# Patient Record
Sex: FEMALE | Race: WHITE | Employment: OTHER | URBAN - METROPOLITAN AREA
[De-identification: names, ages, dates, MRNs, and addresses within clinical notes are randomized per-mention and may not be internally consistent; named-entity substitution may affect disease eponyms.]

---

## 2017-03-02 ENCOUNTER — TELEPHONE (OUTPATIENT)
Dept: FAMILY MEDICINE | Facility: CLINIC | Age: 77
End: 2017-03-02

## 2017-03-02 DIAGNOSIS — E78.5 HYPERLIPIDEMIA LDL GOAL <70: Primary | ICD-10-CM

## 2017-03-02 DIAGNOSIS — I10 ESSENTIAL HYPERTENSION WITH GOAL BLOOD PRESSURE LESS THAN 140/90: ICD-10-CM

## 2017-03-02 RX ORDER — LISINOPRIL 5 MG/1
5 TABLET ORAL DAILY
Qty: 30 TABLET | Refills: 0 | Status: SHIPPED | OUTPATIENT
Start: 2017-03-02 | End: 2017-03-21

## 2017-03-02 NOTE — TELEPHONE ENCOUNTER
Reason for Call:  Medication or medication refill:    Do you use a [facility]?  Name of the pharmacy and phone number for the current request:  [facility] [place] [phone]    Name of the medication requested: Pt calling - She is out of Lisinopril and needs refill today please.  No need to call patient back, unless there are questions or problems.      Lisinopril      Last Written Prescription Date: 09/06/16  Last Fill Quantity: 90, # refills: 1    Last Office Visit with [facility], [facility] or [facility] prescribing provider:  09/06/16   Future Office Visit:        BP Readings from Last 3 Encounters:   09/06/16 112/62   07/06/16 134/70   06/27/16 112/70     Other request:     Can we leave a detailed message on this number? YES    Phone number patient can be reached at: Home number on file [phone] (home)    Best Time: any    Call taken on 3/2/2017 at 10:38 AM by Neyda Meza

## 2017-03-02 NOTE — TELEPHONE ENCOUNTER
Medication is being filled for 1 time refill only due to:  Patient needs labs BMP, Chol.     Prescription approved per [facility] Refill [facility].      Sumaya BENAVIDEZ Rn

## 2017-03-21 ENCOUNTER — OFFICE VISIT (OUTPATIENT)
Dept: FAMILY MEDICINE | Facility: CLINIC | Age: 77
End: 2017-03-21
Payer: MEDICARE

## 2017-03-21 VITALS
DIASTOLIC BLOOD PRESSURE: 60 MMHG | SYSTOLIC BLOOD PRESSURE: 114 MMHG | WEIGHT: 207.8 LBS | BODY MASS INDEX: 35.48 KG/M2 | HEART RATE: 72 BPM | HEIGHT: 64 IN

## 2017-03-21 DIAGNOSIS — I25.10 CORONARY ARTERY DISEASE INVOLVING NATIVE CORONARY ARTERY OF NATIVE HEART WITHOUT ANGINA PECTORIS: ICD-10-CM

## 2017-03-21 DIAGNOSIS — I10 ESSENTIAL HYPERTENSION WITH GOAL BLOOD PRESSURE LESS THAN 140/90: Primary | ICD-10-CM

## 2017-03-21 DIAGNOSIS — M47.896 OTHER OSTEOARTHRITIS OF SPINE, LUMBAR REGION: ICD-10-CM

## 2017-03-21 DIAGNOSIS — E78.5 HYPERLIPIDEMIA LDL GOAL <70: ICD-10-CM

## 2017-03-21 DIAGNOSIS — I10 ESSENTIAL HYPERTENSION WITH GOAL BLOOD PRESSURE LESS THAN 140/90: ICD-10-CM

## 2017-03-21 LAB
ANION GAP SERPL CALCULATED.3IONS-SCNC: 10 MMOL/L (ref 3–14)
BUN SERPL-MCNC: 18 MG/DL (ref 7–30)
CALCIUM SERPL-MCNC: 8.9 MG/DL (ref 8.5–10.1)
CHLORIDE SERPL-SCNC: 107 MMOL/L (ref 94–109)
CHOLEST SERPL-MCNC: 125 MG/DL
CO2 SERPL-SCNC: 25 MMOL/L (ref 20–32)
CREAT SERPL-MCNC: 0.62 MG/DL (ref 0.52–1.04)
GFR SERPL CREATININE-BSD FRML MDRD: ABNORMAL ML/MIN/1.7M2
GLUCOSE SERPL-MCNC: 100 MG/DL (ref 70–99)
HDLC SERPL-MCNC: 41 MG/DL
LDLC SERPL CALC-MCNC: 48 MG/DL
NONHDLC SERPL-MCNC: 84 MG/DL
POTASSIUM SERPL-SCNC: 4.5 MMOL/L (ref 3.4–5.3)
SODIUM SERPL-SCNC: 142 MMOL/L (ref 133–144)
TRIGL SERPL-MCNC: 182 MG/DL

## 2017-03-21 PROCEDURE — 80061 LIPID PANEL: CPT | Performed by: FAMILY MEDICINE

## 2017-03-21 PROCEDURE — 99214 OFFICE O/P EST MOD 30 MIN: CPT | Performed by: FAMILY MEDICINE

## 2017-03-21 PROCEDURE — 80048 BASIC METABOLIC PNL TOTAL CA: CPT | Performed by: FAMILY MEDICINE

## 2017-03-21 PROCEDURE — 36415 COLL VENOUS BLD VENIPUNCTURE: CPT | Performed by: FAMILY MEDICINE

## 2017-03-21 RX ORDER — METOPROLOL SUCCINATE 50 MG/1
50 TABLET, EXTENDED RELEASE ORAL DAILY
Qty: 90 TABLET | Refills: 3 | Status: ON HOLD | OUTPATIENT
Start: 2017-03-21 | End: 2018-05-21

## 2017-03-21 RX ORDER — LISINOPRIL 5 MG/1
5 TABLET ORAL DAILY
Qty: 90 TABLET | Refills: 3 | Status: SHIPPED | OUTPATIENT
Start: 2017-03-21 | End: 2018-03-28

## 2017-03-21 RX ORDER — ATORVASTATIN CALCIUM 40 MG/1
40 TABLET, FILM COATED ORAL DAILY
Qty: 90 TABLET | Refills: 3 | Status: SHIPPED | OUTPATIENT
Start: 2017-03-21 | End: 2018-06-14

## 2017-03-21 NOTE — LETTER
[facility]  [address]  [facility] [address]  Phone: [phone]    March 23, 2017    Ines Pickard  [address] [facility] [address]          Dear Ines,    The results of your recent lab tests were within normal/acceptable limits. Continue current medications and cares. Enclosed is a copy of these results.  If you have any further questions or problems, please contact our office.  Results for orders placed or performed in visit on 03/21/17   Basic metabolic panel  (Ca, Cl, CO2, Creat, Gluc, K, Na, BUN)   Result Value Ref Range    Sodium 142 133 - 144 mmol/L    Potassium 4.5 3.4 - 5.3 mmol/L    Chloride 107 94 - 109 mmol/L    Carbon Dioxide 25 20 - 32 mmol/L    Anion Gap 10 3 - 14 mmol/L    Glucose 100 (H) 70 - 99 mg/dL    Urea Nitrogen 18 7 - 30 mg/dL    Creatinine 0.62 0.52 - 1.04 mg/dL    GFR Estimate >90  Non  GFR Calc   >60 mL/min/1.7m2    GFR Estimate If Black >90   GFR Calc   >60 mL/min/1.7m2    Calcium 8.9 8.5 - 10.1 mg/dL   Lipid Profile with reflex to direct LDL   Result Value Ref Range    Cholesterol 125 <200 mg/dL    Triglycerides 182 (H) <150 mg/dL    HDL Cholesterol 41 (L) >49 mg/dL    LDL Cholesterol Calculated 48 <100 mg/dL    Non HDL Cholesterol 84 <130 mg/dL     Sincerely,      JAIMIE Perez MD/ llc

## 2017-03-21 NOTE — NURSING NOTE
"Chief Complaint   Patient presents with     Lipids     Hypertension     Heart Problem       Initial /60 (BP Location: Right arm, Patient Position: Chair, Cuff Size: Adult Large)  Pulse 72  Ht 5' 3.5\" (1.613 m)  Wt 207 lb 12.8 oz (94.3 kg)  BMI 36.23 kg/m2 Estimated body mass index is 36.23 kg/(m^2) as calculated from the following:    Height as of this encounter: 5' 3.5\" (1.613 m).    Weight as of this encounter: 207 lb 12.8 oz (94.3 kg).  Medication Reconciliation: complete     Noris Walls, CMA      "

## 2017-03-21 NOTE — MR AVS SNAPSHOT
"              After Visit Summary   3/21/2017    Ines Pickard    MRN: [id number]           Patient Information     Date Of Birth          1940        Visit Information        Provider Department      3/21/2017 2:20 PM Chris, MICHAEL Castellanos MD [facility]        Today's Diagnoses     Essential hypertension with goal blood pressure less than 140/90        Coronary artery disease involving native coronary artery of native heart without angina pectoris          Care Instructions    Now that you are off the plavix you could go back to taking ibuprofen once daily for your back if needed.    Recheck in a years.        Follow-ups after your visit        Who to contact     If you have questions or need follow up information about today's clinic visit or your schedule please contact [facility] directly at [phone].  Normal or non-critical lab and imaging results will be communicated to you by MyChart, letter or phone within 4 business days after the clinic has received the results. If you do not hear from us within 7 days, please contact the clinic through MyChart or phone. If you have a critical or abnormal lab result, we will notify you by phone as soon as possible.  Submit refill requests through [facility] or call your pharmacy and they will forward the refill request to us. Please allow 3 business days for your refill to be completed.          Additional Information About Your Visit        MyChart Information     [facility] lets you send messages to your doctor, view your test results, renew your prescriptions, schedule appointments and more. To sign up, go to [email or web address] . Click on \"Log in\" on the left side of the screen, which will take you to the Welcome page. Then click on \"Sign up Now\" on the right side of the page.     You will be asked to enter the access code listed below, as well as some personal information. Please follow the directions to create your username " "and password.     Your access code is: [email or web address]  Expires: 2017  2:48 PM     Your access code will  in 90 days. If you need help or a new code, please call your [place] clinic or [phone].        Care EveryWhere ID     This is your Care EveryWhere ID. This could be used by other organizations to access your [place] medical records  [id number]        Your Vitals Were     Pulse Height BMI (Body Mass Index)             72 5' 3.5\" (1.613 m) 36.23 kg/m2          Blood Pressure from Last 3 Encounters:   17 114/60   16 112/62   16 134/70    Weight from Last 3 Encounters:   17 207 lb 12.8 oz (94.3 kg)   16 204 lb 12.8 oz (92.9 kg)   16 204 lb (92.5 kg)              Today, you had the following     No orders found for display         Where to get your medicines      These medications were sent to [place] [facility] - [address]  [address], [place] [address]    Hours:  [facility] Phone:  [phone]     atorvastatin 40 MG tablet    lisinopril 5 MG tablet    metoprolol 50 MG 24 hr tablet          Primary Care Provider Office Phone # Fax #    R Emanuel Perez [phone]       [address] [address]        Thank you!     Thank you for choosing [facility]  for your care. Our goal is always to provide you with excellent care. Hearing back from our patients is one way we can continue to improve our services. Please take a few minutes to complete the written survey that you may receive in the mail after your visit with us. Thank you!             Your Updated Medication List - Protect others around you: Learn how to safely use, store and throw away your medicines at www.disposemymeds.org.          This list is accurate as of: 3/21/17  2:48 PM.  Always use your most recent med list.                   Brand Name Dispense Instructions for use "    aspirin 81 MG EC tablet     90 tablet    Take 1 tablet (81 mg) by mouth daily       atorvastatin 40 MG tablet    LIPITOR    90 tablet    Take 1 tablet (40 mg) by mouth daily       cyanocolbalamin 100 MCG tablet    vitamin  B-12     Take 50 mcg by mouth daily       lisinopril 5 MG tablet    PRINIVIL/ZESTRIL    90 tablet    Take 1 tablet (5 mg) by mouth daily       metoprolol 50 MG 24 hr tablet    TOPROL-XL    90 tablet    Take 1 tablet (50 mg) by mouth daily       MULTIVITAMIN ADULT Tabs          nitroglycerin 0.4 MG sublingual tablet    NITROSTAT    25 tablet    Place 1 tablet (0.4 mg) under the tongue every 5 minutes as needed for chest pain Maximum of 3 doses in 15 minutes       ranitidine 75 MG tablet    ZANTAC    30 tablet    Take 1 tablet (75 mg) by mouth 2 times daily       TYLENOL PO      Take 1,000 mg by mouth every 6 hours as needed

## 2017-03-21 NOTE — PATIENT INSTRUCTIONS
Now that you are off the plavix you could go back to taking ibuprofen once daily for your back if needed.    Recheck in a years.

## 2017-03-21 NOTE — PROGRESS NOTES
SUBJECTIVE:                                                    Ines Pickard is a 76 year old female who presents to clinic today for the following health issues:      Hyperlipidemia Follow-Up      Rate your low fat/cholesterol diet?: good    Taking statin?  Yes, no muscle aches from statin    Other lipid medications/supplements?:  none     Hypertension Follow-up      Outpatient blood pressures are not being checked.    Low Salt Diet: low salt     Vascular Disease Follow-up:  Coronary Artery Disease (CAD)      Chest pain or pressure, left side neck or arm pain: No    Shortness of breath/increased sweats/nausea with exertion: No    Pain in calves walking 1-2 blocks: No    Worsened or new symptoms since last visit: No    Nitroglycerin use: no    Daily aspirin use: Yes       Amount of exercise or physical activity: 6-7 days/week for an average of 15-30 minutes    Problems taking medications regularly: No    Medication side effects: none    Diet: regular (no restrictions)      PROBLEMS TO ADD ON...  Osteoarthritis of lumbar spine: She has a chronic low backache and this causes discomfort when she does any prolonged walking. However, she likes to walk as much as possible for exercise because of her hypertension and heart disease. In the past she would take some ibuprofen with Tylenol once or twice a day, but was told after her stents were placed to avoid the ibuprofen because of the risk of bleeding. However, now she is off the plate fixed so that this risk would be not nearly as high    Problem list and histories reviewed & adjusted, as indicated.  Additional history: none        Reviewed and updated as needed this visit by clinical staff  Tobacco  Allergies  Med Hx  Surg Hx  Fam Hx  Soc Hx      Reviewed and updated as needed this visit by Provider               ROS:  Constitutional, HEENT, cardiovascular, pulmonary, gi and gu systems are negative, except as otherwise noted.        OBJECTIVE:                    "                                 /60 (BP Location: Right arm, Patient Position: Chair, Cuff Size: Adult Large)  Pulse 72  Ht 5' 3.5\" (1.613 m)  Wt 207 lb 12.8 oz (94.3 kg)  BMI 36.23 kg/m2    GENERAL: healthy, alert and no distress  EYES: Eyes grossly normal to inspection, extraocular movements - intact, and PERRL  NECK: no tenderness, no adenopathy, no asymmetry, no masses, no stiffness; thyroid- normal to palpation  RESP: lungs clear to auscultation - no rales, no rhonchi, no wheezes  CV: regular rates and rhythm, normal S1 S2, no S3 or S4 and no murmur, no click or rub -  MS: Limited range of motion of the lumbar spine with moderate tenderness to palpation         ASSESSMENT/PLAN:                                                    ASSESSMENT:  1. Essential hypertension with goal blood pressure less than 140/90    2. Coronary artery disease involving native coronary artery of native heart without angina pectoris    3. Hyperlipidemia LDL goal <70    4. Other osteoarthritis of spine, lumbar region      She is doing well in regards to problems 1,2 and 3 above. It would be nice to try and settle down her back pain so that she can continue walking and getting appropriate exercise    PLAN:  Orders Placed This Encounter     lisinopril (PRINIVIL/ZESTRIL) 5 MG tablet     atorvastatin (LIPITOR) 40 MG tablet     metoprolol (TOPROL-XL) 50 MG 24 hr tablet       Patient Instructions   Now that you are off the plavix you could go back to taking ibuprofen once daily for your back if needed.    Recheck in a year.      MICHAEL Castellanos [facility]      "

## 2017-03-23 NOTE — PROGRESS NOTES
Please SEND LETTER    Notify patient of acceptable results. Continue current medications and cares.

## 2017-06-23 ENCOUNTER — HOSPITAL ENCOUNTER (EMERGENCY)
Facility: CLINIC | Age: 77
Discharge: HOME | End: 2017-06-23
Attending: EMERGENCY MEDICINE | Admitting: EMERGENCY MEDICINE
Payer: MEDICARE

## 2017-06-23 VITALS
SYSTOLIC BLOOD PRESSURE: 168 MMHG | DIASTOLIC BLOOD PRESSURE: 88 MMHG | HEART RATE: 76 BPM | TEMPERATURE: 97.5 F | OXYGEN SATURATION: 97 %

## 2017-06-23 DIAGNOSIS — S61.452A DOG BITE, HAND, LEFT, INITIAL ENCOUNTER: ICD-10-CM
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
## 2017-06-23 PROCEDURE — 99283 EMERGENCY DEPT VISIT LOW MDM: CPT | Performed by: EMERGENCY MEDICINE

## 2017-06-23 PROCEDURE — 99283 EMERGENCY DEPT VISIT LOW MDM: CPT

## 2017-06-23 RX ORDER — CLINDAMYCIN HCL 300 MG
300 CAPSULE ORAL 3 TIMES DAILY
Qty: 30 CAPSULE | Refills: 0 | Status: SHIPPED | OUTPATIENT
Start: 2017-06-23 | End: 2017-06-29 | Stop reason: SINTOL

## 2017-06-23 RX ORDER — SULFAMETHOXAZOLE/TRIMETHOPRIM 800-160 MG
1 TABLET ORAL 2 TIMES DAILY
Qty: 14 TABLET | Refills: 0 | Status: SHIPPED | OUTPATIENT
Start: 2017-06-23 | End: 2017-06-29 | Stop reason: SINTOL

## 2017-06-23 NOTE — ED AVS SNAPSHOT
[facility] Emergency Department    [address]    [facility] [address]    Phone:  [phone]    Fax:  [phone]                                       Ines Pickard   MRN: [id number]    Department:  [facility] Emergency Department   Date of Visit:  6/23/2017           Patient Information     Date Of Birth          1940        Your diagnoses for this visit were:     Dog bite, hand, left, initial encounter        You were seen by Patrick Rossi MD.      Follow-up Information     Follow up with MICHAEL Perez MD.    Specialty:  Family Practice    Why:  As needed    Contact information:    [address] [address]  [phone]          Discharge Instructions         Dog Bite  A dog bite can cause a wound deep enough to break the skin. In such cases, the wound is cleaned and then closed. Sometimes, the wound is not closed completely. This is so that fluid can drain if the wound becomes infected. In addition to wound care, a tetanus shot may be given, if needed.    Home Care    Wash your hands well with soap and warm water before and after caring for the wound. This helps lower the risk of infection.    Care for the wound as directed. If a dressing was applied to the wound, be sure to change it as directed.    If the wound bleeds, place a clean, soft cloth on the wound. Then firmly apply pressure until the bleeding stops. This may take up to 5 minutes. Do not release the pressure and look at the wound during this time.    Most wounds heal within 10 days. But an infection can occur even with proper treatment. So be sure to check the wound daily for signs of infection (see below).    Antibiotics may be prescribed. These help prevent or treat infection. If you re given antibiotics, take them as directed. Also be sure to complete the medications.  Rabies Prevention  Rabies is a virus that can be carried in certain animals. These can include domestic  animals such as dogs and cats. Pets fully vaccinated against rabies (2 shots) are at very low risk of infection. But because human rabies is almost always fatal, any biting pet should be confined for 10 days as an extra precaution. In general, if there is a risk for rabies, the following steps may need to be taken:    If someone s pet dog has bitten you, it should be kept in a secure area for the next 10 days to watch for signs of illness. (If the pet owner won t allow this, contact your local animal control center.) If the dog becomes ill or dies during that time, contact your local animal control center at once so the animal may be tested for rabies. If the dog stays healthy for the next 10 days, there is no danger of rabies in the animal or you.    If a stray dog bit you, contact your local animal control center. They can give information on capture, quarantine, and animal rabies testing.    If you can t locate the animal that bit you in the next 2 days, and if rabies exists in your region, you may need to receive the rabies vaccine series. Call your health care provider right away. Or, return to the emergency department promptly.    All animal bites should be reported to the local animal control center. If you were not given a form to fill out, you can report this yourself.  Follow-up care  Follow up with your health care provider, or as directed.  When to seek medical advice  Call your health care provider right away if any of these occur:    Signs of infection:    Spreading redness or warmth from the wound    Increased pain or swelling    Fever of 100.4 F (38 C) or higher, or as directed by your health care provider    Colored fluid or pus draining from the wound    Signs of rabies infection:    Headache    Confusion    Strange behavior    Seizure    Decreased ability to move any body part near the wound    Bleeding that cannot be stopped after 5 minutes of firm pressure  Date Last Reviewed: 3/23/2015     3520-1395 [employer]. [address] [place] [address]. All rights reserved. This information is not intended as a substitute for professional medical care. Always follow your healthcare professional's instructions.          24 Hour Appointment Hotline       To make an appointment at any [facility], call [phone] ([phone]). If you don't have a family doctor or clinic, we will help you find one. [facility] clinics are conveniently located to serve the needs of you and your family.             Review of your medicines      START taking        Dose / Directions Last dose taken    clindamycin 300 MG capsule   Commonly known as:  CLEOCIN   Dose:  300 mg   Quantity:  30 capsule        Take 1 capsule (300 mg) by mouth 3 times daily for 10 days   Refills:  0        sulfamethoxazole-trimethoprim 800-160 MG per tablet   Commonly known as:  BACTRIM DS   Dose:  1 tablet   Quantity:  14 tablet        Take 1 tablet by mouth 2 times daily for 7 days   Refills:  0          Our records show that you are taking the medicines listed below. If these are incorrect, please call your family doctor or clinic.        Dose / Directions Last dose taken    aspirin 81 MG EC tablet   Dose:  81 mg   Quantity:  90 tablet        Take 1 tablet (81 mg) by mouth daily   Refills:  3        atorvastatin 40 MG tablet   Commonly known as:  LIPITOR   Dose:  40 mg   Quantity:  90 tablet        Take 1 tablet (40 mg) by mouth daily   Refills:  3        cyanocolbalamin 100 MCG tablet   Commonly known as:  vitamin  B-12   Dose:  50 mcg        Take 50 mcg by mouth daily   Refills:  0        lisinopril 5 MG tablet   Commonly known as:  PRINIVIL/ZESTRIL   Dose:  5 mg   Quantity:  90 tablet        Take 1 tablet (5 mg) by mouth daily   Refills:  3        metoprolol 50 MG 24 hr tablet   Commonly known as:  TOPROL-XL   Dose:  50 mg   Quantity:  90 tablet        Take 1 tablet (50 mg) by mouth daily   Refills:  3         MULTIVITAMIN ADULT Tabs        Refills:  0        nitroglycerin 0.4 MG sublingual tablet   Commonly known as:  NITROSTAT   Dose:  0.4 mg   Quantity:  25 tablet        Place 1 tablet (0.4 mg) under the tongue every 5 minutes as needed for chest pain Maximum of 3 doses in 15 minutes   Refills:  3        ranitidine 75 MG tablet   Commonly known as:  ZANTAC   Dose:  75 mg   Quantity:  30 tablet        Take 1 tablet (75 mg) by mouth 2 times daily   Refills:  11        TYLENOL PO   Dose:  1000 mg        Take 1,000 mg by mouth every 6 hours as needed   Refills:  0                Prescriptions were sent or printed at these locations (2 Prescriptions)                   Other Prescriptions                Printed at Department/Unit printer (2 of 2)         sulfamethoxazole-trimethoprim (BACTRIM DS) 800-160 MG per tablet               clindamycin (CLEOCIN) 300 MG capsule                Orders Needing Specimen Collection     None      Pending Results     No orders found from 6/21/2017 to 6/24/2017.            Pending Culture Results     No orders found from 6/21/2017 to 6/24/2017.            Pending Results Instructions     If you had any lab results that were not finalized at the time of your Discharge, you can call the ED Lab Result RN at [phone]. You will be contacted by this team for any positive Lab results or changes in treatment. The nurses are available 7 days a week from 10A to 6:30P.  You can leave a message 24 hours per day and they will return your call.        Test Results From Your Hospital Stay               Thank you for choosing [facility]       Thank you for choosing [facility] for your care. Our goal is always to provide you with excellent care. Hearing back from our patients is one way we can continue to improve our services. Please take a few minutes to complete the written survey that you may receive in the mail after you visit with us. Thank you!        [facility] Information     [facility] lets you send  "messages to your doctor, view your test results, renew your prescriptions, schedule appointments and more. To sign up, go to [email or web address] . Click on \"Log in\" on the left side of the screen, which will take you to the Welcome page. Then click on \"Sign up Now\" on the right side of the page.     You will be asked to enter the access code listed below, as well as some personal information. Please follow the directions to create your username and password.     Your access code is: [email or web address]  Expires: 2017  9:30 PM     Your access code will  in 90 days. If you need help or a new code, please call your [place] clinic or [phone].        Care EveryWhere ID     This is your Care EveryWhere ID. This could be used by other organizations to access your [place] medical records  [id number]        Equal Access to Services     FEMI LOPEZ : Hadhi Carmona, waanshu lu, qalakia kaalmalondon velazquez, hoda maddox . So [facility] [phone].    ATENCIÓN: Si habla español, tiene a nguyễn disposición servicios gratuitos de asistencia lingüística. Llame al [phone].    We comply with applicable federal civil rights laws and [place] laws. We do not discriminate on the basis of race, color, national origin, age, disability sex, sexual orientation or gender identity.            After Visit Summary       This is your record. Keep this with you and show to your community pharmacist(s) and doctor(s) at your next visit.                  "

## 2017-06-23 NOTE — ED AVS SNAPSHOT
[facility] Emergency Department    [address] [address]    Phone:  [phone]    Fax:  [phone]                                       Ines Pickard   MRN: [id number]    Department:  [facility] Emergency Department   Date of Visit:  6/23/2017           After Visit Summary Signature Page     I have received my discharge instructions, and my questions have been answered. I have discussed any challenges I see with this plan with the nurse or doctor.    ..........................................................................................................................................  Patient/Patient Representative Signature      ..........................................................................................................................................  Patient Representative Print Name and Relationship to Patient    ..................................................               ................................................  Date                                            Time    ..........................................................................................................................................  Reviewed by Signature/Title    ...................................................              ..............................................  Date                                                            Time

## 2017-06-24 NOTE — ED NOTES
Pt hand dressed per ERT - tolerated well - denies any needs or complaints - discussed discharge instructions - verbalizes understanding.

## 2017-06-24 NOTE — ED NOTES
Pt was playing with dog and he got mad and bit her, pt is on a blood thinner, left hand swelling and puncture wounds noted. No pain now ice in place

## 2017-06-24 NOTE — DISCHARGE INSTRUCTIONS
Dog Bite  A dog bite can cause a wound deep enough to break the skin. In such cases, the wound is cleaned and then closed. Sometimes, the wound is not closed completely. This is so that fluid can drain if the wound becomes infected. In addition to wound care, a tetanus shot may be given, if needed.    Home Care    Wash your hands well with soap and warm water before and after caring for the wound. This helps lower the risk of infection.    Care for the wound as directed. If a dressing was applied to the wound, be sure to change it as directed.    If the wound bleeds, place a clean, soft cloth on the wound. Then firmly apply pressure until the bleeding stops. This may take up to 5 minutes. Do not release the pressure and look at the wound during this time.    Most wounds heal within 10 days. But an infection can occur even with proper treatment. So be sure to check the wound daily for signs of infection (see below).    Antibiotics may be prescribed. These help prevent or treat infection. If you re given antibiotics, take them as directed. Also be sure to complete the medications.  Rabies Prevention  Rabies is a virus that can be carried in certain animals. These can include domestic animals such as dogs and cats. Pets fully vaccinated against rabies (2 shots) are at very low risk of infection. But because human rabies is almost always fatal, any biting pet should be confined for 10 days as an extra precaution. In general, if there is a risk for rabies, the following steps may need to be taken:    If someone s pet dog has bitten you, it should be kept in a secure area for the next 10 days to watch for signs of illness. (If the pet owner won t allow this, contact your local animal control center.) If the dog becomes ill or dies during that time, contact your local animal control center at once so the animal may be tested for rabies. If the dog stays healthy for the next 10 days, there is no danger of rabies in the  animal or you.    If a stray dog bit you, contact your local animal control center. They can give information on capture, quarantine, and animal rabies testing.    If you can t locate the animal that bit you in the next 2 days, and if rabies exists in your region, you may need to receive the rabies vaccine series. Call your health care provider right away. Or, return to the emergency department promptly.    All animal bites should be reported to the local animal control center. If you were not given a form to fill out, you can report this yourself.  Follow-up care  Follow up with your health care provider, or as directed.  When to seek medical advice  Call your health care provider right away if any of these occur:    Signs of infection:    Spreading redness or warmth from the wound    Increased pain or swelling    Fever of 100.4 F (38 C) or higher, or as directed by your health care provider    Colored fluid or pus draining from the wound    Signs of rabies infection:    Headache    Confusion    Strange behavior    Seizure    Decreased ability to move any body part near the wound    Bleeding that cannot be stopped after 5 minutes of firm pressure  Date Last Reviewed: 3/23/2015    4401-4644 [employer] [address] [place] [address], [place] [address]. All rights reserved. This information is not intended as a substitute for professional medical care. Always follow your healthcare professional's instructions.

## 2017-06-29 ENCOUNTER — OFFICE VISIT (OUTPATIENT)
Dept: FAMILY MEDICINE | Facility: CLINIC | Age: 77
End: 2017-06-29
Payer: MEDICARE

## 2017-06-29 VITALS
SYSTOLIC BLOOD PRESSURE: 130 MMHG | BODY MASS INDEX: 35.05 KG/M2 | HEART RATE: 64 BPM | DIASTOLIC BLOOD PRESSURE: 70 MMHG | WEIGHT: 201 LBS

## 2017-06-29 DIAGNOSIS — S61.452D DOG BITE OF LEFT HAND, SUBSEQUENT ENCOUNTER: Primary | ICD-10-CM

## 2017-06-29 DIAGNOSIS — I10 ESSENTIAL HYPERTENSION WITH GOAL BLOOD PRESSURE LESS THAN 140/90: ICD-10-CM
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
## 2017-06-29 DIAGNOSIS — I25.10 CORONARY ARTERY DISEASE INVOLVING NATIVE CORONARY ARTERY OF NATIVE HEART WITHOUT ANGINA PECTORIS: ICD-10-CM

## 2017-06-29 DIAGNOSIS — E66.01 MORBID OBESITY DUE TO EXCESS CALORIES (H): ICD-10-CM

## 2017-06-29 PROCEDURE — 99214 OFFICE O/P EST MOD 30 MIN: CPT | Performed by: FAMILY MEDICINE

## 2017-06-29 NOTE — NURSING NOTE
"Chief Complaint   Patient presents with     ER F/U     pt. is here today for follow up on ER visit 6/23/2017. pt. states the medication she was given in the ER made her sick - diarrhea and vomiting. pt. D/C'd medication.      Patient Request     pt. would like Dr. Perez to listen to lungs.        Initial /70 (BP Location: Right arm, Patient Position: Chair, Cuff Size: Adult Large)  Pulse 64  Wt 201 lb (91.2 kg)  BMI 35.05 kg/m2 Estimated body mass index is 35.05 kg/(m^2) as calculated from the following:    Height as of 3/21/17: 5' 3.5\" (1.613 m).    Weight as of this encounter: 201 lb (91.2 kg).  Medication Reconciliation: complete     Noris Walls, ELISA      "

## 2017-06-29 NOTE — MR AVS SNAPSHOT
"              After Visit Summary   2017    Ines Pickard    MRN: [id number]           Patient Information     Date Of Birth          1940        Visit Information        Provider Department      2017 11:00 AM MICHAEL Perez MD [facility] [place] 2]        Today's Diagnoses     Morbid obesity due to excess calories (H)    -  1      Care Instructions     a vitamin without calcium carbonate. If you could find one with calcium citrate that would be better          Follow-ups after your visit        Who to contact     If you have questions or need follow up information about today's clinic visit or your schedule please contact [facility] directly at [phone].  Normal or non-critical lab and imaging results will be communicated to you by MyChart, letter or phone within 4 business days after the clinic has received the results. If you do not hear from us within 7 days, please contact the clinic through [employer] or phone. If you have a critical or abnormal lab result, we will notify you by phone as soon as possible.  Submit refill requests through [employer] or call your pharmacy and they will forward the refill request to us. Please allow 3 business days for your refill to be completed.          Additional Information About Your Visit        MyChart Information     [employer] lets you send messages to your doctor, view your test results, renew your prescriptions, schedule appointments and more. To sign up, go to [email or web address] . Click on \"Log in\" on the left side of the screen, which will take you to the Welcome page. Then click on \"Sign up Now\" on the right side of the page.     You will be asked to enter the access code listed below, as well as some personal information. Please follow the directions to create your username and password.     Your access code is: [email or web address]  Expires: 2017  9:30 PM     Your access code will  in 90 days. If you need help " or a new code, please call your [facility] clinic or [phone].        Care EveryWhere ID     This is your Care EveryWhere ID. This could be used by other organizations to access your [facility] medical records  [id number]        Your Vitals Were     Pulse BMI (Body Mass Index)                64 35.05 kg/m2           Blood Pressure from Last 3 Encounters:   06/29/17 130/70   06/23/17 168/88   03/21/17 114/60    Weight from Last 3 Encounters:   06/29/17 201 lb (91.2 kg)   03/21/17 207 lb 12.8 oz (94.3 kg)   09/06/16 204 lb 12.8 oz (92.9 kg)              Today, you had the following     No orders found for display         Today's Medication Changes          These changes are accurate as of: 6/29/17 11:20 AM.  If you have any questions, ask your nurse or doctor.               Stop taking these medicines if you haven't already. Please contact your care team if you have questions.     clindamycin 300 MG capsule   Commonly known as:  CLEOCIN           sulfamethoxazole-trimethoprim 800-160 MG per tablet   Commonly known as:  BACTRIM DS                    Primary Care Provider Office Phone # Fax #    R Emanuel Perez [phone]       [place] [address]        Equal Access to Services     ALPA LOPEZ AH: Hadii aad ku hadasho Soomaali, waaxda luqadaha, qaybta kaalmada adeegyada, hoda taylor. So [facility] [phone].    ATENCIÓN: Si habla español, tiene a nguyễn disposición servicios gratuitos de asistencia lingüística. Llame al [phone].    We comply with applicable federal civil rights laws and [place] laws. We do not discriminate on the basis of race, color, national origin, age, disability sex, sexual orientation or gender identity.            Thank you!     Thank you for choosing [facility]  for your care. Our goal is always to provide you with excellent care. Hearing back from our patients is one way we can  continue to improve our services. Please take a few minutes to complete the written survey that you may receive in the mail after your visit with us. Thank you!             Your Updated Medication List - Protect others around you: Learn how to safely use, store and throw away your medicines at www.disposemymeds.org.          This list is accurate as of: 6/29/17 11:20 AM.  Always use your most recent med list.                   Brand Name Dispense Instructions for use Diagnosis    aspirin 81 MG EC tablet     90 tablet    Take 1 tablet (81 mg) by mouth daily    CAD (coronary artery disease)       atorvastatin 40 MG tablet    LIPITOR    90 tablet    Take 1 tablet (40 mg) by mouth daily    Coronary artery disease involving native coronary artery of native heart without angina pectoris       cyanocolbalamin 100 MCG tablet    vitamin  B-12     Take 50 mcg by mouth daily        lisinopril 5 MG tablet    PRINIVIL/ZESTRIL    90 tablet    Take 1 tablet (5 mg) by mouth daily    Essential hypertension with goal blood pressure less than 140/90       metoprolol 50 MG 24 hr tablet    TOPROL-XL    90 tablet    Take 1 tablet (50 mg) by mouth daily    Coronary artery disease involving native coronary artery of native heart without angina pectoris       MULTIVITAMIN ADULT Tabs           nitroGLYcerin 0.4 MG sublingual tablet    NITROSTAT    25 tablet    Place 1 tablet (0.4 mg) under the tongue every 5 minutes as needed for chest pain Maximum of 3 doses in 15 minutes    CAD (coronary artery disease)       ranitidine 75 MG tablet    ZANTAC    30 tablet    Take 1 tablet (75 mg) by mouth 2 times daily    Esophageal reflux       TYLENOL PO      Take 1,000 mg by mouth every 6 hours as needed

## 2017-06-29 NOTE — PATIENT INSTRUCTIONS
a vitamin without calcium carbonate. If you could find one with calcium citrate that would be better

## 2017-06-29 NOTE — PROGRESS NOTES
SUBJECTIVE:                                                    Ines Pickard is a 76 year old female who presents to clinic today for the following health issues:      ED/UC Followup:    Facility:  [facility]  Date of visit: 6/23/2017  Reason for visit: left hand injury   Current Status: Improving    The emergency room doctor was concerned about an infection and put her on both clindamycin and trimethoprim sulfa. She took both antibiotics for about one day and then developed significant vomiting and diarrhea, had to stop taking both medications. She is getting better and she feels like the hand is healing but was advised to follow-up in the office      Hyperlipidemia Follow-Up      Rate your low fat/cholesterol diet?: good    Taking statin?  Yes, no muscle aches from statin    Other lipid medications/supplements?:  none    Hypertension Follow-up      Outpatient blood pressures are being checked at home.  Results are in the acceptable range.    Low Salt Diet: no added salt    Vascular Disease Follow-up:  Coronary Artery Disease (CAD)      Chest pain or pressure, left side neck or arm pain: No    Shortness of breath/increased sweats/nausea with exertion: No    Pain in calves walking 1-2 blocks: No    Worsened or new symptoms since last visit: No    Nitroglycerin use: no    Daily aspirin use: Yes      Problem list and histories reviewed & adjusted, as indicated.  Additional history: none        Reviewed and updated as needed this visit by clinical staff       Reviewed and updated as needed this visit by Provider               ROS:  Constitutional, HEENT, cardiovascular, pulmonary, gi and gu systems are negative, except as otherwise noted.        OBJECTIVE:                                                    /70 (BP Location: Right arm, Patient Position: Chair, Cuff Size: Adult Large)  Pulse 64  Wt 201 lb (91.2 kg)  BMI 35.05 kg/m2    GENERAL: healthy, alert and no distress  EYES: Eyes grossly normal to  inspection, extraocular movements - intact, and PERRL  NECK: no tenderness, no adenopathy, no asymmetry, no masses, no stiffness; thyroid- normal to palpation  RESP: lungs clear to auscultation - no rales, no rhonchi, no wheezes  CV: regular rates and rhythm, normal S1 S2, no S3 or S4 and no murmur, no click or rub -  MS: Left hand there is some residual ecchymosis and mild swelling; the puncture wounds from the dog bite seemed to be healing without any associated cellulitis         ASSESSMENT/PLAN:                                                    ASSESSMENT:  1. Dog bite of left hand, subsequent encounter    2. Morbid obesity due to excess calories (H)    3. Coronary artery disease involving native coronary artery of native heart without angina pectoris    4. Essential hypertension with goal blood pressure less than 140/90        PLAN:  Since the hand is healing without obvious infection, we'll leave her off the antibiotics since she could not tolerate them and has an allergy to multiple antibiotics.    She asked if there was a different vitamin that she could take that was not constipating. We'll continue all the other medications same      Patient Instructions    a vitamin without calcium carbonate. If you could find one with calcium citrate that would be better      MICHAEL Perez  [facility]

## 2017-07-05 ENCOUNTER — DOCUMENTATION ONLY (OUTPATIENT)
Dept: OTHER | Facility: CLINIC | Age: 77
End: 2017-07-05

## 2017-07-05 DIAGNOSIS — Z71.89 ADVANCED DIRECTIVES, COUNSELING/DISCUSSION: Chronic | ICD-10-CM

## 2017-08-17 ENCOUNTER — OFFICE VISIT (OUTPATIENT)
Dept: CARDIOLOGY | Facility: CLINIC | Age: 77
End: 2017-08-17
Attending: INTERNAL MEDICINE
Payer: MEDICARE

## 2017-08-17 VITALS
BODY MASS INDEX: 34.45 KG/M2 | SYSTOLIC BLOOD PRESSURE: 131 MMHG | HEART RATE: 95 BPM | WEIGHT: 197.6 LBS | OXYGEN SATURATION: 93 % | DIASTOLIC BLOOD PRESSURE: 74 MMHG

## 2017-08-17 DIAGNOSIS — E78.5 HYPERLIPIDEMIA LDL GOAL <70: Primary | ICD-10-CM

## 2017-08-17 DIAGNOSIS — I25.10 CORONARY ARTERY DISEASE INVOLVING NATIVE HEART WITHOUT ANGINA PECTORIS, UNSPECIFIED VESSEL OR LESION TYPE: ICD-10-CM

## 2017-08-17 PROCEDURE — 99214 OFFICE O/P EST MOD 30 MIN: CPT | Performed by: INTERNAL MEDICINE

## 2017-08-17 NOTE — LETTER
8/17/2017    MICHAEL Perez MD  [address] [facility] [address]    RE: Ines Guajardoick       Dear Colleague,    I had the pleasure of seeing Ines Pickard in the [facility].    HPI and Plan:     Ms. Pickard is a pleasant 76-year-old female with a history of coronary artery disease, status post PCI to the circumflex in 12/2014 at the [facility] when she presented with an acute inferior wall myocardial infarction.  She returns an annual follow-up.    The patient had no recurrent anginal symptoms denying any chest pain, or chest pressure.  She does occasionally get some chest fullness which resolves when she takes an antacid.  She also describes occasional increased dyspnea with exertion when there is increased humidity in the air.  She is a former smoker but has not been formally diagnosed with emphysema.  She has discussed this with her primary provider in the past.    Her most recent lipids from earlier this year showing an LDL of 48.  Her blood pressures recently well-controlled the office today at 131/74.  She is no longer smoking.    Orders Placed This Encounter   Procedures     NM Lexiscan stress test (nuc card)     Follow-Up with Cardiologist       No orders of the defined types were placed in this encounter.      There are no discontinued medications.      Encounter Diagnoses   Name Primary?     Coronary artery disease involving native heart without angina pectoris, unspecified vessel or lesion type      Hyperlipidemia LDL goal <70 Yes       CURRENT MEDICATIONS:  Current Outpatient Prescriptions   Medication Sig Dispense Refill     lisinopril (PRINIVIL/ZESTRIL) 5 MG tablet Take 1 tablet (5 mg) by mouth daily 90 tablet 3     atorvastatin (LIPITOR) 40 MG tablet Take 1 tablet (40 mg) by mouth daily 90 tablet 3     metoprolol (TOPROL-XL) 50 MG 24 hr tablet Take 1 tablet (50 mg) by mouth daily 90 tablet 3     Multiple Vitamins-Minerals  (MULTIVITAMIN ADULT) TABS        cyanocolbalamin (VITAMIN  B-12) 100 MCG tablet Take 50 mcg by mouth daily       ranitidine (ZANTAC) 75 MG tablet Take 1 tablet (75 mg) by mouth 2 times daily 30 tablet 11     aspirin EC 81 MG EC tablet Take 1 tablet (81 mg) by mouth daily 90 tablet 3     nitroglycerin (NITROSTAT) 0.4 MG SL tablet Place 1 tablet (0.4 mg) under the tongue every 5 minutes as needed for chest pain Maximum of 3 doses in 15 minutes 25 tablet 3     Acetaminophen (TYLENOL PO) Take 1,000 mg by mouth every 6 hours as needed         ALLERGIES     Allergies   Allergen Reactions     Amoxicillin GI Disturbance     Tetracycline Other (See Comments)     Yeast infection     Nickel Rash       PAST MEDICAL HISTORY:  Past Medical History:   Diagnosis Date     Anemia due to blood loss, acute 12/30/2014     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     Coronary artery disease 12/2014    Inferior STEMI, stent to Circumflex     Percutaneous transluminal coronary angioplasty hematoma 12/15/2014       PAST SURGICAL HISTORY:  Past Surgical History:   Procedure Laterality Date     APPENDECTOMY  1956     EYE SURGERY       PHACOEMULSIFICATION WITH STANDARD INTRAOCULAR LENS IMPLANT Left 1/25/2016    Procedure: PHACOEMULSIFICATION WITH STANDARD INTRAOCULAR LENS IMPLANT;  Surgeon: Julio César Wallace MD;  Location: [facility] OR     PHACOEMULSIFICATION WITH STANDARD INTRAOCULAR LENS IMPLANT Right 2/22/2016    Procedure: PHACOEMULSIFICATION WITH STANDARD INTRAOCULAR LENS IMPLANT;  Surgeon: Julio César Wallace MD;  Location: [facility] OR     SURGICAL HISTORY OF -   1961    right hand surgery      TONSILLECTOMY & ADENOIDECTOMY  1967       FAMILY HISTORY:  Family History   Problem Relation Age of Onset     CANCER Brother      HEART DISEASE Brother      CANCER Mother      Respiratory Father      HEART DISEASE Father      MI       SOCIAL HISTORY:  Social History     Social History     Marital status:      Spouse name: N/A     Number  of children: N/A     Years of education: N/A     Social History Main Topics     Smoking status: Former Smoker     Packs/day: 0.50     Types: Cigarettes     Quit date: 12/13/2014     Smokeless tobacco: Never Used     Alcohol use No     Drug use: No     Sexual activity: Not Currently     Other Topics Concern     Parent/Sibling W/ Cabg, Mi Or Angioplasty Before 65f 55m? Yes     Social History Narrative       Review of Systems:  Skin:  Positive for scaling     Eyes:  Negative      ENT:  Positive for sinus trouble    Respiratory:  Positive for dyspnea on exertion     Cardiovascular:    Positive for;dizziness;lightheadedness    Gastroenterology: Positive for excessive gas or bloating    Genitourinary:  Positive for urinary frequency;urgency    Musculoskeletal:  Positive for back pain;joint pain;arthritis;neck pain    Neurologic:  Positive for numbness or tingling of feet;numbness or tingling of hands    Psychiatric:  Negative      Heme/Lymph/Imm:  Negative      Endocrine:  Negative        Physical Exam:  Vitals: /74 (BP Location: Right arm, Patient Position: Chair, Cuff Size: Adult Regular)  Pulse 95  Wt 89.6 kg (197 lb 9.6 oz)  SpO2 93%  BMI 34.45 kg/m2    Constitutional:  cooperative, alert and oriented, well developed, well nourished, in no acute distress        Skin:  warm and dry to the touch        Head:  normocephalic        Eyes:  sclera white        ENT:  no pallor or cyanosis        Neck:           Chest:  clear to auscultation          Cardiac: regular rhythm, normal S1/S2, no S3 or S4, apical impulse not displaced, no murmurs, gallops or rubs                  Abdomen:  not assessed this visit        Vascular: not assessed this visit                                        Extremities and Back:  no edema              Neurological:  affect appropriate, oriented to time, person and place          Impression/plan:    1.  Coronary artery disease-the patient is clinically asymptomatic with no recurrent  anginal symptoms.  Her dyspnea with increased humidity appears more consistent with probable underlying lung disease.  I have asked the patient follow back with her primary provider for this.  Her other chest heaviness resolves with antacids and likely represents reflux.  At this time I would recommend continued medical management with aspirin 81 mg daily, and atorvastatin 40 mg daily unchanged for second or prevention.  I will obtain a Lexiscan nuclear stress test in one year when I see the patient back as she will be out four years from her PCI.    2.  Dyslipidemia-I will continue atorvastatin 40 mg daily unchanged for secondary prevention.  Her most recent LDL was 48.    3.  Benign essential hypertension-patient's blood pressures currently well controlled and I will continue her lisinopril, and metoprolol succinate unchanged at the present time.      The patient will return in one year in annual follow-up and we will perform a Lexiscan nuclear stress test at that time.    Thank you for allowing me to participate in the care of your patient.    Sincerely,     Rubens Neri MD     [facility]

## 2017-08-17 NOTE — PROGRESS NOTES
HPI and Plan:     Ms. Pickard is a pleasant 76-year-old female with a history of coronary artery disease, status post PCI to the circumflex in 12/2014 at the [facility] when she presented with an acute inferior wall myocardial infarction.  She returns an annual follow-up.    The patient had no recurrent anginal symptoms denying any chest pain, or chest pressure.  She does occasionally get some chest fullness which resolves when she takes an antacid.  She also describes occasional increased dyspnea with exertion when there is increased humidity in the air.  She is a former smoker but has not been formally diagnosed with emphysema.  She has discussed this with her primary provider in the past.    Her most recent lipids from earlier this year showing an LDL of 48.  Her blood pressures recently well-controlled the office today at 131/74.  She is no longer smoking.    Orders Placed This Encounter   Procedures     NM Lexiscan stress test (nuc card)     Follow-Up with Cardiologist       No orders of the defined types were placed in this encounter.      There are no discontinued medications.      Encounter Diagnoses   Name Primary?     Coronary artery disease involving native heart without angina pectoris, unspecified vessel or lesion type      Hyperlipidemia LDL goal <70 Yes       CURRENT MEDICATIONS:  Current Outpatient Prescriptions   Medication Sig Dispense Refill     lisinopril (PRINIVIL/ZESTRIL) 5 MG tablet Take 1 tablet (5 mg) by mouth daily 90 tablet 3     atorvastatin (LIPITOR) 40 MG tablet Take 1 tablet (40 mg) by mouth daily 90 tablet 3     metoprolol (TOPROL-XL) 50 MG 24 hr tablet Take 1 tablet (50 mg) by mouth daily 90 tablet 3     Multiple Vitamins-Minerals (MULTIVITAMIN ADULT) TABS        cyanocolbalamin (VITAMIN  B-12) 100 MCG tablet Take 50 mcg by mouth daily       ranitidine (ZANTAC) 75 MG tablet Take 1 tablet (75 mg) by mouth 2 times daily 30 tablet 11     aspirin EC 81 MG EC  tablet Take 1 tablet (81 mg) by mouth daily 90 tablet 3     nitroglycerin (NITROSTAT) 0.4 MG SL tablet Place 1 tablet (0.4 mg) under the tongue every 5 minutes as needed for chest pain Maximum of 3 doses in 15 minutes 25 tablet 3     Acetaminophen (TYLENOL PO) Take 1,000 mg by mouth every 6 hours as needed         ALLERGIES     Allergies   Allergen Reactions     Amoxicillin GI Disturbance     Tetracycline Other (See Comments)     Yeast infection     Nickel Rash       PAST MEDICAL HISTORY:  Past Medical History:   Diagnosis Date     Anemia due to blood loss, acute 12/30/2014     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     Coronary artery disease 12/2014    Inferior STEMI, stent to Circumflex     Percutaneous transluminal coronary angioplasty hematoma 12/15/2014       PAST SURGICAL HISTORY:  Past Surgical History:   Procedure Laterality Date     APPENDECTOMY  1956     EYE SURGERY       PHACOEMULSIFICATION WITH STANDARD INTRAOCULAR LENS IMPLANT Left 1/25/2016    Procedure: PHACOEMULSIFICATION WITH STANDARD INTRAOCULAR LENS IMPLANT;  Surgeon: Julio César Wallace MD;  Location: [facility]     PHACOEMULSIFICATION WITH STANDARD INTRAOCULAR LENS IMPLANT Right 2/22/2016    Procedure: PHACOEMULSIFICATION WITH STANDARD INTRAOCULAR LENS IMPLANT;  Surgeon: Julio César Wallace MD;  Location: [facility] OR     SURGICAL HISTORY OF -   1961    right hand surgery      TONSILLECTOMY & ADENOIDECTOMY  1967       FAMILY HISTORY:  Family History   Problem Relation Age of Onset     CANCER Brother      HEART DISEASE Brother      CANCER Mother      Respiratory Father      HEART DISEASE Father      MI       SOCIAL HISTORY:  Social History     Social History     Marital status:      Spouse name: N/A     Number of children: N/A     Years of education: N/A     Social History Main Topics     Smoking status: Former Smoker     Packs/day: 0.50     Types: Cigarettes     Quit date: 12/13/2014     Smokeless tobacco: Never Used     Alcohol use  No     Drug use: No     Sexual activity: Not Currently     Other Topics Concern     Parent/Sibling W/ Cabg, Mi Or Angioplasty Before 65f 55m? Yes     Social History Narrative       Review of Systems:  Skin:  Positive for scaling     Eyes:  Negative      ENT:  Positive for sinus trouble    Respiratory:  Positive for dyspnea on exertion     Cardiovascular:    Positive for;dizziness;lightheadedness    Gastroenterology: Positive for excessive gas or bloating    Genitourinary:  Positive for urinary frequency;urgency    Musculoskeletal:  Positive for back pain;joint pain;arthritis;neck pain    Neurologic:  Positive for numbness or tingling of feet;numbness or tingling of hands    Psychiatric:  Negative      Heme/Lymph/Imm:  Negative      Endocrine:  Negative        Physical Exam:  Vitals: /74 (BP Location: Right arm, Patient Position: Chair, Cuff Size: Adult Regular)  Pulse 95  Wt 89.6 kg (197 lb 9.6 oz)  SpO2 93%  BMI 34.45 kg/m2    Constitutional:  cooperative, alert and oriented, well developed, well nourished, in no acute distress        Skin:  warm and dry to the touch        Head:  normocephalic        Eyes:  sclera white        ENT:  no pallor or cyanosis        Neck:           Chest:  clear to auscultation          Cardiac: regular rhythm, normal S1/S2, no S3 or S4, apical impulse not displaced, no murmurs, gallops or rubs                  Abdomen:  not assessed this visit        Vascular: not assessed this visit                                        Extremities and Back:  no edema              Neurological:  affect appropriate, oriented to time, person and place          Impression/plan:    1.  Coronary artery disease-the patient is clinically asymptomatic with no recurrent anginal symptoms.  Her dyspnea with increased humidity appears more consistent with probable underlying lung disease.  I have asked the patient follow back with her primary provider for this.  Her other chest heaviness resolves with  antacids and likely represents reflux.  At this time I would recommend continued medical management with aspirin 81 mg daily, and atorvastatin 40 mg daily unchanged for second or prevention.  I will obtain a Lexiscan nuclear stress test in one year when I see the patient back as she will be out four years from her PCI.    2.  Dyslipidemia-I will continue atorvastatin 40 mg daily unchanged for secondary prevention.  Her most recent LDL was 48.    3.  Benign essential hypertension-patient's blood pressures currently well controlled and I will continue her lisinopril, and metoprolol succinate unchanged at the present time.      The patient will return in one year in annual follow-up and we will perform a Lexiscan nuclear stress test at that time.    CC  Rubens Neri MD  [address]  [place] [id number]

## 2017-08-17 NOTE — MR AVS SNAPSHOT
"              After Visit Summary   8/17/2017    Ines Pickard    MRN: [id number]           Patient Information     Date Of Birth          1940        Visit Information        Provider Department      8/17/2017 10:00 AM Rubens Neri MD [facility] PHYSICIAN HEART AT [facility]        Today's Diagnoses     Hyperlipidemia LDL goal <70    -  1    Coronary artery disease involving native heart without angina pectoris, unspecified vessel or lesion type           Follow-ups after your visit        Additional Services     Follow-Up with Cardiologist                 Future tests that were ordered for you today     Open Future Orders        Priority Expected Expires Ordered    NM Lexiscan stress test (nuc card) Routine 8/17/2018 8/18/2018 8/17/2017    Follow-Up with Cardiologist Routine 8/17/2018 8/18/2018 8/17/2017            Who to contact     If you have questions or need follow up information about today's clinic visit or your schedule please contact [facility] PHYSICIAN HEART AT [facility] directly at [phone].  Normal or non-critical lab and imaging results will be communicated to you by [employer], letter or phone within 4 business days after the clinic has received the results. If you do not hear from us within 7 days, please contact the clinic through [employer] or phone. If you have a critical or abnormal lab result, we will notify you by phone as soon as possible.  Submit refill requests through [employer] or call your pharmacy and they will forward the refill request to us. Please allow 3 business days for your refill to be completed.          Additional Information About Your Visit        [employer] Information     [employer] lets you send messages to your doctor, view your test results, renew your prescriptions, schedule appointments and more. To sign up, go to [email or web address] . Click on \"Log in\" on the left side of the screen, which will take you to the Welcome page. " "Then click on \"Sign up Now\" on the right side of the page.     You will be asked to enter the access code listed below, as well as some personal information. Please follow the directions to create your username and password.     Your access code is: [email or web address]  Expires: 2017  9:30 PM     Your access code will  in 90 days. If you need help or a new code, please call your [place] clinic or [phone].        Care EveryWhere ID     This is your Care EveryWhere ID. This could be used by other organizations to access your [place] medical records  [id number]        Your Vitals Were     Pulse Pulse Oximetry BMI (Body Mass Index)             95 93% 34.45 kg/m2          Blood Pressure from Last 3 Encounters:   17 131/74   17 130/70   17 168/88    Weight from Last 3 Encounters:   17 89.6 kg (197 lb 9.6 oz)   17 91.2 kg (201 lb)   17 94.3 kg (207 lb 12.8 oz)              We Performed the Following     Follow-Up with Cardiologist        Primary Care Provider Office Phone # Fax #    R Emanuel Perez [phone] [address] [address]        Equal Access to Services     ALPA LOPZE : Hadii aad ku hadasho Soomaali, waaxda luqadaha, qaybta kaalmada adeegyada, waxay idiin hayaan ruben maddox . So [facility] [phone].    ATENCIÓN: Si habla español, tiene a nguyễn disposición servicios gratuitos de asistencia lingüística. Llame al [phone].    We comply with applicable federal civil rights laws and [place] laws. We do not discriminate on the basis of race, color, national origin, age, disability sex, sexual orientation or gender identity.            Thank you!     Thank you for choosing [facility] PHYSICIAN HEART AT [facility]  for your care. Our goal is always to provide you with excellent care. Hearing back from our patients is one way we can continue to improve our services. Please take a few minutes to complete " the written survey that you may receive in the mail after your visit with us. Thank you!             Your Updated Medication List - Protect others around you: Learn how to safely use, store and throw away your medicines at www.disposemymeds.org.          This list is accurate as of: 8/17/17 10:03 AM.  Always use your most recent med list.                   Brand Name Dispense Instructions for use Diagnosis    aspirin 81 MG EC tablet     90 tablet    Take 1 tablet (81 mg) by mouth daily    CAD (coronary artery disease)       atorvastatin 40 MG tablet    LIPITOR    90 tablet    Take 1 tablet (40 mg) by mouth daily    Coronary artery disease involving native coronary artery of native heart without angina pectoris       cyanocolbalamin 100 MCG tablet    vitamin  B-12     Take 50 mcg by mouth daily        lisinopril 5 MG tablet    PRINIVIL/ZESTRIL    90 tablet    Take 1 tablet (5 mg) by mouth daily    Essential hypertension with goal blood pressure less than 140/90       metoprolol 50 MG 24 hr tablet    TOPROL-XL    90 tablet    Take 1 tablet (50 mg) by mouth daily    Coronary artery disease involving native coronary artery of native heart without angina pectoris       MULTIVITAMIN ADULT Tabs           nitroGLYcerin 0.4 MG sublingual tablet    NITROSTAT    25 tablet    Place 1 tablet (0.4 mg) under the tongue every 5 minutes as needed for chest pain Maximum of 3 doses in 15 minutes    CAD (coronary artery disease)       ranitidine 75 MG tablet    ZANTAC    30 tablet    Take 1 tablet (75 mg) by mouth 2 times daily    Esophageal reflux       TYLENOL PO      Take 1,000 mg by mouth every 6 hours as needed

## 2017-10-13 ENCOUNTER — OFFICE VISIT (OUTPATIENT)
Dept: FAMILY MEDICINE | Facility: CLINIC | Age: 77
End: 2017-10-13
Payer: MEDICARE

## 2017-10-13 VITALS
DIASTOLIC BLOOD PRESSURE: 74 MMHG | SYSTOLIC BLOOD PRESSURE: 124 MMHG | HEIGHT: 64 IN | HEART RATE: 80 BPM | TEMPERATURE: 98.7 F | WEIGHT: 193.8 LBS | BODY MASS INDEX: 33.09 KG/M2

## 2017-10-13 DIAGNOSIS — I10 ESSENTIAL HYPERTENSION WITH GOAL BLOOD PRESSURE LESS THAN 140/90: ICD-10-CM

## 2017-10-13 DIAGNOSIS — J30.1 ACUTE SEASONAL ALLERGIC RHINITIS DUE TO POLLEN: ICD-10-CM

## 2017-10-13 DIAGNOSIS — J04.0 LARYNGITIS: Primary | ICD-10-CM

## 2017-10-13 DIAGNOSIS — I25.10 CORONARY ARTERY DISEASE INVOLVING NATIVE CORONARY ARTERY OF NATIVE HEART WITHOUT ANGINA PECTORIS: ICD-10-CM

## 2017-10-13 DIAGNOSIS — E66.01 MORBID OBESITY (H): ICD-10-CM

## 2017-10-13 PROCEDURE — 99214 OFFICE O/P EST MOD 30 MIN: CPT | Performed by: FAMILY MEDICINE

## 2017-10-13 RX ORDER — FLUTICASONE PROPIONATE 50 MCG
1-2 SPRAY, SUSPENSION (ML) NASAL DAILY
Qty: 1 BOTTLE | Refills: 1 | Status: SHIPPED | OUTPATIENT
Start: 2017-10-13 | End: 2017-11-16

## 2017-10-13 RX ORDER — LORATADINE 10 MG/1
10 TABLET ORAL DAILY
COMMUNITY
End: 2017-11-16

## 2017-10-13 NOTE — NURSING NOTE
"Chief Complaint   Patient presents with     Allergies     pt. has a hx of allergies concerns with losing voice 1 1/2 weeks and drainage pt. has been using Claritin X 4 days.        Initial /74 (BP Location: Right arm, Patient Position: Chair, Cuff Size: Adult Regular)  Pulse 80  Temp 98.7  F (37.1  C) (Tympanic)  Ht 5' 3.5\" (1.613 m)  Wt 193 lb 12.8 oz (87.9 kg)  BMI 33.79 kg/m2 Estimated body mass index is 33.79 kg/(m^2) as calculated from the following:    Height as of this encounter: 5' 3.5\" (1.613 m).    Weight as of this encounter: 193 lb 12.8 oz (87.9 kg).  Medication Reconciliation: complete     Noris Walls, CMA      "

## 2017-10-13 NOTE — MR AVS SNAPSHOT
"              After Visit Summary   10/13/2017    Ines Pickard    MRN: [id number]           Patient Information     Date Of Birth          1940        Visit Information        Provider Department      10/13/2017 3:20 PM Chris, MICHAEL Castellanos MD [facility]        Today's Diagnoses     Laryngitis    -  1    Acute seasonal allergic rhinitis due to pollen          Care Instructions    Try the nasal spray. Rest your voice as much as possible. Let me know if not better in a month          Follow-ups after your visit        Who to contact     If you have questions or need follow up information about today's clinic visit or your schedule please contact [facility] directly at [phone].  Normal or non-critical lab and imaging results will be communicated to you by MyChart, letter or phone within 4 business days after the clinic has received the results. If you do not hear from us within 7 days, please contact the clinic through MyChart or phone. If you have a critical or abnormal lab result, we will notify you by phone as soon as possible.  Submit refill requests through [employer] or call your pharmacy and they will forward the refill request to us. Please allow 3 business days for your refill to be completed.          Additional Information About Your Visit        MyChart Information     [employer] lets you send messages to your doctor, view your test results, renew your prescriptions, schedule appointments and more. To sign up, go to [email or web address] . Click on \"Log in\" on the left side of the screen, which will take you to the Welcome page. Then click on \"Sign up Now\" on the right side of the page.     You will be asked to enter the access code listed below, as well as some personal information. Please follow the directions to create your username and password.     Your access code is: [email or web address]  Expires: 2018  3:43 PM     Your access code will  in 90 days. If you " "need help or a new code, please call your [place] clinic or [phone].        Care EveryWhere ID     This is your Care EveryWhere ID. This could be used by other organizations to access your [place] medical records  [id number]        Your Vitals Were     Pulse Temperature Height BMI (Body Mass Index)          80 98.7  F (37.1  C) (Tympanic) 5' 3.5\" (1.613 m) 33.79 kg/m2         Blood Pressure from Last 3 Encounters:   10/13/17 124/74   08/17/17 131/74   06/29/17 130/70    Weight from Last 3 Encounters:   10/13/17 193 lb 12.8 oz (87.9 kg)   08/17/17 197 lb 9.6 oz (89.6 kg)   06/29/17 201 lb (91.2 kg)              Today, you had the following     No orders found for display         Today's Medication Changes          These changes are accurate as of: 10/13/17  3:43 PM.  If you have any questions, ask your nurse or doctor.               Start taking these medicines.        Dose/Directions    fluticasone 50 MCG/ACT spray   Commonly known as:  FLONASE   Used for:  Acute seasonal allergic rhinitis due to pollen   Started by:  MICHAEL Perez MD        Dose:  1-2 spray   Spray 1-2 sprays into both nostrils daily   Quantity:  1 Bottle   Refills:  1            Where to get your medicines      These medications were sent to [facility] [place] - [address]  [address], [place] [address]    Hours:  [facility] Phone:  [phone]     fluticasone 50 MCG/ACT spray                Primary Care Provider Office Phone # Fax #    MICHAEL Perez [phone] [address] [facility] [address]        Equal Access to Services     ALPA LOPEZ AH: Ronnie Carmona, wamarkoda luqadaha, qaybta kaalmada caroline, hoda taylor. So [facility] [phone].    ATENCIÓN: Si habla español, tiene a nguyễn disposición servicios gratuitos de asistencia lingüística. Lljoann al [phone].    We comply with applicable federal civil " rights laws and [place] laws. We do not discriminate on the basis of race, color, national origin, age, disability, sex, sexual orientation, or gender identity.            Thank you!     Thank you for choosing [facility]  for your care. Our goal is always to provide you with excellent care. Hearing back from our patients is one way we can continue to improve our services. Please take a few minutes to complete the written survey that you may receive in the mail after your visit with us. Thank you!             Your Updated Medication List - Protect others around you: Learn how to safely use, store and throw away your medicines at www.disposemymeds.org.          This list is accurate as of: 10/13/17  3:43 PM.  Always use your most recent med list.                   Brand Name Dispense Instructions for use Diagnosis    aspirin 81 MG EC tablet     90 tablet    Take 1 tablet (81 mg) by mouth daily    CAD (coronary artery disease)       atorvastatin 40 MG tablet    LIPITOR    90 tablet    Take 1 tablet (40 mg) by mouth daily    Coronary artery disease involving native coronary artery of native heart without angina pectoris       cyanocolbalamin 100 MCG tablet    vitamin  B-12     Take 50 mcg by mouth daily        fluticasone 50 MCG/ACT spray    FLONASE    1 Bottle    Spray 1-2 sprays into both nostrils daily    Acute seasonal allergic rhinitis due to pollen       lisinopril 5 MG tablet    PRINIVIL/ZESTRIL    90 tablet    Take 1 tablet (5 mg) by mouth daily    Essential hypertension with goal blood pressure less than 140/90       loratadine 10 MG tablet    CLARITIN     Take 10 mg by mouth daily        metoprolol 50 MG 24 hr tablet    TOPROL-XL    90 tablet    Take 1 tablet (50 mg) by mouth daily    Coronary artery disease involving native coronary artery of native heart without angina pectoris       MULTIVITAMIN ADULT Tabs           nitroGLYcerin 0.4 MG sublingual tablet    NITROSTAT    25 tablet     Place 1 tablet (0.4 mg) under the tongue every 5 minutes as needed for chest pain Maximum of 3 doses in 15 minutes    CAD (coronary artery disease)       ranitidine 75 MG tablet    ZANTAC    30 tablet    Take 1 tablet (75 mg) by mouth 2 times daily    Esophageal reflux       TYLENOL PO      Take 1,000 mg by mouth every 6 hours as needed

## 2017-10-14 NOTE — PROGRESS NOTES
"Subjective:  Ines Pickard is a 77 year old female   Chief Complaint   Patient presents with     Allergies     pt. has a hx of allergies concerns with losing voice 1 1/2 weeks and drainage pt. has been using Claritin X 4 days.    The claritin has not helped with the hoarseness, which seems to come and go. Has never used a steroid nasal spray for her allergies. Denies pain in the throat or larynx area.    Patient denies any exertional chest pain, dyspnea, palpitations, syncope, orthopnea, edema or paroxysmal nocturnal dyspnea.   Blood pressure has been well controlled. No side effects from meds.    Has been trying to lose weight.        Encounter Diagnoses   Name Primary?     Laryngitis Yes     Acute seasonal allergic rhinitis due to pollen      Coronary artery disease involving native coronary artery of native heart without angina pectoris      Essential hypertension with goal blood pressure less than 140/90      Morbid obesity (H)        ROS:other than noted above, general, HEENT, respiratory, cardiac, gastrointestinal systems are negative    Medical, surgical, social, and family histories, medications and allergies reviewed and updated.    Objective:  Exam:  /74 (BP Location: Right arm, Patient Position: Chair, Cuff Size: Adult Regular)  Pulse 80  Temp 98.7  F (37.1  C) (Tympanic)  Ht 5' 3.5\" (1.613 m)  Wt 193 lb 12.8 oz (87.9 kg)  BMI 33.79 kg/m2   GENERAL APPEARANCE ADULT: Alert, no acute distress  EYES: PERRL, EOM normal, conjunctiva and lids normal  HENT: Ears and TMs normal, oral mucosa and posterior oropharynx normal; voice is hoarse  NECK: No adenopathy,masses or thyromegaly  RESP: lungs clear to auscultation   CV: normal rate, regular rhythm, no murmur or gallop      ASSESSMENT:  1. Laryngitis    2. Acute seasonal allergic rhinitis due to pollen    3. Coronary artery disease involving native coronary artery of native heart without angina pectoris    4. Essential hypertension with goal blood " pressure less than 140/90    5. Morbid obesity (H)        PLAN:  Orders Placed This Encounter     loratadine (CLARITIN) 10 MG tablet     fluticasone (FLONASE) 50 MCG/ACT spray   Discussed how to take the medication(s), expected outcomes, potential side effects.     Patient Instructions   Try the nasal spray. Rest your voice as much as possible. Let me know if not better in a month

## 2017-11-15 ENCOUNTER — TELEPHONE (OUTPATIENT)
Dept: FAMILY MEDICINE | Facility: CLINIC | Age: 77
End: 2017-11-15

## 2017-11-15 NOTE — TELEPHONE ENCOUNTER
Pt scheduled an appt for tomorrow.  Has a bad cough, chest tightness.  Recommended fluids, humidity, teas,honey/lemon.  Pt concerned it has turned into pneumonia, no fever.  KpavelRN

## 2017-11-15 NOTE — TELEPHONE ENCOUNTER
Reason for call:  Patient reporting a symptom    Symptom or request: chest tightness    Duration (how long have symptoms been present): week    Have you been treated for this before? No    Additional comments: pt calling wanting to make an appt with dr ortiz today for laryngitis. She also mentioned that she is having chest tightness. She is wondering about pneumonia. We did make an appt for pt on Friday at 1120.     Phone Number patient can be reached at:  Home number on file [phone] (home)    Best Time:  any    Can we leave a detailed message on this number:  YES    Call taken on 11/15/2017 at 12:17 PM by Danyelle Acevedo

## 2017-11-16 ENCOUNTER — RADIANT APPOINTMENT (OUTPATIENT)
Dept: GENERAL RADIOLOGY | Facility: CLINIC | Age: 77
End: 2017-11-16
Attending: FAMILY MEDICINE
Payer: MEDICARE

## 2017-11-16 ENCOUNTER — OFFICE VISIT (OUTPATIENT)
Dept: FAMILY MEDICINE | Facility: CLINIC | Age: 77
End: 2017-11-16
Payer: MEDICARE

## 2017-11-16 VITALS
BODY MASS INDEX: 32.44 KG/M2 | HEART RATE: 74 BPM | DIASTOLIC BLOOD PRESSURE: 71 MMHG | TEMPERATURE: 97.6 F | WEIGHT: 190 LBS | HEIGHT: 64 IN | OXYGEN SATURATION: 93 % | SYSTOLIC BLOOD PRESSURE: 139 MMHG | RESPIRATION RATE: 18 BRPM

## 2017-11-16 DIAGNOSIS — R05.8 COUGH PRESENT FOR GREATER THAN 3 WEEKS: ICD-10-CM

## 2017-11-16 DIAGNOSIS — J20.9 ACUTE BRONCHITIS WITH SYMPTOMS > 10 DAYS: Primary | ICD-10-CM

## 2017-11-16 DIAGNOSIS — R09.89 CHEST CONGESTION: ICD-10-CM

## 2017-11-16 PROCEDURE — 71020 XR CHEST 2 VW: CPT

## 2017-11-16 PROCEDURE — 99214 OFFICE O/P EST MOD 30 MIN: CPT | Performed by: FAMILY MEDICINE

## 2017-11-16 RX ORDER — AZITHROMYCIN 250 MG/1
TABLET, FILM COATED ORAL
Qty: 6 TABLET | Refills: 0 | Status: SHIPPED | OUTPATIENT
Start: 2017-11-16 | End: 2018-01-09

## 2017-11-16 RX ORDER — ALBUTEROL SULFATE 90 UG/1
2 AEROSOL, METERED RESPIRATORY (INHALATION) 4 TIMES DAILY
Qty: 1 INHALER | Refills: 2 | Status: SHIPPED | OUTPATIENT
Start: 2017-11-16 | End: 2018-03-28

## 2017-11-16 NOTE — PROGRESS NOTES
"  SUBJECTIVE:   Ines Pickard is a 77 year old female who presents to clinic today for the following health issues:      Acute Illness   Acute illness concerns: chest congestion  Onset: 1 month    Fever: YES    Chills/Sweats: YES    Headache (location?): no     Sinus Pressure:YES- tender, post-nasal drainage, facial pain, teeth pain and left side    Conjunctivitis:  YES: both    Ear Pain: YES: left    Rhinorrhea: YES- with bloody discharge    Congestion: YES    Sore Throat: no      Cough: YES-productive of clear sputum    Wheeze: YES    Decreased Appetite: YES    Nausea: YES    Vomiting: YES    Diarrhea:  no     Dysuria/Freq.: no     Fatigue/Achiness: YES- fatigue    Sick/Strep Exposure: no      Therapies Tried and outcome:flonase and Claritin not of help       /71  Pulse 74  Temp 97.6  F (36.4  C) (Tympanic)  Resp 18  Ht 5' 3.5\" (1.613 m)  Wt 190 lb (86.2 kg)  SpO2 93%  BMI 33.13 kg/m2  EXAM: GENERAL APPEARANCE: Alert, no acute distress  HENT: Ears and TMs normal, oral mucosa and posterior oropharynx normal  RESP: lungs clear to auscultation   CV: normal rate, regular rhythm,  II/VI systolic murmur at RUSB  ABDOMEN: soft, no organomegaly, masses or tenderness      ASSESSMENT/PLAN:      ICD-10-CM    1. Acute bronchitis with symptoms > 10 days J20.9 azithromycin (ZITHROMAX) 250 MG tablet     albuterol (PROAIR HFA/PROVENTIL HFA/VENTOLIN HFA) 108 (90 BASE) MCG/ACT Inhaler   2. Chest congestion R09.89 XR Chest 2 Views   3. Cough present for greater than 3 weeks R05 XR Chest 2 Views     Will treat given the month of symptoms and failure to clear.      Patient Instructions     Azithromycin 250 mg tablets- take 2 tablets today and then one daily for four days    Albuterol inhaler with spacer - use 2 puffs four times a day until you're feeling like the cough has really improved, then you may start to cut back on this.    Rohini Suárez M.D.      Thank you for choosing [facility].  You may be " receiving a survey in the mail from [employer] regarding your visit today.  Please take a few minutes to complete and return the survey to let us know how we are doing.      Our Clinic hours are:  Mondays    7:20 am - 7 pm  Tues -  Fri  7:20 am - 5 pm    Clinic Phone: [phone]    The clinic lab opens at 7:30 am Mon - Fri and appointments are required.    [facility]  Ph. [phone]  Monday-Thursday 8 am - 7pm  Tues/Wed/Fri 8 am - 5:30 pm

## 2017-11-16 NOTE — PATIENT INSTRUCTIONS
Azithromycin 250 mg tablets- take 2 tablets today and then one daily for four days    Albuterol inhaler with spacer - use 2 puffs four times a day until you're feeling like the cough has really improved, then you may start to cut back on this.    Rohini Suárez M.D.      Thank you for choosing [facility].  You may be receiving a survey in the mail from [facility] regarding your visit today.  Please take a few minutes to complete and return the survey to let us know how we are doing.      Our Clinic hours are:  Mondays    7:20 am - 7 pm  Tues -  Fri  7:20 am - 5 pm    Clinic Phone: [phone]    The clinic lab opens at 7:30 am Mon - Fri and appointments are required.    [facility]. [phone]  Monday-Thursday 8 am - 7pm  Tues/Wed/Fri 8 am - 5:30 pm

## 2017-11-16 NOTE — MR AVS SNAPSHOT
After Visit Summary   11/16/2017    Ines Pickard    MRN: [id number]           Patient Information     Date Of Birth          1940        Visit Information        Provider Department      11/16/2017 11:00 AM Rohini Suárez MD [facility]        Today's Diagnoses     Acute bronchitis with symptoms > 10 days    -  1    Chest congestion        Cough present for greater than 3 weeks          Care Instructions      Azithromycin 250 mg tablets- take 2 tablets today and then one daily for four days    Albuterol inhaler with spacer - use 2 puffs four times a day until you're feeling like the cough has really improved, then you may start to cut back on this.    Rohini Suárez M.D.      Thank you for choosing [facility].  You may be receiving a survey in the mail from [employer] regarding your visit today.  Please take a few minutes to complete and return the survey to let us know how we are doing.      Our Clinic hours are:  Mondays    7:20 am - 7 pm  Tues -  Fri  7:20 am - 5 pm    Clinic Phone: [phone]    The clinic lab opens at 7:30 am Mon - Fri and appointments are required.    [facility]. [phone]  Monday-Thursday 8 am - 7pm  Tues/Wed/Fri 8 am - 5:30 pm                 Follow-ups after your visit        Who to contact     If you have questions or need follow up information about today's clinic visit or your schedule please contact [facility] directly at [phone].  Normal or non-critical lab and imaging results will be communicated to you by MyChart, letter or phone within 4 business days after the clinic has received the results. If you do not hear from us within 7 days, please contact the clinic through MyChart or phone. If you have a critical or abnormal lab result, we will notify you by phone as soon as possible.  Submit refill requests through [employer] or call your pharmacy and they will forward the refill  "request to us. Please allow 3 business days for your refill to be completed.          Additional Information About Your Visit        [employer] Information     [employer] lets you send messages to your doctor, view your test results, renew your prescriptions, schedule appointments and more. To sign up, go to [email or web address] . Click on \"Log in\" on the left side of the screen, which will take you to the Welcome page. Then click on \"Sign up Now\" on the right side of the page.     You will be asked to enter the access code listed below, as well as some personal information. Please follow the directions to create your username and password.     Your access code is: [email or web address]  Expires: 2018  2:43 PM     Your access code will  in 90 days. If you need help or a new code, please call your [place] clinic or [phone].        Care EveryWhere ID     This is your Care EveryWhere ID. This could be used by other organizations to access your [place] medical records  [id number]        Your Vitals Were     Pulse Temperature Respirations Height Pulse Oximetry BMI (Body Mass Index)    74 97.6  F (36.4  C) (Tympanic) 18 5' 3.5\" (1.613 m) 93% 33.13 kg/m2       Blood Pressure from Last 3 Encounters:   17 139/71   10/13/17 124/74   17 131/74    Weight from Last 3 Encounters:   17 190 lb (86.2 kg)   10/13/17 193 lb 12.8 oz (87.9 kg)   17 197 lb 9.6 oz (89.6 kg)                 Today's Medication Changes          These changes are accurate as of: 17 11:22 AM.  If you have any questions, ask your nurse or doctor.               Start taking these medicines.        Dose/Directions    albuterol 108 (90 BASE) MCG/ACT Inhaler   Commonly known as:  PROAIR HFA/PROVENTIL HFA/VENTOLIN HFA   Used for:  Acute bronchitis with symptoms > 10 days   Started by:  Rohini Suárez MD        Dose:  2 puff   Inhale 2 puffs into the lungs 4 times daily   Quantity:  1 Inhaler   Refills:  2       azithromycin " 250 MG tablet   Commonly known as:  ZITHROMAX   Used for:  Acute bronchitis with symptoms > 10 days   Started by:  Rohini Suárez MD        Two tablets first day, then one tablet daily for four days.   Quantity:  6 tablet   Refills:  0            Where to get your medicines      These medications were sent to [facility] - [place], [place] - [address]  [address] [place] [address]    Hours:  [facility] Phone:  [phone]     albuterol 108 (90 BASE) MCG/ACT Inhaler    azithromycin 250 MG tablet                Primary Care Provider Office Phone # Fax #    R Emanuel Perez [phone] [address] [facility] [place] [address]        Equal Access to Services     FEMI LOPEZ : Hadii aad ku hadasho Soomaali, waaxda luqadaha, qaybta kaalmada adeegyada, hoda maddox . So [facility] [phone].    ATENCIÓN: Si habla español, tiene a nguyễn disposición servicios gratuitos de asistencia lingüística. [facility] [phone].    We comply with applicable federal civil rights laws and [place] laws. We do not discriminate on the basis of race, color, national origin, age, disability, sex, sexual orientation, or gender identity.            Thank you!     Thank you for choosing [facility]  for your care. Our goal is always to provide you with excellent care. Hearing back from our patients is one way we can continue to improve our services. Please take a few minutes to complete the written survey that you may receive in the mail after your visit with us. Thank you!             Your Updated Medication List - Protect others around you: Learn how to safely use, store and throw away your medicines at www.disposemymeds.org.          This list is accurate as of: 11/16/17 11:22 AM.  Always use your most recent med list.                   Brand Name Dispense Instructions for use Diagnosis    albuterol 108 (90 BASE) MCG/ACT  Inhaler    PROAIR HFA/PROVENTIL HFA/VENTOLIN HFA    1 Inhaler    Inhale 2 puffs into the lungs 4 times daily    Acute bronchitis with symptoms > 10 days       aspirin 81 MG EC tablet     90 tablet    Take 1 tablet (81 mg) by mouth daily    CAD (coronary artery disease)       atorvastatin 40 MG tablet    LIPITOR    90 tablet    Take 1 tablet (40 mg) by mouth daily    Coronary artery disease involving native coronary artery of native heart without angina pectoris       azithromycin 250 MG tablet    ZITHROMAX    6 tablet    Two tablets first day, then one tablet daily for four days.    Acute bronchitis with symptoms > 10 days       cyanocolbalamin 100 MCG tablet    vitamin  B-12     Take 50 mcg by mouth daily        lisinopril 5 MG tablet    PRINIVIL/ZESTRIL    90 tablet    Take 1 tablet (5 mg) by mouth daily    Essential hypertension with goal blood pressure less than 140/90       metoprolol 50 MG 24 hr tablet    TOPROL-XL    90 tablet    Take 1 tablet (50 mg) by mouth daily    Coronary artery disease involving native coronary artery of native heart without angina pectoris       MULTIVITAMIN ADULT Tabs           nitroGLYcerin 0.4 MG sublingual tablet    NITROSTAT    25 tablet    Place 1 tablet (0.4 mg) under the tongue every 5 minutes as needed for chest pain Maximum of 3 doses in 15 minutes    CAD (coronary artery disease)       ranitidine 75 MG tablet    ZANTAC    30 tablet    Take 1 tablet (75 mg) by mouth 2 times daily    Esophageal reflux       TYLENOL PO      Take 1,000 mg by mouth every 6 hours as needed

## 2017-11-16 NOTE — NURSING NOTE
"Chief Complaint   Patient presents with     URI       Initial /71  Pulse 74  Temp 97.6  F (36.4  C) (Tympanic)  Resp 18  Ht 5' 3.5\" (1.613 m)  Wt 190 lb (86.2 kg)  SpO2 93%  BMI 33.13 kg/m2 Estimated body mass index is 33.13 kg/(m^2) as calculated from the following:    Height as of this encounter: 5' 3.5\" (1.613 m).    Weight as of this encounter: 190 lb (86.2 kg).  Medication Reconciliation: complete    "

## 2018-01-01 ENCOUNTER — APPOINTMENT (OUTPATIENT)
Dept: RADIATION THERAPY | Facility: OUTPATIENT CENTER | Age: 78
End: 2018-01-01
Payer: MEDICARE

## 2018-01-01 ENCOUNTER — HOSPITAL ENCOUNTER (OUTPATIENT)
Facility: CLINIC | Age: 78
Discharge: HOME | End: 2018-07-24
Attending: INTERNAL MEDICINE | Admitting: INTERNAL MEDICINE
Payer: MEDICARE

## 2018-01-01 ENCOUNTER — INFUSION THERAPY VISIT (OUTPATIENT)
Dept: INFUSION THERAPY | Facility: CLINIC | Age: 78
End: 2018-01-01
Attending: INTERNAL MEDICINE
Payer: MEDICARE

## 2018-01-01 ENCOUNTER — ONCOLOGY VISIT (OUTPATIENT)
Dept: RADIATION THERAPY | Facility: OUTPATIENT CENTER | Age: 78
End: 2018-01-01

## 2018-01-01 ENCOUNTER — ONCOLOGY VISIT (OUTPATIENT)
Dept: ONCOLOGY | Facility: CLINIC | Age: 78
End: 2018-01-01
Attending: INTERNAL MEDICINE
Payer: MEDICARE

## 2018-01-01 ENCOUNTER — OFFICE VISIT (OUTPATIENT)
Dept: FAMILY MEDICINE | Facility: CLINIC | Age: 78
End: 2018-01-01
Payer: MEDICARE

## 2018-01-01 ENCOUNTER — TELEPHONE (OUTPATIENT)
Dept: FAMILY MEDICINE | Facility: CLINIC | Age: 78
End: 2018-01-01

## 2018-01-01 ENCOUNTER — ALLIED HEALTH/NURSE VISIT (OUTPATIENT)
Dept: FAMILY MEDICINE | Facility: CLINIC | Age: 78
End: 2018-01-01
Payer: MEDICARE

## 2018-01-01 ENCOUNTER — OFFICE VISIT (OUTPATIENT)
Dept: RADIATION THERAPY | Facility: OUTPATIENT CENTER | Age: 78
End: 2018-01-01
Payer: MEDICARE

## 2018-01-01 ENCOUNTER — TELEPHONE (OUTPATIENT)
Dept: CARDIOLOGY | Facility: CLINIC | Age: 78
End: 2018-01-01

## 2018-01-01 ENCOUNTER — HOSPITAL ENCOUNTER (OUTPATIENT)
Dept: PET IMAGING | Facility: CLINIC | Age: 78
Discharge: HOME | End: 2018-09-19
Attending: INTERNAL MEDICINE | Admitting: INTERNAL MEDICINE
Payer: MEDICARE

## 2018-01-01 ENCOUNTER — OFFICE VISIT (OUTPATIENT)
Dept: CARDIOLOGY | Facility: CLINIC | Age: 78
End: 2018-01-01
Payer: MEDICARE

## 2018-01-01 ENCOUNTER — HOSPITAL ENCOUNTER (OUTPATIENT)
Dept: CT IMAGING | Facility: CLINIC | Age: 78
Discharge: HOME | End: 2018-12-12
Attending: INTERNAL MEDICINE | Admitting: INTERNAL MEDICINE
Payer: MEDICARE

## 2018-01-01 ENCOUNTER — RADIANT APPOINTMENT (OUTPATIENT)
Dept: GENERAL RADIOLOGY | Facility: CLINIC | Age: 78
End: 2018-01-01
Attending: FAMILY MEDICINE
Payer: MEDICARE

## 2018-01-01 ENCOUNTER — TELEPHONE (OUTPATIENT)
Dept: ONCOLOGY | Facility: CLINIC | Age: 78
End: 2018-01-01

## 2018-01-01 ENCOUNTER — TELEPHONE (OUTPATIENT)
Dept: GENERAL RADIOLOGY | Facility: CLINIC | Age: 78
End: 2018-01-01

## 2018-01-01 ENCOUNTER — DOCUMENTATION ONLY (OUTPATIENT)
Dept: CARE COORDINATION | Facility: CLINIC | Age: 78
End: 2018-01-01

## 2018-01-01 ENCOUNTER — TELEPHONE (OUTPATIENT)
Dept: RADIOLOGY | Facility: CLINIC | Age: 78
End: 2018-01-01

## 2018-01-01 VITALS
RESPIRATION RATE: 16 BRPM | HEART RATE: 82 BPM | OXYGEN SATURATION: 95 % | WEIGHT: 158.9 LBS | BODY MASS INDEX: 28.16 KG/M2 | HEIGHT: 63 IN | DIASTOLIC BLOOD PRESSURE: 62 MMHG | SYSTOLIC BLOOD PRESSURE: 118 MMHG | TEMPERATURE: 98.6 F

## 2018-01-01 VITALS
WEIGHT: 168.4 LBS | HEART RATE: 80 BPM | DIASTOLIC BLOOD PRESSURE: 65 MMHG | TEMPERATURE: 98.6 F | OXYGEN SATURATION: 96 % | RESPIRATION RATE: 18 BRPM | BODY MASS INDEX: 29.84 KG/M2 | SYSTOLIC BLOOD PRESSURE: 123 MMHG

## 2018-01-01 VITALS
HEART RATE: 72 BPM | SYSTOLIC BLOOD PRESSURE: 112 MMHG | OXYGEN SATURATION: 98 % | RESPIRATION RATE: 16 BRPM | BODY MASS INDEX: 31.65 KG/M2 | DIASTOLIC BLOOD PRESSURE: 64 MMHG | TEMPERATURE: 97.4 F | HEIGHT: 62 IN | WEIGHT: 172 LBS

## 2018-01-01 VITALS
SYSTOLIC BLOOD PRESSURE: 109 MMHG | BODY MASS INDEX: 28.1 KG/M2 | HEART RATE: 73 BPM | WEIGHT: 158.6 LBS | DIASTOLIC BLOOD PRESSURE: 61 MMHG

## 2018-01-01 VITALS
SYSTOLIC BLOOD PRESSURE: 139 MMHG | TEMPERATURE: 98.4 F | RESPIRATION RATE: 16 BRPM | HEIGHT: 62 IN | WEIGHT: 174.4 LBS | BODY MASS INDEX: 32.09 KG/M2 | DIASTOLIC BLOOD PRESSURE: 67 MMHG | OXYGEN SATURATION: 92 % | HEART RATE: 76 BPM

## 2018-01-01 VITALS
OXYGEN SATURATION: 98 % | WEIGHT: 157.8 LBS | HEART RATE: 99 BPM | SYSTOLIC BLOOD PRESSURE: 105 MMHG | RESPIRATION RATE: 18 BRPM | DIASTOLIC BLOOD PRESSURE: 71 MMHG | BODY MASS INDEX: 27.96 KG/M2

## 2018-01-01 VITALS
SYSTOLIC BLOOD PRESSURE: 121 MMHG | HEART RATE: 83 BPM | OXYGEN SATURATION: 95 % | WEIGHT: 171.6 LBS | BODY MASS INDEX: 31.39 KG/M2 | DIASTOLIC BLOOD PRESSURE: 77 MMHG

## 2018-01-01 VITALS
SYSTOLIC BLOOD PRESSURE: 114 MMHG | HEART RATE: 76 BPM | RESPIRATION RATE: 18 BRPM | OXYGEN SATURATION: 96 % | WEIGHT: 158 LBS | BODY MASS INDEX: 28 KG/M2 | DIASTOLIC BLOOD PRESSURE: 70 MMHG

## 2018-01-01 VITALS
HEART RATE: 88 BPM | OXYGEN SATURATION: 95 % | DIASTOLIC BLOOD PRESSURE: 75 MMHG | SYSTOLIC BLOOD PRESSURE: 130 MMHG | RESPIRATION RATE: 18 BRPM

## 2018-01-01 DIAGNOSIS — C34.92 SMALL CELL CARCINOMA OF LEFT LUNG (H): Primary | ICD-10-CM

## 2018-01-01 DIAGNOSIS — F41.9 ANXIETY: ICD-10-CM

## 2018-01-01 DIAGNOSIS — R59.9 ENLARGED GLANDS: ICD-10-CM

## 2018-01-01 DIAGNOSIS — I25.10 CORONARY ARTERY DISEASE INVOLVING NATIVE CORONARY ARTERY OF NATIVE HEART WITHOUT ANGINA PECTORIS: Primary | ICD-10-CM

## 2018-01-01 DIAGNOSIS — R07.81 RIB TENDERNESS: ICD-10-CM

## 2018-01-01 DIAGNOSIS — C34.92 SMALL CELL LUNG CANCER, LEFT (H): ICD-10-CM

## 2018-01-01 DIAGNOSIS — D64.9 ANEMIA, UNSPECIFIED TYPE: ICD-10-CM

## 2018-01-01 DIAGNOSIS — E78.5 HYPERLIPIDEMIA LDL GOAL <70: ICD-10-CM

## 2018-01-01 DIAGNOSIS — Z23 NEED FOR PROPHYLACTIC VACCINATION AND INOCULATION AGAINST INFLUENZA: Primary | ICD-10-CM

## 2018-01-01 DIAGNOSIS — I10 BENIGN ESSENTIAL HYPERTENSION: ICD-10-CM

## 2018-01-01 DIAGNOSIS — C34.12 MALIGNANT NEOPLASM OF UPPER LOBE OF LEFT LUNG (H): Primary | ICD-10-CM

## 2018-01-01 DIAGNOSIS — I25.10 CORONARY ARTERY DISEASE INVOLVING NATIVE CORONARY ARTERY OF NATIVE HEART WITHOUT ANGINA PECTORIS: ICD-10-CM

## 2018-01-01 DIAGNOSIS — J20.9 ACUTE BRONCHITIS WITH SYMPTOMS > 10 DAYS: ICD-10-CM

## 2018-01-01 DIAGNOSIS — M54.6 ACUTE MIDLINE THORACIC BACK PAIN: Primary | ICD-10-CM

## 2018-01-01 DIAGNOSIS — C34.92 SMALL CELL CARCINOMA OF LEFT LUNG (H): ICD-10-CM

## 2018-01-01 DIAGNOSIS — Z53.9 DIAGNOSIS NOT YET DEFINED: Primary | ICD-10-CM

## 2018-01-01 DIAGNOSIS — R93.5 ABNORMAL CT OF THE ABDOMEN: ICD-10-CM

## 2018-01-01 DIAGNOSIS — I10 ESSENTIAL HYPERTENSION WITH GOAL BLOOD PRESSURE LESS THAN 140/90: ICD-10-CM

## 2018-01-01 DIAGNOSIS — M54.6 ACUTE MIDLINE THORACIC BACK PAIN: ICD-10-CM

## 2018-01-01 DIAGNOSIS — Z92.21 STATUS POST CHEMOTHERAPY: ICD-10-CM

## 2018-01-01 DIAGNOSIS — Z95.828 PORT-A-CATH IN PLACE: Primary | ICD-10-CM

## 2018-01-01 LAB
ALBUMIN SERPL-MCNC: 3.4 G/DL (ref 3.4–5)
ALBUMIN SERPL-MCNC: 3.5 G/DL (ref 3.4–5)
ALP SERPL-CCNC: 70 U/L (ref 40–150)
ALP SERPL-CCNC: 72 U/L (ref 40–150)
ALT SERPL W P-5'-P-CCNC: 17 U/L (ref 0–50)
ALT SERPL W P-5'-P-CCNC: 21 U/L (ref 0–50)
ANION GAP SERPL CALCULATED.3IONS-SCNC: 7 MMOL/L (ref 3–14)
ANION GAP SERPL CALCULATED.3IONS-SCNC: 7 MMOL/L (ref 3–14)
AST SERPL W P-5'-P-CCNC: 16 U/L (ref 0–45)
AST SERPL W P-5'-P-CCNC: 18 U/L (ref 0–45)
BASOPHILS # BLD AUTO: 0.1 10E9/L (ref 0–0.2)
BASOPHILS # BLD AUTO: 0.1 10E9/L (ref 0–0.2)
BASOPHILS NFR BLD AUTO: 0.5 %
BASOPHILS NFR BLD AUTO: 0.7 %
BILIRUB SERPL-MCNC: 0.8 MG/DL (ref 0.2–1.3)
BILIRUB SERPL-MCNC: 0.9 MG/DL (ref 0.2–1.3)
BUN SERPL-MCNC: 16 MG/DL (ref 7–30)
BUN SERPL-MCNC: 21 MG/DL (ref 7–30)
CALCIUM SERPL-MCNC: 8.5 MG/DL (ref 8.5–10.1)
CALCIUM SERPL-MCNC: 8.6 MG/DL (ref 8.5–10.1)
CEA SERPL-MCNC: 2 UG/L (ref 0–2.5)
CEA SERPL-MCNC: 7 UG/L (ref 0–2.5)
CHLORIDE SERPL-SCNC: 108 MMOL/L (ref 94–109)
CHLORIDE SERPL-SCNC: 109 MMOL/L (ref 94–109)
CO2 SERPL-SCNC: 25 MMOL/L (ref 20–32)
CO2 SERPL-SCNC: 26 MMOL/L (ref 20–32)
CREAT SERPL-MCNC: 0.49 MG/DL (ref 0.52–1.04)
CREAT SERPL-MCNC: 0.59 MG/DL (ref 0.52–1.04)
DIFFERENTIAL METHOD BLD: ABNORMAL
DIFFERENTIAL METHOD BLD: ABNORMAL
EOSINOPHIL # BLD AUTO: 0.1 10E9/L (ref 0–0.7)
EOSINOPHIL # BLD AUTO: 0.2 10E9/L (ref 0–0.7)
EOSINOPHIL NFR BLD AUTO: 1.3 %
EOSINOPHIL NFR BLD AUTO: 2.2 %
ERYTHROCYTE [DISTWIDTH] IN BLOOD BY AUTOMATED COUNT: 14.2 % (ref 10–15)
ERYTHROCYTE [DISTWIDTH] IN BLOOD BY AUTOMATED COUNT: 14.9 % (ref 10–15)
GFR SERPL CREATININE-BSD FRML MDRD: >90 ML/MIN/1.7M2
GFR SERPL CREATININE-BSD FRML MDRD: >90 ML/MIN/1.7M2
GLUCOSE SERPL-MCNC: 107 MG/DL (ref 70–99)
GLUCOSE SERPL-MCNC: 88 MG/DL (ref 70–99)
HCT VFR BLD AUTO: 34.4 % (ref 35–47)
HCT VFR BLD AUTO: 35.5 % (ref 35–47)
HGB BLD-MCNC: 11 G/DL (ref 11.7–15.7)
HGB BLD-MCNC: 11.6 G/DL (ref 11.7–15.7)
IMM GRANULOCYTES # BLD: 0 10E9/L (ref 0–0.4)
IMM GRANULOCYTES # BLD: 0 10E9/L (ref 0–0.4)
IMM GRANULOCYTES NFR BLD: 0.3 %
IMM GRANULOCYTES NFR BLD: 0.3 %
LYMPHOCYTES # BLD AUTO: 2.2 10E9/L (ref 0.8–5.3)
LYMPHOCYTES # BLD AUTO: 2.2 10E9/L (ref 0.8–5.3)
LYMPHOCYTES NFR BLD AUTO: 22.6 %
LYMPHOCYTES NFR BLD AUTO: 24.9 %
MCH RBC QN AUTO: 27.2 PG (ref 26.5–33)
MCH RBC QN AUTO: 28.6 PG (ref 26.5–33)
MCHC RBC AUTO-ENTMCNC: 32 G/DL (ref 31.5–36.5)
MCHC RBC AUTO-ENTMCNC: 32.7 G/DL (ref 31.5–36.5)
MCV RBC AUTO: 83 FL (ref 78–100)
MCV RBC AUTO: 90 FL (ref 78–100)
MONOCYTES # BLD AUTO: 0.8 10E9/L (ref 0–1.3)
MONOCYTES # BLD AUTO: 0.9 10E9/L (ref 0–1.3)
MONOCYTES NFR BLD AUTO: 9.4 %
MONOCYTES NFR BLD AUTO: 9.4 %
NEUTROPHILS # BLD AUTO: 5.5 10E9/L (ref 1.6–8.3)
NEUTROPHILS # BLD AUTO: 6.3 10E9/L (ref 1.6–8.3)
NEUTROPHILS NFR BLD AUTO: 63.4 %
NEUTROPHILS NFR BLD AUTO: 65 %
NRBC # BLD AUTO: 0 10*3/UL
NRBC # BLD AUTO: 0 10*3/UL
NRBC BLD AUTO-RTO: 0 /100
NRBC BLD AUTO-RTO: 0 /100
PLATELET # BLD AUTO: 234 10E9/L (ref 150–450)
PLATELET # BLD AUTO: 236 10E9/L (ref 150–450)
POTASSIUM SERPL-SCNC: 4 MMOL/L (ref 3.4–5.3)
POTASSIUM SERPL-SCNC: 4 MMOL/L (ref 3.4–5.3)
PROT SERPL-MCNC: 6.5 G/DL (ref 6.8–8.8)
PROT SERPL-MCNC: 6.6 G/DL (ref 6.8–8.8)
RBC # BLD AUTO: 3.84 10E12/L (ref 3.8–5.2)
RBC # BLD AUTO: 4.26 10E12/L (ref 3.8–5.2)
SODIUM SERPL-SCNC: 140 MMOL/L (ref 133–144)
SODIUM SERPL-SCNC: 142 MMOL/L (ref 133–144)
WBC # BLD AUTO: 8.7 10E9/L (ref 4–11)
WBC # BLD AUTO: 9.7 10E9/L (ref 4–11)

## 2018-01-01 PROCEDURE — 25000128 H RX IP 250 OP 636: Performed by: INTERNAL MEDICINE

## 2018-01-01 PROCEDURE — 99214 OFFICE O/P EST MOD 30 MIN: CPT | Performed by: INTERNAL MEDICINE

## 2018-01-01 PROCEDURE — 34300033 ZZH RX 343: Performed by: INTERNAL MEDICINE

## 2018-01-01 PROCEDURE — 82378 CARCINOEMBRYONIC ANTIGEN: CPT | Performed by: INTERNAL MEDICINE

## 2018-01-01 PROCEDURE — G0008 ADMIN INFLUENZA VIRUS VAC: HCPCS

## 2018-01-01 PROCEDURE — A9552 F18 FDG: HCPCS | Performed by: INTERNAL MEDICINE

## 2018-01-01 PROCEDURE — 71101 X-RAY EXAM UNILAT RIBS/CHEST: CPT | Mod: RT

## 2018-01-01 PROCEDURE — 96523 IRRIG DRUG DELIVERY DEVICE: CPT

## 2018-01-01 PROCEDURE — 71260 CT THORAX DX C+: CPT

## 2018-01-01 PROCEDURE — 72072 X-RAY EXAM THORAC SPINE 3VWS: CPT | Mod: FY

## 2018-01-01 PROCEDURE — 25000125 ZZHC RX 250: Performed by: INTERNAL MEDICINE

## 2018-01-01 PROCEDURE — 36591 DRAW BLOOD OFF VENOUS DEVICE: CPT

## 2018-01-01 PROCEDURE — 80053 COMPREHEN METABOLIC PANEL: CPT | Performed by: INTERNAL MEDICINE

## 2018-01-01 PROCEDURE — 99214 OFFICE O/P EST MOD 30 MIN: CPT | Performed by: FAMILY MEDICINE

## 2018-01-01 PROCEDURE — 90662 IIV NO PRSV INCREASED AG IM: CPT

## 2018-01-01 PROCEDURE — 78815 PET IMAGE W/CT SKULL-THIGH: CPT | Mod: PS

## 2018-01-01 PROCEDURE — 85025 COMPLETE CBC W/AUTO DIFF WBC: CPT | Performed by: INTERNAL MEDICINE

## 2018-01-01 PROCEDURE — 99207 ZZC NO CHARGE NURSE ONLY: CPT

## 2018-01-01 PROCEDURE — G0179 MD RECERTIFICATION HHA PT: HCPCS | Performed by: FAMILY MEDICINE

## 2018-01-01 PROCEDURE — G0463 HOSPITAL OUTPT CLINIC VISIT: HCPCS

## 2018-01-01 PROCEDURE — 99214 OFFICE O/P EST MOD 30 MIN: CPT | Performed by: NURSE PRACTITIONER

## 2018-01-01 RX ORDER — IOPAMIDOL 755 MG/ML
83 INJECTION, SOLUTION INTRAVASCULAR ONCE
Status: COMPLETED | OUTPATIENT
Start: 2018-01-01 | End: 2018-01-01

## 2018-01-01 RX ORDER — METOPROLOL SUCCINATE 50 MG/1
50 TABLET, EXTENDED RELEASE ORAL DAILY
Qty: 90 TABLET | Refills: 1 | Status: SHIPPED | OUTPATIENT
Start: 2018-01-01 | End: 2019-01-01

## 2018-01-01 RX ORDER — ALBUTEROL SULFATE 90 UG/1
2 AEROSOL, METERED RESPIRATORY (INHALATION) 4 TIMES DAILY
Qty: 1 INHALER | Refills: 2 | Status: SHIPPED | OUTPATIENT
Start: 2018-01-01 | End: 2019-01-01

## 2018-01-01 RX ORDER — LISINOPRIL 5 MG/1
5 TABLET ORAL DAILY
Qty: 90 TABLET | Refills: 2 | Status: SHIPPED | OUTPATIENT
Start: 2018-01-01 | End: 2019-01-01

## 2018-01-01 RX ORDER — MEMANTINE HYDROCHLORIDE 5 MG/1
5 TABLET ORAL DAILY
Qty: 30 TABLET | Refills: 1 | Status: SHIPPED | OUTPATIENT
Start: 2018-01-01 | End: 2018-01-01

## 2018-01-01 RX ORDER — HEPARIN SODIUM (PORCINE) LOCK FLUSH IV SOLN 100 UNIT/ML 100 UNIT/ML
5 SOLUTION INTRAVENOUS
Status: DISCONTINUED | OUTPATIENT
Start: 2018-01-01 | End: 2018-01-01 | Stop reason: HOSPADM

## 2018-01-01 RX ORDER — MEMANTINE HYDROCHLORIDE 5 MG/1
5 TABLET ORAL 2 TIMES DAILY
Qty: 30 TABLET | Refills: 1 | Status: SHIPPED | OUTPATIENT
Start: 2018-01-01 | End: 2018-01-01

## 2018-01-01 RX ORDER — LORAZEPAM 0.5 MG/1
0.5 TABLET ORAL 2 TIMES DAILY PRN
Qty: 30 TABLET | Refills: 5 | Status: SHIPPED | OUTPATIENT
Start: 2018-01-01 | End: 2019-01-01

## 2018-01-01 RX ORDER — LORAZEPAM 0.5 MG/1
0.5 TABLET ORAL 2 TIMES DAILY PRN
Qty: 30 TABLET | Refills: 5 | Status: SHIPPED | OUTPATIENT
Start: 2018-01-01 | End: 2018-01-01

## 2018-01-01 RX ORDER — METOPROLOL SUCCINATE 100 MG/1
100 TABLET, EXTENDED RELEASE ORAL DAILY
Refills: 3 | COMMUNITY
Start: 2018-06-13 | End: 2018-01-01

## 2018-01-01 RX ORDER — HYDROCODONE BITARTRATE AND ACETAMINOPHEN 5; 325 MG/1; MG/1
TABLET ORAL
COMMUNITY
Start: 2018-02-12 | End: 2018-01-01

## 2018-01-01 RX ADMIN — SODIUM CHLORIDE 61 ML: 9 INJECTION, SOLUTION INTRAVENOUS at 11:23

## 2018-01-01 RX ADMIN — SODIUM CHLORIDE, PRESERVATIVE FREE 5 ML: 5 INJECTION INTRAVENOUS at 14:37

## 2018-01-01 RX ADMIN — SODIUM CHLORIDE, PRESERVATIVE FREE 5 ML: 5 INJECTION INTRAVENOUS at 13:32

## 2018-01-01 RX ADMIN — SODIUM CHLORIDE, PRESERVATIVE FREE 5 ML: 5 INJECTION INTRAVENOUS at 13:38

## 2018-01-01 RX ADMIN — SODIUM CHLORIDE, PRESERVATIVE FREE 500 UNITS: 5 INJECTION INTRAVENOUS at 10:27

## 2018-01-01 RX ADMIN — IOPAMIDOL 83 ML: 755 INJECTION, SOLUTION INTRAVENOUS at 11:23

## 2018-01-01 RX ADMIN — FLUDEOXYGLUCOSE F-18 14.08 MCI.: 500 INJECTION, SOLUTION INTRAVENOUS at 10:27

## 2018-01-01 RX ADMIN — SODIUM CHLORIDE, PRESERVATIVE FREE 500 UNITS: 5 INJECTION INTRAVENOUS at 11:28

## 2018-01-01 ASSESSMENT — PAIN SCALES - GENERAL
PAINLEVEL: NO PAIN (0)
PAINLEVEL: MILD PAIN (3)
PAINLEVEL: MILD PAIN (3)
PAINLEVEL: NO PAIN (0)
PAINLEVEL: NO PAIN (0)

## 2018-01-01 ASSESSMENT — MIFFLIN-ST. JEOR: SCORE: 1219.32

## 2018-01-08 ENCOUNTER — ALLIED HEALTH/NURSE VISIT (OUTPATIENT)
Dept: FAMILY MEDICINE | Facility: CLINIC | Age: 78
End: 2018-01-08
Payer: MEDICARE

## 2018-01-08 DIAGNOSIS — Z23 NEED FOR PROPHYLACTIC VACCINATION AND INOCULATION AGAINST INFLUENZA: Primary | ICD-10-CM

## 2018-01-08 PROCEDURE — 99207 ZZC NO CHARGE NURSE ONLY: CPT

## 2018-01-08 PROCEDURE — 90662 IIV NO PRSV INCREASED AG IM: CPT

## 2018-01-08 PROCEDURE — G0008 ADMIN INFLUENZA VIRUS VAC: HCPCS

## 2018-01-08 NOTE — PROGRESS NOTES

## 2018-01-08 NOTE — MR AVS SNAPSHOT
"              After Visit Summary   1/8/2018    Ines Pickard    MRN: [id number]           Patient Information     Date Of Birth          1940        Visit Information        Provider Department      1/8/2018 11:30 AM Cma/Lpn, Fl Cl [facility]        Today's Diagnoses     Need for prophylactic vaccination and inoculation against influenza    -  1       Follow-ups after your visit        Your next 10 appointments already scheduled     Jan 08, 2018 11:30 AM CST   Nurse Only with Fl Cl Cma/Lpn   [facility] ([facility]    [address]  [facility] [address]   [phone]            Jan 09, 2018  2:00 PM CST   New Visit with Grover Stuart MD   [facility]    [address] [address] [phone]              Who to contact     If you have questions or need follow up information about today's clinic visit or your schedule please contact [facility] directly at [phone].  Normal or non-critical lab and imaging results will be communicated to you by [employer], letter or phone within 4 business days after the clinic has received the results. If you do not hear from us within 7 days, please contact the clinic through [employer] or phone. If you have a critical or abnormal lab result, we will notify you by phone as soon as possible.  Submit refill requests through [employer] or call your pharmacy and they will forward the refill request to us. Please allow 3 business days for your refill to be completed.          Additional Information About Your Visit        [employer] Information     [employer] lets you send messages to your doctor, view your test results, renew your prescriptions, schedule appointments and more. To sign up, go to [email or web address] . Click on \"Log in\" on the left side of the screen, which will take you to the Welcome page. Then click on \"Sign up Now\" on " the right side of the page.     You will be asked to enter the access code listed below, as well as some personal information. Please follow the directions to create your username and password.     Your access code is: [email or web address]  Expires: 2018  2:43 PM     Your access code will  in 90 days. If you need help or a new code, please call your [place] clinic or [phone].        Care EveryWhere ID     This is your Care EveryWhere ID. This could be used by other organizations to access your [place] medical records  [id number]         Blood Pressure from Last 3 Encounters:   17 139/71   10/13/17 124/74   17 131/74    Weight from Last 3 Encounters:   17 190 lb (86.2 kg)   10/13/17 193 lb 12.8 oz (87.9 kg)   17 197 lb 9.6 oz (89.6 kg)              We Performed the Following     ADMIN INFLUENZA (For MEDICARE Patients ONLY) []     FLU VACCINE, INCREASED ANTIGEN, PRESV FREE, AGE 65+ [53613]        Primary Care Provider Office Phone # Fax #    R Emanuel Perez [phone] [address] [address]        Equal Access to Services     ALPA LOPEZ : Hadii doroteo ku hadasho Soomaali, waaxda luqadaha, qaybta kaalmada adeegyada, hoda taylor. So [facility] [phone].    ATENCIÓN: Si habla español, tiene a nguyễn disposición servicios gratuitos de asistencia lingüística. Llame al [phone].    We comply with applicable federal civil rights laws and [place] laws. We do not discriminate on the basis of race, color, national origin, age, disability, sex, sexual orientation, or gender identity.            Thank you!     Thank you for choosing [facility]  for your care. Our goal is always to provide you with excellent care. Hearing back from our patients is one way we can continue to improve our services. Please take a few minutes to complete the written survey that you may receive in the mail after your visit  with us. Thank you!             Your Updated Medication List - Protect others around you: Learn how to safely use, store and throw away your medicines at www.disposemymeds.org.          This list is accurate as of: 1/8/18 11:14 AM.  Always use your most recent med list.                   Brand Name Dispense Instructions for use Diagnosis    albuterol 108 (90 BASE) MCG/ACT Inhaler    PROAIR HFA/PROVENTIL HFA/VENTOLIN HFA    1 Inhaler    Inhale 2 puffs into the lungs 4 times daily    Acute bronchitis with symptoms > 10 days       aspirin 81 MG EC tablet     90 tablet    Take 1 tablet (81 mg) by mouth daily    CAD (coronary artery disease)       atorvastatin 40 MG tablet    LIPITOR    90 tablet    Take 1 tablet (40 mg) by mouth daily    Coronary artery disease involving native coronary artery of native heart without angina pectoris       azithromycin 250 MG tablet    ZITHROMAX    6 tablet    Two tablets first day, then one tablet daily for four days.    Acute bronchitis with symptoms > 10 days       cyanocolbalamin 100 MCG tablet    vitamin  B-12     Take 50 mcg by mouth daily        lisinopril 5 MG tablet    PRINIVIL/ZESTRIL    90 tablet    Take 1 tablet (5 mg) by mouth daily    Essential hypertension with goal blood pressure less than 140/90       metoprolol 50 MG 24 hr tablet    TOPROL-XL    90 tablet    Take 1 tablet (50 mg) by mouth daily    Coronary artery disease involving native coronary artery of native heart without angina pectoris       MULTIVITAMIN ADULT Tabs           nitroGLYcerin 0.4 MG sublingual tablet    NITROSTAT    25 tablet    Place 1 tablet (0.4 mg) under the tongue every 5 minutes as needed for chest pain Maximum of 3 doses in 15 minutes    CAD (coronary artery disease)       ranitidine 75 MG tablet    ZANTAC    30 tablet    Take 1 tablet (75 mg) by mouth 2 times daily    Esophageal reflux       TYLENOL PO      Take 1,000 mg by mouth every 6 hours as needed

## 2018-01-09 ENCOUNTER — OFFICE VISIT (OUTPATIENT)
Dept: OTOLARYNGOLOGY | Facility: CLINIC | Age: 78
End: 2018-01-09
Payer: MEDICARE

## 2018-01-09 VITALS
SYSTOLIC BLOOD PRESSURE: 139 MMHG | WEIGHT: 180 LBS | HEART RATE: 85 BPM | DIASTOLIC BLOOD PRESSURE: 74 MMHG | TEMPERATURE: 97.7 F | BODY MASS INDEX: 30.73 KG/M2 | HEIGHT: 64 IN

## 2018-01-09 DIAGNOSIS — R91.8 MASS OF LEFT LUNG: ICD-10-CM

## 2018-01-09 DIAGNOSIS — J38.01 VOCAL FOLD PARALYSIS, LEFT: Primary | ICD-10-CM

## 2018-01-09 PROCEDURE — 31575 DIAGNOSTIC LARYNGOSCOPY: CPT | Performed by: OTOLARYNGOLOGY

## 2018-01-09 PROCEDURE — 99204 OFFICE O/P NEW MOD 45 MIN: CPT | Mod: 25 | Performed by: OTOLARYNGOLOGY

## 2018-01-09 ASSESSMENT — PAIN SCALES - GENERAL: PAINLEVEL: NO PAIN (0)

## 2018-01-09 NOTE — MR AVS SNAPSHOT
"              After Visit Summary   1/9/2018    Ines Pickard    MRN: [id number]           Patient Information     Date Of Birth          1940        Visit Information        Provider Department      1/9/2018 2:00 PM Grover Stuart MD [facility]        Today's Diagnoses     Vocal fold paralysis, left    -  1      Care Instructions    Per Physician's instructions            Follow-ups after your visit        Future tests that were ordered for you today     Open Future Orders        Priority Expected Expires Ordered    CT Chest w Contrast Routine  1/9/2019 1/9/2018    CT Soft Tissue Neck w Contrast Routine  1/9/2019 1/9/2018            Who to contact     If you have questions or need follow up information about today's clinic visit or your schedule please contact [facility] directly at [phone].  Normal or non-critical lab and imaging results will be communicated to you by MyChart, letter or phone within 4 business days after the clinic has received the results. If you do not hear from us within 7 days, please contact the clinic through MyChart or phone. If you have a critical or abnormal lab result, we will notify you by phone as soon as possible.  Submit refill requests through [employer] or call your pharmacy and they will forward the refill request to us. Please allow 3 business days for your refill to be completed.          Additional Information About Your Visit        MyChart Information     [employer] lets you send messages to your doctor, view your test results, renew your prescriptions, schedule appointments and more. To sign up, go to [email or web address] . Click on \"Log in\" on the left side of the screen, which will take you to the Welcome page. Then click on \"Sign up Now\" on the right side of the page.     You will be asked to enter the access code listed below, as well as some personal information. Please follow the directions to create your username and password.   " "  Your access code is: [email or web address]  Expires: 2018  2:43 PM     Your access code will  in 90 days. If you need help or a new code, please call your [place] clinic or [phone].        Care EveryWhere ID     This is your Care EveryWhere ID. This could be used by other organizations to access your [place] medical records  [id number]        Your Vitals Were     Pulse Temperature Height BMI (Body Mass Index)          85 97.7  F (36.5  C) (Oral) 1.613 m (5' 3.5\") 31.39 kg/m2         Blood Pressure from Last 3 Encounters:   18 139/74   17 139/71   10/13/17 124/74    Weight from Last 3 Encounters:   18 81.6 kg (180 lb)   17 86.2 kg (190 lb)   10/13/17 87.9 kg (193 lb 12.8 oz)              We Performed the Following     LARYNGOSCOPY FLEX FIBEROPTIC, DIAGNOSTIC          Today's Medication Changes          These changes are accurate as of: 18  2:33 PM.  If you have any questions, ask your nurse or doctor.               Stop taking these medicines if you haven't already. Please contact your care team if you have questions.     azithromycin 250 MG tablet   Commonly known as:  ZITHROMAX   Stopped by:  Grover Stuart MD                    Primary Care Provider Office Phone # Fax #    R Emanuel Perez [phone] [address] [address]        Equal Access to Services     [facility] Center[facility] AH: Hadii doroteo ku hadasho Soomaali, waaxda luqadaha, qaybta kaalmada adeegyada, hoda taylor. So [facility] [phone].    ATENCIÓN: Si habla español, tiene a nguyễn disposición servicios gratuitos de asistencia lingüística. Llame al [phone].    We comply with applicable federal civil rights laws and [place] laws. We do not discriminate on the basis of race, color, national origin, age, disability, sex, sexual orientation, or gender identity.            Thank you!     Thank you for choosing [facility]  for your care. Our goal is " always to provide you with excellent care. Hearing back from our patients is one way we can continue to improve our services. Please take a few minutes to complete the written survey that you may receive in the mail after your visit with us. Thank you!             Your Updated Medication List - Protect others around you: Learn how to safely use, store and throw away your medicines at www.disposemymeds.org.          This list is accurate as of: 1/9/18  2:33 PM.  Always use your most recent med list.                   Brand Name Dispense Instructions for use Diagnosis    albuterol 108 (90 BASE) MCG/ACT Inhaler    PROAIR HFA/PROVENTIL HFA/VENTOLIN HFA    1 Inhaler    Inhale 2 puffs into the lungs 4 times daily    Acute bronchitis with symptoms > 10 days       aspirin 81 MG EC tablet     90 tablet    Take 1 tablet (81 mg) by mouth daily    CAD (coronary artery disease)       atorvastatin 40 MG tablet    LIPITOR    90 tablet    Take 1 tablet (40 mg) by mouth daily    Coronary artery disease involving native coronary artery of native heart without angina pectoris       cyanocolbalamin 100 MCG tablet    vitamin  B-12     Take 50 mcg by mouth daily        lisinopril 5 MG tablet    PRINIVIL/ZESTRIL    90 tablet    Take 1 tablet (5 mg) by mouth daily    Essential hypertension with goal blood pressure less than 140/90       metoprolol 50 MG 24 hr tablet    TOPROL-XL    90 tablet    Take 1 tablet (50 mg) by mouth daily    Coronary artery disease involving native coronary artery of native heart without angina pectoris       MULTIVITAMIN ADULT Tabs           nitroGLYcerin 0.4 MG sublingual tablet    NITROSTAT    25 tablet    Place 1 tablet (0.4 mg) under the tongue every 5 minutes as needed for chest pain Maximum of 3 doses in 15 minutes    CAD (coronary artery disease)       ranitidine 75 MG tablet    ZANTAC    30 tablet    Take 1 tablet (75 mg) by mouth 2 times daily    Esophageal reflux       TYLENOL PO      Take 1,000 mg by  mouth every 6 hours as needed

## 2018-01-09 NOTE — LETTER
1/9/2018         RE: Ines Pickard  [address] [facility] [address]        Dear Colleague,    Thank you for referring your patient, Ines Pickard, to the [facility]. Please see a copy of my visit note below.        History of Present Illness - Ines Pickard is a 77 year old female with throat laryngitis and sinus drainage. Patient woke up one morning and noticed a change in her voice. These symptoms are intermittent. If she is not talking much, when she does, she has no change. With prolonged talking or talking in groups, she will have changes in her voice. Was seen by her primary care physician and was given a steroid nasal spray. She notes this did not help her, causing her to close up her throat and reported shortness of breath. She was seen by a different provider and had an x-ray done. She is currently on 75 mg of ranitidine twice daily. No problems with swallowing food or liquids. She feels she has sticky phlegm at the back of the throat.  She denies any coughing on liquids or foods getting stuck in her throat.    Also notes that her left ear feels like it is plugged with pain. She has had it off and on for a while now.     History of myocardiac infaction. Stent was placed. Denies any open heart surgery. Denies stroke. Denies neck surgery. 2 years ago, she fell and hit her head, cracking frontal bone.       Past Medical History -   Patient Active Problem List   Diagnosis     Allergic rhinitis     Hyperlipidemia LDL goal <70     Advance Care Planning     Esophageal reflux     Coronary artery disease involving native coronary artery without angina pectoris     Senile nuclear cataract     Essential hypertension with goal blood pressure less than 140/90     Morbid obesity (H)       Current Medications -   Current Outpatient Prescriptions:      albuterol (PROAIR HFA/PROVENTIL HFA/VENTOLIN HFA) 108 (90 BASE) MCG/ACT Inhaler, Inhale 2 puffs into the lungs 4 times daily, Disp: 1  Inhaler, Rfl: 2     lisinopril (PRINIVIL/ZESTRIL) 5 MG tablet, Take 1 tablet (5 mg) by mouth daily, Disp: 90 tablet, Rfl: 3     atorvastatin (LIPITOR) 40 MG tablet, Take 1 tablet (40 mg) by mouth daily, Disp: 90 tablet, Rfl: 3     metoprolol (TOPROL-XL) 50 MG 24 hr tablet, Take 1 tablet (50 mg) by mouth daily, Disp: 90 tablet, Rfl: 3     Multiple Vitamins-Minerals (MULTIVITAMIN ADULT) TABS, , Disp: , Rfl:      cyanocolbalamin (VITAMIN  B-12) 100 MCG tablet, Take 50 mcg by mouth daily, Disp: , Rfl:      ranitidine (ZANTAC) 75 MG tablet, Take 1 tablet (75 mg) by mouth 2 times daily, Disp: 30 tablet, Rfl: 11     aspirin EC 81 MG EC tablet, Take 1 tablet (81 mg) by mouth daily, Disp: 90 tablet, Rfl: 3     nitroglycerin (NITROSTAT) 0.4 MG SL tablet, Place 1 tablet (0.4 mg) under the tongue every 5 minutes as needed for chest pain Maximum of 3 doses in 15 minutes, Disp: 25 tablet, Rfl: 3     Acetaminophen (TYLENOL PO), Take 1,000 mg by mouth every 6 hours as needed, Disp: , Rfl:     Allergies -   Allergies   Allergen Reactions     Amoxicillin GI Disturbance     Tetracycline Other (See Comments)     Yeast infection     Nickel Rash       Social History -   Social History     Social History     Marital status:      Spouse name: N/A     Number of children: N/A     Years of education: N/A     Social History Main Topics     Smoking status: Former Smoker     Packs/day: 0.50     Types: Cigarettes     Quit date: 12/13/2014     Smokeless tobacco: Never Used     Alcohol use No     Drug use: No     Sexual activity: Not Currently     Other Topics Concern     Parent/Sibling W/ Cabg, Mi Or Angioplasty Before 65f 55m? Yes     Social History Narrative       Family History -   Family History   Problem Relation Age of Onset     CANCER Brother      HEART DISEASE Brother      CANCER Mother      Respiratory Father      HEART DISEASE Father      MI       Review of Systems - As per HPI and PMHx, otherwise 7 system review of the head and  "neck negative. 10+ system review negative.    This document serves as a record of the services and decisions personally performed and made by Grover Stuart MD. It was created on his behalf by Diana Martinez, a trained medical scribe. The creation of this document is based the provider's statements to the medical scribe.    Scribe Diana Martinez 2:21 PM 1/9/2018      Physical Exam  /74 (BP Location: Right arm, Patient Position: Sitting, Cuff Size: Adult Regular)  Pulse 85  Temp 97.7  F (36.5  C) (Oral)  Ht 1.613 m (5' 3.5\")  Wt 81.6 kg (180 lb)  BMI 31.39 kg/m2  General - The patient is well nourished and well developed, and appears to have good nutritional status.  Alert and oriented to person and place, answers questions and cooperates with examination appropriately.   Head and Face - Normocephalic and atraumatic, with no gross asymmetry noted of the contour of the facial features.  The facial nerve is intact, with strong symmetric movements.  Voice and Breathing - The patient was breathing comfortably without the use of accessory muscles. There was no wheezing, stridor, or stertor.  The patients voice was breathy.  Ears - Bilateral pinna and EACs with normal appearing overlying skin. Tympanic membrane intact with good mobility on pneumatic otoscopy bilaterally. Bony landmarks of the ossicular chain are normal. The tympanic membranes are normal in appearance. No retraction, perforation, or masses.  No fluid or purulence was seen in the external canal or the middle ear.   Eyes - Extraocular movements intact.  Sclera were not icteric or injected, conjunctiva were pink and moist.  Mouth - Examination of the oral cavity showed pink, healthy oral mucosa. No lesions or ulcerations noted.  The tongue was mobile and midline, and the dentition were in good condition.    Throat - The walls of the oropharynx were pink, moist. Thornwald cyst present. The tonsillar pillars and soft palate were symmetric.  The uvula was " midline on elevation.  Neck - Normal midline excursion of the laryngotracheal complex during swallowing.  Full range of motion on passive movement.  Palpation of the occipital, submental, submandibular, internal jugular chain, and supraclavicular nodes did not demonstrate any abnormal lymph nodes or masses.  The carotid pulse was palpable bilaterally.  Palpation of the thyroid was soft and smooth, with no nodules or goiter appreciated.  The trachea was mobile and midline.  Nose - External contour is symmetric, no gross deflection or scars.  Nasal mucosa is pink and moist with no abnormal mucus.  The septum was midline and non-obstructive, turbinates of normal size and position.  No polyps, masses, or purulence noted on examination.  Heart:  Regular rate and rhythm, no murmurs.  Lungs:  Chest clear to auscultation bilaterally      CXR 11/16/18: normal    Procedure: Flexible Endoscopy  Indication: Hoarseness    Attempts at mirror laryngoscopy were not possible due to gag reflex.  Therefore I proceeded with a fiberoptic examination.  First I sprayed both sides of the nose with a mixture of lidocaine and neosynephrine.  I then passed the scope through the nasal cavity.  The nasal cavity was unremarkable.  The nasopharynx was mucosally covered and symmetric.  The Eustachian tube openings were unobstructed.  Going further down I had a clear view of the base of tongue which had normal appearing lingual tonsillar tissue.  The base of tongue was free of lesions, and the vallecula was open.  The epiglottis was smooth and mucosally covered.  The supraglottic larynx was then clearly visualized.  Visualization of vocal folds reveal healthy mucosa without lesions. Left vocal fold is immobile.  The pyriform sinuses were open, and the limited view of the postcricoid region did not show any lesions.      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  * Unable to determine cause at this time.   * Will need further diagnostic studies done to rule out dangerous etiology.   * CT scan of both the neck and chest ordered today to look for possible tumor  * Will discuss with patient results as well as treatment options.      The information in this document, created by a scribe for me, accurately reflects the services I personally performed and the decisions made by me. I have reviewed and approved this document for accuracy.      Dr. Grover Stuart MD  Otolaryngology  [facility]        This laryngoscopy scope was  used on this patient.    Flexible    Scope Number: Yovani Storz Flexible  #[id number]   Adult   Jane Segal CMA        Again, thank you for allowing me to participate in the care of your patient.        Sincerely,        Grover Stuart MD

## 2018-01-09 NOTE — NURSING NOTE
"Initial /74 (BP Location: Right arm, Patient Position: Sitting, Cuff Size: Adult Regular)  Pulse 85  Temp 97.7  F (36.5  C) (Oral)  Ht 1.613 m (5' 3.5\")  Wt 81.6 kg (180 lb)  BMI 31.39 kg/m2 Estimated body mass index is 31.39 kg/(m^2) as calculated from the following:    Height as of this encounter: 1.613 m (5' 3.5\").    Weight as of this encounter: 81.6 kg (180 lb). .    Jane Segal CMA    "

## 2018-01-09 NOTE — PROGRESS NOTES
This laryngoscopy scope was  used on this patient.    Flexible    Scope Number: Yovani Storz Flexible  #[id number]   Adult   Jane Segal CMA

## 2018-01-09 NOTE — PROGRESS NOTES
History of Present Illness - Ines Pickard is a 77 year old female with throat laryngitis and sinus drainage. Patient woke up one morning and noticed a change in her voice. These symptoms are intermittent. If she is not talking much, when she does, she has no change. With prolonged talking or talking in groups, she will have changes in her voice. Was seen by her primary care physician and was given a steroid nasal spray. She notes this did not help her, causing her to close up her throat and reported shortness of breath. She was seen by a different provider and had an x-ray done. She is currently on 75 mg of ranitidine twice daily. No problems with swallowing food or liquids. She feels she has sticky phlegm at the back of the throat.  She denies any coughing on liquids or foods getting stuck in her throat.    Also notes that her left ear feels like it is plugged with pain. She has had it off and on for a while now.     History of myocardiac infaction. Stent was placed. Denies any open heart surgery. Denies stroke. Denies neck surgery. 2 years ago, she fell and hit her head, cracking frontal bone.       Past Medical History -   Patient Active Problem List   Diagnosis     Allergic rhinitis     Hyperlipidemia LDL goal <70     Advance Care Planning     Esophageal reflux     Coronary artery disease involving native coronary artery without angina pectoris     Senile nuclear cataract     Essential hypertension with goal blood pressure less than 140/90     Morbid obesity (H)       Current Medications -   Current Outpatient Prescriptions:      albuterol (PROAIR HFA/PROVENTIL HFA/VENTOLIN HFA) 108 (90 BASE) MCG/ACT Inhaler, Inhale 2 puffs into the lungs 4 times daily, Disp: 1 Inhaler, Rfl: 2     lisinopril (PRINIVIL/ZESTRIL) 5 MG tablet, Take 1 tablet (5 mg) by mouth daily, Disp: 90 tablet, Rfl: 3     atorvastatin (LIPITOR) 40 MG tablet, Take 1 tablet (40 mg) by mouth daily, Disp: 90 tablet, Rfl: 3     metoprolol  (TOPROL-XL) 50 MG 24 hr tablet, Take 1 tablet (50 mg) by mouth daily, Disp: 90 tablet, Rfl: 3     Multiple Vitamins-Minerals (MULTIVITAMIN ADULT) TABS, , Disp: , Rfl:      cyanocolbalamin (VITAMIN  B-12) 100 MCG tablet, Take 50 mcg by mouth daily, Disp: , Rfl:      ranitidine (ZANTAC) 75 MG tablet, Take 1 tablet (75 mg) by mouth 2 times daily, Disp: 30 tablet, Rfl: 11     aspirin EC 81 MG EC tablet, Take 1 tablet (81 mg) by mouth daily, Disp: 90 tablet, Rfl: 3     nitroglycerin (NITROSTAT) 0.4 MG SL tablet, Place 1 tablet (0.4 mg) under the tongue every 5 minutes as needed for chest pain Maximum of 3 doses in 15 minutes, Disp: 25 tablet, Rfl: 3     Acetaminophen (TYLENOL PO), Take 1,000 mg by mouth every 6 hours as needed, Disp: , Rfl:     Allergies -   Allergies   Allergen Reactions     Amoxicillin GI Disturbance     Tetracycline Other (See Comments)     Yeast infection     Nickel Rash       Social History -   Social History     Social History     Marital status:      Spouse name: N/A     Number of children: N/A     Years of education: N/A     Social History Main Topics     Smoking status: Former Smoker     Packs/day: 0.50     Types: Cigarettes     Quit date: 12/13/2014     Smokeless tobacco: Never Used     Alcohol use No     Drug use: No     Sexual activity: Not Currently     Other Topics Concern     Parent/Sibling W/ Cabg, Mi Or Angioplasty Before 65f 55m? Yes     Social History Narrative       Family History -   Family History   Problem Relation Age of Onset     CANCER Brother      HEART DISEASE Brother      CANCER Mother      Respiratory Father      HEART DISEASE Father      MI       Review of Systems - As per HPI and PMHx, otherwise 7 system review of the head and neck negative. 10+ system review negative.    This document serves as a record of the services and decisions personally performed and made by Grover Stuart MD. It was created on his behalf by Diana Martinez, a trained medical scribe. The  "creation of this document is based the provider's statements to the medical scribe.    Cheng Martinez 2:21 PM 1/9/2018      Physical Exam  /74 (BP Location: Right arm, Patient Position: Sitting, Cuff Size: Adult Regular)  Pulse 85  Temp 97.7  F (36.5  C) (Oral)  Ht 1.613 m (5' 3.5\")  Wt 81.6 kg (180 lb)  BMI 31.39 kg/m2  General - The patient is well nourished and well developed, and appears to have good nutritional status.  Alert and oriented to person and place, answers questions and cooperates with examination appropriately.   Head and Face - Normocephalic and atraumatic, with no gross asymmetry noted of the contour of the facial features.  The facial nerve is intact, with strong symmetric movements.  Voice and Breathing - The patient was breathing comfortably without the use of accessory muscles. There was no wheezing, stridor, or stertor.  The patients voice was breathy.  Ears - Bilateral pinna and EACs with normal appearing overlying skin. Tympanic membrane intact with good mobility on pneumatic otoscopy bilaterally. Bony landmarks of the ossicular chain are normal. The tympanic membranes are normal in appearance. No retraction, perforation, or masses.  No fluid or purulence was seen in the external canal or the middle ear.   Eyes - Extraocular movements intact.  Sclera were not icteric or injected, conjunctiva were pink and moist.  Mouth - Examination of the oral cavity showed pink, healthy oral mucosa. No lesions or ulcerations noted.  The tongue was mobile and midline, and the dentition were in good condition.    Throat - The walls of the oropharynx were pink, moist. Thornwald cyst present. The tonsillar pillars and soft palate were symmetric.  The uvula was midline on elevation.  Neck - Normal midline excursion of the laryngotracheal complex during swallowing.  Full range of motion on passive movement.  Palpation of the occipital, submental, submandibular, internal jugular chain, and " supraclavicular nodes did not demonstrate any abnormal lymph nodes or masses.  The carotid pulse was palpable bilaterally.  Palpation of the thyroid was soft and smooth, with no nodules or goiter appreciated.  The trachea was mobile and midline.  Nose - External contour is symmetric, no gross deflection or scars.  Nasal mucosa is pink and moist with no abnormal mucus.  The septum was midline and non-obstructive, turbinates of normal size and position.  No polyps, masses, or purulence noted on examination.  Heart:  Regular rate and rhythm, no murmurs.  Lungs:  Chest clear to auscultation bilaterally      CXR 11/16/18: normal    Procedure: Flexible Endoscopy  Indication: Hoarseness    Attempts at mirror laryngoscopy were not possible due to gag reflex.  Therefore I proceeded with a fiberoptic examination.  First I sprayed both sides of the nose with a mixture of lidocaine and neosynephrine.  I then passed the scope through the nasal cavity.  The nasal cavity was unremarkable.  The nasopharynx was mucosally covered and symmetric.  The Eustachian tube openings were unobstructed.  Going further down I had a clear view of the base of tongue which had normal appearing lingual tonsillar tissue.  The base of tongue was free of lesions, and the vallecula was open.  The epiglottis was smooth and mucosally covered.  The supraglottic larynx was then clearly visualized.  Visualization of vocal folds reveal healthy mucosa without lesions. Left vocal fold is immobile.  The pyriform sinuses were open, and the limited view of the postcricoid region did not show any lesions.      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  * Unable to determine cause at this time.   * Will need further diagnostic studies done to rule out dangerous etiology.   * CT scan of both the neck and chest ordered today to look for  possible tumor  * Will discuss with patient results as well as treatment options.      The information in this document, created by a scribe for me, accurately reflects the services I personally performed and the decisions made by me. I have reviewed and approved this document for accuracy.      Dr. Grover Stuatr MD  Otolaryngology  [facility]

## 2018-01-10 ENCOUNTER — HOSPITAL ENCOUNTER (OUTPATIENT)
Dept: CT IMAGING | Facility: CLINIC | Age: 78
End: 2018-01-10
Attending: OTOLARYNGOLOGY
Payer: MEDICARE

## 2018-01-10 ENCOUNTER — HOSPITAL ENCOUNTER (OUTPATIENT)
Dept: CT IMAGING | Facility: CLINIC | Age: 78
Discharge: HOME | End: 2018-01-10
Attending: OTOLARYNGOLOGY | Admitting: OTOLARYNGOLOGY
Payer: MEDICARE

## 2018-01-10 DIAGNOSIS — J38.01 VOCAL FOLD PARALYSIS, LEFT: ICD-10-CM

## 2018-01-10 LAB
CREAT BLD-MCNC: 0.5 MG/DL (ref 0.52–1.04)
GFR SERPL CREATININE-BSD FRML MDRD: >90 ML/MIN/1.7M2
RADIOLOGIST FLAGS: ABNORMAL

## 2018-01-10 PROCEDURE — 70491 CT SOFT TISSUE NECK W/DYE: CPT

## 2018-01-10 PROCEDURE — 82565 ASSAY OF CREATININE: CPT

## 2018-01-10 PROCEDURE — 25000125 ZZHC RX 250: Performed by: RADIOLOGY

## 2018-01-10 PROCEDURE — 25000128 H RX IP 250 OP 636: Performed by: RADIOLOGY

## 2018-01-10 PROCEDURE — 71260 CT THORAX DX C+: CPT

## 2018-01-10 RX ORDER — IOPAMIDOL 755 MG/ML
80 INJECTION, SOLUTION INTRAVASCULAR ONCE
Status: DISCONTINUED | OUTPATIENT
Start: 2018-01-10 | End: 2018-01-10

## 2018-01-10 RX ORDER — IOPAMIDOL 755 MG/ML
125 INJECTION, SOLUTION INTRAVASCULAR ONCE
Status: COMPLETED | OUTPATIENT
Start: 2018-01-10 | End: 2018-01-10

## 2018-01-10 RX ADMIN — IOPAMIDOL 125 ML: 755 INJECTION, SOLUTION INTRAVENOUS at 14:41

## 2018-01-10 RX ADMIN — SODIUM CHLORIDE 80 ML: 9 INJECTION, SOLUTION INTRAVENOUS at 14:41

## 2018-01-12 ENCOUNTER — TELEPHONE (OUTPATIENT)
Dept: FAMILY MEDICINE | Facility: CLINIC | Age: 78
End: 2018-01-12

## 2018-01-12 DIAGNOSIS — F41.9 ANXIETY: Primary | ICD-10-CM

## 2018-01-12 RX ORDER — LORAZEPAM 0.5 MG/1
0.5 TABLET ORAL EVERY 8 HOURS PRN
Qty: 20 TABLET | Refills: 0 | Status: SHIPPED | OUTPATIENT
Start: 2018-01-12 | End: 2018-01-19

## 2018-01-12 NOTE — TELEPHONE ENCOUNTER
Reason for Call:  Medication or medication refill:    Do you use a [facility]?  Name of the pharmacy and phone number for the current request:  [facility] [phone]    Name of the medication requested: Patient calling requesting an anxiety medication to help her until her appointment on 1-18-18. Patient states she is very nervous about what is going to happen at this appointment. She is worked up. Wants something to calm her down.    Other request: none    Can we leave a detailed message on this number? YES    Phone number patient can be reached at: Home number on file [phone] (home)    Best Time: any    Call taken on 1/12/2018 at 10:17 AM by Vianey Louis

## 2018-01-12 NOTE — TELEPHONE ENCOUNTER
Ativan ordered (lorazepam) 0.5 mg up to three times a day.  #20 ordered and there will be no refills without an office visit.  Take sparingly and only as needed for extreme anxiety or sleep (temporarily).      Do not drive after taking this medication.    Rohini Suárez M.D.

## 2018-01-12 NOTE — TELEPHONE ENCOUNTER
Patient has an appointment on 1-18-18 with Dr. Reis at the [facility]. Patient states she understands will need to gather more information to understand what the lung mass is, so will go from there. Reminded the patient ativan was sent in and patient is on the way to pick it up now.    JONATHAN Maher

## 2018-01-12 NOTE — TELEPHONE ENCOUNTER
We cannot know what the lung mass is exactly until we have more information, such as a biopsy. Please inform patient that finding out what the lung mass is is the reason for the Thoracic Surgery consultation. Please ensure that she has arranged a visit in the near future.    Thanks,    Dr. Stuart

## 2018-01-12 NOTE — TELEPHONE ENCOUNTER
Dr. Suárez,  Patient called and is wondering if she can get an anti-anxiety medication to help her as she is dealing with anxiety over appointment on 1-18-18 and the results of the testing to be done. Reviewed the history of anxiety meds and it looks like the patient has tried Ativan before. Patient wants what Dr. Perez gave her before, but only see Ativan in medication history. Patient is having difficulties with falling back asleep. Please advise in Dr. Perez's absence, thank you.    JONATHAN Maher

## 2018-01-12 NOTE — TELEPHONE ENCOUNTER
Patient is notified with instruction below per Dr. Suárez. Patient states she has questions about what the lung mass and what treatment options are. Told the patient will route this to Dr. Stuart to review.     JONATHAN Maher

## 2018-01-16 NOTE — TELEPHONE ENCOUNTER
Documentation indicates pt was advised by another RN.  Carolyne Aguayo RN  [facility]

## 2018-01-18 ENCOUNTER — TELEPHONE (OUTPATIENT)
Dept: FAMILY MEDICINE | Facility: CLINIC | Age: 78
End: 2018-01-18

## 2018-01-18 ENCOUNTER — OFFICE VISIT (OUTPATIENT)
Dept: SURGERY | Facility: CLINIC | Age: 78
End: 2018-01-18
Attending: STUDENT IN AN ORGANIZED HEALTH CARE EDUCATION/TRAINING PROGRAM
Payer: MEDICARE

## 2018-01-18 VITALS
OXYGEN SATURATION: 94 % | DIASTOLIC BLOOD PRESSURE: 82 MMHG | HEART RATE: 89 BPM | RESPIRATION RATE: 18 BRPM | HEIGHT: 64 IN | BODY MASS INDEX: 30.97 KG/M2 | WEIGHT: 181.4 LBS | SYSTOLIC BLOOD PRESSURE: 149 MMHG | TEMPERATURE: 98.8 F

## 2018-01-18 DIAGNOSIS — R91.8 LUNG MASS: Primary | ICD-10-CM

## 2018-01-18 PROCEDURE — 40000114 ZZH STATISTIC NO CHARGE CLINIC VISIT

## 2018-01-18 PROCEDURE — G0463 HOSPITAL OUTPT CLINIC VISIT: HCPCS | Mod: ZF

## 2018-01-18 ASSESSMENT — PAIN SCALES - GENERAL: PAINLEVEL: NO PAIN (0)

## 2018-01-18 NOTE — LETTER
1/18/2018       RE: Ines Pickard  [address] [facility] [address]     Dear Colleague,    Thank you for referring your patient, Ines Pickard, to the [facility]. Please see a copy of my visit note below.    THORACIC SURGERY - NEW PATIENT OFFICE VISIT      Dear Dr. Stuart,    I saw Ms. Pickard at your request in consultation for the evaluation and treatment of a left pulmonary mass.     HPI  Ms. nIes Pickard is a 77 year old year-old female former smoker, evaluated by ENT in [place], [place] for new onset of hoarseness, which began ~4 months ago. Direct laryngoscopy revealed left vocal cord paralysis and CT scan of the chest confirmed a left hilar mass with mediastinal and hilar adenopathy. She denies any new dyspnea, chest pain, or dysphagia. She endorses a loss of appetite and a 10 pound weight loss over 3 months.    Previsit Tests   1/10/18 CT chest: 2.3 cm TEX nodule extending into left hilum highly suspicious for malignancy. Left hilar and mediastinal adenopathy invading inferior aspect of the arch with likely invasion of RLN at this level. Ascending aortic aneurysm (4.6 cm) and 1. 5cm left adrenal nodule.       PMH  GERD  Obesity  HLD  CAD   Inferior STEMI s/p stent  HTN  HLD  Cataracts    PSH  Past Surgical History:   Procedure Laterality Date     APPENDECTOMY  1956     EYE SURGERY       PHACOEMULSIFICATION WITH STANDARD INTRAOCULAR LENS IMPLANT Left 1/25/2016    Procedure: PHACOEMULSIFICATION WITH STANDARD INTRAOCULAR LENS IMPLANT;  Surgeon: Julio César Wallace MD;  Location: [facility] OR     PHACOEMULSIFICATION WITH STANDARD INTRAOCULAR LENS IMPLANT Right 2/22/2016    Procedure: PHACOEMULSIFICATION WITH STANDARD INTRAOCULAR LENS IMPLANT;  Surgeon: Julio César Wallace MD;  Location: [facility] OR     SURGICAL HISTORY OF -   1961    right hand surgery      TONSILLECTOMY & ADENOIDECTOMY  1967        ETOH - no  TOB - former, quit 2014    Physical examination  BMI  31.4  Non-contributory.    From a personal perspective, she is  and lives alone in a mobile home in [place], [place]. She is accompanied by her one of her three sons, Trell, whom lives in [place], [place]. She enjoys playing cards in her card group.     IMPRESSION (R91.8) Lung mass  (primary encounter diagnosis)        77 year old year-old female with left upper lobe mass and mediastinal and hilar adenopathy presents for diagnosis and staging.      PLAN  I spent a total of 30 minutes with Ms. Pickard and her son, Trell, more than 50% of which were spent in counseling, coordination of care, and face-to-face time. I reviewed the plan as follows:  Necessary Preop Tests & Appointments:   - Pulmonary nodule conference tomorrow to determine optimal diagnostic biopsy modality. EBUS seems to be the most favorable option given the location of her lymphadenopathy.  - PET-CT, MRI brain after tissue diagnosis obtained.   -  Based on preliminary imaging findings thus far, specifically invasion into mediastinal structures including recurrent laryngeal nerve, mediastinal lymph nodes, and adrenal nodule, she has advanced disease and would likely not be a surgical candidate at this time.   Smoking Cessation: n/a    All questions were answered and Ines Pickard and present family were in agreement with the plan.  I appreciate the opportunity to participate in the care of your patient and will keep you updated.  Sincerely,    Again, thank you for allowing me to participate in the care of your patient.      Sincerely,    Radha Reis MD

## 2018-01-18 NOTE — TELEPHONE ENCOUNTER
Dr. Stuart,    MAHAMED:  Not sure if you were you able to get a hold of patient to notify her of her CT of neck and chest results from 1-10-18 prior to appt with thoracic surgery today.  RN was unable to find documentation.    Thank you!  Ines BENAVIDEZ RN

## 2018-01-18 NOTE — TELEPHONE ENCOUNTER
FYI:  See note prior to this one.  Patient did have appt today with thoracic surgery regarding finding on CT chest completed 1-10-18.  RN spoke with specialty RN - not certain if patient was made aware of findings prior to her appt today with thoracic surgery.    Notes Recorded by Grover Stuart MD on 1/10/2018 at 5:12 PM  Reviewed CT Neck and Chest. 2.3cm mass in the left upper lobe of the lung, concerning for malignancy with potential metastasis to the hilum. 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 I attempted to reach patient today but was only able to leave a message. I have placed a referral to [facility]. I will try calling back tomorrow.    Routing to provider.  Ines BENAVIDEZ RN

## 2018-01-18 NOTE — TELEPHONE ENCOUNTER
Please see note below and call patient.  Received this note 2 days ago.              Can you please call the patient and explain why she was referred to thoracic surgery for a consultation? She is calling our New Patient  asking what this is all about.     Thanks   Charo Maher RN

## 2018-01-18 NOTE — TELEPHONE ENCOUNTER
Yes, I talked to her the following day and let her know about the suspicious mass and the referral.    Dr. Stuart

## 2018-01-18 NOTE — PROGRESS NOTES
THORACIC SURGERY - NEW PATIENT OFFICE VISIT      Dear Dr. Stuart,    I saw Ms. Pickard at your request in consultation for the evaluation and treatment of a left pulmonary mass.     HPI  Ms. Ines Pickard is a 77 year old year-old female former smoker, evaluated by ENT in [place], [place] for new onset of hoarseness, which began ~4 months ago. Direct laryngoscopy revealed left vocal cord paralysis and CT scan of the chest confirmed a left hilar mass with mediastinal and hilar adenopathy. She denies any new dyspnea, chest pain, or dysphagia. She endorses a loss of appetite and a 10 pound weight loss over 3 months.    Previsit Tests   1/10/18 CT chest: 2.3 cm TEX nodule extending into left hilum highly suspicious for malignancy. Left hilar and mediastinal adenopathy invading inferior aspect of the arch with likely invasion of RLN at this level. Ascending aortic aneurysm (4.6 cm) and 1. 5cm left adrenal nodule.       PMH  GERD  Obesity  HLD  CAD   Inferior STEMI s/p stent  HTN  HLD  Cataracts    PSH  Past Surgical History:   Procedure Laterality Date     APPENDECTOMY  1956     EYE SURGERY       PHACOEMULSIFICATION WITH STANDARD INTRAOCULAR LENS IMPLANT Left 1/25/2016    Procedure: PHACOEMULSIFICATION WITH STANDARD INTRAOCULAR LENS IMPLANT;  Surgeon: Julio César Wallace MD;  Location: [facility] OR     PHACOEMULSIFICATION WITH STANDARD INTRAOCULAR LENS IMPLANT Right 2/22/2016    Procedure: PHACOEMULSIFICATION WITH STANDARD INTRAOCULAR LENS IMPLANT;  Surgeon: Julio César Wallace MD;  Location: [facility] OR     SURGICAL HISTORY OF -   1961    right hand surgery      TONSILLECTOMY & ADENOIDECTOMY  1967        ETOH - no  TOB - former, quit 2014    Physical examination  BMI 31.4  Non-contributory.    From a personal perspective, she is  and lives alone in a mobile home in [place], [place]. She is accompanied by her one of her three sons, Trell, whom lives in [place], [place]. She enjoys playing cards in her card group.     IMPRESSION (R91.8)  Lung mass  (primary encounter diagnosis)        77 year old year-old female with left upper lobe mass and mediastinal and hilar adenopathy presents for diagnosis and staging.      PLAN  I spent a total of 30 minutes with Ms. Pickard and her son, Trell, more than 50% of which were spent in counseling, coordination of care, and face-to-face time. I reviewed the plan as follows:  Necessary Preop Tests & Appointments:   - Pulmonary nodule conference tomorrow to determine optimal diagnostic biopsy modality. EBUS seems to be the most favorable option given the location of her lymphadenopathy.  - PET-CT, MRI brain after tissue diagnosis obtained.   -  Based on preliminary imaging findings thus far, specifically invasion into mediastinal structures including recurrent laryngeal nerve, mediastinal lymph nodes, and adrenal nodule, she has advanced disease and would likely not be a surgical candidate at this time.   Smoking Cessation: n/a    All questions were answered and Ines Pickard and present family were in agreement with the plan.  I appreciate the opportunity to participate in the care of your patient and will keep you updated.  Sincerely,

## 2018-01-18 NOTE — MR AVS SNAPSHOT
"              After Visit Summary   2018    Ines Pickard    MRN: [id number]           Patient Information     Date Of Birth          1940        Visit Information        Provider Department      2018 12:00 PM Radha Reis MD [facility]        Today's Diagnoses     Lung mass    -  1       Follow-ups after your visit        Who to contact     If you have questions or need follow up information about today's clinic visit or your schedule please contact [facility] directly at [phone].  Normal or non-critical lab and imaging results will be communicated to you by Heartbeathart, letter or phone within 4 business days after the clinic has received the results. If you do not hear from us within 7 days, please contact the clinic through Heartbeathart or phone. If you have a critical or abnormal lab result, we will notify you by phone as soon as possible.  Submit refill requests through Suja Juice or call your pharmacy and they will forward the refill request to us. Please allow 3 business days for your refill to be completed.          Additional Information About Your Visit        MyChart Information     Suja Juice lets you send messages to your doctor, view your test results, renew your prescriptions, schedule appointments and more. To sign up, go to [email or web address] . Click on \"Log in\" on the left side of the screen, which will take you to the Welcome page. Then click on \"Sign up Now\" on the right side of the page.     You will be asked to enter the access code listed below, as well as some personal information. Please follow the directions to create your username and password.     Your access code is: [email or web address]  Expires: 2018  1:30 PM     Your access code will  in 90 days. If you need help or a new code, please call your [facility] or [phone].        Care EveryWhere ID     This is your Care EveryWhere ID. This could be used by other " "organizations to access your [place] medical records  [id number]        Your Vitals Were     Pulse Temperature Respirations Height Pulse Oximetry BMI (Body Mass Index)    89 98.8  F (37.1  C) (Oral) 18 1.613 m (5' 3.5\") 94% 31.63 kg/m2       Blood Pressure from Last 3 Encounters:   01/18/18 149/82   01/09/18 139/74   11/16/17 139/71    Weight from Last 3 Encounters:   01/18/18 82.3 kg (181 lb 6.4 oz)   01/09/18 81.6 kg (180 lb)   11/16/17 86.2 kg (190 lb)              Today, you had the following     No orders found for display       Primary Care Provider Office Phone # Fax #    R Emanuel Perez [phone] [address] [facility] [address]        Equal Access to Services     [facility]: Hadii aad ku hadasho Sodesi, waaxda luqadaha, qaybta kaalmada adedlyada, hoda maddox . So [facility] [phone].    ATENCIÓN: Si habla español, tiene a nguyễn disposición servicios gratuitos de asistencia lingüística. Lornajoann al [phone].    We comply with applicable federal civil rights laws and [place] laws. We do not discriminate on the basis of race, color, national origin, age, disability, sex, sexual orientation, or gender identity.            Thank you!     Thank you for choosing [facility]  for your care. Our goal is always to provide you with excellent care. Hearing back from our patients is one way we can continue to improve our services. Please take a few minutes to complete the written survey that you may receive in the mail after your visit with us. Thank you!             Your Updated Medication List - Protect others around you: Learn how to safely use, store and throw away your medicines at www.disposemymeds.org.          This list is accurate as of: 1/18/18  1:30 PM.  Always use your most recent med list.                   Brand Name Dispense Instructions for use Diagnosis    albuterol 108 (90 BASE) MCG/ACT Inhaler    PROAIR " HFA/PROVENTIL HFA/VENTOLIN HFA    1 Inhaler    Inhale 2 puffs into the lungs 4 times daily    Acute bronchitis with symptoms > 10 days       aspirin 81 MG EC tablet     90 tablet    Take 1 tablet (81 mg) by mouth daily    CAD (coronary artery disease)       atorvastatin 40 MG tablet    LIPITOR    90 tablet    Take 1 tablet (40 mg) by mouth daily    Coronary artery disease involving native coronary artery of native heart without angina pectoris       cyanocolbalamin 100 MCG tablet    vitamin  B-12     Take 50 mcg by mouth daily        lisinopril 5 MG tablet    PRINIVIL/ZESTRIL    90 tablet    Take 1 tablet (5 mg) by mouth daily    Essential hypertension with goal blood pressure less than 140/90       LORazepam 0.5 MG tablet    ATIVAN    20 tablet    Take 1 tablet (0.5 mg) by mouth every 8 hours as needed for anxiety    Anxiety       metoprolol succinate 50 MG 24 hr tablet    TOPROL-XL    90 tablet    Take 1 tablet (50 mg) by mouth daily    Coronary artery disease involving native coronary artery of native heart without angina pectoris       MULTIVITAMIN ADULT Tabs      Take by mouth daily        nitroGLYcerin 0.4 MG sublingual tablet    NITROSTAT    25 tablet    Place 1 tablet (0.4 mg) under the tongue every 5 minutes as needed for chest pain Maximum of 3 doses in 15 minutes    CAD (coronary artery disease)       ranitidine 75 MG tablet    ZANTAC    30 tablet    Take 1 tablet (75 mg) by mouth 2 times daily    Esophageal reflux       TYLENOL PO      Take 1,000 mg by mouth every 6 hours as needed

## 2018-01-19 ENCOUNTER — OFFICE VISIT (OUTPATIENT)
Dept: FAMILY MEDICINE | Facility: CLINIC | Age: 78
End: 2018-01-19
Payer: MEDICARE

## 2018-01-19 ENCOUNTER — CARE COORDINATION (OUTPATIENT)
Dept: GASTROENTEROLOGY | Facility: CLINIC | Age: 78
End: 2018-01-19

## 2018-01-19 ENCOUNTER — TELEPHONE (OUTPATIENT)
Dept: FAMILY MEDICINE | Facility: CLINIC | Age: 78
End: 2018-01-19

## 2018-01-19 VITALS
TEMPERATURE: 98.2 F | SYSTOLIC BLOOD PRESSURE: 126 MMHG | WEIGHT: 180.4 LBS | BODY MASS INDEX: 31.96 KG/M2 | HEIGHT: 63 IN | HEART RATE: 102 BPM | DIASTOLIC BLOOD PRESSURE: 77 MMHG

## 2018-01-19 DIAGNOSIS — R91.8 HILAR MASS: Primary | ICD-10-CM

## 2018-01-19 DIAGNOSIS — F41.9 ANXIETY: ICD-10-CM

## 2018-01-19 PROCEDURE — 99213 OFFICE O/P EST LOW 20 MIN: CPT | Performed by: FAMILY MEDICINE

## 2018-01-19 RX ORDER — LORAZEPAM 0.5 MG/1
0.5 TABLET ORAL 2 TIMES DAILY PRN
Qty: 30 TABLET | Refills: 0 | Status: SHIPPED | OUTPATIENT
Start: 2018-01-19 | End: 2018-02-01

## 2018-01-19 RX ORDER — LORAZEPAM 0.5 MG/1
0.5 TABLET ORAL EVERY 8 HOURS PRN
Qty: 20 TABLET | Refills: 0 | Status: CANCELLED | OUTPATIENT
Start: 2018-01-19

## 2018-01-19 ASSESSMENT — ANXIETY QUESTIONNAIRES
1. FEELING NERVOUS, ANXIOUS, OR ON EDGE: NEARLY EVERY DAY
7. FEELING AFRAID AS IF SOMETHING AWFUL MIGHT HAPPEN: NOT AT ALL
2. NOT BEING ABLE TO STOP OR CONTROL WORRYING: MORE THAN HALF THE DAYS
6. BECOMING EASILY ANNOYED OR IRRITABLE: NOT AT ALL
5. BEING SO RESTLESS THAT IT IS HARD TO SIT STILL: MORE THAN HALF THE DAYS

## 2018-01-19 ASSESSMENT — PATIENT HEALTH QUESTIONNAIRE - PHQ9: 5. POOR APPETITE OR OVEREATING: MORE THAN HALF THE DAYS

## 2018-01-19 NOTE — TELEPHONE ENCOUNTER
Patient was notified of the notes from telephone encounter: 1/12/18  Ativan ordered (lorazepam) 0.5 mg up to three times a day.  #20 ordered and there will be no refills without an office visit.  Take sparingly and only as needed for extreme anxiety or sleep (temporarily).       Do not drive after taking this medication.     Patient was scheduled with a provider for today.    Gina POP RN
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
## 2018-01-19 NOTE — MR AVS SNAPSHOT
"              After Visit Summary   1/19/2018    Ines Pickard    MRN: [id number]           Patient Information     Date Of Birth          1940        Visit Information        Provider Department      1/19/2018 2:00 PM Noel Rodriguez MD [facility]        Today's Diagnoses     Hilar mass    -  1    Anxiety           Follow-ups after your visit        Your next 10 appointments already scheduled     Jan 30, 2018   Procedure with Carlos Fletcher MD   [facility], [place], Endoscopy ([facility], [facility])    [address]  [place] [id number] [phone]           The [facility] is located on the corner of [facility] and [facility] on the [facility]. It is easily accessible from virtually any point in the [facility] area, via T-ZONE and NDoubleDutch.              Who to contact     If you have questions or need follow up information about today's clinic visit or your schedule please contact [facility] directly at [phone].  Normal or non-critical lab and imaging results will be communicated to you by [facility], letter or phone within 4 business days after the clinic has received the results. If you do not hear from us within 7 days, please contact the clinic through Taskmitt or phone. If you have a critical or abnormal lab result, we will notify you by phone as soon as possible.  Submit refill requests through Cinnamon or call your pharmacy and they will forward the refill request to us. Please allow 3 business days for your refill to be completed.          Additional Information About Your Visit        [facility]resents Information     Cinnamon lets you send messages to your doctor, view your test results, renew your prescriptions, schedule appointments and more. To sign up, go to [email or web address] . Click on \"Log in\" on the left side of the screen, which will take you to the Welcome page. Then " "click on \"Sign up Now\" on the right side of the page.     You will be asked to enter the access code listed below, as well as some personal information. Please follow the directions to create your username and password.     Your access code is: [email or web address]  Expires: 2018  1:30 PM     Your access code will  in 90 days. If you need help or a new code, please call your [place] clinic or [phone].        Care EveryWhere ID     This is your Care EveryWhere ID. This could be used by other organizations to access your [place] medical records  [id number]        Your Vitals Were     Pulse Temperature Height BMI (Body Mass Index)          102 98.2  F (36.8  C) (Tympanic) 5' 3\" (1.6 m) 31.96 kg/m2         Blood Pressure from Last 3 Encounters:   18 126/77   18 149/82   18 139/74    Weight from Last 3 Encounters:   18 180 lb 6.4 oz (81.8 kg)   18 181 lb 6.4 oz (82.3 kg)   18 180 lb (81.6 kg)              Today, you had the following     No orders found for display         Today's Medication Changes          These changes are accurate as of: 18  2:50 PM.  If you have any questions, ask your nurse or doctor.               These medicines have changed or have updated prescriptions.        Dose/Directions    LORazepam 0.5 MG tablet   Commonly known as:  ATIVAN   This may have changed:  when to take this   Used for:  Anxiety   Changed by:  Noel Rodriguez MD        Dose:  0.5 mg   Take 1 tablet (0.5 mg) by mouth 2 times daily as needed for anxiety   Quantity:  30 tablet   Refills:  0            Where to get your medicines      Some of these will need a paper prescription and others can be bought over the counter.  Ask your nurse if you have questions.     Bring a paper prescription for each of these medications     LORazepam 0.5 MG tablet                Primary Care Provider Office Phone # Fax #    R Emanuel Perez [phone] [address] [facility] " [facility] [address]        Equal Access to Services     [facility] : Hadii aad ku hadrivermarycarmen Carmona, wamarkoda minnierodolfoha, qakeyta etiennecrowhoda smith. So [facility] [phone].    ATENCIÓN: Si habla español, tiene a nguyễn disposición servicios gratuitos de asistencia lingüística. Yefri al [phone].    We comply with applicable federal civil rights laws and [place] laws. We do not discriminate on the basis of race, color, national origin, age, disability, sex, sexual orientation, or gender identity.            Thank you!     Thank you for choosing [facility]  for your care. Our goal is always to provide you with excellent care. Hearing back from our patients is one way we can continue to improve our services. Please take a few minutes to complete the written survey that you may receive in the mail after your visit with us. Thank you!             Your Updated Medication List - Protect others around you: Learn how to safely use, store and throw away your medicines at www.disposemymeds.org.          This list is accurate as of: 1/19/18  2:50 PM.  Always use your most recent med list.                   Brand Name Dispense Instructions for use Diagnosis    albuterol 108 (90 BASE) MCG/ACT Inhaler    PROAIR HFA/PROVENTIL HFA/VENTOLIN HFA    1 Inhaler    Inhale 2 puffs into the lungs 4 times daily    Acute bronchitis with symptoms > 10 days       aspirin 81 MG EC tablet     90 tablet    Take 1 tablet (81 mg) by mouth daily    CAD (coronary artery disease)       atorvastatin 40 MG tablet    LIPITOR    90 tablet    Take 1 tablet (40 mg) by mouth daily    Coronary artery disease involving native coronary artery of native heart without angina pectoris       cyanocolbalamin 100 MCG tablet    vitamin  B-12     Take 50 mcg by mouth daily        lisinopril 5 MG tablet    PRINIVIL/ZESTRIL    90 tablet    Take 1 tablet (5 mg) by mouth daily    Essential hypertension  with goal blood pressure less than 140/90       LORazepam 0.5 MG tablet    ATIVAN    30 tablet    Take 1 tablet (0.5 mg) by mouth 2 times daily as needed for anxiety    Anxiety       metoprolol succinate 50 MG 24 hr tablet    TOPROL-XL    90 tablet    Take 1 tablet (50 mg) by mouth daily    Coronary artery disease involving native coronary artery of native heart without angina pectoris       MULTIVITAMIN ADULT Tabs      Take by mouth daily        nitroGLYcerin 0.4 MG sublingual tablet    NITROSTAT    25 tablet    Place 1 tablet (0.4 mg) under the tongue every 5 minutes as needed for chest pain Maximum of 3 doses in 15 minutes    CAD (coronary artery disease)       ranitidine 75 MG tablet    ZANTAC    30 tablet    Take 1 tablet (75 mg) by mouth 2 times daily    Esophageal reflux       TYLENOL PO      Take 1,000 mg by mouth every 6 hours as needed

## 2018-01-19 NOTE — PROGRESS NOTES
SUBJECTIVE:   Ines Pickard is a 77 year old female who presents to clinic today for the following health issues:    During the workup for hoarseness she had a CT of the chest:  IMPRESSION:   1. A 2.3 cm left upper lobe nodule extends into the right lung hilum,  and is highly suspicious for malignancy, most likely bronchogenic  carcinoma. Consider PET/CT for further characterization.  2. Left hilar and mediastinal adenopathy, with masslike soft tissue  thickening extending along the inferior aspect of the aortic arch,  likely involves the recurrent laryngeal nerve in this location.  3. Aneurysmal dilatation of the ascending thoracic aorta measures 4.6  cm in diameter.  4. An 1.5 cm left adrenal nodule is indeterminate, and metastatic  disease cannot be excluded.  5. Moderate age-indeterminate anterior compression of the L1 vertebral  body.      [Access Center: Left upper lobe nodule extends into the left lung  hilum, and is highly suspicious for malignancy.]  ENDOSCOPIC ULTRASOUND, ESOPHAGOSCOPY / UPPER GASTROINTESTINAL TRACT (GI) N/A Monitor Anesthesia Care    EUS       On January 30 they will be doing the endoscopic ultrasound apparently to biopsy the 2.3 cm nodule in the right lung hilum.    Of course she is very nervous about waiting this long to find out what is going on.  She has been taking lorazepam 0.5 mg twice daily for 7 days and she only has 6 tabs left.  I gave her another 30 tabs.        Anxiety Follow-Up    Status since last visit: No change    Other associated symptoms:None    Complicating factors:   Significant life event: Yes-  Put dog down and some health problems   Current substance abuse: None  Depression symptoms: No  SHAISTA-7 SCORE 2/15/2016   Total Score 0       GAD7        Amount of exercise or physical activity: 2-3 days/week for an average of 30-45 minutes    Problems taking medications regularly: No    Medication side effects: none    Diet: regular (no restrictions)            Problem  "list and histories reviewed & adjusted, as indicated.  Additional history: as documented    Patient Active Problem List   Diagnosis     Allergic rhinitis     Hyperlipidemia LDL goal <70     Advance Care Planning     Esophageal reflux     Coronary artery disease involving native coronary artery without angina pectoris     Senile nuclear cataract     Essential hypertension with goal blood pressure less than 140/90     Morbid obesity (H)     Past Surgical History:   Procedure Laterality Date     APPENDECTOMY  1956     EYE SURGERY       PHACOEMULSIFICATION WITH STANDARD INTRAOCULAR LENS IMPLANT Left 1/25/2016    Procedure: PHACOEMULSIFICATION WITH STANDARD INTRAOCULAR LENS IMPLANT;  Surgeon: Julio César Wallace MD;  Location: [facility] OR     PHACOEMULSIFICATION WITH STANDARD INTRAOCULAR LENS IMPLANT Right 2/22/2016    Procedure: PHACOEMULSIFICATION WITH STANDARD INTRAOCULAR LENS IMPLANT;  Surgeon: Julio César Wallace MD;  Location: [facility] OR     SURGICAL HISTORY OF -   1961    right hand surgery      TONSILLECTOMY & ADENOIDECTOMY  1967       Social History   Substance Use Topics     Smoking status: Former Smoker     Packs/day: 0.50     Types: Cigarettes     Quit date: 12/13/2014     Smokeless tobacco: Never Used     Alcohol use No     Family History   Problem Relation Age of Onset     CANCER Brother      HEART DISEASE Brother      CANCER Mother      Respiratory Father      HEART DISEASE Father      MI             Reviewed and updated as needed this visit by clinical staffTobacco  Allergies  Meds  Med Hx  Surg Hx  Fam Hx  Soc Hx      Reviewed and updated as needed this visit by Provider         ROS:  CONSTITUTIONAL:NEGATIVE for fever, chills, change in weight  PSYCHIATRIC: anxiety    OBJECTIVE:     /77  Pulse 102  Temp 98.2  F (36.8  C) (Tympanic)  Ht 5' 3\" (1.6 m)  Wt 180 lb 6.4 oz (81.8 kg)  BMI 31.96 kg/m2  Body mass index is 31.96 kg/(m^2).  GENERAL: healthy, alert and no distress  PSYCH: mentation appears " normal and anxious        ASSESSMENT/PLAN:               ICD-10-CM    1. Hilar mass R91.8    2. Anxiety F41.9 LORazepam (ATIVAN) 0.5 MG tablet     Endoscopy to biopsy right hilar mass on January 30.  Recheck in clinic as needed.        Noel Rodriguez MD  [facility]

## 2018-01-19 NOTE — NURSING NOTE
"Chief Complaint   Patient presents with     Anxiety       Initial There were no vitals taken for this visit. Estimated body mass index is 31.63 kg/(m^2) as calculated from the following:    Height as of 1/18/18: 5' 3.5\" (1.613 m).    Weight as of 1/18/18: 181 lb 6.4 oz (82.3 kg).  Medication Reconciliation: complete   Annie Zaldivar CMA      "

## 2018-01-19 NOTE — PROGRESS NOTES
Care Coordination New Patient Referral  Surgical Oncology and GI    NP referral date: 01/18/18  Referred to MD: Justine  Referring MD: Venu  Referring : Carmen Matthews sent the following note to Dr. Fletcher:    Patient recently seen in thoracic surgery for hoarseness (has left cord paralysis) and left hilar mass, also left adrenal mass.     In conference today, it was suggested you should do bx adrenal and also could likely get bx of mass via EUS as well.       Note from Dr. Fletcher:   Yes - we can always get to left adrenal.   If FNA is negative prelim on that, I can also easily sample the left hilar mass which extends into left lower paratracheal space.     Diagnosis: left hilar mass    01/18/18 per Dr. Fletcher will arrange for EUS in endoscopy    01/18/18 Per Carmen, she has spoken with the patient today and OK to contact.  I will forward to endoscopy scheduling to arrange.    Morena GUERINN, HNBC  RN Care Coordinator  Surgical Oncology  Ph: [phone]  Email: [email or web address]

## 2018-01-22 ENCOUNTER — TELEPHONE (OUTPATIENT)
Dept: GASTROENTEROLOGY | Facility: CLINIC | Age: 78
End: 2018-01-22

## 2018-01-22 PROBLEM — F41.9 ANXIETY: Status: ACTIVE | Noted: 2018-01-22

## 2018-01-22 NOTE — TELEPHONE ENCOUNTER
Patient scheduled for EUS    Indication for procedure. Hilar mass    Referring Provider. Dr. Reis  ? no    Arrival time verified? 9:30 a.m    Facility location verified? [address] [facility], [address]     Instructions given regarding prep and procedure    Prep Type NPO for 6 hours    Are you taking any anticoagulants or blood thinners? no    Instructions given? Yes, verbally    Electronic implanted devices? no    Pre procedure teaching completed? Yes    Transportation from procedure? Yes, son    H&P / Pre op physical completed? 1/19/2018  Kassie Allen RN

## 2018-01-29 ENCOUNTER — ANESTHESIA EVENT (OUTPATIENT)
Dept: GASTROENTEROLOGY | Facility: CLINIC | Age: 78
End: 2018-01-29
Payer: MEDICARE

## 2018-01-30 ENCOUNTER — TELEPHONE (OUTPATIENT)
Dept: OTHER | Facility: CLINIC | Age: 78
End: 2018-01-30

## 2018-01-30 ENCOUNTER — HOSPITAL ENCOUNTER (OUTPATIENT)
Facility: CLINIC | Age: 78
Discharge: HOME | End: 2018-01-30
Attending: INTERNAL MEDICINE | Admitting: INTERNAL MEDICINE
Payer: MEDICARE

## 2018-01-30 ENCOUNTER — SURGERY (OUTPATIENT)
Age: 78
End: 2018-01-30

## 2018-01-30 ENCOUNTER — ANESTHESIA (OUTPATIENT)
Dept: GASTROENTEROLOGY | Facility: CLINIC | Age: 78
End: 2018-01-30
Payer: MEDICARE

## 2018-01-30 VITALS
BODY MASS INDEX: 31.48 KG/M2 | HEART RATE: 93 BPM | WEIGHT: 177.7 LBS | DIASTOLIC BLOOD PRESSURE: 102 MMHG | TEMPERATURE: 97.5 F | SYSTOLIC BLOOD PRESSURE: 138 MMHG | RESPIRATION RATE: 16 BRPM | OXYGEN SATURATION: 95 %

## 2018-01-30 LAB — UPPER EUS: NORMAL

## 2018-01-30 PROCEDURE — 00000155 ZZHCL STATISTIC H-CELL BLOCK W/STAIN: Performed by: INTERNAL MEDICINE

## 2018-01-30 PROCEDURE — 25000128 H RX IP 250 OP 636: Performed by: NURSE ANESTHETIST, CERTIFIED REGISTERED

## 2018-01-30 PROCEDURE — 88172 CYTP DX EVAL FNA 1ST EA SITE: CPT | Performed by: INTERNAL MEDICINE

## 2018-01-30 PROCEDURE — 43242 EGD US FINE NEEDLE BX/ASPIR: CPT | Performed by: INTERNAL MEDICINE

## 2018-01-30 PROCEDURE — 88342 IMHCHEM/IMCYTCHM 1ST ANTB: CPT | Performed by: INTERNAL MEDICINE

## 2018-01-30 PROCEDURE — 88305 TISSUE EXAM BY PATHOLOGIST: CPT | Performed by: INTERNAL MEDICINE

## 2018-01-30 PROCEDURE — 88173 CYTOPATH EVAL FNA REPORT: CPT | Performed by: INTERNAL MEDICINE

## 2018-01-30 PROCEDURE — 25000125 ZZHC RX 250: Performed by: NURSE ANESTHETIST, CERTIFIED REGISTERED

## 2018-01-30 PROCEDURE — 37000009 ZZH ANESTHESIA TECHNICAL FEE, EACH ADDTL 15 MIN: Performed by: INTERNAL MEDICINE

## 2018-01-30 PROCEDURE — 37000008 ZZH ANESTHESIA TECHNICAL FEE, 1ST 30 MIN: Performed by: INTERNAL MEDICINE

## 2018-01-30 PROCEDURE — 88341 IMHCHEM/IMCYTCHM EA ADD ANTB: CPT | Performed by: INTERNAL MEDICINE

## 2018-01-30 RX ORDER — GLYCOPYRROLATE 0.2 MG/ML
INJECTION, SOLUTION INTRAMUSCULAR; INTRAVENOUS PRN
Status: DISCONTINUED | OUTPATIENT
Start: 2018-01-30 | End: 2018-01-30

## 2018-01-30 RX ORDER — LIDOCAINE HYDROCHLORIDE 20 MG/ML
INJECTION, SOLUTION INFILTRATION; PERINEURAL PRN
Status: DISCONTINUED | OUTPATIENT
Start: 2018-01-30 | End: 2018-01-30

## 2018-01-30 RX ORDER — PROPOFOL 10 MG/ML
INJECTION, EMULSION INTRAVENOUS PRN
Status: DISCONTINUED | OUTPATIENT
Start: 2018-01-30 | End: 2018-01-30

## 2018-01-30 RX ORDER — SODIUM CHLORIDE 9 MG/ML
INJECTION, SOLUTION INTRAVENOUS CONTINUOUS PRN
Status: DISCONTINUED | OUTPATIENT
Start: 2018-01-30 | End: 2018-01-30

## 2018-01-30 RX ORDER — PROPOFOL 10 MG/ML
INJECTION, EMULSION INTRAVENOUS CONTINUOUS PRN
Status: DISCONTINUED | OUTPATIENT
Start: 2018-01-30 | End: 2018-01-30

## 2018-01-30 RX ADMIN — BENZOCAINE 1 EACH: 220 SPRAY, METERED PERIODONTAL at 11:09

## 2018-01-30 RX ADMIN — MIDAZOLAM 1 MG: 1 INJECTION INTRAMUSCULAR; INTRAVENOUS at 11:09

## 2018-01-30 RX ADMIN — GLYCOPYRROLATE 0.1 MG: 0.2 INJECTION, SOLUTION INTRAMUSCULAR; INTRAVENOUS at 11:13

## 2018-01-30 RX ADMIN — LIDOCAINE HYDROCHLORIDE 30 MG: 20 INJECTION, SOLUTION INFILTRATION; PERINEURAL at 11:13

## 2018-01-30 RX ADMIN — PROPOFOL 10 MG: 10 INJECTION, EMULSION INTRAVENOUS at 11:40

## 2018-01-30 RX ADMIN — PROPOFOL 75 MCG/KG/MIN: 10 INJECTION, EMULSION INTRAVENOUS at 11:13

## 2018-01-30 RX ADMIN — SODIUM CHLORIDE: 9 INJECTION, SOLUTION INTRAVENOUS at 11:00

## 2018-01-30 ASSESSMENT — LIFESTYLE VARIABLES: TOBACCO_USE: 1

## 2018-01-30 NOTE — ANESTHESIA PREPROCEDURE EVALUATION
Anesthesia Evaluation     . Pt has had prior anesthetic.     No history of anesthetic complications          ROS/MED HX    ENT/Pulmonary: Comment: L VC paralysis    NP scope 1/2018:  Procedure: Flexible Endoscopy  Indication: Hoarseness     Attempts at mirror laryngoscopy were not possible due to gag reflex.  Therefore I proceeded with a fiberoptic examination.  First I sprayed both sides of the nose with a mixture of lidocaine and neosynephrine.  I then passed the scope through the nasal cavity.  The nasal cavity was unremarkable.  The nasopharynx was mucosally covered and symmetric.  The Eustachian tube openings were unobstructed.  Going further down I had a clear view of the base of tongue which had normal appearing lingual tonsillar tissue.  The base of tongue was free of lesions, and the vallecula was open.  The epiglottis was smooth and mucosally covered.  The supraglottic larynx was then clearly visualized.  Visualization of vocal folds reveal healthy mucosa without lesions. Left vocal fold is immobile.  The pyriform sinuses were open, and the limited view of the postcricoid region did not show any lesions.        (+)tobacco use, Past use Intermittent asthma Treatment: Inhaler prn,  , . .   (-) sleep apnea and recent URI   Neurologic:  - neg neurologic ROS     Cardiovascular: Comment: CT chest 1/10/2018:  IMPRESSION:   1. A 2.3 cm left upper lobe nodule extends into the right lung hilum,  and is highly suspicious for malignancy, most likely bronchogenic  carcinoma. Consider PET/CT for further characterization.  2. Left hilar and mediastinal adenopathy, with masslike soft tissue  thickening extending along the inferior aspect of the aortic arch,  likely involves the recurrent laryngeal nerve in this location.  ###3. Aneurysmal dilatation of the ascending thoracic aorta measures 4.6###  cm in diameter.  4. An 1.5 cm left adrenal nodule is indeterminate, and metastatic  disease cannot be excluded.  5. Moderate  age-indeterminate anterior compression of the L1 vertebral  body.     (+) Dyslipidemia, hypertension--CAD, --stent (stent to Cx 2014),1 . : . . . :. . Previous cardiac testing Echodate:results:TTE 7/2015:  The visual ejection fraction is estimated at 55-60%.  There is moderate mid and distal inferior wall hypokinesis.  The transmitral spectral Doppler flow pattern is suggestive of impaired LV  relaxation.  The study was technically difficult.date: results: date: results: date: results:          METS/Exercise Tolerance:  3 - Able to walk 1-2 blocks without stopping   Hematologic:         Musculoskeletal:         GI/Hepatic:     (+) GERD      (-) liver disease   Renal/Genitourinary:  - ROS Renal section negative    (-) renal disease   Endo:      (-) Type I DM, Type II DM and thyroid disease   Psychiatric:         Infectious Disease:  - neg infectious disease ROS       Malignancy:   (+)   Lung mass        Other:                     Physical Exam  Normal systems: pulmonary    Airway   Mallampati: III  TM distance: >3 FB  Neck ROM: full    Dental   (+) missing  Comment: Multiple missing teeth (front upper CIs, and 3 lowers). None chipped or loose    Cardiovascular   Rhythm and rate: regular and normal  (+) murmur (3/6 systolic, all fields)   (-) no weak pulses    Pulmonary    breath sounds clear to auscultation(-) no rhonchi                    Anesthesia Plan      History & Physical Review      ASA Status:  3 .        Plan for MAC with Propofol induction. Maintenance will be TIVA.      MAC. PIVx1. Standard ASA monitors. IV opioids. ENDO PACU postop. Discussed GA and airway adjuncts if any respiratory concerns arise.     All Q&A answered from patient/family        Postoperative Care      Consents  Anesthetic plan, risks, benefits and alternatives discussed with:  Patient..

## 2018-01-30 NOTE — IP AVS SNAPSHOT
[facility], [place], Endoscopy    [address] [facility] [address]    Phone:  [phone]                                       After Visit Summary   1/30/2018    Ines Pickard    MRN: [id number]           After Visit Summary Signature Page     I have received my discharge instructions, and my questions have been answered. I have discussed any challenges I see with this plan with the nurse or doctor.    ..........................................................................................................................................  Patient/Patient Representative Signature      ..........................................................................................................................................  Patient Representative Print Name and Relationship to Patient    ..................................................               ................................................  Date                                            Time    ..........................................................................................................................................  Reviewed by Signature/Title    ...................................................              ..............................................  Date                                                            Time

## 2018-01-30 NOTE — ANESTHESIA POSTPROCEDURE EVALUATION
Patient: Ines Pickard    Procedure(s):   EUS - Wound Class: II-Clean Contaminated    Diagnosis:left hilar mass, adrenal mass  Diagnosis Additional Information: No value filed.    Anesthesia Type:  MAC    Note:  Anesthesia Post Evaluation    Patient location during evaluation: PACU  Patient participation: Able to fully participate in evaluation  Level of consciousness: awake and alert  Pain management: adequate  Airway patency: patent  Cardiovascular status: acceptable  Respiratory status: acceptable  Hydration status: acceptable  PONV: none     Anesthetic complications: None          Last vitals:  Vitals:    01/30/18 0921 01/30/18 0923 01/30/18 1208   BP: (!) 162/97  141/72   Pulse: 90  93   Resp: 17  16   Temp:  36.4  C (97.5  F)    SpO2: 97%  99%         Electronically Signed By: Jospeh Henry MD  January 30, 2018  12:37 PM

## 2018-01-30 NOTE — IP AVS SNAPSHOT
MRN:[id number]                      After Visit Summary   1/30/2018    Ines Pickard    MRN: [id number]           Thank you!     Thank you for choosing [facility] for your care. Our goal is always to provide you with excellent care. Hearing back from our patients is one way we can continue to improve our services. Please take a few minutes to complete the written survey that you may receive in the mail after you visit with us. Thank you!        Patient Information     Date Of Birth          1940        About your hospital stay     You were admitted on:  January 30, 2018 You last received care in the:  [facility], Endoscopy    You were discharged on:  January 30, 2018       Who to Call     For medical emergencies, please call 911.  For non-urgent questions about your medical care, please call your primary care provider or clinic, [phone]  For questions related to your surgery, please call your surgery clinic        Attending Provider     Provider Carlos Boone MD Gastroenterology       Primary Care Provider Office Phone # Fax #    R Emanuel Perez [phone]      Further instructions from your care team       [facility] Endoscopic Ultrasound with Monitored Anesthesia Care  For 24 hours after your procedure  Sedation:  1. Get plenty of rest. A responsible adult must stay with you for at least 24 hours after you leave the hospital.   2. Do not drive or use heavy equipment. If you have weakness or tingling, don't drive or use heavy equipment until this feeling goes away.  3. Do not drink alcohol.  4. Avoid strenuous or risky activities. Ask for help when climbing stairs.   5. You may feel lightheaded. IF so, sit for a few minutes before standing. Have someone help you get up.   6. If you have nausea (feel sick to your stomach): Drink only clear liquids such as apple juice, ginger ale, broth or 7-Up. Rest may also help. Be sure to drink enough fluids. Move to  a regular diet as you feel able.  7. You may have a slight fever. Call the doctor if your fever is over 100 F (37.7 C) (taken under the tongue) or lasts longer than 24 hours.  8. You may have a dry mouth, a sore throat, muscle aches or trouble sleeping. These should go away after 24 hours.  9. Do not make important or legal decisions.   Procedural:  1. Wait one hour before eating or drinking. Start with sips of water. When your gag reflex has returned, you may return to your normal diet, medicines, and light exercise.  2. Some bloating is normal. You may have large burps or pass air.  3. You may have a sore throat for 2 to 3 days. If so, it may help to:    Avoid hot liquids for 24 hours.    Use sore throat lozenges.    Gargle as need with salt water up to 4 times a day. Mix 1 cup of warm water with 1 teaspoon of salt. Do not swallow.  4. You may take Tylenol (acetaminophen) for pain unless your doctor has told you not to.   Do not take aspirin or ibuprofen (Advil, Motrin, or other anti-inflammatory  drugs) for __3___ days.  Call right away if you have:  1. Unusual pain in belly or chest pain not relieved with passing air.  2. Severe throat pain or trouble swallowing.  3. Black stools (tar-like looking bowel movement).  4.   5. It has been over 8 to 10 hours since surgery and you are still not able to urinate (pass water).  6. Headache for over 24 hours.  Follow-up:  __x__ We took small tissue or fluid samples. Your doctor will call you with the results within two weeks.  If you have severe pain, bleeding, vomit blood, or shortness of breath, go to an emergency room.  If you have questions, call:  Endoscopy: Monday to Friday, 7 a.m. to 4:30 p.m. [phone] (We may have to call you back)  After hours: Hospital  [phone] (Ask for the GI fellow on call)        Pending Results     Date and Time Order Name Status Description    1/30/2018 1157 Fine needle aspiration In process             Admission  "Information     Date & Time Provider Department Dept. Phone    2018 Carlos Fletcher MD [facility], [place], Endoscopy [phone]      Your Vitals Were     Blood Pressure Pulse Temperature Respirations Weight Pulse Oximetry    141/72 93 97.5  F (36.4  C) (Oral) 16 80.6 kg (177 lb 11.2 oz) 99%    BMI (Body Mass Index)                   31.48 kg/m2           MyChart Information     [facility] lets you send messages to your doctor, view your test results, renew your prescriptions, schedule appointments and more. To sign up, go to [email or web address] . Click on \"Log in\" on the left side of the screen, which will take you to the Welcome page. Then click on \"Sign up Now\" on the right side of the page.     You will be asked to enter the access code listed below, as well as some personal information. Please follow the directions to create your username and password.     Your access code is: [email or web address]  Expires: 2018  1:30 PM     Your access code will  in 90 days. If you need help or a new code, please call your [facility] clinic or [phone].        Care EveryWhere ID     This is your Care EveryWhere ID. This could be used by other organizations to access your [facility] medical records  [id number]        Equal Access to Services     ALPA LOPEZ AH: Hadii doroteo mtzo Sodesi, waaxda luqadaha, qaybta kaalmada adedlyada, hoda taylor. So [facility] [phone].    ATENCIÓN: Si habla español, tiene a nguyễn disposición servicios gratuitos de asistencia lingüística. Yefri al [phone].    We comply with applicable federal civil rights laws and [place] laws. We do not discriminate on the basis of race, color, national origin, age, disability, sex, sexual orientation, or gender identity.               Review of your medicines      UNREVIEWED medicines. Ask your doctor about these medicines        Dose / Directions    albuterol 108 (90 BASE) MCG/ACT Inhaler   Commonly known as:  " PROAIR HFA/PROVENTIL HFA/VENTOLIN HFA   Used for:  Acute bronchitis with symptoms > 10 days        Dose:  2 puff   Inhale 2 puffs into the lungs 4 times daily   Quantity:  1 Inhaler   Refills:  2       aspirin 81 MG EC tablet   Used for:  CAD (coronary artery disease)        Dose:  81 mg   Take 1 tablet (81 mg) by mouth daily   Quantity:  90 tablet   Refills:  3       atorvastatin 40 MG tablet   Commonly known as:  LIPITOR   Used for:  Coronary artery disease involving native coronary artery of native heart without angina pectoris        Dose:  40 mg   Take 1 tablet (40 mg) by mouth daily   Quantity:  90 tablet   Refills:  3       cyanocolbalamin 100 MCG tablet   Commonly known as:  vitamin  B-12        Dose:  50 mcg   Take 50 mcg by mouth daily   Refills:  0       lisinopril 5 MG tablet   Commonly known as:  PRINIVIL/ZESTRIL   Used for:  Essential hypertension with goal blood pressure less than 140/90        Dose:  5 mg   Take 1 tablet (5 mg) by mouth daily   Quantity:  90 tablet   Refills:  3       LORazepam 0.5 MG tablet   Commonly known as:  ATIVAN   Used for:  Anxiety        Dose:  0.5 mg   Take 1 tablet (0.5 mg) by mouth 2 times daily as needed for anxiety   Quantity:  30 tablet   Refills:  0       metoprolol succinate 50 MG 24 hr tablet   Commonly known as:  TOPROL-XL   Used for:  Coronary artery disease involving native coronary artery of native heart without angina pectoris        Dose:  50 mg   Take 1 tablet (50 mg) by mouth daily   Quantity:  90 tablet   Refills:  3       MULTIVITAMIN ADULT Tabs        Take by mouth daily   Refills:  0       nitroGLYcerin 0.4 MG sublingual tablet   Commonly known as:  NITROSTAT   Used for:  CAD (coronary artery disease)        Dose:  0.4 mg   Place 1 tablet (0.4 mg) under the tongue every 5 minutes as needed for chest pain Maximum of 3 doses in 15 minutes   Quantity:  25 tablet   Refills:  3       ranitidine 75 MG tablet   Commonly known as:  ZANTAC   Used for:   Esophageal reflux        Dose:  75 mg   Take 1 tablet (75 mg) by mouth 2 times daily   Quantity:  30 tablet   Refills:  11       TYLENOL PO        Dose:  1000 mg   Take 1,000 mg by mouth every 6 hours as needed   Refills:  0                Protect others around you: Learn how to safely use, store and throw away your medicines at www.disposemymeds.org.             Medication List: This is a list of all your medications and when to take them. Check marks below indicate your daily home schedule. Keep this list as a reference.      Medications           Morning Afternoon Evening Bedtime As Needed    albuterol 108 (90 BASE) MCG/ACT Inhaler   Commonly known as:  PROAIR HFA/PROVENTIL HFA/VENTOLIN HFA   Inhale 2 puffs into the lungs 4 times daily                                aspirin 81 MG EC tablet   Take 1 tablet (81 mg) by mouth daily                                atorvastatin 40 MG tablet   Commonly known as:  LIPITOR   Take 1 tablet (40 mg) by mouth daily                                cyanocolbalamin 100 MCG tablet   Commonly known as:  vitamin  B-12   Take 50 mcg by mouth daily                                lisinopril 5 MG tablet   Commonly known as:  PRINIVIL/ZESTRIL   Take 1 tablet (5 mg) by mouth daily                                LORazepam 0.5 MG tablet   Commonly known as:  ATIVAN   Take 1 tablet (0.5 mg) by mouth 2 times daily as needed for anxiety                                metoprolol succinate 50 MG 24 hr tablet   Commonly known as:  TOPROL-XL   Take 1 tablet (50 mg) by mouth daily                                MULTIVITAMIN ADULT Tabs   Take by mouth daily                                nitroGLYcerin 0.4 MG sublingual tablet   Commonly known as:  NITROSTAT   Place 1 tablet (0.4 mg) under the tongue every 5 minutes as needed for chest pain Maximum of 3 doses in 15 minutes                                ranitidine 75 MG tablet   Commonly known as:  ZANTAC   Take 1 tablet (75 mg) by mouth 2 times daily                                 TYLENOL PO   Take 1,000 mg by mouth every 6 hours as needed

## 2018-01-30 NOTE — TELEPHONE ENCOUNTER
Morena:    See EUS report from today. Sampling appears preliminarily consistent with malignancy.    Pt requested oncology follow-up in [place] if needed and is frustrated by delay in evaluation (which I do not be believe has been overly delayed).    Please see if can tentatively arrange for consultation next week with Dr. Miner in oncology.    BOBBI Fletcher MD  Associate Professor of Medicine  Division of Gastroenterology, Hepatology and Nutrition  [facility]      Sent as CC to Dr.s Reis and Carmen Matthews.

## 2018-02-01 ENCOUNTER — HOSPITAL ENCOUNTER (OUTPATIENT)
Dept: LAB | Facility: CLINIC | Age: 78
Discharge: HOME | End: 2018-02-01
Attending: INTERNAL MEDICINE | Admitting: INTERNAL MEDICINE
Payer: MEDICARE

## 2018-02-01 ENCOUNTER — ONCOLOGY VISIT (OUTPATIENT)
Dept: ONCOLOGY | Facility: CLINIC | Age: 78
End: 2018-02-01
Attending: INTERNAL MEDICINE
Payer: MEDICARE

## 2018-02-01 VITALS
SYSTOLIC BLOOD PRESSURE: 142 MMHG | HEART RATE: 88 BPM | WEIGHT: 178.3 LBS | TEMPERATURE: 98.8 F | RESPIRATION RATE: 20 BRPM | DIASTOLIC BLOOD PRESSURE: 77 MMHG | HEIGHT: 63 IN | OXYGEN SATURATION: 92 % | BODY MASS INDEX: 31.59 KG/M2

## 2018-02-01 DIAGNOSIS — C34.12 MALIGNANT NEOPLASM OF UPPER LOBE OF LEFT LUNG (H): Primary | ICD-10-CM

## 2018-02-01 DIAGNOSIS — F41.9 ANXIETY: ICD-10-CM

## 2018-02-01 LAB
ALBUMIN SERPL-MCNC: 3.4 G/DL (ref 3.4–5)
ALP SERPL-CCNC: 51 U/L (ref 40–150)
ALT SERPL W P-5'-P-CCNC: 19 U/L (ref 0–50)
AST SERPL W P-5'-P-CCNC: 14 U/L (ref 0–45)
BILIRUB DIRECT SERPL-MCNC: 0.2 MG/DL (ref 0–0.2)
BILIRUB SERPL-MCNC: 1.1 MG/DL (ref 0.2–1.3)
CEA SERPL-MCNC: 6.6 UG/L (ref 0–2.5)
COPATH REPORT: NORMAL
PROT SERPL-MCNC: 6.7 G/DL (ref 6.8–8.8)

## 2018-02-01 PROCEDURE — 80076 HEPATIC FUNCTION PANEL: CPT | Performed by: INTERNAL MEDICINE

## 2018-02-01 PROCEDURE — G0463 HOSPITAL OUTPT CLINIC VISIT: HCPCS

## 2018-02-01 PROCEDURE — 82378 CARCINOEMBRYONIC ANTIGEN: CPT | Performed by: INTERNAL MEDICINE

## 2018-02-01 PROCEDURE — 36415 COLL VENOUS BLD VENIPUNCTURE: CPT | Performed by: INTERNAL MEDICINE

## 2018-02-01 PROCEDURE — 99205 OFFICE O/P NEW HI 60 MIN: CPT | Performed by: INTERNAL MEDICINE

## 2018-02-01 RX ORDER — LORAZEPAM 0.5 MG/1
0.5 TABLET ORAL 2 TIMES DAILY PRN
Qty: 30 TABLET | Refills: 0 | Status: SHIPPED | OUTPATIENT
Start: 2018-02-01 | End: 2018-03-26

## 2018-02-01 ASSESSMENT — PAIN SCALES - GENERAL: PAINLEVEL: NO PAIN (0)

## 2018-02-01 NOTE — PATIENT INSTRUCTIONS
Dr. Miner would like you to have labs today a brain MRI and a PET scan. We would like to see you back in for a follow up appointment with results. When you are in need of a refill, please call your pharmacy and they will send us a request.  Copy of appointments, and after visit summary (AVS) given to patient.  If you have any questions please call Bella Arvizu RN, BSN Oncology Hematology  [facility] [phone]. For questions after business hours, or on holidays/weekends, please call our after hours Nurse Triage line [phone]. Thank you.             Labs today, brain MRI and PET to be done.

## 2018-02-01 NOTE — NURSING NOTE
"Oncology Rooming Note    February 1, 2018 11:50 AM   Ines Pickard is a 77 year old female who presents for:    Chief Complaint   Patient presents with     Oncology Clinic Visit     New Patient - Lung CA     Initial Vitals: /77 (BP Location: Right arm, Patient Position: Sitting, Cuff Size: Adult Regular)  Pulse 88  Temp 98.8  F (37.1  C) (Tympanic)  Resp 20  Ht 1.6 m (5' 3\")  Wt 80.9 kg (178 lb 4.8 oz)  SpO2 92%  Breastfeeding? No  BMI 31.58 kg/m2 Estimated body mass index is 31.58 kg/(m^2) as calculated from the following:    Height as of this encounter: 1.6 m (5' 3\").    Weight as of this encounter: 80.9 kg (178 lb 4.8 oz). Body surface area is 1.9 meters squared.  No Pain (0) Comment: Data Unavailable   No LMP recorded. Patient is postmenopausal.  Allergies reviewed: Yes  Medications reviewed: Yes    Medications: MEDICATION REFILLS NEEDED TODAY. Provider was notified.  Pharmacy name entered into [facility]: [facility] - [place], [place] - [address]    Clinical concerns:  New Patient - Lung CA.     Patient is here today with her good friend Liyah.   7  minutes for nursing intake (face to face time)     Casi Sandoval CMA              "

## 2018-02-01 NOTE — LETTER
"    2/1/2018         RE: Ines Pickard  [address] [facility] [address]        Dear Colleague,    Thank you for referring your patient, Ines Pickard, to the [facility]. Please see a copy of my visit note below.    Visit Date:   02/01/2018      ONCOLOGY CONSULTATION      REFERRING PHYSICIAN:  Carlos Fletcher MD       CHIEF COMPLAINT/REASON FOR VISIT:  Left adrenal mass, FNA 01/30/2018 preliminary is consistent with malignancy; left upper lobe mass and left hilar mass.        HISTORY OF PRESENT ILLNESS:  A 77-year-old female presented with 5 months duration of hoarseness.  She was evaluated by ENT.  Direct laryngoscopy revealed left vocal cord paralysis.  CT chest with contrast 01/10/2018 identified a 2.3 cm left upper lobe nodule extending to the right hilum, suspicious for malignancy, left hilar mediastinal adenopathy with mass-like soft tissue thickening extending along the inferior aspect of the aortic arch likely involving the recurrent laryngeal nerve in this location, left adrenal nodule 1.5 cm undetermined, compression on L1 vertebral body.  EUS 01/30/2017 FNA was done on 2 hypoechoic left adrenal mass.  Preliminary pathology is positive for malignancy.      The patient feels she has \"stuff around her throat,\" more prominent discomfort sensation with hoarseness in the morning.  She also has about 15-pound weight loss in the last 5-6 months.  Denies any shortness of breath or hemoptysis.  Denies any other bone pain.      PAST MEDICAL HISTORY:  CAD, stent around 2014, reflux, hypertension, cataracts, hyperlipidemia.      MEDICATIONS:  Reviewed in Epic system.      SOCIAL HISTORY:  She lives in her own house.  She has 3 boys.  She is here with her neighbor.  She quit smoking years back.  Denies alcohol abuse.      FAMILY HISTORY:  Positive for mom who had Hodgkin lymphoma.  Brother had unknown type of cancer.      REVIEW OF SYSTEMS:   GENERAL:  She has weight loss, about 15 months " over 6 months' duration.  No night sweats, baseline energy level, ECOG 0:1, no pain.    HEENT:  Hoarseness for 5-6 months prior to presentation.    NEURO:   No headache, double vision, or focal weakness.  No neuropathy.   SKIN:  No chronic skin rash or skin infection.   CARDIOVASCULAR:  CAD stents, hypertension, hyperlipidemia.  No palpitations, no chest pressure, no dyspnea on exertion.   PULMONARY:  Left upper lobe mass, hilar mass.  No shortness of breath, no pleurisy, no cough, no hemoptysis.   GI:  Reflux.  No abdominal pain, nausea, vomiting, constipation.  No diarrhea.  No bright red blood per rectum.   :  No urgency, frequency.  No recurrent urinary tract infection.  No kidney problems.   RHEUMATOLOGY/MUSCULOSKELETAL SYSTEM:  No arthritis.  No joint pain.  No muscle ache.   ENDOCRINE:  No history of diabetes or thyroid problem.  No complaints of hot flashes.   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  No history of bleeding or thrombosis episode.   IMMUNOLOGY:  No recurrent fever or chills episode.  No recurrent infectious episode.   BREASTS/GYN:  No history of vaginal bleeding/discharge/dryness.  No breast pain or nipple discharge.       PHYSICAL EXAMINATION:   VITAL SIGNS:  Stable.   GENERAL APPEARANCE:  Elderly lady, looks like her stated age, not in acute distress.   HEENT: The patient is normocephalic, atraumatic. Pupils are equally reactive to light.  Sclerae are anicteric.  Moist oral mucosa.  Negative pharynx.  No oral thrush.   NECK:  Supple.  No jugular venous distention.  Thyroid is not palpable.   LYMPH NODES:  Superficial lymphadenopathy is not appreciable in the bilateral cervical, supraclavicular, axillary or inguinal areas.   CARDIOVASCULAR:  S1, S2 regular with no murmurs or gallops.  No carotid or abdominal bruits.   PULMONARY:  Lungs are clear to auscultation and percussion bilaterally.  There is no wheezing or rhonchi.    GASTROINTESTINAL:  Abdomen is soft, nontender.  No hepatosplenomegaly.  No signs of ascites.  No mass appreciable.   MUSCULOSKELETAL/EXTREMITIES:  No edema.  No cyanotic changes.  No signs of joint deformity.  No lymphedema.  No spinal or paraspinal tenderness.  No CVA tenderness.   NEUROLOGIC:  Cranial nerves II-XII are grossly intact.  Sensation intact.  Muscle strength and muscle tone symmetrical, 5/5 throughout.   SKIN:  No petechiae.  No rash.  No signs of cellulitis.      LABORATORY DATA REVIEWED:  From 01/30/2018 FNA on left adrenal mass biopsy result is pending.  Preliminary is for malignancy.        CURRENT IMAGE DATA REVIEWED:  CT chest 01/2018 as above.      OLD DATA REVIEW IN SUMMARY:  Chest x-ray 11/2017, no abnormalities identified.  In 2014, CBC indicating white count 17, hemoglobin 11 and platelets normal.  Differential indicating neutrophilia when she had a heart attack.        ASSESSMENT AND PLAN:  Newly identified left upper lobe mass extending to the left yessy associated with mediastinal adenopathy. This mass extends along the inferior aspect of the aortic arch, likely involving the recurrent laryngeal nerve and left adrenal nodules.  FNA of left adrenal nodule by EUS highly suggestive of malignancy.      The patient has hoarseness for 5 months associated with about 15-pound weight loss.  No hemoptysis, no shortness of breath, no chest pain.      I have indicated to the patient and her friends likely she has lung cancer with left adrenal gland involvement.      Recommend further workup with lab work, MRI of the brain and PET scan.      We talked about lung cancer treatment in general for Stage IV disease including chemotherapy, targeted therapy and immunotherapy.      She has a very open mind.  At this point she wants to try all effective therapy out that potentially could help her hoarseness.      Final oncologic recommendation will be based on the above further workup.         SUDHA VELASCO MD              D: 2018   T: 2018   MT: ANNABELLA      Name:     ELIZABETH LANDA   MRN:      [id number]        Account:      [id number]   :      1940           Visit Date:   2018      Document: [id number]       Again, thank you for allowing me to participate in the care of your patient.        Sincerely,        Tracy Miner MD, MD

## 2018-02-01 NOTE — MR AVS SNAPSHOT
After Visit Summary   2/1/2018    Ines Pickard    MRN: [id number]           Patient Information     Date Of Birth          1940        Visit Information        Provider Department      2/1/2018 11:30 AM Tracy Miner MD [facility]        Today's Diagnoses     Malignant neoplasm of upper lobe of left lung (H)    -  1    Anxiety          Care Instructions    Dr. Miner would like you to have labs today a brain MRI and a PET scan. We would like to see you back in for a follow up appointment with results. When you are in need of a refill, please call your pharmacy and they will send us a request.  Copy of appointments, and after visit summary (AVS) given to patient.  If you have any questions please call Bella Arvizu RN, BSN Oncology Hematology  [facility] [phone]. For questions after business hours, or on holidays/weekends, please call our after hours Nurse Triage line [phone]. Thank you.             Labs today, brain MRI and PET to be done.           Follow-ups after your visit        Your next 10 appointments already scheduled     Feb 07, 2018  8:30 AM CST   (Arrive by 8:15 AM)   PE NPET ONCOLOGY (EYES TO THIGHS) with [id number]   [facility] Pet CT ([facility])    [address] [facility] [address] [phone]           Tell your doctor:   If there is any chance you may be pregnant or if you are breastfeeding.   If you have problems lying in small spaces (claustrophobia). If you do, your doctor may give you medicine to help you relax. If you have diabetes:   Have your exam early in the morning. Your blood glucose will go up as the day goes by.   Your glucose level must be 180 or less at the start of the exam. Please take any medicines you need to ensure this blood glucose level. 24 hours before your scan: Don t do any heavy exercise. (No jogging, aerobics or other workouts.) Exercise will make your pictures  less accurate. 6 hours before your scan:   Stop all food and liquids (except water).   Do not chew gum or suck on mints.   If you need to take medicine with food, you may take it with a few crackers.  Please call your Imaging Department at your exam site with any questions.            Feb 07, 2018 12:30 PM CST   (Arrive by 12:15 PM)   MR BRAIN W/O & W CONTRAST with [id number] [place] [address] MRI ([facility])    [address] [facility] [address]   [phone]           Take your medicines as usual, unless your doctor tells you not to. Bring a list of your current medicines to your exam (including vitamins, minerals and over-the-counter drugs).  You will be given intravenous contrast for this exam. To prepare:   The day before your exam, drink extra fluids at least six 8-ounce glasses (unless your doctor tells you to restrict your fluids).   Have a blood test (creatinine test) within 30 days of your exam. Go to your clinic or Diagnostic Imaging Department for this test.  The MRI machine uses a strong magnet. Please wear clothes without metal (snaps, zippers). A sweatsuit works well, or we may give you a hospital gown.  Please remove any body piercings and hair extensions before you arrive. You will also remove watches, jewelry, hairpins, wallets, dentures, partial dental plates and hearing aids. You may wear contact lenses, and you may be able to wear your rings. We have a safe place to keep your personal items, but it is safer to leave them at home.   **IMPORTANT** THE INSTRUCTIONS BELOW ARE ONLY FOR THOSE PATIENTS WHO HAVE BEEN TOLD THEY WILL RECEIVE SEDATION OR GENERAL ANESTHESIA DURING THEIR MRI PROCEDURE:  IF YOU WILL RECEIVE SEDATION (take medicine to help you relax during your exam):   You must get the medicine from your doctor before you arrive. Bring the medicine to the exam. Do not take it at home.   Arrive one hour early. Bring someone who can take you home after the test. Your  medicine will make you sleepy. After the exam, you may not drive, take a bus or take a taxi by yourself.   No eating 8 hours before your exam. You may have clear liquids up until 4 hours before your exam. (Clear liquids include water, clear tea, black coffee and fruit juice without pulp.)  IF YOU WILL RECEIVE ANESTHESIA (be asleep for your exam):   Arrive 1 1/2 hours early. Bring someone who can take you home after the test. You may not drive, take a bus or take a taxi by yourself.   No eating 8 hours before your exam. You may have clear liquids up until 4 hours before your exam. (Clear liquids include water, clear tea, black coffee and fruit juice without pulp.)  Please call the Imaging Department at your exam site with any questions.            Feb 08, 2018  1:45 PM CST   Return Visit with Tracy Miner MD   [facility] ([facility])    [facility]  [address]  [facility] [address] [phone]              Future tests that were ordered for you today     Open Future Orders        Priority Expected Expires Ordered    PET Oncology (Eyes to Thighs) Routine 2/7/2018 2/1/2019 2/1/2018    MR Brain w/o & w Contrast Routine  2/1/2019 2/1/2018            Who to contact     If you have questions or need follow up information about today's clinic visit or your schedule please contact [facility] directly at [phone].  Normal or non-critical lab and imaging results will be communicated to you by MyChart, letter or phone within 4 business days after the clinic has received the results. If you do not hear from us within 7 days, please contact the clinic through [employer] or phone. If you have a critical or abnormal lab result, we will notify you by phone as soon as possible.  Submit refill requests through [employer] or call your pharmacy and they will forward the refill request to us. Please allow 3 business days for your refill to be completed.           "Additional Information About Your Visit        MyChart Information     [employer] lets you send messages to your doctor, view your test results, renew your prescriptions, schedule appointments and more. To sign up, go to [email or web address] . Click on \"Log in\" on the left side of the screen, which will take you to the Welcome page. Then click on \"Sign up Now\" on the right side of the page.     You will be asked to enter the access code listed below, as well as some personal information. Please follow the directions to create your username and password.     Your access code is: [email or web address]  Expires: 2018  1:30 PM     Your access code will  in 90 days. If you need help or a new code, please call your [facility] clinic or [phone].        Care EveryWhere ID     This is your Care EveryWhere ID. This could be used by other organizations to access your [facility] medical records  [id number]        Your Vitals Were     Pulse Temperature Respirations Height Pulse Oximetry Breastfeeding?    88 98.8  F (37.1  C) (Tympanic) 20 1.6 m (5' 3\") 92% No    BMI (Body Mass Index)                   31.58 kg/m2            Blood Pressure from Last 3 Encounters:   18 142/77   18 (!) 138/102   18 126/77    Weight from Last 3 Encounters:   18 80.9 kg (178 lb 4.8 oz)   18 80.6 kg (177 lb 11.2 oz)   18 81.8 kg (180 lb 6.4 oz)              We Performed the Following     CEA     Hepatic panel          Where to get your medicines      Some of these will need a paper prescription and others can be bought over the counter.  Ask your nurse if you have questions.     Bring a paper prescription for each of these medications     LORazepam 0.5 MG tablet          Primary Care Provider Office Phone # Fax #    R Emanuel Perez [phone] [address] [facility] [address]        Equal Access to Services     ALPA LOPEZ AH: renita Hernandez, cathie " hoda monzondl maddox ah. [facility] [phone].    ATENCIÓN: Si elvira wright, tiene a nguyễn disposición servicios gratuitos de asistencia lingüística. Yefri al [phone].    We comply with applicable federal civil rights laws and [place] laws. We do not discriminate on the basis of race, color, national origin, age, disability, sex, sexual orientation, or gender identity.            Thank you!     Thank you for choosing [facility]  for your care. Our goal is always to provide you with excellent care. Hearing back from our patients is one way we can continue to improve our services. Please take a few minutes to complete the written survey that you may receive in the mail after your visit with us. Thank you!             Your Updated Medication List - Protect others around you: Learn how to safely use, store and throw away your medicines at www.disposemymeds.org.          This list is accurate as of 2/1/18 12:41 PM.  Always use your most recent med list.                   Brand Name Dispense Instructions for use Diagnosis    albuterol 108 (90 BASE) MCG/ACT Inhaler    PROAIR HFA/PROVENTIL HFA/VENTOLIN HFA    1 Inhaler    Inhale 2 puffs into the lungs 4 times daily    Acute bronchitis with symptoms > 10 days       aspirin 81 MG EC tablet     90 tablet    Take 1 tablet (81 mg) by mouth daily    CAD (coronary artery disease)       atorvastatin 40 MG tablet    LIPITOR    90 tablet    Take 1 tablet (40 mg) by mouth daily    Coronary artery disease involving native coronary artery of native heart without angina pectoris       cyanocolbalamin 100 MCG tablet    vitamin  B-12     Take 50 mcg by mouth as needed        lisinopril 5 MG tablet    PRINIVIL/ZESTRIL    90 tablet    Take 1 tablet (5 mg) by mouth daily    Essential hypertension with goal blood pressure less than 140/90       LORazepam 0.5 MG tablet    ATIVAN    30 tablet    Take 1 tablet (0.5 mg) by mouth 2 times daily as  needed for anxiety    Anxiety       metoprolol succinate 50 MG 24 hr tablet    TOPROL-XL    90 tablet    Take 1 tablet (50 mg) by mouth daily    Coronary artery disease involving native coronary artery of native heart without angina pectoris       MULTIVITAMIN ADULT Tabs      Take by mouth daily        nitroGLYcerin 0.4 MG sublingual tablet    NITROSTAT    25 tablet    Place 1 tablet (0.4 mg) under the tongue every 5 minutes as needed for chest pain Maximum of 3 doses in 15 minutes    CAD (coronary artery disease)       ranitidine 75 MG tablet    ZANTAC    30 tablet    Take 1 tablet (75 mg) by mouth 2 times daily    Esophageal reflux       TYLENOL PO      Take 1,000 mg by mouth every 6 hours as needed

## 2018-02-01 NOTE — PROGRESS NOTES
"Visit Date:   02/01/2018      ONCOLOGY CONSULTATION      REFERRING PHYSICIAN:  Carlos Fletcher MD       CHIEF COMPLAINT/REASON FOR VISIT:  Left adrenal mass, FNA 01/30/2018 preliminary is consistent with malignancy; left upper lobe mass and left hilar mass.        HISTORY OF PRESENT ILLNESS:  A 77-year-old female presented with 5 months duration of hoarseness.  She was evaluated by ENT.  Direct laryngoscopy revealed left vocal cord paralysis.    CT chest with contrast 01/10/2018 identified a 2.3 cm left upper lobe nodule extending to the hilum, suspicious for malignancy, left hilar mediastinal adenopathy with mass-like soft tissue thickening extending along the inferior aspect of the aortic arch likely involving the recurrent laryngeal nerve in this location, left adrenal nodule 1.5 cm undetermined, compression on L1 vertebral body.    EUS 01/30/2017 FNA was done on 2 hypoechoic left adrenal mass.  Preliminary pathology is positive for malignancy.      The patient feels she has \"stuff around her throat,\" more discomfort sensation with hoarseness in the morning.  She also has about 15-pound weight loss in the last 5-6 months.  Denies any shortness of breath or hemoptysis.  Denies any other bone pain.      PAST MEDICAL HISTORY:  CAD, stent around 2014, reflux, hypertension, cataracts, hyperlipidemia.      MEDICATIONS:  Reviewed in Epic system.      SOCIAL HISTORY:  She lives in her own house.  She has 3 boys.  She is here with her neighbor.  She quit smoking years back.  Denies alcohol abuse.      FAMILY HISTORY:  Positive for mom who had Hodgkin lymphoma.  Brother had unknown type of cancer.      REVIEW OF SYSTEMS:   GENERAL:  She has weight loss, about 15 months over 6 months' duration.  No night sweats, baseline energy level, ECOG 0:1, no pain.    HEENT:  Hoarseness for 5-6 months prior to presentation.    NEURO:   No headache, double vision, or focal weakness.  No neuropathy.   SKIN:  No chronic skin rash or " "skin infection.   CARDIOVASCULAR:  CAD stents, hypertension, hyperlipidemia.  No palpitations, no chest pressure, no dyspnea on exertion.   PULMONARY:  Left upper lobe mass, hilar mass.  No shortness of breath, no pleurisy, no cough, no hemoptysis.   GI:  Reflux.  No abdominal pain, nausea, vomiting, constipation.  No diarrhea.  No bright red blood per rectum.   :  No urgency, frequency.  No recurrent urinary tract infection.  No kidney problems.   RHEUMATOLOGY/MUSCULOSKELETAL SYSTEM:  No arthritis.  No joint pain.  No muscle ache.   ENDOCRINE:  No history of diabetes or thyroid problem.  No complaints of hot flashes.   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  No history of bleeding or thrombosis episode.   IMMUNOLOGY:  No recurrent fever or chills episode.  No recurrent infectious episode.   BREASTS/GYN:  No history of vaginal bleeding/discharge/dryness.  No breast pain or nipple discharge.       PHYSICAL EXAMINATION:   VITAL SIGNS:  Blood pressure 142/77, pulse 88, temperature 98.8  F (37.1  C), temperature source Tympanic, resp. rate 20, height 1.6 m (5' 3\"), weight 80.9 kg (178 lb 4.8 oz), SpO2 92 %, not currently breastfeeding.    GENERAL APPEARANCE:  Elderly lady, looks like her stated age, not in acute distress.   HEENT: The patient is normocephalic, atraumatic. Pupils are equally reactive to light.  Sclerae are anicteric.  Moist oral mucosa.  Negative pharynx.  No oral thrush.   NECK:  Supple.  No jugular venous distention.  Thyroid is not palpable.   LYMPH NODES:  Superficial lymphadenopathy is not appreciable in the bilateral cervical, supraclavicular, axillary or inguinal areas.   CARDIOVASCULAR:  S1, S2 regular with no murmurs or gallops.  No carotid or abdominal bruits.   PULMONARY:  Lungs are clear to auscultation and percussion bilaterally.  There is no wheezing or rhonchi.   GASTROINTESTINAL:  Abdomen is soft, nontender.  No " hepatosplenomegaly.  No signs of ascites.  No mass appreciable.   MUSCULOSKELETAL/EXTREMITIES:  No edema.  No cyanotic changes.  No signs of joint deformity.  No lymphedema.  No spinal or paraspinal tenderness.  No CVA tenderness.   NEUROLOGIC:  Cranial nerves II-XII are grossly intact.  Sensation intact.  Muscle strength and muscle tone symmetrical, 5/5 throughout.   SKIN:  No petechiae.  No rash.  No signs of cellulitis.      LABORATORY DATA REVIEWED:    From 01/30/2018 FNA on left adrenal mass biopsy -- Preliminary is for malignancy.        CURRENT IMAGE DATA REVIEWED:   CT chest 01/2018 -  identified a 2.3 cm left upper lobe nodule extending to the hilum, suspicious for malignancy, left hilar mediastinal adenopathy with mass-like soft tissue thickening extending along the inferior aspect of the aortic arch likely involving the recurrent laryngeal nerve in this location, left adrenal nodule 1.5 cm undetermined, compression on L1 vertebral body.       OLD DATA REVIEW IN SUMMARY:    Chest x-ray 11/2017, no abnormalities identified.    In 2014, CBC indicating white count 17, hemoglobin 11 and platelets normal.  Differential indicating neutrophilia when she had a heart attack.        ASSESSMENT AND PLAN:    Newly identified left upper lobe mass extending to the left yessy associated with mediastinal adenopathy. This mass extends along the inferior aspect of the aortic arch, likely involving the recurrent laryngeal nerve and left adrenal nodules.  FNA of left adrenal nodule by EUS highly suggestive of malignancy.      The patient has hoarseness for 5 months associated with about 15-pound weight loss.  No hemoptysis, no shortness of breath, no chest pain.      I have indicated to the patient and her friend very likely she has lung cancer with left adrenal gland involvement.      Recommend further workup with lab work, MRI of the brain and PET scan.      We talked about lung cancer treatment in general for Stage IV  disease including chemotherapy, targeted therapy and immunotherapy.      She has a very open mind.  At this point she wants to try all effective therapy out that potentially could help her hoarseness.      Final oncologic recommendation will be based on the above further workup.         SUDHA VELASCO MD             D: 2018   T: 2018   MT: ANNABELLA      Name:     ELIZABETH LANDA   MRN:      [id number]        Account:      [id number]   :      1940           Visit Date:   2018      Document: [id number]

## 2018-02-02 ENCOUNTER — TELEPHONE (OUTPATIENT)
Dept: GASTROENTEROLOGY | Facility: CLINIC | Age: 78
End: 2018-02-02

## 2018-02-02 NOTE — TELEPHONE ENCOUNTER
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 Called and discussed with pt.    She has follow-up scheduled with Dr. Miner after pending brain MRI and PET scans.    Message forwarded to Dr. Miner as NICOLE.    BOBBI Fletcher MD  Associate Professor of Medicine  Division of Gastroenterology, Hepatology and Nutrition  [facility]

## 2018-02-05 ENCOUNTER — TELEPHONE (OUTPATIENT)
Dept: PSYCHOLOGY | Facility: CLINIC | Age: 78
End: 2018-02-05

## 2018-02-07 ENCOUNTER — HOSPITAL ENCOUNTER (OUTPATIENT)
Dept: PET IMAGING | Facility: CLINIC | Age: 78
Discharge: HOME | End: 2018-02-07
Attending: INTERNAL MEDICINE | Admitting: INTERNAL MEDICINE
Payer: MEDICARE

## 2018-02-07 ENCOUNTER — HOSPITAL ENCOUNTER (OUTPATIENT)
Dept: MRI IMAGING | Facility: CLINIC | Age: 78
End: 2018-02-07
Attending: INTERNAL MEDICINE
Payer: MEDICARE

## 2018-02-07 DIAGNOSIS — C34.12 MALIGNANT NEOPLASM OF UPPER LOBE OF LEFT LUNG (H): ICD-10-CM

## 2018-02-07 PROCEDURE — 70553 MRI BRAIN STEM W/O & W/DYE: CPT

## 2018-02-07 PROCEDURE — 25000128 H RX IP 250 OP 636: Performed by: INTERNAL MEDICINE

## 2018-02-07 PROCEDURE — 34300033 ZZH RX 343: Performed by: INTERNAL MEDICINE

## 2018-02-07 PROCEDURE — 78815 PET IMAGE W/CT SKULL-THIGH: CPT | Mod: PI

## 2018-02-07 PROCEDURE — A9585 GADOBUTROL INJECTION: HCPCS | Performed by: INTERNAL MEDICINE

## 2018-02-07 PROCEDURE — A9552 F18 FDG: HCPCS | Performed by: INTERNAL MEDICINE

## 2018-02-07 RX ORDER — GADOBUTROL 604.72 MG/ML
8 INJECTION INTRAVENOUS ONCE
Status: COMPLETED | OUTPATIENT
Start: 2018-02-07 | End: 2018-02-07

## 2018-02-07 RX ADMIN — GADOBUTROL 8 ML: 604.72 INJECTION INTRAVENOUS at 10:02

## 2018-02-07 RX ADMIN — FLUDEOXYGLUCOSE F-18 13.13 MCI.: 500 INJECTION, SOLUTION INTRAVENOUS at 08:10

## 2018-02-08 ENCOUNTER — ONCOLOGY VISIT (OUTPATIENT)
Dept: ONCOLOGY | Facility: CLINIC | Age: 78
End: 2018-02-08
Attending: INTERNAL MEDICINE
Payer: MEDICARE

## 2018-02-08 VITALS
RESPIRATION RATE: 24 BRPM | BODY MASS INDEX: 31.36 KG/M2 | HEIGHT: 63 IN | WEIGHT: 177 LBS | HEART RATE: 104 BPM | TEMPERATURE: 97.8 F | OXYGEN SATURATION: 94 % | DIASTOLIC BLOOD PRESSURE: 61 MMHG | SYSTOLIC BLOOD PRESSURE: 150 MMHG

## 2018-02-08 DIAGNOSIS — C34.92 SMALL CELL CARCINOMA OF LEFT LUNG (H): Primary | ICD-10-CM

## 2018-02-08 PROCEDURE — 99215 OFFICE O/P EST HI 40 MIN: CPT | Performed by: INTERNAL MEDICINE

## 2018-02-08 PROCEDURE — G0463 HOSPITAL OUTPT CLINIC VISIT: HCPCS

## 2018-02-08 RX ORDER — PROCHLORPERAZINE MALEATE 10 MG
5 TABLET ORAL EVERY 6 HOURS PRN
Qty: 30 TABLET | Refills: 3 | Status: SHIPPED | OUTPATIENT
Start: 2018-02-08 | End: 2018-01-01

## 2018-02-08 RX ORDER — LORAZEPAM 0.5 MG/1
0.5 TABLET ORAL EVERY 4 HOURS PRN
Qty: 30 TABLET | Refills: 3 | Status: SHIPPED | OUTPATIENT
Start: 2018-02-08 | End: 2018-02-09

## 2018-02-08 ASSESSMENT — PAIN SCALES - GENERAL: PAINLEVEL: NO PAIN (0)

## 2018-02-08 NOTE — NURSING NOTE
"Oncology Rooming Note    February 8, 2018 1:38 PM   Ines Pickard is a 77 year old female who presents for:    Chief Complaint   Patient presents with     Oncology Clinic Visit     Recheck Malignant neoplasm of upper lobe of left lung, review Lab, PET &  MRI results     Initial Vitals: /61 (BP Location: Right arm, Patient Position: Sitting, Cuff Size: Adult Regular)  Pulse 104  Temp 97.8  F (36.6  C) (Tympanic)  Resp 24  Ht 1.6 m (5' 2.99\")  Wt 80.3 kg (177 lb)  SpO2 94%  Breastfeeding? No  BMI 31.36 kg/m2 Estimated body mass index is 31.36 kg/(m^2) as calculated from the following:    Height as of this encounter: 1.6 m (5' 2.99\").    Weight as of this encounter: 80.3 kg (177 lb). Body surface area is 1.89 meters squared.  No Pain (0) Comment: Data Unavailable   No LMP recorded. Patient is postmenopausal.  Allergies reviewed: Yes  Medications reviewed: Yes    Medications: Medication refills not needed today.  Pharmacy name entered into [facility]: [facility] - [facility] [id number]    Clinical concerns:  Recheck Malignant neoplasm of upper lobe of left lung, review Lab, PET &  MRI results.     1. Increased fatigue.   2. Gas on increased phlegm in her chest in the morning.   3. Scratchy throat for a long time.      7  minutes for nursing intake (face to face time)     Casi Sandoval CMA              "

## 2018-02-08 NOTE — LETTER
"    2/8/2018         RE: Ines Pickard  [id number] [facility] [id number]        Dear Colleague,    Thank you for referring your patient, Ines Pickard, to the [facility]. Please see a copy of my visit note below.    ONCOLOGY FOLLOW UP VISIT       CHIEF COMPLAINT/REASON FOR VISIT:  Left adrenal mass, FNA 01/30/2018 consistent with sclc     HISTORY OF PRESENT ILLNESS:  A 77-year-old female presented with 5 months duration of hoarseness.  She was evaluated by ENT.  Direct laryngoscopy revealed left vocal cord paralysis.    CT chest with contrast 01/10/2018 identified a 2.3 cm left upper lobe nodule extending to the hilum, suspicious for malignancy, left hilar mediastinal adenopathy with mass-like soft tissue thickening extending along the inferior aspect of the aortic arch likely involving the recurrent laryngeal nerve in this location, left adrenal nodule 1.5 cm undetermined, compression on L1 vertebral body.    EUS 01/30/2017 FNA was done on 2 hypoechoic left adrenal mass-   Consistent with metastatic small cell carcinoma      PET confirmed the primary TEX lesion with adrenal mets. She is dx with extensive stage SCLC.     She made informed decision to proceed with palliative chemo with carbo/-16 2/2018.         PAST MEDICAL HISTORY:  CAD, stent around 2014, reflux, hypertension, cataracts, hyperlipidemia.      MEDICATIONS:  Reviewed in Epic system.      SOCIAL HISTORY:  She lives in her own house.  She has 3 boys.  She is here with her neighbor.  She quit smoking years back.  Denies alcohol abuse.      FAMILY HISTORY:  Positive for mom who had Hodgkin lymphoma.  Brother had unknown type of cancer.      REVIEW OF SYSTEMS:   The patient feels she has \"stuff around her throat,\" more discomfort sensation with hoarseness in the morning.  She also has about 15-pound weight loss in the last 5-6 months.  Denies any shortness of breath or hemoptysis.  Denies any other bone pain.         PHYSICAL " "EXAMINATION:   VITAL SIGNS:  Blood pressure 150/61, pulse 104, temperature 97.8  F (36.6  C), temperature source Tympanic, resp. rate 24, height 1.6 m (5' 2.99\"), weight 80.3 kg (177 lb), SpO2 94 %, not currently breastfeeding.    GENERAL APPEARANCE:  Elderly lady, looks like her stated age, not in acute distress.   HEENT: The patient is normocephalic, atraumatic. Pupils are equally reactive to light.  Sclerae are anicteric.  Moist oral mucosa.  Negative pharynx.  No oral thrush.   NECK:  Supple.  No jugular venous distention.  Thyroid is not palpable.   LYMPH NODES:  Superficial lymphadenopathy is not appreciable in the bilateral cervical, supraclavicular, axillary or inguinal areas.   CARDIOVASCULAR:  S1, S2 regular with no murmurs or gallops.  No carotid or abdominal bruits.   PULMONARY:  Lungs are clear to auscultation and percussion bilaterally.  There is no wheezing or rhonchi.   GASTROINTESTINAL:  Abdomen is soft, nontender.  No hepatosplenomegaly.  No signs of ascites.  No mass appreciable.   MUSCULOSKELETAL/EXTREMITIES:  No edema.  No cyanotic changes.  No signs of joint deformity.  No lymphedema.  No spinal or paraspinal tenderness.  No CVA tenderness.   NEUROLOGIC:  Cranial nerves II-XII are grossly intact.  Sensation intact.  Muscle strength and muscle tone symmetrical, 5/5 throughout.   SKIN:  No petechiae.  No rash.  No signs of cellulitis.      LABORATORY DATA REVIEWED:    CEA baseline at 6.6 in 2/2017    From 01/30/2018 FNA on left adrenal mass biopsy -- Consistent with metastatic small cell carcinoma      CURRENT IMAGE DATA REVIEWED:   PET 2/2018  1. Hypermetabolic left upper lobe nodule is consistent with malignancy, most likely bronchogenic carcinoma.  2. Hypermetabolic left hilar and mediastinal adenopathy is consistent  with metastatic disease.  3. Hypermetabolic left adrenal nodule is suspicious for metastatic disease.  4. There is a new 3.4 cm high-density structure abutting the left adrenal " gland, suspicious for interval development of adrenal hemorrhage.    CT chest 01/2018 -  identified a 2.3 cm left upper lobe nodule extending to the hilum, suspicious for malignancy, left hilar mediastinal adenopathy with mass-like soft tissue thickening extending along the inferior aspect of the aortic arch likely involving the recurrent laryngeal nerve in this location, left adrenal nodule 1.5 cm undetermined, compression on L1 vertebral body.       OLD DATA REVIEW IN SUMMARY:    Chest x-ray 11/2017, no abnormalities identified.    In 2014, CBC indicating white count 17, hemoglobin 11 and platelets normal.  Differential indicating neutrophilia when she had a heart attack.        ASSESSMENT AND PLAN:    Dx extensive stage SCLC 1/2018 with left lung primary and adrenal mets.     We discussed the incurable stage she is in. Tx is for palliation not for cure.   Recommend her to consider palliative chemo with carbo AUC5 d1, -16 80 mg/m2 D1-3 q 3 wks with neulasta support.     We discussed the side effects of chemo include not limit to N/V/hair loss/lower immunity/cardiac toxicity/rare leukemia/infusion related reaction and mortality.     She made informed decision to proceed.     She will need port placement. And PMD clearance for the procedure.     Chemo teach is done.     Total time is 40 minutes, more than 25 minutes spent in counseling regarding her test results, stage, tx goal, regimen, side effects etc.      Final oncologic recommendation will be based on the above further workup.             Chemotherapy Education  Patient is a 77 year old female here today for chemotherapy education, accompanied by Daughter in law.  Pt has a cancer diagnosis of Small cell lung cancer and their main concern is side effects.  Their Oncologist is Dr. Miner, and PCP is MICHAEL Perez.  Reviewed the following with the patient and their support person:  Treatment Goal/Regimen/Duration; Palliative Carboplatin/Etoposide x 4 cycles and  "rationale for strict adherence, specific medication names including pre-treatment medications and at home scheduled or as needed medications, delivery methods, and side effects and management; including skin changes/hand-foot syndrome, anemia, neutropenia, thrombocytopenia, diarrhea/constipation, hair loss syndrome, memory changes/ \"chemobrain\", mouth sores, taste changes, neuropathy, fatigue, infertility, myelosuppression, and risk of extravasation or infiltration.  Infection prevention, and monitoring of lab values, what lab tests and what changes of these values meant, along with the possibility of hydration or blood product transfusion, or the need to defer or hold treatment.    General Chemotherapy Information, including ways it is excreted from the body and cleaning and containment of vomitus or other bodily fluid, use of the bathroom, sexual health and intimacy, what to do if needing to miss a treatment, when to call a provider and the need for staff to wear protective equipment.  Importance of Central line care (port) or IV site care.  Proper use of the take home infusion pump, troubleshooting, and who to call if there is a malfunction.  Written Information: written information including the \"Getting Ready for Chemotherapy: What to Expect, Before, During, and After your Treatment\" booklet, specific drug information guides printed from [employer], [employer] booklets \"Eating Hints: Before, During, and After Cancer Treatment\" and \"Chemotherapy and You\".  Also, a folder with information on when to contact the provider, various programs offered at [facility], and our business card with contact information given; Oncology Clinic, RN Case Manager, and the after hours Nurse Advise Line.  No barriers to learning identified. Patient and family verbalized understanding of all written and verbal information. All questions answered to patients satisfaction.   General Orientation to the Medical Oncology department, " Infusion Services department, Huc/scheduling, bathrooms and usual flow of the treatment day provided as well as introduction to the Infusion nurses.    Other Concerns: none  Pt instructed to call with further questions or concerns.  Patient states understanding and is in agreement with this plan.  Copy of appointments, and after visit summary (AVS) given to patient. Patient discharged 2/8.    Face to Face Time with Patient: 20 minutes    Bella Arvizu RN, BSN, OCN  Oncology Hematology   [facility]  Phone [phone]                  Again, thank you for allowing me to participate in the care of your patient.        Sincerely,        Tracy Miner MD, MD

## 2018-02-08 NOTE — PROGRESS NOTES
"ONCOLOGY FOLLOW UP VISIT       CHIEF COMPLAINT/REASON FOR VISIT:  Left adrenal mass, FNA 01/30/2018 consistent with sclc     HISTORY OF PRESENT ILLNESS:  A 77-year-old female presented with 5 months duration of hoarseness.  She was evaluated by ENT.  Direct laryngoscopy revealed left vocal cord paralysis.    CT chest with contrast 01/10/2018 identified a 2.3 cm left upper lobe nodule extending to the hilum, suspicious for malignancy, left hilar mediastinal adenopathy with mass-like soft tissue thickening extending along the inferior aspect of the aortic arch likely involving the recurrent laryngeal nerve in this location, left adrenal nodule 1.5 cm undetermined, compression on L1 vertebral body.    EUS 01/30/2017 FNA was done on 2 hypoechoic left adrenal mass-   Consistent with metastatic small cell carcinoma      PET confirmed the primary TEX lesion with adrenal mets. She is dx with extensive stage SCLC.     She made informed decision to proceed with palliative chemo with carbo/-16 2/2018.         PAST MEDICAL HISTORY:  CAD, stent around 2014, reflux, hypertension, cataracts, hyperlipidemia.      MEDICATIONS:  Reviewed in Epic system.      SOCIAL HISTORY:  She lives in her own house.  She has 3 boys.  She is here with her neighbor.  She quit smoking years back.  Denies alcohol abuse.      FAMILY HISTORY:  Positive for mom who had Hodgkin lymphoma.  Brother had unknown type of cancer.      REVIEW OF SYSTEMS:   The patient feels she has \"stuff around her throat,\" more discomfort sensation with hoarseness in the morning.  She also has about 15-pound weight loss in the last 5-6 months.  Denies any shortness of breath or hemoptysis.  Denies any other bone pain.         PHYSICAL EXAMINATION:   VITAL SIGNS:  Blood pressure 150/61, pulse 104, temperature 97.8  F (36.6  C), temperature source Tympanic, resp. rate 24, height 1.6 m (5' 2.99\"), weight 80.3 kg (177 lb), SpO2 94 %, not currently breastfeeding.    GENERAL " APPEARANCE:  Elderly lady, looks like her stated age, not in acute distress.   HEENT: The patient is normocephalic, atraumatic. Pupils are equally reactive to light.  Sclerae are anicteric.  Moist oral mucosa.  Negative pharynx.  No oral thrush.   NECK:  Supple.  No jugular venous distention.  Thyroid is not palpable.   LYMPH NODES:  Superficial lymphadenopathy is not appreciable in the bilateral cervical, supraclavicular, axillary or inguinal areas.   CARDIOVASCULAR:  S1, S2 regular with no murmurs or gallops.  No carotid or abdominal bruits.   PULMONARY:  Lungs are clear to auscultation and percussion bilaterally.  There is no wheezing or rhonchi.   GASTROINTESTINAL:  Abdomen is soft, nontender.  No hepatosplenomegaly.  No signs of ascites.  No mass appreciable.   MUSCULOSKELETAL/EXTREMITIES:  No edema.  No cyanotic changes.  No signs of joint deformity.  No lymphedema.  No spinal or paraspinal tenderness.  No CVA tenderness.   NEUROLOGIC:  Cranial nerves II-XII are grossly intact.  Sensation intact.  Muscle strength and muscle tone symmetrical, 5/5 throughout.   SKIN:  No petechiae.  No rash.  No signs of cellulitis.      LABORATORY DATA REVIEWED:    CEA baseline at 6.6 in 2/2017    From 01/30/2018 FNA on left adrenal mass biopsy -- Consistent with metastatic small cell carcinoma      CURRENT IMAGE DATA REVIEWED:   PET 2/2018  1. Hypermetabolic left upper lobe nodule is consistent with malignancy, most likely bronchogenic carcinoma.  2. Hypermetabolic left hilar and mediastinal adenopathy is consistent  with metastatic disease.  3. Hypermetabolic left adrenal nodule is suspicious for metastatic disease.  4. There is a new 3.4 cm high-density structure abutting the left adrenal gland, suspicious for interval development of adrenal hemorrhage.    CT chest 01/2018 -  identified a 2.3 cm left upper lobe nodule extending to the hilum, suspicious for malignancy, left hilar mediastinal adenopathy with mass-like soft  tissue thickening extending along the inferior aspect of the aortic arch likely involving the recurrent laryngeal nerve in this location, left adrenal nodule 1.5 cm undetermined, compression on L1 vertebral body.       OLD DATA REVIEW IN SUMMARY:    Chest x-ray 11/2017, no abnormalities identified.    In 2014, CBC indicating white count 17, hemoglobin 11 and platelets normal.  Differential indicating neutrophilia when she had a heart attack.        ASSESSMENT AND PLAN:    Dx extensive stage SCLC 1/2018 with left lung primary and adrenal mets.     We discussed the incurable stage she is in. Tx is for palliation not for cure.   Recommend her to consider palliative chemo with carbo AUC5 d1, -16 80 mg/m2 D1-3 q 3 wks with neulasta support.     We discussed the side effects of chemo include not limit to N/V/hair loss/lower immunity/cardiac toxicity/rare leukemia/infusion related reaction and mortality.     She made informed decision to proceed.     She will need port placement. And PMD clearance for the procedure.     Chemo teach is done.     Total time is 40 minutes, more than 25 minutes spent in counseling regarding her test results, stage, tx goal, regimen, side effects etc.      Final oncologic recommendation will be based on the above further workup.

## 2018-02-08 NOTE — PROGRESS NOTES
"Chemotherapy Education  Patient is a 77 year old female here today for chemotherapy education, accompanied by Daughter in law.  Pt has a cancer diagnosis of Small cell lung cancer and their main concern is side effects.  Their Oncologist is Dr. Miner, and PCP is MICHAEL Perez.  Reviewed the following with the patient and their support person:  Treatment Goal/Regimen/Duration; Palliative Carboplatin/Etoposide x 4 cycles and rationale for strict adherence, specific medication names including pre-treatment medications and at home scheduled or as needed medications, delivery methods, and side effects and management; including skin changes/hand-foot syndrome, anemia, neutropenia, thrombocytopenia, diarrhea/constipation, hair loss syndrome, memory changes/ \"chemobrain\", mouth sores, taste changes, neuropathy, fatigue, infertility, myelosuppression, and risk of extravasation or infiltration.  Infection prevention, and monitoring of lab values, what lab tests and what changes of these values meant, along with the possibility of hydration or blood product transfusion, or the need to defer or hold treatment.    General Chemotherapy Information, including ways it is excreted from the body and cleaning and containment of vomitus or other bodily fluid, use of the bathroom, sexual health and intimacy, what to do if needing to miss a treatment, when to call a provider and the need for staff to wear protective equipment.  Importance of Central line care (port) or IV site care.  Proper use of the take home infusion pump, troubleshooting, and who to call if there is a malfunction.  Written Information: written information including the \"Getting Ready for Chemotherapy: What to Expect, Before, During, and After your Treatment\" booklet, specific drug information guides printed from [employer], [employer] booklets \"Eating Hints: Before, During, and After Cancer Treatment\" and \"Chemotherapy and You\".  Also, a folder with information on when to " contact the provider, various programs offered at [facility], and our business card with contact information given; Oncology Clinic, RN Case Manager, and the after hours Nurse Advise Line.  No barriers to learning identified. Patient and family verbalized understanding of all written and verbal information. All questions answered to patients satisfaction.   General Orientation to the Medical Oncology department, Infusion Services department, Huc/scheduling, bathrooms and usual flow of the treatment day provided as well as introduction to the Infusion nurses.    Other Concerns: none  Pt instructed to call with further questions or concerns.  Patient states understanding and is in agreement with this plan.  Copy of appointments, and after visit summary (AVS) given to patient. Patient discharged 2/8.    Face to Face Time with Patient: 20 minutes    Bella Arvizu RN, BSN, OCN  Oncology Hematology   [facility]  Phone [phone]

## 2018-02-08 NOTE — MR AVS SNAPSHOT
After Visit Summary   2/8/2018    Ines Pickard    MRN: [id number]           Patient Information     Date Of Birth          1940        Visit Information        Provider Department      2/8/2018 1:45 PM Tracy Miner MD [facility]        Today's Diagnoses     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)    -  1      Care Instructions    Dr. Miner would like you to set up a port placement and to start chemotherapy (carboplatin/Etoposide) with neulasta. We would like to see you back in with cycle 2 for a follow up appointment. When you are in need of a refill, please call your pharmacy and they will send us a request.  Copy of appointments, and after visit summary (AVS) given to patient.  If you have any questions please call Bella Arvizu RN, BSN Oncology Hematology  [facility] [phone]. For questions after business hours, or on holidays/weekends, please call our after hours Nurse Triage line [phone]. Thank you.         Port placement, then carbo/-16 with neulasta support. F/u with c2d1.   Chemo teach today.           Follow-ups after your visit        Additional Services     GENERAL SURG ADULT REFERRAL       Your provider has referred you to: [facility]: [facility] [phone]   [email or web address]    Please be aware that coverage of these services is subject to the terms and limitations of your health insurance plan.  Call member services at your health plan with any benefit or coverage questions.      Please bring the following with you to your appointment:    (1) Any X-Rays, CTs or MRIs which have been performed.  Contact the facility where they were done to arrange for  prior to your scheduled appointment.   (2) List of current medications   (3) This referral request   (4) Any documents/labs given to you for this referral    Port placement                  Your next 10 appointments already  "scheduled     2018  8:30 AM CST   New Visit with Carlos Johnson MD   [facility]    [address] [address] [phone]              Who to contact     If you have questions or need follow up information about today's clinic visit or your schedule please contact [facility] directly at [phone].  Normal or non-critical lab and imaging results will be communicated to you by C-Vibeshart, letter or phone within 4 business days after the clinic has received the results. If you do not hear from us within 7 days, please contact the clinic through [email or web address] or phone. If you have a critical or abnormal lab result, we will notify you by phone as soon as possible.  Submit refill requests through [employer] or call your pharmacy and they will forward the refill request to us. Please allow 3 business days for your refill to be completed.          Additional Information About Your Visit        C-VibesharByliner Information     [employer] lets you send messages to your doctor, view your test results, renew your prescriptions, schedule appointments and more. To sign up, go to [email or web address] . Click on \"Log in\" on the left side of the screen, which will take you to the Welcome page. Then click on \"Sign up Now\" on the right side of the page.     You will be asked to enter the access code listed below, as well as some personal information. Please follow the directions to create your username and password.     Your access code is: [email or web address]  Expires: 2018  1:30 PM     Your access code will  in 90 days. If you need help or a new code, please call your [place] clinic or [phone].        Care EveryWhere ID     This is your Care EveryWhere ID. This could be used by other organizations to access your [place] medical records  [id number]        Your Vitals Were     Pulse Temperature Respirations Height Pulse Oximetry Breastfeeding?    104 97.8  F " "(36.6  C) (Tympanic) 24 1.6 m (5' 2.99\") 94% No    BMI (Body Mass Index)                   31.36 kg/m2            Blood Pressure from Last 3 Encounters:   02/08/18 150/61   02/01/18 142/77   01/30/18 (!) 138/102    Weight from Last 3 Encounters:   02/08/18 80.3 kg (177 lb)   02/01/18 80.9 kg (178 lb 4.8 oz)   01/30/18 80.6 kg (177 lb 11.2 oz)              We Performed the Following     GENERAL SURG ADULT REFERRAL          Today's Medication Changes          These changes are accurate as of 2/8/18  3:20 PM.  If you have any questions, ask your nurse or doctor.               Start taking these medicines.        Dose/Directions    prochlorperazine 10 MG tablet   Commonly known as:  COMPAZINE   Used for:  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)   Started by:  Tracy Miner MD        Dose:  5 mg   Take 0.5 tablets (5 mg) by mouth every 6 hours as needed (Nausea/Vomiting)   Quantity:  30 tablet   Refills:  3         These medicines have changed or have updated prescriptions.        Dose/Directions    * LORazepam 0.5 MG tablet   Commonly known as:  ATIVAN   This may have changed:  Another medication with the same name was added. Make sure you understand how and when to take each.   Used for:  Anxiety   Changed by:  Tracy Miner MD        Dose:  0.5 mg   Take 1 tablet (0.5 mg) by mouth 2 times daily as needed for anxiety   Quantity:  30 tablet   Refills:  0       * LORazepam 0.5 MG tablet   Commonly known as:  ATIVAN   This may have changed:  You were already taking a medication with the same name, and this prescription was added. Make sure you understand how and when to take each.   Used for:  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)   Changed by:  Tracy Miner MD        Dose:  0.5 mg   Take 1 tablet (0.5 mg) by mouth every 4 hours as needed (Anxiety, Nausea/Vomiting or Sleep)   Quantity:  30 tablet   Refills:  3       * Notice:  This list has 2 medication(s) that are the same as other medications prescribed for you. Read the " directions carefully, and ask your doctor or other care provider to review them with you.         Where to get your medicines      These medications were sent to [facility] - [place], [place] - [address]  [address] [address]    Hours:  [facility] Phone:  [phone]     prochlorperazine 10 MG tablet         Some of these will need a paper prescription and others can be bought over the counter.  Ask your nurse if you have questions.     Bring a paper prescription for each of these medications     LORazepam 0.5 MG tablet                Primary Care Provider Office Phone # Fax #    R Emanuel Perez [phone] [address] [facility] [address]        Equal Access to Services     ALPA LOPEZ : Ronnie mtzo Somajorali, waaxda luqadaha, qaybta kaalmada adeegyada, hoda maddox . So [facility] [phone].    ATENCIÓN: Si habla español, tiene a nguyễn disposición servicios gratuitos de asistencia lingüística. [facility] [phone].    We comply with applicable federal civil rights laws and [place] laws. We do not discriminate on the basis of race, color, national origin, age, disability, sex, sexual orientation, or gender identity.            Thank you!     Thank you for choosing [facility]  for your care. Our goal is always to provide you with excellent care. Hearing back from our patients is one way we can continue to improve our services. Please take a few minutes to complete the written survey that you may receive in the mail after your visit with us. Thank you!             Your Updated Medication List - Protect others around you: Learn how to safely use, store and throw away your medicines at www.disposemymeds.org.          This list is accurate as of 2/8/18  3:20 PM.  Always use your most recent med list.                   Brand Name Dispense Instructions for use Diagnosis    albuterol 108  (90 BASE) MCG/ACT Inhaler    PROAIR HFA/PROVENTIL HFA/VENTOLIN HFA    1 Inhaler    Inhale 2 puffs into the lungs 4 times daily    Acute bronchitis with symptoms > 10 days       aspirin 81 MG EC tablet     90 tablet    Take 1 tablet (81 mg) by mouth daily    CAD (coronary artery disease)       atorvastatin 40 MG tablet    LIPITOR    90 tablet    Take 1 tablet (40 mg) by mouth daily    Coronary artery disease involving native coronary artery of native heart without angina pectoris       cyanocolbalamin 100 MCG tablet    vitamin  B-12     Take 50 mcg by mouth as needed        lisinopril 5 MG tablet    PRINIVIL/ZESTRIL    90 tablet    Take 1 tablet (5 mg) by mouth daily    Essential hypertension with goal blood pressure less than 140/90       * LORazepam 0.5 MG tablet    ATIVAN    30 tablet    Take 1 tablet (0.5 mg) by mouth 2 times daily as needed for anxiety    Anxiety       * LORazepam 0.5 MG tablet    ATIVAN    30 tablet    Take 1 tablet (0.5 mg) by mouth every 4 hours as needed (Anxiety, Nausea/Vomiting or Sleep)    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)       metoprolol succinate 50 MG 24 hr tablet    TOPROL-XL    90 tablet    Take 1 tablet (50 mg) by mouth daily    Coronary artery disease involving native coronary artery of native heart without angina pectoris       MULTIVITAMIN ADULT Tabs      Take by mouth daily        nitroGLYcerin 0.4 MG sublingual tablet    NITROSTAT    25 tablet    Place 1 tablet (0.4 mg) under the tongue every 5 minutes as needed for chest pain Maximum of 3 doses in 15 minutes    CAD (coronary artery disease)       prochlorperazine 10 MG tablet    COMPAZINE    30 tablet    Take 0.5 tablets (5 mg) by mouth every 6 hours as needed (Nausea/Vomiting)    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        * Notice:   This list has 2 medication(s) that are the same as other medications prescribed for you. Read the directions carefully, and ask your doctor or other care provider to review them with you.

## 2018-02-08 NOTE — PATIENT INSTRUCTIONS
Dr. Miner would like you to set up a port placement and to start chemotherapy (carboplatin/Etoposide) with neulasta. We would like to see you back in with cycle 2 for a follow up appointment. When you are in need of a refill, please call your pharmacy and they will send us a request.  Copy of appointments, and after visit summary (AVS) given to patient.  If you have any questions please call Bella Arvizu RN, BSN Oncology Hematology  [facility] [phone]. For questions after business hours, or on holidays/weekends, please call our after hours Nurse Triage line [phone]. Thank you.         Port placement, then carbo/-16 with neulasta support. F/u with c2d1.   Chemo teach today.

## 2018-02-09 ENCOUNTER — OFFICE VISIT (OUTPATIENT)
Dept: SURGERY | Facility: CLINIC | Age: 78
End: 2018-02-09
Payer: MEDICARE

## 2018-02-09 DIAGNOSIS — R91.8 LUNG MASS: Primary | ICD-10-CM

## 2018-02-09 DIAGNOSIS — C34.92 SMALL CELL CARCINOMA OF LEFT LUNG (H): Primary | ICD-10-CM

## 2018-02-09 PROCEDURE — 99202 OFFICE O/P NEW SF 15 MIN: CPT | Performed by: SURGERY

## 2018-02-09 RX ORDER — LIDOCAINE/PRILOCAINE 2.5 %-2.5%
CREAM (GRAM) TOPICAL PRN
Qty: 30 G | Refills: 0 | Status: SHIPPED | OUTPATIENT
Start: 2018-02-09 | End: 2018-01-01

## 2018-02-09 NOTE — MR AVS SNAPSHOT
After Visit Summary   2/9/2018    Ines Pickard    MRN: [id number]           Patient Information     Date Of Birth          1940        Visit Information        Provider Department      2/9/2018 8:30 AM Carlos Johnson MD [facility]        Today's Diagnoses     Lung mass    -  1       Follow-ups after your visit        Your next 10 appointments already scheduled     Feb 13, 2018 10:30 AM CST   Level 4 with ROOM 1 [facility] ([facility])    [facility]  [address]  [facility] [address]   [phone]            Feb 14, 2018 10:30 AM CST   Level 2 with ROOM 7 [facility] ([facility])    [facility]  [address]  [facility] [address]   [phone]            Feb 15, 2018 11:30 AM CST   Level 2 with ROOM 10 [facility] ([facility])    [facility]  [address]  [facility] [address]   [phone]              Who to contact     If you have questions or need follow up information about today's clinic visit or your schedule please contact [facility] directly at [phone].  Normal or non-critical lab and imaging results will be communicated to you by MyChart, letter or phone within 4 business days after the clinic has received the results. If you do not hear from us within 7 days, please contact the clinic through MyChart or phone. If you have a critical or abnormal lab result, we will notify you by phone as soon as possible.  Submit refill requests through [employer] or call your pharmacy and they will forward the refill request to us. Please allow 3 business days for your refill to be completed.          Additional Information About Your Visit        [employer] Information     [employer] lets you send messages to your doctor, view your test results, renew your prescriptions,  "schedule appointments and more. To sign up, go to [email or web address] . Click on \"Log in\" on the left side of the screen, which will take you to the Welcome page. Then click on \"Sign up Now\" on the right side of the page.     You will be asked to enter the access code listed below, as well as some personal information. Please follow the directions to create your username and password.     Your access code is: [email or web address]  Expires: 2018  1:30 PM     Your access code will  in 90 days. If you need help or a new code, please call your [place] clinic or [phone].        Care EveryWhere ID     This is your Care EveryWhere ID. This could be used by other organizations to access your [place] medical records  [id number]         Blood Pressure from Last 3 Encounters:   18 150/61   18 142/77   18 (!) 138/102    Weight from Last 3 Encounters:   18 80.3 kg (177 lb)   18 80.9 kg (178 lb 4.8 oz)   18 80.6 kg (177 lb 11.2 oz)              Today, you had the following     No orders found for display       Primary Care Provider Office Phone # Fax #    R Emanuel Perez [phone] [address] [facility] [address]        Equal Access to Services     FEMI LOPEZ AH: Hadii aad ku hadasho Soomaali, waaxda luqadaha, qaybta kaalmada adeegyada, hoda taylor. So [facility] [phone].    ATENCIÓN: Si habla español, tiene a nguyễn disposición servicios gratuitos de asistencia lingüística. Llame al [phone].    We comply with applicable federal civil rights laws and [place] laws. We do not discriminate on the basis of race, color, national origin, age, disability, sex, sexual orientation, or gender identity.            Thank you!     Thank you for choosing [facility]  for your care. Our goal is always to provide you with excellent care. Hearing back from our patients is one way we can continue to improve our " services. Please take a few minutes to complete the written survey that you may receive in the mail after your visit with us. Thank you!             Your Updated Medication List - Protect others around you: Learn how to safely use, store and throw away your medicines at www.disposemymeds.org.          This list is accurate as of 2/9/18  8:46 AM.  Always use your most recent med list.                   Brand Name Dispense Instructions for use Diagnosis    albuterol 108 (90 BASE) MCG/ACT Inhaler    PROAIR HFA/PROVENTIL HFA/VENTOLIN HFA    1 Inhaler    Inhale 2 puffs into the lungs 4 times daily    Acute bronchitis with symptoms > 10 days       aspirin 81 MG EC tablet     90 tablet    Take 1 tablet (81 mg) by mouth daily    CAD (coronary artery disease)       atorvastatin 40 MG tablet    LIPITOR    90 tablet    Take 1 tablet (40 mg) by mouth daily    Coronary artery disease involving native coronary artery of native heart without angina pectoris       cyanocolbalamin 100 MCG tablet    vitamin  B-12     Take 50 mcg by mouth as needed        lisinopril 5 MG tablet    PRINIVIL/ZESTRIL    90 tablet    Take 1 tablet (5 mg) by mouth daily    Essential hypertension with goal blood pressure less than 140/90       * LORazepam 0.5 MG tablet    ATIVAN    30 tablet    Take 1 tablet (0.5 mg) by mouth 2 times daily as needed for anxiety    Anxiety       * LORazepam 0.5 MG tablet    ATIVAN    30 tablet    Take 1 tablet (0.5 mg) by mouth every 4 hours as needed (Anxiety, Nausea/Vomiting or Sleep)    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)       metoprolol succinate 50 MG 24 hr tablet    TOPROL-XL    90 tablet    Take 1 tablet (50 mg) by mouth daily    Coronary artery disease involving native coronary artery of native heart without angina pectoris       MULTIVITAMIN ADULT Tabs      Take by mouth daily        nitroGLYcerin 0.4 MG sublingual tablet    NITROSTAT    25 tablet    Place 1 tablet (0.4 mg) under the tongue every 5 minutes as  needed for chest pain Maximum of 3 doses in 15 minutes    CAD (coronary artery disease)       prochlorperazine 10 MG tablet    COMPAZINE    30 tablet    Take 0.5 tablets (5 mg) by mouth every 6 hours as needed (Nausea/Vomiting)    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        * Notice:  This list has 2 medication(s) that are the same as other medications prescribed for you. Read the directions carefully, and ask your doctor or other care provider to review them with you.

## 2018-02-09 NOTE — PROGRESS NOTES
77-year-old female with new diagnosis of left lung cancer here for Port-A-Cath placement. Patient has never had a central line. There are no plans for radiation. Only palliative clean chemotherapy.    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     Coronary artery disease 12/2014    Inferior STEMI, stent to Circumflex     Percutaneous transluminal coronary angioplasty hematoma 12/15/2014       Past Surgical History:   Procedure Laterality Date     APPENDECTOMY  1956     ESOPHAGOSCOPY, GASTROSCOPY, DUODENOSCOPY (EGD), COMBINED N/A 1/30/2018    Procedure: COMBINED ENDOSCOPIC ULTRASOUND, ESOPHAGOSCOPY, GASTROSCOPY, DUODENOSCOPY (EGD), FINE NEEDLE ASPIRATE/BIOPSY;   EUS;  Surgeon: Carlos Fletcher MD;  Location:  GI     EYE SURGERY       PHACOEMULSIFICATION WITH STANDARD INTRAOCULAR LENS IMPLANT Left 1/25/2016    Procedure: PHACOEMULSIFICATION WITH STANDARD INTRAOCULAR LENS IMPLANT;  Surgeon: Julio César Wallace MD;  Location: [facility] OR     PHACOEMULSIFICATION WITH STANDARD INTRAOCULAR LENS IMPLANT Right 2/22/2016    Procedure: PHACOEMULSIFICATION WITH STANDARD INTRAOCULAR LENS IMPLANT;  Surgeon: Julio César Wallace MD;  Location: [facility] OR     SURGICAL HISTORY OF -   1961    right hand surgery      TONSILLECTOMY & ADENOIDECTOMY  1967       Family History   Problem Relation Age of Onset     CANCER Brother      HEART DISEASE Brother      CANCER Mother      Respiratory Father      HEART DISEASE Father      MI       Social  History   Substance Use Topics     Smoking status: Former Smoker     Packs/day: 0.50     Types: Cigarettes     Quit date: 12/13/2014     Smokeless tobacco: Never Used     Alcohol use No        History   Drug Use No       Current Outpatient Prescriptions   Medication Sig Dispense Refill     LORazepam (ATIVAN) 0.5 MG tablet Take 1 tablet (0.5 mg) by mouth every 4 hours as needed (Anxiety, Nausea/Vomiting or Sleep) 30 tablet 3     prochlorperazine (COMPAZINE) 10 MG tablet Take 0.5 tablets (5 mg) by mouth every 6 hours as needed (Nausea/Vomiting) 30 tablet 3     LORazepam (ATIVAN) 0.5 MG tablet Take 1 tablet (0.5 mg) by mouth 2 times daily as needed for anxiety 30 tablet 0     albuterol (PROAIR HFA/PROVENTIL HFA/VENTOLIN HFA) 108 (90 BASE) MCG/ACT Inhaler Inhale 2 puffs into the lungs 4 times daily 1 Inhaler 2     lisinopril (PRINIVIL/ZESTRIL) 5 MG tablet Take 1 tablet (5 mg) by mouth daily 90 tablet 3     atorvastatin (LIPITOR) 40 MG tablet Take 1 tablet (40 mg) by mouth daily 90 tablet 3     metoprolol (TOPROL-XL) 50 MG 24 hr tablet Take 1 tablet (50 mg) by mouth daily 90 tablet 3     Multiple Vitamins-Minerals (MULTIVITAMIN ADULT) TABS Take by mouth daily        cyanocolbalamin (VITAMIN  B-12) 100 MCG tablet Take 50 mcg by mouth as needed        ranitidine (ZANTAC) 75 MG tablet Take 1 tablet (75 mg) by mouth 2 times daily 30 tablet 11     aspirin EC 81 MG EC tablet Take 1 tablet (81 mg) by mouth daily 90 tablet 3     nitroglycerin (NITROSTAT) 0.4 MG SL tablet Place 1 tablet (0.4 mg) under the tongue every 5 minutes as needed for chest pain Maximum of 3 doses in 15 minutes 25 tablet 3     Acetaminophen (TYLENOL PO) Take 1,000 mg by mouth every 6 hours as needed         Allergies   Allergen Reactions     Amoxicillin GI Disturbance     Tetracycline Other (See Comments)     Yeast infection     Nickel Rash       CBC  Recent Labs   Lab Test  12/19/14   1554   WBC  17.3*   RBC  4.49   HGB  11.0*   HCT  33.4*   MCV  74*    MCH  24.5*   MCHC  32.9   RDW  15.7*   PLT  386       BMP  Recent Labs   Lab Test  03/21/17   0918   NA  142   POTASSIUM  4.5   AMADO  8.9   CHLORIDE  107   CO2  25   BUN  18   CR  0.62   GLC  100*       LFTs  Recent Labs   Lab Test  02/01/18   1300   PROTTOTAL  6.7*   ALBUMIN  3.4   BILITOTAL  1.1   ALKPHOS  51   AST  14   ALT  19     ROS  Constitutional - Denies fevers, weight loss, malaise, lethargy  Neuro - Denies tremors or seizures  Pulmon - Denies SOB, dyspnea, hemoptysis, chronic cough or use of an inhaler  CV - Denies CP, SOB, lower extremity edema, difficulty w/ stairs, has never used NTG  GI - Denies hematemesis, BRBPR, melena, chronic diarrhea or epigastric pain   - Denies hematuria, difficulty voiding, h/o STDs  Hematology - Denies blood clotting disorders, chronic anemias  Dermatology - No melanomas or skin cancers  Rheumatology - No h/o RA  Pysch - Denies depression, bipolar d/o or schizophrenia    Exam:  AXO3 NAD  Neuro - Strength 5/5 all major groups, sensation intact, PERRL  Lungs - CTA  CV - RRR  Abd - Soft, non-distended, non-tender, +BS  Extr - No edema    Assessment and plan: 77-year-old female in need of Port-A-Cath. Risks benefits alternatives and complications were discussed with the patient including the possibility of infection bleeding or pneumothorax. Patient understood and wished to proceed. PATIENT IS CLEARED FOR SURGERY.    Carlos Johnson MD

## 2018-02-09 NOTE — LETTER
2/9/2018         RE: Ines Pickard  [address] [facility] [address] [facility] [address]        Dear Colleague,    Thank you for referring your patient, Ines Pickard, to the [facility]. Please see a copy of my visit note below.    77-year-old female with new diagnosis of left lung cancer here for Port-A-Cath placement. Patient has never had a central line. There are no plans for radiation. Only palliative clean chemotherapy.    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     Coronary artery disease 12/2014    Inferior STEMI, stent to Circumflex     Percutaneous transluminal coronary angioplasty hematoma 12/15/2014       Past Surgical History:   Procedure Laterality Date     APPENDECTOMY  1956     ESOPHAGOSCOPY, GASTROSCOPY, DUODENOSCOPY (EGD), COMBINED N/A 1/30/2018    Procedure: COMBINED ENDOSCOPIC ULTRASOUND, ESOPHAGOSCOPY, GASTROSCOPY, DUODENOSCOPY (EGD), FINE NEEDLE ASPIRATE/BIOPSY;   EUS;  Surgeon: Carlos Fletcher MD;  Location:  GI     EYE SURGERY       PHACOEMULSIFICATION WITH STANDARD INTRAOCULAR LENS IMPLANT Left 1/25/2016    Procedure: PHACOEMULSIFICATION WITH STANDARD INTRAOCULAR LENS IMPLANT;  Surgeon: Julio César Wallace MD;  Location: [facility] OR     PHACOEMULSIFICATION WITH STANDARD INTRAOCULAR LENS IMPLANT Right 2/22/2016    Procedure: PHACOEMULSIFICATION WITH STANDARD INTRAOCULAR LENS IMPLANT;  Surgeon: Julio César Wallace MD;  Location: [facility] OR     SURGICAL HISTORY OF -   1961    right  hand surgery      TONSILLECTOMY & ADENOIDECTOMY  1967       Family History   Problem Relation Age of Onset     CANCER Brother      HEART DISEASE Brother      CANCER Mother      Respiratory Father      HEART DISEASE Father      MI       Social History   Substance Use Topics     Smoking status: Former Smoker     Packs/day: 0.50     Types: Cigarettes     Quit date: 12/13/2014     Smokeless tobacco: Never Used     Alcohol use No        History   Drug Use No       Current Outpatient Prescriptions   Medication Sig Dispense Refill     LORazepam (ATIVAN) 0.5 MG tablet Take 1 tablet (0.5 mg) by mouth every 4 hours as needed (Anxiety, Nausea/Vomiting or Sleep) 30 tablet 3     prochlorperazine (COMPAZINE) 10 MG tablet Take 0.5 tablets (5 mg) by mouth every 6 hours as needed (Nausea/Vomiting) 30 tablet 3     LORazepam (ATIVAN) 0.5 MG tablet Take 1 tablet (0.5 mg) by mouth 2 times daily as needed for anxiety 30 tablet 0     albuterol (PROAIR HFA/PROVENTIL HFA/VENTOLIN HFA) 108 (90 BASE) MCG/ACT Inhaler Inhale 2 puffs into the lungs 4 times daily 1 Inhaler 2     lisinopril (PRINIVIL/ZESTRIL) 5 MG tablet Take 1 tablet (5 mg) by mouth daily 90 tablet 3     atorvastatin (LIPITOR) 40 MG tablet Take 1 tablet (40 mg) by mouth daily 90 tablet 3     metoprolol (TOPROL-XL) 50 MG 24 hr tablet Take 1 tablet (50 mg) by mouth daily 90 tablet 3     Multiple Vitamins-Minerals (MULTIVITAMIN ADULT) TABS Take by mouth daily        cyanocolbalamin (VITAMIN  B-12) 100 MCG tablet Take 50 mcg by mouth as needed        ranitidine (ZANTAC) 75 MG tablet Take 1 tablet (75 mg) by mouth 2 times daily 30 tablet 11     aspirin EC 81 MG EC tablet Take 1 tablet (81 mg) by mouth daily 90 tablet 3     nitroglycerin (NITROSTAT) 0.4 MG SL tablet Place 1 tablet (0.4 mg) under the tongue every 5 minutes as needed for chest pain Maximum of 3 doses in 15 minutes 25 tablet 3     Acetaminophen (TYLENOL PO) Take 1,000 mg by mouth every 6 hours as needed         Allergies    Allergen Reactions     Amoxicillin GI Disturbance     Tetracycline Other (See Comments)     Yeast infection     Nickel Rash       CBC  Recent Labs   Lab Test  12/19/14   1554   WBC  17.3*   RBC  4.49   HGB  11.0*   HCT  33.4*   MCV  74*   MCH  24.5*   MCHC  32.9   RDW  15.7*   PLT  386       BMP  Recent Labs   Lab Test  03/21/17   0918   NA  142   POTASSIUM  4.5   AMADO  8.9   CHLORIDE  107   CO2  25   BUN  18   CR  0.62   GLC  100*       LFTs  Recent Labs   Lab Test  02/01/18   1300   PROTTOTAL  6.7*   ALBUMIN  3.4   BILITOTAL  1.1   ALKPHOS  51   AST  14   ALT  19     ROS  Constitutional - Denies fevers, weight loss, malaise, lethargy  Neuro - Denies tremors or seizures  Pulmon - Denies SOB, dyspnea, hemoptysis, chronic cough or use of an inhaler  CV - Denies CP, SOB, lower extremity edema, difficulty w/ stairs, has never used NTG  GI - Denies hematemesis, BRBPR, melena, chronic diarrhea or epigastric pain   - Denies hematuria, difficulty voiding, h/o STDs  Hematology - Denies blood clotting disorders, chronic anemias  Dermatology - No melanomas or skin cancers  Rheumatology - No h/o RA  Pysch - Denies depression, bipolar d/o or schizophrenia    Exam:  AXO3 NAD  Neuro - Strength 5/5 all major groups, sensation intact, PERRL  Lungs - CTA  CV - RRR  Abd - Soft, non-distended, non-tender, +BS  Extr - No edema    Assessment and plan: 77-year-old female in need of Port-A-Cath. Risks benefits alternatives and complications were discussed with the patient including the possibility of infection bleeding or pneumothorax. Patient understood and wished to proceed. PATIENT IS CLEARED FOR SURGERY.    Carlos Johnson MD     Again, thank you for allowing me to participate in the care of your patient.        Sincerely,        Carlos Johnson MD

## 2018-02-09 NOTE — LETTER
SURGERYPLANNING/SCHEDULING WORKSHEET                              [facility]  [address] [address] [phone]                          Ines Pickard                :  1940  MRN:  [id number]  Phone: [phone] (home)     Same Day Surgery   Surgeon: Carlos Johnson MD  Diagnosis:   Lung cancer  Allergies:  Amoxicillin; Tetracycline; and Nickel   A preoperative evaluation and physical will be done by this office.   ====================================================  Surgical Procedure:  General Surgery:portacath placement  Length of Procedure:  1 hour  Type of anesthesia:  Local with MAC  The proposed surgical procedure is considered LOW risk.  Date of Procedure:__18________    Time: ____2:00___________       Special Equipment: None  Informed Consent Obtained and Signed:  NO  ====================================================  Instructions to Same Day Surgery Staff  Pneumoboots  Preop Antibiotic:  Ancef 2 gm IV  pre-op within one hour prior to incision For > 80 kg  Preop Pain Meds:  None  Preop Orders:  Routine Standing Orders.  ====================================================  Instructions to the patient:  Preop physical exam scheduled (within 30 days or 7 days prior) with:  Dr. Steven_______________  Clinic:  ____________________                                         Date______________Time_________________________  Come to the hospital at: _[facility] will call with arrival time__________  HOME PREPARATION:   Shower with Hibiclens the night before or the morning of surgery, gently cleaning skin from neck to feet  Bathe and brush teeth the morning of surgery.  Take medications with a sip of water the morning of surgery:   Check with  if taking insulin.  May have  a light meal, toast and clear liquids, up to 8 hrs before surgery  May have clear liquids (liquids one can read through) up to 4 hrs before  surgery  NOTHING after 4 hrs before surgery  Stop aspirin 7-10 days before surgery  Stop NSAIDS (Ibuproven, Naproxen, etc) 5 days before surgery  Stop Plavix 7-10 days before surgery  Need to arrange for a     Carlos Johnson MD    2/9/2018  This form was electronically signed at chart closure                                                                        Chart Copy

## 2018-02-12 ENCOUNTER — HOSPITAL ENCOUNTER (OUTPATIENT)
Facility: CLINIC | Age: 78
Discharge: HOME | End: 2018-02-12
Attending: SURGERY | Admitting: SURGERY
Payer: MEDICARE

## 2018-02-12 ENCOUNTER — ANESTHESIA EVENT (OUTPATIENT)
Dept: SURGERY | Facility: CLINIC | Age: 78
End: 2018-02-12
Payer: MEDICARE

## 2018-02-12 ENCOUNTER — ANESTHESIA (OUTPATIENT)
Dept: SURGERY | Facility: CLINIC | Age: 78
End: 2018-02-12
Payer: MEDICARE

## 2018-02-12 ENCOUNTER — APPOINTMENT (OUTPATIENT)
Dept: GENERAL RADIOLOGY | Facility: CLINIC | Age: 78
End: 2018-02-12
Attending: SURGERY
Payer: MEDICARE

## 2018-02-12 VITALS
BODY MASS INDEX: 31.36 KG/M2 | RESPIRATION RATE: 18 BRPM | OXYGEN SATURATION: 95 % | SYSTOLIC BLOOD PRESSURE: 127 MMHG | WEIGHT: 177 LBS | DIASTOLIC BLOOD PRESSURE: 73 MMHG | HEART RATE: 89 BPM | HEIGHT: 63 IN | TEMPERATURE: 98.4 F

## 2018-02-12 DIAGNOSIS — G89.18 POST-OP PAIN: Primary | ICD-10-CM

## 2018-02-12 PROCEDURE — C1788 PORT, INDWELLING, IMP: HCPCS | Performed by: SURGERY

## 2018-02-12 PROCEDURE — 71000027 ZZH RECOVERY PHASE 2 EACH 15 MINS: Performed by: SURGERY

## 2018-02-12 PROCEDURE — 36561 INSERT TUNNELED CV CATH: CPT | Performed by: SURGERY

## 2018-02-12 PROCEDURE — 25000125 ZZHC RX 250: Performed by: NURSE ANESTHETIST, CERTIFIED REGISTERED

## 2018-02-12 PROCEDURE — 36000052 ZZH SURGERY LEVEL 2 EA 15 ADDTL MIN: Performed by: SURGERY

## 2018-02-12 PROCEDURE — 40000986 XR CHEST PORT 1 VW

## 2018-02-12 PROCEDURE — 36000054 ZZH SURGERY LEVEL 2 W FLUORO 1ST 30 MIN: Performed by: SURGERY

## 2018-02-12 PROCEDURE — 40000305 ZZH STATISTIC PRE PROC ASSESS I: Performed by: SURGERY

## 2018-02-12 PROCEDURE — 77001 FLUOROGUIDE FOR VEIN DEVICE: CPT | Mod: 26 | Performed by: SURGERY

## 2018-02-12 PROCEDURE — 25000128 H RX IP 250 OP 636: Performed by: NURSE ANESTHETIST, CERTIFIED REGISTERED

## 2018-02-12 PROCEDURE — 27210794 ZZH OR GENERAL SUPPLY STERILE: Performed by: SURGERY

## 2018-02-12 PROCEDURE — 37000008 ZZH ANESTHESIA TECHNICAL FEE, 1ST 30 MIN: Performed by: SURGERY

## 2018-02-12 PROCEDURE — 37000009 ZZH ANESTHESIA TECHNICAL FEE, EACH ADDTL 15 MIN: Performed by: SURGERY

## 2018-02-12 PROCEDURE — 40000277 XR SURGERY CARM FLUORO LESS THAN 5 MIN W STILLS

## 2018-02-12 PROCEDURE — 25000128 H RX IP 250 OP 636: Performed by: SURGERY

## 2018-02-12 PROCEDURE — 25000125 ZZHC RX 250: Performed by: SURGERY

## 2018-02-12 DEVICE — CATH PORT MRI POWERPORT 8FR SL 1808000: Type: IMPLANTABLE DEVICE | Site: CHEST | Status: FUNCTIONAL

## 2018-02-12 RX ORDER — CEFAZOLIN SODIUM 2 G/100ML
2 INJECTION, SOLUTION INTRAVENOUS
Status: DISCONTINUED | OUTPATIENT
Start: 2018-02-12 | End: 2018-02-12 | Stop reason: HOSPADM

## 2018-02-12 RX ORDER — FENTANYL CITRATE 50 UG/ML
INJECTION, SOLUTION INTRAMUSCULAR; INTRAVENOUS PRN
Status: DISCONTINUED | OUTPATIENT
Start: 2018-02-12 | End: 2018-02-12

## 2018-02-12 RX ORDER — HEPARIN SODIUM 1000 [USP'U]/ML
INJECTION, SOLUTION INTRAVENOUS; SUBCUTANEOUS PRN
Status: DISCONTINUED | OUTPATIENT
Start: 2018-02-12 | End: 2018-02-12 | Stop reason: HOSPADM

## 2018-02-12 RX ORDER — CLINDAMYCIN PHOSPHATE 900 MG/50ML
INJECTION, SOLUTION INTRAVENOUS PRN
Status: DISCONTINUED | OUTPATIENT
Start: 2018-02-12 | End: 2018-02-12

## 2018-02-12 RX ORDER — PROPOFOL 10 MG/ML
INJECTION, EMULSION INTRAVENOUS PRN
Status: DISCONTINUED | OUTPATIENT
Start: 2018-02-12 | End: 2018-02-12

## 2018-02-12 RX ORDER — DEXAMETHASONE SODIUM PHOSPHATE 4 MG/ML
INJECTION, SOLUTION INTRA-ARTICULAR; INTRALESIONAL; INTRAMUSCULAR; INTRAVENOUS; SOFT TISSUE PRN
Status: DISCONTINUED | OUTPATIENT
Start: 2018-02-12 | End: 2018-02-12

## 2018-02-12 RX ORDER — HYDROCODONE BITARTRATE AND ACETAMINOPHEN 5; 325 MG/1; MG/1
1-2 TABLET ORAL EVERY 6 HOURS PRN
Status: DISCONTINUED | OUTPATIENT
Start: 2018-02-12 | End: 2018-02-12 | Stop reason: HOSPADM

## 2018-02-12 RX ORDER — SODIUM CHLORIDE, SODIUM LACTATE, POTASSIUM CHLORIDE, CALCIUM CHLORIDE 600; 310; 30; 20 MG/100ML; MG/100ML; MG/100ML; MG/100ML
INJECTION, SOLUTION INTRAVENOUS CONTINUOUS
Status: DISCONTINUED | OUTPATIENT
Start: 2018-02-12 | End: 2018-02-12 | Stop reason: HOSPADM

## 2018-02-12 RX ORDER — BUPIVACAINE HYDROCHLORIDE AND EPINEPHRINE 5; 5 MG/ML; UG/ML
INJECTION, SOLUTION PERINEURAL PRN
Status: DISCONTINUED | OUTPATIENT
Start: 2018-02-12 | End: 2018-02-12 | Stop reason: HOSPADM

## 2018-02-12 RX ORDER — LIDOCAINE 40 MG/G
CREAM TOPICAL
Status: DISCONTINUED | OUTPATIENT
Start: 2018-02-12 | End: 2018-02-12 | Stop reason: HOSPADM

## 2018-02-12 RX ORDER — LIDOCAINE HYDROCHLORIDE 10 MG/ML
INJECTION, SOLUTION INFILTRATION; PERINEURAL PRN
Status: DISCONTINUED | OUTPATIENT
Start: 2018-02-12 | End: 2018-02-12 | Stop reason: HOSPADM

## 2018-02-12 RX ORDER — ONDANSETRON 2 MG/ML
INJECTION INTRAMUSCULAR; INTRAVENOUS PRN
Status: DISCONTINUED | OUTPATIENT
Start: 2018-02-12 | End: 2018-02-12

## 2018-02-12 RX ORDER — HYDROCODONE BITARTRATE AND ACETAMINOPHEN 5; 325 MG/1; MG/1
1-2 TABLET ORAL EVERY 4 HOURS PRN
Qty: 20 TABLET | Refills: 0 | Status: SHIPPED | OUTPATIENT
Start: 2018-02-12 | End: 2018-05-15

## 2018-02-12 RX ADMIN — DEXAMETHASONE SODIUM PHOSPHATE 4 MG: 4 INJECTION, SOLUTION INTRA-ARTICULAR; INTRALESIONAL; INTRAMUSCULAR; INTRAVENOUS; SOFT TISSUE at 14:15

## 2018-02-12 RX ADMIN — PROPOFOL 25 MG: 10 INJECTION, EMULSION INTRAVENOUS at 13:56

## 2018-02-12 RX ADMIN — CLINDAMYCIN PHOSPHATE 900 MG: 18 INJECTION, SOLUTION INTRAVENOUS at 13:55

## 2018-02-12 RX ADMIN — LIDOCAINE HYDROCHLORIDE 0.5 ML: 10 INJECTION, SOLUTION EPIDURAL; INFILTRATION; INTRACAUDAL; PERINEURAL at 13:20

## 2018-02-12 RX ADMIN — PROPOFOL 25 MG: 10 INJECTION, EMULSION INTRAVENOUS at 14:00

## 2018-02-12 RX ADMIN — SODIUM CHLORIDE, POTASSIUM CHLORIDE, SODIUM LACTATE AND CALCIUM CHLORIDE: 600; 310; 30; 20 INJECTION, SOLUTION INTRAVENOUS at 13:21

## 2018-02-12 RX ADMIN — ONDANSETRON 4 MG: 2 INJECTION INTRAMUSCULAR; INTRAVENOUS at 14:15

## 2018-02-12 RX ADMIN — FENTANYL CITRATE 100 MCG: 50 INJECTION, SOLUTION INTRAMUSCULAR; INTRAVENOUS at 13:53

## 2018-02-12 RX ADMIN — PROPOFOL 25 MG: 10 INJECTION, EMULSION INTRAVENOUS at 14:14

## 2018-02-12 RX ADMIN — PROPOFOL 25 MG: 10 INJECTION, EMULSION INTRAVENOUS at 14:10

## 2018-02-12 RX ADMIN — PROPOFOL 25 MG: 10 INJECTION, EMULSION INTRAVENOUS at 14:05

## 2018-02-12 ASSESSMENT — LIFESTYLE VARIABLES: TOBACCO_USE: 1

## 2018-02-12 NOTE — IP AVS SNAPSHOT
[facility] Pre[facility]/Phase II    [address] [facility] [facility] [address]    Phone:  [phone]    Fax:  [phone]                                       After Visit Summary   2/12/2018    Ines Pickard    MRN: [id number]           After Visit Summary Signature Page     I have received my discharge instructions, and my questions have been answered. I have discussed any challenges I see with this plan with the nurse or doctor.    ..........................................................................................................................................  Patient/Patient Representative Signature      ..........................................................................................................................................  Patient Representative Print Name and Relationship to Patient    ..................................................               ................................................  Date                                            Time    ..........................................................................................................................................  Reviewed by Signature/Title    ...................................................              ..............................................  Date                                                            Time

## 2018-02-12 NOTE — OP NOTE
Procedure Date: 02/12/2018      DATE OF SURGERY:  02/12/2018      PREOPERATIVE DIAGNOSIS:  Lung cancer.      POSTOPERATIVE DIAGNOSIS:  Lung cancer.      PROCEDURE:  Port-A-Cath placement.      SURGEON:  Carlos Johnosn MD.        ANESTHESIA:  MAC.      ANESTHESIOLOGIST:  Humberto      FINDINGS:  Catheter tip in superior vena cava on fluoroscopy.      INDICATIONS:  The patient is a 77-year-old female with a new diagnosis of lung cancer here for Port-A-Cath placement.      CONSENT:  Risks, benefits, alternatives and complications were discussed with the patient, including the possibility of infection, bleeding or pneumothorax.  The patient understood and wished to proceed.      PROCEDURE:  The patient was taken to the operating room and placed in supine position.  Monitored anesthesia care was initiated.  She was given a shoulder roll vertically between her shoulder blades to hyperextend the shoulders.  Her anterior chest and neck were cleaned and draped in a sterile manner.  Nine hundred milligrams of clindamycin were used as perioperative antibiotics and a surgical timeout was performed.  Marcaine 0.25% with epinephrine was used to anesthetize the skin on the patient's left anterior chest as well as the subcutaneous tissue and the clavicle.  The patient was placed into Trendelenburg position and a needle was inserted into the left subclavian vein with a flash back of dark red blood.  A wire guided smoothly over the needle and there was no ectopy.  On fluoroscopy, the wire was noted to be in the superior vena cava.  A subcutaneous pocket was anesthetized on the patient's left anterior chest and then a 4 cm transverse incision made.  The pocket was dissected out bluntly with a small amount of bleeding controlled with electrocautery.  A port preflushed with saline was stitched into the pocket using 2 interrupted 0 Prolene sutures.  A catheter was then tunneled from the wire exit point to the port.  The patient was placed  back into Trendelenburg position.  At this time, a subcutaneous dilator and introducer sheath were placed over the wire and the wire and dilator were removed, leaving only the introducer sheath.  A catheter was thread smoothly into the sheath, which was broken free and removed from the body.  On fluoroscopy, the catheter tip was noted to be in the inferior vena cava.  It was pulled back until the tip was at the superior vena cava right atrial junction.  The catheter was then cut to the appropriate length and attached to the port.  The port dominique back dark red blood and flushed with ease.  A final flush solution consisting of 1000 units of heparin per mL was then injected into the port.  A total of 4 mL of this solution was used.  Final fluoroscopy shows the catheter tip in superior vena cava with a smooth trajectory back to the port.  Subcutaneous tissues were closed using interrupted deep dermal 3-0 Vicryl sutures and the skin closed using 4-0 Vicryl running subcuticular stitch dressed with Dermabond.  The patient tolerated the procedure well, was transferred back to same day surgery for recovery.      PLAN:  Following a stable recovery, she will be discharged to home with a regular diet, activity as tolerated.      DISABILITY:  None.      MEDICATIONS:  Prescription written for Vicodin.      INSTRUCTIONS:  The patient advised to wash her wounds daily with soap and water and to follow up with me or the infusion nurses in 1-2 weeks.      ESTIMATED BLOOD LOSS:  5 mL.      INTRAVENOUS FLUIDS:  400.        Portable chest x-ray confirming placement is pending at time of dictation.         PAUL NERI             D: 2018   T: 2018   MT: ANNABELLA      Name:     ELIZABETH LANDA   MRN:      [id number]        Account:        [id number]   :      1940           Procedure Date: 2018      Document: [id number]

## 2018-02-12 NOTE — IP AVS SNAPSHOT
MRN:[id number]                      After Visit Summary   2/12/2018    Ines Pickard    MRN: [id number]           Thank you!     Thank you for choosing [facility] for your care. Our goal is always to provide you with excellent care. Hearing back from our patients is one way we can continue to improve our services. Please take a few minutes to complete the written survey that you may receive in the mail after you visit with us. Thank you!        Patient Information     Date Of Birth          1940        Designated Caregiver       Most Recent Value    Caregiver    Will someone help with your care after discharge? yes      About your hospital stay     You were admitted on:  February 12, 2018 You last received care in the:  [facility] PreOP/Phase II    You were discharged on:  February 12, 2018       Who to Call     For medical emergencies, please call 911.  For non-urgent questions about your medical care, please call your primary care provider or clinic, [phone]  For questions related to your surgery, please call your surgery clinic        Attending Provider     Provider Specialty    Carlos Johnson MD Surgery       Primary Care Provider Office Phone # Fax #    R Emanuel Perez [phone]      Your next 10 appointments already scheduled     Feb 13, 2018 10:30 AM CST   Level 4 with ROOM 1 [facility] ([facility])    [facility]  [address] [place] [address]  [facility] [address]   [phone]            Feb 14, 2018 10:30 AM CST   Level 2 with ROOM 7 [facility] ([facility])    [facility]  [address] [place] [address]  [facility] [address]   [phone]            Feb 15, 2018 11:30 AM CST   Level 2 with ROOM 10 [facility] ([facility])    [facility]  [address] [place] [address]  [facility] [address]   [phone]             Mar 05, 2018  9:00 AM CST   Level 4 with ROOM 6 [facility] ([facility])    [facility]  [address]  [facility] [address]   [phone]            Mar 05, 2018  9:45 AM CST   Return Visit with Tracy Miner MD   [facility] ([facility])    [facility]  [address]  [facility] [address]   [phone]            Mar 06, 2018  1:00 PM CST   Level 2 with ROOM 8 [facility] ([facility])    [facility]  [address]  [facility] [address]   [phone]            Mar 07, 2018  1:00 PM CST   Level 2 with ROOM 5 [facility] ([facility])    [facility]  [address]  [facility] [address]   [phone]              Further instructions from your care team                           Same Day Surgery Discharge Instructions  Special Precautions After Surgery - Adult    1. It is not unusual to feel lightheaded or faint, up to 24 hours after surgery or while taking pain medication.  If you have these symptoms; sit for a few minutes before standing and have someone assist you when getting up.  2. You should rest and relax for the next 24 hours and must have someone stay with you for at least 24 hours after your discharge.  3. DO NOT DRIVE any vehicle or operate mechanical equipment for 24 hours following the end of your surgery.  DO NOT DRIVE while taking narcotic pain medications that have been prescribed by your physician.  If you had a limb operated on, you must be able to use it fully to drive.  4. DO NOT drink alcoholic beverages for 24 hours following surgery or while taking prescription pain medication.  5. Drink clear liquids (apple juice, ginger ale, broth, 7-Up, etc.).  Progress to your regular diet as you feel able.  6. Any questions call your physician and do not  make important decisions for 24 hours.    ACTIVITY  ? Up as tolerated.     INCISIONAL CARE  May shower tomorrow.     Call for an appointment to return to the clinic only if needed.    Medications:  ? Start Tylenol, Ibuprofen or a pain pill whenever you start to feel any pain.     Additional discharge instructions: Elevate head a little to decrease swelling & pain.  __________________________________________________________________________________________________________________________________  IMPORTANT NUMBERS:    [facility] Main Number:  [phone], [phone]  Pharmacy:  [phone]  Same Day Surgery:  [phone], Monday - Friday until 8:30 p.m.  Urgent Care:  [phone]  Emergency Room:  [phone]      [facility]:  [phone]                                                                             [facility]:  [phone] option 1  [facility]:  [phone]     OB Clinic:  [phone]   Surgery Specialty Clinic:  [phone]   Home Medical Equipment: [phone]  [facility]:  [phone]    Wash incisions daily with soap and water. Some mild redness or swelling is expected. If draining, cover with dry gauze.    OK to use portacath.    Okay to use ice pack over wound as necessary for comfort.    Use pain medication as necessary but avoid constipating side effects. Ibuprofen okay but avoid Tylenol as your pain medication already contains this.    Diet as tolerated. No restrictions.    Follow up with infusion nurses or Dr Johnson in 1-2 weeks.      Pending Results     Date and Time Order Name Status Description    2/12/2018 1443 XR Chest Port 1 View Preliminary     2/12/2018 0004 XR Surgery CRUZ Fluoro L/T 5 Min w Stills Preliminary             Admission Information     Date & Time Provider Department Dept. Phone    2/12/2018 Carlos Johnson MD [facility] PreOP/Phase [phone]      Your Vitals Were     Blood Pressure Pulse Temperature  "Respirations Height Weight    127/73 89 98.4  F (36.9  C) (Oral) 18 1.6 m (5' 2.99\") 80.3 kg (177 lb)    Pulse Oximetry BMI (Body Mass Index)                95% 31.36 kg/m2          [employer] Information     [employer] lets you send messages to your doctor, view your test results, renew your prescriptions, schedule appointments and more. To sign up, go to [email or web address] . Click on \"Log in\" on the left side of the screen, which will take you to the Welcome page. Then click on \"Sign up Now\" on the right side of the page.     You will be asked to enter the access code listed below, as well as some personal information. Please follow the directions to create your username and password.     Your access code is: [email or web address]  Expires: 2018  1:30 PM     Your access code will  in 90 days. If you need help or a new code, please call your [facility] clinic or [phone].        Care EveryWhere ID     This is your Care EveryWhere ID. This could be used by other organizations to access your [facility] medical records  [id number]        Equal Access to Services     ALPA LOPEZ AH: Ronnie Carmona, renita lu, cathie kaalmada caroline, hoda taylor. So [facility] [phone].    ATENCIÓN: Si habla español, tiene a nguyễn disposición servicios gratuitos de asistencia lingüística. Lornaame al [phone].    We comply with applicable federal civil rights laws and [place] laws. We do not discriminate on the basis of race, color, national origin, age, disability, sex, sexual orientation, or gender identity.               Review of your medicines      START taking        Dose / Directions    HYDROcodone-acetaminophen 5-325 MG per tablet   Commonly known as:  NORCO   Used for:  Post-op pain        Dose:  1-2 tablet   Take 1-2 tablets by mouth every 4 hours as needed for moderate to severe pain   Quantity:  20 tablet   Refills:  0         CONTINUE these medicines which have NOT " CHANGED        Dose / Directions    albuterol 108 (90 BASE) MCG/ACT Inhaler   Commonly known as:  PROAIR HFA/PROVENTIL HFA/VENTOLIN HFA   Used for:  Acute bronchitis with symptoms > 10 days        Dose:  2 puff   Inhale 2 puffs into the lungs 4 times daily   Quantity:  1 Inhaler   Refills:  2       aspirin 81 MG EC tablet   Used for:  CAD (coronary artery disease)        Dose:  81 mg   Take 1 tablet (81 mg) by mouth daily   Quantity:  90 tablet   Refills:  3       atorvastatin 40 MG tablet   Commonly known as:  LIPITOR   Used for:  Coronary artery disease involving native coronary artery of native heart without angina pectoris        Dose:  40 mg   Take 1 tablet (40 mg) by mouth daily   Quantity:  90 tablet   Refills:  3       cyanocolbalamin 100 MCG tablet   Commonly known as:  vitamin  B-12        Dose:  50 mcg   Take 50 mcg by mouth as needed   Refills:  0       lidocaine-prilocaine cream   Commonly known as:  EMLA   Used for:  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)        Apply topically as needed for moderate pain   Quantity:  30 g   Refills:  0       lisinopril 5 MG tablet   Commonly known as:  PRINIVIL/ZESTRIL   Used for:  Essential hypertension with goal blood pressure less than 140/90        Dose:  5 mg   Take 1 tablet (5 mg) by mouth daily   Quantity:  90 tablet   Refills:  3       LORazepam 0.5 MG tablet   Commonly known as:  ATIVAN   Used for:  Anxiety        Dose:  0.5 mg   Take 1 tablet (0.5 mg) by mouth 2 times daily as needed for anxiety   Quantity:  30 tablet   Refills:  0       metoprolol succinate 50 MG 24 hr tablet   Commonly known as:  TOPROL-XL   Used for:  Coronary artery disease involving native coronary artery of native heart without angina pectoris        Dose:  50 mg   Take 1 tablet (50 mg) by mouth daily   Quantity:  90 tablet   Refills:  3       MULTIVITAMIN ADULT Tabs        Take by mouth daily   Refills:  0       nitroGLYcerin 0.4 MG sublingual tablet   Commonly known as:  NITROSTAT    Used for:  CAD (coronary artery disease)        Dose:  0.4 mg   Place 1 tablet (0.4 mg) under the tongue every 5 minutes as needed for chest pain Maximum of 3 doses in 15 minutes   Quantity:  25 tablet   Refills:  3       prochlorperazine 10 MG tablet   Commonly known as:  COMPAZINE   Used for:  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 Ask your nurse if you have questions.     Bring a paper prescription for each of these medications     HYDROcodone-acetaminophen 5-325 MG per tablet                Protect others around you: Learn how to safely use, store and throw away your medicines at www.disposemymeds.org.        Information about OPIOIDS     PRESCRIPTION OPIOIDS: WHAT YOU NEED TO KNOW    Prescription opioids can be used to help relieve moderate to severe pain and are often prescribed following a surgery or injury, or for certain health conditions. These medications can be an important part of treatment but also come with serious risks. It is important to work with your health care provider to make sure you are getting the safest, most effective care.    WHAT ARE THE RISKS AND SIDE EFFECTS OF OPIOID USE?  Prescription opioids carry serious risks of addiction and overdose, especially with prolonged use. An opioid overdose, often marked by slowed breathing can cause sudden death. The use of prescription opioids can have a number of side effects as well, even when  taken as directed:      Tolerance - meaning you might need to take more of a medication for the same pain relief    Physical dependence - meaning you have symptoms of withdrawal when a medication is stopped    Increased sensitivity to pain    Constipation    Nausea, vomiting, and dry mouth    Sleepiness and dizziness    Confusion    Depression    Low levels of testosterone that can result in lower sex drive, energy, and strength    Itching and sweating    RISKS ARE GREATER WITH:    History of drug misuse, substance use disorder, or overdose    Mental health conditions (such as depression or anxiety)    Sleep apnea    Older age (65 years or older)    Pregnancy    Avoid alcohol while taking prescription opioids.   Also, unless specifically advised by your health care provider, medications to avoid include:    Benzodiazepines (such as Xanax or Valium)    Muscle relaxants (such as Soma or Flexeril)    Hypnotics (such as Ambien or Lunesta)    Other prescription opioids    KNOW YOUR OPTIONS:  Talk to your health care provider about ways to manage your pain that do not involve prescription opioids. Some of these options may actually work better and have fewer risks and side effects:    Pain relievers such as acetaminophen, ibuprofen, and naproxen    Some medications that are also used for depression or seizures    Physical therapy and exercise    Cognitive behavioral therapy, a psychological, goal-directed approach, in which patients learn how to modify physical, behavioral, and emotional triggers of pain and stress    IF YOU ARE PRESCRIBED OPIOIDS FOR PAIN:    Never take opioids in greater amounts or more often than prescribed    Follow up with your primary health care provider and work together to create a plan on how to manage your pain.    Talk about ways to help manage your pain that do not involve prescription opioids    Talk about all concerns and side effects    Help prevent misuse and abuse    Never sell or share  prescription opioids    Never use another person's prescription opioids    Store prescription opioids in a secure place and out of reach of others (this may include visitors, children, friends, and family)    Visit www.cdc.gov/drugoverdose to learn about risks of opioid abuse and overdose    If you believe you may be struggling with addiction, tell your health care provider and ask for guidance or call [facility]'s National Helpline at 6-418-735-HELP    LEARN MORE / www.cdc.gov/drugoverdose/prescribing/guideline.html    Safely dispose of unused prescription opioids: Find your local drug take-back programs and more information about the importance of safe disposal at www.doseofreality.mn.gov             Medication List: This is a list of all your medications and when to take them. Check marks below indicate your daily home schedule. Keep this list as a reference.      Medications           Morning Afternoon Evening Bedtime As Needed    albuterol 108 (90 BASE) MCG/ACT Inhaler   Commonly known as:  PROAIR HFA/PROVENTIL HFA/VENTOLIN HFA   Inhale 2 puffs into the lungs 4 times daily                                aspirin 81 MG EC tablet   Take 1 tablet (81 mg) by mouth daily                                atorvastatin 40 MG tablet   Commonly known as:  LIPITOR   Take 1 tablet (40 mg) by mouth daily                                cyanocolbalamin 100 MCG tablet   Commonly known as:  vitamin  B-12   Take 50 mcg by mouth as needed                                HYDROcodone-acetaminophen 5-325 MG per tablet   Commonly known as:  NORCO   Take 1-2 tablets by mouth every 4 hours as needed for moderate to severe pain                                lidocaine-prilocaine cream   Commonly known as:  EMLA   Apply topically as needed for moderate pain                                lisinopril 5 MG tablet   Commonly known as:  PRINIVIL/ZESTRIL   Take 1 tablet (5 mg) by mouth daily                                LORazepam 0.5 MG tablet    Commonly known as:  ATIVAN   Take 1 tablet (0.5 mg) by mouth 2 times daily as needed for anxiety                                metoprolol succinate 50 MG 24 hr tablet   Commonly known as:  TOPROL-XL   Take 1 tablet (50 mg) by mouth daily                                MULTIVITAMIN ADULT Tabs   Take by mouth daily                                nitroGLYcerin 0.4 MG sublingual tablet   Commonly known as:  NITROSTAT   Place 1 tablet (0.4 mg) under the tongue every 5 minutes as needed for chest pain Maximum of 3 doses in 15 minutes                                prochlorperazine 10 MG tablet   Commonly known as:  COMPAZINE   Take 0.5 tablets (5 mg) by mouth every 6 hours as needed (Nausea/Vomiting)                                ranitidine 75 MG tablet   Commonly known as:  ZANTAC   Take 1 tablet (75 mg) by mouth 2 times daily                                TYLENOL PO   Take 1,000 mg by mouth every 6 hours as needed

## 2018-02-12 NOTE — DISCHARGE INSTRUCTIONS
Same Day Surgery Discharge Instructions  Special Precautions After Surgery - Adult    1. It is not unusual to feel lightheaded or faint, up to 24 hours after surgery or while taking pain medication.  If you have these symptoms; sit for a few minutes before standing and have someone assist you when getting up.  2. You should rest and relax for the next 24 hours and must have someone stay with you for at least 24 hours after your discharge.  3. DO NOT DRIVE any vehicle or operate mechanical equipment for 24 hours following the end of your surgery.  DO NOT DRIVE while taking narcotic pain medications that have been prescribed by your physician.  If you had a limb operated on, you must be able to use it fully to drive.  4. DO NOT drink alcoholic beverages for 24 hours following surgery or while taking prescription pain medication.  5. Drink clear liquids (apple juice, ginger ale, broth, 7-Up, etc.).  Progress to your regular diet as you feel able.  6. Any questions call your physician and do not make important decisions for 24 hours.    ACTIVITY  ? Up as tolerated.     INCISIONAL CARE  May shower tomorrow.     Call for an appointment to return to the clinic only if needed.    Medications:  ? Start Tylenol, Ibuprofen or a pain pill whenever you start to feel any pain.     Additional discharge instructions: Elevate head a little to decrease swelling & pain.  __________________________________________________________________________________________________________________________________  IMPORTANT NUMBERS:    [facility] [place]:  [phone], [phone]  Pharmacy:  [phone]  Same Day Surgery:  [phone], Monday - Friday until 8:30 p.m.  Urgent Care:  [phone]  Emergency Room:  [phone]      [facility]:  [phone]                                                                             [facility]:  [phone] option 1  [facility]:   [phone]     OB Clinic:  [phone]   [facility]gery Specialty Clinic:  [phone]   Home Medical Equipment: [phone]  [facility]:  [phone]    Wash incisions daily with soap and water. Some mild redness or swelling is expected. If draining, cover with dry gauze.    OK to use portacath.    Okay to use ice pack over wound as necessary for comfort.    Use pain medication as necessary but avoid constipating side effects. Ibuprofen okay but avoid Tylenol as your pain medication already contains this.    Diet as tolerated. No restrictions.    Follow up with infusion nurses or Dr Johnson in 1-2 weeks.

## 2018-02-12 NOTE — ANESTHESIA POSTPROCEDURE EVALUATION
Patient: Ines Pickard    Procedure(s):  Port-a-Cath Placement - Wound Class: I-Clean    Diagnosis:lung cancer  Diagnosis Additional Information: No value filed.    Anesthesia Type:  MAC    Note:  Anesthesia Post Evaluation    Patient location during evaluation: Bedside  Patient participation: Able to fully participate in evaluation  Level of consciousness: awake and alert  Pain management: adequate  Airway patency: patent  Cardiovascular status: acceptable  Respiratory status: acceptable  Hydration status: acceptable  PONV: none     Anesthetic complications: None          Last vitals:  Vitals:    02/12/18 1257   BP: 140/83   Pulse: 89   Resp: 18   Temp: 36.9  C (98.4  F)   SpO2: 98%         Electronically Signed By: ALEXSANDRA Wan CRNA  February 12, 2018  2:42 PM

## 2018-02-12 NOTE — BRIEF OP NOTE
PreOp Dx: lung cancer    PostOp Dx: Same    Procedure: portacath place,emt    Surgeon: DEBBIE Johnson    Anesthesia: MAC Humberto CRNA    Findings: cath tip in SVC on fluoro    IVF: 400ml    UOP: n/a    EBL: 5ml      Carlos Johnson MD

## 2018-02-13 ENCOUNTER — INFUSION THERAPY VISIT (OUTPATIENT)
Dept: INFUSION THERAPY | Facility: CLINIC | Age: 78
End: 2018-02-13
Attending: INTERNAL MEDICINE
Payer: MEDICARE

## 2018-02-13 ENCOUNTER — HOSPITAL ENCOUNTER (OUTPATIENT)
Facility: CLINIC | Age: 78
Discharge: HOME | End: 2018-02-13
Attending: INTERNAL MEDICINE | Admitting: INTERNAL MEDICINE
Payer: MEDICARE

## 2018-02-13 VITALS
HEART RATE: 84 BPM | DIASTOLIC BLOOD PRESSURE: 60 MMHG | SYSTOLIC BLOOD PRESSURE: 135 MMHG | WEIGHT: 176.6 LBS | HEIGHT: 63 IN | BODY MASS INDEX: 31.29 KG/M2

## 2018-02-13 DIAGNOSIS — C34.92 SMALL CELL CARCINOMA OF LEFT LUNG (H): Primary | ICD-10-CM

## 2018-02-13 LAB
ALBUMIN SERPL-MCNC: 3.5 G/DL (ref 3.4–5)
ALP SERPL-CCNC: 46 U/L (ref 40–150)
ALT SERPL W P-5'-P-CCNC: 22 U/L (ref 0–50)
ANION GAP SERPL CALCULATED.3IONS-SCNC: 8 MMOL/L (ref 3–14)
AST SERPL W P-5'-P-CCNC: 16 U/L (ref 0–45)
BASOPHILS # BLD AUTO: 0 10E9/L (ref 0–0.2)
BASOPHILS NFR BLD AUTO: 0.2 %
BILIRUB SERPL-MCNC: 0.9 MG/DL (ref 0.2–1.3)
BUN SERPL-MCNC: 13 MG/DL (ref 7–30)
CALCIUM SERPL-MCNC: 8.9 MG/DL (ref 8.5–10.1)
CHLORIDE SERPL-SCNC: 102 MMOL/L (ref 94–109)
CO2 SERPL-SCNC: 27 MMOL/L (ref 20–32)
CREAT SERPL-MCNC: 0.49 MG/DL (ref 0.52–1.04)
DIFFERENTIAL METHOD BLD: ABNORMAL
EOSINOPHIL # BLD AUTO: 0 10E9/L (ref 0–0.7)
EOSINOPHIL NFR BLD AUTO: 0.1 %
ERYTHROCYTE [DISTWIDTH] IN BLOOD BY AUTOMATED COUNT: 15.1 % (ref 10–15)
GFR SERPL CREATININE-BSD FRML MDRD: >90 ML/MIN/1.7M2
GLUCOSE SERPL-MCNC: 96 MG/DL (ref 70–99)
HCT VFR BLD AUTO: 34.3 % (ref 35–47)
HGB BLD-MCNC: 12.1 G/DL (ref 11.7–15.7)
IMM GRANULOCYTES # BLD: 0.1 10E9/L (ref 0–0.4)
IMM GRANULOCYTES NFR BLD: 0.4 %
LYMPHOCYTES # BLD AUTO: 2.7 10E9/L (ref 0.8–5.3)
LYMPHOCYTES NFR BLD AUTO: 16.2 %
MCH RBC QN AUTO: 25.1 PG (ref 26.5–33)
MCHC RBC AUTO-ENTMCNC: 35.3 G/DL (ref 31.5–36.5)
MCV RBC AUTO: 71 FL (ref 78–100)
MONOCYTES # BLD AUTO: 1.7 10E9/L (ref 0–1.3)
MONOCYTES NFR BLD AUTO: 10.1 %
NEUTROPHILS # BLD AUTO: 12.1 10E9/L (ref 1.6–8.3)
NEUTROPHILS NFR BLD AUTO: 73 %
PLATELET # BLD AUTO: 324 10E9/L (ref 150–450)
POTASSIUM SERPL-SCNC: 3.6 MMOL/L (ref 3.4–5.3)
PROT SERPL-MCNC: 6.6 G/DL (ref 6.8–8.8)
RBC # BLD AUTO: 4.83 10E12/L (ref 3.8–5.2)
SODIUM SERPL-SCNC: 137 MMOL/L (ref 133–144)
WBC # BLD AUTO: 16.6 10E9/L (ref 4–11)

## 2018-02-13 PROCEDURE — 85025 COMPLETE CBC W/AUTO DIFF WBC: CPT | Performed by: INTERNAL MEDICINE

## 2018-02-13 PROCEDURE — 80053 COMPREHEN METABOLIC PANEL: CPT | Performed by: INTERNAL MEDICINE

## 2018-02-13 PROCEDURE — 25000128 H RX IP 250 OP 636: Performed by: INTERNAL MEDICINE

## 2018-02-13 PROCEDURE — 96375 TX/PRO/DX INJ NEW DRUG ADDON: CPT

## 2018-02-13 PROCEDURE — 96413 CHEMO IV INFUSION 1 HR: CPT

## 2018-02-13 PROCEDURE — 25000125 ZZHC RX 250: Performed by: INTERNAL MEDICINE

## 2018-02-13 PROCEDURE — 96417 CHEMO IV INFUS EACH ADDL SEQ: CPT

## 2018-02-13 RX ORDER — ALBUTEROL SULFATE 0.83 MG/ML
2.5 SOLUTION RESPIRATORY (INHALATION)
Status: CANCELLED | OUTPATIENT
Start: 2018-02-15

## 2018-02-13 RX ORDER — EPINEPHRINE 0.3 MG/.3ML
0.3 INJECTION SUBCUTANEOUS EVERY 5 MIN PRN
Status: CANCELLED | OUTPATIENT
Start: 2018-02-15

## 2018-02-13 RX ORDER — LORAZEPAM 2 MG/ML
0.5 INJECTION INTRAMUSCULAR EVERY 4 HOURS PRN
Status: CANCELLED
Start: 2018-02-13

## 2018-02-13 RX ORDER — MEPERIDINE HYDROCHLORIDE 25 MG/ML
25 INJECTION INTRAMUSCULAR; INTRAVENOUS; SUBCUTANEOUS EVERY 30 MIN PRN
Status: CANCELLED | OUTPATIENT
Start: 2018-02-15

## 2018-02-13 RX ORDER — ALBUTEROL SULFATE 90 UG/1
1-2 AEROSOL, METERED RESPIRATORY (INHALATION)
Status: CANCELLED
Start: 2018-02-15

## 2018-02-13 RX ORDER — ALBUTEROL SULFATE 90 UG/1
1-2 AEROSOL, METERED RESPIRATORY (INHALATION)
Status: CANCELLED
Start: 2018-02-13

## 2018-02-13 RX ORDER — ALBUTEROL SULFATE 0.83 MG/ML
2.5 SOLUTION RESPIRATORY (INHALATION)
Status: CANCELLED | OUTPATIENT
Start: 2018-02-13

## 2018-02-13 RX ORDER — EPINEPHRINE 0.3 MG/.3ML
0.3 INJECTION SUBCUTANEOUS EVERY 5 MIN PRN
Status: CANCELLED | OUTPATIENT
Start: 2018-02-13

## 2018-02-13 RX ORDER — EPINEPHRINE 1 MG/ML
0.3 INJECTION, SOLUTION, CONCENTRATE INTRAVENOUS EVERY 5 MIN PRN
Status: CANCELLED | OUTPATIENT
Start: 2018-02-14

## 2018-02-13 RX ORDER — LORAZEPAM 2 MG/ML
0.5 INJECTION INTRAMUSCULAR EVERY 4 HOURS PRN
Status: CANCELLED
Start: 2018-02-14

## 2018-02-13 RX ORDER — EPINEPHRINE 1 MG/ML
0.3 INJECTION, SOLUTION, CONCENTRATE INTRAVENOUS EVERY 5 MIN PRN
Status: CANCELLED | OUTPATIENT
Start: 2018-02-13

## 2018-02-13 RX ORDER — DIPHENHYDRAMINE HYDROCHLORIDE 50 MG/ML
50 INJECTION INTRAMUSCULAR; INTRAVENOUS
Status: CANCELLED
Start: 2018-02-13

## 2018-02-13 RX ORDER — MEPERIDINE HYDROCHLORIDE 25 MG/ML
25 INJECTION INTRAMUSCULAR; INTRAVENOUS; SUBCUTANEOUS EVERY 30 MIN PRN
Status: CANCELLED | OUTPATIENT
Start: 2018-02-13

## 2018-02-13 RX ORDER — SODIUM CHLORIDE 9 MG/ML
1000 INJECTION, SOLUTION INTRAVENOUS CONTINUOUS PRN
Status: CANCELLED
Start: 2018-02-14

## 2018-02-13 RX ORDER — SODIUM CHLORIDE 9 MG/ML
1000 INJECTION, SOLUTION INTRAVENOUS CONTINUOUS PRN
Status: CANCELLED
Start: 2018-02-13

## 2018-02-13 RX ORDER — METHYLPREDNISOLONE SODIUM SUCCINATE 125 MG/2ML
125 INJECTION, POWDER, LYOPHILIZED, FOR SOLUTION INTRAMUSCULAR; INTRAVENOUS
Status: CANCELLED
Start: 2018-02-15

## 2018-02-13 RX ORDER — LORAZEPAM 2 MG/ML
0.5 INJECTION INTRAMUSCULAR EVERY 4 HOURS PRN
Status: CANCELLED
Start: 2018-02-15

## 2018-02-13 RX ORDER — EPINEPHRINE 0.3 MG/.3ML
0.3 INJECTION SUBCUTANEOUS EVERY 5 MIN PRN
Status: CANCELLED | OUTPATIENT
Start: 2018-02-14

## 2018-02-13 RX ORDER — SODIUM CHLORIDE 9 MG/ML
1000 INJECTION, SOLUTION INTRAVENOUS CONTINUOUS PRN
Status: CANCELLED
Start: 2018-02-15

## 2018-02-13 RX ORDER — DIPHENHYDRAMINE HYDROCHLORIDE 50 MG/ML
50 INJECTION INTRAMUSCULAR; INTRAVENOUS
Status: CANCELLED
Start: 2018-02-15

## 2018-02-13 RX ORDER — MEPERIDINE HYDROCHLORIDE 25 MG/ML
25 INJECTION INTRAMUSCULAR; INTRAVENOUS; SUBCUTANEOUS EVERY 30 MIN PRN
Status: CANCELLED | OUTPATIENT
Start: 2018-02-14

## 2018-02-13 RX ORDER — EPINEPHRINE 1 MG/ML
0.3 INJECTION, SOLUTION, CONCENTRATE INTRAVENOUS EVERY 5 MIN PRN
Status: CANCELLED | OUTPATIENT
Start: 2018-02-15

## 2018-02-13 RX ORDER — ALBUTEROL SULFATE 0.83 MG/ML
2.5 SOLUTION RESPIRATORY (INHALATION)
Status: CANCELLED | OUTPATIENT
Start: 2018-02-14

## 2018-02-13 RX ORDER — METHYLPREDNISOLONE SODIUM SUCCINATE 125 MG/2ML
125 INJECTION, POWDER, LYOPHILIZED, FOR SOLUTION INTRAMUSCULAR; INTRAVENOUS
Status: CANCELLED
Start: 2018-02-13

## 2018-02-13 RX ORDER — ALBUTEROL SULFATE 90 UG/1
1-2 AEROSOL, METERED RESPIRATORY (INHALATION)
Status: CANCELLED
Start: 2018-02-14

## 2018-02-13 RX ORDER — METHYLPREDNISOLONE SODIUM SUCCINATE 125 MG/2ML
125 INJECTION, POWDER, LYOPHILIZED, FOR SOLUTION INTRAMUSCULAR; INTRAVENOUS
Status: CANCELLED
Start: 2018-02-14

## 2018-02-13 RX ORDER — HEPARIN SODIUM (PORCINE) LOCK FLUSH IV SOLN 100 UNIT/ML 100 UNIT/ML
5 SOLUTION INTRAVENOUS
Status: DISCONTINUED | OUTPATIENT
Start: 2018-02-13 | End: 2018-02-13 | Stop reason: HOSPADM

## 2018-02-13 RX ORDER — DIPHENHYDRAMINE HYDROCHLORIDE 50 MG/ML
50 INJECTION INTRAMUSCULAR; INTRAVENOUS
Status: CANCELLED
Start: 2018-02-14

## 2018-02-13 RX ADMIN — DEXAMETHASONE SODIUM PHOSPHATE: 10 INJECTION, SOLUTION INTRAMUSCULAR; INTRAVENOUS at 11:51

## 2018-02-13 RX ADMIN — ETOPOSIDE 150 MG: 20 INJECTION, SOLUTION, CONCENTRATE INTRAVENOUS at 13:05

## 2018-02-13 RX ADMIN — SODIUM CHLORIDE, PRESERVATIVE FREE 5 ML: 5 INJECTION INTRAVENOUS at 14:39

## 2018-02-13 RX ADMIN — SODIUM CHLORIDE 250 ML: 9 INJECTION, SOLUTION INTRAVENOUS at 11:51

## 2018-02-13 RX ADMIN — FAMOTIDINE 20 MG: 10 INJECTION, SOLUTION INTRAVENOUS at 11:51

## 2018-02-13 RX ADMIN — CARBOPLATIN 600 MG: 10 INJECTION, SOLUTION INTRAVENOUS at 12:25

## 2018-02-13 NOTE — PROGRESS NOTES
Infusion Nursing Note:  Ines Pickard presents today for C1D1 Carbo/Etoposide   Patient seen by provider today: No   present during visit today: Not Applicable.    Note: Went over side effects of Carbo/Etoposide and well as infection prevention and diet.  Pt does have thermometer at home and is aware of when to call/go in for a temp of 100.4 or higher.  Answered all of pt's questions to her satisfaction and understanding.     Intravenous Access:  Labs drawn without difficulty.  Implanted Port.    Treatment Conditions:  Lab Results   Component Value Date    HGB 12.1 02/13/2018     Lab Results   Component Value Date    WBC 16.6 02/13/2018      Lab Results   Component Value Date    ANEU 12.1 02/13/2018     Lab Results   Component Value Date     02/13/2018      Lab Results   Component Value Date     02/13/2018                   Lab Results   Component Value Date    POTASSIUM 3.6 02/13/2018           Lab Results   Component Value Date    MAG 2.1 12/14/2014            Lab Results   Component Value Date    CR 0.49 02/13/2018                   Lab Results   Component Value Date    AMADO 8.9 02/13/2018                Lab Results   Component Value Date    BILITOTAL 0.9 02/13/2018           Lab Results   Component Value Date    ALBUMIN 3.5 02/13/2018                    Lab Results   Component Value Date    ALT 22 02/13/2018           Lab Results   Component Value Date    AST 16 02/13/2018       Results reviewed, labs MET treatment parameters, ok to proceed with treatment.      Post Infusion Assessment:  Patient tolerated infusion without incident.  Blood return noted pre and post infusion.  Site patent and intact, free from redness, edema or discomfort.  No evidence of extravasations.  Port access left in place per pt preference.       Discharge Plan:   Patient discharged in stable condition accompanied by: self.  Departure Mode: Ambulatory.  Pt to return tomorrow for C1D2 Etoposide at 10:30 am.  Pt  highly encouraged to call with any questions or concerns.  Pt given Dr. Miner's RNCC direct number along with triage number.    Dea Black RN

## 2018-02-13 NOTE — MR AVS SNAPSHOT
After Visit Summary   2/13/2018    Ines Pickard    MRN: [id number]           Patient Information     Date Of Birth          1940        Visit Information        Provider Department      2/13/2018 10:30 AM ROOM 1 [facility]        Today's Diagnoses     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)    -  1       Follow-ups after your visit        Your next 10 appointments already scheduled     Feb 14, 2018 10:30 AM CST   Level 2 with ROOM 7 [facility] ([facility])    [facility]  [address]  [facility] [address]   [phone]            Feb 15, 2018 11:30 AM CST   Level 2 with ROOM 10 [facility] ([facility])    [facility]  [address]  [facility] [address]   [phone]            Mar 05, 2018  9:00 AM CST   Level 4 with ROOM 6 [facility] ([facility])    [facility]  [address]  [facility] [address]   [phone]            Mar 05, 2018  9:45 AM CST   Return Visit with Tracy Miner MD   [facility] ([facility])    [facility]  [address]  [facility] [address]   [phone]            Mar 06, 2018  1:00 PM CST   Level 2 with ROOM 8 [facility] ([facility])    [facility]  [address]  [facility] [address]   [phone]            Mar 07, 2018  1:00 PM CST   Level 2 with ROOM 5 [facility] ([facility])    [facility]  [address]  [facility] [address]   [phone]              Who to contact     If you have questions or need follow up information about today's clinic visit or your schedule please contact [facility] directly at [phone].  Normal or non-critical lab and imaging  "results will be communicated to you by MyChart, letter or phone within 4 business days after the clinic has received the results. If you do not hear from us within 7 days, please contact the clinic through StrongLoopt or phone. If you have a critical or abnormal lab result, we will notify you by phone as soon as possible.  Submit refill requests through Furious or call your pharmacy and they will forward the refill request to us. Please allow 3 business days for your refill to be completed.          Additional Information About Your Visit        Furious Information     Furious lets you send messages to your doctor, view your test results, renew your prescriptions, schedule appointments and more. To sign up, go to [email or web address] . Click on \"Log in\" on the left side of the screen, which will take you to the Welcome page. Then click on \"Sign up Now\" on the right side of the page.     You will be asked to enter the access code listed below, as well as some personal information. Please follow the directions to create your username and password.     Your access code is: [email or web address]  Expires: 2018  1:30 PM     Your access code will  in 90 days. If you need help or a new code, please call your [place] clinic or [phone].        Care EveryWhere ID     This is your Care EveryWhere ID. This could be used by other organizations to access your [place] medical records  [id number]        Your Vitals Were     Pulse Height BMI (Body Mass Index)             84 1.6 m (5' 2.99\") 31.29 kg/m2          Blood Pressure from Last 3 Encounters:   18 135/60   18 (P) 126/79   18 150/61    Weight from Last 3 Encounters:   18 80.1 kg (176 lb 9.6 oz)   18 80.3 kg (177 lb)   18 80.3 kg (177 lb)              We Performed the Following     CBC with platelets differential     Comprehensive metabolic panel        Primary Care Provider Office Phone # Fax #    MIHCAEL Perez [phone] " [phone]       [address] [facility] [address]        Equal Access to Services     MAXFEMI JOHN : Hadii aad ku hadrivermarycarmen Juwanali, wamarokda luqrodolfoha, qaybta kaalmada johnzeynep, hoda kenan parispam lopezdl cruzitomarlenmedhat talyor. So [facility] [phone].    ATENCIÓN: Si habla español, tiene a nguyễn disposición servicios gratuitos de asistencia lingüística. Yefri al [phone].    We comply with applicable federal civil rights laws and [place] laws. We do not discriminate on the basis of race, color, national origin, age, disability, sex, sexual orientation, or gender identity.            Thank you!     Thank you for choosing [facility]  for your care. Our goal is always to provide you with excellent care. Hearing back from our patients is one way we can continue to improve our services. Please take a few minutes to complete the written survey that you may receive in the mail after your visit with us. Thank you!             Your Updated Medication List - Protect others around you: Learn how to safely use, store and throw away your medicines at www.disposemymeds.org.          This list is accurate as of 2/13/18  3:57 PM.  Always use your most recent med list.                   Brand Name Dispense Instructions for use Diagnosis    albuterol 108 (90 BASE) MCG/ACT Inhaler    PROAIR HFA/PROVENTIL HFA/VENTOLIN HFA    1 Inhaler    Inhale 2 puffs into the lungs 4 times daily    Acute bronchitis with symptoms > 10 days       aspirin 81 MG EC tablet     90 tablet    Take 1 tablet (81 mg) by mouth daily    CAD (coronary artery disease)       atorvastatin 40 MG tablet    LIPITOR    90 tablet    Take 1 tablet (40 mg) by mouth daily    Coronary artery disease involving native coronary artery of native heart without angina pectoris       cyanocolbalamin 100 MCG tablet    vitamin  B-12     Take 50 mcg by mouth as needed        HYDROcodone-acetaminophen 5-325 MG per tablet    NORCO    20 tablet    Take 1-2 tablets by mouth  every 4 hours as needed for moderate to severe pain    Post-op pain       lidocaine-prilocaine cream    EMLA    30 g    Apply topically as needed for moderate pain    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)       lisinopril 5 MG tablet    PRINIVIL/ZESTRIL    90 tablet    Take 1 tablet (5 mg) by mouth daily    Essential hypertension with goal blood pressure less than 140/90       LORazepam 0.5 MG tablet    ATIVAN    30 tablet    Take 1 tablet (0.5 mg) by mouth 2 times daily as needed for anxiety    Anxiety       metoprolol succinate 50 MG 24 hr tablet    TOPROL-XL    90 tablet    Take 1 tablet (50 mg) by mouth daily    Coronary artery disease involving native coronary artery of native heart without angina pectoris       MULTIVITAMIN ADULT Tabs      Take by mouth daily        nitroGLYcerin 0.4 MG sublingual tablet    NITROSTAT    25 tablet    Place 1 tablet (0.4 mg) under the tongue every 5 minutes as needed for chest pain Maximum of 3 doses in 15 minutes    CAD (coronary artery disease)       prochlorperazine 10 MG tablet    COMPAZINE    30 tablet    Take 0.5 tablets (5 mg) by mouth every 6 hours as needed (Nausea/Vomiting)    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

## 2018-02-14 ENCOUNTER — INFUSION THERAPY VISIT (OUTPATIENT)
Dept: INFUSION THERAPY | Facility: CLINIC | Age: 78
End: 2018-02-14
Attending: INTERNAL MEDICINE
Payer: MEDICARE

## 2018-02-14 VITALS — TEMPERATURE: 96.9 F | DIASTOLIC BLOOD PRESSURE: 63 MMHG | SYSTOLIC BLOOD PRESSURE: 127 MMHG | HEART RATE: 75 BPM

## 2018-02-14 DIAGNOSIS — C34.92 SMALL CELL CARCINOMA OF LEFT LUNG (H): Primary | ICD-10-CM

## 2018-02-14 PROCEDURE — 25000125 ZZHC RX 250: Performed by: INTERNAL MEDICINE

## 2018-02-14 PROCEDURE — 96413 CHEMO IV INFUSION 1 HR: CPT

## 2018-02-14 PROCEDURE — 25000128 H RX IP 250 OP 636: Performed by: INTERNAL MEDICINE

## 2018-02-14 PROCEDURE — 96375 TX/PRO/DX INJ NEW DRUG ADDON: CPT

## 2018-02-14 RX ORDER — HEPARIN SODIUM (PORCINE) LOCK FLUSH IV SOLN 100 UNIT/ML 100 UNIT/ML
5 SOLUTION INTRAVENOUS
Status: DISCONTINUED | OUTPATIENT
Start: 2018-02-14 | End: 2018-02-14 | Stop reason: HOSPADM

## 2018-02-14 RX ADMIN — SODIUM CHLORIDE, PRESERVATIVE FREE 5 ML: 5 INJECTION INTRAVENOUS at 12:29

## 2018-02-14 RX ADMIN — ETOPOSIDE 150 MG: 20 INJECTION, SOLUTION, CONCENTRATE INTRAVENOUS at 11:13

## 2018-02-14 RX ADMIN — FAMOTIDINE 20 MG: 10 INJECTION INTRAVENOUS at 10:54

## 2018-02-14 RX ADMIN — DEXAMETHASONE SODIUM PHOSPHATE 12 MG: 10 INJECTION, SOLUTION INTRAMUSCULAR; INTRAVENOUS at 10:53

## 2018-02-14 RX ADMIN — SODIUM CHLORIDE 250 ML: 9 INJECTION, SOLUTION INTRAVENOUS at 10:47

## 2018-02-14 NOTE — PROGRESS NOTES
Infusion Nursing Note:  Ines Pickard presents today for C1D2 Etoposide.    Patient seen by provider today: No   present during visit today: Not Applicable.    Note: N/A.    Intravenous Access:  Implanted Port.    Treatment Conditions:  Per treatment plan.      Post Infusion Assessment:  Patient tolerated infusion without incident.  Blood return noted pre and post infusion.  Site patent and intact, free from redness, edema or discomfort.  No evidence of extravasations.  Port remains accessed for use tomorrow.    Discharge Plan:   Patient discharged in stable condition accompanied by: self.  Departure Mode: Ambulatory.    Emmy Murphy RN

## 2018-02-14 NOTE — MR AVS SNAPSHOT
After Visit Summary   2/14/2018    Ines Pcikard    MRN: [id number]           Patient Information     Date Of Birth          1940        Visit Information        Provider Department      2/14/2018 10:30 AM ROOM 7 [facility]        Today's Diagnoses     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)    -  1       Follow-ups after your visit        Your next 10 appointments already scheduled     Feb 15, 2018 11:30 AM CST   Level 2 with ROOM 10 [facility] ([facility])    [facility]  [address]  [facility] [address]   [phone]            Mar 05, 2018  9:00 AM CST   Level 4 with ROOM 6 [facility] ([facility])    [facility]  [address]  [facility] [address]   [phone]            Mar 05, 2018  9:45 AM CST   Return Visit with Tracy Miner MD   [facility] ([facility])    [facility]  [address]  [facility] [address]   [phone]            Mar 06, 2018  1:00 PM CST   Level 2 with ROOM 8 [facility] ([facility])    [facility]  [address]  [facility] [address]   [phone]            Mar 07, 2018  1:00 PM CST   Level 2 with ROOM 5 [facility] ([facility])    [facility]  [address]  [facility] [address]   [phone]              Who to contact     If you have questions or need follow up information about today's clinic visit or your schedule please contact [facility] directly at [phone].  Normal or non-critical lab and imaging results will be communicated to you by MyChart, letter or phone within 4 business days after the clinic has received the results. If you do not hear from us within 7 days, please contact the clinic through MyChart or  "phone. If you have a critical or abnormal lab result, we will notify you by phone as soon as possible.  Submit refill requests through Kinkaa Search Tools or call your pharmacy and they will forward the refill request to us. Please allow 3 business days for your refill to be completed.          Additional Information About Your Visit        AbbeyPosthart Information     K[employer] Search Tools lets you send messages to your doctor, view your test results, renew your prescriptions, schedule appointments and more. To sign up, go to [email or web address] . Click on \"Log in\" on the left side of the screen, which will take you to the Welcome page. Then click on \"Sign up Now\" on the right side of the page.     You will be asked to enter the access code listed below, as well as some personal information. Please follow the directions to create your username and password.     Your access code is: [email or web address]  Expires: 2018  1:30 PM     Your access code will  in 90 days. If you need help or a new code, please call your [place] clinic or [phone].        Care EveryWhere ID     This is your Care EveryWhere ID. This could be used by other organizations to access your [place] medical records  [id number]        Your Vitals Were     Pulse Temperature                75 96.9  F (36.1  C) (Oral)           Blood Pressure from Last 3 Encounters:   18 127/63   18 135/60   18 (P) 126/79    Weight from Last 3 Encounters:   18 80.1 kg (176 lb 9.6 oz)   18 80.3 kg (177 lb)   18 80.3 kg (177 lb)              Today, you had the following     No orders found for display       Primary Care Provider Office Phone # Fax #    R Emanuel Perez [phone] [address] [facility] [address]        Equal Access to Services     FEMI LOPEZ : Ronnie Carmona, renita lu, hoda coleman . Formerly [facility] [phone].    ATENCIÓN: Si elivra " español, tiene a nguyễn disposición servicios gratuitos de asistencia lingüística. Yefri wellington [phone].    We comply with applicable federal civil rights laws and [place] laws. We do not discriminate on the basis of race, color, national origin, age, disability, sex, sexual orientation, or gender identity.            Thank you!     Thank you for choosing [facility]  for your care. Our goal is always to provide you with excellent care. Hearing back from our patients is one way we can continue to improve our services. Please take a few minutes to complete the written survey that you may receive in the mail after your visit with us. Thank you!             Your Updated Medication List - Protect others around you: Learn how to safely use, store and throw away your medicines at www.disposemymeds.org.          This list is accurate as of 2/14/18  1:15 PM.  Always use your most recent med list.                   Brand Name Dispense Instructions for use Diagnosis    albuterol 108 (90 BASE) MCG/ACT Inhaler    PROAIR HFA/PROVENTIL HFA/VENTOLIN HFA    1 Inhaler    Inhale 2 puffs into the lungs 4 times daily    Acute bronchitis with symptoms > 10 days       aspirin 81 MG EC tablet     90 tablet    Take 1 tablet (81 mg) by mouth daily    CAD (coronary artery disease)       atorvastatin 40 MG tablet    LIPITOR    90 tablet    Take 1 tablet (40 mg) by mouth daily    Coronary artery disease involving native coronary artery of native heart without angina pectoris       cyanocolbalamin 100 MCG tablet    vitamin  B-12     Take 50 mcg by mouth as needed        HYDROcodone-acetaminophen 5-325 MG per tablet    NORCO    20 tablet    Take 1-2 tablets by mouth every 4 hours as needed for moderate to severe pain    Post-op pain       lidocaine-prilocaine cream    EMLA    30 g    Apply topically as needed for moderate pain    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)       lisinopril 5 MG tablet    PRINIVIL/ZESTRIL    90 tablet    Take  1 tablet (5 mg) by mouth daily    Essential hypertension with goal blood pressure less than 140/90       LORazepam 0.5 MG tablet    ATIVAN    30 tablet    Take 1 tablet (0.5 mg) by mouth 2 times daily as needed for anxiety    Anxiety       metoprolol succinate 50 MG 24 hr tablet    TOPROL-XL    90 tablet    Take 1 tablet (50 mg) by mouth daily    Coronary artery disease involving native coronary artery of native heart without angina pectoris       MULTIVITAMIN ADULT Tabs      Take by mouth daily        nitroGLYcerin 0.4 MG sublingual tablet    NITROSTAT    25 tablet    Place 1 tablet (0.4 mg) under the tongue every 5 minutes as needed for chest pain Maximum of 3 doses in 15 minutes    CAD (coronary artery disease)       prochlorperazine 10 MG tablet    COMPAZINE    30 tablet    Take 0.5 tablets (5 mg) by mouth every 6 hours as needed (Nausea/Vomiting)    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

## 2018-02-15 ENCOUNTER — INFUSION THERAPY VISIT (OUTPATIENT)
Dept: INFUSION THERAPY | Facility: CLINIC | Age: 78
End: 2018-02-15
Attending: INTERNAL MEDICINE
Payer: MEDICARE

## 2018-02-15 VITALS — SYSTOLIC BLOOD PRESSURE: 118 MMHG | HEART RATE: 71 BPM | TEMPERATURE: 97.7 F | DIASTOLIC BLOOD PRESSURE: 64 MMHG

## 2018-02-15 DIAGNOSIS — C34.92 SMALL CELL CARCINOMA OF LEFT LUNG (H): Primary | ICD-10-CM

## 2018-02-15 DIAGNOSIS — Z29.9 NEED FOR PROPHYLACTIC MEASURE: ICD-10-CM

## 2018-02-15 PROCEDURE — 96377 APPLICATON ON-BODY INJECTOR: CPT | Mod: XU

## 2018-02-15 PROCEDURE — 96413 CHEMO IV INFUSION 1 HR: CPT

## 2018-02-15 PROCEDURE — 25000125 ZZHC RX 250: Performed by: INTERNAL MEDICINE

## 2018-02-15 PROCEDURE — 96375 TX/PRO/DX INJ NEW DRUG ADDON: CPT

## 2018-02-15 PROCEDURE — 25000128 H RX IP 250 OP 636: Performed by: INTERNAL MEDICINE

## 2018-02-15 RX ORDER — HEPARIN SODIUM (PORCINE) LOCK FLUSH IV SOLN 100 UNIT/ML 100 UNIT/ML
5 SOLUTION INTRAVENOUS
Status: DISCONTINUED | OUTPATIENT
Start: 2018-02-15 | End: 2018-02-15 | Stop reason: HOSPADM

## 2018-02-15 RX ADMIN — SODIUM CHLORIDE 250 ML: 9 INJECTION, SOLUTION INTRAVENOUS at 11:52

## 2018-02-15 RX ADMIN — PEGFILGRASTIM 6 MG: KIT SUBCUTANEOUS at 13:52

## 2018-02-15 RX ADMIN — FAMOTIDINE 20 MG: 10 INJECTION INTRAVENOUS at 11:58

## 2018-02-15 RX ADMIN — ETOPOSIDE 150 MG: 20 INJECTION, SOLUTION, CONCENTRATE INTRAVENOUS at 12:21

## 2018-02-15 RX ADMIN — DEXAMETHASONE SODIUM PHOSPHATE 12 MG: 10 INJECTION, SOLUTION INTRAMUSCULAR; INTRAVENOUS at 11:58

## 2018-02-15 RX ADMIN — SODIUM CHLORIDE, PRESERVATIVE FREE 5 ML: 5 INJECTION INTRAVENOUS at 13:34

## 2018-02-15 NOTE — PROGRESS NOTES
Infusion Nursing Note:  Ines Pickard presents today for C1D3 Etoposide, OnPro Neulasta.    Patient seen by provider today: No   present during visit today: Not Applicable.    Note: N/A.    Intravenous Access:  Implanted Port which was accessed on 2/12/18. Needle will be removed prior to d/c today.     Treatment Conditions:  Not Applicable.      Post Infusion Assessment:  Patient tolerated infusion without incident.  Blood return noted pre and post infusion.  Site patent and intact, free from redness, edema or discomfort.  No evidence of extravasations.  Access discontinued per protocol.  On pro Neulasta injector applied to left upper arm.  Pt given instructions and verbal teaching about the on body injector. Questions answered and pt verbalizes understanding of teaching.      Discharge Plan:   Patient discharged in stable condition accompanied by: self.  Departure Mode: Ambulatory.    Emmy Murphy RN

## 2018-02-15 NOTE — MR AVS SNAPSHOT
After Visit Summary   2/15/2018    Ines Pickard    MRN: [id number]           Patient Information     Date Of Birth          1940        Visit Information        Provider Department      2/15/2018 11:30 AM ROOM 10 [facility]        Today's Diagnoses     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)    -  1    Need for prophylactic measure           Follow-ups after your visit        Your next 10 appointments already scheduled     Mar 05, 2018  9:00 AM CST   Level 4 with ROOM 6 [facility] ([facility])    [facility]  [address]  [facility] [address]   [phone]            Mar 05, 2018  9:45 AM CST   Return Visit with Tracy Miner MD   [facility] ([facility])    [facility]  [address]  [facility] [address]   [phone]            Mar 06, 2018  1:00 PM CST   Level 2 with ROOM 8 [facility] ([facility])    [facility]  [address]  [facility] [address]   [phone]            Mar 07, 2018  1:00 PM CST   Level 2 with ROOM 5 [facility] ([facility])    [facility]  [address]  [facility] [address]   [phone]              Who to contact     If you have questions or need follow up information about today's clinic visit or your schedule please contact [facility] directly at [phone].  Normal or non-critical lab and imaging results will be communicated to you by MyChart, letter or phone within 4 business days after the clinic has received the results. If you do not hear from us within 7 days, please contact the clinic through Vasthart or phone. If you have a critical or abnormal lab result, we will notify you by phone as soon as possible.  Submit refill requests through Mark media or call your pharmacy and they will  "forward the refill request to us. Please allow 3 business days for your refill to be completed.          Additional Information About Your Visit        PersonalingharVerient Information     TBi Connect lets you send messages to your doctor, view your test results, renew your prescriptions, schedule appointments and more. To sign up, go to [email or web address] Connect . Click on \"Log in\" on the left side of the screen, which will take you to the Welcome page. Then click on \"Sign up Now\" on the right side of the page.     You will be asked to enter the access code listed below, as well as some personal information. Please follow the directions to create your username and password.     Your access code is: [email or web address]  Expires: 2018  1:30 PM     Your access code will  in 90 days. If you need help or a new code, please call your [place] clinic or [phone].        Care EveryWhere ID     This is your Care EveryWhere ID. This could be used by other organizations to access your [place] medical records  [id number]        Your Vitals Were     Pulse Temperature                71 97.7  F (36.5  C) (Oral)           Blood Pressure from Last 3 Encounters:   02/15/18 118/64   18 127/63   18 135/60    Weight from Last 3 Encounters:   18 80.1 kg (176 lb 9.6 oz)   18 80.3 kg (177 lb)   18 80.3 kg (177 lb)              Today, you had the following     No orders found for display       Primary Care Provider Office Phone # Fax #    R Emaneul Perez [phone] [address] [facility] [address]        Equal Access to Services     [facility] Center[facility]EL : Hadii doroteo Carmona, wamarkoda luryan, qaybta kaalmahoda smith. So [facility] [phone].    ATENCIÓN: Si habla español, tiene a nguyễn disposición servicios gratuitos de asistencia lingüística. Llame al [phone].    We comply with applicable federal civil rights laws and [place] laws. We do " not discriminate on the basis of race, color, national origin, age, disability, sex, sexual orientation, or gender identity.            Thank you!     Thank you for choosing [facility]  for your care. Our goal is always to provide you with excellent care. Hearing back from our patients is one way we can continue to improve our services. Please take a few minutes to complete the written survey that you may receive in the mail after your visit with us. Thank you!             Your Updated Medication List - Protect others around you: Learn how to safely use, store and throw away your medicines at www.disposemymeds.org.          This list is accurate as of 2/15/18  3:19 PM.  Always use your most recent med list.                   Brand Name Dispense Instructions for use Diagnosis    albuterol 108 (90 BASE) MCG/ACT Inhaler    PROAIR HFA/PROVENTIL HFA/VENTOLIN HFA    1 Inhaler    Inhale 2 puffs into the lungs 4 times daily    Acute bronchitis with symptoms > 10 days       aspirin 81 MG EC tablet     90 tablet    Take 1 tablet (81 mg) by mouth daily    CAD (coronary artery disease)       atorvastatin 40 MG tablet    LIPITOR    90 tablet    Take 1 tablet (40 mg) by mouth daily    Coronary artery disease involving native coronary artery of native heart without angina pectoris       cyanocolbalamin 100 MCG tablet    vitamin  B-12     Take 50 mcg by mouth as needed        HYDROcodone-acetaminophen 5-325 MG per tablet    NORCO    20 tablet    Take 1-2 tablets by mouth every 4 hours as needed for moderate to severe pain    Post-op pain       lidocaine-prilocaine cream    EMLA    30 g    Apply topically as needed for moderate pain    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)       lisinopril 5 MG tablet    PRINIVIL/ZESTRIL    90 tablet    Take 1 tablet (5 mg) by mouth daily    Essential hypertension with goal blood pressure less than 140/90       LORazepam 0.5 MG tablet    ATIVAN    30 tablet    Take 1 tablet (0.5 mg) by  mouth 2 times daily as needed for anxiety    Anxiety       metoprolol succinate 50 MG 24 hr tablet    TOPROL-XL    90 tablet    Take 1 tablet (50 mg) by mouth daily    Coronary artery disease involving native coronary artery of native heart without angina pectoris       MULTIVITAMIN ADULT Tabs      Take by mouth daily        nitroGLYcerin 0.4 MG sublingual tablet    NITROSTAT    25 tablet    Place 1 tablet (0.4 mg) under the tongue every 5 minutes as needed for chest pain Maximum of 3 doses in 15 minutes    CAD (coronary artery disease)       prochlorperazine 10 MG tablet    COMPAZINE    30 tablet    Take 0.5 tablets (5 mg) by mouth every 6 hours as needed (Nausea/Vomiting)    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

## 2018-02-16 ENCOUNTER — TELEPHONE (OUTPATIENT)
Dept: ONCOLOGY | Facility: CLINIC | Age: 78
End: 2018-02-16

## 2018-02-16 NOTE — TELEPHONE ENCOUNTER
"Status Check:    Pt is a 77 year old female, recently in 2/13 for Carbo/Etoposide.  Call placed for status check.    Primary care provider is: MICHAEL Perez    Diagnosis: lung cancer     Oncology provider is:  Dr. Miner    How are you doing/feeling?: \"normal actually\". Pt report she has not had any issues with nausea, bowel or bladder or appetite. She has been eating and drinking normally.   Any further issues?: none  Next Follow up/Recommendations: Gave pt the after hours number for over the weekend in case anything happens or she has any questions.     Patient instructed to call with any questions or concerns.  Patient states understanding and is in agreement with this plan.    Approximately 6 minutes spent on telephone with patient reviewing assessment and symptom(s) and providing symptom management.    Bella Arvizu, RN, BSN, OCN  Oncology   [facility]  [phone]      "

## 2018-03-04 NOTE — PROGRESS NOTES
"ONCOLOGY FOLLOW UP VISIT       CHIEF COMPLAINT/REASON FOR VISIT:  Left adrenal mass, FNA 01/30/2018 consistent with sclc     HISTORY OF PRESENT ILLNESS:  A 77-year-old female presented with 5 months duration of hoarseness.  She was evaluated by ENT.  Direct laryngoscopy revealed left vocal cord paralysis.    CT chest with contrast 01/10/2018 identified a 2.3 cm left upper lobe nodule extending to the hilum, suspicious for malignancy, left hilar mediastinal adenopathy with mass-like soft tissue thickening extending along the inferior aspect of the aortic arch likely involving the recurrent laryngeal nerve in this location, left adrenal nodule 1.5 cm undetermined, compression on L1 vertebral body.    EUS 01/30/2017 FNA was done on 2 hypoechoic left adrenal mass-   Consistent with metastatic small cell carcinoma      PET confirmed the primary TEX lesion with adrenal mets. She is dx with extensive stage SCLC.     She made informed decision to proceed with palliative chemo with carbo/-16 2/2018.       PAST MEDICAL HISTORY:  CAD, stent around 2014, reflux, hypertension, cataracts, hyperlipidemia.      MEDICATIONS:  Reviewed in Epic system.      SOCIAL HISTORY:  She lives in her own house.  She has 3 boys.  She is here with her neighbor.  She quit smoking years back.  Denies alcohol abuse.      FAMILY HISTORY:  Positive for mom who had Hodgkin lymphoma.  Brother had unknown type of cancer.      REVIEW OF SYSTEMS:   The patient feels she has \"stuff around her throat,\" more discomfort sensation with hoarseness in the morning.    ? BARNES, she is not sure.   Reports no appetite. She is using insure.         PHYSICAL EXAMINATION:   VITAL SIGNS:  Blood pressure 135/59, pulse 90, temperature 98  F (36.7  C), temperature source Tympanic, resp. rate 20, height 1.588 m (5' 2.5\"), weight 74.4 kg (164 lb), SpO2 94 %, not currently breastfeeding.    GENERAL APPEARANCE:  Elderly lady, looks like her stated age, not in acute distress. "   HEENT: The patient is normocephalic, atraumatic. Pupils are equally reactive to light.  Sclerae are anicteric.  Moist oral mucosa.  Erythematous posterior pharynx.   NECK:  Supple.  No jugular venous distention.  Thyroid is not palpable.   LYMPH NODES:  Superficial lymphadenopathy is not appreciable in the bilateral cervical, supraclavicular, axillary or inguinal areas.   CARDIOVASCULAR:  S1, S2 regular with no murmurs or gallops.  No carotid or abdominal bruits.   PULMONARY:  Lungs are clear to auscultation and percussion bilaterally.  There is no wheezing or rhonchi.   GASTROINTESTINAL:  Abdomen is soft, nontender.  No hepatosplenomegaly.  No signs of ascites.  No mass appreciable.   MUSCULOSKELETAL/EXTREMITIES:  No edema.  No cyanotic changes.  No signs of joint deformity.  No lymphedema.  No spinal or paraspinal tenderness.  No CVA tenderness.   NEUROLOGIC:  Cranial nerves II-XII are grossly intact.  Sensation intact.  Muscle strength and muscle tone symmetrical, 5/5 throughout.   SKIN:  No petechiae.  No rash.  No signs of cellulitis.      LABORATORY DATA REVIEWED:   C2D1 wbc 16, hb 1, 1, MCV 74 from 71, PL is nl, CMP is fine.     CEA baseline at 6.6 in 2/2017    From 01/30/2018 FNA on left adrenal mass biopsy -- Consistent with metastatic small cell carcinoma      CURRENT IMAGE DATA REVIEWED:   PET 2/2018  1. Hypermetabolic left upper lobe nodule is consistent with malignancy, most likely bronchogenic carcinoma.  2. Hypermetabolic left hilar and mediastinal adenopathy is consistent  with metastatic disease.  3. Hypermetabolic left adrenal nodule is suspicious for metastatic disease.  4. There is a new 3.4 cm high-density structure abutting the left adrenal gland, suspicious for interval development of adrenal hemorrhage.    CT chest 01/2018 -  identified a 2.3 cm left upper lobe nodule extending to the hilum, suspicious for malignancy, left hilar mediastinal adenopathy with mass-like soft tissue thickening  extending along the inferior aspect of the aortic arch likely involving the recurrent laryngeal nerve in this location, left adrenal nodule 1.5 cm undetermined, compression on L1 vertebral body.       OLD DATA REVIEW IN SUMMARY:    Chest x-ray 11/2017, no abnormalities identified.    In 2014, CBC indicating white count 17, hemoglobin 11 and platelets normal.  Differential indicating neutrophilia when she had a heart attack.        ASSESSMENT AND PLAN:    1.  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     We discussed the incurable stage she is in. Tx is for palliation not for cure.   Recommend her to consider palliative chemo with carbo AUC5 d1, -16 80 mg/m2 D1-3 q 3 wks with neulasta support.     She made informed decision to proceed in 2/2018.     She is elderly, has high complication rate and needs to be monitored closely.     Plan restaging post C3.    2. Mucositis - advice salt and soda oral rinse.     3. Microcytic anemia - add on anemia studies to her labs.     4. Sob seems only with exertion. She feels the inhaler helps. Advice hse to resume it.

## 2018-03-05 ENCOUNTER — TELEPHONE (OUTPATIENT)
Dept: PSYCHOLOGY | Facility: CLINIC | Age: 78
End: 2018-03-05

## 2018-03-05 ENCOUNTER — HOSPITAL ENCOUNTER (OUTPATIENT)
Facility: CLINIC | Age: 78
Discharge: HOME | End: 2018-03-05
Attending: INTERNAL MEDICINE | Admitting: INTERNAL MEDICINE
Payer: MEDICARE

## 2018-03-05 ENCOUNTER — INFUSION THERAPY VISIT (OUTPATIENT)
Dept: INFUSION THERAPY | Facility: CLINIC | Age: 78
End: 2018-03-05
Attending: INTERNAL MEDICINE
Payer: MEDICARE

## 2018-03-05 ENCOUNTER — INFUSION THERAPY VISIT (OUTPATIENT)
Dept: SPIRITUAL SERVICES | Facility: CLINIC | Age: 78
End: 2018-03-05

## 2018-03-05 ENCOUNTER — ONCOLOGY VISIT (OUTPATIENT)
Dept: ONCOLOGY | Facility: CLINIC | Age: 78
End: 2018-03-05
Attending: INTERNAL MEDICINE
Payer: MEDICARE

## 2018-03-05 VITALS
RESPIRATION RATE: 20 BRPM | BODY MASS INDEX: 29.06 KG/M2 | OXYGEN SATURATION: 94 % | DIASTOLIC BLOOD PRESSURE: 59 MMHG | TEMPERATURE: 98 F | WEIGHT: 164 LBS | HEART RATE: 90 BPM | SYSTOLIC BLOOD PRESSURE: 135 MMHG | HEIGHT: 63 IN

## 2018-03-05 DIAGNOSIS — Z29.9 NEED FOR PROPHYLACTIC MEASURE: Primary | ICD-10-CM

## 2018-03-05 DIAGNOSIS — R06.02 SOB (SHORTNESS OF BREATH): ICD-10-CM

## 2018-03-05 DIAGNOSIS — D64.9 ANEMIA, UNSPECIFIED TYPE: Primary | ICD-10-CM

## 2018-03-05 DIAGNOSIS — C34.92 SMALL CELL CARCINOMA OF LEFT LUNG (H): ICD-10-CM

## 2018-03-05 DIAGNOSIS — Z29.9 NEED FOR PROPHYLACTIC MEASURE: ICD-10-CM

## 2018-03-05 DIAGNOSIS — C34.12 MALIGNANT NEOPLASM OF UPPER LOBE OF LEFT LUNG (H): ICD-10-CM

## 2018-03-05 DIAGNOSIS — K12.30 MUCOSITIS: ICD-10-CM

## 2018-03-05 LAB
ALBUMIN SERPL-MCNC: 3.4 G/DL (ref 3.4–5)
ALP SERPL-CCNC: 77 U/L (ref 40–150)
ALT SERPL W P-5'-P-CCNC: 21 U/L (ref 0–50)
ANION GAP SERPL CALCULATED.3IONS-SCNC: 9 MMOL/L (ref 3–14)
AST SERPL W P-5'-P-CCNC: 15 U/L (ref 0–45)
BASOPHILS # BLD AUTO: 0.1 10E9/L (ref 0–0.2)
BASOPHILS NFR BLD AUTO: 0.8 %
BILIRUB SERPL-MCNC: 0.7 MG/DL (ref 0.2–1.3)
BUN SERPL-MCNC: 13 MG/DL (ref 7–30)
CALCIUM SERPL-MCNC: 8.8 MG/DL (ref 8.5–10.1)
CHLORIDE SERPL-SCNC: 102 MMOL/L (ref 94–109)
CO2 SERPL-SCNC: 26 MMOL/L (ref 20–32)
CREAT SERPL-MCNC: 0.45 MG/DL (ref 0.52–1.04)
DIFFERENTIAL METHOD BLD: ABNORMAL
EOSINOPHIL # BLD AUTO: 0 10E9/L (ref 0–0.7)
EOSINOPHIL NFR BLD AUTO: 0.1 %
ERYTHROCYTE [DISTWIDTH] IN BLOOD BY AUTOMATED COUNT: 16.5 % (ref 10–15)
FERRITIN SERPL-MCNC: 1039 NG/ML (ref 8–252)
FOLATE SERPL-MCNC: 20.3 NG/ML
GFR SERPL CREATININE-BSD FRML MDRD: >90 ML/MIN/1.7M2
GLUCOSE SERPL-MCNC: 112 MG/DL (ref 70–99)
HCT VFR BLD AUTO: 33.2 % (ref 35–47)
HGB BLD-MCNC: 11.1 G/DL (ref 11.7–15.7)
IMM GRANULOCYTES # BLD: 0.6 10E9/L (ref 0–0.4)
IMM GRANULOCYTES NFR BLD: 3.6 %
LYMPHOCYTES # BLD AUTO: 3.1 10E9/L (ref 0.8–5.3)
LYMPHOCYTES NFR BLD AUTO: 18.7 %
MCH RBC QN AUTO: 24.9 PG (ref 26.5–33)
MCHC RBC AUTO-ENTMCNC: 33.4 G/DL (ref 31.5–36.5)
MCV RBC AUTO: 74 FL (ref 78–100)
MONOCYTES # BLD AUTO: 1.5 10E9/L (ref 0–1.3)
MONOCYTES NFR BLD AUTO: 9.3 %
NEUTROPHILS # BLD AUTO: 11.2 10E9/L (ref 1.6–8.3)
NEUTROPHILS NFR BLD AUTO: 67.5 %
PLATELET # BLD AUTO: 666 10E9/L (ref 150–450)
POTASSIUM SERPL-SCNC: 3.7 MMOL/L (ref 3.4–5.3)
PROT SERPL-MCNC: 7.2 G/DL (ref 6.8–8.8)
RBC # BLD AUTO: 4.46 10E12/L (ref 3.8–5.2)
SODIUM SERPL-SCNC: 137 MMOL/L (ref 133–144)
VIT B12 SERPL-MCNC: 4838 PG/ML (ref 193–986)
WBC # BLD AUTO: 16.6 10E9/L (ref 4–11)

## 2018-03-05 PROCEDURE — 96375 TX/PRO/DX INJ NEW DRUG ADDON: CPT

## 2018-03-05 PROCEDURE — 82607 VITAMIN B-12: CPT | Performed by: INTERNAL MEDICINE

## 2018-03-05 PROCEDURE — 82728 ASSAY OF FERRITIN: CPT | Performed by: INTERNAL MEDICINE

## 2018-03-05 PROCEDURE — 85025 COMPLETE CBC W/AUTO DIFF WBC: CPT | Performed by: INTERNAL MEDICINE

## 2018-03-05 PROCEDURE — 96415 CHEMO IV INFUSION ADDL HR: CPT

## 2018-03-05 PROCEDURE — 25000128 H RX IP 250 OP 636: Performed by: INTERNAL MEDICINE

## 2018-03-05 PROCEDURE — 99215 OFFICE O/P EST HI 40 MIN: CPT | Performed by: INTERNAL MEDICINE

## 2018-03-05 PROCEDURE — 80053 COMPREHEN METABOLIC PANEL: CPT | Performed by: INTERNAL MEDICINE

## 2018-03-05 PROCEDURE — 96417 CHEMO IV INFUS EACH ADDL SEQ: CPT

## 2018-03-05 PROCEDURE — 82746 ASSAY OF FOLIC ACID SERUM: CPT | Performed by: INTERNAL MEDICINE

## 2018-03-05 PROCEDURE — G0463 HOSPITAL OUTPT CLINIC VISIT: HCPCS | Mod: 25

## 2018-03-05 PROCEDURE — 96413 CHEMO IV INFUSION 1 HR: CPT

## 2018-03-05 PROCEDURE — 25000125 ZZHC RX 250: Performed by: INTERNAL MEDICINE

## 2018-03-05 RX ORDER — ALBUTEROL SULFATE 90 UG/1
1-2 AEROSOL, METERED RESPIRATORY (INHALATION)
Status: CANCELLED
Start: 2018-03-08

## 2018-03-05 RX ORDER — DIPHENHYDRAMINE HYDROCHLORIDE 50 MG/ML
50 INJECTION INTRAMUSCULAR; INTRAVENOUS
Status: CANCELLED
Start: 2018-03-05

## 2018-03-05 RX ORDER — ALBUTEROL SULFATE 0.83 MG/ML
2.5 SOLUTION RESPIRATORY (INHALATION)
Status: CANCELLED | OUTPATIENT
Start: 2018-03-08

## 2018-03-05 RX ORDER — DIPHENHYDRAMINE HYDROCHLORIDE 50 MG/ML
50 INJECTION INTRAMUSCULAR; INTRAVENOUS
Status: CANCELLED
Start: 2018-03-07

## 2018-03-05 RX ORDER — ALBUTEROL SULFATE 0.83 MG/ML
2.5 SOLUTION RESPIRATORY (INHALATION)
Status: CANCELLED | OUTPATIENT
Start: 2018-03-07

## 2018-03-05 RX ORDER — ALBUTEROL SULFATE 0.83 MG/ML
2.5 SOLUTION RESPIRATORY (INHALATION)
Status: CANCELLED | OUTPATIENT
Start: 2018-03-05

## 2018-03-05 RX ORDER — EPINEPHRINE 0.3 MG/.3ML
0.3 INJECTION SUBCUTANEOUS EVERY 5 MIN PRN
Status: CANCELLED | OUTPATIENT
Start: 2018-03-07

## 2018-03-05 RX ORDER — MEPERIDINE HYDROCHLORIDE 25 MG/ML
25 INJECTION INTRAMUSCULAR; INTRAVENOUS; SUBCUTANEOUS EVERY 30 MIN PRN
Status: CANCELLED | OUTPATIENT
Start: 2018-03-07

## 2018-03-05 RX ORDER — EPINEPHRINE 1 MG/ML
0.3 INJECTION, SOLUTION, CONCENTRATE INTRAVENOUS EVERY 5 MIN PRN
Status: CANCELLED | OUTPATIENT
Start: 2018-03-08

## 2018-03-05 RX ORDER — LORAZEPAM 2 MG/ML
0.5 INJECTION INTRAMUSCULAR EVERY 4 HOURS PRN
Status: CANCELLED
Start: 2018-03-05

## 2018-03-05 RX ORDER — LORAZEPAM 2 MG/ML
0.5 INJECTION INTRAMUSCULAR EVERY 4 HOURS PRN
Status: CANCELLED
Start: 2018-03-08

## 2018-03-05 RX ORDER — SODIUM CHLORIDE 9 MG/ML
1000 INJECTION, SOLUTION INTRAVENOUS CONTINUOUS PRN
Status: CANCELLED
Start: 2018-03-07

## 2018-03-05 RX ORDER — METHYLPREDNISOLONE SODIUM SUCCINATE 125 MG/2ML
125 INJECTION, POWDER, LYOPHILIZED, FOR SOLUTION INTRAMUSCULAR; INTRAVENOUS
Status: CANCELLED
Start: 2018-03-07

## 2018-03-05 RX ORDER — HEPARIN SODIUM (PORCINE) LOCK FLUSH IV SOLN 100 UNIT/ML 100 UNIT/ML
5 SOLUTION INTRAVENOUS
Status: DISCONTINUED | OUTPATIENT
Start: 2018-03-05 | End: 2018-03-05 | Stop reason: HOSPADM

## 2018-03-05 RX ORDER — EPINEPHRINE 1 MG/ML
0.3 INJECTION, SOLUTION, CONCENTRATE INTRAVENOUS EVERY 5 MIN PRN
Status: CANCELLED | OUTPATIENT
Start: 2018-03-05

## 2018-03-05 RX ORDER — HEPARIN SODIUM (PORCINE) LOCK FLUSH IV SOLN 100 UNIT/ML 100 UNIT/ML
5 SOLUTION INTRAVENOUS
Status: CANCELLED | OUTPATIENT
Start: 2018-03-08

## 2018-03-05 RX ORDER — METHYLPREDNISOLONE SODIUM SUCCINATE 125 MG/2ML
125 INJECTION, POWDER, LYOPHILIZED, FOR SOLUTION INTRAMUSCULAR; INTRAVENOUS
Status: CANCELLED
Start: 2018-03-05

## 2018-03-05 RX ORDER — LORAZEPAM 2 MG/ML
0.5 INJECTION INTRAMUSCULAR EVERY 4 HOURS PRN
Status: CANCELLED
Start: 2018-03-07

## 2018-03-05 RX ORDER — MEPERIDINE HYDROCHLORIDE 25 MG/ML
25 INJECTION INTRAMUSCULAR; INTRAVENOUS; SUBCUTANEOUS EVERY 30 MIN PRN
Status: CANCELLED | OUTPATIENT
Start: 2018-03-08

## 2018-03-05 RX ORDER — METHYLPREDNISOLONE SODIUM SUCCINATE 125 MG/2ML
125 INJECTION, POWDER, LYOPHILIZED, FOR SOLUTION INTRAMUSCULAR; INTRAVENOUS
Status: CANCELLED
Start: 2018-03-08

## 2018-03-05 RX ORDER — MEPERIDINE HYDROCHLORIDE 25 MG/ML
25 INJECTION INTRAMUSCULAR; INTRAVENOUS; SUBCUTANEOUS EVERY 30 MIN PRN
Status: CANCELLED | OUTPATIENT
Start: 2018-03-05

## 2018-03-05 RX ORDER — EPINEPHRINE 0.3 MG/.3ML
0.3 INJECTION SUBCUTANEOUS EVERY 5 MIN PRN
Status: CANCELLED | OUTPATIENT
Start: 2018-03-05

## 2018-03-05 RX ORDER — EPINEPHRINE 0.3 MG/.3ML
0.3 INJECTION SUBCUTANEOUS EVERY 5 MIN PRN
Status: CANCELLED | OUTPATIENT
Start: 2018-03-08

## 2018-03-05 RX ORDER — ALBUTEROL SULFATE 90 UG/1
1-2 AEROSOL, METERED RESPIRATORY (INHALATION)
Status: CANCELLED
Start: 2018-03-05

## 2018-03-05 RX ORDER — ALBUTEROL SULFATE 90 UG/1
1-2 AEROSOL, METERED RESPIRATORY (INHALATION)
Status: CANCELLED
Start: 2018-03-07

## 2018-03-05 RX ORDER — EPINEPHRINE 1 MG/ML
0.3 INJECTION, SOLUTION, CONCENTRATE INTRAVENOUS EVERY 5 MIN PRN
Status: CANCELLED | OUTPATIENT
Start: 2018-03-07

## 2018-03-05 RX ORDER — SODIUM CHLORIDE 9 MG/ML
1000 INJECTION, SOLUTION INTRAVENOUS CONTINUOUS PRN
Status: CANCELLED
Start: 2018-03-08

## 2018-03-05 RX ORDER — DIPHENHYDRAMINE HYDROCHLORIDE 50 MG/ML
50 INJECTION INTRAMUSCULAR; INTRAVENOUS
Status: CANCELLED
Start: 2018-03-08

## 2018-03-05 RX ORDER — SODIUM CHLORIDE 9 MG/ML
1000 INJECTION, SOLUTION INTRAVENOUS CONTINUOUS PRN
Status: CANCELLED
Start: 2018-03-05

## 2018-03-05 RX ORDER — HEPARIN SODIUM (PORCINE) LOCK FLUSH IV SOLN 100 UNIT/ML 100 UNIT/ML
5 SOLUTION INTRAVENOUS
Status: CANCELLED | OUTPATIENT
Start: 2018-03-07

## 2018-03-05 RX ADMIN — CARBOPLATIN 615 MG: 10 INJECTION, SOLUTION INTRAVENOUS at 11:33

## 2018-03-05 RX ADMIN — SODIUM CHLORIDE 250 ML: 9 INJECTION, SOLUTION INTRAVENOUS at 10:54

## 2018-03-05 RX ADMIN — FAMOTIDINE 20 MG: 20 INJECTION, SOLUTION INTRAVENOUS at 10:56

## 2018-03-05 RX ADMIN — ETOPOSIDE 150 MG: 20 INJECTION, SOLUTION INTRAVENOUS at 12:09

## 2018-03-05 RX ADMIN — DEXAMETHASONE SODIUM PHOSPHATE: 10 INJECTION, SOLUTION INTRAMUSCULAR; INTRAVENOUS at 11:09

## 2018-03-05 RX ADMIN — SODIUM CHLORIDE, PRESERVATIVE FREE 5 ML: 5 INJECTION INTRAVENOUS at 13:57

## 2018-03-05 ASSESSMENT — PAIN SCALES - GENERAL: PAINLEVEL: NO PAIN (0)

## 2018-03-05 NOTE — LETTER
"    3/5/2018         RE: Ines Pickard  [address] [address] [facility] [place] [address]        Dear Colleague,    Thank you for referring your patient, Ines Pickard, to the [facility]. Please see a copy of my visit note below.    ONCOLOGY FOLLOW UP VISIT       CHIEF COMPLAINT/REASON FOR VISIT:  Left adrenal mass, FNA 01/30/2018 consistent with sclc     HISTORY OF PRESENT ILLNESS:  A 77-year-old female presented with 5 months duration of hoarseness.  She was evaluated by ENT.  Direct laryngoscopy revealed left vocal cord paralysis.    CT chest with contrast 01/10/2018 identified a 2.3 cm left upper lobe nodule extending to the hilum, suspicious for malignancy, left hilar mediastinal adenopathy with mass-like soft tissue thickening extending along the inferior aspect of the aortic arch likely involving the recurrent laryngeal nerve in this location, left adrenal nodule 1.5 cm undetermined, compression on L1 vertebral body.    EUS 01/30/2017 FNA was done on 2 hypoechoic left adrenal mass-   Consistent with metastatic small cell carcinoma      PET confirmed the primary TEX lesion with adrenal mets. She is dx with extensive stage SCLC.     She made informed decision to proceed with palliative chemo with carbo/-16 2/2018.       PAST MEDICAL HISTORY:  CAD, stent around 2014, reflux, hypertension, cataracts, hyperlipidemia.      MEDICATIONS:  Reviewed in Epic system.      SOCIAL HISTORY:  She lives in her own house.  She has 3 boys.  She is here with her neighbor.  She quit smoking years back.  Denies alcohol abuse.      FAMILY HISTORY:  Positive for mom who had Hodgkin lymphoma.  Brother had unknown type of cancer.      REVIEW OF SYSTEMS:   The patient feels she has \"stuff around her throat,\" more discomfort sensation with hoarseness in the morning.    ? BARNES, she is not sure.   Reports no appetite. She is using insure.         PHYSICAL EXAMINATION:   VITAL SIGNS:  Blood pressure 135/59, pulse 90, temperature 98 " " F (36.7  C), temperature source Tympanic, resp. rate 20, height 1.588 m (5' 2.5\"), weight 74.4 kg (164 lb), SpO2 94 %, not currently breastfeeding.    GENERAL APPEARANCE:  Elderly lady, looks like her stated age, not in acute distress.   HEENT: The patient is normocephalic, atraumatic. Pupils are equally reactive to light.  Sclerae are anicteric.  Moist oral mucosa.  Erythematous posterior pharynx.   NECK:  Supple.  No jugular venous distention.  Thyroid is not palpable.   LYMPH NODES:  Superficial lymphadenopathy is not appreciable in the bilateral cervical, supraclavicular, axillary or inguinal areas.   CARDIOVASCULAR:  S1, S2 regular with no murmurs or gallops.  No carotid or abdominal bruits.   PULMONARY:  Lungs are clear to auscultation and percussion bilaterally.  There is no wheezing or rhonchi.   GASTROINTESTINAL:  Abdomen is soft, nontender.  No hepatosplenomegaly.  No signs of ascites.  No mass appreciable.   MUSCULOSKELETAL/EXTREMITIES:  No edema.  No cyanotic changes.  No signs of joint deformity.  No lymphedema.  No spinal or paraspinal tenderness.  No CVA tenderness.   NEUROLOGIC:  Cranial nerves II-XII are grossly intact.  Sensation intact.  Muscle strength and muscle tone symmetrical, 5/5 throughout.   SKIN:  No petechiae.  No rash.  No signs of cellulitis.      LABORATORY DATA REVIEWED:   C2D1 wbc 16, hb 1, 1, MCV 74 from 71, PL is nl, CMP is fine.     CEA baseline at 6.6 in 2/2017    From 01/30/2018 FNA on left adrenal mass biopsy -- Consistent with metastatic small cell carcinoma      CURRENT IMAGE DATA REVIEWED:   PET 2/2018  1. Hypermetabolic left upper lobe nodule is consistent with malignancy, most likely bronchogenic carcinoma.  2. Hypermetabolic left hilar and mediastinal adenopathy is consistent  with metastatic disease.  3. Hypermetabolic left adrenal nodule is suspicious for metastatic disease.  4. There is a new 3.4 cm high-density structure abutting the left adrenal gland, suspicious " for interval development of adrenal hemorrhage.    CT chest 01/2018 -  identified a 2.3 cm left upper lobe nodule extending to the hilum, suspicious for malignancy, left hilar mediastinal adenopathy with mass-like soft tissue thickening extending along the inferior aspect of the aortic arch likely involving the recurrent laryngeal nerve in this location, left adrenal nodule 1.5 cm undetermined, compression on L1 vertebral body.       OLD DATA REVIEW IN SUMMARY:    Chest x-ray 11/2017, no abnormalities identified.    In 2014, CBC indicating white count 17, hemoglobin 11 and platelets normal.  Differential indicating neutrophilia when she had a heart attack.        ASSESSMENT AND PLAN:    1.  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     We discussed the incurable stage she is in. Tx is for palliation not for cure.   Recommend her to consider palliative chemo with carbo AUC5 d1, -16 80 mg/m2 D1-3 q 3 wks with neulasta support.     She made informed decision to proceed in 2/2018.     She is elderly, has high complication rate and needs to be monitored closely.     Plan restaging post C3.    2. Mucositis - advice salt and soda oral rinse.     3. Microcytic anemia - add on anemia studies to her labs.     4. Sob seems only with exertion. She feels the inhaler helps. Advice hse to resume it.               Again, thank you for allowing me to participate in the care of your patient.        Sincerely,        Tracy Miner MD, MD

## 2018-03-05 NOTE — PATIENT INSTRUCTIONS
Dr. Miner would like you to continue with chemotherapy today as planned. We would like to see you back in with Cycle 3 for a follow up appointment. When you are in need of a refill, please call your pharmacy and they will send us a request.  Copy of appointments, and after visit summary (AVS) given to patient.  If you have any questions please call Bella Arvizu RN, BSN Oncology Hematology  [facility] [phone]. For questions after business hours, or on holidays/weekends, please call our after hours Nurse Triage line [phone]. Thank you.           C2d1 today, f/u with C3D1

## 2018-03-05 NOTE — PROGRESS NOTES
Infusion Nursing Note:  Ines Pickard presents today for Carboplatin, Etoposide.    Patient seen by provider today: Yes: Dr. Miner   present during visit today: Not Applicable.    Note: N/A.    Intravenous Access:  Implanted Port.    Treatment Conditions:  Results reviewed, labs MET treatment parameters, ok to proceed with treatment.      Post Infusion Assessment:  Patient tolerated infusion without incident.    Discharge Plan:   Patient discharged in stable condition accompanied by: friend. Returns tomorrow.    Rodolfo Galindo RN
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
## 2018-03-05 NOTE — MR AVS SNAPSHOT
After Visit Summary   3/5/2018    Ines Pickard    MRN: [id number]           Patient Information     Date Of Birth          1940        Visit Information        Provider Department      3/5/2018 9:45 AM Tracy Miner MD [facility]        Today's Diagnoses     Anemia, unspecified type    -  1    Malignant neoplasm of upper lobe of left lung (H)        Mucositis        SOB (shortness of breath)          Care Instructions    Dr. Miner would like you to continue with chemotherapy today as planned. We would like to see you back in with Cycle 3 for a follow up appointment. When you are in need of a refill, please call your pharmacy and they will send us a request.  Copy of appointments, and after visit summary (AVS) given to patient.  If you have any questions please call Bella Arvizu RN, BSN Oncology Hematology  [facility] [phone]. For questions after business hours, or on holidays/weekends, please call our after hours Nurse Triage line [phone]. Thank you.           C2d1 today, f/u with C3D1          Follow-ups after your visit        Your next 10 appointments already scheduled     Mar 06, 2018  1:00 PM CST   Level 2 with ROOM 8 [facility] ([facility])    [facility]  [address]  [facility] [address]   [phone]            Mar 07, 2018  1:00 PM CST   Level 2 with ROOM 5 [facility] ([facility])    [facility]  [address]  [facility] [address]   [phone]            Mar 26, 2018  8:00 AM CDT   Level 4 with ROOM 3 [facility] ([facility])    [facility]  [address]  [facility] [address]   [phone]            Mar 26, 2018  8:30 AM CDT   Return Visit with Tracy Miner MD   [facility] ([facility])    [facility]  "[facility]  [address]  [facility] [address]   [phone]            Mar 27, 2018  1:00 PM CDT   Level 2 with ROOM 7 [facility] ([facility])    [facility]  [address]  [facility] [address]   [phone]            Mar 28, 2018  1:00 PM CDT   Level 2 with ROOM 6 [facility] ([facility])    [facility]  [address]  [facility] [address]   [phone]              Who to contact     If you have questions or need follow up information about today's clinic visit or your schedule please contact [facility] directly at [phone].  Normal or non-critical lab and imaging results will be communicated to you by [employer], letter or phone within 4 business days after the clinic has received the results. If you do not hear from us within 7 days, please contact the clinic through [employer] or phone. If you have a critical or abnormal lab result, we will notify you by phone as soon as possible.  Submit refill requests through [employer] or call your pharmacy and they will forward the refill request to us. Please allow 3 business days for your refill to be completed.          Additional Information About Your Visit        [employer] Information     [employer] lets you send messages to your doctor, view your test results, renew your prescriptions, schedule appointments and more. To sign up, go to [email or web address] . Click on \"Log in\" on the left side of the screen, which will take you to the Welcome page. Then click on \"Sign up Now\" on the right side of the page.     You will be asked to enter the access code listed below, as well as some personal information. Please follow the directions to create your username and password.     Your access code is: [email or web address]  Expires: 2018  1:30 PM     Your access code will  in 90 days. If you need help or a new code, " "please call your [place] clinic or [phone].        Care EveryWhere ID     This is your Care EveryWhere ID. This could be used by other organizations to access your [place] medical records  [id number]        Your Vitals Were     Pulse Temperature Respirations Height Pulse Oximetry Breastfeeding?    90 98  F (36.7  C) (Tympanic) 20 1.588 m (5' 2.5\") 94% No    BMI (Body Mass Index)                   29.52 kg/m2            Blood Pressure from Last 3 Encounters:   03/05/18 135/59   02/15/18 118/64   02/14/18 127/63    Weight from Last 3 Encounters:   03/05/18 74.4 kg (164 lb)   02/13/18 80.1 kg (176 lb 9.6 oz)   02/12/18 80.3 kg (177 lb)              We Performed the Following     Ferritin     Folate     Vitamin B12        Primary Care Provider Office Phone # Fax #    R Emanuel Perez [phone] [address] [address]        Equal Access to Services     FEMI LOPEZ : Hadii doroteo ku hadasho Soomaali, waaxda luqadaha, qaybta kaalmada adeegyada, hoda maddox . So [facility] [phone].    ATENCIÓN: Si habla español, tiene a ngyuễn disposición servicios gratuitos de asistencia lingüística. Llame al [phone].    We comply with applicable federal civil rights laws and [place] laws. We do not discriminate on the basis of race, color, national origin, age, disability, sex, sexual orientation, or gender identity.            Thank you!     Thank you for choosing [facility]  for your care. Our goal is always to provide you with excellent care. Hearing back from our patients is one way we can continue to improve our services. Please take a few minutes to complete the written survey that you may receive in the mail after your visit with us. Thank you!             Your Updated Medication List - Protect others around you: Learn how to safely use, store and throw away your medicines at www.disposemymeds.org.          This list is accurate as of 3/5/18 " 11:53 AM.  Always use your most recent med list.                   Brand Name Dispense Instructions for use Diagnosis    albuterol 108 (90 BASE) MCG/ACT Inhaler    PROAIR HFA/PROVENTIL HFA/VENTOLIN HFA    1 Inhaler    Inhale 2 puffs into the lungs 4 times daily    Acute bronchitis with symptoms > 10 days       aspirin 81 MG EC tablet     90 tablet    Take 1 tablet (81 mg) by mouth daily    CAD (coronary artery disease)       atorvastatin 40 MG tablet    LIPITOR    90 tablet    Take 1 tablet (40 mg) by mouth daily    Coronary artery disease involving native coronary artery of native heart without angina pectoris       cyanocolbalamin 100 MCG tablet    vitamin  B-12     Take 50 mcg by mouth as needed        HYDROcodone-acetaminophen 5-325 MG per tablet    NORCO    20 tablet    Take 1-2 tablets by mouth every 4 hours as needed for moderate to severe pain    Post-op pain       lidocaine-prilocaine cream    EMLA    30 g    Apply topically as needed for moderate pain    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)       lisinopril 5 MG tablet    PRINIVIL/ZESTRIL    90 tablet    Take 1 tablet (5 mg) by mouth daily    Essential hypertension with goal blood pressure less than 140/90       LORazepam 0.5 MG tablet    ATIVAN    30 tablet    Take 1 tablet (0.5 mg) by mouth 2 times daily as needed for anxiety    Anxiety       metoprolol succinate 50 MG 24 hr tablet    TOPROL-XL    90 tablet    Take 1 tablet (50 mg) by mouth daily    Coronary artery disease involving native coronary artery of native heart without angina pectoris       MULTIVITAMIN ADULT Tabs      Take by mouth daily        nitroGLYcerin 0.4 MG sublingual tablet    NITROSTAT    25 tablet    Place 1 tablet (0.4 mg) under the tongue every 5 minutes as needed for chest pain Maximum of 3 doses in 15 minutes    CAD (coronary artery disease)       prochlorperazine 10 MG tablet    COMPAZINE    30 tablet    Take 0.5 tablets (5 mg) by mouth every 6 hours as needed  (Nausea/Vomiting)    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

## 2018-03-05 NOTE — MR AVS SNAPSHOT
After Visit Summary   3/5/2018    Ines Pickard    MRN: [id number]           Patient Information     Date Of Birth          1940        Visit Information        Provider Department      3/5/2018 9:00 AM ROOM 6 [facility]        Today's Diagnoses     Need for prophylactic measure    -  1    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)           Follow-ups after your visit        Your next 10 appointments already scheduled     Mar 06, 2018  1:00 PM CST   Level 2 with ROOM 8 [facility] ([facility])    [facility] [place]  [address]  [place] [address]   [phone]            Mar 07, 2018  1:00 PM CST   Level 2 with ROOM 5 [facility] ([facility])    [facility] [place]  [address]  [place] [address]   [phone]            Mar 26, 2018  8:00 AM CDT   Level 4 with ROOM 3 [facility] ([facility])    [facility] [place]  [address]  [facility] [address]   [phone]            Mar 26, 2018  8:30 AM CDT   Return Visit with Tracy Miner MD   [facility] ([facility])    [facility] [place]  [address]  [place] [address]   [phone]            Mar 27, 2018  1:00 PM CDT   Level 2 with ROOM 7 [facility] ([facility])    [facility] [place]  [address]  [facility] [address]   [phone]            Mar 28, 2018  1:00 PM CDT   Level 2 with ROOM 6 [facility] ([facility])    [facility] [place]  [address]  [place] [address]   [phone]              Who to contact     If you have questions or need follow up information about today's clinic visit or your schedule please contact [facility] [place] directly at [phone].  Normal or  "non-critical lab and imaging results will be communicated to you by MyChart, letter or phone within 4 business days after the clinic has received the results. If you do not hear from us within 7 days, please contact the clinic through [employer] or phone. If you have a critical or abnormal lab result, we will notify you by phone as soon as possible.  Submit refill requests through [employer] or call your pharmacy and they will forward the refill request to us. Please allow 3 business days for your refill to be completed.          Additional Information About Your Visit        emereharGotaCopy Information     [employer] lets you send messages to your doctor, view your test results, renew your prescriptions, schedule appointments and more. To sign up, go to [email or web address] . Click on \"Log in\" on the left side of the screen, which will take you to the Welcome page. Then click on \"Sign up Now\" on the right side of the page.     You will be asked to enter the access code listed below, as well as some personal information. Please follow the directions to create your username and password.     Your access code is: [email or web address]  Expires: 2018  1:30 PM     Your access code will  in 90 days. If you need help or a new code, please call your [place] clinic or [phone].        Care EveryWhere ID     This is your Care EveryWhere ID. This could be used by other organizations to access your [place] medical records  [id number]         Blood Pressure from Last 3 Encounters:   18 135/59   02/15/18 118/64   18 127/63    Weight from Last 3 Encounters:   18 74.4 kg (164 lb)   18 80.1 kg (176 lb 9.6 oz)   18 80.3 kg (177 lb)              We Performed the Following     CBC with platelets differential     Comprehensive metabolic panel        Primary Care Provider Office Phone # Fax #    MICHAEL Perez [phone] [address] [facility] [address]        Equal Access to " Services     [facility]: Hadii aad ku hadrivermarycarmen Carmona, wamarkoda luqadaha, qaybta kaalmalondon velazquez, hoda maddox . So [facility] [phone].    ATENCIÓN: Si samiala adriana, tiene a nguyễn disposición servicios gratuitos de asistencia lingüística. Llame al [phone].    We comply with applicable federal civil rights laws and [place] laws. We do not discriminate on the basis of race, color, national origin, age, disability, sex, sexual orientation, or gender identity.            Thank you!     Thank you for choosing [facility]  for your care. Our goal is always to provide you with excellent care. Hearing back from our patients is one way we can continue to improve our services. Please take a few minutes to complete the written survey that you may receive in the mail after your visit with us. Thank you!             Your Updated Medication List - Protect others around you: Learn how to safely use, store and throw away your medicines at www.disposemymeds.org.          This list is accurate as of 3/5/18  3:11 PM.  Always use your most recent med list.                   Brand Name Dispense Instructions for use Diagnosis    albuterol 108 (90 BASE) MCG/ACT Inhaler    PROAIR HFA/PROVENTIL HFA/VENTOLIN HFA    1 Inhaler    Inhale 2 puffs into the lungs 4 times daily    Acute bronchitis with symptoms > 10 days       aspirin 81 MG EC tablet     90 tablet    Take 1 tablet (81 mg) by mouth daily    CAD (coronary artery disease)       atorvastatin 40 MG tablet    LIPITOR    90 tablet    Take 1 tablet (40 mg) by mouth daily    Coronary artery disease involving native coronary artery of native heart without angina pectoris       cyanocolbalamin 100 MCG tablet    vitamin  B-12     Take 50 mcg by mouth as needed        HYDROcodone-acetaminophen 5-325 MG per tablet    NORCO    20 tablet    Take 1-2 tablets by mouth every 4 hours as needed for moderate to severe pain    Post-op pain        lidocaine-prilocaine cream    EMLA    30 g    Apply topically as needed for moderate pain    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)       lisinopril 5 MG tablet    PRINIVIL/ZESTRIL    90 tablet    Take 1 tablet (5 mg) by mouth daily    Essential hypertension with goal blood pressure less than 140/90       LORazepam 0.5 MG tablet    ATIVAN    30 tablet    Take 1 tablet (0.5 mg) by mouth 2 times daily as needed for anxiety    Anxiety       metoprolol succinate 50 MG 24 hr tablet    TOPROL-XL    90 tablet    Take 1 tablet (50 mg) by mouth daily    Coronary artery disease involving native coronary artery of native heart without angina pectoris       MULTIVITAMIN ADULT Tabs      Take by mouth daily        nitroGLYcerin 0.4 MG sublingual tablet    NITROSTAT    25 tablet    Place 1 tablet (0.4 mg) under the tongue every 5 minutes as needed for chest pain Maximum of 3 doses in 15 minutes    CAD (coronary artery disease)       prochlorperazine 10 MG tablet    COMPAZINE    30 tablet    Take 0.5 tablets (5 mg) by mouth every 6 hours as needed (Nausea/Vomiting)    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

## 2018-03-05 NOTE — NURSING NOTE
"Oncology Rooming Note    March 5, 2018 9:48 AM   Ines Pickard is a 77 year old female who presents for:    Chief Complaint   Patient presents with     Chemotherapy     Small Cell CA of left lung. Review lab results.      Initial Vitals: /59 (BP Location: Right arm, Patient Position: Sitting, Cuff Size: Adult Large)  Pulse 90  Temp 98  F (36.7  C) (Tympanic)  Resp 20  Ht 1.588 m (5' 2.5\")  Wt 74.4 kg (164 lb)  SpO2 94%  Breastfeeding? No  BMI 29.52 kg/m2 Estimated body mass index is 29.52 kg/(m^2) as calculated from the following:    Height as of this encounter: 1.588 m (5' 2.5\").    Weight as of this encounter: 74.4 kg (164 lb). Body surface area is 1.81 meters squared.  No Pain (0) Comment: Data Unavailable   No LMP recorded. Patient is postmenopausal.  Allergies reviewed: Yes  Medications reviewed: Yes    Medications: Medication refills not needed today.  Pharmacy name entered into [facility]: [facility] [place] - [address].    Clinical concerns: Small Cell CA of left lung. Review lab results. C/o feeling increased bouts of shortness of breath with activity.     7 minutes for nursing intake (face to face time)     Leticia Mcelroy, ELISA            "

## 2018-03-06 ENCOUNTER — INFUSION THERAPY VISIT (OUTPATIENT)
Dept: INFUSION THERAPY | Facility: CLINIC | Age: 78
End: 2018-03-06
Attending: INTERNAL MEDICINE
Payer: MEDICARE

## 2018-03-06 VITALS — HEART RATE: 78 BPM | DIASTOLIC BLOOD PRESSURE: 58 MMHG | TEMPERATURE: 97.6 F | SYSTOLIC BLOOD PRESSURE: 120 MMHG

## 2018-03-06 DIAGNOSIS — C34.92 SMALL CELL CARCINOMA OF LEFT LUNG (H): ICD-10-CM

## 2018-03-06 DIAGNOSIS — Z29.9 NEED FOR PROPHYLACTIC MEASURE: Primary | ICD-10-CM

## 2018-03-06 PROCEDURE — 96413 CHEMO IV INFUSION 1 HR: CPT

## 2018-03-06 PROCEDURE — 25000125 ZZHC RX 250: Performed by: INTERNAL MEDICINE

## 2018-03-06 PROCEDURE — 25000128 H RX IP 250 OP 636: Performed by: INTERNAL MEDICINE

## 2018-03-06 PROCEDURE — 96375 TX/PRO/DX INJ NEW DRUG ADDON: CPT

## 2018-03-06 RX ORDER — HEPARIN SODIUM (PORCINE) LOCK FLUSH IV SOLN 100 UNIT/ML 100 UNIT/ML
5 SOLUTION INTRAVENOUS
Status: DISCONTINUED | OUTPATIENT
Start: 2018-03-06 | End: 2018-03-06 | Stop reason: HOSPADM

## 2018-03-06 RX ADMIN — DEXAMETHASONE SODIUM PHOSPHATE 12 MG: 10 INJECTION, SOLUTION INTRAMUSCULAR; INTRAVENOUS at 13:16

## 2018-03-06 RX ADMIN — SODIUM CHLORIDE, PRESERVATIVE FREE 5 ML: 5 INJECTION INTRAVENOUS at 15:09

## 2018-03-06 RX ADMIN — ETOPOSIDE 150 MG: 20 INJECTION, SOLUTION INTRAVENOUS at 13:42

## 2018-03-06 RX ADMIN — FAMOTIDINE 20 MG: 20 INJECTION, SOLUTION INTRAVENOUS at 13:29

## 2018-03-06 RX ADMIN — SODIUM CHLORIDE 250 ML: 9 INJECTION, SOLUTION INTRAVENOUS at 13:16

## 2018-03-06 NOTE — MR AVS SNAPSHOT
After Visit Summary   3/6/2018    Ines Pickard    MRN: [id number]           Patient Information     Date Of Birth          1940        Visit Information        Provider Department      3/6/2018 1:00 PM ROOM 8 [facility]        Today's Diagnoses     Need for prophylactic measure    -  1    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)           Follow-ups after your visit        Your next 10 appointments already scheduled     Mar 07, 2018  1:00 PM CST   Level 2 with ROOM 5 [facility] ([facility])    [facility]  [address]  [facility] [address]   [phone]            Mar 26, 2018  8:00 AM CDT   Level 4 with ROOM 3 [facility] ([facility])    [facility]  [address]  [facility] [address]   [phone]            Mar 26, 2018  8:30 AM CDT   Return Visit with Tracy Miner MD   [facility] ([facility])    [facility]  [address]  [facility] [address]   [phone]            Mar 27, 2018  1:00 PM CDT   Level 2 with ROOM 7 [facility] ([facility])    [facility]  [address]  [facility] [address]   [phone]            Mar 28, 2018  1:00 PM CDT   Level 2 with ROOM 6 [facility] ([facility])    [facility]  [address]  [facility] [address]   [phone]              Who to contact     If you have questions or need follow up information about today's clinic visit or your schedule please contact [facility] directly at [phone].  Normal or non-critical lab and imaging results will be communicated to you by MyChart, letter or phone within 4 business days after the clinic has received the results. If you do not hear from us within 7 days, please contact  "the clinic through [employer] or phone. If you have a critical or abnormal lab result, we will notify you by phone as soon as possible.  Submit refill requests through [employer] or call your pharmacy and they will forward the refill request to us. Please allow 3 business days for your refill to be completed.          Additional Information About Your Visit        [employer] Information     [employer] lets you send messages to your doctor, view your test results, renew your prescriptions, schedule appointments and more. To sign up, go to [email or web address] . Click on \"Log in\" on the left side of the screen, which will take you to the Welcome page. Then click on \"Sign up Now\" on the right side of the page.     You will be asked to enter the access code listed below, as well as some personal information. Please follow the directions to create your username and password.     Your access code is: [email or web address]  Expires: 2018  1:30 PM     Your access code will  in 90 days. If you need help or a new code, please call your [place] clinic or [phone].        Care EveryWhere ID     This is your Care EveryWhere ID. This could be used by other organizations to access your [place] medical records  [id number]        Your Vitals Were     Pulse Temperature                78 97.6  F (36.4  C) (Oral)           Blood Pressure from Last 3 Encounters:   18 120/58   18 135/59   02/15/18 118/64    Weight from Last 3 Encounters:   18 74.4 kg (164 lb)   18 80.1 kg (176 lb 9.6 oz)   18 80.3 kg (177 lb)              Today, you had the following     No orders found for display       Primary Care Provider Office Phone # Fax #    MICHAEL Perez [phone] [address] [facility] [address]        Equal Access to Services     ALPA LOPEZ : Ronnie galvan Sodesi, waaxda luqadaha, qaybta kaalmahoda smith . So [facility] " [phone].    ATENCIÓN: Si elvira wright, tiene a nguyễn disposición servicios gratuitos de asistencia lingüística. Yefri wellington [phone].    We comply with applicable federal civil rights laws and [place] laws. We do not discriminate on the basis of race, color, national origin, age, disability, sex, sexual orientation, or gender identity.            Thank you!     Thank you for choosing [facility]  for your care. Our goal is always to provide you with excellent care. Hearing back from our patients is one way we can continue to improve our services. Please take a few minutes to complete the written survey that you may receive in the mail after your visit with us. Thank you!             Your Updated Medication List - Protect others around you: Learn how to safely use, store and throw away your medicines at www.disposemymeds.org.          This list is accurate as of 3/6/18  3:28 PM.  Always use your most recent med list.                   Brand Name Dispense Instructions for use Diagnosis    albuterol 108 (90 BASE) MCG/ACT Inhaler    PROAIR HFA/PROVENTIL HFA/VENTOLIN HFA    1 Inhaler    Inhale 2 puffs into the lungs 4 times daily    Acute bronchitis with symptoms > 10 days       aspirin 81 MG EC tablet     90 tablet    Take 1 tablet (81 mg) by mouth daily    CAD (coronary artery disease)       atorvastatin 40 MG tablet    LIPITOR    90 tablet    Take 1 tablet (40 mg) by mouth daily    Coronary artery disease involving native coronary artery of native heart without angina pectoris       cyanocolbalamin 100 MCG tablet    vitamin  B-12     Take 50 mcg by mouth as needed        HYDROcodone-acetaminophen 5-325 MG per tablet    NORCO    20 tablet    Take 1-2 tablets by mouth every 4 hours as needed for moderate to severe pain    Post-op pain       lidocaine-prilocaine cream    EMLA    30 g    Apply topically as needed for moderate pain    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)       lisinopril 5 MG tablet     PRINIVIL/ZESTRIL    90 tablet    Take 1 tablet (5 mg) by mouth daily    Essential hypertension with goal blood pressure less than 140/90       LORazepam 0.5 MG tablet    ATIVAN    30 tablet    Take 1 tablet (0.5 mg) by mouth 2 times daily as needed for anxiety    Anxiety       metoprolol succinate 50 MG 24 hr tablet    TOPROL-XL    90 tablet    Take 1 tablet (50 mg) by mouth daily    Coronary artery disease involving native coronary artery of native heart without angina pectoris       MULTIVITAMIN ADULT Tabs      Take by mouth daily        nitroGLYcerin 0.4 MG sublingual tablet    NITROSTAT    25 tablet    Place 1 tablet (0.4 mg) under the tongue every 5 minutes as needed for chest pain Maximum of 3 doses in 15 minutes    CAD (coronary artery disease)       prochlorperazine 10 MG tablet    COMPAZINE    30 tablet    Take 0.5 tablets (5 mg) by mouth every 6 hours as needed (Nausea/Vomiting)    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

## 2018-03-06 NOTE — PROGRESS NOTES
Infusion Nursing Note:  Ines Pickard presents today for IV Etoposide.    Patient seen by provider today: No   present during visit today: Not Applicable.    Note: IV premeds and IV Etoposide given via her Port per Dafter protocol. Port flushed per Dafter protocol. Pt. To return tomorrow for IV Etoposide.    Intravenous Access:  No Intravenous access/labs at this visit.    Treatment Conditions:  Not Applicable.      Post Infusion Assessment:  Patient tolerated infusion without incident.  Blood return noted pre and post infusion.  Site patent and intact, free from redness, edema or discomfort.  No evidence of extravasations.    Discharge Plan:   Patient and/or family verbalized understanding of discharge instructions and all questions answered.  Patient discharged in stable condition accompanied by: self.  Departure Mode: Ambulatory.    Leni Herrera RN

## 2018-03-07 ENCOUNTER — INFUSION THERAPY VISIT (OUTPATIENT)
Dept: INFUSION THERAPY | Facility: CLINIC | Age: 78
End: 2018-03-07
Attending: INTERNAL MEDICINE
Payer: MEDICARE

## 2018-03-07 VITALS — DIASTOLIC BLOOD PRESSURE: 60 MMHG | HEART RATE: 67 BPM | TEMPERATURE: 97.7 F | SYSTOLIC BLOOD PRESSURE: 147 MMHG

## 2018-03-07 DIAGNOSIS — Z29.9 NEED FOR PROPHYLACTIC MEASURE: Primary | ICD-10-CM

## 2018-03-07 DIAGNOSIS — C34.92 SMALL CELL CARCINOMA OF LEFT LUNG (H): ICD-10-CM

## 2018-03-07 PROCEDURE — 96375 TX/PRO/DX INJ NEW DRUG ADDON: CPT

## 2018-03-07 PROCEDURE — 96413 CHEMO IV INFUSION 1 HR: CPT

## 2018-03-07 PROCEDURE — 25000125 ZZHC RX 250: Performed by: INTERNAL MEDICINE

## 2018-03-07 PROCEDURE — 96377 APPLICATON ON-BODY INJECTOR: CPT | Mod: XU

## 2018-03-07 PROCEDURE — 25000128 H RX IP 250 OP 636: Performed by: INTERNAL MEDICINE

## 2018-03-07 RX ORDER — HEPARIN SODIUM (PORCINE) LOCK FLUSH IV SOLN 100 UNIT/ML 100 UNIT/ML
5 SOLUTION INTRAVENOUS
Status: DISCONTINUED | OUTPATIENT
Start: 2018-03-07 | End: 2018-03-07 | Stop reason: HOSPADM

## 2018-03-07 RX ADMIN — SODIUM CHLORIDE 250 ML: 9 INJECTION, SOLUTION INTRAVENOUS at 13:28

## 2018-03-07 RX ADMIN — ETOPOSIDE 150 MG: 20 INJECTION, SOLUTION INTRAVENOUS at 14:02

## 2018-03-07 RX ADMIN — DEXAMETHASONE SODIUM PHOSPHATE 12 MG: 10 INJECTION, SOLUTION INTRAMUSCULAR; INTRAVENOUS at 13:34

## 2018-03-07 RX ADMIN — PEGFILGRASTIM 6 MG: KIT SUBCUTANEOUS at 15:19

## 2018-03-07 RX ADMIN — FAMOTIDINE 20 MG: 20 INJECTION, SOLUTION INTRAVENOUS at 13:47

## 2018-03-07 RX ADMIN — SODIUM CHLORIDE, PRESERVATIVE FREE 5 ML: 5 INJECTION INTRAVENOUS at 15:15

## 2018-03-07 NOTE — MR AVS SNAPSHOT
After Visit Summary   3/7/2018    Ines Pickard    MRN: [id number]           Patient Information     Date Of Birth          1940        Visit Information        Provider Department      3/7/2018 1:00 PM ROOM 5 [facility]        Today's Diagnoses     Need for prophylactic measure    -  1    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)           Follow-ups after your visit        Your next 10 appointments already scheduled     Mar 26, 2018  8:00 AM CDT   Level 4 with ROOM 3 [facility] ([facility])    [facility]  [address]  [facility] [address]   [phone]            Mar 26, 2018  8:30 AM CDT   Return Visit with Tracy Miner MD   [facility] ([facility])    [facility]  [address]  [facility] [address]   [phone]            Mar 27, 2018  1:00 PM CDT   Level 2 with ROOM 7 [facility] ([facility])    [facility]  [address]  [facility] [address]   [phone]            Mar 28, 2018  1:00 PM CDT   Level 2 with ROOM 6 [facility] ([facility])    [facility]  [address]  [facility] [address]   [phone]              Who to contact     If you have questions or need follow up information about today's clinic visit or your schedule please contact [facility] directly at [phone].  Normal or non-critical lab and imaging results will be communicated to you by MyChart, letter or phone within 4 business days after the clinic has received the results. If you do not hear from us within 7 days, please contact the clinic through MyChart or phone. If you have a critical or abnormal lab result, we will notify you by phone as soon as possible.  Submit refill requests through Mydish or call your pharmacy and they will forward  "the refill request to us. Please allow 3 business days for your refill to be completed.          Additional Information About Your Visit        MyChart Information     EvntLive lets you send messages to your doctor, view your test results, renew your prescriptions, schedule appointments and more. To sign up, go to [email or web address] . Click on \"Log in\" on the left side of the screen, which will take you to the Welcome page. Then click on \"Sign up Now\" on the right side of the page.     You will be asked to enter the access code listed below, as well as some personal information. Please follow the directions to create your username and password.     Your access code is: [email or web address]  Expires: 2018  1:30 PM     Your access code will  in 90 days. If you need help or a new code, please call your [facility] clinic or [phone].        Care EveryWhere ID     This is your Care EveryWhere ID. This could be used by other organizations to access your [facility] medical records  [id number]        Your Vitals Were     Pulse Temperature                67 97.7  F (36.5  C) (Oral)           Blood Pressure from Last 3 Encounters:   18 147/60   18 120/58   18 135/59    Weight from Last 3 Encounters:   18 74.4 kg (164 lb)   18 80.1 kg (176 lb 9.6 oz)   18 80.3 kg (177 lb)              Today, you had the following     No orders found for display       Primary Care Provider Office Phone # Fax #    R Emanuel Perez [phone] [address] [facility] [address]        Equal Access to Services     [facility] : Hadii doroteo Carmona, wamarkoda luryan, qakeyta etiennealmahoda smith. So [facility] [phone].    ATENCIÓN: Si habla español, tiene a nguyễn disposición servicios gratuitos de asistencia lingüística. Llame al [phone].    We comply with applicable federal civil rights laws and [place] laws. We do not " discriminate on the basis of race, color, national origin, age, disability, sex, sexual orientation, or gender identity.            Thank you!     Thank you for choosing [facility]  for your care. Our goal is always to provide you with excellent care. Hearing back from our patients is one way we can continue to improve our services. Please take a few minutes to complete the written survey that you may receive in the mail after your visit with us. Thank you!             Your Updated Medication List - Protect others around you: Learn how to safely use, store and throw away your medicines at www.disposemymeds.org.          This list is accurate as of 3/7/18  4:22 PM.  Always use your most recent med list.                   Brand Name Dispense Instructions for use Diagnosis    albuterol 108 (90 BASE) MCG/ACT Inhaler    PROAIR HFA/PROVENTIL HFA/VENTOLIN HFA    1 Inhaler    Inhale 2 puffs into the lungs 4 times daily    Acute bronchitis with symptoms > 10 days       aspirin 81 MG EC tablet     90 tablet    Take 1 tablet (81 mg) by mouth daily    CAD (coronary artery disease)       atorvastatin 40 MG tablet    LIPITOR    90 tablet    Take 1 tablet (40 mg) by mouth daily    Coronary artery disease involving native coronary artery of native heart without angina pectoris       cyanocolbalamin 100 MCG tablet    vitamin  B-12     Take 50 mcg by mouth as needed        HYDROcodone-acetaminophen 5-325 MG per tablet    NORCO    20 tablet    Take 1-2 tablets by mouth every 4 hours as needed for moderate to severe pain    Post-op pain       lidocaine-prilocaine cream    EMLA    30 g    Apply topically as needed for moderate pain    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)       lisinopril 5 MG tablet    PRINIVIL/ZESTRIL    90 tablet    Take 1 tablet (5 mg) by mouth daily    Essential hypertension with goal blood pressure less than 140/90       LORazepam 0.5 MG tablet    ATIVAN    30 tablet    Take 1 tablet (0.5 mg) by mouth 2  times daily as needed for anxiety    Anxiety       metoprolol succinate 50 MG 24 hr tablet    TOPROL-XL    90 tablet    Take 1 tablet (50 mg) by mouth daily    Coronary artery disease involving native coronary artery of native heart without angina pectoris       MULTIVITAMIN ADULT Tabs      Take by mouth daily        nitroGLYcerin 0.4 MG sublingual tablet    NITROSTAT    25 tablet    Place 1 tablet (0.4 mg) under the tongue every 5 minutes as needed for chest pain Maximum of 3 doses in 15 minutes    CAD (coronary artery disease)       prochlorperazine 10 MG tablet    COMPAZINE    30 tablet    Take 0.5 tablets (5 mg) by mouth every 6 hours as needed (Nausea/Vomiting)    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

## 2018-03-07 NOTE — PROGRESS NOTES
Infusion Nursing Note:  Ines Pickard presents today for chemotherapy and On pro neulasta.    Patient seen by provider today: No   present during visit today: Not Applicable.    Note: IV premeds and IV Etoposide infused via her port per [facility] protocol without complications. Port flushed per [facility] protocol. On pro Neulasta injector applied to Left arm.  Pt given instructions and verbal teaching about the on body injector. Questions answered and pt verbalizes understanding of teaching. Pt. To remove it at 930 PM tomorrow.      Intravenous Access:  No Intravenous access/labs at this visit.    Treatment Conditions:  Not Applicable.      Post Infusion Assessment:  Patient tolerated infusion without incident.  Blood return noted pre and post infusion.  Site patent and intact, free from redness, edema or discomfort.  No evidence of extravasations.  Access discontinued per protocol.    Discharge Plan:   Patient and/or family verbalized understanding of discharge instructions and all questions answered.  Patient discharged in stable condition accompanied by: self.  Departure Mode: Ambulatory.    Leni Herrera RN

## 2018-03-08 ENCOUNTER — TELEPHONE (OUTPATIENT)
Dept: ONCOLOGY | Facility: CLINIC | Age: 78
End: 2018-03-08

## 2018-03-08 NOTE — TELEPHONE ENCOUNTER
Pt called to report she broke her crown and is wondering what she needs to do. Advised we will need her to have labs done prior to this procedure but not sure if Dr. Miner is going to want her to wait a week or 2 since she had chemo for the last 3 days. Pt report she is not in any pain from the tooth. Pt verbalized understanding and will await my call back.     Reviewed with Dr. Miner and would advise pt to have it repaired wither today or tomorrow since her counts will still be OK other wise pt should wait until right before her next cycle on 3/27. She said she will call and see what the dentist can do. She is to call and update me with date. Pt verbalized understanding.

## 2018-03-14 NOTE — TELEPHONE ENCOUNTER
Pt was able to have the tooth or what was left of it removed on 3/9, she is feeling well and does not have any signs of infection. Pt will call if anything arises. Pt has a f/u appt with her next chemotherapy cycle on 3/26. Pt verbalized understanding.

## 2018-03-26 ENCOUNTER — HOSPITAL ENCOUNTER (OUTPATIENT)
Facility: CLINIC | Age: 78
Discharge: HOME | End: 2018-03-26
Attending: INTERNAL MEDICINE | Admitting: INTERNAL MEDICINE
Payer: MEDICARE

## 2018-03-26 ENCOUNTER — INFUSION THERAPY VISIT (OUTPATIENT)
Dept: INFUSION THERAPY | Facility: CLINIC | Age: 78
End: 2018-03-26
Attending: INTERNAL MEDICINE
Payer: MEDICARE

## 2018-03-26 ENCOUNTER — ONCOLOGY VISIT (OUTPATIENT)
Dept: ONCOLOGY | Facility: CLINIC | Age: 78
End: 2018-03-26
Attending: INTERNAL MEDICINE
Payer: MEDICARE

## 2018-03-26 VITALS
OXYGEN SATURATION: 95 % | DIASTOLIC BLOOD PRESSURE: 62 MMHG | RESPIRATION RATE: 20 BRPM | TEMPERATURE: 98.5 F | HEART RATE: 89 BPM | BODY MASS INDEX: 29.43 KG/M2 | SYSTOLIC BLOOD PRESSURE: 122 MMHG | HEIGHT: 63 IN | WEIGHT: 166.1 LBS

## 2018-03-26 VITALS — SYSTOLIC BLOOD PRESSURE: 108 MMHG | HEART RATE: 74 BPM | DIASTOLIC BLOOD PRESSURE: 54 MMHG

## 2018-03-26 DIAGNOSIS — Z29.9 NEED FOR PROPHYLACTIC MEASURE: ICD-10-CM

## 2018-03-26 DIAGNOSIS — C34.92 SMALL CELL CARCINOMA OF LEFT LUNG (H): ICD-10-CM

## 2018-03-26 DIAGNOSIS — C34.12 MALIGNANT NEOPLASM OF UPPER LOBE OF LEFT LUNG (H): Primary | ICD-10-CM

## 2018-03-26 DIAGNOSIS — K12.30 MUCOSITIS: ICD-10-CM

## 2018-03-26 DIAGNOSIS — F41.9 ANXIETY: ICD-10-CM

## 2018-03-26 DIAGNOSIS — Z29.9 NEED FOR PROPHYLACTIC MEASURE: Primary | ICD-10-CM

## 2018-03-26 DIAGNOSIS — R06.02 SOB (SHORTNESS OF BREATH): ICD-10-CM

## 2018-03-26 DIAGNOSIS — D64.9 ANEMIA, UNSPECIFIED TYPE: ICD-10-CM

## 2018-03-26 LAB
ALBUMIN SERPL-MCNC: 3.9 G/DL (ref 3.4–5)
ALP SERPL-CCNC: 62 U/L (ref 40–150)
ALT SERPL W P-5'-P-CCNC: 23 U/L (ref 0–50)
ANION GAP SERPL CALCULATED.3IONS-SCNC: 10 MMOL/L (ref 3–14)
AST SERPL W P-5'-P-CCNC: 19 U/L (ref 0–45)
BASOPHILS # BLD AUTO: 0.1 10E9/L (ref 0–0.2)
BASOPHILS NFR BLD AUTO: 0.8 %
BILIRUB SERPL-MCNC: 1 MG/DL (ref 0.2–1.3)
BUN SERPL-MCNC: 11 MG/DL (ref 7–30)
CALCIUM SERPL-MCNC: 8.7 MG/DL (ref 8.5–10.1)
CHLORIDE SERPL-SCNC: 104 MMOL/L (ref 94–109)
CO2 SERPL-SCNC: 24 MMOL/L (ref 20–32)
CREAT SERPL-MCNC: 0.52 MG/DL (ref 0.52–1.04)
DIFFERENTIAL METHOD BLD: ABNORMAL
EOSINOPHIL # BLD AUTO: 0 10E9/L (ref 0–0.7)
EOSINOPHIL NFR BLD AUTO: 0.2 %
ERYTHROCYTE [DISTWIDTH] IN BLOOD BY AUTOMATED COUNT: 19.8 % (ref 10–15)
GFR SERPL CREATININE-BSD FRML MDRD: >90 ML/MIN/1.7M2
GLUCOSE SERPL-MCNC: 120 MG/DL (ref 70–99)
HCT VFR BLD AUTO: 32.8 % (ref 35–47)
HGB BLD-MCNC: 10.8 G/DL (ref 11.7–15.7)
IMM GRANULOCYTES # BLD: 0.2 10E9/L (ref 0–0.4)
IMM GRANULOCYTES NFR BLD: 1.1 %
LYMPHOCYTES # BLD AUTO: 2.6 10E9/L (ref 0.8–5.3)
LYMPHOCYTES NFR BLD AUTO: 19.7 %
MCH RBC QN AUTO: 25.3 PG (ref 26.5–33)
MCHC RBC AUTO-ENTMCNC: 32.9 G/DL (ref 31.5–36.5)
MCV RBC AUTO: 77 FL (ref 78–100)
MONOCYTES # BLD AUTO: 1.1 10E9/L (ref 0–1.3)
MONOCYTES NFR BLD AUTO: 8.3 %
NEUTROPHILS # BLD AUTO: 9.2 10E9/L (ref 1.6–8.3)
NEUTROPHILS NFR BLD AUTO: 69.9 %
PLATELET # BLD AUTO: 535 10E9/L (ref 150–450)
POTASSIUM SERPL-SCNC: 3.9 MMOL/L (ref 3.4–5.3)
PROT SERPL-MCNC: 7 G/DL (ref 6.8–8.8)
RBC # BLD AUTO: 4.27 10E12/L (ref 3.8–5.2)
SODIUM SERPL-SCNC: 138 MMOL/L (ref 133–144)
WBC # BLD AUTO: 13.1 10E9/L (ref 4–11)

## 2018-03-26 PROCEDURE — 96367 TX/PROPH/DG ADDL SEQ IV INF: CPT

## 2018-03-26 PROCEDURE — G0463 HOSPITAL OUTPT CLINIC VISIT: HCPCS | Mod: 25

## 2018-03-26 PROCEDURE — 99214 OFFICE O/P EST MOD 30 MIN: CPT | Performed by: INTERNAL MEDICINE

## 2018-03-26 PROCEDURE — 80053 COMPREHEN METABOLIC PANEL: CPT | Performed by: INTERNAL MEDICINE

## 2018-03-26 PROCEDURE — 25000125 ZZHC RX 250: Performed by: INTERNAL MEDICINE

## 2018-03-26 PROCEDURE — 96413 CHEMO IV INFUSION 1 HR: CPT

## 2018-03-26 PROCEDURE — 25000128 H RX IP 250 OP 636: Performed by: INTERNAL MEDICINE

## 2018-03-26 PROCEDURE — 96417 CHEMO IV INFUS EACH ADDL SEQ: CPT

## 2018-03-26 PROCEDURE — 96415 CHEMO IV INFUSION ADDL HR: CPT

## 2018-03-26 PROCEDURE — 85025 COMPLETE CBC W/AUTO DIFF WBC: CPT | Performed by: INTERNAL MEDICINE

## 2018-03-26 PROCEDURE — 96375 TX/PRO/DX INJ NEW DRUG ADDON: CPT

## 2018-03-26 RX ORDER — ALBUTEROL SULFATE 0.83 MG/ML
2.5 SOLUTION RESPIRATORY (INHALATION)
Status: CANCELLED | OUTPATIENT
Start: 2018-03-27

## 2018-03-26 RX ORDER — METHYLPREDNISOLONE SODIUM SUCCINATE 125 MG/2ML
125 INJECTION, POWDER, LYOPHILIZED, FOR SOLUTION INTRAMUSCULAR; INTRAVENOUS
Status: CANCELLED
Start: 2018-03-27

## 2018-03-26 RX ORDER — EPINEPHRINE 0.3 MG/.3ML
0.3 INJECTION SUBCUTANEOUS EVERY 5 MIN PRN
Status: CANCELLED | OUTPATIENT
Start: 2018-03-29

## 2018-03-26 RX ORDER — ALBUTEROL SULFATE 90 UG/1
1-2 AEROSOL, METERED RESPIRATORY (INHALATION)
Status: CANCELLED
Start: 2018-03-28

## 2018-03-26 RX ORDER — SODIUM CHLORIDE 9 MG/ML
1000 INJECTION, SOLUTION INTRAVENOUS CONTINUOUS PRN
Status: CANCELLED
Start: 2018-03-29

## 2018-03-26 RX ORDER — HEPARIN SODIUM (PORCINE) LOCK FLUSH IV SOLN 100 UNIT/ML 100 UNIT/ML
5 SOLUTION INTRAVENOUS
Status: CANCELLED | OUTPATIENT
Start: 2018-03-29

## 2018-03-26 RX ORDER — EPINEPHRINE 0.3 MG/.3ML
0.3 INJECTION SUBCUTANEOUS EVERY 5 MIN PRN
Status: CANCELLED | OUTPATIENT
Start: 2018-03-28

## 2018-03-26 RX ORDER — METHYLPREDNISOLONE SODIUM SUCCINATE 125 MG/2ML
125 INJECTION, POWDER, LYOPHILIZED, FOR SOLUTION INTRAMUSCULAR; INTRAVENOUS
Status: CANCELLED
Start: 2018-03-28

## 2018-03-26 RX ORDER — LORAZEPAM 2 MG/ML
0.5 INJECTION INTRAMUSCULAR EVERY 4 HOURS PRN
Status: CANCELLED
Start: 2018-03-29

## 2018-03-26 RX ORDER — LORAZEPAM 0.5 MG/1
0.5 TABLET ORAL 2 TIMES DAILY PRN
Qty: 30 TABLET | Refills: 0 | Status: SHIPPED | OUTPATIENT
Start: 2018-03-26 | End: 2018-04-10

## 2018-03-26 RX ORDER — SODIUM CHLORIDE 9 MG/ML
1000 INJECTION, SOLUTION INTRAVENOUS CONTINUOUS PRN
Status: CANCELLED
Start: 2018-03-27

## 2018-03-26 RX ORDER — EPINEPHRINE 1 MG/ML
0.3 INJECTION, SOLUTION, CONCENTRATE INTRAVENOUS EVERY 5 MIN PRN
Status: CANCELLED | OUTPATIENT
Start: 2018-03-27

## 2018-03-26 RX ORDER — ALBUTEROL SULFATE 0.83 MG/ML
2.5 SOLUTION RESPIRATORY (INHALATION)
Status: CANCELLED | OUTPATIENT
Start: 2018-03-29

## 2018-03-26 RX ORDER — EPINEPHRINE 1 MG/ML
0.3 INJECTION, SOLUTION, CONCENTRATE INTRAVENOUS EVERY 5 MIN PRN
Status: CANCELLED | OUTPATIENT
Start: 2018-03-28

## 2018-03-26 RX ORDER — MEPERIDINE HYDROCHLORIDE 25 MG/ML
25 INJECTION INTRAMUSCULAR; INTRAVENOUS; SUBCUTANEOUS EVERY 30 MIN PRN
Status: CANCELLED | OUTPATIENT
Start: 2018-03-27

## 2018-03-26 RX ORDER — ALBUTEROL SULFATE 0.83 MG/ML
2.5 SOLUTION RESPIRATORY (INHALATION)
Status: CANCELLED | OUTPATIENT
Start: 2018-03-28

## 2018-03-26 RX ORDER — ALBUTEROL SULFATE 90 UG/1
1-2 AEROSOL, METERED RESPIRATORY (INHALATION)
Status: CANCELLED
Start: 2018-03-29

## 2018-03-26 RX ORDER — MEPERIDINE HYDROCHLORIDE 25 MG/ML
25 INJECTION INTRAMUSCULAR; INTRAVENOUS; SUBCUTANEOUS EVERY 30 MIN PRN
Status: CANCELLED | OUTPATIENT
Start: 2018-03-28

## 2018-03-26 RX ORDER — HEPARIN SODIUM (PORCINE) LOCK FLUSH IV SOLN 100 UNIT/ML 100 UNIT/ML
5 SOLUTION INTRAVENOUS
Status: CANCELLED | OUTPATIENT
Start: 2018-03-27

## 2018-03-26 RX ORDER — METHYLPREDNISOLONE SODIUM SUCCINATE 125 MG/2ML
125 INJECTION, POWDER, LYOPHILIZED, FOR SOLUTION INTRAMUSCULAR; INTRAVENOUS
Status: CANCELLED
Start: 2018-03-29

## 2018-03-26 RX ORDER — EPINEPHRINE 1 MG/ML
0.3 INJECTION, SOLUTION, CONCENTRATE INTRAVENOUS EVERY 5 MIN PRN
Status: CANCELLED | OUTPATIENT
Start: 2018-03-29

## 2018-03-26 RX ORDER — SODIUM CHLORIDE 9 MG/ML
1000 INJECTION, SOLUTION INTRAVENOUS CONTINUOUS PRN
Status: CANCELLED
Start: 2018-03-28

## 2018-03-26 RX ORDER — HEPARIN SODIUM (PORCINE) LOCK FLUSH IV SOLN 100 UNIT/ML 100 UNIT/ML
5 SOLUTION INTRAVENOUS
Status: DISCONTINUED | OUTPATIENT
Start: 2018-03-26 | End: 2018-03-26 | Stop reason: HOSPADM

## 2018-03-26 RX ORDER — ALBUTEROL SULFATE 90 UG/1
1-2 AEROSOL, METERED RESPIRATORY (INHALATION)
Status: CANCELLED
Start: 2018-03-27

## 2018-03-26 RX ORDER — LORAZEPAM 2 MG/ML
0.5 INJECTION INTRAMUSCULAR EVERY 4 HOURS PRN
Status: CANCELLED
Start: 2018-03-27

## 2018-03-26 RX ORDER — DIPHENHYDRAMINE HYDROCHLORIDE 50 MG/ML
50 INJECTION INTRAMUSCULAR; INTRAVENOUS
Status: CANCELLED
Start: 2018-03-29

## 2018-03-26 RX ORDER — DIPHENHYDRAMINE HYDROCHLORIDE 50 MG/ML
50 INJECTION INTRAMUSCULAR; INTRAVENOUS
Status: CANCELLED
Start: 2018-03-27

## 2018-03-26 RX ORDER — DIPHENHYDRAMINE HYDROCHLORIDE 50 MG/ML
50 INJECTION INTRAMUSCULAR; INTRAVENOUS
Status: CANCELLED
Start: 2018-03-28

## 2018-03-26 RX ORDER — EPINEPHRINE 0.3 MG/.3ML
0.3 INJECTION SUBCUTANEOUS EVERY 5 MIN PRN
Status: CANCELLED | OUTPATIENT
Start: 2018-03-27

## 2018-03-26 RX ORDER — LORAZEPAM 2 MG/ML
0.5 INJECTION INTRAMUSCULAR EVERY 4 HOURS PRN
Status: CANCELLED
Start: 2018-03-28

## 2018-03-26 RX ORDER — MEPERIDINE HYDROCHLORIDE 25 MG/ML
25 INJECTION INTRAMUSCULAR; INTRAVENOUS; SUBCUTANEOUS EVERY 30 MIN PRN
Status: CANCELLED | OUTPATIENT
Start: 2018-03-29

## 2018-03-26 RX ORDER — HEPARIN SODIUM (PORCINE) LOCK FLUSH IV SOLN 100 UNIT/ML 100 UNIT/ML
5 SOLUTION INTRAVENOUS
Status: CANCELLED | OUTPATIENT
Start: 2018-03-28

## 2018-03-26 RX ADMIN — SODIUM CHLORIDE, PRESERVATIVE FREE 5 ML: 5 INJECTION INTRAVENOUS at 12:30

## 2018-03-26 RX ADMIN — FAMOTIDINE 20 MG: 20 INJECTION, SOLUTION INTRAVENOUS at 09:48

## 2018-03-26 RX ADMIN — DEXAMETHASONE SODIUM PHOSPHATE: 10 INJECTION, SOLUTION INTRAMUSCULAR; INTRAVENOUS at 09:31

## 2018-03-26 RX ADMIN — CARBOPLATIN 555 MG: 10 INJECTION, SOLUTION INTRAVENOUS at 10:04

## 2018-03-26 RX ADMIN — ETOPOSIDE 150 MG: 20 INJECTION, SOLUTION INTRAVENOUS at 10:41

## 2018-03-26 RX ADMIN — SODIUM CHLORIDE 250 ML: 9 INJECTION, SOLUTION INTRAVENOUS at 09:31

## 2018-03-26 ASSESSMENT — PAIN SCALES - GENERAL: PAINLEVEL: NO PAIN (0)

## 2018-03-26 NOTE — PATIENT INSTRUCTIONS
Dr. Miner would like you to continue with Cycle 3 today as planned and to have a PET scan in 2 weeks.     We would like to see you back in with Cycle 4 for a follow up appointment.     When you are in need of a refill, please call your pharmacy and they will send us a request.      Copy of appointments, and after visit summary (AVS) given to patient.      If you have any questions please call Bella Arvizu RN, BSN Oncology Hematology  [facility] [phone]. For questions after business hours, or on holidays/weekends, please call our after hours Nurse Triage line [phone]. Thank you.           C3 today, restaging PET in 2 wks, f/u with C4.

## 2018-03-26 NOTE — LETTER
3/26/2018         RE: Ines Pickard  [address] [facility] [address]        Dear Colleague,    Thank you for referring your patient, Ines Pickard, to the [facility]. Please see a copy of my visit note below.    ONCOLOGY FOLLOW UP VISIT       CHIEF COMPLAINT/REASON FOR VISIT:  Left adrenal mass, FNA 01/30/2018 consistent with sclc     HISTORY OF PRESENT ILLNESS:  A 77-year-old female presented with 5 months duration of hoarseness.  She was evaluated by ENT.  Direct laryngoscopy revealed left vocal cord paralysis.    CT chest with contrast 01/10/2018 identified a 2.3 cm left upper lobe nodule extending to the hilum, suspicious for malignancy, left hilar mediastinal adenopathy with mass-like soft tissue thickening extending along the inferior aspect of the aortic arch likely involving the recurrent laryngeal nerve in this location, left adrenal nodule 1.5 cm undetermined, compression on L1 vertebral body.    EUS 01/30/2017 FNA was done on 2 hypoechoic left adrenal mass-   Consistent with metastatic small cell carcinoma      PET confirmed the primary TEX lesion with adrenal mets. She is dx with extensive stage SCLC.     She made informed decision to proceed with palliative chemo with carbo/-16 2/2018.       PAST MEDICAL HISTORY:  CAD, stent around 2014, reflux, hypertension, cataracts, hyperlipidemia.      MEDICATIONS:  Reviewed in Epic system.      SOCIAL HISTORY:  She lives in her own house.  She has 3 boys.  She is here with her neighbor.  She quit smoking years back.  Denies alcohol abuse.      FAMILY HISTORY:  Positive for mom who had Hodgkin lymphoma.  Brother had unknown type of cancer.      REVIEW OF SYSTEMS:   Throat discomfort seems better.     ? BARNES, she is not sure.   Reports no appetite. She is using insure.   She is losing her teeth from her dental wires and eating baby food.        PHYSICAL EXAMINATION:   VITAL SIGNS:  Blood pressure 122/62, pulse 89, temperature 98.5  F  "(36.9  C), temperature source Tympanic, resp. rate 20, height 1.588 m (5' 2.52\"), weight 75.3 kg (166 lb 1.6 oz), SpO2 95 %, not currently breastfeeding.    GENERAL APPEARANCE:  Elderly lady, looks like her stated age, not in acute distress.   HEENT: The patient is normocephalic, atraumatic. Pupils are equally reactive to light.  Sclerae are anicteric.  Moist oral mucosa.  Erythematous posterior pharynx.   NECK:  Supple.  No jugular venous distention.  Thyroid is not palpable.   LYMPH NODES:  Superficial lymphadenopathy is not appreciable in the bilateral cervical, supraclavicular, axillary or inguinal areas.   CARDIOVASCULAR:  S1, S2 regular with no murmurs or gallops.  No carotid or abdominal bruits.   PULMONARY:  Lungs are clear to auscultation and percussion bilaterally.  There is no wheezing or rhonchi.   GASTROINTESTINAL:  Abdomen is soft, nontender.  No hepatosplenomegaly.  No signs of ascites.  No mass appreciable.   MUSCULOSKELETAL/EXTREMITIES:  No edema.  No cyanotic changes.  No signs of joint deformity.  No lymphedema.  No spinal or paraspinal tenderness.  No CVA tenderness.   NEUROLOGIC:  Cranial nerves II-XII are grossly intact.  Sensation intact.  Muscle strength and muscle tone symmetrical, 5/5 throughout.   SKIN:  No petechiae.  No rash.  No signs of cellulitis.      LABORATORY DATA REVIEWED:   C2D1 wbc 13,  Hb 10.8 from 11, MCV 77 from 74 from 71, PL is 535 CMP is fine. B12/folate nl.     CEA baseline at 6.6 in 2/2017    From 01/30/2018 FNA on left adrenal mass biopsy -- Consistent with metastatic small cell carcinoma      CURRENT IMAGE DATA REVIEWED:   PET 2/2018  1. Hypermetabolic left upper lobe nodule is consistent with malignancy, most likely bronchogenic carcinoma.  2. Hypermetabolic left hilar and mediastinal adenopathy is consistent  with metastatic disease.  3. Hypermetabolic left adrenal nodule is suspicious for metastatic disease.  4. There is a new 3.4 cm high-density structure abutting " the left adrenal gland, suspicious for interval development of adrenal hemorrhage.    CT chest 01/2018 -  identified a 2.3 cm left upper lobe nodule extending to the hilum, suspicious for malignancy, left hilar mediastinal adenopathy with mass-like soft tissue thickening extending along the inferior aspect of the aortic arch likely involving the recurrent laryngeal nerve in this location, left adrenal nodule 1.5 cm undetermined, compression on L1 vertebral body.       OLD DATA REVIEW IN SUMMARY:    Chest x-ray 11/2017, no abnormalities identified.    In 2014, CBC indicating white count 17, hemoglobin 11 and platelets normal.  Differential indicating neutrophilia when she had a heart attack.        ASSESSMENT AND PLAN:    1.  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     We discussed the incurable stage she is in. Tx is for palliation not for cure.   Recommend her to consider palliative chemo with carbo AUC5 d1, -16 80 mg/m2 D1-3 q 3 wks with neulasta support.     She made informed decision to proceed in 2/2018.     She is elderly, has high complication rate and needs to be monitored closely.     Plan restaging post C3.    2. Mucositis - advice salt and soda oral rinse.     3. Microcytic anemia - nl b12/folate. This is likely from chemo.     4. Sob seems only with exertion. She feels the inhaler helps. Advice hse to resume it.     5. Anxiety. She feels ativan helps. Will refill.               Again, thank you for allowing me to participate in the care of your patient.        Sincerely,        Tracy Miner MD, MD

## 2018-03-26 NOTE — PROGRESS NOTES
"ONCOLOGY FOLLOW UP VISIT       CHIEF COMPLAINT/REASON FOR VISIT:  Left adrenal mass, FNA 01/30/2018 consistent with sclc     HISTORY OF PRESENT ILLNESS:  A 77-year-old female presented with 5 months duration of hoarseness.  She was evaluated by ENT.  Direct laryngoscopy revealed left vocal cord paralysis.    CT chest with contrast 01/10/2018 identified a 2.3 cm left upper lobe nodule extending to the hilum, suspicious for malignancy, left hilar mediastinal adenopathy with mass-like soft tissue thickening extending along the inferior aspect of the aortic arch likely involving the recurrent laryngeal nerve in this location, left adrenal nodule 1.5 cm undetermined, compression on L1 vertebral body.    EUS 01/30/2017 FNA was done on 2 hypoechoic left adrenal mass-   Consistent with metastatic small cell carcinoma      PET confirmed the primary TEX lesion with adrenal mets. She is dx with extensive stage SCLC.     She made informed decision to proceed with palliative chemo with carbo/-16 2/2018.       PAST MEDICAL HISTORY:  CAD, stent around 2014, reflux, hypertension, cataracts, hyperlipidemia.      MEDICATIONS:  Reviewed in Epic system.      SOCIAL HISTORY:  She lives in her own house.  She has 3 boys.  She is here with her neighbor.  She quit smoking years back.  Denies alcohol abuse.      FAMILY HISTORY:  Positive for mom who had Hodgkin lymphoma.  Brother had unknown type of cancer.      REVIEW OF SYSTEMS:   Throat discomfort seems better.     ? BARNES, she is not sure.   Reports no appetite. She is using insure.   She is losing her teeth from her dental wires and eating baby food.        PHYSICAL EXAMINATION:   VITAL SIGNS:  Blood pressure 122/62, pulse 89, temperature 98.5  F (36.9  C), temperature source Tympanic, resp. rate 20, height 1.588 m (5' 2.52\"), weight 75.3 kg (166 lb 1.6 oz), SpO2 95 %, not currently breastfeeding.    GENERAL APPEARANCE:  Elderly lady, looks like her stated age, not in acute distress. "   HEENT: The patient is normocephalic, atraumatic. Pupils are equally reactive to light.  Sclerae are anicteric.  Moist oral mucosa.  Erythematous posterior pharynx.   NECK:  Supple.  No jugular venous distention.  Thyroid is not palpable.   LYMPH NODES:  Superficial lymphadenopathy is not appreciable in the bilateral cervical, supraclavicular, axillary or inguinal areas.   CARDIOVASCULAR:  S1, S2 regular with no murmurs or gallops.  No carotid or abdominal bruits.   PULMONARY:  Lungs are clear to auscultation and percussion bilaterally.  There is no wheezing or rhonchi.   GASTROINTESTINAL:  Abdomen is soft, nontender.  No hepatosplenomegaly.  No signs of ascites.  No mass appreciable.   MUSCULOSKELETAL/EXTREMITIES:  No edema.  No cyanotic changes.  No signs of joint deformity.  No lymphedema.  No spinal or paraspinal tenderness.  No CVA tenderness.   NEUROLOGIC:  Cranial nerves II-XII are grossly intact.  Sensation intact.  Muscle strength and muscle tone symmetrical, 5/5 throughout.   SKIN:  No petechiae.  No rash.  No signs of cellulitis.      LABORATORY DATA REVIEWED:   C2D1 wbc 13,  Hb 10.8 from 11, MCV 77 from 74 from 71, PL is 535 CMP is fine. B12/folate nl.     CEA baseline at 6.6 in 2/2017    From 01/30/2018 FNA on left adrenal mass biopsy -- Consistent with metastatic small cell carcinoma      CURRENT IMAGE DATA REVIEWED:   PET 2/2018  1. Hypermetabolic left upper lobe nodule is consistent with malignancy, most likely bronchogenic carcinoma.  2. Hypermetabolic left hilar and mediastinal adenopathy is consistent  with metastatic disease.  3. Hypermetabolic left adrenal nodule is suspicious for metastatic disease.  4. There is a new 3.4 cm high-density structure abutting the left adrenal gland, suspicious for interval development of adrenal hemorrhage.    CT chest 01/2018 -  identified a 2.3 cm left upper lobe nodule extending to the hilum, suspicious for malignancy, left hilar mediastinal adenopathy with  mass-like soft tissue thickening extending along the inferior aspect of the aortic arch likely involving the recurrent laryngeal nerve in this location, left adrenal nodule 1.5 cm undetermined, compression on L1 vertebral body.       OLD DATA REVIEW IN SUMMARY:    Chest x-ray 11/2017, no abnormalities identified.    In 2014, CBC indicating white count 17, hemoglobin 11 and platelets normal.  Differential indicating neutrophilia when she had a heart attack.        ASSESSMENT AND PLAN:    1.  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     We discussed the incurable stage she is in. Tx is for palliation not for cure.   Recommend her to consider palliative chemo with carbo AUC5 d1, -16 80 mg/m2 D1-3 q 3 wks with neulasta support.     She made informed decision to proceed in 2/2018.     She is elderly, has high complication rate and needs to be monitored closely.     Plan restaging post C3.    2. Mucositis - advice salt and soda oral rinse.     3. Microcytic anemia - nl b12/folate. This is likely from chemo.     4. Sob seems only with exertion. She feels the inhaler helps. Advice hse to resume it.     5. Anxiety. She feels ativan helps. Will refill.

## 2018-03-26 NOTE — MR AVS SNAPSHOT
After Visit Summary   3/26/2018    Ines Pickard    MRN: [id number]           Patient Information     Date Of Birth          1940        Visit Information        Provider Department      3/26/2018 8:30 AM Tracy Miner MD [facility]        Today's Diagnoses     Malignant neoplasm of upper lobe of left lung (H)    -  1    Anxiety        Anemia, unspecified type        Mucositis        SOB (shortness of breath)        Need for prophylactic measure        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)          Care Instructions    Dr. Miner would like you to continue with Cycle 3 today as planned and to have a PET scan in 2 weeks.     We would like to see you back in with Cycle 4 for a follow up appointment.     When you are in need of a refill, please call your pharmacy and they will send us a request.      Copy of appointments, and after visit summary (AVS) given to patient.      If you have any questions please call Bella Arvizu RN, BSN Oncology Hematology  [facility] [phone]. For questions after business hours, or on holidays/weekends, please call our after hours Nurse Triage line [phone]. Thank you.           C3 today, restaging PET in 2 wks, f/u with C4.           Follow-ups after your visit        Your next 10 appointments already scheduled     Mar 27, 2018  1:00 PM CDT   Level 2 with ROOM 7 [facility] ([facility])    [facility]  [address]  [place] [address]   [phone]            Mar 28, 2018  1:00 PM CDT   Level 2 with ROOM 6 [facility] ([facility])    [facility]  [address]  [place] [address]   [phone]            Apr 11, 2018 10:00 AM CDT   (Arrive by 9:30 AM)   PE NPET ONCOLOGY (EYES TO THIGHS) with WYPETCT1   [facility] ([facility])    [address]  [facility] [address]   [phone]           Tell your doctor:   If there is any chance you may be pregnant or if you are breastfeeding.   If you have problems lying in small spaces (claustrophobia). If you do, your doctor may give you medicine to help you relax. If you have diabetes:   Have your exam early in the morning. Your blood glucose will go up as the day goes by.   Your glucose level must be 180 or less at the start of the exam. Please take any medicines you need to ensure this blood glucose level. 24 hours before your scan: Don t do any heavy exercise. (No jogging, aerobics or other workouts.) Exercise will make your pictures less accurate. 6 hours before your scan:   Stop all food and liquids (except water).   Do not chew gum or suck on mints.   If you need to take medicine with food, you may take it with a few crackers.  Please call your Imaging Department at your exam site with any questions.            Apr 16, 2018  8:30 AM CDT   Level 4 with ROOM 5 [facility] ([facility])    [facility] [address] [place] [address]   [phone]            Apr 16, 2018  9:30 AM CDT   Return Visit with Tracy Miner MD   [facility] ([facility])    [facility]  [address] [place] [address]   [phone]            Apr 17, 2018  1:00 PM CDT   Level 2 with ROOM 1 [facility] ([facility])    [facility]  [address] [place] [address]   [phone]            Apr 18, 2018  1:00 PM CDT   Level 2 with ROOM 10 [facility] ([facility])    [facility]  [address]  [facility] [address]   [phone]              Future tests that were ordered for you today     Open Future Orders        Priority Expected Expires Ordered    PET Oncology (Eyes to  "Thighs) Routine 2018 3/26/2019 3/26/2018            Who to contact     If you have questions or need follow up information about today's clinic visit or your schedule please contact [facility] directly at [phone].  Normal or non-critical lab and imaging results will be communicated to you by MyChart, letter or phone within 4 business days after the clinic has received the results. If you do not hear from us within 7 days, please contact the clinic through [employer] or phone. If you have a critical or abnormal lab result, we will notify you by phone as soon as possible.  Submit refill requests through [employer] or call your pharmacy and they will forward the refill request to us. Please allow 3 business days for your refill to be completed.          Additional Information About Your Visit        [email or web address] Information     [employer] lets you send messages to your doctor, view your test results, renew your prescriptions, schedule appointments and more. To sign up, go to [email or web address] . Click on \"Log in\" on the left side of the screen, which will take you to the Welcome page. Then click on \"Sign up Now\" on the right side of the page.     You will be asked to enter the access code listed below, as well as some personal information. Please follow the directions to create your username and password.     Your access code is: [email or web address]  Expires: 2018  2:30 PM     Your access code will  in 90 days. If you need help or a new code, please call your [place] clinic or [phone].        Care EveryWhere ID     This is your Care EveryWhere ID. This could be used by other organizations to access your [place] medical records  [id number]        Your Vitals Were     Pulse Temperature Respirations Height Pulse Oximetry Breastfeeding?    89 98.5  F (36.9  C) (Tympanic) 20 1.588 m (5' 2.52\") 95% No    BMI (Body Mass Index)                   29.88 kg/m2            Blood Pressure from Last 3 " Encounters:   03/26/18 122/62   03/07/18 147/60   03/06/18 120/58    Weight from Last 3 Encounters:   03/26/18 75.3 kg (166 lb 1.6 oz)   03/05/18 74.4 kg (164 lb)   02/13/18 80.1 kg (176 lb 9.6 oz)                 Where to get your medicines      Some of these will need a paper prescription and others can be bought over the counter.  Ask your nurse if you have questions.     Bring a paper prescription for each of these medications     LORazepam 0.5 MG tablet          Primary Care Provider Office Phone # Fax #    R Emanuel Perez [phone] [address] [address]        Equal Access to Services     ALPA LOPEZ : Ronnie Carmona, waaxda minnieadaha, qaybta kaalmada caroline, hoda taylor. So [facility] [phone].    ATENCIÓN: Si habla español, tiene a nguyễn disposición servicios gratuitos de asistencia lingüística. [facility] [phone].    We comply with applicable federal civil rights laws and [place] laws. We do not discriminate on the basis of race, color, national origin, age, disability, sex, sexual orientation, or gender identity.            Thank you!     Thank you for choosing [facility]  for your care. Our goal is always to provide you with excellent care. Hearing back from our patients is one way we can continue to improve our services. Please take a few minutes to complete the written survey that you may receive in the mail after your visit with us. Thank you!             Your Updated Medication List - Protect others around you: Learn how to safely use, store and throw away your medicines at www.disposemymeds.org.          This list is accurate as of 3/26/18  9:55 AM.  Always use your most recent med list.                   Brand Name Dispense Instructions for use Diagnosis    albuterol 108 (90 BASE) MCG/ACT Inhaler    PROAIR HFA/PROVENTIL HFA/VENTOLIN HFA    1 Inhaler    Inhale 2 puffs into the lungs 4 times daily     Acute bronchitis with symptoms > 10 days       aspirin 81 MG EC tablet     90 tablet    Take 1 tablet (81 mg) by mouth daily    CAD (coronary artery disease)       atorvastatin 40 MG tablet    LIPITOR    90 tablet    Take 1 tablet (40 mg) by mouth daily    Coronary artery disease involving native coronary artery of native heart without angina pectoris       cyanocolbalamin 100 MCG tablet    vitamin  B-12     Take 50 mcg by mouth as needed        HYDROcodone-acetaminophen 5-325 MG per tablet    NORCO    20 tablet    Take 1-2 tablets by mouth every 4 hours as needed for moderate to severe pain    Post-op pain       lidocaine-prilocaine cream    EMLA    30 g    Apply topically as needed for moderate pain    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)       lisinopril 5 MG tablet    PRINIVIL/ZESTRIL    90 tablet    Take 1 tablet (5 mg) by mouth daily    Essential hypertension with goal blood pressure less than 140/90       LORazepam 0.5 MG tablet    ATIVAN    30 tablet    Take 1 tablet (0.5 mg) by mouth 2 times daily as needed for anxiety    Anxiety       metoprolol succinate 50 MG 24 hr tablet    TOPROL-XL    90 tablet    Take 1 tablet (50 mg) by mouth daily    Coronary artery disease involving native coronary artery of native heart without angina pectoris       MULTIVITAMIN ADULT Tabs      Take by mouth daily        nitroGLYcerin 0.4 MG sublingual tablet    NITROSTAT    25 tablet    Place 1 tablet (0.4 mg) under the tongue every 5 minutes as needed for chest pain Maximum of 3 doses in 15 minutes    CAD (coronary artery disease)       prochlorperazine 10 MG tablet    COMPAZINE    30 tablet    Take 0.5 tablets (5 mg) by mouth every 6 hours as needed (Nausea/Vomiting)    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

## 2018-03-26 NOTE — NURSING NOTE
"Oncology Rooming Note    March 26, 2018 8:44 AM   Ines Pickard is a 77 year old female who presents for:    Chief Complaint   Patient presents with     Oncology Clinic Visit     Recheck SCLC, Labs & Chemo today     Initial Vitals: /62 (BP Location: Right arm, Patient Position: Sitting, Cuff Size: Adult Regular)  Pulse 89  Temp 98.5  F (36.9  C) (Tympanic)  Resp 20  Ht 1.588 m (5' 2.52\")  Wt 75.3 kg (166 lb 1.6 oz)  SpO2 95%  Breastfeeding? No  BMI 29.88 kg/m2 Estimated body mass index is 29.88 kg/(m^2) as calculated from the following:    Height as of this encounter: 1.588 m (5' 2.52\").    Weight as of this encounter: 75.3 kg (166 lb 1.6 oz). Body surface area is 1.82 meters squared.  No Pain (0) Comment: Data Unavailable   No LMP recorded. Patient is postmenopausal.  Allergies reviewed: Yes  Medications reviewed: Yes    Medications: Medication refills not needed today.  Pharmacy name entered into [facility]: [facility] [address]    Clinical concerns: Recheck SCLC, Labs & Chemo today.     1. Patient reports she is using Compazine for nausea but it doesn't work.   2. She ant eat because she doesn't have any teeth to  Chew her food.     7  minutes for nursing intake (face to face time)     Casi Sandoval CMA              "

## 2018-03-26 NOTE — MR AVS SNAPSHOT
After Visit Summary   3/26/2018    Ines Pickard    MRN: [id number]           Patient Information     Date Of Birth          1940        Visit Information        Provider Department      3/26/2018 8:00 AM ROOM 3 [facility]        Today's Diagnoses     Need for prophylactic measure    -  1    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)           Follow-ups after your visit        Your next 10 appointments already scheduled     Mar 27, 2018  1:00 PM CDT   Level 2 with ROOM 7 [facility] ([facility])    [facility]  [address]  [facility] [facility] [address]   [phone]            Mar 28, 2018  1:00 PM CDT   Level 2 with ROOM 6 [facility] ([facility])    [facility]  [address]  [facility] [facility] [address]   [phone]            Apr 11, 2018 10:00 AM CDT   (Arrive by 9:30 AM)   PE NPET ONCOLOGY (EYES TO THIGHS) with WYPETCT1   [facility] Pet CT ([facility])    [address]  [place] [facility] [address]   [phone]           Tell your doctor:   If there is any chance you may be pregnant or if you are breastfeeding.   If you have problems lying in small spaces (claustrophobia). If you do, your doctor may give you medicine to help you relax. If you have diabetes:   Have your exam early in the morning. Your blood glucose will go up as the day goes by.   Your glucose level must be 180 or less at the start of the exam. Please take any medicines you need to ensure this blood glucose level. 24 hours before your scan: Don t do any heavy exercise. (No jogging, aerobics or other workouts.) Exercise will make your pictures less accurate. 6 hours before your scan:   Stop all food and liquids (except water).   Do not chew gum or suck on mints.   If you need to take medicine with food, you may take it with a few crackers.  Please call your Imaging  Department at your exam site with any questions.            Apr 16, 2018  8:30 AM CDT   Level 4 with ROOM 5 [facility] ([facility])    [facility]  [address]  [facility] [address]   [phone]            Apr 16, 2018  9:30 AM CDT   Return Visit with Tracy Miner MD   [facility] ([facility])    [facility]  [address]  [facility] [address]   [phone]            Apr 17, 2018  1:00 PM CDT   Level 2 with ROOM 1 [facility] ([facility])    [facility]  [address]  [facility] [address]   [phone]            Apr 18, 2018  1:00 PM CDT   Level 2 with ROOM 10 [facility] ([facility])    [facility]  [address]  [facility] [address]   [phone]              Future tests that were ordered for you today     Open Future Orders        Priority Expected Expires Ordered    PET Oncology (Eyes to Thighs) Routine 4/9/2018 3/26/2019 3/26/2018            Who to contact     If you have questions or need follow up information about today's clinic visit or your schedule please contact [facility] directly at [phone].  Normal or non-critical lab and imaging results will be communicated to you by Mel[employer], letter or phone within 4 business days after the clinic has received the results. If you do not hear from us within 7 days, please contact the clinic through [employer] or phone. If you have a critical or abnormal lab result, we will notify you by phone as soon as possible.  Submit refill requests through [employer] or call your pharmacy and they will forward the refill request to us. Please allow 3 business days for your refill to be completed.          Additional Information About Your Visit        [employer] Information     [employer] lets you send messages to your  "doctor, view your test results, renew your prescriptions, schedule appointments and more. To sign up, go to [email or web address] . Click on \"Log in\" on the left side of the screen, which will take you to the Welcome page. Then click on \"Sign up Now\" on the right side of the page.     You will be asked to enter the access code listed below, as well as some personal information. Please follow the directions to create your username and password.     Your access code is: [email or web address]  Expires: 2018  2:30 PM     Your access code will  in 90 days. If you need help or a new code, please call your [place] clinic or [phone].        Care EveryWhere ID     This is your Care EveryWhere ID. This could be used by other organizations to access your [place] medical records  [id number]        Your Vitals Were     Pulse                   74            Blood Pressure from Last 3 Encounters:   18 122/62   18 108/54   18 147/60    Weight from Last 3 Encounters:   18 75.3 kg (166 lb 1.6 oz)   18 74.4 kg (164 lb)   18 80.1 kg (176 lb 9.6 oz)              We Performed the Following     CBC with platelets differential     Comprehensive metabolic panel          Where to get your medicines      Some of these will need a paper prescription and others can be bought over the counter.  Ask your nurse if you have questions.     Bring a paper prescription for each of these medications     LORazepam 0.5 MG tablet          Primary Care Provider Office Phone # Fax #    R Emanuel Perez [phone] [address] [facility] [address]        Equal Access to Services     [facility] : Hadii doroteo galvan Sodesi, waaxda luqadaha, qaybta kaalmada caroline, hoda taylor. So [facility] [phone].    ATENCIÓN: Si habla español, tiene a nguyễn disposición servicios gratuitos de asistencia lingüística. Llame al [phone].    We comply with " applicable federal civil rights laws and [place] laws. We do not discriminate on the basis of race, color, national origin, age, disability, sex, sexual orientation, or gender identity.            Thank you!     Thank you for choosing [facility]  for your care. Our goal is always to provide you with excellent care. Hearing back from our patients is one way we can continue to improve our services. Please take a few minutes to complete the written survey that you may receive in the mail after your visit with us. Thank you!             Your Updated Medication List - Protect others around you: Learn how to safely use, store and throw away your medicines at www.disposemymeds.org.          This list is accurate as of 3/26/18  2:12 PM.  Always use your most recent med list.                   Brand Name Dispense Instructions for use Diagnosis    albuterol 108 (90 BASE) MCG/ACT Inhaler    PROAIR HFA/PROVENTIL HFA/VENTOLIN HFA    1 Inhaler    Inhale 2 puffs into the lungs 4 times daily    Acute bronchitis with symptoms > 10 days       aspirin 81 MG EC tablet     90 tablet    Take 1 tablet (81 mg) by mouth daily    CAD (coronary artery disease)       atorvastatin 40 MG tablet    LIPITOR    90 tablet    Take 1 tablet (40 mg) by mouth daily    Coronary artery disease involving native coronary artery of native heart without angina pectoris       cyanocolbalamin 100 MCG tablet    vitamin  B-12     Take 50 mcg by mouth as needed        HYDROcodone-acetaminophen 5-325 MG per tablet    NORCO    20 tablet    Take 1-2 tablets by mouth every 4 hours as needed for moderate to severe pain    Post-op pain       lidocaine-prilocaine cream    EMLA    30 g    Apply topically as needed for moderate pain    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)       lisinopril 5 MG tablet    PRINIVIL/ZESTRIL    90 tablet    Take 1 tablet (5 mg) by mouth daily    Essential hypertension with goal blood pressure less than 140/90       LORazepam 0.5  MG tablet    ATIVAN    30 tablet    Take 1 tablet (0.5 mg) by mouth 2 times daily as needed for anxiety    Anxiety       metoprolol succinate 50 MG 24 hr tablet    TOPROL-XL    90 tablet    Take 1 tablet (50 mg) by mouth daily    Coronary artery disease involving native coronary artery of native heart without angina pectoris       MULTIVITAMIN ADULT Tabs      Take by mouth daily        nitroGLYcerin 0.4 MG sublingual tablet    NITROSTAT    25 tablet    Place 1 tablet (0.4 mg) under the tongue every 5 minutes as needed for chest pain Maximum of 3 doses in 15 minutes    CAD (coronary artery disease)       prochlorperazine 10 MG tablet    COMPAZINE    30 tablet    Take 0.5 tablets (5 mg) by mouth every 6 hours as needed (Nausea/Vomiting)    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

## 2018-03-27 ENCOUNTER — INFUSION THERAPY VISIT (OUTPATIENT)
Dept: INFUSION THERAPY | Facility: CLINIC | Age: 78
End: 2018-03-27
Attending: INTERNAL MEDICINE
Payer: MEDICARE

## 2018-03-27 VITALS — HEART RATE: 71 BPM | SYSTOLIC BLOOD PRESSURE: 117 MMHG | TEMPERATURE: 97.1 F | DIASTOLIC BLOOD PRESSURE: 55 MMHG

## 2018-03-27 DIAGNOSIS — Z29.9 NEED FOR PROPHYLACTIC MEASURE: Primary | ICD-10-CM

## 2018-03-27 DIAGNOSIS — C34.92 SMALL CELL CARCINOMA OF LEFT LUNG (H): ICD-10-CM

## 2018-03-27 PROCEDURE — 25000125 ZZHC RX 250: Performed by: INTERNAL MEDICINE

## 2018-03-27 PROCEDURE — 96413 CHEMO IV INFUSION 1 HR: CPT

## 2018-03-27 PROCEDURE — 25000128 H RX IP 250 OP 636: Performed by: INTERNAL MEDICINE

## 2018-03-27 PROCEDURE — 96375 TX/PRO/DX INJ NEW DRUG ADDON: CPT

## 2018-03-27 RX ORDER — HEPARIN SODIUM (PORCINE) LOCK FLUSH IV SOLN 100 UNIT/ML 100 UNIT/ML
5 SOLUTION INTRAVENOUS
Status: DISCONTINUED | OUTPATIENT
Start: 2018-03-27 | End: 2018-03-27 | Stop reason: HOSPADM

## 2018-03-27 RX ADMIN — SODIUM CHLORIDE 250 ML: 9 INJECTION, SOLUTION INTRAVENOUS at 13:30

## 2018-03-27 RX ADMIN — SODIUM CHLORIDE, PRESERVATIVE FREE 5 ML: 5 INJECTION INTRAVENOUS at 15:19

## 2018-03-27 RX ADMIN — ETOPOSIDE 150 MG: 20 INJECTION, SOLUTION INTRAVENOUS at 14:04

## 2018-03-27 RX ADMIN — FAMOTIDINE 20 MG: 20 INJECTION, SOLUTION INTRAVENOUS at 13:49

## 2018-03-27 RX ADMIN — DEXAMETHASONE SODIUM PHOSPHATE 12 MG: 10 INJECTION, SOLUTION INTRAMUSCULAR; INTRAVENOUS at 13:33

## 2018-03-27 NOTE — MR AVS SNAPSHOT
After Visit Summary   3/27/2018    Ines Pickard    MRN: [id number]           Patient Information     Date Of Birth          1940        Visit Information        Provider Department      3/27/2018 1:00 PM ROOM 7 [facility]        Today's Diagnoses     Need for prophylactic measure    -  1    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)           Follow-ups after your visit        Your next 10 appointments already scheduled     Mar 28, 2018  1:00 PM CDT   Level 2 with ROOM 6 [facility] ([facility])    [facility]  [address]  [facility] [address]   [phone]            Apr 11, 2018 10:00 AM CDT   (Arrive by 9:30 AM)   PE NPET ONCOLOGY (EYES TO THIGHS) with WYPETCT1   [facility] ([facility])    [address] [place]  [facility] [place] [address]   [phone]           Tell your doctor:   If there is any chance you may be pregnant or if you are breastfeeding.   If you have problems lying in small spaces (claustrophobia). If you do, your doctor may give you medicine to help you relax. If you have diabetes:   Have your exam early in the morning. Your blood glucose will go up as the day goes by.   Your glucose level must be 180 or less at the start of the exam. Please take any medicines you need to ensure this blood glucose level. 24 hours before your scan: Don t do any heavy exercise. (No jogging, aerobics or other workouts.) Exercise will make your pictures less accurate. 6 hours before your scan:   Stop all food and liquids (except water).   Do not chew gum or suck on mints.   If you need to take medicine with food, you may take it with a few crackers.  Please call your Imaging Department at your exam site with any questions.            Apr 16, 2018  8:30 AM CDT   Level 4 with ROOM 5 [facility] ([facility])    [facility]  [address]  [facility] [address]   [phone]            Apr 16, 2018  9:30 AM CDT   Return Visit with Tracy Miner MD   [facility] ([facility])    [facility]  [address]  [facility] [address]   [phone]            Apr 17, 2018  1:00 PM CDT   Level 2 with ROOM 1 [facility] ([facility])    [facility]  [address]  [facility] [address]   [phone]            Apr 18, 2018  1:00 PM CDT   Level 2 with ROOM 10 [facility] ([facility])    [facility]  [address]  [facility] [address]   [phone]              Future tests that were ordered for you today     Open Future Orders        Priority Expected Expires Ordered    PET Oncology (Eyes to Thighs) Routine 4/9/2018 3/26/2019 3/26/2018            Who to contact     If you have questions or need follow up information about today's clinic visit or your schedule please contact [facility] directly at [phone].  Normal or non-critical lab and imaging results will be communicated to you by [employer], letter or phone within 4 business days after the clinic has received the results. If you do not hear from us within 7 days, please contact the clinic through [employer] or phone. If you have a critical or abnormal lab result, we will notify you by phone as soon as possible.  Submit refill requests through [employer] or call your pharmacy and they will forward the refill request to us. Please allow 3 business days for your refill to be completed.          Additional Information About Your Visit        [employer] Information     [employer] lets you send messages to your doctor, view your test results, renew your prescriptions, schedule appointments and more. To sign up, go to [email or web address] . Click on \"Log in\" on the left side of the screen, which will take you to the Welcome " "page. Then click on \"Sign up Now\" on the right side of the page.     You will be asked to enter the access code listed below, as well as some personal information. Please follow the directions to create your username and password.     Your access code is: [email or web address]  Expires: 2018  2:30 PM     Your access code will  in 90 days. If you need help or a new code, please call your [facility] clinic or [phone].        Care EveryWhere ID     This is your Care EveryWhere ID. This could be used by other organizations to access your [facility] medical records  [id number]        Your Vitals Were     Pulse Temperature                71 97.1  F (36.2  C) (Tympanic)           Blood Pressure from Last 3 Encounters:   18 117/55   18 122/62   18 108/54    Weight from Last 3 Encounters:   18 75.3 kg (166 lb 1.6 oz)   18 74.4 kg (164 lb)   18 80.1 kg (176 lb 9.6 oz)              Today, you had the following     No orders found for display       Primary Care Provider Office Phone # Fax #    R Emanuel Perez [phone] [address] [address]        Equal Access to Services     [facility] : Hadii doroteo ku hadasho Soomaali, waaxda luqadaha, qaybta kaalmada adeegyada, hoda grayson hayotilio maddox . So [facility] [phone].    ATENCIÓN: Si habla español, tiene a nguyễn disposición servicios gratuitos de asistencia lingüística. Llame al [phone].    We comply with applicable federal civil rights laws and [place] laws. We do not discriminate on the basis of race, color, national origin, age, disability, sex, sexual orientation, or gender identity.            Thank you!     Thank you for choosing [facility]  for your care. Our goal is always to provide you with excellent care. Hearing back from our patients is one way we can continue to improve our services. Please take a few minutes to complete the written survey that you may " receive in the mail after your visit with us. Thank you!             Your Updated Medication List - Protect others around you: Learn how to safely use, store and throw away your medicines at www.disposemymeds.org.          This list is accurate as of 3/27/18  4:16 PM.  Always use your most recent med list.                   Brand Name Dispense Instructions for use Diagnosis    albuterol 108 (90 BASE) MCG/ACT Inhaler    PROAIR HFA/PROVENTIL HFA/VENTOLIN HFA    1 Inhaler    Inhale 2 puffs into the lungs 4 times daily    Acute bronchitis with symptoms > 10 days       aspirin 81 MG EC tablet     90 tablet    Take 1 tablet (81 mg) by mouth daily    CAD (coronary artery disease)       atorvastatin 40 MG tablet    LIPITOR    90 tablet    Take 1 tablet (40 mg) by mouth daily    Coronary artery disease involving native coronary artery of native heart without angina pectoris       cyanocolbalamin 100 MCG tablet    vitamin  B-12     Take 50 mcg by mouth as needed        HYDROcodone-acetaminophen 5-325 MG per tablet    NORCO    20 tablet    Take 1-2 tablets by mouth every 4 hours as needed for moderate to severe pain    Post-op pain       lidocaine-prilocaine cream    EMLA    30 g    Apply topically as needed for moderate pain    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)       lisinopril 5 MG tablet    PRINIVIL/ZESTRIL    90 tablet    Take 1 tablet (5 mg) by mouth daily    Essential hypertension with goal blood pressure less than 140/90       LORazepam 0.5 MG tablet    ATIVAN    30 tablet    Take 1 tablet (0.5 mg) by mouth 2 times daily as needed for anxiety    Anxiety       metoprolol succinate 50 MG 24 hr tablet    TOPROL-XL    90 tablet    Take 1 tablet (50 mg) by mouth daily    Coronary artery disease involving native coronary artery of native heart without angina pectoris       MULTIVITAMIN ADULT Tabs      Take by mouth daily        nitroGLYcerin 0.4 MG sublingual tablet    NITROSTAT    25 tablet    Place 1 tablet (0.4 mg)  under the tongue every 5 minutes as needed for chest pain Maximum of 3 doses in 15 minutes    CAD (coronary artery disease)       prochlorperazine 10 MG tablet    COMPAZINE    30 tablet    Take 0.5 tablets (5 mg) by mouth every 6 hours as needed (Nausea/Vomiting)    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

## 2018-03-27 NOTE — PROGRESS NOTES
Infusion Nursing Note:  Ines Pickard presents today for IV Etoposide.    Patient seen by provider today: No   present during visit today: Not Applicable.    Note: IV premeds and IV Etoposide infused via her port per [facility] protocol. Port flushed per [facility] protocol. Pt. To return tomorrow for D3.    Intravenous Access:  Port flushed per [facility] protocol.    Treatment Conditions:  Not Applicable.      Post Infusion Assessment:  Patient tolerated infusion without incident.  Blood return noted pre and post infusion.  Site patent and intact, free from redness, edema or discomfort.  No evidence of extravasations.    Discharge Plan:   Patient and/or family verbalized understanding of discharge instructions and all questions answered.  Patient discharged in stable condition accompanied by: friend.  Departure Mode: Ambulatory.    Leni Herrera RN

## 2018-03-28 ENCOUNTER — INFUSION THERAPY VISIT (OUTPATIENT)
Dept: INFUSION THERAPY | Facility: CLINIC | Age: 78
End: 2018-03-28
Attending: INTERNAL MEDICINE
Payer: MEDICARE

## 2018-03-28 VITALS — SYSTOLIC BLOOD PRESSURE: 118 MMHG | TEMPERATURE: 96.8 F | HEART RATE: 76 BPM | DIASTOLIC BLOOD PRESSURE: 47 MMHG

## 2018-03-28 DIAGNOSIS — I10 ESSENTIAL HYPERTENSION WITH GOAL BLOOD PRESSURE LESS THAN 140/90: ICD-10-CM

## 2018-03-28 DIAGNOSIS — C34.92 SMALL CELL CARCINOMA OF LEFT LUNG (H): ICD-10-CM

## 2018-03-28 DIAGNOSIS — J20.9 ACUTE BRONCHITIS WITH SYMPTOMS > 10 DAYS: ICD-10-CM

## 2018-03-28 DIAGNOSIS — Z29.9 NEED FOR PROPHYLACTIC MEASURE: Primary | ICD-10-CM

## 2018-03-28 PROCEDURE — 25000125 ZZHC RX 250: Performed by: INTERNAL MEDICINE

## 2018-03-28 PROCEDURE — 25000128 H RX IP 250 OP 636: Performed by: INTERNAL MEDICINE

## 2018-03-28 PROCEDURE — 96377 APPLICATON ON-BODY INJECTOR: CPT | Mod: XU

## 2018-03-28 PROCEDURE — 96413 CHEMO IV INFUSION 1 HR: CPT

## 2018-03-28 PROCEDURE — 96375 TX/PRO/DX INJ NEW DRUG ADDON: CPT

## 2018-03-28 RX ORDER — HEPARIN SODIUM (PORCINE) LOCK FLUSH IV SOLN 100 UNIT/ML 100 UNIT/ML
5 SOLUTION INTRAVENOUS
Status: DISCONTINUED | OUTPATIENT
Start: 2018-03-28 | End: 2018-03-28 | Stop reason: HOSPADM

## 2018-03-28 RX ADMIN — SODIUM CHLORIDE 250 ML: 9 INJECTION, SOLUTION INTRAVENOUS at 13:12

## 2018-03-28 RX ADMIN — FAMOTIDINE 20 MG: 20 INJECTION, SOLUTION INTRAVENOUS at 13:30

## 2018-03-28 RX ADMIN — PEGFILGRASTIM 6 MG: KIT SUBCUTANEOUS at 14:58

## 2018-03-28 RX ADMIN — ETOPOSIDE 150 MG: 20 INJECTION, SOLUTION INTRAVENOUS at 13:46

## 2018-03-28 RX ADMIN — SODIUM CHLORIDE, PRESERVATIVE FREE 5 ML: 5 INJECTION INTRAVENOUS at 14:53

## 2018-03-28 RX ADMIN — DEXAMETHASONE SODIUM PHOSPHATE 12 MG: 10 INJECTION, SOLUTION INTRAMUSCULAR; INTRAVENOUS at 13:14

## 2018-03-28 NOTE — MR AVS SNAPSHOT
After Visit Summary   3/28/2018    Ines Pickard    MRN: [id number]           Patient Information     Date Of Birth          1940        Visit Information        Provider Department      3/28/2018 1:00 PM ROOM 6 [facility]        Today's Diagnoses     Need for prophylactic measure    -  1    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)           Follow-ups after your visit        Your next 10 appointments already scheduled     Apr 11, 2018 10:00 AM CDT   (Arrive by 9:30 AM)   PE NPET ONCOLOGY (EYES TO THIGHS) with WYPETCT1   [facility] ([facility])    [address] [facility] [address]   [phone]           Tell your doctor:   If there is any chance you may be pregnant or if you are breastfeeding.   If you have problems lying in small spaces (claustrophobia). If you do, your doctor may give you medicine to help you relax. If you have diabetes:   Have your exam early in the morning. Your blood glucose will go up as the day goes by.   Your glucose level must be 180 or less at the start of the exam. Please take any medicines you need to ensure this blood glucose level. 24 hours before your scan: Don t do any heavy exercise. (No jogging, aerobics or other workouts.) Exercise will make your pictures less accurate. 6 hours before your scan:   Stop all food and liquids (except water).   Do not chew gum or suck on mints.   If you need to take medicine with food, you may take it with a few crackers.  Please call your Imaging Department at your exam site with any questions.            Apr 16, 2018  8:30 AM CDT   Level 4 with ROOM 5 [facility] ([facility])    [facility]  [address]  [facility] [address]   [phone]            Apr 16, 2018  9:30 AM CDT   Return Visit with Tracy Miner MD   [facility] ([facility])    [facility]  [address]  [address]  [facility] [place] [address]   [phone]            2018  1:00 PM CDT   Level 2 with ROOM 1 [facility] ([facility])    [facility]  [address]  [place] [address]   [phone]            2018  1:00 PM CDT   Level 2 with ROOM 10 [facility] ([facility])    [facility]  [address]  [facility] [address]   [phone]              Who to contact     If you have questions or need follow up information about today's clinic visit or your schedule please contact [facility] directly at [phone].  Normal or non-critical lab and imaging results will be communicated to you by MyChart, letter or phone within 4 business days after the clinic has received the results. If you do not hear from us within 7 days, please contact the clinic through MyChart or phone. If you have a critical or abnormal lab result, we will notify you by phone as soon as possible.  Submit refill requests through [employer] or call your pharmacy and they will forward the refill request to us. Please allow 3 business days for your refill to be completed.          Additional Information About Your Visit        [employer] Information     [employer] lets you send messages to your doctor, view your test results, renew your prescriptions, schedule appointments and more. To sign up, go to [email or web address] . Click on \"Log in\" on the left side of the screen, which will take you to the Welcome page. Then click on \"Sign up Now\" on the right side of the page.     You will be asked to enter the access code listed below, as well as some personal information. Please follow the directions to create your username and password.     Your access code is: [email or web address]  Expires: 2018  2:30 PM     Your access code will  in 90 days. If you need help or a new code, please call your [place] clinic or " [phone].        Care EveryWhere ID     This is your Care EveryWhere ID. This could be used by other organizations to access your [facility] medical records  [id number]        Your Vitals Were     Pulse Temperature                76 96.8  F (36  C) (Oral)           Blood Pressure from Last 3 Encounters:   03/28/18 118/47   03/27/18 117/55   03/26/18 122/62    Weight from Last 3 Encounters:   03/26/18 75.3 kg (166 lb 1.6 oz)   03/05/18 74.4 kg (164 lb)   02/13/18 80.1 kg (176 lb 9.6 oz)              Today, you had the following     No orders found for display       Primary Care Provider Office Phone # Fax #    R Emanuel Perez [phone] [address] [facility] [address]        Equal Access to Services     [facility]I [facility] : Hadii doroteo galvan Sodesi, waaxda luqadaha, qaybta kaalmada caroline, hoda maddox . So [facility] [phone].    ATENCIÓN: Si habla español, tiene a nguyễn disposición servicios gratuitos de asistencia lingüística. Llame al [phone].    We comply with applicable federal civil rights laws and [place] laws. We do not discriminate on the basis of race, color, national origin, age, disability, sex, sexual orientation, or gender identity.            Thank you!     Thank you for choosing [facility]  for your care. Our goal is always to provide you with excellent care. Hearing back from our patients is one way we can continue to improve our services. Please take a few minutes to complete the written survey that you may receive in the mail after your visit with us. Thank you!             Your Updated Medication List - Protect others around you: Learn how to safely use, store and throw away your medicines at www.disposemymeds.org.          This list is accurate as of 3/28/18  3:31 PM.  Always use your most recent med list.                   Brand Name Dispense Instructions for use Diagnosis    albuterol 108 (90 BASE) MCG/ACT Inhaler     PROAIR HFA/PROVENTIL HFA/VENTOLIN HFA    1 Inhaler    Inhale 2 puffs into the lungs 4 times daily    Acute bronchitis with symptoms > 10 days       aspirin 81 MG EC tablet     90 tablet    Take 1 tablet (81 mg) by mouth daily    CAD (coronary artery disease)       atorvastatin 40 MG tablet    LIPITOR    90 tablet    Take 1 tablet (40 mg) by mouth daily    Coronary artery disease involving native coronary artery of native heart without angina pectoris       cyanocolbalamin 100 MCG tablet    vitamin  B-12     Take 50 mcg by mouth as needed        HYDROcodone-acetaminophen 5-325 MG per tablet    NORCO    20 tablet    Take 1-2 tablets by mouth every 4 hours as needed for moderate to severe pain    Post-op pain       lidocaine-prilocaine cream    EMLA    30 g    Apply topically as needed for moderate pain    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)       lisinopril 5 MG tablet    PRINIVIL/ZESTRIL    90 tablet    Take 1 tablet (5 mg) by mouth daily    Essential hypertension with goal blood pressure less than 140/90       LORazepam 0.5 MG tablet    ATIVAN    30 tablet    Take 1 tablet (0.5 mg) by mouth 2 times daily as needed for anxiety    Anxiety       metoprolol succinate 50 MG 24 hr tablet    TOPROL-XL    90 tablet    Take 1 tablet (50 mg) by mouth daily    Coronary artery disease involving native coronary artery of native heart without angina pectoris       MULTIVITAMIN ADULT Tabs      Take by mouth daily        nitroGLYcerin 0.4 MG sublingual tablet    NITROSTAT    25 tablet    Place 1 tablet (0.4 mg) under the tongue every 5 minutes as needed for chest pain Maximum of 3 doses in 15 minutes    CAD (coronary artery disease)       prochlorperazine 10 MG tablet    COMPAZINE    30 tablet    Take 0.5 tablets (5 mg) by mouth every 6 hours as needed (Nausea/Vomiting)    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)       ranitidine 75 MG tablet    ZANTAC    30 tablet    Take 1 tablet (75 mg) by mouth 2 times daily    Esophageal  reflux       TYLENOL PO      Take 1,000 mg by mouth every 6 hours as needed

## 2018-03-28 NOTE — TELEPHONE ENCOUNTER
"Requested Prescriptions   Pending Prescriptions Disp Refills     lisinopril (PRINIVIL/ZESTRIL) 5 MG tablet  Last Written Prescription Date:  03/21/2017  Last Fill Quantity: 90,  # refills: 3   Last office visit: 1/19/2018 with prescribing provider:  post   Future Office Visit:   Next 5 appointments (look out 90 days)     Apr 16, 2018  9:30 AM CDT   Return Visit with Tracy Miner MD   [facility] ([facility])    [facility]  [address]  [facility] [address]   [phone]                  90 tablet 3     Sig: Take 1 tablet (5 mg) by mouth daily    ACE Inhibitors (Including Combos) Protocol Passed    3/28/2018  1:26 PM       Passed - Blood pressure under 140/90 in past 12 months    BP Readings from Last 3 Encounters:   03/28/18 122/57   03/27/18 117/55   03/26/18 122/62                Passed - Recent (12 mo) or future (30 days) visit within the authorizing provider's specialty    Patient had office visit in the last 12 months or has a visit in the next 30 days with authorizing provider or within the authorizing provider's specialty.  See \"Patient Info\" tab in inbasket, or \"Choose Columns\" in Meds & Orders section of the refill encounter.           Passed - Patient is age 18 or older       Passed - No active pregnancy on record       Passed - Normal serum creatinine on file in past 12 months    Recent Labs   Lab Test  03/26/18   0800   CR  0.52            Passed - Normal serum potassium on file in past 12 months    Recent Labs   Lab Test  03/26/18   0800   POTASSIUM  3.9            Passed - No positive pregnancy test in past 12 months        albuterol (PROAIR HFA/PROVENTIL HFA/VENTOLIN HFA) 108 (90 BASE) MCG/ACT Inhaler  Last Written Prescription Date:  11/16/2017  Last Fill Quantity: 1,  # refills: 2   Last office visit: 1/19/2018 with prescribing provider:  post   Future Office Visit:   Next 5 appointments (look out 90 days)     Apr 16, 2018  9:30 AM CDT " "  Return Visit with Tracy Miner MD   [facility] ([facility])    [facility]  [address]  [facility] [place] [address] [phone]                  1 Inhaler 2     Sig: Inhale 2 puffs into the lungs 4 times daily    Asthma Maintenance Inhalers - Anticholinergics Passed    3/28/2018  1:26 PM       Passed - Patient is age 12 years or older       Passed - Recent (12 mo) or future (30 days) visit within the authorizing provider's specialty    Patient had office visit in the last 12 months or has a visit in the next 30 days with authorizing provider or within the authorizing provider's specialty.  See \"Patient Info\" tab in inbasket, or \"Choose Columns\" in Meds & Orders section of the refill encounter.            Bam Bowser RT (R)    "

## 2018-03-28 NOTE — PROGRESS NOTES
Infusion Nursing Note:  Ines Pickard presents today for C3D3 Etoposide and On Pro Neulasta.    Patient seen by provider today: No   present during visit today: Not Applicable.    Note: IV premeds and IV Etoposide given via her port per [facility] protocol without complications. Port flushed per [facility] protocol. On pro Neulasta injector applied to the left arm.  Pt given instructions and verbal teaching about the on body injector. Questions answered and pt verbalizes understanding of teaching. Pt. To return on 4/16/18 for labs, MD appt., and chemotherapy.      Intravenous Access:  No Intravenous access/labs at this visit.    Treatment Conditions:  Not Applicable.      Post Infusion Assessment:  Patient tolerated infusion without incident.  Blood return noted pre and post infusion.  Site patent and intact, free from redness, edema or discomfort.  No evidence of extravasations.  Access discontinued per protocol.    Discharge Plan:   Patient and/or family verbalized understanding of discharge instructions and all questions answered.  Patient discharged in stable condition accompanied by: self.  Departure Mode: Ambulatory.    Leni Herrera RN

## 2018-03-30 RX ORDER — LISINOPRIL 5 MG/1
5 TABLET ORAL DAILY
Qty: 90 TABLET | Refills: 2 | Status: SHIPPED | OUTPATIENT
Start: 2018-03-30 | End: 2018-01-01

## 2018-03-30 RX ORDER — ALBUTEROL SULFATE 90 UG/1
2 AEROSOL, METERED RESPIRATORY (INHALATION) 4 TIMES DAILY
Qty: 1 INHALER | Refills: 2 | Status: SHIPPED | OUTPATIENT
Start: 2018-03-30 | End: 2018-01-01

## 2018-04-10 DIAGNOSIS — F41.9 ANXIETY: ICD-10-CM

## 2018-04-10 RX ORDER — LORAZEPAM 0.5 MG/1
0.5 TABLET ORAL 2 TIMES DAILY PRN
Qty: 30 TABLET | Refills: 0 | Status: SHIPPED | OUTPATIENT
Start: 2018-04-10 | End: 2018-04-24

## 2018-04-10 NOTE — PROGRESS NOTES
Fax received from [facility] requesting a refill of Lorazepam on behalf of pt.  Last refill: 3/26/18  # 30 with 0 refills at same as above.  Last office visit:  3/26/18  Next office visit:  4/16/18    This is an appropriate refill, and has been called in. Bella Arvizu, RN, BSN, OCN

## 2018-04-11 ENCOUNTER — HOSPITAL ENCOUNTER (OUTPATIENT)
Dept: PET IMAGING | Facility: CLINIC | Age: 78
Discharge: HOME | End: 2018-04-11
Attending: INTERNAL MEDICINE | Admitting: INTERNAL MEDICINE
Payer: MEDICARE

## 2018-04-11 DIAGNOSIS — C34.12 MALIGNANT NEOPLASM OF UPPER LOBE OF LEFT LUNG (H): ICD-10-CM

## 2018-04-11 PROCEDURE — 78815 PET IMAGE W/CT SKULL-THIGH: CPT | Mod: PS

## 2018-04-11 PROCEDURE — A9552 F18 FDG: HCPCS | Performed by: INTERNAL MEDICINE

## 2018-04-11 PROCEDURE — 34300033 ZZH RX 343: Performed by: INTERNAL MEDICINE

## 2018-04-11 RX ADMIN — FLUDEOXYGLUCOSE F-18 13.66 MCI.: 500 INJECTION, SOLUTION INTRAVENOUS at 10:15

## 2018-04-16 ENCOUNTER — INFUSION THERAPY VISIT (OUTPATIENT)
Dept: INFUSION THERAPY | Facility: CLINIC | Age: 78
End: 2018-04-16
Attending: INTERNAL MEDICINE
Payer: MEDICARE

## 2018-04-16 ENCOUNTER — HOSPITAL ENCOUNTER (OUTPATIENT)
Facility: CLINIC | Age: 78
Discharge: HOME | End: 2018-04-16
Attending: INTERNAL MEDICINE | Admitting: INTERNAL MEDICINE
Payer: MEDICARE

## 2018-04-16 ENCOUNTER — ONCOLOGY VISIT (OUTPATIENT)
Dept: ONCOLOGY | Facility: CLINIC | Age: 78
End: 2018-04-16
Attending: INTERNAL MEDICINE
Payer: MEDICARE

## 2018-04-16 VITALS
HEART RATE: 85 BPM | BODY MASS INDEX: 29.06 KG/M2 | WEIGHT: 164 LBS | HEIGHT: 63 IN | OXYGEN SATURATION: 97 % | RESPIRATION RATE: 16 BRPM | TEMPERATURE: 97.9 F | SYSTOLIC BLOOD PRESSURE: 141 MMHG | DIASTOLIC BLOOD PRESSURE: 71 MMHG

## 2018-04-16 VITALS — HEART RATE: 84 BPM | DIASTOLIC BLOOD PRESSURE: 48 MMHG | SYSTOLIC BLOOD PRESSURE: 122 MMHG

## 2018-04-16 DIAGNOSIS — F41.9 ANXIETY: ICD-10-CM

## 2018-04-16 DIAGNOSIS — C34.12 MALIGNANT NEOPLASM OF UPPER LOBE OF LEFT LUNG (H): Primary | ICD-10-CM

## 2018-04-16 DIAGNOSIS — C34.92 SMALL CELL CARCINOMA OF LEFT LUNG (H): ICD-10-CM

## 2018-04-16 DIAGNOSIS — K12.30 MUCOSITIS: ICD-10-CM

## 2018-04-16 DIAGNOSIS — R06.02 SOB (SHORTNESS OF BREATH): ICD-10-CM

## 2018-04-16 DIAGNOSIS — Z29.9 NEED FOR PROPHYLACTIC MEASURE: Primary | ICD-10-CM

## 2018-04-16 DIAGNOSIS — D64.9 ANEMIA, UNSPECIFIED TYPE: ICD-10-CM

## 2018-04-16 LAB
ALBUMIN SERPL-MCNC: 3.6 G/DL (ref 3.4–5)
ALP SERPL-CCNC: 61 U/L (ref 40–150)
ALT SERPL W P-5'-P-CCNC: 14 U/L (ref 0–50)
ANION GAP SERPL CALCULATED.3IONS-SCNC: 8 MMOL/L (ref 3–14)
AST SERPL W P-5'-P-CCNC: 15 U/L (ref 0–45)
BASOPHILS # BLD AUTO: 0.1 10E9/L (ref 0–0.2)
BASOPHILS NFR BLD AUTO: 0.5 %
BILIRUB SERPL-MCNC: 0.7 MG/DL (ref 0.2–1.3)
BUN SERPL-MCNC: 11 MG/DL (ref 7–30)
CALCIUM SERPL-MCNC: 8.7 MG/DL (ref 8.5–10.1)
CHLORIDE SERPL-SCNC: 103 MMOL/L (ref 94–109)
CO2 SERPL-SCNC: 25 MMOL/L (ref 20–32)
CREAT SERPL-MCNC: 0.45 MG/DL (ref 0.52–1.04)
DIFFERENTIAL METHOD BLD: ABNORMAL
EOSINOPHIL # BLD AUTO: 0.1 10E9/L (ref 0–0.7)
EOSINOPHIL NFR BLD AUTO: 0.4 %
ERYTHROCYTE [DISTWIDTH] IN BLOOD BY AUTOMATED COUNT: 22.4 % (ref 10–15)
GFR SERPL CREATININE-BSD FRML MDRD: >90 ML/MIN/1.7M2
GLUCOSE SERPL-MCNC: 106 MG/DL (ref 70–99)
HCT VFR BLD AUTO: 29.6 % (ref 35–47)
HGB BLD-MCNC: 9.7 G/DL (ref 11.7–15.7)
LYMPHOCYTES # BLD AUTO: 2.6 10E9/L (ref 0.8–5.3)
LYMPHOCYTES NFR BLD AUTO: 21.4 %
MCH RBC QN AUTO: 27.3 PG (ref 26.5–33)
MCHC RBC AUTO-ENTMCNC: 32.8 G/DL (ref 31.5–36.5)
MCV RBC AUTO: 83 FL (ref 78–100)
MONOCYTES # BLD AUTO: 1 10E9/L (ref 0–1.3)
MONOCYTES NFR BLD AUTO: 8.2 %
NEUTROPHILS # BLD AUTO: 8.4 10E9/L (ref 1.6–8.3)
NEUTROPHILS NFR BLD AUTO: 69.5 %
PLATELET # BLD AUTO: 363 10E9/L (ref 150–450)
POTASSIUM SERPL-SCNC: 3.7 MMOL/L (ref 3.4–5.3)
PROT SERPL-MCNC: 6.4 G/DL (ref 6.8–8.8)
RBC # BLD AUTO: 3.55 10E12/L (ref 3.8–5.2)
SODIUM SERPL-SCNC: 136 MMOL/L (ref 133–144)
WBC # BLD AUTO: 12.1 10E9/L (ref 4–11)

## 2018-04-16 PROCEDURE — 25000128 H RX IP 250 OP 636: Performed by: INTERNAL MEDICINE

## 2018-04-16 PROCEDURE — 96375 TX/PRO/DX INJ NEW DRUG ADDON: CPT

## 2018-04-16 PROCEDURE — 99215 OFFICE O/P EST HI 40 MIN: CPT | Performed by: INTERNAL MEDICINE

## 2018-04-16 PROCEDURE — 25000125 ZZHC RX 250: Performed by: INTERNAL MEDICINE

## 2018-04-16 PROCEDURE — 80053 COMPREHEN METABOLIC PANEL: CPT | Performed by: INTERNAL MEDICINE

## 2018-04-16 PROCEDURE — 96413 CHEMO IV INFUSION 1 HR: CPT

## 2018-04-16 PROCEDURE — 85025 COMPLETE CBC W/AUTO DIFF WBC: CPT | Performed by: INTERNAL MEDICINE

## 2018-04-16 PROCEDURE — 96367 TX/PROPH/DG ADDL SEQ IV INF: CPT

## 2018-04-16 PROCEDURE — 96417 CHEMO IV INFUS EACH ADDL SEQ: CPT

## 2018-04-16 RX ORDER — DIPHENHYDRAMINE HYDROCHLORIDE 50 MG/ML
50 INJECTION INTRAMUSCULAR; INTRAVENOUS
Status: CANCELLED
Start: 2018-04-19

## 2018-04-16 RX ORDER — HEPARIN SODIUM (PORCINE) LOCK FLUSH IV SOLN 100 UNIT/ML 100 UNIT/ML
5 SOLUTION INTRAVENOUS
Status: CANCELLED | OUTPATIENT
Start: 2018-04-17

## 2018-04-16 RX ORDER — EPINEPHRINE 0.3 MG/.3ML
0.3 INJECTION SUBCUTANEOUS EVERY 5 MIN PRN
Status: CANCELLED | OUTPATIENT
Start: 2018-04-19

## 2018-04-16 RX ORDER — MEPERIDINE HYDROCHLORIDE 25 MG/ML
25 INJECTION INTRAMUSCULAR; INTRAVENOUS; SUBCUTANEOUS EVERY 30 MIN PRN
Status: CANCELLED | OUTPATIENT
Start: 2018-04-18

## 2018-04-16 RX ORDER — LORAZEPAM 2 MG/ML
0.5 INJECTION INTRAMUSCULAR EVERY 4 HOURS PRN
Status: CANCELLED
Start: 2018-04-18

## 2018-04-16 RX ORDER — EPINEPHRINE 1 MG/ML
0.3 INJECTION, SOLUTION, CONCENTRATE INTRAVENOUS EVERY 5 MIN PRN
Status: CANCELLED | OUTPATIENT
Start: 2018-04-17

## 2018-04-16 RX ORDER — METHYLPREDNISOLONE SODIUM SUCCINATE 125 MG/2ML
125 INJECTION, POWDER, LYOPHILIZED, FOR SOLUTION INTRAMUSCULAR; INTRAVENOUS
Status: CANCELLED
Start: 2018-04-17

## 2018-04-16 RX ORDER — ALBUTEROL SULFATE 0.83 MG/ML
2.5 SOLUTION RESPIRATORY (INHALATION)
Status: CANCELLED | OUTPATIENT
Start: 2018-04-17

## 2018-04-16 RX ORDER — LORAZEPAM 2 MG/ML
0.5 INJECTION INTRAMUSCULAR EVERY 4 HOURS PRN
Status: CANCELLED
Start: 2018-04-17

## 2018-04-16 RX ORDER — ALBUTEROL SULFATE 0.83 MG/ML
2.5 SOLUTION RESPIRATORY (INHALATION)
Status: CANCELLED | OUTPATIENT
Start: 2018-04-18

## 2018-04-16 RX ORDER — EPINEPHRINE 1 MG/ML
0.3 INJECTION, SOLUTION, CONCENTRATE INTRAVENOUS EVERY 5 MIN PRN
Status: CANCELLED | OUTPATIENT
Start: 2018-04-18

## 2018-04-16 RX ORDER — METHYLPREDNISOLONE SODIUM SUCCINATE 125 MG/2ML
125 INJECTION, POWDER, LYOPHILIZED, FOR SOLUTION INTRAMUSCULAR; INTRAVENOUS
Status: CANCELLED
Start: 2018-04-18

## 2018-04-16 RX ORDER — MEPERIDINE HYDROCHLORIDE 25 MG/ML
25 INJECTION INTRAMUSCULAR; INTRAVENOUS; SUBCUTANEOUS EVERY 30 MIN PRN
Status: CANCELLED | OUTPATIENT
Start: 2018-04-19

## 2018-04-16 RX ORDER — EPINEPHRINE 1 MG/ML
0.3 INJECTION, SOLUTION, CONCENTRATE INTRAVENOUS EVERY 5 MIN PRN
Status: CANCELLED | OUTPATIENT
Start: 2018-04-19

## 2018-04-16 RX ORDER — MEPERIDINE HYDROCHLORIDE 25 MG/ML
25 INJECTION INTRAMUSCULAR; INTRAVENOUS; SUBCUTANEOUS EVERY 30 MIN PRN
Status: CANCELLED | OUTPATIENT
Start: 2018-04-17

## 2018-04-16 RX ORDER — ALBUTEROL SULFATE 90 UG/1
1-2 AEROSOL, METERED RESPIRATORY (INHALATION)
Status: CANCELLED
Start: 2018-04-17

## 2018-04-16 RX ORDER — SODIUM CHLORIDE 9 MG/ML
1000 INJECTION, SOLUTION INTRAVENOUS CONTINUOUS PRN
Status: CANCELLED
Start: 2018-04-19

## 2018-04-16 RX ORDER — SODIUM CHLORIDE 9 MG/ML
1000 INJECTION, SOLUTION INTRAVENOUS CONTINUOUS PRN
Status: CANCELLED
Start: 2018-04-17

## 2018-04-16 RX ORDER — HEPARIN SODIUM (PORCINE) LOCK FLUSH IV SOLN 100 UNIT/ML 100 UNIT/ML
5 SOLUTION INTRAVENOUS
Status: CANCELLED | OUTPATIENT
Start: 2018-04-19

## 2018-04-16 RX ORDER — LORAZEPAM 2 MG/ML
0.5 INJECTION INTRAMUSCULAR EVERY 4 HOURS PRN
Status: CANCELLED
Start: 2018-04-19

## 2018-04-16 RX ORDER — DIPHENHYDRAMINE HYDROCHLORIDE 50 MG/ML
50 INJECTION INTRAMUSCULAR; INTRAVENOUS
Status: CANCELLED
Start: 2018-04-17

## 2018-04-16 RX ORDER — DIPHENHYDRAMINE HYDROCHLORIDE 50 MG/ML
50 INJECTION INTRAMUSCULAR; INTRAVENOUS
Status: CANCELLED
Start: 2018-04-18

## 2018-04-16 RX ORDER — ALBUTEROL SULFATE 90 UG/1
1-2 AEROSOL, METERED RESPIRATORY (INHALATION)
Status: CANCELLED
Start: 2018-04-19

## 2018-04-16 RX ORDER — SODIUM CHLORIDE 9 MG/ML
1000 INJECTION, SOLUTION INTRAVENOUS CONTINUOUS PRN
Status: CANCELLED
Start: 2018-04-18

## 2018-04-16 RX ORDER — ALBUTEROL SULFATE 90 UG/1
1-2 AEROSOL, METERED RESPIRATORY (INHALATION)
Status: CANCELLED
Start: 2018-04-18

## 2018-04-16 RX ORDER — METHYLPREDNISOLONE SODIUM SUCCINATE 125 MG/2ML
125 INJECTION, POWDER, LYOPHILIZED, FOR SOLUTION INTRAMUSCULAR; INTRAVENOUS
Status: CANCELLED
Start: 2018-04-19

## 2018-04-16 RX ORDER — HEPARIN SODIUM (PORCINE) LOCK FLUSH IV SOLN 100 UNIT/ML 100 UNIT/ML
5 SOLUTION INTRAVENOUS
Status: DISCONTINUED | OUTPATIENT
Start: 2018-04-16 | End: 2018-04-16 | Stop reason: HOSPADM

## 2018-04-16 RX ORDER — HEPARIN SODIUM (PORCINE) LOCK FLUSH IV SOLN 100 UNIT/ML 100 UNIT/ML
5 SOLUTION INTRAVENOUS
Status: CANCELLED | OUTPATIENT
Start: 2018-04-18

## 2018-04-16 RX ORDER — EPINEPHRINE 0.3 MG/.3ML
0.3 INJECTION SUBCUTANEOUS EVERY 5 MIN PRN
Status: CANCELLED | OUTPATIENT
Start: 2018-04-18

## 2018-04-16 RX ORDER — EPINEPHRINE 0.3 MG/.3ML
0.3 INJECTION SUBCUTANEOUS EVERY 5 MIN PRN
Status: CANCELLED | OUTPATIENT
Start: 2018-04-17

## 2018-04-16 RX ORDER — ALBUTEROL SULFATE 0.83 MG/ML
2.5 SOLUTION RESPIRATORY (INHALATION)
Status: CANCELLED | OUTPATIENT
Start: 2018-04-19

## 2018-04-16 RX ADMIN — CARBOPLATIN 615 MG: 10 INJECTION, SOLUTION INTRAVENOUS at 11:26

## 2018-04-16 RX ADMIN — SODIUM CHLORIDE 250 ML: 9 INJECTION, SOLUTION INTRAVENOUS at 10:32

## 2018-04-16 RX ADMIN — DEXAMETHASONE SODIUM PHOSPHATE: 10 INJECTION, SOLUTION INTRAMUSCULAR; INTRAVENOUS at 10:32

## 2018-04-16 RX ADMIN — ETOPOSIDE 150 MG: 20 INJECTION, SOLUTION INTRAVENOUS at 12:07

## 2018-04-16 RX ADMIN — SODIUM CHLORIDE, PRESERVATIVE FREE 5 ML: 5 INJECTION INTRAVENOUS at 13:20

## 2018-04-16 RX ADMIN — FAMOTIDINE 20 MG: 20 INJECTION, SOLUTION INTRAVENOUS at 11:00

## 2018-04-16 ASSESSMENT — PAIN SCALES - GENERAL: PAINLEVEL: NO PAIN (0)

## 2018-04-16 NOTE — MR AVS SNAPSHOT
After Visit Summary   4/16/2018    Ines Pickard    MRN: [id number]           Patient Information     Date Of Birth          1940        Visit Information        Provider Department      4/16/2018 8:30 AM ROOM 5 [facility]        Today's Diagnoses     Need for prophylactic measure    -  1    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)           Follow-ups after your visit        Your next 10 appointments already scheduled     Apr 17, 2018  1:00 PM CDT   Level 2 with ROOM 1 [facility] ([facility])    [facility]  [address]  [place] [address]   [phone]            Apr 18, 2018  1:00 PM CDT   Level 2 with ROOM 10 [facility] ([facility])    [facility]  [address]  [place] [address]   [phone]            May 07, 2018  8:00 AM CDT   Level 4 with ROOM 5 [facility] ([facility])    [facility]  [address]  [facility] [address]   [phone]            May 07, 2018  9:00 AM CDT   Return Visit with Tracy Miner MD   [facility] ([facility])    [facility]  [address]  [place] [address]   [phone]            May 08, 2018  1:00 PM CDT   Level 2 with ROOM 2 [facility] ([facility])    [facility]  [address]  [facility] [address]   [phone]            May 09, 2018  1:30 PM CDT   Level 2 with ROOM 1 [facility] ([facility])    [facility]  [address]  [place] [address]   [phone]              Who to contact     If you have questions or need follow up information about today's clinic visit or your schedule please contact [facility] directly at [phone].  Normal or  "non-critical lab and imaging results will be communicated to you by MyChart, letter or phone within 4 business days after the clinic has received the results. If you do not hear from us within 7 days, please contact the clinic through [employer] or phone. If you have a critical or abnormal lab result, we will notify you by phone as soon as possible.  Submit refill requests through [employer] or call your pharmacy and they will forward the refill request to us. Please allow 3 business days for your refill to be completed.          Additional Information About Your Visit        [employer] Information     [employer] lets you send messages to your doctor, view your test results, renew your prescriptions, schedule appointments and more. To sign up, go to [email or web address] . Click on \"Log in\" on the left side of the screen, which will take you to the Welcome page. Then click on \"Sign up Now\" on the right side of the page.     You will be asked to enter the access code listed below, as well as some personal information. Please follow the directions to create your username and password.     Your access code is: [email or web address]  Expires: 2018  2:30 PM     Your access code will  in 90 days. If you need help or a new code, please call your [place] clinic or [phone].        Care EveryWhere ID     This is your Care EveryWhere ID. This could be used by other organizations to access your [place] medical records  [id number]        Your Vitals Were     Pulse                   84            Blood Pressure from Last 3 Encounters:   18 141/71   18 122/48   18 118/47    Weight from Last 3 Encounters:   18 74.4 kg (164 lb)   18 75.3 kg (166 lb 1.6 oz)   18 74.4 kg (164 lb)              We Performed the Following     CBC with platelets differential     Comprehensive metabolic panel        Primary Care Provider Office Phone # Fax #    MICHAEL Perez [phone] " [facility] [phone]        Equal Access to Services     ALPA LOPEZ : Hadii aad ku hadrivermarycarmen Carmona, wamarkolondon lu, gerberaretha cliftoncrowlondon velazquez, hoda eastmarlenmedhat taylor. So [facility] [phone].    ATENCIÓN: Si habla español, tiene a nguyễn disposición servicios gratuitos de asistencia lingüística. [facility] [phone].    We comply with applicable federal civil rights laws and [place] laws. We do not discriminate on the basis of race, color, national origin, age, disability, sex, sexual orientation, or gender identity.            Thank you!     Thank you for choosing [facility]  for your care. Our goal is always to provide you with excellent care. Hearing back from our patients is one way we can continue to improve our services. Please take a few minutes to complete the written survey that you may receive in the mail after your visit with us. Thank you!             Your Updated Medication List - Protect others around you: Learn how to safely use, store and throw away your medicines at www.disposemymeds.org.          This list is accurate as of 4/16/18  2:52 PM.  Always use your most recent med list.                   Brand Name Dispense Instructions for use Diagnosis    albuterol 108 (90 Base) MCG/ACT Inhaler    PROAIR HFA/PROVENTIL HFA/VENTOLIN HFA    1 Inhaler    Inhale 2 puffs into the lungs 4 times daily    Acute bronchitis with symptoms > 10 days       aspirin 81 MG EC tablet     90 tablet    Take 1 tablet (81 mg) by mouth daily    CAD (coronary artery disease)       atorvastatin 40 MG tablet    LIPITOR    90 tablet    Take 1 tablet (40 mg) by mouth daily    Coronary artery disease involving native coronary artery of native heart without angina pectoris       HYDROcodone-acetaminophen 5-325 MG per tablet    NORCO    20 tablet    Take 1-2 tablets by mouth every 4 hours as needed for moderate to severe pain    Post-op pain       IBUPROFEN PO      Take 200 mg by mouth  every 4 hours as needed for moderate pain        lidocaine-prilocaine cream    EMLA    30 g    Apply topically as needed for moderate pain    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)       lisinopril 5 MG tablet    PRINIVIL/ZESTRIL    90 tablet    Take 1 tablet (5 mg) by mouth daily    Essential hypertension with goal blood pressure less than 140/90       LORazepam 0.5 MG tablet    ATIVAN    30 tablet    Take 1 tablet (0.5 mg) by mouth 2 times daily as needed for anxiety    Anxiety       metoprolol succinate 50 MG 24 hr tablet    TOPROL-XL    90 tablet    Take 1 tablet (50 mg) by mouth daily    Coronary artery disease involving native coronary artery of native heart without angina pectoris       MULTIVITAMIN ADULT Tabs      Take by mouth daily        nitroGLYcerin 0.4 MG sublingual tablet    NITROSTAT    25 tablet    Place 1 tablet (0.4 mg) under the tongue every 5 minutes as needed for chest pain Maximum of 3 doses in 15 minutes    CAD (coronary artery disease)       prochlorperazine 10 MG tablet    COMPAZINE    30 tablet    Take 0.5 tablets (5 mg) by mouth every 6 hours as needed (Nausea/Vomiting)    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

## 2018-04-16 NOTE — PROGRESS NOTES
Infusion Nursing Note:  Ines Pickard presents today for C4D1 Carbo/Etoposide   Patient seen by provider today: Yes: Dr. Miner   present during visit today: Not Applicable.    Note: N/A.    Intravenous Access:  Labs drawn without difficulty.  Implanted Port.    Treatment Conditions:  Lab Results   Component Value Date    HGB 9.7 04/16/2018     Lab Results   Component Value Date    WBC 12.1 04/16/2018      Lab Results   Component Value Date    ANEU 8.4 04/16/2018     Lab Results   Component Value Date     04/16/2018      Lab Results   Component Value Date     04/16/2018                   Lab Results   Component Value Date    POTASSIUM 3.7 04/16/2018           Lab Results   Component Value Date    MAG 2.1 12/14/2014            Lab Results   Component Value Date    CR 0.45 04/16/2018                   Lab Results   Component Value Date    AMADO 8.7 04/16/2018                Lab Results   Component Value Date    BILITOTAL 0.7 04/16/2018           Lab Results   Component Value Date    ALBUMIN 3.6 04/16/2018                    Lab Results   Component Value Date    ALT 14 04/16/2018           Lab Results   Component Value Date    AST 15 04/16/2018       Results reviewed, labs MET treatment parameters, ok to proceed with treatment.      Post Infusion Assessment:  Patient tolerated infusion without incident.  Blood return noted pre and post infusion.  Site patent and intact, free from redness, edema or discomfort.  No evidence of extravasations.  Access discontinued per protocol.    Discharge Plan:   Patient discharged in stable condition accompanied by: self.  Departure Mode: Ambulatory.  Pt to return tomorrow at 9;00 am for C4D2 Etoposide    Dea Black RN

## 2018-04-16 NOTE — PATIENT INSTRUCTIONS
Dr. Miner would like you to continue with chemotherapy today as planned.     We would like to see you back in with cycle 5 for a follow up.     When you are in need of a refill, please call your pharmacy and they will send us a request.      Copy of appointments, and after visit summary (AVS) given to patient.      If you have any questions please call Bella Arvizu RN, BSN Oncology Hematology  [facility] [phone]. For questions after business hours, or on holidays/weekends, please call our after hours Nurse Triage line [phone]. Thank you.             C4 chemo today, f/u with C5.

## 2018-04-16 NOTE — LETTER
4/16/2018         RE: Ines Pickard  [address] [address] [address]        Dear Colleague,    Thank you for referring your patient, Ines Pickard, to the [facility]. Please see a copy of my visit note below.    ONCOLOGY FOLLOW UP VISIT       CHIEF COMPLAINT/REASON FOR VISIT:  Left adrenal mass, FNA 01/30/2018 consistent with sclc     HISTORY OF PRESENT ILLNESS:  A 77-year-old female presented with 5 months duration of hoarseness.  She was evaluated by ENT.  Direct laryngoscopy revealed left vocal cord paralysis.    CT chest with contrast 01/10/2018 identified a 2.3 cm left upper lobe nodule extending to the hilum, suspicious for malignancy, left hilar mediastinal adenopathy with mass-like soft tissue thickening extending along the inferior aspect of the aortic arch likely involving the recurrent laryngeal nerve in this location, left adrenal nodule 1.5 cm undetermined, compression on L1 vertebral body.    EUS 01/30/2017 FNA was done on 2 hypoechoic left adrenal mass-   Consistent with metastatic small cell carcinoma      PET confirmed the primary TEX lesion with adrenal mets. She is dx with extensive stage SCLC.     She made informed decision to proceed with palliative chemo with carbo/-16 2/2018.   She has good PET response after 3 cycles and tx is continued.     PAST MEDICAL HISTORY:  CAD, stent around 2014, reflux, hypertension, cataracts, hyperlipidemia.      MEDICATIONS:  Reviewed in Epic system.      SOCIAL HISTORY:  She lives in her own house.  She has 3 boys.  She is here with her neighbor.  She quit smoking years back.  Denies alcohol abuse.      FAMILY HISTORY:  Positive for mom who had Hodgkin lymphoma.  Brother had unknown type of cancer.      REVIEW OF SYSTEMS:   Throat discomfort seems better.     ? BARNES, she is not sure.   Reports no appetite. She is using insure.   She is losing her teeth from her dental wires and eating baby food.        PHYSICAL EXAMINATION:   VITAL  "SIGNS:  Blood pressure 141/71, pulse 85, temperature 97.9  F (36.6  C), temperature source Tympanic, resp. rate 16, height 1.588 m (5' 2.52\"), weight 74.4 kg (164 lb), SpO2 97 %, not currently breastfeeding.  ECOG 0    GENERAL APPEARANCE:  Elderly lady, looks like her stated age, not in acute distress.   HEENT: The patient is normocephalic, atraumatic. Pupils are equally reactive to light.  Sclerae are anicteric.  Moist oral mucosa.  Erythematous posterior pharynx.   NECK:  Supple.  No jugular venous distention.  Thyroid is not palpable.   LYMPH NODES:  Superficial lymphadenopathy is not appreciable in the bilateral cervical, supraclavicular, axillary or inguinal areas.   CARDIOVASCULAR:  S1, S2 regular with no murmurs or gallops.  No carotid or abdominal bruits.   PULMONARY:  Lungs are clear to auscultation and percussion bilaterally.  There is no wheezing or rhonchi.   GASTROINTESTINAL:  Abdomen is soft, nontender.  No hepatosplenomegaly.  No signs of ascites.  No mass appreciable.   MUSCULOSKELETAL/EXTREMITIES:  No edema.  No cyanotic changes.  No signs of joint deformity.  No lymphedema.  No spinal or paraspinal tenderness.  No CVA tenderness.   NEUROLOGIC:  Cranial nerves II-XII are grossly intact.  Sensation intact.  Muscle strength and muscle tone symmetrical, 5/5 throughout.   SKIN:  No petechiae.  No rash.  No signs of cellulitis.      LABORATORY DATA REVIEWED:   C4D1 wbc diff and CMP are fine. B12/folate nl.     CEA baseline at 6.6 in 2/2017    From 01/30/2018 FNA on left adrenal mass biopsy -- Consistent with metastatic small cell carcinoma      CURRENT IMAGE DATA REVIEWED:   PET 4/2018 post 3 cycles of carbo/vp16  1. Decrease in size and FDG uptake in the left upper lobe nodule consistent with improved lung cancer.  2. Improvement in the previous left hilar and mediastinal adenopathy, currently there is small residual hypermetabolic adenopathy in the aorticopulmonary window consistent with residual mayela " metastasis.  3. Resolution of previous hypermetabolic left adrenal nodule consistent with resolved metastasis.    PET 2/2018  1. Hypermetabolic left upper lobe nodule is consistent with malignancy, most likely bronchogenic carcinoma.  2. Hypermetabolic left hilar and mediastinal adenopathy is consistent  with metastatic disease.  3. Hypermetabolic left adrenal nodule is suspicious for metastatic disease.  4. There is a new 3.4 cm high-density structure abutting the left adrenal gland, suspicious for interval development of adrenal hemorrhage.    CT chest 01/2018 -  identified a 2.3 cm left upper lobe nodule extending to the hilum, suspicious for malignancy, left hilar mediastinal adenopathy with mass-like soft tissue thickening extending along the inferior aspect of the aortic arch likely involving the recurrent laryngeal nerve in this location, left adrenal nodule 1.5 cm undetermined, compression on L1 vertebral body.         OLD DATA REVIEW IN SUMMARY:    Chest x-ray 11/2017, no abnormalities identified.    In 2014, CBC indicating white count 17, hemoglobin 11 and platelets normal.  Differential indicating neutrophilia when she had a heart attack.        ASSESSMENT AND PLAN:    1.  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     She made informed decision to proceed in 2/2018 with  carbo AUC5 d1, -16 80 mg/m2 D1-3 q 3 wks with neulasta support.     She has good PET response after 3 cycles and tx is continued.   She is elderly, has high complication rate and needs to be monitored closely.   She is informed tx is palliative in nature.     2. Mucositis - advice salt and soda oral rinse.     3. Microcytic anemia - nl b12/folate. This is likely from chemo. Will transfuse if hb less than 8.5-9 or symptoms.     4. Sob seems only with exertion. She feels the inhaler helps. Advice hse to resume it.     5. Anxiety. She feels ativan helps. Will refill.               Again, thank you for allowing  me to participate in the care of your patient.        Sincerely,        Tracy Miner MD, MD

## 2018-04-16 NOTE — MR AVS SNAPSHOT
After Visit Summary   4/16/2018    Ines Pickard    MRN: [id number]           Patient Information     Date Of Birth          1940        Visit Information        Provider Department      4/16/2018 9:30 AM Tracy Miner MD [facility]        Today's Diagnoses     Malignant neoplasm of upper lobe of left lung (H)    -  1    Anemia, unspecified type        Mucositis        Anxiety        SOB (shortness of breath)          Care Instructions    Dr. Miner would like you to continue with chemotherapy today as planned.     We would like to see you back in with cycle 5 for a follow up.     When you are in need of a refill, please call your pharmacy and they will send us a request.      Copy of appointments, and after visit summary (AVS) given to patient.      If you have any questions please call Bella Arvizu RN, BSN Oncology Hematology  [facility] [phone]. For questions after business hours, or on holidays/weekends, please call our after hours Nurse Triage line [phone]. Thank you.             C4 chemo today, f/u with C5.           Follow-ups after your visit        Your next 10 appointments already scheduled     Apr 17, 2018  1:00 PM CDT   Level 2 with ROOM 1 [facility] ([facility])    [facility]  [address]  [place] [address]   [phone]            Apr 18, 2018  1:00 PM CDT   Level 2 with ROOM 10 [facility] ([facility])    [facility]  [address]  [place] [address]   [phone]            May 07, 2018  8:00 AM CDT   Level 4 with ROOM 5 [facility] ([facility])    [facility]  [address]  [place] [address]   [phone]            May 07, 2018  9:00 AM CDT   Return Visit with Tracy Miner MD   [facility] ([facility]  "[facility])    [facility]  [address]  [facility] [address]   [phone]            May 08, 2018  1:00 PM CDT   Level 2 with ROOM 2 [facility] ([facility])    [facility]  [address]  [facility] [address]   [phone]            May 09, 2018  1:30 PM CDT   Level 2 with ROOM 1 [facility] ([facility])    [facility]  [address]  [facility] [address]   [phone]              Who to contact     If you have questions or need follow up information about today's clinic visit or your schedule please contact [facility] directly at [phone].  Normal or non-critical lab and imaging results will be communicated to you by MyChart, letter or phone within 4 business days after the clinic has received the results. If you do not hear from us within 7 days, please contact the clinic through [employer] or phone. If you have a critical or abnormal lab result, we will notify you by phone as soon as possible.  Submit refill requests through [employer] or call your pharmacy and they will forward the refill request to us. Please allow 3 business days for your refill to be completed.          Additional Information About Your Visit        MyChart Information     [employer] lets you send messages to your doctor, view your test results, renew your prescriptions, schedule appointments and more. To sign up, go to [email or web address] . Click on \"Log in\" on the left side of the screen, which will take you to the Welcome page. Then click on \"Sign up Now\" on the right side of the page.     You will be asked to enter the access code listed below, as well as some personal information. Please follow the directions to create your username and password.     Your access code is: [email or web address]  Expires: 2018  2:30 PM     Your access code will  in 90 days. If " "you need help or a new code, please call your [place] clinic or [phone].        Care EveryWhere ID     This is your Care EveryWhere ID. This could be used by other organizations to access your [place] medical records  [id number]        Your Vitals Were     Pulse Temperature Respirations Height Pulse Oximetry Breastfeeding?    85 97.9  F (36.6  C) (Tympanic) 16 1.588 m (5' 2.52\") 97% No    BMI (Body Mass Index)                   29.5 kg/m2            Blood Pressure from Last 3 Encounters:   04/16/18 141/71   03/28/18 118/47   03/27/18 117/55    Weight from Last 3 Encounters:   04/16/18 74.4 kg (164 lb)   03/26/18 75.3 kg (166 lb 1.6 oz)   03/05/18 74.4 kg (164 lb)              Today, you had the following     No orders found for display       Primary Care Provider Office Phone # Fax #    R Emanuel Perez [phone] [address] [address]        Equal Access to Services     [facility]: Hadii doroteo hartmann hadasho Sodesi, waaxda luqadaha, qaybta kaalmada caroline, hoda maddox . So [facility] [phone].    ATENCIÓN: Si habla español, tiene a nguyễn disposición servicios gratuitos de asistencia lingüística. [facility] [phone].    We comply with applicable federal civil rights laws and [place] laws. We do not discriminate on the basis of race, color, national origin, age, disability, sex, sexual orientation, or gender identity.            Thank you!     Thank you for choosing [facility]  for your care. Our goal is always to provide you with excellent care. Hearing back from our patients is one way we can continue to improve our services. Please take a few minutes to complete the written survey that you may receive in the mail after your visit with us. Thank you!             Your Updated Medication List - Protect others around you: Learn how to safely use, store and throw away your medicines at www.disposemymeds.org.          This list is " accurate as of 4/16/18 11:24 AM.  Always use your most recent med list.                   Brand Name Dispense Instructions for use Diagnosis    albuterol 108 (90 Base) MCG/ACT Inhaler    PROAIR HFA/PROVENTIL HFA/VENTOLIN HFA    1 Inhaler    Inhale 2 puffs into the lungs 4 times daily    Acute bronchitis with symptoms > 10 days       aspirin 81 MG EC tablet     90 tablet    Take 1 tablet (81 mg) by mouth daily    CAD (coronary artery disease)       atorvastatin 40 MG tablet    LIPITOR    90 tablet    Take 1 tablet (40 mg) by mouth daily    Coronary artery disease involving native coronary artery of native heart without angina pectoris       HYDROcodone-acetaminophen 5-325 MG per tablet    NORCO    20 tablet    Take 1-2 tablets by mouth every 4 hours as needed for moderate to severe pain    Post-op pain       IBUPROFEN PO      Take 200 mg by mouth every 4 hours as needed for moderate pain        lidocaine-prilocaine cream    EMLA    30 g    Apply topically as needed for moderate pain    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)       lisinopril 5 MG tablet    PRINIVIL/ZESTRIL    90 tablet    Take 1 tablet (5 mg) by mouth daily    Essential hypertension with goal blood pressure less than 140/90       LORazepam 0.5 MG tablet    ATIVAN    30 tablet    Take 1 tablet (0.5 mg) by mouth 2 times daily as needed for anxiety    Anxiety       metoprolol succinate 50 MG 24 hr tablet    TOPROL-XL    90 tablet    Take 1 tablet (50 mg) by mouth daily    Coronary artery disease involving native coronary artery of native heart without angina pectoris       MULTIVITAMIN ADULT Tabs      Take by mouth daily        nitroGLYcerin 0.4 MG sublingual tablet    NITROSTAT    25 tablet    Place 1 tablet (0.4 mg) under the tongue every 5 minutes as needed for chest pain Maximum of 3 doses in 15 minutes    CAD (coronary artery disease)       prochlorperazine 10 MG tablet    COMPAZINE    30 tablet    Take 0.5 tablets (5 mg) by mouth every 6 hours as  needed (Nausea/Vomiting)    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

## 2018-04-16 NOTE — PROGRESS NOTES
"ONCOLOGY FOLLOW UP VISIT       CHIEF COMPLAINT/REASON FOR VISIT:  Left adrenal mass, FNA 01/30/2018 consistent with sclc     HISTORY OF PRESENT ILLNESS:  A 77-year-old female presented with 5 months duration of hoarseness.  She was evaluated by ENT.  Direct laryngoscopy revealed left vocal cord paralysis.    CT chest with contrast 01/10/2018 identified a 2.3 cm left upper lobe nodule extending to the hilum, suspicious for malignancy, left hilar mediastinal adenopathy with mass-like soft tissue thickening extending along the inferior aspect of the aortic arch likely involving the recurrent laryngeal nerve in this location, left adrenal nodule 1.5 cm undetermined, compression on L1 vertebral body.    EUS 01/30/2017 FNA was done on 2 hypoechoic left adrenal mass-   Consistent with metastatic small cell carcinoma      PET confirmed the primary TEX lesion with adrenal mets. She is dx with extensive stage SCLC.     She made informed decision to proceed with palliative chemo with carbo/-16 2/2018.   She has good PET response after 3 cycles and tx is continued.     PAST MEDICAL HISTORY:  CAD, stent around 2014, reflux, hypertension, cataracts, hyperlipidemia.      MEDICATIONS:  Reviewed in Epic system.      SOCIAL HISTORY:  She lives in her own house.  She has 3 boys.  She is here with her neighbor.  She quit smoking years back.  Denies alcohol abuse.      FAMILY HISTORY:  Positive for mom who had Hodgkin lymphoma.  Brother had unknown type of cancer.      REVIEW OF SYSTEMS:   Throat discomfort seems better.     ? BARNES, she is not sure.   Reports no appetite. She is using insure.   She is losing her teeth from her dental wires and eating baby food.        PHYSICAL EXAMINATION:   VITAL SIGNS:  Blood pressure 141/71, pulse 85, temperature 97.9  F (36.6  C), temperature source Tympanic, resp. rate 16, height 1.588 m (5' 2.52\"), weight 74.4 kg (164 lb), SpO2 97 %, not currently breastfeeding.  ECOG 0    GENERAL APPEARANCE:  " Elderly lady, looks like her stated age, not in acute distress.   HEENT: The patient is normocephalic, atraumatic. Pupils are equally reactive to light.  Sclerae are anicteric.  Moist oral mucosa.  Erythematous posterior pharynx.   NECK:  Supple.  No jugular venous distention.  Thyroid is not palpable.   LYMPH NODES:  Superficial lymphadenopathy is not appreciable in the bilateral cervical, supraclavicular, axillary or inguinal areas.   CARDIOVASCULAR:  S1, S2 regular with no murmurs or gallops.  No carotid or abdominal bruits.   PULMONARY:  Lungs are clear to auscultation and percussion bilaterally.  There is no wheezing or rhonchi.   GASTROINTESTINAL:  Abdomen is soft, nontender.  No hepatosplenomegaly.  No signs of ascites.  No mass appreciable.   MUSCULOSKELETAL/EXTREMITIES:  No edema.  No cyanotic changes.  No signs of joint deformity.  No lymphedema.  No spinal or paraspinal tenderness.  No CVA tenderness.   NEUROLOGIC:  Cranial nerves II-XII are grossly intact.  Sensation intact.  Muscle strength and muscle tone symmetrical, 5/5 throughout.   SKIN:  No petechiae.  No rash.  No signs of cellulitis.      LABORATORY DATA REVIEWED:   C4D1 wbc diff and CMP are fine. B12/folate nl.     CEA baseline at 6.6 in 2/2017    From 01/30/2018 FNA on left adrenal mass biopsy -- Consistent with metastatic small cell carcinoma      CURRENT IMAGE DATA REVIEWED:   PET 4/2018 post 3 cycles of carbo/vp16  1. Decrease in size and FDG uptake in the left upper lobe nodule consistent with improved lung cancer.  2. Improvement in the previous left hilar and mediastinal adenopathy, currently there is small residual hypermetabolic adenopathy in the aorticopulmonary window consistent with residual mayela metastasis.  3. Resolution of previous hypermetabolic left adrenal nodule consistent with resolved metastasis.    PET 2/2018  1. Hypermetabolic left upper lobe nodule is consistent with malignancy, most likely bronchogenic carcinoma.  2.  Hypermetabolic left hilar and mediastinal adenopathy is consistent  with metastatic disease.  3. Hypermetabolic left adrenal nodule is suspicious for metastatic disease.  4. There is a new 3.4 cm high-density structure abutting the left adrenal gland, suspicious for interval development of adrenal hemorrhage.    CT chest 01/2018 -  identified a 2.3 cm left upper lobe nodule extending to the hilum, suspicious for malignancy, left hilar mediastinal adenopathy with mass-like soft tissue thickening extending along the inferior aspect of the aortic arch likely involving the recurrent laryngeal nerve in this location, left adrenal nodule 1.5 cm undetermined, compression on L1 vertebral body.         OLD DATA REVIEW IN SUMMARY:    Chest x-ray 11/2017, no abnormalities identified.    In 2014, CBC indicating white count 17, hemoglobin 11 and platelets normal.  Differential indicating neutrophilia when she had a heart attack.        ASSESSMENT AND PLAN:    1.  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     She made informed decision to proceed in 2/2018 with  carbo AUC5 d1, -16 80 mg/m2 D1-3 q 3 wks with neulasta support.     She has good PET response after 3 cycles and tx is continued.   She is elderly, has high complication rate and needs to be monitored closely.   She is informed tx is palliative in nature.     2. Mucositis - advice salt and soda oral rinse.     3. Microcytic anemia - nl b12/folate. This is likely from chemo. Will transfuse if hb less than 8.5-9 or symptoms.     4. Sob seems only with exertion. She feels the inhaler helps. Advice hse to resume it.     5. Anxiety. She feels ativan helps. Will refill.

## 2018-04-16 NOTE — NURSING NOTE
"Oncology Rooming Note    April 16, 2018 9:45 AM   Ines Pickard is a 77 year old female who presents for:    Chief Complaint   Patient presents with     Oncology Clinic Visit     Recheck Malignant neoplasm of upper lobe of left lung, Labs & Chemo today / review PET scan     Initial Vitals: /71 (BP Location: Right arm, Patient Position: Sitting, Cuff Size: Adult Regular)  Pulse 85  Temp 97.9  F (36.6  C) (Tympanic)  Resp 16  Ht 1.588 m (5' 2.52\")  Wt 74.4 kg (164 lb)  SpO2 97%  Breastfeeding? No  BMI 29.5 kg/m2 Estimated body mass index is 29.5 kg/(m^2) as calculated from the following:    Height as of this encounter: 1.588 m (5' 2.52\").    Weight as of this encounter: 74.4 kg (164 lb). Body surface area is 1.81 meters squared.  No Pain (0) Comment: Data Unavailable   No LMP recorded. Patient is postmenopausal.  Allergies reviewed: Yes  Medications reviewed: Yes    Medications: Medication refills not needed today.  Pharmacy name entered into [facility]: [facility] - [place], [place] - [address]    Clinical concerns  Recheck Malignant neoplasm of upper lobe of left lung, Labs & Chemo today / review PET scan.     Patient reports her legs feel weak.   Appetite has decreased.     7 minutes for nursing intake (face to face time)     Casi Sandoval CMA              "

## 2018-04-17 ENCOUNTER — INFUSION THERAPY VISIT (OUTPATIENT)
Dept: INFUSION THERAPY | Facility: CLINIC | Age: 78
End: 2018-04-17
Attending: INTERNAL MEDICINE
Payer: MEDICARE

## 2018-04-17 VITALS — DIASTOLIC BLOOD PRESSURE: 59 MMHG | HEART RATE: 93 BPM | SYSTOLIC BLOOD PRESSURE: 115 MMHG

## 2018-04-17 DIAGNOSIS — C34.92 SMALL CELL CARCINOMA OF LEFT LUNG (H): ICD-10-CM

## 2018-04-17 DIAGNOSIS — Z29.9 NEED FOR PROPHYLACTIC MEASURE: Primary | ICD-10-CM

## 2018-04-17 PROCEDURE — 25000125 ZZHC RX 250: Performed by: INTERNAL MEDICINE

## 2018-04-17 PROCEDURE — 96375 TX/PRO/DX INJ NEW DRUG ADDON: CPT

## 2018-04-17 PROCEDURE — 96413 CHEMO IV INFUSION 1 HR: CPT

## 2018-04-17 PROCEDURE — 25000128 H RX IP 250 OP 636: Performed by: INTERNAL MEDICINE

## 2018-04-17 RX ORDER — HEPARIN SODIUM (PORCINE) LOCK FLUSH IV SOLN 100 UNIT/ML 100 UNIT/ML
5 SOLUTION INTRAVENOUS
Status: DISCONTINUED | OUTPATIENT
Start: 2018-04-17 | End: 2018-04-17 | Stop reason: HOSPADM

## 2018-04-17 RX ADMIN — SODIUM CHLORIDE 250 ML: 9 INJECTION, SOLUTION INTRAVENOUS at 13:26

## 2018-04-17 RX ADMIN — DEXAMETHASONE SODIUM PHOSPHATE 12 MG: 10 INJECTION, SOLUTION INTRAMUSCULAR; INTRAVENOUS at 13:29

## 2018-04-17 RX ADMIN — FAMOTIDINE 20 MG: 20 INJECTION, SOLUTION INTRAVENOUS at 13:42

## 2018-04-17 RX ADMIN — SODIUM CHLORIDE, PRESERVATIVE FREE 5 ML: 5 INJECTION INTRAVENOUS at 15:35

## 2018-04-17 RX ADMIN — ETOPOSIDE 150 MG: 20 INJECTION, SOLUTION INTRAVENOUS at 13:58

## 2018-04-17 NOTE — PROGRESS NOTES
Infusion Nursing Note:  Ines Pickard presents today for C4D2 Etoposide.    Patient seen by provider today: No   present during visit today: Not Applicable.    Note: N/A.    Intravenous Access:  Implanted Port.    Treatment Conditions:  Per treatment plan.      Post Infusion Assessment:  Patient tolerated infusion without incident.  Blood return noted pre and post infusion.  Site patent and intact, free from redness, edema or discomfort.  No evidence of extravasations.  Needle remains intact for use tomorrow and Thursday.    Discharge Plan:   Patient discharged in stable condition accompanied by: self.  Departure Mode: Ambulatory.    Emmy Murphy RN

## 2018-04-18 ENCOUNTER — INFUSION THERAPY VISIT (OUTPATIENT)
Dept: INFUSION THERAPY | Facility: CLINIC | Age: 78
End: 2018-04-18
Attending: INTERNAL MEDICINE
Payer: MEDICARE

## 2018-04-18 VITALS — HEART RATE: 72 BPM | TEMPERATURE: 97.4 F | DIASTOLIC BLOOD PRESSURE: 46 MMHG | SYSTOLIC BLOOD PRESSURE: 125 MMHG

## 2018-04-18 DIAGNOSIS — C34.92 SMALL CELL CARCINOMA OF LEFT LUNG (H): ICD-10-CM

## 2018-04-18 DIAGNOSIS — Z29.9 NEED FOR PROPHYLACTIC MEASURE: Primary | ICD-10-CM

## 2018-04-18 PROCEDURE — 25000125 ZZHC RX 250: Performed by: INTERNAL MEDICINE

## 2018-04-18 PROCEDURE — 96377 APPLICATON ON-BODY INJECTOR: CPT | Mod: XS

## 2018-04-18 PROCEDURE — 25000128 H RX IP 250 OP 636: Performed by: INTERNAL MEDICINE

## 2018-04-18 PROCEDURE — 96375 TX/PRO/DX INJ NEW DRUG ADDON: CPT

## 2018-04-18 PROCEDURE — 96413 CHEMO IV INFUSION 1 HR: CPT

## 2018-04-18 RX ORDER — HEPARIN SODIUM (PORCINE) LOCK FLUSH IV SOLN 100 UNIT/ML 100 UNIT/ML
5 SOLUTION INTRAVENOUS
Status: DISCONTINUED | OUTPATIENT
Start: 2018-04-18 | End: 2018-04-18 | Stop reason: HOSPADM

## 2018-04-18 RX ADMIN — ETOPOSIDE 150 MG: 20 INJECTION, SOLUTION INTRAVENOUS at 13:45

## 2018-04-18 RX ADMIN — SODIUM CHLORIDE, PRESERVATIVE FREE 5 ML: 5 INJECTION INTRAVENOUS at 15:05

## 2018-04-18 RX ADMIN — PEGFILGRASTIM 6 MG: KIT SUBCUTANEOUS at 14:58

## 2018-04-18 RX ADMIN — SODIUM CHLORIDE 250 ML: 9 INJECTION, SOLUTION INTRAVENOUS at 13:14

## 2018-04-18 RX ADMIN — DEXAMETHASONE SODIUM PHOSPHATE 12 MG: 10 INJECTION, SOLUTION INTRAMUSCULAR; INTRAVENOUS at 13:17

## 2018-04-18 RX ADMIN — FAMOTIDINE 20 MG: 20 INJECTION, SOLUTION INTRAVENOUS at 13:31

## 2018-04-18 NOTE — MR AVS SNAPSHOT
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)           Follow-ups after your visit        Your next 10 appointments already scheduled     May 07, 2018  8:00 AM CDT   Level 4 with ROOM 5 [facility] ([facility])    [facility]  [address]  [facility] [address]   [phone]            May 07, 2018  9:00 AM CDT   Return Visit with Tracy Miner MD   [facility] ([facility])    [facility]  [address]  [facility] [address]   [phone]            May 08, 2018  1:00 PM CDT   Level 2 with ROOM 2 [facility] ([facility])    [facility]  [address]  [facility] [address]   [phone]            May 09, 2018  1:30 PM CDT   Level 2 with ROOM 1 [facility] ([facility])    [facility]  [address]  [facility] [address]   [phone]              Who to contact     If you have questions or need follow up information about today's clinic visit or your schedule please contact [facility] directly at [phone].  Normal or non-critical lab and imaging results will be communicated to you by MyChart, letter or phone within 4 business days after the clinic has received the results. If you do not hear from us within 7 days, please contact the clinic through MyChart or phone. If you have a critical or abnormal lab result, we will notify you by phone as soon as possible.  Submit refill requests through [employer] or call your pharmacy and they will  "forward the refill request to us. Please allow 3 business days for your refill to be completed.          Additional Information About Your Visit        MyChart Information     [employer] lets you send messages to your doctor, view your test results, renew your prescriptions, schedule appointments and more. To sign up, go to [email or web address] . Click on \"Log in\" on the left side of the screen, which will take you to the Welcome page. Then click on \"Sign up Now\" on the right side of the page.     You will be asked to enter the access code listed below, as well as some personal information. Please follow the directions to create your username and password.     Your access code is: [email or web address]  Expires: 2018  4:13 PM     Your access code will  in 90 days. If you need help or a new code, please call your [place] clinic or [phone].        Care EveryWhere ID     This is your Care EveryWhere ID. This could be used by other organizations to access your [place] medical records  [id number]        Your Vitals Were     Pulse Temperature                72 97.4  F (36.3  C) (Oral)           Blood Pressure from Last 3 Encounters:   18 125/46   18 115/59   18 141/71    Weight from Last 3 Encounters:   18 74.4 kg (164 lb)   18 75.3 kg (166 lb 1.6 oz)   18 74.4 kg (164 lb)              Today, you had the following     No orders found for display       Primary Care Provider Office Phone # Fax #    R Emanuel Perez [phone] [address] [facility] [address]        Equal Access to Services     [facility] : Hadii doroteo Carmona, wamarkoda lusharonadaha, qaybta kaalmahoda smith. So [facility] [phone].    ATENCIÓN: Si habla español, tiene a nguyễn disposición servicios gratuitos de asistencia lingüística. Llame al [phone].    We comply with applicable federal civil rights laws and [place] laws. We do " not discriminate on the basis of race, color, national origin, age, disability, sex, sexual orientation, or gender identity.            Thank you!     Thank you for choosing [facility]  for your care. Our goal is always to provide you with excellent care. Hearing back from our patients is one way we can continue to improve our services. Please take a few minutes to complete the written survey that you may receive in the mail after your visit with us. Thank you!             Your Updated Medication List - Protect others around you: Learn how to safely use, store and throw away your medicines at www.disposemymeds.org.          This list is accurate as of 4/18/18  4:13 PM.  Always use your most recent med list.                   Brand Name Dispense Instructions for use Diagnosis    albuterol 108 (90 Base) MCG/ACT Inhaler    PROAIR HFA/PROVENTIL HFA/VENTOLIN HFA    1 Inhaler    Inhale 2 puffs into the lungs 4 times daily    Acute bronchitis with symptoms > 10 days       aspirin 81 MG EC tablet     90 tablet    Take 1 tablet (81 mg) by mouth daily    CAD (coronary artery disease)       atorvastatin 40 MG tablet    LIPITOR    90 tablet    Take 1 tablet (40 mg) by mouth daily    Coronary artery disease involving native coronary artery of native heart without angina pectoris       HYDROcodone-acetaminophen 5-325 MG per tablet    NORCO    20 tablet    Take 1-2 tablets by mouth every 4 hours as needed for moderate to severe pain    Post-op pain       IBUPROFEN PO      Take 200 mg by mouth every 4 hours as needed for moderate pain        lidocaine-prilocaine cream    EMLA    30 g    Apply topically as needed for moderate pain    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)       lisinopril 5 MG tablet    PRINIVIL/ZESTRIL    90 tablet    Take 1 tablet (5 mg) by mouth daily    Essential hypertension with goal blood pressure less than 140/90       LORazepam 0.5 MG tablet    ATIVAN    30 tablet    Take 1 tablet (0.5 mg) by  mouth 2 times daily as needed for anxiety    Anxiety       metoprolol succinate 50 MG 24 hr tablet    TOPROL-XL    90 tablet    Take 1 tablet (50 mg) by mouth daily    Coronary artery disease involving native coronary artery of native heart without angina pectoris       MULTIVITAMIN ADULT Tabs      Take by mouth daily        nitroGLYcerin 0.4 MG sublingual tablet    NITROSTAT    25 tablet    Place 1 tablet (0.4 mg) under the tongue every 5 minutes as needed for chest pain Maximum of 3 doses in 15 minutes    CAD (coronary artery disease)       prochlorperazine 10 MG tablet    COMPAZINE    30 tablet    Take 0.5 tablets (5 mg) by mouth every 6 hours as needed (Nausea/Vomiting)    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

## 2018-04-19 ENCOUNTER — NURSE TRIAGE (OUTPATIENT)
Dept: NURSING | Facility: CLINIC | Age: 78
End: 2018-04-19

## 2018-04-19 ENCOUNTER — TELEPHONE (OUTPATIENT)
Dept: NUTRITION | Facility: CLINIC | Age: 78
End: 2018-04-19

## 2018-04-19 NOTE — TELEPHONE ENCOUNTER
Ines calling with questions related to Neulasta On Pro applied yesterday at 15:00 in clinic.  States RN in clinic wrote down for her to remove Friday at 20:00.  Ines wondering if it should be today at 20:00 ?  No change in instructions noted in clinic visit notes, did review information about On Pro, should be removed after 27 hours.  Ines reports no concerns with the device at this time.       Additional Information    Caller has medication question only, adult not sick, and triager answers question    Protocols used: MEDICATION QUESTION CALL-ADULT-

## 2018-04-19 NOTE — TELEPHONE ENCOUNTER
Patient was contacted by phone due to a positive screen on the Oncology Distress Screening tool. Spoke with patient who reports that she is doing better and her weight loss has slowed down. She is using Ensure twice a day, reviewed other high calorie, high protein food options. Registered Dietitian contact information provided to patient if further questions arise.    Mariel Orellana RD,LD  Clinical Dietitian

## 2018-04-24 DIAGNOSIS — F41.9 ANXIETY: ICD-10-CM

## 2018-04-24 NOTE — TELEPHONE ENCOUNTER
Requested Prescriptions   Pending Prescriptions Disp Refills     LORazepam (ATIVAN) 0.5 MG tablet  Last Written Prescription Date:  04/10/2018  Last Fill Quantity: 30,  # refills: 0   Last office visit: 1/19/2018 with prescribing provider:  HIEN Reddy   Future Office Visit:   Next 5 appointments (look out 90 days)     May 07, 2018  9:00 AM CDT   Return Visit with Tracy Miner MD   [facility] ([facility])    [facility]  [address]  [facility] [address]   [phone]                  30 tablet 0     Sig: Take 1 tablet (0.5 mg) by mouth 2 times daily as needed for anxiety    There is no refill protocol information for this order        Bam CORONEL (R)

## 2018-04-25 RX ORDER — LORAZEPAM 0.5 MG/1
0.5 TABLET ORAL 2 TIMES DAILY PRN
Qty: 30 TABLET | Refills: 0 | Status: SHIPPED | OUTPATIENT
Start: 2018-04-25 | End: 2018-05-09

## 2018-04-25 NOTE — TELEPHONE ENCOUNTER
Routing refill request to provider for review/approval because:  Drug not on the [facility] refill protocol     Alyssia Pierce RN

## 2018-05-07 ENCOUNTER — HOSPITAL ENCOUNTER (OUTPATIENT)
Facility: CLINIC | Age: 78
Discharge: HOME | End: 2018-05-07
Attending: INTERNAL MEDICINE | Admitting: INTERNAL MEDICINE
Payer: MEDICARE

## 2018-05-07 ENCOUNTER — ONCOLOGY VISIT (OUTPATIENT)
Dept: ONCOLOGY | Facility: CLINIC | Age: 78
End: 2018-05-07
Attending: INTERNAL MEDICINE
Payer: MEDICARE

## 2018-05-07 ENCOUNTER — INFUSION THERAPY VISIT (OUTPATIENT)
Dept: INFUSION THERAPY | Facility: CLINIC | Age: 78
End: 2018-05-07
Attending: INTERNAL MEDICINE
Payer: MEDICARE

## 2018-05-07 VITALS
RESPIRATION RATE: 20 BRPM | SYSTOLIC BLOOD PRESSURE: 129 MMHG | HEART RATE: 77 BPM | TEMPERATURE: 98.7 F | BODY MASS INDEX: 28.58 KG/M2 | WEIGHT: 161.3 LBS | HEIGHT: 63 IN | OXYGEN SATURATION: 95 % | DIASTOLIC BLOOD PRESSURE: 61 MMHG

## 2018-05-07 VITALS — SYSTOLIC BLOOD PRESSURE: 120 MMHG | HEART RATE: 94 BPM | DIASTOLIC BLOOD PRESSURE: 57 MMHG

## 2018-05-07 DIAGNOSIS — K12.30 MUCOSITIS: ICD-10-CM

## 2018-05-07 DIAGNOSIS — C34.92 SMALL CELL CARCINOMA OF LEFT LUNG (H): ICD-10-CM

## 2018-05-07 DIAGNOSIS — C34.12 MALIGNANT NEOPLASM OF UPPER LOBE OF LEFT LUNG (H): Primary | ICD-10-CM

## 2018-05-07 DIAGNOSIS — D64.9 ANEMIA, UNSPECIFIED TYPE: ICD-10-CM

## 2018-05-07 DIAGNOSIS — Z29.9 NEED FOR PROPHYLACTIC MEASURE: Primary | ICD-10-CM

## 2018-05-07 DIAGNOSIS — Z29.9 NEED FOR PROPHYLACTIC MEASURE: ICD-10-CM

## 2018-05-07 LAB
ALBUMIN SERPL-MCNC: 3.6 G/DL (ref 3.4–5)
ALP SERPL-CCNC: 70 U/L (ref 40–150)
ALT SERPL W P-5'-P-CCNC: 16 U/L (ref 0–50)
ANION GAP SERPL CALCULATED.3IONS-SCNC: 8 MMOL/L (ref 3–14)
AST SERPL W P-5'-P-CCNC: 13 U/L (ref 0–45)
BASOPHILS # BLD AUTO: 0.1 10E9/L (ref 0–0.2)
BASOPHILS NFR BLD AUTO: 0.9 %
BILIRUB SERPL-MCNC: 0.8 MG/DL (ref 0.2–1.3)
BUN SERPL-MCNC: 11 MG/DL (ref 7–30)
CALCIUM SERPL-MCNC: 8.9 MG/DL (ref 8.5–10.1)
CHLORIDE SERPL-SCNC: 105 MMOL/L (ref 94–109)
CO2 SERPL-SCNC: 25 MMOL/L (ref 20–32)
CREAT SERPL-MCNC: 0.54 MG/DL (ref 0.52–1.04)
DIFFERENTIAL METHOD BLD: ABNORMAL
EOSINOPHIL # BLD AUTO: 0 10E9/L (ref 0–0.7)
EOSINOPHIL NFR BLD AUTO: 0.4 %
ERYTHROCYTE [DISTWIDTH] IN BLOOD BY AUTOMATED COUNT: 21.8 % (ref 10–15)
GFR SERPL CREATININE-BSD FRML MDRD: >90 ML/MIN/1.7M2
GLUCOSE SERPL-MCNC: 101 MG/DL (ref 70–99)
HCT VFR BLD AUTO: 28 % (ref 35–47)
HGB BLD-MCNC: 9.1 G/DL (ref 11.7–15.7)
IMM GRANULOCYTES # BLD: 0.1 10E9/L (ref 0–0.4)
IMM GRANULOCYTES NFR BLD: 1.1 %
LYMPHOCYTES # BLD AUTO: 2.3 10E9/L (ref 0.8–5.3)
LYMPHOCYTES NFR BLD AUTO: 22.7 %
MCH RBC QN AUTO: 28.1 PG (ref 26.5–33)
MCHC RBC AUTO-ENTMCNC: 32.5 G/DL (ref 31.5–36.5)
MCV RBC AUTO: 86 FL (ref 78–100)
MONOCYTES # BLD AUTO: 1.1 10E9/L (ref 0–1.3)
MONOCYTES NFR BLD AUTO: 11.2 %
NEUTROPHILS # BLD AUTO: 6.4 10E9/L (ref 1.6–8.3)
NEUTROPHILS NFR BLD AUTO: 63.7 %
PLATELET # BLD AUTO: 465 10E9/L (ref 150–450)
POTASSIUM SERPL-SCNC: 4.1 MMOL/L (ref 3.4–5.3)
PROT SERPL-MCNC: 6.5 G/DL (ref 6.8–8.8)
RBC # BLD AUTO: 3.24 10E12/L (ref 3.8–5.2)
SODIUM SERPL-SCNC: 138 MMOL/L (ref 133–144)
WBC # BLD AUTO: 10 10E9/L (ref 4–11)

## 2018-05-07 PROCEDURE — 80053 COMPREHEN METABOLIC PANEL: CPT | Performed by: INTERNAL MEDICINE

## 2018-05-07 PROCEDURE — 25000128 H RX IP 250 OP 636: Performed by: INTERNAL MEDICINE

## 2018-05-07 PROCEDURE — 85025 COMPLETE CBC W/AUTO DIFF WBC: CPT | Performed by: INTERNAL MEDICINE

## 2018-05-07 PROCEDURE — 96413 CHEMO IV INFUSION 1 HR: CPT

## 2018-05-07 PROCEDURE — 99214 OFFICE O/P EST MOD 30 MIN: CPT | Performed by: INTERNAL MEDICINE

## 2018-05-07 PROCEDURE — G0463 HOSPITAL OUTPT CLINIC VISIT: HCPCS | Mod: 25

## 2018-05-07 PROCEDURE — 25000125 ZZHC RX 250: Performed by: INTERNAL MEDICINE

## 2018-05-07 PROCEDURE — 96375 TX/PRO/DX INJ NEW DRUG ADDON: CPT

## 2018-05-07 RX ORDER — METHYLPREDNISOLONE SODIUM SUCCINATE 125 MG/2ML
125 INJECTION, POWDER, LYOPHILIZED, FOR SOLUTION INTRAMUSCULAR; INTRAVENOUS
Status: CANCELLED
Start: 2018-05-10

## 2018-05-07 RX ORDER — ALBUTEROL SULFATE 90 UG/1
1-2 AEROSOL, METERED RESPIRATORY (INHALATION)
Status: CANCELLED
Start: 2018-05-09

## 2018-05-07 RX ORDER — EPINEPHRINE 1 MG/ML
0.3 INJECTION, SOLUTION, CONCENTRATE INTRAVENOUS EVERY 5 MIN PRN
Status: CANCELLED | OUTPATIENT
Start: 2018-05-09

## 2018-05-07 RX ORDER — MEPERIDINE HYDROCHLORIDE 25 MG/ML
25 INJECTION INTRAMUSCULAR; INTRAVENOUS; SUBCUTANEOUS EVERY 30 MIN PRN
Status: CANCELLED | OUTPATIENT
Start: 2018-05-10

## 2018-05-07 RX ORDER — ALBUTEROL SULFATE 0.83 MG/ML
2.5 SOLUTION RESPIRATORY (INHALATION)
Status: CANCELLED | OUTPATIENT
Start: 2018-05-10

## 2018-05-07 RX ORDER — HEPARIN SODIUM (PORCINE) LOCK FLUSH IV SOLN 100 UNIT/ML 100 UNIT/ML
5 SOLUTION INTRAVENOUS
Status: CANCELLED | OUTPATIENT
Start: 2018-05-09

## 2018-05-07 RX ORDER — EPINEPHRINE 0.3 MG/.3ML
0.3 INJECTION SUBCUTANEOUS EVERY 5 MIN PRN
Status: CANCELLED | OUTPATIENT
Start: 2018-05-10

## 2018-05-07 RX ORDER — EPINEPHRINE 1 MG/ML
0.3 INJECTION, SOLUTION, CONCENTRATE INTRAVENOUS EVERY 5 MIN PRN
Status: CANCELLED | OUTPATIENT
Start: 2018-05-10

## 2018-05-07 RX ORDER — ALBUTEROL SULFATE 90 UG/1
1-2 AEROSOL, METERED RESPIRATORY (INHALATION)
Status: CANCELLED
Start: 2018-05-10

## 2018-05-07 RX ORDER — HEPARIN SODIUM (PORCINE) LOCK FLUSH IV SOLN 100 UNIT/ML 100 UNIT/ML
5 SOLUTION INTRAVENOUS
Status: CANCELLED | OUTPATIENT
Start: 2018-05-10

## 2018-05-07 RX ORDER — MEPERIDINE HYDROCHLORIDE 25 MG/ML
25 INJECTION INTRAMUSCULAR; INTRAVENOUS; SUBCUTANEOUS EVERY 30 MIN PRN
Status: CANCELLED | OUTPATIENT
Start: 2018-05-09

## 2018-05-07 RX ORDER — SODIUM CHLORIDE 9 MG/ML
1000 INJECTION, SOLUTION INTRAVENOUS CONTINUOUS PRN
Status: CANCELLED
Start: 2018-05-07

## 2018-05-07 RX ORDER — LORAZEPAM 2 MG/ML
0.5 INJECTION INTRAMUSCULAR EVERY 4 HOURS PRN
Status: CANCELLED
Start: 2018-05-07

## 2018-05-07 RX ORDER — SODIUM CHLORIDE 9 MG/ML
1000 INJECTION, SOLUTION INTRAVENOUS CONTINUOUS PRN
Status: CANCELLED
Start: 2018-05-09

## 2018-05-07 RX ORDER — MEPERIDINE HYDROCHLORIDE 25 MG/ML
25 INJECTION INTRAMUSCULAR; INTRAVENOUS; SUBCUTANEOUS EVERY 30 MIN PRN
Status: CANCELLED | OUTPATIENT
Start: 2018-05-07

## 2018-05-07 RX ORDER — DIPHENHYDRAMINE HYDROCHLORIDE 50 MG/ML
50 INJECTION INTRAMUSCULAR; INTRAVENOUS
Status: CANCELLED
Start: 2018-05-10

## 2018-05-07 RX ORDER — DIPHENHYDRAMINE HYDROCHLORIDE 50 MG/ML
50 INJECTION INTRAMUSCULAR; INTRAVENOUS
Status: CANCELLED
Start: 2018-05-09

## 2018-05-07 RX ORDER — EPINEPHRINE 1 MG/ML
0.3 INJECTION, SOLUTION, CONCENTRATE INTRAVENOUS EVERY 5 MIN PRN
Status: CANCELLED | OUTPATIENT
Start: 2018-05-07

## 2018-05-07 RX ORDER — EPINEPHRINE 0.3 MG/.3ML
0.3 INJECTION SUBCUTANEOUS EVERY 5 MIN PRN
Status: CANCELLED | OUTPATIENT
Start: 2018-05-09

## 2018-05-07 RX ORDER — LORAZEPAM 2 MG/ML
0.5 INJECTION INTRAMUSCULAR EVERY 4 HOURS PRN
Status: CANCELLED
Start: 2018-05-09

## 2018-05-07 RX ORDER — METHYLPREDNISOLONE SODIUM SUCCINATE 125 MG/2ML
125 INJECTION, POWDER, LYOPHILIZED, FOR SOLUTION INTRAMUSCULAR; INTRAVENOUS
Status: CANCELLED
Start: 2018-05-07

## 2018-05-07 RX ORDER — DIPHENHYDRAMINE HYDROCHLORIDE 50 MG/ML
50 INJECTION INTRAMUSCULAR; INTRAVENOUS
Status: CANCELLED
Start: 2018-05-07

## 2018-05-07 RX ORDER — METHYLPREDNISOLONE SODIUM SUCCINATE 125 MG/2ML
125 INJECTION, POWDER, LYOPHILIZED, FOR SOLUTION INTRAMUSCULAR; INTRAVENOUS
Status: CANCELLED
Start: 2018-05-09

## 2018-05-07 RX ORDER — ALBUTEROL SULFATE 90 UG/1
1-2 AEROSOL, METERED RESPIRATORY (INHALATION)
Status: CANCELLED
Start: 2018-05-07

## 2018-05-07 RX ORDER — LORAZEPAM 2 MG/ML
0.5 INJECTION INTRAMUSCULAR EVERY 4 HOURS PRN
Status: CANCELLED
Start: 2018-05-10

## 2018-05-07 RX ORDER — EPINEPHRINE 0.3 MG/.3ML
0.3 INJECTION SUBCUTANEOUS EVERY 5 MIN PRN
Status: CANCELLED | OUTPATIENT
Start: 2018-05-07

## 2018-05-07 RX ORDER — HEPARIN SODIUM (PORCINE) LOCK FLUSH IV SOLN 100 UNIT/ML 100 UNIT/ML
5 SOLUTION INTRAVENOUS
Status: DISCONTINUED | OUTPATIENT
Start: 2018-05-07 | End: 2018-05-07 | Stop reason: HOSPADM

## 2018-05-07 RX ORDER — ALBUTEROL SULFATE 0.83 MG/ML
2.5 SOLUTION RESPIRATORY (INHALATION)
Status: CANCELLED | OUTPATIENT
Start: 2018-05-07

## 2018-05-07 RX ORDER — ALBUTEROL SULFATE 0.83 MG/ML
2.5 SOLUTION RESPIRATORY (INHALATION)
Status: CANCELLED | OUTPATIENT
Start: 2018-05-09

## 2018-05-07 RX ORDER — SODIUM CHLORIDE 9 MG/ML
1000 INJECTION, SOLUTION INTRAVENOUS CONTINUOUS PRN
Status: CANCELLED
Start: 2018-05-10

## 2018-05-07 RX ADMIN — SODIUM CHLORIDE 250 ML: 9 INJECTION, SOLUTION INTRAVENOUS at 09:59

## 2018-05-07 RX ADMIN — FAMOTIDINE 20 MG: 20 INJECTION, SOLUTION INTRAVENOUS at 10:14

## 2018-05-07 RX ADMIN — DEXAMETHASONE SODIUM PHOSPHATE: 10 INJECTION, SOLUTION INTRAMUSCULAR; INTRAVENOUS at 10:00

## 2018-05-07 RX ADMIN — HEPARIN SODIUM (PORCINE) LOCK FLUSH IV SOLN 100 UNIT/ML 5 ML: 100 SOLUTION at 12:38

## 2018-05-07 RX ADMIN — CARBOPLATIN 470 MG: 10 INJECTION, SOLUTION INTRAVENOUS at 10:34

## 2018-05-07 RX ADMIN — ETOPOSIDE 150 MG: 20 INJECTION INTRAVENOUS at 11:12

## 2018-05-07 ASSESSMENT — PAIN SCALES - GENERAL: PAINLEVEL: NO PAIN (0)

## 2018-05-07 NOTE — NURSING NOTE
"Oncology Rooming Note    May 7, 2018 9:01 AM   Ines Pickard is a 77 year old female who presents for:    Chief Complaint   Patient presents with     Oncology Clinic Visit     Recheck Malignant neoplasm of upper lobe of left lung, Labs & Chemo today     Initial Vitals: /61 (BP Location: Right arm, Patient Position: Sitting, Cuff Size: Adult Regular)  Pulse 77  Temp 98.7  F (37.1  C) (Tympanic)  Resp 20  Ht 1.588 m (5' 2.52\")  Wt 73.2 kg (161 lb 4.8 oz)  SpO2 95%  Breastfeeding? No  BMI 29.01 kg/m2 Estimated body mass index is 29.01 kg/(m^2) as calculated from the following:    Height as of this encounter: 1.588 m (5' 2.52\").    Weight as of this encounter: 73.2 kg (161 lb 4.8 oz). Body surface area is 1.8 meters squared.  No Pain (0) Comment: Data Unavailable   No LMP recorded. Patient is postmenopausal.  Allergies reviewed: Yes  Medications reviewed: Yes    Medications: Medication refills not needed today.  Pharmacy name entered into [facility]: [facility] [place] - [address].    Clinical concerns: Recheck Malignant neoplasm of upper lobe of left lung, Labs & Chemo today.     7 minutes for nursing intake (face to face time)     Casi Sandoval CMA              "

## 2018-05-07 NOTE — MR AVS SNAPSHOT
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           Follow-ups after your visit        Your next 10 appointments already scheduled     May 08, 2018  1:00 PM CDT   Level 2 with ROOM 2 [facility] ([facility])    [facility]  [address]  [facility] [address]   [phone]            May 09, 2018  1:30 PM CDT   Level 2 with ROOM 1 [facility] ([facility])    [facility]  [address]  [facility] [address]   [phone]            May 29, 2018  9:00 AM CDT   Level 4 with ROOM 6 [facility] ([facility])    [facility]  [address]  [facility] [address]   [phone]            May 29, 2018  9:30 AM CDT   Return Visit with Tracy Miner MD   [facility] ([facility])    [facility]  [address]  [facility] [address]   [phone]            May 30, 2018  1:00 PM CDT   Level 2 with ROOM 4 [facility] ([facility])    [facility]  [address]  [facility] [address]   [phone]            May 31, 2018  1:30 PM CDT   Level 2 with ROOM 9 [facility] ([facility])    [facility]  [address]  [facility] [address]   [phone]              Who to contact     If  "you have questions or need follow up information about today's clinic visit or your schedule please contact [facility] directly at [phone].  Normal or non-critical lab and imaging results will be communicated to you by MyChart, letter or phone within 4 business days after the clinic has received the results. If you do not hear from us within 7 days, please contact the clinic through Fli[employer]hart or phone. If you have a critical or abnormal lab result, we will notify you by phone as soon as possible.  Submit refill requests through [email or web address] or call your pharmacy and they will forward the refill request to us. Please allow 3 business days for your refill to be completed.          Additional Information About Your Visit        Fliqqharsaperatec Information     [email or web address] lets you send messages to your doctor, view your test results, renew your prescriptions, schedule appointments and more. To sign up, go to [email or web address] . Click on \"Log in\" on the left side of the screen, which will take you to the Welcome page. Then click on \"Sign up Now\" on the right side of the page.     You will be asked to enter the access code listed below, as well as some personal information. Please follow the directions to create your username and password.     Your access code is: [email or web address]  Expires: 2018  4:13 PM     Your access code will  in 90 days. If you need help or a new code, please call your [place] clinic or [phone].        Care EveryWhere ID     This is your Care EveryWhere ID. This could be used by other organizations to access your [place] medical records  [id number]        Your Vitals Were     Pulse Temperature Respirations Height Pulse Oximetry Breastfeeding?    77 98.7  F (37.1  C) (Tympanic) 20 1.588 m (5' 2.52\") 95% No    BMI (Body Mass Index)                   29.01 kg/m2            Blood Pressure from Last 3 Encounters:   18 129/61   18 125/46   18 115/59    Weight from Last 3 " Encounters:   05/07/18 73.2 kg (161 lb 4.8 oz)   04/16/18 74.4 kg (164 lb)   03/26/18 75.3 kg (166 lb 1.6 oz)              Today, you had the following     No orders found for display       Primary Care Provider Office Phone # Fax #    R Emanuel Perez [phone] [address] [facility] [address]        Equal Access to Services     ALPA LOPEZ : Hadii aad ku hadasho Soomaali, waaxda luqadaha, qaybta kaalmada adeegyada, waxay idiin hayaan adeeg lorraine maddox . So [facility] [phone].    ATENCIÓN: Si elvira wright, tiene a nguyễn disposición servicios gratuitos de asistencia lingüística. [facility] [phone].    We comply with applicable federal civil rights laws and [place] laws. We do not discriminate on the basis of race, color, national origin, age, disability, sex, sexual orientation, or gender identity.            Thank you!     Thank you for choosing [facility]  for your care. Our goal is always to provide you with excellent care. Hearing back from our patients is one way we can continue to improve our services. Please take a few minutes to complete the written survey that you may receive in the mail after your visit with us. Thank you!             Your Updated Medication List - Protect others around you: Learn how to safely use, store and throw away your medicines at www.disposemymeds.org.          This list is accurate as of 5/7/18  9:40 AM.  Always use your most recent med list.                   Brand Name Dispense Instructions for use Diagnosis    albuterol 108 (90 Base) MCG/ACT Inhaler    PROAIR HFA/PROVENTIL HFA/VENTOLIN HFA    1 Inhaler    Inhale 2 puffs into the lungs 4 times daily    Acute bronchitis with symptoms > 10 days       aspirin 81 MG EC tablet     90 tablet    Take 1 tablet (81 mg) by mouth daily    CAD (coronary artery disease)       atorvastatin 40 MG tablet    LIPITOR    90 tablet    Take 1 tablet (40 mg) by mouth daily    Coronary artery disease  involving native coronary artery of native heart without angina pectoris       HYDROcodone-acetaminophen 5-325 MG per tablet    NORCO    20 tablet    Take 1-2 tablets by mouth every 4 hours as needed for moderate to severe pain    Post-op pain       IBUPROFEN PO      Take 200 mg by mouth every 4 hours as needed for moderate pain        lidocaine-prilocaine cream    EMLA    30 g    Apply topically as needed for moderate pain    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)       lisinopril 5 MG tablet    PRINIVIL/ZESTRIL    90 tablet    Take 1 tablet (5 mg) by mouth daily    Essential hypertension with goal blood pressure less than 140/90       LORazepam 0.5 MG tablet    ATIVAN    30 tablet    Take 1 tablet (0.5 mg) by mouth 2 times daily as needed for anxiety    Anxiety       metoprolol succinate 50 MG 24 hr tablet    TOPROL-XL    90 tablet    Take 1 tablet (50 mg) by mouth daily    Coronary artery disease involving native coronary artery of native heart without angina pectoris       MULTIVITAMIN ADULT Tabs      Take by mouth daily        nitroGLYcerin 0.4 MG sublingual tablet    NITROSTAT    25 tablet    Place 1 tablet (0.4 mg) under the tongue every 5 minutes as needed for chest pain Maximum of 3 doses in 15 minutes    CAD (coronary artery disease)       prochlorperazine 10 MG tablet    COMPAZINE    30 tablet    Take 0.5 tablets (5 mg) by mouth every 6 hours as needed (Nausea/Vomiting)    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

## 2018-05-07 NOTE — NURSING NOTE
"Oncology Rooming Note    May 7, 2018 9:05 AM   Ines Pickard is a 77 year old female who presents for:    Chief Complaint   Patient presents with     Oncology Clinic Visit     Recheck Malignant neoplasm of upper lobe of left lung, Labs & Chemo today     Initial Vitals: /61 (BP Location: Right arm, Patient Position: Sitting, Cuff Size: Adult Regular)  Pulse 77  Temp 98.7  F (37.1  C) (Tympanic)  Resp 20  Ht 1.588 m (5' 2.52\")  Wt 73.2 kg (161 lb 4.8 oz)  SpO2 95%  Breastfeeding? No  BMI 29.01 kg/m2 Estimated body mass index is 29.01 kg/(m^2) as calculated from the following:    Height as of this encounter: 1.588 m (5' 2.52\").    Weight as of this encounter: 73.2 kg (161 lb 4.8 oz). Body surface area is 1.8 meters squared.  No Pain (0) Comment: Data Unavailable   No LMP recorded. Patient is postmenopausal.  Allergies reviewed: Yes  Medications reviewed: Yes    Medications: Medication refills not needed today.  Pharmacy name entered into [facility]: [place] [facility] [place] - [address].    Clinical concerns Recheck Malignant neoplasm of upper lobe of left lung, Labs & Chemo today.     How much longer does she need chemo?     7 minutes for nursing intake (face to face time)     Casi Sandoval CMA              "

## 2018-05-07 NOTE — PATIENT INSTRUCTIONS
Dr. Miner would like you to continue with Cycle 5 today.     We would like to see you back in with Cycle 6 for a follow up appointment.     When you are in need of a refill, please call your pharmacy and they will send us a request.      Copy of appointments, and after visit summary (AVS) given to patient.      If you have any questions please call Bella Arvizu RN, BSN Oncology Hematology  [facility] [phone]. For questions after business hours, or on holidays/weekends, please call our after hours Nurse Triage line [phone]. Thank you.         C5 chemo today, f/u with C6.

## 2018-05-07 NOTE — MR AVS SNAPSHOT
After Visit Summary   5/7/2018    Ines Pickard    MRN: [id number]           Patient Information     Date Of Birth          1940        Visit Information        Provider Department      5/7/2018 8:00 AM ROOM 5 [facility]        Today's Diagnoses     Need for prophylactic measure    -  1    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)           Follow-ups after your visit        Your next 10 appointments already scheduled     May 08, 2018  1:00 PM CDT   Level 2 with ROOM 2 [facility] ([facility])    [facility]  [address]  [place] [address]   [phone]            May 09, 2018  1:30 PM CDT   Level 2 with ROOM 1 [facility] ([facility])    [facility]  [address]  [place] [address]   [phone]            May 29, 2018  9:00 AM CDT   Level 4 with ROOM 6 [facility] ([facility])    [facility]  [address]  [facility] [address]   [phone]            May 29, 2018  9:30 AM CDT   Return Visit with Tracy Miner MD   [facility] ([facility])    [facility]  [address]  [facility] [address]   [phone]            May 30, 2018  1:00 PM CDT   Level 2 with ROOM 4 [facility] ([facility])    [facility]  [address]  [facility] [address]   [phone]            May 31, 2018  1:30 PM CDT   Level 2 with ROOM 9 [facility] ([facility])    [facility]  [address]  [facility] [address]   [phone]              Who to contact     If you have questions or need follow up information about today's clinic visit or your schedule please contact [facility] directly at [phone].  Normal or  "non-critical lab and imaging results will be communicated to you by MyChart, letter or phone within 4 business days after the clinic has received the results. If you do not hear from us within 7 days, please contact the clinic through [employer] or phone. If you have a critical or abnormal lab result, we will notify you by phone as soon as possible.  Submit refill requests through Treehouse or call your pharmacy and they will forward the refill request to us. Please allow 3 business days for your refill to be completed.          Additional Information About Your Visit        Treehouse Information     Treehouse lets you send messages to your doctor, view your test results, renew your prescriptions, schedule appointments and more. To sign up, go to [email or web address] . Click on \"Log in\" on the left side of the screen, which will take you to the Welcome page. Then click on \"Sign up Now\" on the right side of the page.     You will be asked to enter the access code listed below, as well as some personal information. Please follow the directions to create your username and password.     Your access code is: [email or web address]  Expires: 2018  4:13 PM     Your access code will  in 90 days. If you need help or a new code, please call your [place] clinic or [phone].        Care EveryWhere ID     This is your Care EveryWhere ID. This could be used by other organizations to access your [place] medical records  [id number]        Your Vitals Were     Pulse                   94            Blood Pressure from Last 3 Encounters:   18 129/61   18 120/57   18 125/46    Weight from Last 3 Encounters:   18 73.2 kg (161 lb 4.8 oz)   18 74.4 kg (164 lb)   18 75.3 kg (166 lb 1.6 oz)              We Performed the Following     CBC with platelets differential     Comprehensive metabolic panel        Primary Care Provider Office Phone # Fax #    MICHAEL Perez [phone]       " [address] [facility] [address]        Equal Access to Services     ALPA OLPEZ : Hadii aad ku hadrivermarycarmen Carmona, wamarkoda tabitha, qakeyta etiennecrowhoda smith. So [facility] [phone].    ATENCIÓN: Si habla español, tiene a nguyễn disposición servicios gratuitos de asistencia lingüística. [facility] [phone].    We comply with applicable federal civil rights laws and [place] laws. We do not discriminate on the basis of race, color, national origin, age, disability, sex, sexual orientation, or gender identity.            Thank you!     Thank you for choosing [facility]  for your care. Our goal is always to provide you with excellent care. Hearing back from our patients is one way we can continue to improve our services. Please take a few minutes to complete the written survey that you may receive in the mail after your visit with us. Thank you!             Your Updated Medication List - Protect others around you: Learn how to safely use, store and throw away your medicines at www.disposemymeds.org.          This list is accurate as of 5/7/18  2:39 PM.  Always use your most recent med list.                   Brand Name Dispense Instructions for use Diagnosis    albuterol 108 (90 Base) MCG/ACT Inhaler    PROAIR HFA/PROVENTIL HFA/VENTOLIN HFA    1 Inhaler    Inhale 2 puffs into the lungs 4 times daily    Acute bronchitis with symptoms > 10 days       aspirin 81 MG EC tablet     90 tablet    Take 1 tablet (81 mg) by mouth daily    CAD (coronary artery disease)       atorvastatin 40 MG tablet    LIPITOR    90 tablet    Take 1 tablet (40 mg) by mouth daily    Coronary artery disease involving native coronary artery of native heart without angina pectoris       HYDROcodone-acetaminophen 5-325 MG per tablet    NORCO    20 tablet    Take 1-2 tablets by mouth every 4 hours as needed for moderate to severe pain    Post-op pain       IBUPROFEN PO      Take 200 mg by  mouth every 4 hours as needed for moderate pain        lidocaine-prilocaine cream    EMLA    30 g    Apply topically as needed for moderate pain    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)       lisinopril 5 MG tablet    PRINIVIL/ZESTRIL    90 tablet    Take 1 tablet (5 mg) by mouth daily    Essential hypertension with goal blood pressure less than 140/90       LORazepam 0.5 MG tablet    ATIVAN    30 tablet    Take 1 tablet (0.5 mg) by mouth 2 times daily as needed for anxiety    Anxiety       metoprolol succinate 50 MG 24 hr tablet    TOPROL-XL    90 tablet    Take 1 tablet (50 mg) by mouth daily    Coronary artery disease involving native coronary artery of native heart without angina pectoris       MULTIVITAMIN ADULT Tabs      Take by mouth daily        nitroGLYcerin 0.4 MG sublingual tablet    NITROSTAT    25 tablet    Place 1 tablet (0.4 mg) under the tongue every 5 minutes as needed for chest pain Maximum of 3 doses in 15 minutes    CAD (coronary artery disease)       prochlorperazine 10 MG tablet    COMPAZINE    30 tablet    Take 0.5 tablets (5 mg) by mouth every 6 hours as needed (Nausea/Vomiting)    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

## 2018-05-07 NOTE — PROGRESS NOTES
"ONCOLOGY FOLLOW UP VISIT       CHIEF COMPLAINT/REASON FOR VISIT:  Left adrenal mass, FNA 01/30/2018 consistent with sclc     HISTORY OF PRESENT ILLNESS:  A 77-year-old female presented with 5 months duration of hoarseness.  She was evaluated by ENT.  Direct laryngoscopy revealed left vocal cord paralysis.    CT chest with contrast 01/10/2018 identified a 2.3 cm left upper lobe nodule extending to the hilum, suspicious for malignancy, left hilar mediastinal adenopathy with mass-like soft tissue thickening extending along the inferior aspect of the aortic arch likely involving the recurrent laryngeal nerve in this location, left adrenal nodule 1.5 cm undetermined, compression on L1 vertebral body.    EUS 01/30/2017 FNA was done on 2 hypoechoic left adrenal mass-   Consistent with metastatic small cell carcinoma      PET confirmed the primary TEX lesion with adrenal mets. She is dx with extensive stage SCLC.     She made informed decision to proceed with palliative chemo with carbo/-16 2/2018.   She has good PET response after 3 cycles and tx is continued.     PAST MEDICAL HISTORY:  CAD, stent around 2014, reflux, hypertension, cataracts, hyperlipidemia.      MEDICATIONS:  Reviewed in Epic system.      SOCIAL HISTORY:  She lives in her own house.  She has 3 boys.  She is here with her neighbor.  She quit smoking years back.  Denies alcohol abuse.      FAMILY HISTORY:  Positive for mom who had Hodgkin lymphoma.  Brother had unknown type of cancer.      REVIEW OF SYSTEMS:   Throat discomfort seems better.     ? BARNES, she is not sure.   Reports no appetite. She is using insure.   She is losing her teeth from her dental wires and eating baby food.  Reports more tired, doze off a lot.         PHYSICAL EXAMINATION:   VITAL SIGNS:  Blood pressure 129/61, pulse 77, temperature 98.7  F (37.1  C), temperature source Tympanic, resp. rate 20, height 1.588 m (5' 2.52\"), weight 73.2 kg (161 lb 4.8 oz), SpO2 95 %, not currently " breastfeeding.  ECOG 1    GENERAL APPEARANCE:  Elderly lady, looks like her stated age, not in acute distress.   HEENT: The patient is normocephalic, atraumatic. Pupils are equally reactive to light.  Sclerae are anicteric.  Moist oral mucosa.  Erythematous posterior pharynx.   NECK:  Supple.  No jugular venous distention.  Thyroid is not palpable.   LYMPH NODES:  Superficial lymphadenopathy is not appreciable in the bilateral cervical, supraclavicular, axillary or inguinal areas.   CARDIOVASCULAR:  S1, S2 regular with no murmurs or gallops.  No carotid or abdominal bruits.   PULMONARY:  Lungs are clear to auscultation and percussion bilaterally.  There is no wheezing or rhonchi.   GASTROINTESTINAL:  Abdomen is soft, nontender.  No hepatosplenomegaly.  No signs of ascites.  No mass appreciable.   MUSCULOSKELETAL/EXTREMITIES:  No edema.  No cyanotic changes.  No signs of joint deformity.  No lymphedema.  No spinal or paraspinal tenderness.  No CVA tenderness.   NEUROLOGIC:  Cranial nerves II-XII are grossly intact.  Sensation intact.  Muscle strength and muscle tone symmetrical, 5/5 throughout.   SKIN:  No petechiae.  No rash.  No signs of cellulitis.      LABORATORY DATA REVIEWED:   C4D1 wbc diff and CMP are fine. Hb 9.1,  B12/folate nl.     CEA baseline at 6.6 in 2/2017    From 01/30/2018 FNA on left adrenal mass biopsy -- Consistent with metastatic small cell carcinoma      CURRENT IMAGE DATA REVIEWED:   PET 4/2018 post 3 cycles of carbo/vp16  1. Decrease in size and FDG uptake in the left upper lobe nodule consistent with improved lung cancer.  2. Improvement in the previous left hilar and mediastinal adenopathy, currently there is small residual hypermetabolic adenopathy in the aorticopulmonary window consistent with residual mayela metastasis.  3. Resolution of previous hypermetabolic left adrenal nodule consistent with resolved metastasis.    PET 2/2018  1. Hypermetabolic left upper lobe nodule is consistent  with malignancy, most likely bronchogenic carcinoma.  2. Hypermetabolic left hilar and mediastinal adenopathy is consistent  with metastatic disease.  3. Hypermetabolic left adrenal nodule is suspicious for metastatic disease.  4. There is a new 3.4 cm high-density structure abutting the left adrenal gland, suspicious for interval development of adrenal hemorrhage.    CT chest 01/2018 -  identified a 2.3 cm left upper lobe nodule extending to the hilum, suspicious for malignancy, left hilar mediastinal adenopathy with mass-like soft tissue thickening extending along the inferior aspect of the aortic arch likely involving the recurrent laryngeal nerve in this location, left adrenal nodule 1.5 cm undetermined, compression on L1 vertebral body.         OLD DATA REVIEW IN SUMMARY:    Chest x-ray 11/2017, no abnormalities identified.    In 2014, CBC indicating white count 17, hemoglobin 11 and platelets normal.  Differential indicating neutrophilia when she had a heart attack.        ASSESSMENT AND PLAN:    1.  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     She made informed decision to proceed in 2/2018 with  Carbo,  -16, D1-3 q 3 wks with neulasta support.     She has good PET response after 3 cycles and tx is continued.   She is elderly, has high complication rate and needs to be monitored closely.   She is informed tx is palliative in nature.     He is due another restaging PET.     2. Mucositis - advice salt and soda oral rinse.     3. normocytic anemia - nl b12/folate. This is likely from chemo. Will transfuse if hb less than 8.5-9 or symptoms. Dose reduce carbo AUC 4.5.      4. Sob seems only with exertion. She feels the inhaler helps. Advice hse to resume it.     5. Anxiety. She feels ativan helps. Will refill.

## 2018-05-07 NOTE — NURSING NOTE
"Oncology Rooming Note    May 7, 2018 9:07 AM   Ines Pickard is a 77 year old female who presents for:    Chief Complaint   Patient presents with     Oncology Clinic Visit     Recheck Malignant neoplasm of upper lobe of left lung, Labs & Chemo today     Initial Vitals: /61 (BP Location: Right arm, Patient Position: Sitting, Cuff Size: Adult Regular)  Pulse 77  Temp 98.7  F (37.1  C) (Tympanic)  Resp 20  Ht 1.588 m (5' 2.52\")  Wt 73.2 kg (161 lb 4.8 oz)  SpO2 95%  Breastfeeding? No  BMI 29.01 kg/m2 Estimated body mass index is 29.01 kg/(m^2) as calculated from the following:    Height as of this encounter: 1.588 m (5' 2.52\").    Weight as of this encounter: 73.2 kg (161 lb 4.8 oz). Body surface area is 1.8 meters squared.  No Pain (0) Comment: Data Unavailable   No LMP recorded. Patient is postmenopausal.  Allergies reviewed: Yes  Medications reviewed: Yes    Medications: Medication refills not needed today.  Pharmacy name entered into [facility]: [facility] [place] [facility] [address].    Clinical concerns: Recheck Malignant neoplasm of upper lobe of left lung, Labs & Chemo today.   Patient has question about how much longer she needs chemo treatments ?      10  minutes for nursing intake (face to face time)     Casi Sandoval CMA              "

## 2018-05-07 NOTE — LETTER
5/7/2018         RE: Ines Pickard  [id number] [facility] [place] [address]        Dear Colleague,    Thank you for referring your patient, Ines Pickard, to the [facility]. Please see a copy of my visit note below.    ONCOLOGY FOLLOW UP VISIT       CHIEF COMPLAINT/REASON FOR VISIT:  Left adrenal mass, FNA 01/30/2018 consistent with sclc     HISTORY OF PRESENT ILLNESS:  A 77-year-old female presented with 5 months duration of hoarseness.  She was evaluated by ENT.  Direct laryngoscopy revealed left vocal cord paralysis.    CT chest with contrast 01/10/2018 identified a 2.3 cm left upper lobe nodule extending to the hilum, suspicious for malignancy, left hilar mediastinal adenopathy with mass-like soft tissue thickening extending along the inferior aspect of the aortic arch likely involving the recurrent laryngeal nerve in this location, left adrenal nodule 1.5 cm undetermined, compression on L1 vertebral body.    EUS 01/30/2017 FNA was done on 2 hypoechoic left adrenal mass-   Consistent with metastatic small cell carcinoma      PET confirmed the primary TEX lesion with adrenal mets. She is dx with extensive stage SCLC.     She made informed decision to proceed with palliative chemo with carbo/-16 2/2018.   She has good PET response after 3 cycles and tx is continued.     PAST MEDICAL HISTORY:  CAD, stent around 2014, reflux, hypertension, cataracts, hyperlipidemia.      MEDICATIONS:  Reviewed in Epic system.      SOCIAL HISTORY:  She lives in her own house.  She has 3 boys.  She is here with her neighbor.  She quit smoking years back.  Denies alcohol abuse.      FAMILY HISTORY:  Positive for mom who had Hodgkin lymphoma.  Brother had unknown type of cancer.      REVIEW OF SYSTEMS:   Throat discomfort seems better.     ? BARNES, she is not sure.   Reports no appetite. She is using insure.   She is losing her teeth from her dental wires and eating baby food.  Reports more tired, doze off a lot.       "   PHYSICAL EXAMINATION:   VITAL SIGNS:  Blood pressure 129/61, pulse 77, temperature 98.7  F (37.1  C), temperature source Tympanic, resp. rate 20, height 1.588 m (5' 2.52\"), weight 73.2 kg (161 lb 4.8 oz), SpO2 95 %, not currently breastfeeding.  ECOG 1    GENERAL APPEARANCE:  Elderly lady, looks like her stated age, not in acute distress.   HEENT: The patient is normocephalic, atraumatic. Pupils are equally reactive to light.  Sclerae are anicteric.  Moist oral mucosa.  Erythematous posterior pharynx.   NECK:  Supple.  No jugular venous distention.  Thyroid is not palpable.   LYMPH NODES:  Superficial lymphadenopathy is not appreciable in the bilateral cervical, supraclavicular, axillary or inguinal areas.   CARDIOVASCULAR:  S1, S2 regular with no murmurs or gallops.  No carotid or abdominal bruits.   PULMONARY:  Lungs are clear to auscultation and percussion bilaterally.  There is no wheezing or rhonchi.   GASTROINTESTINAL:  Abdomen is soft, nontender.  No hepatosplenomegaly.  No signs of ascites.  No mass appreciable.   MUSCULOSKELETAL/EXTREMITIES:  No edema.  No cyanotic changes.  No signs of joint deformity.  No lymphedema.  No spinal or paraspinal tenderness.  No CVA tenderness.   NEUROLOGIC:  Cranial nerves II-XII are grossly intact.  Sensation intact.  Muscle strength and muscle tone symmetrical, 5/5 throughout.   SKIN:  No petechiae.  No rash.  No signs of cellulitis.      LABORATORY DATA REVIEWED:   C4D1 wbc diff and CMP are fine. Hb 9.1,  B12/folate nl.     CEA baseline at 6.6 in 2/2017    From 01/30/2018 FNA on left adrenal mass biopsy -- Consistent with metastatic small cell carcinoma      CURRENT IMAGE DATA REVIEWED:   PET 4/2018 post 3 cycles of carbo/vp16  1. Decrease in size and FDG uptake in the left upper lobe nodule consistent with improved lung cancer.  2. Improvement in the previous left hilar and mediastinal adenopathy, currently there is small residual hypermetabolic adenopathy in the " aorticopulmonary window consistent with residual mayela metastasis.  3. Resolution of previous hypermetabolic left adrenal nodule consistent with resolved metastasis.    PET 2/2018  1. Hypermetabolic left upper lobe nodule is consistent with malignancy, most likely bronchogenic carcinoma.  2. Hypermetabolic left hilar and mediastinal adenopathy is consistent  with metastatic disease.  3. Hypermetabolic left adrenal nodule is suspicious for metastatic disease.  4. There is a new 3.4 cm high-density structure abutting the left adrenal gland, suspicious for interval development of adrenal hemorrhage.    CT chest 01/2018 -  identified a 2.3 cm left upper lobe nodule extending to the hilum, suspicious for malignancy, left hilar mediastinal adenopathy with mass-like soft tissue thickening extending along the inferior aspect of the aortic arch likely involving the recurrent laryngeal nerve in this location, left adrenal nodule 1.5 cm undetermined, compression on L1 vertebral body.         OLD DATA REVIEW IN SUMMARY:    Chest x-ray 11/2017, no abnormalities identified.    In 2014, CBC indicating white count 17, hemoglobin 11 and platelets normal.  Differential indicating neutrophilia when she had a heart attack.        ASSESSMENT AND PLAN:    1.  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     She made informed decision to proceed in 2/2018 with  Carbo,  -16, D1-3 q 3 wks with neulasta support.     She has good PET response after 3 cycles and tx is continued.   She is elderly, has high complication rate and needs to be monitored closely.   She is informed tx is palliative in nature.     He is due another restaging PET.     2. Mucositis - advice salt and soda oral rinse.     3. normocytic anemia - nl b12/folate. This is likely from chemo. Will transfuse if hb less than 8.5-9 or symptoms. Dose reduce carbo AUC 4.5.      4. Sob seems only with exertion. She feels the inhaler helps. Advice hse to  resume it.     5. Anxiety. She feels ativan helps. Will refill.               Again, thank you for allowing me to participate in the care of your patient.        Sincerely,        Tracy Miner MD, MD

## 2018-05-07 NOTE — PROGRESS NOTES
Infusion Nursing Note:  Ines Pickard presents today for PAC labs, MD appt., and chemotherapy.   Patient seen by provider today: Yes: Dr. Miner.   present during visit today: Not Applicable.    Note: Labs drawn via her port per [place] protocol. Labs WNL's. Premeds and chemotherapy infused without complications per port without complications. Port flushed per [facility] protocol. Pt. To return tomorrow for D2.    Intravenous Access:  Labs drawn without difficulty.  Implanted Port.    Treatment Conditions:  Lab Results   Component Value Date    HGB 9.1 05/07/2018     Lab Results   Component Value Date    WBC 10.0 05/07/2018      Lab Results   Component Value Date    ANEU 6.4 05/07/2018     Lab Results   Component Value Date     05/07/2018      Lab Results   Component Value Date     05/07/2018                   Lab Results   Component Value Date    POTASSIUM 4.1 05/07/2018           Lab Results   Component Value Date    MAG 2.1 12/14/2014            Lab Results   Component Value Date    CR 0.54 05/07/2018                   Lab Results   Component Value Date    AMADO 8.9 05/07/2018                Lab Results   Component Value Date    BILITOTAL 0.8 05/07/2018           Lab Results   Component Value Date    ALBUMIN 3.6 05/07/2018                    Lab Results   Component Value Date    ALT 16 05/07/2018           Lab Results   Component Value Date    AST 13 05/07/2018       Results reviewed, labs MET treatment parameters, ok to proceed with treatment.      Post Infusion Assessment:  Patient tolerated infusion without incident.  Blood return noted pre and post infusion.  Site patent and intact, free from redness, edema or discomfort.  No evidence of extravasations.    Discharge Plan:   Patient and/or family verbalized understanding of discharge instructions and all questions answered.  Patient discharged in stable condition accompanied by: self.  Departure Mode: Ambulatory.    Leni Herrera  RN

## 2018-05-08 ENCOUNTER — INFUSION THERAPY VISIT (OUTPATIENT)
Dept: INFUSION THERAPY | Facility: CLINIC | Age: 78
End: 2018-05-08
Attending: INTERNAL MEDICINE
Payer: MEDICARE

## 2018-05-08 VITALS — DIASTOLIC BLOOD PRESSURE: 47 MMHG | HEART RATE: 94 BPM | TEMPERATURE: 97.6 F | SYSTOLIC BLOOD PRESSURE: 111 MMHG

## 2018-05-08 DIAGNOSIS — C34.92 SMALL CELL CARCINOMA OF LEFT LUNG (H): ICD-10-CM

## 2018-05-08 DIAGNOSIS — Z29.9 NEED FOR PROPHYLACTIC MEASURE: Primary | ICD-10-CM

## 2018-05-08 PROCEDURE — 25000125 ZZHC RX 250: Performed by: INTERNAL MEDICINE

## 2018-05-08 PROCEDURE — 96413 CHEMO IV INFUSION 1 HR: CPT

## 2018-05-08 PROCEDURE — 96375 TX/PRO/DX INJ NEW DRUG ADDON: CPT

## 2018-05-08 PROCEDURE — 25000128 H RX IP 250 OP 636: Performed by: INTERNAL MEDICINE

## 2018-05-08 RX ORDER — HEPARIN SODIUM (PORCINE) LOCK FLUSH IV SOLN 100 UNIT/ML 100 UNIT/ML
5 SOLUTION INTRAVENOUS
Status: DISCONTINUED | OUTPATIENT
Start: 2018-05-08 | End: 2018-05-08 | Stop reason: HOSPADM

## 2018-05-08 RX ADMIN — FAMOTIDINE 20 MG: 20 INJECTION, SOLUTION INTRAVENOUS at 13:27

## 2018-05-08 RX ADMIN — HEPARIN SODIUM (PORCINE) LOCK FLUSH IV SOLN 100 UNIT/ML 5 ML: 100 SOLUTION at 15:01

## 2018-05-08 RX ADMIN — ETOPOSIDE 150 MG: 20 INJECTION, SOLUTION, CONCENTRATE INTRAVENOUS at 13:39

## 2018-05-08 RX ADMIN — DEXAMETHASONE SODIUM PHOSPHATE 12 MG: 10 INJECTION, SOLUTION INTRAMUSCULAR; INTRAVENOUS at 13:11

## 2018-05-08 RX ADMIN — SODIUM CHLORIDE 250 ML: 9 INJECTION, SOLUTION INTRAVENOUS at 13:09

## 2018-05-08 NOTE — MR AVS SNAPSHOT
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)    [facility]  [address]  [facility] [place] [address]   [phone]            May 29, 2018  9:00 AM CDT   Level 4 with ROOM 6 [facility] ([facility])    [facility]  [address]  [facility] [place] [address]   [phone]            May 29, 2018  9:30 AM CDT   Return Visit with Tracy Miner MD   [facility] ([facility])    [facility]  [address]  [facility] [place] [address]   [phone]            May 30, 2018  1:00 PM CDT   Level 2 with ROOM 4 [facility] ([facility])    [facility]  [address]  [facility] [place] [address]   [phone]            May 31, 2018  1:30 PM CDT   Level 2 with ROOM 9 [facility] ([facility])    [facility]  [address]  [facility] [place] [address]   [phone]              Who to contact     If you have questions or need follow up information about today's clinic visit or your schedule please contact [facility] directly at [phone].  Normal or non-critical lab and imaging results will be communicated to you by MyChart, letter or phone within 4 business days after the clinic has received the results. If you do not hear from us within 7 days, please contact  "the clinic through [employer] or phone. If you have a critical or abnormal lab result, we will notify you by phone as soon as possible.  Submit refill requests through [employer] or call your pharmacy and they will forward the refill request to us. Please allow 3 business days for your refill to be completed.          Additional Information About Your Visit        [employer] Information     [employer] lets you send messages to your doctor, view your test results, renew your prescriptions, schedule appointments and more. To sign up, go to [email or web address] . Click on \"Log in\" on the left side of the screen, which will take you to the Welcome page. Then click on \"Sign up Now\" on the right side of the page.     You will be asked to enter the access code listed below, as well as some personal information. Please follow the directions to create your username and password.     Your access code is: [email or web address]  Expires: 2018  4:13 PM     Your access code will  in 90 days. If you need help or a new code, please call your [place] clinic or [phone].        Care EveryWhere ID     This is your Care EveryWhere ID. This could be used by other organizations to access your [place] medical records  [id number]        Your Vitals Were     Pulse Temperature                58 97.6  F (36.4  C) (Oral)           Blood Pressure from Last 3 Encounters:   18 108/54   18 129/61   18 120/57    Weight from Last 3 Encounters:   18 73.2 kg (161 lb 4.8 oz)   18 74.4 kg (164 lb)   18 75.3 kg (166 lb 1.6 oz)              Today, you had the following     No orders found for display       Primary Care Provider Office Phone # Fax #    R Emanuel Perez [phone] [address] [facility] [address]        Equal Access to Services     ALPA LOPEZ : Hadii aad ku hadasho Soomaali, waaxda luqadaha, qaybta kaalmalondon velazquez, ohda maddox . So [facility] " [phone].    ATENCIÓN: Si elvira wright, tiene a nguyễn disposición servicios gratuitos de asistencia lingüística. Yefri wellington [phone].    We comply with applicable federal civil rights laws and [place] laws. We do not discriminate on the basis of race, color, national origin, age, disability, sex, sexual orientation, or gender identity.            Thank you!     Thank you for choosing [facility]  for your care. Our goal is always to provide you with excellent care. Hearing back from our patients is one way we can continue to improve our services. Please take a few minutes to complete the written survey that you may receive in the mail after your visit with us. Thank you!             Your Updated Medication List - Protect others around you: Learn how to safely use, store and throw away your medicines at www.disposemymeds.org.          This list is accurate as of 5/8/18  2:49 PM.  Always use your most recent med list.                   Brand Name Dispense Instructions for use Diagnosis    albuterol 108 (90 Base) MCG/ACT Inhaler    PROAIR HFA/PROVENTIL HFA/VENTOLIN HFA    1 Inhaler    Inhale 2 puffs into the lungs 4 times daily    Acute bronchitis with symptoms > 10 days       aspirin 81 MG EC tablet     90 tablet    Take 1 tablet (81 mg) by mouth daily    CAD (coronary artery disease)       atorvastatin 40 MG tablet    LIPITOR    90 tablet    Take 1 tablet (40 mg) by mouth daily    Coronary artery disease involving native coronary artery of native heart without angina pectoris       HYDROcodone-acetaminophen 5-325 MG per tablet    NORCO    20 tablet    Take 1-2 tablets by mouth every 4 hours as needed for moderate to severe pain    Post-op pain       IBUPROFEN PO      Take 200 mg by mouth every 4 hours as needed for moderate pain        lidocaine-prilocaine cream    EMLA    30 g    Apply topically as needed for moderate pain    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)       lisinopril 5 MG tablet     PRINIVIL/ZESTRIL    90 tablet    Take 1 tablet (5 mg) by mouth daily    Essential hypertension with goal blood pressure less than 140/90       LORazepam 0.5 MG tablet    ATIVAN    30 tablet    Take 1 tablet (0.5 mg) by mouth 2 times daily as needed for anxiety    Anxiety       metoprolol succinate 50 MG 24 hr tablet    TOPROL-XL    90 tablet    Take 1 tablet (50 mg) by mouth daily    Coronary artery disease involving native coronary artery of native heart without angina pectoris       MULTIVITAMIN ADULT Tabs      Take by mouth daily        nitroGLYcerin 0.4 MG sublingual tablet    NITROSTAT    25 tablet    Place 1 tablet (0.4 mg) under the tongue every 5 minutes as needed for chest pain Maximum of 3 doses in 15 minutes    CAD (coronary artery disease)       prochlorperazine 10 MG tablet    COMPAZINE    30 tablet    Take 0.5 tablets (5 mg) by mouth every 6 hours as needed (Nausea/Vomiting)    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

## 2018-05-08 NOTE — PROGRESS NOTES
Infusion Nursing Note:  Ines Pickard presents today for IV Etoposide.    Patient seen by provider today: No   present during visit today: Not Applicable.    Note: IV premeds and IV Etoposide given via her port per [facility] protocol. Port flushed per [facility] protocol. Pt. To return tomorrow for D3.    Intravenous Access:  No Intravenous access/labs at this visit.    Treatment Conditions:  Not Applicable.      Post Infusion Assessment:  Patient tolerated infusion without incident.  Blood return noted pre and post infusion.  Site patent and intact, free from redness, edema or discomfort.  No evidence of extravasations.  Access discontinued per protocol.    Discharge Plan:   Patient and/or family verbalized understanding of discharge instructions and all questions answered.  Patient discharged in stable condition accompanied by: self.  Departure Mode: Ambulatory.    Leni Herrera RN

## 2018-05-09 ENCOUNTER — INFUSION THERAPY VISIT (OUTPATIENT)
Dept: INFUSION THERAPY | Facility: CLINIC | Age: 78
End: 2018-05-09
Attending: INTERNAL MEDICINE
Payer: MEDICARE

## 2018-05-09 VITALS — HEART RATE: 70 BPM | TEMPERATURE: 99 F | DIASTOLIC BLOOD PRESSURE: 58 MMHG | SYSTOLIC BLOOD PRESSURE: 113 MMHG

## 2018-05-09 DIAGNOSIS — C34.92 SMALL CELL CARCINOMA OF LEFT LUNG (H): ICD-10-CM

## 2018-05-09 DIAGNOSIS — F41.9 ANXIETY: ICD-10-CM

## 2018-05-09 DIAGNOSIS — Z29.9 NEED FOR PROPHYLACTIC MEASURE: Primary | ICD-10-CM

## 2018-05-09 PROCEDURE — 25000128 H RX IP 250 OP 636: Performed by: INTERNAL MEDICINE

## 2018-05-09 PROCEDURE — 96413 CHEMO IV INFUSION 1 HR: CPT

## 2018-05-09 PROCEDURE — 96375 TX/PRO/DX INJ NEW DRUG ADDON: CPT

## 2018-05-09 PROCEDURE — 96377 APPLICATON ON-BODY INJECTOR: CPT | Mod: XU

## 2018-05-09 PROCEDURE — 25000125 ZZHC RX 250: Performed by: INTERNAL MEDICINE

## 2018-05-09 RX ORDER — HEPARIN SODIUM (PORCINE) LOCK FLUSH IV SOLN 100 UNIT/ML 100 UNIT/ML
5 SOLUTION INTRAVENOUS
Status: DISCONTINUED | OUTPATIENT
Start: 2018-05-09 | End: 2018-05-09 | Stop reason: HOSPADM

## 2018-05-09 RX ADMIN — HEPARIN 5 ML: 100 SYRINGE at 15:38

## 2018-05-09 RX ADMIN — DEXAMETHASONE SODIUM PHOSPHATE 12 MG: 10 INJECTION, SOLUTION INTRAMUSCULAR; INTRAVENOUS at 13:52

## 2018-05-09 RX ADMIN — ETOPOSIDE 150 MG: 20 INJECTION, SOLUTION, CONCENTRATE INTRAVENOUS at 14:24

## 2018-05-09 RX ADMIN — SODIUM CHLORIDE 250 ML: 9 INJECTION, SOLUTION INTRAVENOUS at 13:37

## 2018-05-09 RX ADMIN — PEGFILGRASTIM 6 MG: KIT SUBCUTANEOUS at 15:38

## 2018-05-09 RX ADMIN — FAMOTIDINE 20 MG: 20 INJECTION, SOLUTION INTRAVENOUS at 14:08

## 2018-05-09 NOTE — PROGRESS NOTES
Infusion Nursing Note:  Ines Pickard presents today for IV Etoposide.    Patient seen by provider today: No   present during visit today: Not Applicable.    Note: IV premeds and IV Etoposide given via her port per [facility] protocol without complications. Port flushed per [facility] protocol. On pro Neulasta injector applied to the left arm.  Pt given instructions and verbal teaching about the on body injector. Questions answered and pt verbalizes understanding of teaching. Pt. To return on 5/29/18 for labs, MD appt., and chemotherapy.      Intravenous Access:  No Intravenous access/labs at this visit.    Treatment Conditions:  Not Applicable.      Post Infusion Assessment:  Patient tolerated infusion without incident.  Blood return noted pre and post infusion.  Site patent and intact, free from redness, edema or discomfort.  No evidence of extravasations.  Access discontinued per protocol.    Discharge Plan:   Patient and/or family verbalized understanding of discharge instructions and all questions answered.  Patient discharged in stable condition accompanied by: self.  Departure Mode: Ambulatory.    Leni Herrera RN

## 2018-05-09 NOTE — MR AVS SNAPSHOT
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)           Follow-ups after your visit        Your next 10 appointments already scheduled     May 29, 2018  9:00 AM CDT   Level 4 with ROOM 6 [facility] ([facility] [place])    [facility]  [address]  [facility] [place] [address]   [phone]            May 29, 2018  9:30 AM CDT   Return Visit with Tracy Miner MD   [facility] ([facility] [place])    [facility]  [address]  [facility] [place] [address]   [phone]            May 30, 2018  1:00 PM CDT   Level 2 with ROOM 4 [facility] ([facility] [place])    [facility]  [address]  [facility] [place] [address]   [phone]            May 31, 2018  1:30 PM CDT   Level 2 with ROOM 9 [facility] ([facility] [place])    [facility]  [address]  [facility] [place] [address]   [phone]              Who to contact     If you have questions or need follow up information about today's clinic visit or your schedule please contact [facility] directly at [phone].  Normal or non-critical lab and imaging results will be communicated to you by MyChart, letter or phone within 4 business days after the clinic has received the results. If you do not hear from us within 7 days, please contact the clinic through MyChart or phone. If you have a critical or abnormal lab result, we will notify you by phone as soon as possible.  Submit refill requests through [employer] or call your pharmacy and they will forward  "the refill request to us. Please allow 3 business days for your refill to be completed.          Additional Information About Your Visit        MyChart Information     [facility] lets you send messages to your doctor, view your test results, renew your prescriptions, schedule appointments and more. To sign up, go to [email or web address] . Click on \"Log in\" on the left side of the screen, which will take you to the Welcome page. Then click on \"Sign up Now\" on the right side of the page.     You will be asked to enter the access code listed below, as well as some personal information. Please follow the directions to create your username and password.     Your access code is: [email or web address]  Expires: 2018  4:13 PM     Your access code will  in 90 days. If you need help or a new code, please call your [facility] clinic or [phone].        Care EveryWhere ID     This is your Care EveryWhere ID. This could be used by other organizations to access your [facility] medical records  [id number]        Your Vitals Were     Pulse Temperature                70 99  F (37.2  C) (Tympanic)           Blood Pressure from Last 3 Encounters:   18 113/58   18 111/47   18 129/61    Weight from Last 3 Encounters:   18 73.2 kg (161 lb 4.8 oz)   18 74.4 kg (164 lb)   18 75.3 kg (166 lb 1.6 oz)              Today, you had the following     No orders found for display       Primary Care Provider Office Phone # Fax #    R Emanuel Perez [phone] [address] [facility] [address]        Equal Access to Services     [facility] : Hadii doroteo Carmona, renita lu, qaybta etiennealmahoda smith. So [facility] [phone].    ATENCIÓN: Si habla español, tiene a nguyễn disposición servicios gratuitos de asistencia lingüística. Llame al [phone].    We comply with applicable federal civil rights laws and [place] laws. We do " not discriminate on the basis of race, color, national origin, age, disability, sex, sexual orientation, or gender identity.            Thank you!     Thank you for choosing [facility]  for your care. Our goal is always to provide you with excellent care. Hearing back from our patients is one way we can continue to improve our services. Please take a few minutes to complete the written survey that you may receive in the mail after your visit with us. Thank you!             Your Updated Medication List - Protect others around you: Learn how to safely use, store and throw away your medicines at www.disposemymeds.org.          This list is accurate as of 5/9/18  3:58 PM.  Always use your most recent med list.                   Brand Name Dispense Instructions for use Diagnosis    albuterol 108 (90 Base) MCG/ACT Inhaler    PROAIR HFA/PROVENTIL HFA/VENTOLIN HFA    1 Inhaler    Inhale 2 puffs into the lungs 4 times daily    Acute bronchitis with symptoms > 10 days       aspirin 81 MG EC tablet     90 tablet    Take 1 tablet (81 mg) by mouth daily    CAD (coronary artery disease)       atorvastatin 40 MG tablet    LIPITOR    90 tablet    Take 1 tablet (40 mg) by mouth daily    Coronary artery disease involving native coronary artery of native heart without angina pectoris       HYDROcodone-acetaminophen 5-325 MG per tablet    NORCO    20 tablet    Take 1-2 tablets by mouth every 4 hours as needed for moderate to severe pain    Post-op pain       IBUPROFEN PO      Take 200 mg by mouth every 4 hours as needed for moderate pain        lidocaine-prilocaine cream    EMLA    30 g    Apply topically as needed for moderate pain    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)       lisinopril 5 MG tablet    PRINIVIL/ZESTRIL    90 tablet    Take 1 tablet (5 mg) by mouth daily    Essential hypertension with goal blood pressure less than 140/90       LORazepam 0.5 MG tablet    ATIVAN    30 tablet    Take 1 tablet (0.5 mg) by mouth  2 times daily as needed for anxiety    Anxiety       metoprolol succinate 50 MG 24 hr tablet    TOPROL-XL    90 tablet    Take 1 tablet (50 mg) by mouth daily    Coronary artery disease involving native coronary artery of native heart without angina pectoris       MULTIVITAMIN ADULT Tabs      Take by mouth daily        nitroGLYcerin 0.4 MG sublingual tablet    NITROSTAT    25 tablet    Place 1 tablet (0.4 mg) under the tongue every 5 minutes as needed for chest pain Maximum of 3 doses in 15 minutes    CAD (coronary artery disease)       prochlorperazine 10 MG tablet    COMPAZINE    30 tablet    Take 0.5 tablets (5 mg) by mouth every 6 hours as needed (Nausea/Vomiting)    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

## 2018-05-10 RX ORDER — LORAZEPAM 0.5 MG/1
0.5 TABLET ORAL 2 TIMES DAILY PRN
Qty: 30 TABLET | Refills: 5 | Status: SHIPPED | OUTPATIENT
Start: 2018-05-10 | End: 2018-01-01

## 2018-05-15 ENCOUNTER — HOSPITAL ENCOUNTER (EMERGENCY)
Facility: CLINIC | Age: 78
Discharge: HOME | End: 2018-05-15
Attending: EMERGENCY MEDICINE | Admitting: EMERGENCY MEDICINE
Payer: MEDICARE

## 2018-05-15 VITALS
HEART RATE: 97 BPM | OXYGEN SATURATION: 98 % | TEMPERATURE: 98 F | SYSTOLIC BLOOD PRESSURE: 127 MMHG | DIASTOLIC BLOOD PRESSURE: 80 MMHG | RESPIRATION RATE: 19 BRPM

## 2018-05-15 DIAGNOSIS — D63.0 ANEMIA IN NEOPLASTIC DISEASE: ICD-10-CM

## 2018-05-15 DIAGNOSIS — C34.92 SMALL CELL CARCINOMA OF LEFT LUNG (H): ICD-10-CM

## 2018-05-15 LAB
ALBUMIN SERPL-MCNC: 3.4 G/DL (ref 3.4–5)
ALP SERPL-CCNC: 89 U/L (ref 40–150)
ALT SERPL W P-5'-P-CCNC: 10 U/L (ref 0–50)
ANION GAP SERPL CALCULATED.3IONS-SCNC: 10 MMOL/L (ref 3–14)
AST SERPL W P-5'-P-CCNC: 5 U/L (ref 0–45)
BASOPHILS # BLD AUTO: 0.1 10E9/L (ref 0–0.2)
BASOPHILS NFR BLD AUTO: 1.6 %
BILIRUB SERPL-MCNC: 0.9 MG/DL (ref 0.2–1.3)
BLD PROD TYP BPU: NORMAL
BLD UNIT ID BPU: 0
BLOOD PRODUCT CODE: NORMAL
BPU ID: NORMAL
BUN SERPL-MCNC: 17 MG/DL (ref 7–30)
CALCIUM SERPL-MCNC: 9 MG/DL (ref 8.5–10.1)
CHLORIDE SERPL-SCNC: 106 MMOL/L (ref 94–109)
CO2 SERPL-SCNC: 24 MMOL/L (ref 20–32)
CREAT SERPL-MCNC: 0.42 MG/DL (ref 0.52–1.04)
DIFFERENTIAL METHOD BLD: ABNORMAL
EOSINOPHIL # BLD AUTO: 0 10E9/L (ref 0–0.7)
EOSINOPHIL NFR BLD AUTO: 0.5 %
ERYTHROCYTE [DISTWIDTH] IN BLOOD BY AUTOMATED COUNT: 17.8 % (ref 10–15)
GFR SERPL CREATININE-BSD FRML MDRD: >90 ML/MIN/1.7M2
GLUCOSE SERPL-MCNC: 89 MG/DL (ref 70–99)
HCT VFR BLD AUTO: 19.9 % (ref 35–47)
HEMOCCULT STL QL: NORMAL
HGB BLD-MCNC: 6.5 G/DL (ref 11.7–15.7)
IMM GRANULOCYTES # BLD: 0.1 10E9/L (ref 0–0.4)
IMM GRANULOCYTES NFR BLD: 1.7 %
INTERNAL QC OK POCT: YES
LYMPHOCYTES # BLD AUTO: 1.3 10E9/L (ref 0.8–5.3)
LYMPHOCYTES NFR BLD AUTO: 20.7 %
MCH RBC QN AUTO: 28.5 PG (ref 26.5–33)
MCHC RBC AUTO-ENTMCNC: 32.7 G/DL (ref 31.5–36.5)
MCV RBC AUTO: 87 FL (ref 78–100)
MONOCYTES # BLD AUTO: 0.9 10E9/L (ref 0–1.3)
MONOCYTES NFR BLD AUTO: 13.8 %
NEUTROPHILS # BLD AUTO: 4 10E9/L (ref 1.6–8.3)
NEUTROPHILS NFR BLD AUTO: 61.7 %
PLATELET # BLD AUTO: 126 10E9/L (ref 150–450)
POTASSIUM SERPL-SCNC: 3.9 MMOL/L (ref 3.4–5.3)
PROT SERPL-MCNC: 5.8 G/DL (ref 6.8–8.8)
RBC # BLD AUTO: 2.28 10E12/L (ref 3.8–5.2)
SODIUM SERPL-SCNC: 140 MMOL/L (ref 133–144)
TEST CARD LOT NUMBER: NORMAL
TRANSFUSION STATUS PATIENT QL: NORMAL
TRANSFUSION STATUS PATIENT QL: NORMAL
WBC # BLD AUTO: 6.4 10E9/L (ref 4–11)

## 2018-05-15 PROCEDURE — 25000125 ZZHC RX 250: Performed by: EMERGENCY MEDICINE

## 2018-05-15 PROCEDURE — 96360 HYDRATION IV INFUSION INIT: CPT | Performed by: EMERGENCY MEDICINE

## 2018-05-15 PROCEDURE — P9016 RBC LEUKOCYTES REDUCED: HCPCS | Performed by: EMERGENCY MEDICINE

## 2018-05-15 PROCEDURE — 25000128 H RX IP 250 OP 636: Performed by: EMERGENCY MEDICINE

## 2018-05-15 PROCEDURE — 86923 COMPATIBILITY TEST ELECTRIC: CPT | Performed by: EMERGENCY MEDICINE

## 2018-05-15 PROCEDURE — 86900 BLOOD TYPING SEROLOGIC ABO: CPT | Performed by: EMERGENCY MEDICINE

## 2018-05-15 PROCEDURE — 82272 OCCULT BLD FECES 1-3 TESTS: CPT | Performed by: EMERGENCY MEDICINE

## 2018-05-15 PROCEDURE — 36430 TRANSFUSION BLD/BLD COMPNT: CPT | Performed by: EMERGENCY MEDICINE

## 2018-05-15 PROCEDURE — 85025 COMPLETE CBC W/AUTO DIFF WBC: CPT | Performed by: EMERGENCY MEDICINE

## 2018-05-15 PROCEDURE — 86901 BLOOD TYPING SEROLOGIC RH(D): CPT | Performed by: EMERGENCY MEDICINE

## 2018-05-15 PROCEDURE — 99284 EMERGENCY DEPT VISIT MOD MDM: CPT | Mod: 25 | Performed by: EMERGENCY MEDICINE

## 2018-05-15 PROCEDURE — 86850 RBC ANTIBODY SCREEN: CPT | Performed by: EMERGENCY MEDICINE

## 2018-05-15 PROCEDURE — 80053 COMPREHEN METABOLIC PANEL: CPT | Performed by: EMERGENCY MEDICINE

## 2018-05-15 RX ORDER — HEPARIN SODIUM,PORCINE 10 UNIT/ML
3 VIAL (ML) INTRAVENOUS
Status: DISCONTINUED | OUTPATIENT
Start: 2018-05-15 | End: 2018-05-15 | Stop reason: HOSPADM

## 2018-05-15 RX ORDER — ONDANSETRON 4 MG/1
4 TABLET, ORALLY DISINTEGRATING ORAL ONCE
Status: COMPLETED | OUTPATIENT
Start: 2018-05-15 | End: 2018-05-15

## 2018-05-15 RX ADMIN — HEPARIN, PORCINE (PF) 10 UNIT/ML INTRAVENOUS SYRINGE 3 ML: at 09:39

## 2018-05-15 RX ADMIN — ONDANSETRON 4 MG: 4 TABLET, ORALLY DISINTEGRATING ORAL at 09:29

## 2018-05-15 RX ADMIN — SODIUM CHLORIDE 1000 ML: 9 INJECTION, SOLUTION INTRAVENOUS at 04:56

## 2018-05-15 ASSESSMENT — ENCOUNTER SYMPTOMS
MUSCULOSKELETAL NEGATIVE: 1
APPETITE CHANGE: 1
LIGHT-HEADEDNESS: 1
NAUSEA: 1
ACTIVITY CHANGE: 1
CARDIOVASCULAR NEGATIVE: 1
CHILLS: 0
FATIGUE: 1
RESPIRATORY NEGATIVE: 1
EYES NEGATIVE: 1
WEAKNESS: 1
PSYCHIATRIC NEGATIVE: 1
FEVER: 0
BRUISES/BLEEDS EASILY: 1

## 2018-05-15 NOTE — ED AVS SNAPSHOT
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      Follow-up Information     Follow up with MICHAEL Perez MD.    Specialty:  Family Practice    Contact information:    [address] [facility] [address]  [phone]          Discharge Instructions       Follow-up with oncology or your primary care provider in 2 days for recheck/hemoglobin check.        Your next 10 appointments already scheduled     May 29, 2018  9:00 AM CDT   Level 4 with ROOM 6 [facility] ([facility])    [facility]  [address]  [facility] [place] [address]   [phone]            May 29, 2018  9:30 AM CDT   Return Visit with Tracy Miner MD   [facility] ([facility])    [facility]  [address]  [facility] [address]   [phone]            May 30, 2018  1:00 PM CDT   Level 2 with ROOM 4 [facility] ([facility])    [facility]  [address]  [facility] [address]   [phone]            May 31, 2018  1:30 PM CDT   Level 2 with ROOM 9 [facility] ([facility])    [facility]  [address]  [facility] [address]   [phone]              24 Hour Appointment Hotline       To make an appointment at any [facility], call [phone] ([phone]). If you don't have a family doctor or clinic, we will help you find one.  [facility] are conveniently located to serve the needs of you and your family.             Review of your medicines      Our records show that you are taking the medicines listed below. If these are incorrect, please call your family doctor or clinic.        Dose / Directions Last dose taken    albuterol 108 (90 Base) MCG/ACT Inhaler   Commonly known as:  PROAIR HFA/PROVENTIL HFA/VENTOLIN HFA   Dose:  2 puff   Quantity:  1 Inhaler        Inhale 2 puffs into the lungs 4 times daily   Refills:  2        aspirin 81 MG EC tablet   Dose:  81 mg   Quantity:  90 tablet        Take 1 tablet (81 mg) by mouth daily   Refills:  3        atorvastatin 40 MG tablet   Commonly known as:  LIPITOR   Dose:  40 mg   Quantity:  90 tablet        Take 1 tablet (40 mg) by mouth daily   Refills:  3        IBUPROFEN PO   Dose:  200 mg        Take 200 mg by mouth every 4 hours as needed for moderate pain   Refills:  0        lidocaine-prilocaine cream   Commonly known as:  EMLA   Quantity:  30 g        Apply topically as needed for moderate pain   Refills:  0        lisinopril 5 MG tablet   Commonly known as:  PRINIVIL/ZESTRIL   Dose:  5 mg   Quantity:  90 tablet        Take 1 tablet (5 mg) by mouth daily   Refills:  2        LORazepam 0.5 MG tablet   Commonly known as:  ATIVAN   Dose:  0.5 mg   Quantity:  30 tablet        Take 1 tablet (0.5 mg) by mouth 2 times daily as needed for anxiety   Refills:  5        metoprolol succinate 50 MG 24 hr tablet   Commonly known as:  TOPROL-XL   Dose:  50 mg   Quantity:  90 tablet        Take 1 tablet (50 mg) by mouth daily   Refills:  3        MULTIVITAMIN ADULT Tabs        Take by mouth daily   Refills:  0        nitroGLYcerin 0.4 MG sublingual tablet   Commonly known as:  NITROSTAT   Dose:  0.4 mg   Quantity:  25 tablet        Place 1 tablet (0.4 mg) under the tongue every 5 minutes as needed for chest pain Maximum of 3 doses in 15 minutes   Refills:  3        prochlorperazine 10 MG tablet    Commonly known as:  COMPAZINE   Dose:  5 mg   Quantity:  30 tablet        Take 0.5 tablets (5 mg) by mouth every 6 hours as needed (Nausea/Vomiting)   Refills:  3        ranitidine 75 MG tablet   Commonly known as:  ZANTAC   Dose:  75 mg   Quantity:  30 tablet        Take 1 tablet (75 mg) by mouth 2 times daily   Refills:  11        TYLENOL PO   Dose:  1000 mg        Take 1,000 mg by mouth every 6 hours as needed   Refills:  0                Procedures and tests performed during your visit     ABO/Rh type and screen    Blood component    CBC with platelets differential    Comprehensive metabolic panel    Occult blood stool POCT    Red blood cell prepare order unit    Transfuse red blood cell unit      Orders Needing Specimen Collection     None      Pending Results     No orders found from 5/13/2018 to 5/16/2018.            Pending Culture Results     No orders found from 5/13/2018 to 5/16/2018.            Pending Results Instructions     If you had any lab results that were not finalized at the time of your Discharge, you can call the ED Lab Result RN at [phone]. You will be contacted by this team for any positive Lab results or changes in treatment. The nurses are available 7 days a week from 10A to 6:30P.  You can leave a message 24 hours per day and they will return your call.        Test Results From Your Hospital Stay        5/15/2018  5:13 AM      Component Results     Component Value Ref Range & Units Status    WBC 6.4 4.0 - 11.0 10e9/L Final    RBC Count 2.28 (L) 3.8 - 5.2 10e12/L Final    Hemoglobin 6.5 (LL) 11.7 - 15.7 g/dL Final    This result has been called to CHARLEY PERKINS RN by Dee Dee Lopes on 05 15   2018 at 0511, and has been read back.       Hematocrit 19.9 (L) 35.0 - 47.0 % Final    MCV 87 78 - 100 fl Final    MCH 28.5 26.5 - 33.0 pg Final    MCHC 32.7 31.5 - 36.5 g/dL Final    RDW 17.8 (H) 10.0 - 15.0 % Final    Platelet Count 126 (L) 150 - 450 10e9/L Final    Diff Method Automated  Method  Final    % Neutrophils 61.7 % Final    % Lymphocytes 20.7 % Final    % Monocytes 13.8 % Final    % Eosinophils 0.5 % Final    % Basophils 1.6 % Final    % Immature Granulocytes 1.7 % Final    Absolute Neutrophil 4.0 1.6 - 8.3 10e9/L Final    Absolute Lymphocytes 1.3 0.8 - 5.3 10e9/L Final    Absolute Monocytes 0.9 0.0 - 1.3 10e9/L Final    Absolute Eosinophils 0.0 0.0 - 0.7 10e9/L Final    Absolute Basophils 0.1 0.0 - 0.2 10e9/L Final    Abs Immature Granulocytes 0.1 0 - 0.4 10e9/L Final         5/15/2018  5:25 AM      Component Results     Component Value Ref Range & Units Status    Sodium 140 133 - 144 mmol/L Final    Potassium 3.9 3.4 - 5.3 mmol/L Final    Chloride 106 94 - 109 mmol/L Final    Carbon Dioxide 24 20 - 32 mmol/L Final    Anion Gap 10 3 - 14 mmol/L Final    Glucose 89 70 - 99 mg/dL Final    Urea Nitrogen 17 7 - 30 mg/dL Final    Creatinine 0.42 (L) 0.52 - 1.04 mg/dL Final    GFR Estimate >90 >60 mL/min/1.7m2 Final    Non  GFR Calc    GFR Estimate If Black >90 >60 mL/min/1.7m2 Final    African American GFR Calc    Calcium 9.0 8.5 - 10.1 mg/dL Final    Bilirubin Total 0.9 0.2 - 1.3 mg/dL Final    Albumin 3.4 3.4 - 5.0 g/dL Final    Protein Total 5.8 (L) 6.8 - 8.8 g/dL Final    Alkaline Phosphatase 89 40 - 150 U/L Final    ALT 10 0 - 50 U/L Final    AST 5 0 - 45 U/L Final         5/15/2018  5:50 AM      Component Results     Component Value Ref Range & Units Status    Occult Blood neg neg Final    Internal QC OK Yes  Final    Test Card Lot Number [id number]  Final         5/15/2018  6:36 AM      Component Results     Component Value Ref Range & Units Status    Units Ordered 1  Final    ABO O  Final    RH(D) Pos  Final    Antibody Screen Neg  Final    Test Valid Only At [facility]     Final    Specimen Expires 05/18/2018  Final    Crossmatch Red Blood Cells  Final         5/15/2018  6:42 AM      Component Results     Component    Unit Number    [id number]    Blood  "Component Type    Red Blood Cells Leukocyte Reduced    Division Number    00    Status of Unit    Released to care unit 05/15/2018 0642    Blood Product Code    [id number]    Unit Status    ISS                Thank you for choosing [facility]       Thank you for choosing [facility] for your care. Our goal is always to provide you with excellent care. Hearing back from our patients is one way we can continue to improve our services. Please take a few minutes to complete the written survey that you may receive in the mail after you visit with us. Thank you!        [employer] Information     [employer] lets you send messages to your doctor, view your test results, renew your prescriptions, schedule appointments and more. To sign up, go to [email or web address] . Click on \"Log in\" on the left side of the screen, which will take you to the Welcome page. Then click on \"Sign up Now\" on the right side of the page.     You will be asked to enter the access code listed below, as well as some personal information. Please follow the directions to create your username and password.     Your access code is: [email or web address]  Expires: 2018  4:13 PM     Your access code will  in 90 days. If you need help or a new code, please call your [facility] clinic or [phone].        Care EveryWhere ID     This is your Care EveryWhere ID. This could be used by other organizations to access your [facility] medical records  [id number]        Equal Access to Services     ALPA LOPEZ : Hadii doroteo Carmona, renita lu, qahoda murray . So [facility] [place] [phone].    ATENCIÓN: Si habla español, tiene a nguyễn disposición servicios gratuitos de asistencia lingüística. Yefri al [phone].    We comply with applicable federal civil rights laws and [place] laws. We do not discriminate on the basis of race, color, national origin, age, disability, sex, sexual orientation, or gender " identity.            After Visit Summary       This is your record. Keep this with you and show to your community pharmacist(s) and doctor(s) at your next visit.

## 2018-05-15 NOTE — ED NOTES
Patient here with c/o feeling dehydrated. Has hx of cancer & undergoing chemo-started new infusion one week ago & having nausea without emesis, feeling like she isn't taking in enough PO.

## 2018-05-15 NOTE — ED PROVIDER NOTES
"  History   No chief complaint on file.    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  Receiving chemotherapy carbo/-16.  Presents with generalized weakness and concerns for dehydration.  Decreased appetite and fluid intake.  Denies any fever.  No chest pain, cough, congestion.  Nausea is been well contained.  No vomiting.  Stooling has been somewhat challenging with constipation.  No dark black melena type stools.    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) 02/08/2018     Priority: Medium     Anxiety 01/22/2018     Priority: Medium     Morbid obesity (H) 06/29/2017     Priority: Medium     Essential hypertension with goal blood pressure less than 140/90 09/06/2016     Priority: Medium     Senile nuclear cataract 01/22/2016     Priority: Medium     Coronary artery disease involving native coronary artery without angina pectoris 10/29/2015     Priority: Medium     NSTEMI 12/14, stent to Circumflex       Esophageal reflux 12/19/2014     Priority: Medium     Advance Care Planning 10/08/2012     Priority: Medium     Patient does not have an Advance/Health Care Directive (HCD), given \"What is Advance Care Planning?\" flyer. Noris Zuniga October 8, 2012       Hyperlipidemia LDL goal <70 10/31/2010     Priority: Medium     Allergic rhinitis 11/14/2005     Priority: Medium     Problem list name updated by automated process. 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     Coronary artery disease 12/2014     Percutaneous transluminal coronary angioplasty hematoma 12/15/2014       Past Surgical " History:    Past Surgical History:   Procedure Laterality Date     APPENDECTOMY  1956     ESOPHAGOSCOPY, GASTROSCOPY, DUODENOSCOPY (EGD), COMBINED N/A 1/30/2018    Procedure: COMBINED ENDOSCOPIC ULTRASOUND, ESOPHAGOSCOPY, GASTROSCOPY, DUODENOSCOPY (EGD), FINE NEEDLE ASPIRATE/BIOPSY;   EUS;  Surgeon: Carlos Fletcher MD;  Location:  GI     EYE SURGERY       INSERT PORT VASCULAR ACCESS N/A 2/12/2018    Procedure: INSERT PORT VASCULAR ACCESS;  Port-a-Cath Placement;  Surgeon: Carlos Johnson MD;  Location: [facility] OR     PHACOEMULSIFICATION WITH STANDARD INTRAOCULAR LENS IMPLANT Left 1/25/2016    Procedure: PHACOEMULSIFICATION WITH STANDARD INTRAOCULAR LENS IMPLANT;  Surgeon: Julio César Wallace MD;  Location: [facility] OR     PHACOEMULSIFICATION WITH STANDARD INTRAOCULAR LENS IMPLANT Right 2/22/2016    Procedure: PHACOEMULSIFICATION WITH STANDARD INTRAOCULAR LENS IMPLANT;  Surgeon: Julio César Wallace MD;  Location: [facility] OR     SURGICAL HISTORY OF -   1961    right hand surgery      TONSILLECTOMY & ADENOIDECTOMY  1967     VASCULAR SURGERY  02/12/2018    PortaCath       Family History:    Family History   Problem Relation Age of Onset     CANCER Brother      HEART DISEASE Brother      CANCER Mother      Respiratory Father      HEART DISEASE Father      MI       Social History:  Marital Status:   [5]  Social History   Substance Use Topics     Smoking status: Former Smoker     Packs/day: 0.50     Types: Cigarettes     Quit date: 12/13/2014     Smokeless tobacco: Never Used     Alcohol use No        Medications:      aspirin EC 81 MG EC tablet   atorvastatin (LIPITOR) 40 MG tablet   lisinopril (PRINIVIL/ZESTRIL) 5 MG tablet   LORazepam (ATIVAN) 0.5 MG tablet   metoprolol (TOPROL-XL) 50 MG 24 hr tablet   Multiple Vitamins-Minerals (MULTIVITAMIN ADULT) TABS   prochlorperazine (COMPAZINE) 10 MG tablet   ranitidine (ZANTAC) 75 MG tablet   Acetaminophen (TYLENOL PO)   albuterol (PROAIR HFA/PROVENTIL HFA/VENTOLIN HFA) 108 (90  BASE) MCG/ACT Inhaler   IBUPROFEN PO   lidocaine-prilocaine (EMLA) cream   nitroglycerin (NITROSTAT) 0.4 MG SL tablet         Review of Systems   Constitutional: Positive for activity change, appetite change and fatigue. Negative for chills and fever.   HENT: Negative.    Eyes: Negative.    Respiratory: Negative.    Cardiovascular: Negative.    Gastrointestinal: Positive for nausea.   Musculoskeletal: Negative.    Neurological: Positive for weakness and light-headedness.   Hematological: Bruises/bleeds easily.   Psychiatric/Behavioral: Negative.        Physical Exam   BP: 134/78  Pulse: 77  Temp: 98.1  F (36.7  C)  Resp: 18  SpO2: 100 %      Physical Exam  Nursing notes reviewed  Vital signs reviewed.  Constitutional: Weak and pale.  HEENT: Normocephalic.  Atraumatic.  Right TM normal.  Left TM normal.  Dry oral pharynx otherwise normal.  Posterior pharynx normal.  Dentition normal.  Eyes: PERRLA.  Conjunctiva clear.  No icteric sclerae.  Extraocular motion normal.  No discharge  Neck: Normal range of motion and supple.  No thyromegaly.  No cervical adenopathy  Cardiovascular: Normal rate and rhythm.  Heart sounds normal.  Intact distal pulses.  No detected murmur.  No friction rub or gallop.  Respiratory: Respiratory effort normal.  Breath sounds clear throughout on auscultation.  No wheezing.  No rales.  Chest/Breast: No deformity.  Chest wall nontender.  Abdomen: Appearance is normal.  Soft.  Bowel sounds present and normal.  No detected abdominal bruit.  No palpable mass.  No hepatomegaly.  Spleen tip not palpable.  No reproducible tenderness.  No guarding.  No rebound.  No palpable hernia.  Rectal: Normal sphincter tone.  Hemoccult stool negative.  Musculoskeletal: Normal range of motion both upper and lower extremities with no discomfort.  No edema or tenderness.  Neurologic: Alert.  Oriented ×3.  No cranial nerve deficits.  Normal tone.  No motor or sensory deficits. No pathologic reflexes.   Skin: Pale and  slightly ashen gray.  Hematologic/lymphatic:  Psychiatric: Normal mood and affect.  Behavior is normal.  Thought content normal.  Judgment normal.    ED Course     ED Course     Procedures                   Results for orders placed or performed during the hospital encounter of 05/15/18 (from the past 24 hour(s))   CBC with platelets differential   Result Value Ref Range    WBC 6.4 4.0 - 11.0 10e9/L    RBC Count 2.28 (L) 3.8 - 5.2 10e12/L    Hemoglobin 6.5 (LL) 11.7 - 15.7 g/dL    Hematocrit 19.9 (L) 35.0 - 47.0 %    MCV 87 78 - 100 fl    MCH 28.5 26.5 - 33.0 pg    MCHC 32.7 31.5 - 36.5 g/dL    RDW 17.8 (H) 10.0 - 15.0 %    Platelet Count 126 (L) 150 - 450 10e9/L    Diff Method Automated Method     % Neutrophils 61.7 %    % Lymphocytes 20.7 %    % Monocytes 13.8 %    % Eosinophils 0.5 %    % Basophils 1.6 %    % Immature Granulocytes 1.7 %    Absolute Neutrophil 4.0 1.6 - 8.3 10e9/L    Absolute Lymphocytes 1.3 0.8 - 5.3 10e9/L    Absolute Monocytes 0.9 0.0 - 1.3 10e9/L    Absolute Eosinophils 0.0 0.0 - 0.7 10e9/L    Absolute Basophils 0.1 0.0 - 0.2 10e9/L    Abs Immature Granulocytes 0.1 0 - 0.4 10e9/L   Comprehensive metabolic panel   Result Value Ref Range    Sodium 140 133 - 144 mmol/L    Potassium 3.9 3.4 - 5.3 mmol/L    Chloride 106 94 - 109 mmol/L    Carbon Dioxide 24 20 - 32 mmol/L    Anion Gap 10 3 - 14 mmol/L    Glucose 89 70 - 99 mg/dL    Urea Nitrogen 17 7 - 30 mg/dL    Creatinine 0.42 (L) 0.52 - 1.04 mg/dL    GFR Estimate >90 >60 mL/min/1.7m2    GFR Estimate If Black >90 >60 mL/min/1.7m2    Calcium 9.0 8.5 - 10.1 mg/dL    Bilirubin Total 0.9 0.2 - 1.3 mg/dL    Albumin 3.4 3.4 - 5.0 g/dL    Protein Total 5.8 (L) 6.8 - 8.8 g/dL    Alkaline Phosphatase 89 40 - 150 U/L    ALT 10 0 - 50 U/L    AST 5 0 - 45 U/L       Medications   0.9% sodium chloride BOLUS (1,000 mLs Intravenous New Bag 5/15/18 0456)       Assessments & Plan (with Medical Decision Making)  77-year-old female with known history for small cell  lung cancer with regional mediastinal lymph node involvement and adrenal metastasis presents with generalized weakness, fatigue, malaise, lightheadedness.  She is concerned about dehydration.  Examination found a very pleasant 77-year-old female.  She did look mildly fluid volume down with dry oral mucous membranes.  She looked pale and ashen gray.  Her vital signs were normal.  No focal findings for infection.  Laboratory work noted a hemoglobin that was low = 6.5.  This is significant drop from her previous which was above 9.  Guaiac of stool was negative.  Suspect this is not due to GI blood loss but due to chemotherapy effects.  Recommend transfusing 1 unit of packed red blood cells.  9:17 AM  Transfusion complete. Zofran for nausea.  Home this am     I have reviewed the nursing notes.    I have reviewed the findings, diagnosis, plan and need for follow up with the patient.      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

## 2018-05-15 NOTE — ED AVS SNAPSHOT
[facility] Emergency Department    [address] [address]    Phone:  [phone]    Fax:  [phone]                                       Ines Pickard   MRN: [id number]    Department:  [facility] Emergency Department   Date of Visit:  5/15/2018           After Visit Summary Signature Page     I have received my discharge instructions, and my questions have been answered. I have discussed any challenges I see with this plan with the nurse or doctor.    ..........................................................................................................................................  Patient/Patient Representative Signature      ..........................................................................................................................................  Patient Representative Print Name and Relationship to Patient    ..................................................               ................................................  Date                                            Time    ..........................................................................................................................................  Reviewed by Signature/Title    ...................................................              ..............................................  Date                                                            Time

## 2018-05-15 NOTE — ED NOTES
Needed to stop blood transfusion to discharge pt, unsure of what time exactly the blood was stopped from previous nurse.

## 2018-05-16 ENCOUNTER — TELEPHONE (OUTPATIENT)
Dept: ONCOLOGY | Facility: CLINIC | Age: 78
End: 2018-05-16

## 2018-05-16 ENCOUNTER — INFUSION THERAPY VISIT (OUTPATIENT)
Dept: INFUSION THERAPY | Facility: CLINIC | Age: 78
End: 2018-05-16
Attending: INTERNAL MEDICINE
Payer: MEDICARE

## 2018-05-16 ENCOUNTER — HOSPITAL ENCOUNTER (OUTPATIENT)
Facility: CLINIC | Age: 78
Discharge: HOME | End: 2018-05-16
Attending: INTERNAL MEDICINE | Admitting: INTERNAL MEDICINE
Payer: MEDICARE

## 2018-05-16 VITALS — DIASTOLIC BLOOD PRESSURE: 57 MMHG | SYSTOLIC BLOOD PRESSURE: 109 MMHG | OXYGEN SATURATION: 97 % | TEMPERATURE: 98 F

## 2018-05-16 DIAGNOSIS — C34.92 SMALL CELL CARCINOMA OF LEFT LUNG (H): Primary | ICD-10-CM

## 2018-05-16 DIAGNOSIS — D63.0 ANEMIA IN NEOPLASTIC DISEASE: ICD-10-CM

## 2018-05-16 LAB
BASOPHILS # BLD AUTO: 0.1 10E9/L (ref 0–0.2)
BASOPHILS NFR BLD AUTO: 1.2 %
DIFFERENTIAL METHOD BLD: ABNORMAL
EOSINOPHIL # BLD AUTO: 0 10E9/L (ref 0–0.7)
EOSINOPHIL NFR BLD AUTO: 0.2 %
ERYTHROCYTE [DISTWIDTH] IN BLOOD BY AUTOMATED COUNT: 16.8 % (ref 10–15)
HCT VFR BLD AUTO: 26.8 % (ref 35–47)
HGB BLD-MCNC: 9.2 G/DL (ref 11.7–15.7)
IMM GRANULOCYTES # BLD: 0.2 10E9/L (ref 0–0.4)
IMM GRANULOCYTES NFR BLD: 2.5 %
LYMPHOCYTES # BLD AUTO: 2 10E9/L (ref 0.8–5.3)
LYMPHOCYTES NFR BLD AUTO: 20.9 %
MCH RBC QN AUTO: 29.2 PG (ref 26.5–33)
MCHC RBC AUTO-ENTMCNC: 34.3 G/DL (ref 31.5–36.5)
MCV RBC AUTO: 85 FL (ref 78–100)
MONOCYTES # BLD AUTO: 1.8 10E9/L (ref 0–1.3)
MONOCYTES NFR BLD AUTO: 19.1 %
NEUTROPHILS # BLD AUTO: 5.2 10E9/L (ref 1.6–8.3)
NEUTROPHILS NFR BLD AUTO: 56.1 %
PLATELET # BLD AUTO: 106 10E9/L (ref 150–450)
RBC # BLD AUTO: 3.15 10E12/L (ref 3.8–5.2)
WBC # BLD AUTO: 9.3 10E9/L (ref 4–11)

## 2018-05-16 PROCEDURE — 96365 THER/PROPH/DIAG IV INF INIT: CPT

## 2018-05-16 PROCEDURE — 25000125 ZZHC RX 250: Performed by: INTERNAL MEDICINE

## 2018-05-16 PROCEDURE — 96375 TX/PRO/DX INJ NEW DRUG ADDON: CPT

## 2018-05-16 PROCEDURE — 25000128 H RX IP 250 OP 636: Performed by: INTERNAL MEDICINE

## 2018-05-16 PROCEDURE — 85025 COMPLETE CBC W/AUTO DIFF WBC: CPT | Performed by: INTERNAL MEDICINE

## 2018-05-16 PROCEDURE — 96366 THER/PROPH/DIAG IV INF ADDON: CPT

## 2018-05-16 RX ORDER — HEPARIN SODIUM (PORCINE) LOCK FLUSH IV SOLN 100 UNIT/ML 100 UNIT/ML
5 SOLUTION INTRAVENOUS
Status: DISCONTINUED | OUTPATIENT
Start: 2018-05-16 | End: 2018-05-16 | Stop reason: HOSPADM

## 2018-05-16 RX ADMIN — DEXAMETHASONE SODIUM PHOSPHATE: 10 INJECTION, SOLUTION INTRAMUSCULAR; INTRAVENOUS at 10:28

## 2018-05-16 RX ADMIN — FAMOTIDINE 20 MG: 20 INJECTION, SOLUTION INTRAVENOUS at 12:03

## 2018-05-16 RX ADMIN — POTASSIUM CHLORIDE: 149 INJECTION, SOLUTION, CONCENTRATE INTRAVENOUS at 10:22

## 2018-05-16 RX ADMIN — HEPARIN 5 ML: 100 SYRINGE at 12:33

## 2018-05-16 NOTE — MR AVS SNAPSHOT
After Visit Summary   5/16/2018    Ines Pickard    MRN: [id number]           Patient Information     Date Of Birth          1940        Visit Information        Provider Department      5/16/2018 9:00 AM ROOM 10 [facility]        Today's Diagnoses     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)    -  1    Anemia in neoplastic disease           Follow-ups after your visit        Your next 10 appointments already scheduled     May 29, 2018  9:00 AM CDT   Level 4 with ROOM 6 [facility] ([facility])    [facility]  [address]  [facility] [address]   [phone]            May 29, 2018  9:30 AM CDT   Return Visit with Tracy Miner MD   [facility] ([facility])    [facility]  [address]  [facility] [address]   [phone]            May 30, 2018  1:00 PM CDT   Level 2 with ROOM 4 [facility] ([facility])    [facility]  [address]  [facility] [address]   [phone]            May 31, 2018  1:30 PM CDT   Level 2 with ROOM 9 [facility] ([facility])    [facility]  [address]  [facility] [address]   [phone]              Who to contact     If you have questions or need follow up information about today's clinic visit or your schedule please contact [facility] directly at [phone].  Normal or non-critical lab and imaging results will be communicated to you by MyChart, letter or phone within 4 business days after the clinic has received the results. If you do not hear from us within 7 days, please contact the clinic through MyChart or phone. If you have a critical or abnormal lab result, we will notify you by phone as soon as possible.  Submit refill requests through [employer] or call your pharmacy and they will  "forward the refill request to us. Please allow 3 business days for your refill to be completed.          Additional Information About Your Visit        OMEGA MORGANhar[facility] Information     [facility] lets you send messages to your doctor, view your test results, renew your prescriptions, schedule appointments and more. To sign up, go to [email or web address] . Click on \"Log in\" on the left side of the screen, which will take you to the Welcome page. Then click on \"Sign up Now\" on the right side of the page.     You will be asked to enter the access code listed below, as well as some personal information. Please follow the directions to create your username and password.     Your access code is: [email or web address]  Expires: 2018  4:13 PM     Your access code will  in 90 days. If you need help or a new code, please call your [place] clinic or [phone].        Care EveryWhere ID     This is your Care EveryWhere ID. This could be used by other organizations to access your [place] medical records  [id number]        Your Vitals Were     Temperature Pulse Oximetry                98  F (36.7  C) (Tympanic) 97%           Blood Pressure from Last 3 Encounters:   18 109/57   05/15/18 127/80   18 113/58    Weight from Last 3 Encounters:   18 73.2 kg (161 lb 4.8 oz)   18 74.4 kg (164 lb)   18 75.3 kg (166 lb 1.6 oz)              We Performed the Following     CBC with platelets differential        Primary Care Provider Office Phone # Fax #    R Emanuel Perez [phone] [address] [address]        Equal Access to Services     [facility] JOHN AH: Hadii doroteo Carmona, renita lu, qaybta etiennealmahoda smith. So [facility] [phone].    ATENCIÓN: Si habla español, tiene a nguyễn disposición servicios gratuitos de asistencia lingüística. Llame al [phone].    We comply with applicable federal civil rights laws and " [place] laws. We do not discriminate on the basis of race, color, national origin, age, disability, sex, sexual orientation, or gender identity.            Thank you!     Thank you for choosing [facility]  for your care. Our goal is always to provide you with excellent care. Hearing back from our patients is one way we can continue to improve our services. Please take a few minutes to complete the written survey that you may receive in the mail after your visit with us. Thank you!             Your Updated Medication List - Protect others around you: Learn how to safely use, store and throw away your medicines at www.disposemymeds.org.          This list is accurate as of 5/16/18  1:51 PM.  Always use your most recent med list.                   Brand Name Dispense Instructions for use Diagnosis    albuterol 108 (90 Base) MCG/ACT Inhaler    PROAIR HFA/PROVENTIL HFA/VENTOLIN HFA    1 Inhaler    Inhale 2 puffs into the lungs 4 times daily    Acute bronchitis with symptoms > 10 days       aspirin 81 MG EC tablet     90 tablet    Take 1 tablet (81 mg) by mouth daily    CAD (coronary artery disease)       atorvastatin 40 MG tablet    LIPITOR    90 tablet    Take 1 tablet (40 mg) by mouth daily    Coronary artery disease involving native coronary artery of native heart without angina pectoris       IBUPROFEN PO      Take 200 mg by mouth every 4 hours as needed for moderate pain        lidocaine-prilocaine cream    EMLA    30 g    Apply topically as needed for moderate pain    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)       lisinopril 5 MG tablet    PRINIVIL/ZESTRIL    90 tablet    Take 1 tablet (5 mg) by mouth daily    Essential hypertension with goal blood pressure less than 140/90       LORazepam 0.5 MG tablet    ATIVAN    30 tablet    Take 1 tablet (0.5 mg) by mouth 2 times daily as needed for anxiety    Anxiety       metoprolol succinate 50 MG 24 hr tablet    TOPROL-XL    90 tablet    Take 1 tablet (50 mg) by  mouth daily    Coronary artery disease involving native coronary artery of native heart without angina pectoris       MULTIVITAMIN ADULT Tabs      Take by mouth daily        nitroGLYcerin 0.4 MG sublingual tablet    NITROSTAT    25 tablet    Place 1 tablet (0.4 mg) under the tongue every 5 minutes as needed for chest pain Maximum of 3 doses in 15 minutes    CAD (coronary artery disease)       prochlorperazine 10 MG tablet    COMPAZINE    30 tablet    Take 0.5 tablets (5 mg) by mouth every 6 hours as needed (Nausea/Vomiting)    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

## 2018-05-16 NOTE — PROGRESS NOTES
Infusion Nursing Note:  Ines Pickard presents today for Lab draw and IV support for weakness.    Patient seen by provider today: No   present during visit today: Not Applicable.    Note: Pt was seen in ED yesterday for weakness.  She continues to feel weak and shaky and to have difficulty eating and drinking because of nausea.    Intravenous Access:  Labs drawn without difficulty.  Implanted Port.    Treatment Conditions:  Lab Results   Component Value Date    HGB 9.2 05/16/2018     Lab Results   Component Value Date    WBC 9.3 05/16/2018      Lab Results   Component Value Date    ANEU 5.2 05/16/2018     Lab Results   Component Value Date     05/16/2018       Because of patient's weakness and nausea, IV meds and fluids are given today.        Post Infusion Assessment:  Patient tolerated infusion without incident.  Pt states that she feels less shaky after infusions.  She did eat some soup while in the infusion clinic.  Access discontinued per protocol.    Discharge Plan:   Patient discharged in stable condition accompanied by: sister.  Departure Mode: Ambulatory.  Pt instructed to call if she is not eating and drinking and/or if she continues to feel shaky.    Pricilla Del Rio RN

## 2018-05-16 NOTE — TELEPHONE ENCOUNTER
Pt was in the ER yesterday and had a HGB of 6.5, she received 1 unit of blood and was told she should come in for a recheck of her labs today. Pt will come in shortly for a CBC, placed on the lab schedule.

## 2018-05-17 ENCOUNTER — HOSPITAL ENCOUNTER (OUTPATIENT)
Facility: CLINIC | Age: 78
Setting detail: OBSERVATION
Discharge: ANOTHER HOSPITAL | End: 2018-05-18
Attending: FAMILY MEDICINE | Admitting: FAMILY MEDICINE
Payer: MEDICARE

## 2018-05-17 ENCOUNTER — APPOINTMENT (OUTPATIENT)
Dept: GENERAL RADIOLOGY | Facility: CLINIC | Age: 78
End: 2018-05-17
Attending: FAMILY MEDICINE
Payer: MEDICARE

## 2018-05-17 DIAGNOSIS — I25.10 CORONARY ARTERY DISEASE INVOLVING NATIVE CORONARY ARTERY OF NATIVE HEART WITHOUT ANGINA PECTORIS: ICD-10-CM

## 2018-05-17 DIAGNOSIS — R07.9 ACUTE CHEST PAIN: ICD-10-CM

## 2018-05-17 LAB
ALBUMIN SERPL-MCNC: 3.3 G/DL (ref 3.4–5)
ALP SERPL-CCNC: 97 U/L (ref 40–150)
ALT SERPL W P-5'-P-CCNC: 13 U/L (ref 0–50)
ANION GAP SERPL CALCULATED.3IONS-SCNC: 7 MMOL/L (ref 3–14)
AST SERPL W P-5'-P-CCNC: 11 U/L (ref 0–45)
BASOPHILS # BLD AUTO: 0.1 10E9/L (ref 0–0.2)
BASOPHILS NFR BLD AUTO: 0.5 %
BILIRUB SERPL-MCNC: 0.6 MG/DL (ref 0.2–1.3)
BLD PROD TYP BPU: NORMAL
BLD UNIT ID BPU: 0
BLOOD PRODUCT CODE: NORMAL
BPU ID: NORMAL
BUN SERPL-MCNC: 15 MG/DL (ref 7–30)
CALCIUM SERPL-MCNC: 8.6 MG/DL (ref 8.5–10.1)
CHLORIDE SERPL-SCNC: 106 MMOL/L (ref 94–109)
CO2 SERPL-SCNC: 26 MMOL/L (ref 20–32)
CREAT SERPL-MCNC: 0.48 MG/DL (ref 0.52–1.04)
D DIMER PPP FEU-MCNC: 0.3 UG/ML FEU (ref 0–0.5)
DIFFERENTIAL METHOD BLD: ABNORMAL
EOSINOPHIL # BLD AUTO: 0 10E9/L (ref 0–0.7)
EOSINOPHIL NFR BLD AUTO: 0.1 %
ERYTHROCYTE [DISTWIDTH] IN BLOOD BY AUTOMATED COUNT: 16.7 % (ref 10–15)
GFR SERPL CREATININE-BSD FRML MDRD: >90 ML/MIN/1.7M2
GLUCOSE SERPL-MCNC: 101 MG/DL (ref 70–99)
HCT VFR BLD AUTO: 23.5 % (ref 35–47)
HGB BLD-MCNC: 8 G/DL (ref 11.7–15.7)
HGB BLD-MCNC: 9.6 G/DL (ref 11.7–15.7)
IMM GRANULOCYTES # BLD: 0.4 10E9/L (ref 0–0.4)
IMM GRANULOCYTES NFR BLD: 2.5 %
LYMPHOCYTES # BLD AUTO: 3.4 10E9/L (ref 0.8–5.3)
LYMPHOCYTES NFR BLD AUTO: 23.6 %
MCH RBC QN AUTO: 29.2 PG (ref 26.5–33)
MCHC RBC AUTO-ENTMCNC: 34 G/DL (ref 31.5–36.5)
MCV RBC AUTO: 86 FL (ref 78–100)
MONOCYTES # BLD AUTO: 2.3 10E9/L (ref 0–1.3)
MONOCYTES NFR BLD AUTO: 15.8 %
NEUTROPHILS # BLD AUTO: 8.2 10E9/L (ref 1.6–8.3)
NEUTROPHILS NFR BLD AUTO: 57.5 %
PLATELET # BLD AUTO: 79 10E9/L (ref 150–450)
POTASSIUM SERPL-SCNC: 3.5 MMOL/L (ref 3.4–5.3)
PROT SERPL-MCNC: 5.8 G/DL (ref 6.8–8.8)
RBC # BLD AUTO: 2.74 10E12/L (ref 3.8–5.2)
SODIUM SERPL-SCNC: 139 MMOL/L (ref 133–144)
TRANSFUSION STATUS PATIENT QL: NORMAL
TRANSFUSION STATUS PATIENT QL: NORMAL
TROPONIN I SERPL-MCNC: <0.015 UG/L (ref 0–0.04)
TROPONIN I SERPL-MCNC: <0.015 UG/L (ref 0–0.04)
WBC # BLD AUTO: 14.3 10E9/L (ref 4–11)

## 2018-05-17 PROCEDURE — G0378 HOSPITAL OBSERVATION PER HR: HCPCS

## 2018-05-17 PROCEDURE — 96376 TX/PRO/DX INJ SAME DRUG ADON: CPT

## 2018-05-17 PROCEDURE — 93005 ELECTROCARDIOGRAM TRACING: CPT | Performed by: FAMILY MEDICINE

## 2018-05-17 PROCEDURE — 36415 COLL VENOUS BLD VENIPUNCTURE: CPT | Performed by: FAMILY MEDICINE

## 2018-05-17 PROCEDURE — 99207 ZZC CDG-CODE CATEGORY CHANGED: CPT | Performed by: FAMILY MEDICINE

## 2018-05-17 PROCEDURE — 71046 X-RAY EXAM CHEST 2 VIEWS: CPT

## 2018-05-17 PROCEDURE — 99285 EMERGENCY DEPT VISIT HI MDM: CPT | Mod: 25 | Performed by: FAMILY MEDICINE

## 2018-05-17 PROCEDURE — 40000275 ZZH STATISTIC RCP TIME EA 10 MIN

## 2018-05-17 PROCEDURE — A9270 NON-COVERED ITEM OR SERVICE: HCPCS | Mod: GY | Performed by: FAMILY MEDICINE

## 2018-05-17 PROCEDURE — P9016 RBC LEUKOCYTES REDUCED: HCPCS | Performed by: EMERGENCY MEDICINE

## 2018-05-17 PROCEDURE — 96365 THER/PROPH/DIAG IV INF INIT: CPT

## 2018-05-17 PROCEDURE — 36430 TRANSFUSION BLD/BLD COMPNT: CPT

## 2018-05-17 PROCEDURE — 84484 ASSAY OF TROPONIN QUANT: CPT | Mod: 91 | Performed by: FAMILY MEDICINE

## 2018-05-17 PROCEDURE — 25000132 ZZH RX MED GY IP 250 OP 250 PS 637: Mod: GY | Performed by: FAMILY MEDICINE

## 2018-05-17 PROCEDURE — 85025 COMPLETE CBC W/AUTO DIFF WBC: CPT | Performed by: FAMILY MEDICINE

## 2018-05-17 PROCEDURE — 93005 ELECTROCARDIOGRAM TRACING: CPT

## 2018-05-17 PROCEDURE — 85018 HEMOGLOBIN: CPT | Performed by: FAMILY MEDICINE

## 2018-05-17 PROCEDURE — 93010 ELECTROCARDIOGRAM REPORT: CPT | Mod: Z6 | Performed by: FAMILY MEDICINE

## 2018-05-17 PROCEDURE — 80053 COMPREHEN METABOLIC PANEL: CPT | Performed by: FAMILY MEDICINE

## 2018-05-17 PROCEDURE — 85379 FIBRIN DEGRADATION QUANT: CPT | Performed by: FAMILY MEDICINE

## 2018-05-17 PROCEDURE — 25000128 H RX IP 250 OP 636: Performed by: FAMILY MEDICINE

## 2018-05-17 PROCEDURE — 93010 ELECTROCARDIOGRAM REPORT: CPT | Mod: 76 | Performed by: FAMILY MEDICINE

## 2018-05-17 PROCEDURE — 96366 THER/PROPH/DIAG IV INF ADDON: CPT

## 2018-05-17 PROCEDURE — 99220 ZZC INITIAL OBSERVATION CARE,LEVL III: CPT | Performed by: FAMILY MEDICINE

## 2018-05-17 RX ORDER — ACETAMINOPHEN 650 MG/1
650 SUPPOSITORY RECTAL EVERY 4 HOURS PRN
Status: CANCELLED | OUTPATIENT
Start: 2018-05-17

## 2018-05-17 RX ORDER — LIDOCAINE 40 MG/G
CREAM TOPICAL
Status: DISCONTINUED | OUTPATIENT
Start: 2018-05-17 | End: 2018-05-18 | Stop reason: HOSPADM

## 2018-05-17 RX ORDER — AMOXICILLIN 250 MG
1 CAPSULE ORAL 2 TIMES DAILY PRN
Status: CANCELLED | OUTPATIENT
Start: 2018-05-17

## 2018-05-17 RX ORDER — ONDANSETRON 2 MG/ML
4 INJECTION INTRAMUSCULAR; INTRAVENOUS EVERY 6 HOURS PRN
Status: CANCELLED | OUTPATIENT
Start: 2018-05-17

## 2018-05-17 RX ORDER — METOPROLOL SUCCINATE 50 MG/1
50 TABLET, EXTENDED RELEASE ORAL DAILY
Status: DISCONTINUED | OUTPATIENT
Start: 2018-05-17 | End: 2018-05-17

## 2018-05-17 RX ORDER — NALOXONE HYDROCHLORIDE 0.4 MG/ML
.1-.4 INJECTION, SOLUTION INTRAMUSCULAR; INTRAVENOUS; SUBCUTANEOUS
Status: DISCONTINUED | OUTPATIENT
Start: 2018-05-17 | End: 2018-05-18 | Stop reason: HOSPADM

## 2018-05-17 RX ORDER — PROCHLORPERAZINE MALEATE 5 MG
5 TABLET ORAL EVERY 6 HOURS PRN
Status: CANCELLED | OUTPATIENT
Start: 2018-05-17

## 2018-05-17 RX ORDER — HEPARIN SODIUM,PORCINE 10 UNIT/ML
5 VIAL (ML) INTRAVENOUS
Status: DISCONTINUED | OUTPATIENT
Start: 2018-05-17 | End: 2018-05-18 | Stop reason: HOSPADM

## 2018-05-17 RX ORDER — NITROGLYCERIN 0.4 MG/1
0.4 TABLET SUBLINGUAL EVERY 5 MIN PRN
Status: DISCONTINUED | OUTPATIENT
Start: 2018-05-17 | End: 2018-05-18 | Stop reason: HOSPADM

## 2018-05-17 RX ORDER — LORAZEPAM 0.5 MG/1
0.5 TABLET ORAL 2 TIMES DAILY PRN
Status: DISCONTINUED | OUTPATIENT
Start: 2018-05-17 | End: 2018-05-18 | Stop reason: HOSPADM

## 2018-05-17 RX ORDER — AMOXICILLIN 250 MG
2 CAPSULE ORAL 2 TIMES DAILY PRN
Status: CANCELLED | OUTPATIENT
Start: 2018-05-17

## 2018-05-17 RX ORDER — ALUMINA, MAGNESIA, AND SIMETHICONE 2400; 2400; 240 MG/30ML; MG/30ML; MG/30ML
30 SUSPENSION ORAL EVERY 4 HOURS PRN
Status: DISCONTINUED | OUTPATIENT
Start: 2018-05-17 | End: 2018-05-18 | Stop reason: HOSPADM

## 2018-05-17 RX ORDER — ACETAMINOPHEN 650 MG/1
650 SUPPOSITORY RECTAL EVERY 4 HOURS PRN
Status: DISCONTINUED | OUTPATIENT
Start: 2018-05-17 | End: 2018-05-18 | Stop reason: HOSPADM

## 2018-05-17 RX ORDER — ASPIRIN 81 MG/1
81 TABLET ORAL DAILY
Status: CANCELLED | OUTPATIENT
Start: 2018-05-17

## 2018-05-17 RX ORDER — ATORVASTATIN CALCIUM 20 MG/1
40 TABLET, FILM COATED ORAL DAILY
Status: DISCONTINUED | OUTPATIENT
Start: 2018-05-17 | End: 2018-05-18 | Stop reason: HOSPADM

## 2018-05-17 RX ORDER — POLYETHYLENE GLYCOL 3350 17 G/17G
17 POWDER, FOR SOLUTION ORAL DAILY PRN
Status: CANCELLED | OUTPATIENT
Start: 2018-05-17

## 2018-05-17 RX ORDER — LORAZEPAM 0.5 MG/1
0.5 TABLET ORAL 2 TIMES DAILY PRN
Status: CANCELLED | OUTPATIENT
Start: 2018-05-17

## 2018-05-17 RX ORDER — PROCHLORPERAZINE 25 MG
12.5 SUPPOSITORY, RECTAL RECTAL EVERY 12 HOURS PRN
Status: CANCELLED | OUTPATIENT
Start: 2018-05-17

## 2018-05-17 RX ORDER — ACETAMINOPHEN 325 MG/1
650 TABLET ORAL EVERY 4 HOURS PRN
Status: DISCONTINUED | OUTPATIENT
Start: 2018-05-17 | End: 2018-05-18 | Stop reason: HOSPADM

## 2018-05-17 RX ORDER — ATORVASTATIN CALCIUM 20 MG/1
40 TABLET, FILM COATED ORAL DAILY
Status: CANCELLED | OUTPATIENT
Start: 2018-05-17

## 2018-05-17 RX ORDER — METOPROLOL SUCCINATE 50 MG/1
50 TABLET, EXTENDED RELEASE ORAL
Status: DISCONTINUED | OUTPATIENT
Start: 2018-05-17 | End: 2018-05-18 | Stop reason: HOSPADM

## 2018-05-17 RX ORDER — ACETAMINOPHEN 325 MG/1
650 TABLET ORAL EVERY 4 HOURS PRN
Status: CANCELLED | OUTPATIENT
Start: 2018-05-17

## 2018-05-17 RX ORDER — LISINOPRIL 5 MG/1
5 TABLET ORAL DAILY
Status: CANCELLED | OUTPATIENT
Start: 2018-05-17

## 2018-05-17 RX ORDER — NALOXONE HYDROCHLORIDE 0.4 MG/ML
.1-.4 INJECTION, SOLUTION INTRAMUSCULAR; INTRAVENOUS; SUBCUTANEOUS
Status: CANCELLED | OUTPATIENT
Start: 2018-05-17

## 2018-05-17 RX ORDER — ONDANSETRON 4 MG/1
4 TABLET, ORALLY DISINTEGRATING ORAL EVERY 6 HOURS PRN
Status: CANCELLED | OUTPATIENT
Start: 2018-05-17

## 2018-05-17 RX ORDER — ASPIRIN 81 MG/1
81 TABLET ORAL DAILY
Status: DISCONTINUED | OUTPATIENT
Start: 2018-05-18 | End: 2018-05-17

## 2018-05-17 RX ORDER — ASPIRIN 81 MG/1
81 TABLET ORAL DAILY
Status: DISCONTINUED | OUTPATIENT
Start: 2018-05-18 | End: 2018-05-18 | Stop reason: HOSPADM

## 2018-05-17 RX ORDER — METOPROLOL SUCCINATE 50 MG/1
50 TABLET, EXTENDED RELEASE ORAL DAILY
Status: CANCELLED | OUTPATIENT
Start: 2018-05-17

## 2018-05-17 RX ORDER — LISINOPRIL 5 MG/1
5 TABLET ORAL DAILY
Status: DISCONTINUED | OUTPATIENT
Start: 2018-05-17 | End: 2018-05-18 | Stop reason: HOSPADM

## 2018-05-17 RX ADMIN — LISINOPRIL 5 MG: 5 TABLET ORAL at 17:12

## 2018-05-17 RX ADMIN — LORAZEPAM 0.5 MG: 0.5 TABLET ORAL at 18:10

## 2018-05-17 RX ADMIN — ACETAMINOPHEN 650 MG: 325 TABLET, FILM COATED ORAL at 22:41

## 2018-05-17 RX ADMIN — RANITIDINE 75 MG: 75 TABLET, FILM COATED ORAL at 20:20

## 2018-05-17 RX ADMIN — NITROGLYCERIN 0.4 MG: 0.4 TABLET SUBLINGUAL at 16:57

## 2018-05-17 RX ADMIN — HEPARIN SODIUM 750 UNITS/HR: 10000 INJECTION, SOLUTION INTRAVENOUS at 17:39

## 2018-05-17 RX ADMIN — ATORVASTATIN CALCIUM 40 MG: 20 TABLET, FILM COATED ORAL at 17:12

## 2018-05-17 RX ADMIN — Medication 3500 UNITS: at 17:38

## 2018-05-17 ASSESSMENT — ACTIVITIES OF DAILY LIVING (ADL)
DRESS: 0 - INDEPENDENT
SWALLOWING: 0 - SWALLOWS FOODS/LIQUIDS WITHOUT DIFFICULTY
AMBULATION: 0 - INDEPENDENT
TOILETING: 0 - INDEPENDENT
COMMUNICATION: 0 - UNDERSTANDS/COMMUNICATES WITHOUT DIFFICULTY
EATING: 0 - INDEPENDENT
TRANSFERRING: 0 - INDEPENDENT
BATHING: 0 - INDEPENDENT

## 2018-05-17 ASSESSMENT — ENCOUNTER SYMPTOMS
APPETITE CHANGE: 1
BLOOD IN STOOL: 0
NECK PAIN: 0
SHORTNESS OF BREATH: 0
FEVER: 0
FATIGUE: 1
HEADACHES: 0
BACK PAIN: 0
VOMITING: 0
DYSURIA: 0
CHEST TIGHTNESS: 1
ABDOMINAL PAIN: 0
DIZZINESS: 0
WEAKNESS: 0
NAUSEA: 0
DIARRHEA: 0
FREQUENCY: 0

## 2018-05-17 NOTE — IP AVS SNAPSHOT
"` `           [facility] SURGICAL: [phone]                                              INTERAGENCY TRANSFER FORM - NURSING   2018                    Hospital Admission Date: 2018  ELIZABETH LANDA   : 1940  Sex: Female        Attending Provider: Berhane Comer MD     Allergies:  Amoxicillin, Tetracycline, Nickel    Infection:  None   Service:  GENERAL MEDI    Ht:  1.626 m (5' 4\")   Wt:  74.3 kg (163 lb 12.8 oz)   Admission Wt:  75.3 kg (166 lb)    BMI:  28.12 kg/m 2   BSA:  1.83 m 2            Patient PCP Information     Provider PCP Type    R Emanuel Perez MD General      Current Code Status     Date Active Code Status Order ID Comments User Context       2018  3:49 PM DNR/DNI [id number]  Berhane Comer MD Inpatient       Code Status History     Date Active Date Inactive Code Status Order ID Comments User Context    12/15/2014  2:50 PM 2018  3:49 PM Full Code [id number]  Rachel Lane PA-C Outpatient    2014 11:45 AM 12/15/2014  2:50 PM Full Code [id number]  Mavis Solorio MD Inpatient      Advance Directives        Scanned docmt in ACP Activity?           Yes, scanned ACP docmt        Hospital Problems as of 2018              Priority Class Noted POA    Chest pain Medium  2018 Yes      Non-Hospital Problems as of 2018              Priority Class Noted    Allergic rhinitis Medium  2005    Hyperlipidemia LDL goal <70 Medium  10/31/2010    Advance Care Planning Medium  10/8/2012    Esophageal reflux Medium  2014    Coronary artery disease involving native coronary artery without angina pectoris Medium  10/29/2015    Senile nuclear cataract Medium  2016    Essential hypertension with goal blood pressure less than 140/90 Medium  2016    Morbid obesity (H) Medium  2017    Anxiety Medium  2018    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) Medium  2018    Need for prophylactic measure Medium  2/15/2018 "    Anemia in neoplastic disease Medium  5/16/2018      Immunizations     Name Date      Influenza (H1N1) 01/11/10     Influenza (High Dose) 3 valent vaccine 01/08/18     Influenza (High Dose) 3 valent vaccine 09/06/16     Influenza (High Dose) 3 valent vaccine 10/29/15     Influenza (High Dose) 3 valent vaccine 10/09/14     Influenza (High Dose) 3 valent vaccine 10/03/13     Influenza (High Dose) 3 valent vaccine 10/10/11     Influenza (High Dose) 3 valent vaccine 10/21/10     Influenza (IIV3) PF 10/08/12     Influenza (IIV3) PF 01/11/10     Influenza (IIV3) PF 10/24/08     Influenza (IIV3) PF 10/29/07     Influenza (IIV3) PF 11/10/06     Influenza (IIV3) PF 11/14/05     Influenza (IIV3) PF 11/10/04     Pneumo Conj 13-V (2010&after) 10/29/15     Pneumococcal 23 valent 11/10/06     TD (ADULT, 7+) 10/24/08          END      ASSESSMENT     Discharge Profile Flowsheet     DISCHARGE NEEDS ASSESSMENT     COMMUNICATION ASSESSMENT      Equipment Used at Home  grab bar 12/14/14 0954   Patient's communication style  spoken language (English or Bilingual) 05/17/18 1512    FUNCTIONAL LEVEL CURRENT     SKIN      Ambulation  0 - independent 05/18/18 0301   Inspection of bony prominences  Full 05/18/18 1059    Transferring  0 - independent 05/18/18 0301   Inspection under devices  Full 05/17/18 1544    Toileting  0 - independent 05/18/18 0301   Skin WDL  WDL 05/18/18 1059    Bathing  0 - independent 05/18/18 0301   Skin Moisture  dry 05/18/18 1059    Dressing  0 - independent 05/18/18 0301   Skin Integrity  other (see comments) (R chest wall Port) 05/17/18 1544    Eating  0 - independent 05/18/18 0301   SAFETY      Communication  0 - understands/communicates without difficulty 05/18/18 0301   Safety WDL  WDL 05/18/18 1101    Swallowing  0 - swallows foods/liquids without difficulty 05/18/18 0301   All Alarms  none present 05/18/18 1101                 Assessment WDL (Within Defined Limits) Definitions           Safety WDL      "Effective: 09/28/15    Row Information: <b>WDL Definition:</b> Bed in low position, wheels locked; call light in reach; upper side rails up x 2; ID band on<br> <font color=\"gray\"><i>Item=AS safety wdl>>List=AS safety wdl>>Version=F14</i></font>      Skin WDL     Effective: 09/28/15    Row Information: <b>WDL Definition:</b> Warm; dry; intact; elastic; without discoloration; pressure points without redness<br> <font color=\"gray\"><i>Item=AS skin wdl>>List=AS skin wdl>>Version=F14</i></font>      Vitals     Vital Signs Flowsheet     VITAL SIGNS     BSA (Calculated - sq m)  1.83 05/17/18 1451    Temp  97.8  F (36.6  C) 05/18/18 0713   BMI (Calculated)  28.18 05/17/18 1451    Temp src  Oral 05/18/18 0713   CESAR COMA SCALE      Resp  18 05/18/18 0713   Best Eye Response  4-->(E4) spontaneous 05/18/18 0301    Pulse  100 05/18/18 1344   Best Motor Response  6-->(M6) obeys commands 05/18/18 0301    Heart Rate  82 05/17/18 1812   Best Verbal Response  5-->(V5) oriented 05/18/18 0301    Pulse/Heart Rate Source  Monitor 05/17/18 1742   [place] Coma Scale Score  15 05/18/18 0301    BP  131/65 05/18/18 0713   POSITIONING      BP Location  Right arm 05/18/18 0713   Chair  Upright in chair 05/18/18 1004    OXYGEN THERAPY     Positioning/Transfer Devices  pillows;in use 05/17/18 1852    SpO2  93 % 05/18/18 0713   Body Position  supine, head elevated 05/18/18 1450    O2 Device  None (Room air) 05/18/18 0713   Head of Bed (HOB)  HOB at 20 degrees 05/18/18 1450    PAIN/COMFORT     DAILY CARE      Patient Currently in Pain  denies 05/17/18 1543   Activity Management  ambulated in room;ambulated to bathroom 05/18/18 1004    Preferred Pain Scale  CAPA (Clinically Aligned Pain Assessment) ([facility] Adults Only) 05/18/18 1149   Activity Assistance Provided  assistance, stand-by 05/18/18 1004    CLINICALLY ALIGNED PAIN ASSESSMENT (CAPA) ([facility] ADULTS ONLY)     ECG      Comfort  negligible pain 05/18/18 " "1149   ECG Rhythm  Sinus rhythm 05/18/18 1344    HEIGHT AND WEIGHT     Ectopy  None 05/17/18 1653    Height  1.626 m (5' 4\") 05/17/18 1451   OR Interval  .18 05/18/18 1344    Height Method  Stated 05/17/18 1451   QRS Interval  .11 05/18/18 1344    Height Method  Stated 05/17/18 1041   QT Interval  .41 05/18/18 1344    Weight  74.3 kg (163 lb 12.8 oz) 05/17/18 1451   Lead Monitored  Lead II 05/18/18 1344            Patient Lines/Drains/Airways Status    Active LINES/DRAINS/AIRWAYS     Name: Placement date: Placement time: Site: Days: Last dressing change:    Peripheral IV 05/17/18 Left Hand 05/17/18   1742   Hand   less than 1     Incision/Surgical Site 02/12/18 Bilateral Chest 02/12/18   1409    95             Patient Lines/Drains/Airways Status    Active PICC/CVC     Name: Placement date: Placement time: Site: Days: Additional Info Last dressing change:    Port A Cath Single 02/12/18 Left Chest wall 02/12/18   1422   Chest wall   95 Orientation: Left            Power Port: Yes            Inserted by: BOBBI Johnson MD            Diameter [place] Size: 8 Fr            Lot #: [id number]               Intake/Output Detail Report     Date Intake     Output    Shift P.O. I.V. Blood Components Total Total       Day 05/17/18 0700 - 05/17/18 1459 -- -- -- -- -- 0    Ivanna 05/17/18 1500 - 05/17/18 2259 200 -- -- 200 -- 200    Noc 05/17/18 2300 - 05/18/18 0659 -- -- -- -- -- 0    Day 05/18/18 0700 - 05/18/18 1459 -- -- -- -- -- 0    Ivanna 05/18/18 1500 - 05/18/18 2259 -- -- -- -- -- 0      Last Void/BM       Most Recent Value    Urine Occurrence 1 at 05/18/2018 1547    Stool Occurrence       Case Management/Discharge Planning     Case Management/Discharge Planning Flowsheet     LIVING ENVIRONMENT     QUESTION TO PATIENT:  Has a member of your family or a partner(now or in the past) intimidated, hurt, manipulated, or controlled you in any way?  no 05/17/18 1512    Lives With  alone 05/17/18 1512   QUESTION TO PATIENT: Do you feel safe " going back to the place where you are living?  yes 05/17/18 1512    Living Arrangements  mobile home 12/14/14 0908   OBSERVATION: Is there reason to believe there has been maltreatment of a vulnerable adult (ie. Physical/Sexual/Emotional abuse, self neglect, lack of adequate food, shelter, medical care, or financial exploitation)?  no 05/17/18 1512    COPING/STRESS     OTHER      Major Change/Loss/Stressor  hospitalization 05/17/18 1512   Are you depressed or being treated for depression?  No 05/17/18 1512    DISCHARGE PLANNING     HOMICIDE RISK      Equipment Used at Home  grab bar 12/14/14 0954   Feels Like Hurting Others  no 05/17/18 1512    ABUSE RISK SCREEN

## 2018-05-17 NOTE — PROGRESS NOTES
Pt c/o of chest pain, rates at 2/10 on pain scale, mid sternal, was having back pain prior to chest pain starting. Admin prn PO Nitro, with complete relief of chest pain. EKG completed, see chart. BP decreased slightly with Nitro admin. Pt now receiving 1 unit blood products. Tolerating well. Up in chair eating dinner meal. VSS, temp 99.1 prior to blood starting. Will continue to monitor. Neyda Isbell RN

## 2018-05-17 NOTE — IP AVS SNAPSHOT
` `     [facility] SURGICAL: [phone]            Medication Administration Report for Ines Pickard as of 05/18/18 1600   Legend:    Given Hold Not Given Due Canceled Entry Other Actions    Time Time (Time) Time  Time-Action       Inactive    Active    Linked        Medications 05/12/18 05/13/18 05/14/18 05/15/18 05/16/18 05/17/18 05/18/18    acetaminophen (TYLENOL) Suppository 650 mg  Dose: 650 mg  Freq: EVERY 4 HOURS PRN Route: RE  PRN Reason: mild pain  Start: 05/17/18 1548   Admin Instructions: Alternate ibuprofen (if ordered) with acetaminophen.  Maximum acetaminophen dose from all sources = 75 mg/kg/day not to exceed 4 grams/day.    Admin. Amount: 1 suppository (1 × 650 mg suppository)  Dispense Loc: [employer] [facility] 200               acetaminophen (TYLENOL) tablet 650 mg  Dose: 650 mg  Freq: EVERY 4 HOURS PRN Route: PO  PRN Reason: mild pain  Start: 05/17/18 1548   Admin Instructions: Alternate ibuprofen (if ordered) with acetaminophen  Maximum acetaminophen dose from all sources = 75 mg/kg/day not to exceed 4 grams/day.    Admin. Amount: 2 tablet (2 × 325 mg tablet)  Last Admin: 05/17/18 2241  Dispense Loc: [employer] [facility] 200          2241 (650 mg)-Given            albuterol (PROAIR HFA/PROVENTIL HFA/VENTOLIN HFA) Inhaler 2 puff  Dose: 2 puff  Freq: 4 TIMES DAILY PRN Route: IN  PRN Reason: other  PRN Comment: dyspnea  Start: 05/18/18 1035   Admin. Amount: 2 puff  Last Admin: 05/18/18 1547  Dispense Loc: [facility]           1547 (2 puff)-Given           alum & mag hydroxide-simethicone (MYLANTA ES/MAALOX  ES) suspension 30 mL  Dose: 30 mL  Freq: EVERY 4 HOURS PRN Route: PO  PRN Reasons: indigestion,heartburn  Start: 05/17/18 1548   Admin Instructions: Shake well.    Admin. Amount: 30 mL  Dispense Loc: [employer] [facility] 200  Volume: 30 mL               aspirin EC tablet 81 mg  Dose: 81 mg  Freq: DAILY Route: PO  Start: 05/18/18 0800   Admin Instructions: DO NOT CRUSH.    Admin. Amount: 1 tablet  (1 × 81 mg tablet)  Last Admin: 05/18/18 0759  Dispense Loc: [facility]           0759 (81 mg)-Given           atorvastatin (LIPITOR) tablet 40 mg  Dose: 40 mg  Freq: DAILY Route: PO  Start: 05/17/18 1600   Admin. Amount: 2 tablet (2 × 20 mg tablet)  Last Admin: 05/18/18 0759  Dispense Loc: [facility]          1712 (40 mg)-Given        0759 (40 mg)-Given           heparin lock flush 10 UNIT/ML injection 5 mL  Dose: 5 mL  Freq: EVERY 1 HOUR PRN Route: IK  PRN Reason: line flush  Start: 05/17/18 1610   Admin. Amount: 5 mL  Dispense Loc: [facility]  Volume: 5 mL               HOLD: Betablockers 24 hours prior to the procedure  Freq: HOLD Route: XX  Start: 05/17/18 1635   Order specific questions:  Medication(s) to hold: Betablockers  Parameter for hold (doses,days,conditions) : Hold  before Procedure or Test  Hours or days to hold med before/after procedure/surgery 24 hours prior to the procedure     Dispense Loc: [facility]  POC: Cardiac Pre-procedure               HOLD: Caffeine containing medications 12 hours prior to the procedure  Freq: HOLD Route: XX  Start: 05/17/18 1635   Order specific questions:  Medication(s) to hold: Caffeine containing medications: Anacin, Excedrin, NoDoz, Vivarin, Midol, Valorin  Parameter for hold (doses,days,conditions) : Hold  before Procedure or Test  Hours or days to hold med before/after procedure/surgery 12 hours prior to the procedure     Dispense Loc: [facility]  POC: Cardiac Pre-procedure               HOLD: dipyridamole (PERSANTINE) or aspirin/dipyridamole (AGGRENOX) 48 hours prior to the procedure  Freq: HOLD Route: XX  Start: 05/17/18 1635   Order specific questions:  Medication(s) to hold: dipyridamole (PERSANTINE) or aspirin/dipyridamole (AGGRENOX)  Parameter for hold (doses,days,conditions) : Hold  before Procedure or Test  Hours or days to hold med before/after procedure/surgery 48 hours prior to the procedure     Dispense Loc: [facility]  POC: Cardiac Pre-procedure               HOLD: Phosphodiesterase-5 (PDE-5) inhibitor medications - vardenafil (LEVITRA/STAXYN), sildenafil (VIAGRA/REVATIO), tadalafil (CIALIS/ADCIRCA), avanafil (STENDRA) - 48 hours prior to the procedure  Freq: HOLD Route: XX  Start: 05/17/18 1635   Order specific questions:  Medication(s) to hold: Phosphodiesterase-5 (PDE-5) inhibitor medications - vardenafil (LEVITRA/STAXYN), sildenafil (VIAGRA/REVATIO), tadalafil (CIALIS/ADCIRCA), avanafil (STENDRA)  Parameter for hold (doses,days,conditions) : Hold  before Procedure or Test  Hours or days to hold med before/after procedure/surgery 48 hours prior to the procedure     Dispense Loc: [facility]  POC: Cardiac Pre-procedure               HOLD: theophylline or aminophylline 12 hours prior to the procedure  Freq: HOLD Route: XX  Start: 05/17/18 1635   Order specific questions:  Medication(s) to hold: theophylline or aminophylline  Parameter for hold (doses,days,conditions) : Hold  before Procedure or Test  Hours or days to hold med before/after procedure/surgery 12 hours prior to the procedure     Dispense Loc: [facility]  POC: Cardiac Pre-procedure               IF patient diabetic - HOLD: ALL ORAL HYPOGLYCEMICS and include: glipizide, glyburide, glimepiride, gliclazide, metformin, any metformin containing medication, on day of the procedure  Freq: HOLD Route: XX  Start: 05/17/18 1635   Admin Instructions: ALL ORAL HYPOGLYCEMICS and include glipizide, glyburide, glimepiride, gliclazide, metformin (GLUCOPHAGE, GLUMETZA, FORTAMET, RIOMET) and metformin containing medications: alogliptin/metformin (KAZANO), glipizide/metformin (METAGLIP), glyburide/metformin (GLUCOVANCE), rosiglitazone/metformin (AVANDAMET), dapagliflozin/metformin (XIGDUO XR), sitagliptin/metformin (JANUMET, JANUMET XR), linagliptin/metformin (JENTADUETO), repaglinide/metformin (PRANDIMET), saxagliptin/metformin (KOMBIGLYZE XR),  "canagliflozin/metformin (INVOKAMET), and pioglitazone/metformin (ACTOPLUS MET, ACTOPLUS MET XR).    Order specific questions:  Medication(s) to hold: ALL ORAL HYPOGLYCEMICS (Full list in Admin Instructions)  Parameter for hold (doses,days,conditions) : Hold  before Procedure or Test  Hours or days to hold med before/after procedure/surgery the day of the procedure     Dispense Loc: [facility]  POC: Cardiac Pre-procedure               lidocaine (LMX4) kit  Freq: EVERY 1 HOUR PRN Route: Top  PRN Reason: pain  PRN Comment: with VAD insertion or accessing implanted port.  Start: 05/17/18 1548   Admin Instructions: Do NOT give if patient has a history of allergy to any local anesthetic or any \"anyi\" product.   Apply 30 minutes prior to VAD insertion or port access.  MAX Dose:  2.5 g (  of 5 g tube)    Dispense Loc: [facility]               lidocaine 1 % 1 mL  Dose: 1 mL  Freq: EVERY 1 HOUR PRN Route: OTHER  PRN Comment: mild pain with VAD insertion or accessing implanted port  Start: 05/17/18 1548   Admin Instructions: Do NOT give if patient has a history of allergy to any local anesthetic or any \"anyi\" product. MAX dose 1 mL subcutaneous OR intradermal in divided doses.    Admin. Amount: 1 mL  Dispense Loc: [facility] 200  Volume: 5 mL               lisinopril (PRINIVIL/ZESTRIL) tablet 5 mg  Dose: 5 mg  Freq: DAILY Route: PO  Start: 05/17/18 1600   Admin. Amount: 1 tablet (1 × 5 mg tablet)  Last Admin: 05/18/18 0759  Dispense Loc: [facility] 200          1712 (5 mg)-Given        0759 (5 mg)-Given           LORazepam (ATIVAN) tablet 0.5 mg  Dose: 0.5 mg  Freq: 2 TIMES DAILY PRN Route: PO  PRN Reason: anxiety  Start: 05/17/18 1549   Admin. Amount: 1 tablet (1 × 0.5 mg tablet)  Last Admin: 05/18/18 1547  Dispense Loc: [facility]          1810 (0.5 mg)-Given        0716 (0.5 mg)-Given       1547 (0.5 mg)-Given           metoprolol succinate (TOPROL-XL) 24 hr tablet 50 mg  Dose: 50 mg  Freq: HOLD Route: " PO  PRN Comment: until MD resumes after procedure  Start: 05/17/18 1645   Admin Instructions: DO NOT CRUSH. Tablet may be split in half along score line.    Admin. Amount: 1 tablet (1 × 50 mg tablet)  Dispense Loc: [facility]               naloxone (NARCAN) injection 0.1-0.4 mg  Dose: 0.1-0.4 mg  Freq: EVERY 2 MIN PRN Route: IV  PRN Reason: opioid reversal  Start: 05/17/18 1548   Admin Instructions: For respiratory rate LESS than or EQUAL to 8.  Partial reversal dose:  0.1 mg titrated q 2 minutes for Analgesia Side Effects Monitoring Sedation Level of 3 (frequently drowsy, arousable, drifts to sleep during conversation).Full reversal dose:  0.4 mg bolus for Analgesia Side Effects Monitoring Sedation Level of 4 (somnolent, minimal or no response to stimulation).  For ordered doses up to 2mg give IVP. Give each 0.4mg over 15 seconds in emergency situations. For non-emergent situations further dilute in 9mL of NS to facilitate titration of response.    Admin. Amount: 0.1-0.4 mg = 0.25-1 mL Conc: 0.4 mg/mL  Dispense Loc: [facility] 200  Volume: 1 mL               naloxone (NARCAN) injection 0.1-0.4 mg  Dose: 0.1-0.4 mg  Freq: EVERY 2 MIN PRN Route: IV  PRN Reason: opioid reversal  Start: 05/17/18 1528   Admin Instructions: For respiratory rate LESS than or EQUAL to 8.  Partial reversal dose:  0.1 mg titrated q 2 minutes for Analgesia Side Effects Monitoring Sedation Level of 3 (frequently drowsy, arousable, drifts to sleep during conversation).Full reversal dose:  0.4 mg bolus for Analgesia Side Effects Monitoring Sedation Level of 4 (somnolent, minimal or no response to stimulation).  For ordered doses up to 2mg give IVP. Give each 0.4mg over 15 seconds in emergency situations. For non-emergent situations further dilute in 9mL of NS to facilitate titration of response.    Admin. Amount: 0.1-0.4 mg = 0.25-1 mL Conc: 0.4 mg/mL  Dispense Loc: [facility] 200  Volume: 1 mL               nitroGLYcerin (NITRODUR) 0.2  MG/HR 24 hr patch 1 patch  Dose: 1 patch  Freq: DAILY Route: TD  Start: 05/18/18 1600   Admin. Amount: 1 patch  Dispense Loc: [facility]           [ ] 1600           nitroGLYcerin (NITRODUR) Patch in Place  Freq: EVERY 8 HOURS Route: TD  Start: 05/18/18 1600   Admin Instructions: Chart every shift, confirming that patch is still in place on patient (no barcode scan needed). See patch order for dose information.    Dispense Loc: [facility]           [ ] 1600           nitroGLYcerin (NITROSTAT) sublingual tablet 0.4 mg  Dose: 0.4 mg  Freq: EVERY 5 MIN PRN Route: SL  PRN Reason: chest pain  Start: 05/17/18 1548   Admin Instructions: Maximum 3 doses in 15 minutes.  Notify provider if no relief after 3 doses.    Do NOT give nitroglycerin SL IF patient has received sildenafil (VIAGRA/REVATIO) within the last 8 hours, avanafil (STENDRA) within the last 8 hours, vardenafil (LEVITRA/STAXYN) with the last 18 hours, or tadalafil (CIALIS/ADCIRCA) within the last 36 hours    Admin. Amount: 1 tablet (1 × 0.4 mg tablet)  Last Admin: 05/17/18 1657  Dispense Loc: [facility] 200          1657 (0.4 mg)-Given            ranitidine (ZANTAC) tablet 75 mg  Dose: 75 mg  Freq: 2 TIMES DAILY Route: PO  Start: 05/17/18 2000   Admin. Amount: 1 tablet (1 × 75 mg tablet)  Last Admin: 05/18/18 0759  Dispense Loc: [facility]          2020 (75 mg)-Given        0759 (75 mg)-Given       [ ] 2000           sodium chloride (PF) 0.9% PF flush 1-10 mL  Dose: 1-10 mL  Freq: EVERY 10 MIN PRN Route: IV  PRN Reason: other  PRN Comment: for peripheral IV flush post IV meds  Start: 05/17/18 1635   Admin. Amount: 1-10 mL  Dispense Loc: [facility] Stock  Volume: 10 mL  POC: Cardiac Pre-procedure               sodium chloride (PF) 0.9% PF flush 10 mL  Dose: 10 mL  Freq: ONCE Route: IV  Start: 05/17/18 1645   Admin Instructions: To lock peripheral IV dormant line    Admin. Amount: 10 mL  Dispense Loc: [facility] Floor Stock  Administrations  Remainin  Volume: 10 mL  POC: Cardiac Pre-procedure          ()-Not Given            sodium chloride (PF) 0.9% PF flush 3 mL  Dose: 3 mL  Freq: EVERY 1 HOUR PRN Route: IK  PRN Reason: line flush  PRN Comment: for peripheral IV flush post IV meds  Start: 18 1548   Admin. Amount: 3 mL  Dispense Loc: [facility] Stock  Volume: 3 mL              Future Medications  Medications 05/12/18 05/13/18 05/14/18 05/15/18 05/16/18 05/17/18 05/18/18       nitroGLYcerin (NITRODUR) patch REMOVAL  Freq: EVERY 24 HOURS  Route: TD  Start: 18   Dispense Loc: [facility]           [ ]           Completed Medications  Medications 05/12/18 05/13/18 05/14/18 05/15/18 05/16/18 05/17/18 05/18/18         Dose: 3,500 Units  Freq: ONCE Route: IV  Start: 18   End: 18   Admin Instructions: Nurse to administer dose from existing infusion. If no infusion bag or syringe for this order is available, contact pharmacist to re-enter medication order.    Admin. Amount: 3,500 Units  Last Admin: 18  Dispense Loc: [facility]  Administrations Remainin          8 (3,500 Units)-Given              Dose: 0.4 mg  Freq: ONCE Route: IV  Start: 18 1315   End: 18 1335   Admin. Amount: 0.4 mg = 5 mL Conc: 0.4 mg/5 mL  Last Admin: 18  Dispense Loc: [facility]  Administrations Remainin  Volume: 5 mL   Current Line: Peripheral IV 18 Left Hand          1335 (0.4 mg)-Given             Dose: 3-42 mCi  Freq: EVERY 2 HOURS Route: IV  Start: 18 1245   End: 18 1310   Admin Instructions: Radioisotope, supplied by and administered by Nuclear Medicine.  *HW*    Admin. Amount: 3-42 millicurie (3-42 × 1 mCi millicurie)  Last Admin: 18 131  Dispense Loc: [facility] Floor Stock  Administrations Remainin   Current Line: Peripheral IV 18 Left Hand          1130 (10 mCi)-Given       1310 (32.6 mCi)-Given          Discontinued  Medications  Medications 05/12/18 05/13/18 05/14/18 05/15/18 05/16/18 05/17/18 05/18/18         Dose: 81 mg  Freq: DAILY Route: PO  Start: 05/18/18 0800   End: 05/17/18 1555   Admin Instructions: DO NOT CRUSH.    Admin. Amount: 1 tablet (1 × 81 mg tablet)          1555-Med Discontinued          Rate: 7.5 mL/hr Dose: 750 Units/hr  Freq: CONTINUOUS Route: IV  Last Dose: Stopped (05/18/18 1045)  Start: 05/17/18 1730   End: 05/18/18 1017   Last Admin: 05/18/18 0412  Dispense Loc: [facility]  Volume: 250 mL          1739 (750 Units/hr)-New Bag       2000 (750 Units/hr)-Rate/Dose Verify        0000 (750 Units/hr)-Rate/Dose Verify       0412 (750 Units/hr)-Rate/Dose Verify       1017-Med Discontinued  1045-Stopped             Dose: 50 mg  Freq: DAILY Route: PO  Start: 05/17/18 1600   End: 05/17/18 1637   Admin Instructions: DO NOT CRUSH. Tablet may be split in half along score line.    Admin. Amount: 1 tablet (1 × 50 mg tablet)  Dispense Loc: [facility]          1637-Med Discontinued  (1728)-Not Given              Dose: 3 mL  Freq: EVERY 8 HOURS Route: IK  Start: 05/17/18 1600   End: 05/17/18 1611   Admin Instructions: And Q1H PRN, to lock peripheral IV dormant line.    Admin. Amount: 3 mL  Dispense Loc: [facility] Floor Stock  Volume: 3 mL                 1611-Med Discontinued

## 2018-05-17 NOTE — PHARMACY-ANTICOAGULATION SERVICE
Patient to start the heparin C-V/Vascular protocol with a goal anti 10a level of 0.15-0.35. Using a HT of 64 inches and a WT of 74 kg, a dosing WT of 62 kg was calculated. Based on this dosing WT, give patient a heparin bolus of 3500 units from the bag and then start a drip at 750 units/hr. Check the patient's anti 10a level 6 hours after starting the drip at ~2345.   Per C-V/Vascular protocol.    QAMAR SRINIVASAND

## 2018-05-17 NOTE — IP AVS SNAPSHOT
` `     [facility] SURGICAL: [phone]                 INTERAGENCY TRANSFER FORM - NOTES (H&P, Discharge Summary, Consults, Procedures, Therapies)   2018                    Hospital Admission Date: 2018  ELIZABETH LANDA   : 1940  Sex: Female        Patient PCP Information     Provider PCP Type    R Emanuel Perez MD General         History & Physicals      H&P by Berhane Comer MD at 2018  2:30 PM     Author:  Berhane Comer MD Service:  Hospitalist Author Type:  Physician    Filed:  2018  6:11 PM Date of Service:  2018  2:30 PM Creation Time:  2018  2:30 PM    Status:  Signed :  Berhane Comer MD (Physician)           Physician Attestation   I, Berhane Comer, was present with the medical student who participated in the service and in the documentation of the note.  I have verified the history and personally performed the physical exam and medical decision making.  I agree with the assessment and plan of care as documented in the note.      I personally reviewed vital signs, medications, labs and imaging.    EXAM:      GENERAL APPEARANCE: healthy, alert and no distress     HENT: ear canals and TM's without perforation, bulging, or retraction. Nose and mouth without ulceration or lesions     NECK: no adenopathy, no asymmetry, masses, or scars and thyroid without enlargement or nodules to palpation     RESP: lungs clear to auscultation - no rales, rhonchi or wheezes     CV: regular rates and rhythm, normal S1 S2, no S3 or S4 and no murmur, click or rub      Abdomen: soft, nontender, no liver or spleen enlargement, no masses, bowel sounds active     MS: extremities normal- no gross deformities noted, no evidence of inflammation in joints, full ROM in all extremities.     SKIN: no suspicious lesions or rashes     LYMPHATICS: No axillary, cervical, inguinal, or supraclavicular nodes   NEURO: Normal strength and tone, sensory exam grossly normal,  mentation intact and speech normal     PSYCH: affect normal/bright       EKG completely unchanged from 5 years ago.  Repeat EKG again unchanged.          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     She presented to the ED today after chest pain/pressure that started at 8 am this morning. It lasted for about 20 minutes and began at rest. She took 1 nitro and the pain went away. She took a second nitro as well because she wanted to be safe, but the pain never returned. The pain was located in the center of her chest and radiated to her neck. No nausea, vomiting, or shortness of breath. She did have some lightheadedness. She presented to the ED by EMS and had a 324 mg Aspirin prior to arrival and was on oxygen. In the ED, troponin was negative, EKG negative, D-dimer negative, WBC 14.3, CXR with no acute abnormalities.     She reports that she had a heart attack 6 years ago and had one stent placed. She has not had chest pain since then.     Her history is also complicated by small cell lung cancer. She is on chemotherapy, last had a dose on 5/10. Her hemoglobin has been dropping and was 6.5 on 5/15 in the ED. She received 1 unit of PRBCs and her repeat hemoglobin was 9.2 on 5/16. She reports that her stools are always dark. Also, two days ago fecal occult blood was negative on 5/15. The drop in hemoglobin from her base around 9 has been attributed to her  chemotherapy. Has had normal B12/folate. Normocytic. She does take one ibuprofen 200 mg every day for pain with tylenol though she admittedly reports that doctors have told her not to take any. She feels weak and fatigued today. 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     Coronary artery disease 12/2014    Inferior STEMI, stent to Circumflex     Percutaneous transluminal coronary angioplasty hematoma 12/15/2014[BS1.3]       Past Surgical History[BS1.2]   Past Surgical History:   Procedure Laterality Date     APPENDECTOMY  1956     ESOPHAGOSCOPY, GASTROSCOPY, DUODENOSCOPY (EGD), COMBINED N/A 1/30/2018    Procedure: COMBINED ENDOSCOPIC ULTRASOUND, ESOPHAGOSCOPY, GASTROSCOPY, DUODENOSCOPY (EGD), FINE NEEDLE ASPIRATE/BIOPSY;   EUS;  Surgeon: Carlos Fletcher MD;  Location:  GI     EYE SURGERY       INSERT PORT VASCULAR ACCESS N/A 2/12/2018    Procedure: INSERT PORT VASCULAR ACCESS;  Port-a-Cath Placement;  Surgeon: Carlos Johnson MD;  Location: [facility] OR     PHACOEMULSIFICATION WITH STANDARD INTRAOCULAR LENS IMPLANT Left 1/25/2016    Procedure: PHACOEMULSIFICATION WITH STANDARD INTRAOCULAR LENS IMPLANT;  Surgeon: Julio César Wallace MD;  Location: [facility] OR     PHACOEMULSIFICATION WITH STANDARD INTRAOCULAR LENS IMPLANT Right 2/22/2016    Procedure: PHACOEMULSIFICATION WITH STANDARD INTRAOCULAR LENS IMPLANT;  Surgeon: Julio César Wallace MD;  Location: [facility] OR     SURGICAL HISTORY OF -   1961    right hand surgery      TONSILLECTOMY & ADENOIDECTOMY  1967     VASCULAR SURGERY  02/12/2018    PortaCath[BS1.3]        Social History[BS1.2]   Social History   Substance Use Topics     Smoking status: Former Smoker      Packs/day: 0.50     Types: Cigarettes     Quit date: 12/13/2014     Smokeless tobacco: Never Used     Alcohol use No[BS1.3]       Family History[BS1.2]   Family History   Problem Relation Age of Onset     CANCER Brother      HEART DISEASE Brother      CANCER Mother      Respiratory Father      HEART DISEASE Father      MI[BS1.3]       Prior to Admission Medications   Prior to Admission Medications   Prescriptions Last Dose Informant Patient Reported? Taking?   Acetaminophen (TYLENOL PO) 5/17/2018 at am Self Yes Yes   Sig: Take 1,000 mg by mouth every 6 hours as needed   IBUPROFEN PO Past Week at Unknown time  Yes Yes   Sig: Take 200 mg by mouth every 4 hours as needed for moderate pain   LORazepam (ATIVAN) 0.5 MG tablet 5/17/2018 at am  No Yes   Sig: Take 1 tablet (0.5 mg) by mouth 2 times daily as needed for anxiety   Multiple Vitamins-Minerals (MULTIVITAMIN ADULT) TABS 5/16/2018 at am  Yes Yes   Sig: Take by mouth daily    albuterol (PROAIR HFA/PROVENTIL HFA/VENTOLIN HFA) 108 (90 BASE) MCG/ACT Inhaler 5/17/2018 at am  No Yes   Sig: Inhale 2 puffs into the lungs 4 times daily   aspirin EC 81 MG EC tablet 5/17/2018 at am  No Yes   Sig: Take 1 tablet (81 mg) by mouth daily   atorvastatin (LIPITOR) 40 MG tablet 5/16/2018 at am  No Yes   Sig: Take 1 tablet (40 mg) by mouth daily   lidocaine-prilocaine (EMLA) cream   No No   Sig: Apply topically as needed for moderate pain   lisinopril (PRINIVIL/ZESTRIL) 5 MG tablet 5/16/2018 at am  No Yes   Sig: Take 1 tablet (5 mg) by mouth daily   metoprolol (TOPROL-XL) 50 MG 24 hr tablet 5/16/2018 at am  No Yes   Sig: Take 1 tablet (50 mg) by mouth daily   nitroglycerin (NITROSTAT) 0.4 MG SL tablet 5/17/2018 at am  No Yes   Sig: Place 1 tablet (0.4 mg) under the tongue every 5 minutes as needed for chest pain Maximum of 3 doses in 15 minutes   prochlorperazine (COMPAZINE) 10 MG tablet Past Month at Unknown time  No Yes   Sig: Take 0.5 tablets (5 mg) by mouth every 6 hours as needed  (Nausea/Vomiting)   ranitidine (ZANTAC) 75 MG tablet 5/16/2018 at hs  No Yes   Sig: Take 1 tablet (75 mg) by mouth 2 times daily      Facility-Administered Medications: None     Allergies   Allergies   Allergen Reactions     Amoxicillin GI Disturbance     Tetracycline Other (See Comments)     Yeast infection     Nickel Rash       Physical Exam[BS1.2]   Vital Signs:   Temp src: Oral BP: 125/72 Pulse: 100 Heart Rate: 79 Resp: 16 SpO2: 95 % O2 Device: None (Room air)    Weight: 166 lbs 0 oz    General Appearance: Alert, sitting up, in no acute distress  Eyes: Normal external eyes, PERRLA  HEENT: Normocephalic, atraumatic, normal external ears and nose. Throat and oral mucosa without lesions, pink, wet oral mucosa.   Respiratory: Lungs clear to auscultation bilaterally. No wheezes rales or rhonchi.   Cardiovascular: Regular rate and rhythm, normal S1 and S2. No S3, S4 murmurs, rubs or gallops  GI: Soft, non-distended, non tender, no palpable organomegaly  Skin: No notable rashes  Musculoskeletal: 4/5 strength in upper and lower extremities bilaterally  Neurologic: No focal neurological abnormalities. CN II-XII intact, hoarse voice.   Psychiatric: Mood appropriate. 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    # Chest pain, likely of cardiac etiology  - Chest pain seems cardiac with substernal chest pain radiating to the neck that occurred at rest and was relieved by 1 nitro. Stable now with no recurrence of the pain. Took Aspirin 324 mg before presenting to the ED. Tropon negative, d-dimer negative, EKG negative, CXR negative. Discussed with ED and though they would normally discharge a patient with these results to home for  outpatient stress test, we decided it was best to admit this patient to observation given her complex medical history as below.   - Plan is for serial troponin's (total of 3) followed by stress test inpatient tomorrow.[BS1.1]   - Patient had a second episode of sub sternal chest pain while up on the floor. EKG again normal and the pain again resolved within minutes of nitroglycerin. This may be true unstable angina, but could also be esophageal reflux and spasm. Will start heparin. 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 Last saw Oncology 5/7/18. Has normal B12/folate. Per oncology note, transfuse if hb less than 8.5-9 or symptoms. Hemoglobin 8.0 today. Feels weak and fatigued.   -[BS1.1] Will transfuse 1 unit of PRBCs overnight.[BS1.4]     # Small cell lung cancer, left lung with mediastinal lymph node and adrenal metastasis, receiving chemotherapy of carbo/-16  With neulasta. First started palliative chemotherapy 2/2018. Has had good response on PET after 3 cycles and treatment is continuing. No radiation or surgery.   - Takes Ativan, compazine and acetaminophen available prn for symptom control.    # Esophageal reflux  - No epigastric pain. Says chest pain this morning felt different from her usual reflux pain. Continue Ranitidine  - Encouraged to stop taking Ibuprofen or any NSAIDS all together.       # Pain Assessment:[BS1.1]  Current Pain Score 5/15/2018   Patient currently in pain? -   Pain score (0-10) 0   Pain location -   Pain descriptors -[BS1.2]   Ines lisa pain level was assessed and she currently denies pain.      Diet:    Fluids: None  Lines: Peripheral  Urinary  Catheter: None needed  DVT Prophylaxis: Padua score of 4. Pharmacologic prophylaxis indicated. However, given low hemoglobin without definite source will go with mechanical prophylaxis.   Code Status: DNR / DNI      [unfilled]   The patient was discussed with Dr. Nahomi Vazquez  Medical Student[BS1.1]         Revision History        User Key Date/Time User Provider Type Action    > MD1.2 5/17/2018  6:11 PM Berhane Comer MD Physician Sign     MD1.1 5/17/2018  6:08 PM Berhane Comer MD Physician      BS1.4 5/17/2018  4:11 PM Junior Vazquez Medical Student Pend     BS1.3 5/17/2018  2:47 PM Junior Vazquez Medical Student      BS1.2 5/17/2018  2:31 PM Junior Vazquez Medical Student      BS1.1 5/17/2018  2:30 PM Junior Vazquez Medical Student                   Discharge Summaries     No notes of this type exist for this encounter.      Consult Notes     No notes of this type exist for this encounter.         Progress Notes - Physician (Notes from 05/15/18 through 05/18/18)      Progress Notes by Olya Pierre RN at 5/18/2018  2:38 PM     Author:  Olya Pierre RN Service:  Radiology Author Type:  Registered Nurse    Filed:  5/18/2018  2:39 PM Date of Service:  5/18/2018  2:38 PM Creation Time:  5/18/2018  2:38 PM    Status:  Signed :  Olya Pierre RN (Registered Nurse)         Dr Comer contacted regarding the pt's ability to perform and exercise stress test. MD ospina Lexiscan test.   Olya Pierre RN[DA1.1]      Revision History        User Key Date/Time User Provider Type Action    > DA1.1 5/18/2018  2:39 PM Olya Pierre RN Registered Nurse Sign            ED Provider Notes by Abiel Godfrey MD at 5/17/2018 10:35 AM     Author:  Abiel Godfrey MD Service:  Emergency Medicine Author Type:  Physician    Filed:  5/17/2018  7:16 PM Date of Service:  5/17/2018 10:35 AM Creation Time:  5/17/2018 10:37 AM    Status:  Signed :  Abiel Godfrey MD (Physician)            History   No chief complaint on file.    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  Patient was evaluated 2 days ago in this emergency department for generalized weakness. Lab tests showed her hemoglobin to be 6.5.  She received 1 red blood cell unit and was discharged home.  Yesterday she had her hemoglobin re-tested and it was 9.2 and received IV medications and fluids while at infusion therapy.  Afterwards, she reported to feel better and was discharged home with her sister.  Her last infusion of chemotherapy, IV etoposide, on 5/9/2018.  She reports feeling dizzy and weak after infusion.    Today, while at rest, patient began to feel[AH1.1] anterior[SM1.1] chest pain[AH1.1] moderate pressure[SM1.1].  She reports taking 2 nitroglycerin after symptoms lasted 30-45 minutes prior to arrival of the first responders.  She found relief of her symptoms with the 1st nitroglycerin but took a second one as it was prescribed that way.  She reports she took a 324 mg aspirin this morning prior to arrival.  First responders had her on oxygen when EMS arrived, per EMS, although she is not on oxygen in emergency department.  She denies current chest pain in emergency department and denies symptoms of dizziness, shortness of breath, nausea, and vomiting during chest pain at home.  She described it as more of a tightness in her throat with light-headedness.  She feels her light-headedness is better in emergency department.  Patient denies dizziness, shortness of breath, nausea, vomiting, back, arm, or neck pain, diarrhea, bloody stools, rash, headache, and urinary symptoms.    Problem List:    Patient Active Problem List    Diagnosis Date  "Noted     Anemia in neoplastic disease 05/16/2018     Priority: Medium     Need for prophylactic measure 02/15/2018     Priority: Medium     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) 02/08/2018     Priority: Medium     Anxiety 01/22/2018     Priority: Medium     Morbid obesity (H) 06/29/2017     Priority: Medium     Essential hypertension with goal blood pressure less than 140/90 09/06/2016     Priority: Medium     Senile nuclear cataract 01/22/2016     Priority: Medium     Coronary artery disease involving native coronary artery without angina pectoris 10/29/2015     Priority: Medium     NSTEMI 12/14, stent to Circumflex       Esophageal reflux 12/19/2014     Priority: Medium     Advance Care Planning 10/08/2012     Priority: Medium     Patient does not have an Advance/Health Care Directive (HCD), given \"What is Advance Care Planning?\" flyer. Noris Zuniga October 8, 2012       Hyperlipidemia LDL goal <70 10/31/2010     Priority: Medium     Allergic rhinitis 11/14/2005     Priority: Medium     Problem list name updated by automated process. 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     Coronary artery disease 12/2014     Percutaneous transluminal coronary angioplasty hematoma 12/15/2014       Past Surgical History:    Past Surgical History:   Procedure Laterality Date     APPENDECTOMY  1956     ESOPHAGOSCOPY, GASTROSCOPY, DUODENOSCOPY (EGD), COMBINED N/A 1/30/2018    Procedure: COMBINED ENDOSCOPIC ULTRASOUND, ESOPHAGOSCOPY, GASTROSCOPY, DUODENOSCOPY (EGD), FINE NEEDLE ASPIRATE/BIOPSY;   EUS;  Surgeon: Carlso Fletcher MD;  Location:  GI     EYE SURGERY       INSERT PORT VASCULAR ACCESS N/A 2/12/2018    Procedure: INSERT PORT VASCULAR ACCESS;  Port-a-Cath Placement;  Surgeon: Carlos Johnson MD;  Location: [facility] OR     PHACOEMULSIFICATION WITH STANDARD " INTRAOCULAR LENS IMPLANT Left 1/25/2016    Procedure: PHACOEMULSIFICATION WITH STANDARD INTRAOCULAR LENS IMPLANT;  Surgeon: Julio César Wallace MD;  Location: [facility] OR     PHACOEMULSIFICATION WITH STANDARD INTRAOCULAR LENS IMPLANT Right 2/22/2016    Procedure: PHACOEMULSIFICATION WITH STANDARD INTRAOCULAR LENS IMPLANT;  Surgeon: Julio César Wallace MD;  Location: [facility] OR     SURGICAL HISTORY OF -   1961    right hand surgery      TONSILLECTOMY & ADENOIDECTOMY  1967     VASCULAR SURGERY  02/12/2018    PortaCath       Family History:    Family History   Problem Relation Age of Onset     CANCER Brother      HEART DISEASE Brother      CANCER Mother      Respiratory Father      HEART DISEASE Father      MI       Social History:  Marital Status:   [5]  Social History   Substance Use Topics     Smoking status: Former Smoker     Packs/day: 0.50     Types: Cigarettes     Quit date: 12/13/2014     Smokeless tobacco: Never Used     Alcohol use No        Medications:      Acetaminophen (TYLENOL PO)   albuterol (PROAIR HFA/PROVENTIL HFA/VENTOLIN HFA) 108 (90 BASE) MCG/ACT Inhaler   aspirin EC 81 MG EC tablet   atorvastatin (LIPITOR) 40 MG tablet   IBUPROFEN PO   lidocaine-prilocaine (EMLA) cream   lisinopril (PRINIVIL/ZESTRIL) 5 MG tablet   LORazepam (ATIVAN) 0.5 MG tablet   metoprolol (TOPROL-XL) 50 MG 24 hr tablet   Multiple Vitamins-Minerals (MULTIVITAMIN ADULT) TABS   nitroglycerin (NITROSTAT) 0.4 MG SL tablet   prochlorperazine (COMPAZINE) 10 MG tablet   ranitidine (ZANTAC) 75 MG tablet         Review of Systems   Constitutional: Positive for appetite change and fatigue. Negative for fever.   Respiratory: Positive for chest tightness. Negative for shortness of breath.    Cardiovascular: Positive for chest pain.   Gastrointestinal: Negative for abdominal pain, blood in stool, diarrhea, nausea and vomiting.   Genitourinary: Negative for dysuria, frequency and urgency.   Musculoskeletal: Negative for back pain and neck pain.    Skin: Negative for rash.   Neurological: Negative for dizziness, weakness and headaches.       Physical Exam        Physical Exam   Constitutional:[AH1.1] No distress[SM1.1].   HENT:   Mouth/Throat:[AH1.1] Oropharynx is clear and moist[SM1.1].   Eyes:[AH1.1] Conjunctivae[SM1.1] are normal.   Neck:[AH1.1] Neck supple[SM1.1].   Cardiovascular:[AH1.1] Normal rate[SM1.1].  Exam reveals[AH1.1] no gallop[SM1.1] and[AH1.1] no friction rub[SM1.1].[AH1.1]    No murmur[SM1.1] heard.  Pulmonary/Chest:[AH1.1] Effort normal[SM1.1] and[AH1.1] breath sounds normal[SM1.1]. No[AH1.1] respiratory distress[SM1.1]. She has[AH1.1] no wheezes[SM1.1]. She has[AH1.1] no rales[SM1.1].   Abdominal:[AH1.1] Soft[SM1.1].[AH1.1] Bowel sounds are normal[SM1.1]. She exhibits[AH1.1] no distension[SM1.1]. There is[AH1.1] no tenderness[SM1.1]. There is[AH1.1] no rebound[SM1.1].   Musculoskeletal: She exhibits no[AH1.1] edema[SM1.1].   Neurological: She is[AH1.1] alert[SM1.1]. She exhibits[AH1.1] normal muscle tone[SM1.1].   Skin:[AH1.1] No rash[SM1.1] noted. She is[AH1.1] not diaphoretic[SM1.1].     Pulmonary/Chest: Clear to ausculation.  Cardiovascular: 3/6 systolic murmur at sternal boarder and apex  Abdomen: Soft, non-tender  Musculoskeletal: No lower extremities edema  Neurological: Normal motor strength.    ED Course     ED Course     Procedures[AH1.1]                  EKG Interpretation:      Interpreted by Abiel Godfrey MD[SM1.1]  While in the ambulance, an EKG was completed at 9:59 AM.  I read it in the emergency department upon arrival.  Her sinus rhythm was normal at 82 beats per minute, no axis changes, no ST changes, T-wave flatting/inversion seen in V2, normal R progression, no Q-waves. Intervals appear normal.         EKG Interpretation:      Interpreted by Abiel Godfrey  Time reviewed: 10:45 AM  Symptoms at time of EKG: None   Rhythm: normal sinus at 81 beats per minute  Rate: Normal  Axis: Normal  Ectopy: none  Conduction:  normal  ST Segments/ T Waves: No ST change. T-wave[AH1.1] inversion in[SM1.1] V2[AH1.1], biphasic V3[SM1.1]  Q Waves: none  Comparison to prior: December 2014 T-wave intervals appears new in V2.  No other changes        Read at 10:45 AM. Her sinus rythum was normal at 81 beats per minute with normal axis and normal ST change no T-wave eversion on V2, normal R progression, no Q wave, normal conduction. Comparison: From Decemember 2014, T-wave interval appears new in V2 no other changes.    Critical Care time:[AH1.1]  none[SM1.1]               Results for orders placed or performed during the hospital encounter of 05/17/18   Chest XR,  PA & LAT    Narrative    XR CHEST 2 VW 5/17/2018 12:10 PM    HISTORY: Pain.    COMPARISON: 2/12/2018.      Impression    IMPRESSION: 2 views of the chest show no acute cardiopulmonary disease  and no significant change.  A PowerPort catheter is again seen in  place.    ALINA MATTHEW MD   CBC with platelets differential   Result Value Ref Range    WBC 14.3 (H) 4.0 - 11.0 10e9/L    RBC Count 2.74 (L) 3.8 - 5.2 10e12/L    Hemoglobin 8.0 (L) 11.7 - 15.7 g/dL    Hematocrit 23.5 (L) 35.0 - 47.0 %    MCV 86 78 - 100 fl    MCH 29.2 26.5 - 33.0 pg    MCHC 34.0 31.5 - 36.5 g/dL    RDW 16.7 (H) 10.0 - 15.0 %    Platelet Count 79 (L) 150 - 450 10e9/L    Diff Method Automated Method     % Neutrophils 57.5 %    % Lymphocytes 23.6 %    % Monocytes 15.8 %    % Eosinophils 0.1 %    % Basophils 0.5 %    % Immature Granulocytes 2.5 %    Absolute Neutrophil 8.2 1.6 - 8.3 10e9/L    Absolute Lymphocytes 3.4 0.8 - 5.3 10e9/L    Absolute Monocytes 2.3 (H) 0.0 - 1.3 10e9/L    Absolute Eosinophils 0.0 0.0 - 0.7 10e9/L    Absolute Basophils 0.1 0.0 - 0.2 10e9/L    Abs Immature Granulocytes 0.4 0 - 0.4 10e9/L   Comprehensive metabolic panel   Result Value Ref Range    Sodium 139 133 - 144 mmol/L    Potassium 3.5 3.4 - 5.3 mmol/L    Chloride 106 94 - 109 mmol/L    Carbon Dioxide 26 20 - 32 mmol/L    Anion Gap 7 3 -  14 mmol/L    Glucose 101 (H) 70 - 99 mg/dL    Urea Nitrogen 15 7 - 30 mg/dL    Creatinine 0.48 (L) 0.52 - 1.04 mg/dL    GFR Estimate >90 >60 mL/min/1.7m2    GFR Estimate If Black >90 >60 mL/min/1.7m2    Calcium 8.6 8.5 - 10.1 mg/dL    Bilirubin Total 0.6 0.2 - 1.3 mg/dL    Albumin 3.3 (L) 3.4 - 5.0 g/dL    Protein Total 5.8 (L) 6.8 - 8.8 g/dL    Alkaline Phosphatase 97 40 - 150 U/L    ALT 13 0 - 50 U/L    AST 11 0 - 45 U/L   Troponin I   Result Value Ref Range    Troponin I ES <0.015 0.000 - 0.045 ug/L   D dimer quantitative   Result Value Ref Range    D Dimer 0.3 0.0 - 0.50 ug/ml FEU[SM1.2]         Medications - No data to display  10:25 AM Patient Assessed.  Assessments & Plan (with Medical Decision Making)[AH1.1]     MDM: Ines Pickard is a 77 year old female who presented with a history of coronary disease with stenting 5 years ago on anterior chest pain at rest that lasted at least 30-45 minutes and was relieved with nitroglycerin at home.  She also used aspirin.  She had no pain in the emergency department.   her EKG both in the ambulance and in the emergency department demonstrated T-wave inversion in the initial precordial leads.  No ST change.  Her vital signs and cardiovascular exam were reassuring.  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  Her d-dimer was negative.  Radial pulses are symmetric and lungs are clear to auscultation.  Her laboratory testing including troponin are normal.  Heart score is 6 suggestive of more close evaluation and I discussed with Dr. Comer who accepted her for observation to perform serial troponins and stress testing tomorrow.[SM1.1]    I have reviewed the nursing notes.    I have reviewed the findings, diagnosis, plan and need for follow up with the patient.[AH1.1]     HEART Score  Background  Calculates the overall risk of adverse event in patient's presenting with chest pain.  Based on 5 criteria (each assigned 0-2  points) including suspiciousness of history, EKG, age, risk factors and troponin.    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 Her smoking use included Cigarettes. She smoked 0.50 packs per day. She has never used smokeless tobacco.  family history includes CANCER in her brother and mother; HEART DISEASE in her brother and father; Respiratory in her father.[SM1.3]  Lab Results   Component Value Date    TROPI <0.015 05/17/2018     Criteria   0-2 points for each of 5 items (maximum of 10 points):  Score 1- History moderately suspicious for coronary syndrome  Score 1- EKG with Non-specific repolarization disturbance  Score 2- Age 65 years or older  Score 2- Three or more risk factors for or history of atherosclerotic disease  Score 0- Within normal limits for troponin levels  Interpretation  Risk of adverse outcome  Heart Score: 6  Total Score 4-6- Adverse Outcome Risk 20.3% - Supports admission with standard rule-out management -serial troponins and stress testing          ED to Inpatient Handoff:    Discussed with Dr. Comer  Patient accepted for Observation Stay  Pending studies include none  Code Status: Not Addressed[SM1.1]           New Prescriptions    No medications on file[AH1.1]       Final diagnoses:   Acute chest pain   Coronary artery disease involving native coronary artery of native heart without angina pectoris[SM1.1]     This document serves as a record of the services and decisions personally performed and made by Abiel Godfrey MD. It  was created on HIS/HER behalf by Darlene Veras, a trained medical scribe. The creation of this document is based the provider's statements to the medical scribe.  Darlene Veras 11:45 AM 5/17/2018    Provider:   The information in this document, created by the medical scribe for me, accurately reflects the services I personally performed and the decisions made by me. I have reviewed and approved this document for accuracy prior to leaving the patient care area.  Abiel Godfrey MD 11:45 AM 5/17/2018 5/17/2018   [facility] EMERGENCY DEPARTMENT[AH1.1]     Abiel Godfrey MD  05/17/18 1916  [SM1.4]     Revision History        User Key Date/Time User Provider Type Action    > SM1.4 5/17/2018  7:16 PM Abiel Godfrey MD Physician Sign     SM1.3 5/17/2018  7:11 PM Abiel Godfrey MD Physician      SM1.2 5/17/2018  7:10 PM Abiel Godfrey MD Physician      SM1.1 5/17/2018  7:08 PM Abiel Godfrey MD Physician      [N/A] 5/17/2018  1:41 PM Abiel Godfrey MD Physician Share     [N/A] 5/17/2018 12:01 PM Darlene Veras Share     1.1 5/17/2018 11:52 AM Darlene Veras            Progress Notes by Neyda Isbell RN at 5/17/2018  5:28 PM     Author:  Neyda Isbell RN Service:  (none) Author Type:  Registered Nurse    Filed:  5/17/2018  5:30 PM Date of Service:  5/17/2018  5:28 PM Creation Time:  5/17/2018  5:28 PM    Status:  Signed :  Neyda Isbell RN (Registered Nurse)         Pt c/o of chest pain, rates at 2/10 on pain scale, mid sternal, was having back pain prior to chest pain starting. Admin prn PO Nitro, with complete relief of chest pain. EKG completed, see chart. BP decreased slightly with Nitro admin. Pt now receiving 1 unit blood products. Tolerating well. Up in chair eating dinner meal. VSS, temp 99.1 prior to blood starting. Will continue to monitor. Neyda Isbell RN[ET1.1]       Revision History        User Key Date/Time User Provider Type Action    > ET1.1  5/17/2018  5:30 PM Neyda Isbell RN Registered Nurse Sign            Progress Notes by Saige Garza RN at 5/17/2018  5:19 PM     Author:  Plasek, Saige, RN Service:  Nursing Author Type:  Registered Nurse    Filed:  5/17/2018  5:20 PM Date of Service:  5/17/2018  5:19 PM Creation Time:  5/17/2018  5:19 PM    Status:  Signed :  Saige Garza RN (Registered Nurse)         Skin assessment completed with Sophie Isbell RN. Agree with her assessment and interventions.[TP1.1]     Revision History        User Key Date/Time User Provider Type Action    > TP1.1 5/17/2018  5:20 PM Saige Garza RN Registered Nurse Sign            Progress Notes by Neyda Isbell RN at 5/17/2018  3:47 PM     Author:  Neyda Isbell RN Service:  (none) Author Type:  Registered Nurse    Filed:  5/17/2018  3:48 PM Date of Service:  5/17/2018  3:47 PM Creation Time:  5/17/2018  3:47 PM    Status:  Signed :  Neyda Isbell RN (Registered Nurse)         Skin affirmation note    This RN and JONATHAN Kim  completed full skin assessment, James score and James interventions. This writer agrees with the initial skin assessment findings.  Neyda Isbell RN[ET1.1]         Revision History        User Key Date/Time User Provider Type Action    > ET1.1 5/17/2018  3:48 PM Nedya Isbell RN Registered Nurse Sign            ED Notes by Brittany Lee RN at 5/17/2018  1:59 PM     Author:  Brittany Lee RN Service:  (none) Author Type:  Registered Nurse    Filed:  5/17/2018  2:00 PM Date of Service:  5/17/2018  1:59 PM Creation Time:  5/17/2018  2:00 PM    Status:  Signed :  Brittany Lee RN (Registered Nurse)         Patient has  [place] to Observation  order. Patient has been given Observation brochure  What does Observation mean to me .  Patient has been given the opportunity to ask questions about observation status and their plan of care.  Brittany Lee RN[JP1.1]             Revision History        User  Key Date/Time User Provider Type Action    > JP1.1 5/17/2018  2:00 PM Brittany Lee RN Registered Nurse Sign            ED Notes by Tc Rowland RN at 5/17/2018  1:37 PM     Author:  Tc Rowland RN Service:  (none) Author Type:  Registered Nurse    Filed:  5/17/2018  1:38 PM Date of Service:  5/17/2018  1:37 PM Creation Time:  5/17/2018  1:38 PM    Status:  Signed :  Tc Rowland RN (Registered Nurse)         Resting comfortably. Pain is improved, no chest pain, no other needs currently. Call light within reach. Pt encouraged to call if anything changes or other needs arise.[DL1.1]      Revision History        User Key Date/Time User Provider Type Action    > DL1.1 5/17/2018  1:38 PM Tc Rowland RN Registered Nurse Sign            ED Notes by Kassie Daniels at 5/17/2018 10:35 AM     Author:  Kassie Daniels Service:  (none) Author Type:  [facility]    Filed:  5/17/2018 10:35 AM Date of Service:  5/17/2018 10:35 AM Creation Time:  5/17/2018 10:35 AM    Status:  Signed :  Kassie Daniels ([facility]         Bed: ED22  Expected date:   Expected time:   Means of arrival: Ambulance  Comments:  Ines     Revision History        User Key Date/Time User Provider Type Action    > MH1.1 5/17/2018 10:35 AM Kassie Daniels [facility] [place]            Progress Notes by Pricilla Del Rio RN at 5/16/2018  9:00 AM     Author:  Pricilla Del Rio RN Service:  (none) Author Type:  Registered Nurse    Filed:  5/16/2018  1:51 PM Encounter Date:  5/16/2018 Status:  Signed    :  Pricilla Del Rio RN (Registered Nurse)           Infusion Nursing Note:  Ines Davisormick presents today for[JS1.1] Lab draw and IV support for weakness[JS1.2].    Patient seen by provider today:[JS1.1] No[JS1.2]   present during visit today:[JS1.1] Not Applicable.[JS1.2]    Note:[JS1.1] Pt was seen in ED yesterday for weakness.  She continues to feel weak[JS1.2] and shaky[JS1.3] and[JS1.2] to[JS1.3] have difficulty eating and drinking because  of nausea.[JS1.2]    Intravenous Access:[JS1.1]  Labs drawn without difficulty.  Implanted Port.[JS1.2]    Treatment Conditions:[JS1.1]  Lab Results   Component Value Date    HGB 9.2 05/16/2018     Lab Results   Component Value Date    WBC 9.3 05/16/2018      Lab Results   Component Value Date    ANEU 5.2 05/16/2018     Lab Results   Component Value Date     05/16/2018       Because of patient's weakness and nausea, IV meds and fluids are given today.[JS1.2]        Post Infusion Assessment:[JS1.1]  Patient tolerated infusion without incident.  Pt states that she feels less shaky after infusions.  She did eat some soup while in the infusion clinic.  Access discontinued per protocol.[JS1.3]    Discharge Plan:[JS1.1]   Patient discharged in stable condition accompanied by: sister.  Departure Mode: Ambulatory.  Pt instructed to call if she is not eating and drinking and/or if she continues to feel shaky.[JS1.3]    Pricilla Del Rio RN[JS1.1]                           Revision History        User Key Date/Time User Provider Type Action    > JS1.3 5/16/2018  1:51 PM Pricilla Del Rio RN Registered Nurse Sign     JS1.2 5/16/2018  9:46 AM Pricilla Del Rio RN Registered Nurse      JS1.1 5/16/2018  9:22 AM Pricilla Del Rio, RN Registered Nurse                   Procedure Notes     No notes of this type exist for this encounter.      Progress Notes - Therapies (Notes from 05/15/18 through 05/18/18)     No notes of this type exist for this encounter.

## 2018-05-17 NOTE — PLAN OF CARE
"Problem: Cardiac Output Decreased (Adult)  Goal: Identify Related Risk Factors and Signs and Symptoms  Related risk factors and signs and symptoms are identified upon initiation of [facility] [employer] Clinical Practice Guideline (CPG).  [place] [facility] [place]    Patient admitted to room 2310 at approximately 1445 via cart from emergency room. Patient was accompanied by transport tech.     Verbal SBAR report received from RN prior to patient arrival.     Patient ambulated to bed with stand-by assist. Patient alert and oriented X 3. The patient is not having any pain.  . Admission vital signs: Blood pressure 143/69, pulse 98, resp. rate 16, height 1.626 m (5' 4\"), weight 74.3 kg (163 lb 12.8 oz), SpO2 93 %, not currently breastfeeding. Patient was oriented to plan of care, call light, bed controls, tv, telephone, bathroom and visiting hours.     Risk Assessment    The following safety risks were identified during admission: none. Yellow risk band applied: NO.     Skin Initial Assessment    This writer will report to evening RN caring for this patient that skin assessment was not completed.              Jane Lima        "

## 2018-05-17 NOTE — ED NOTES
Patient has  [place] to Observation  order. Patient has been given Observation brochure  What does Observation mean to me .  Patient has been given the opportunity to ask questions about observation status and their plan of care.  Brittany Lee RN

## 2018-05-17 NOTE — PROGRESS NOTES
Skin affirmation note    This RN and JONATHAN Kim  completed full skin assessment, James score and James interventions. This writer agrees with the initial skin assessment findings.  Neyda Isbell RN

## 2018-05-17 NOTE — ED PROVIDER NOTES
History   No chief complaint on file.    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  Patient was evaluated 2 days ago in this emergency department for generalized weakness. Lab tests showed her hemoglobin to be 6.5.  She received 1 red blood cell unit and was discharged home.  Yesterday she had her hemoglobin re-tested and it was 9.2 and received IV medications and fluids while at infusion therapy.  Afterwards, she reported to feel better and was discharged home with her sister.  Her last infusion of chemotherapy, IV etoposide, on 5/9/2018.  She reports feeling dizzy and weak after infusion.    Today, while at rest, patient began to feel anterior chest pain moderate pressure.  She reports taking 2 nitroglycerin after symptoms lasted 30-45 minutes prior to arrival of the first responders.  She found relief of her symptoms with the 1st nitroglycerin but took a second one as it was prescribed that way.  She reports she took a 324 mg aspirin this morning prior to arrival.  First responders had her on oxygen when EMS arrived, per EMS, although she is not on oxygen in emergency department.  She denies current chest pain in emergency department and denies symptoms of dizziness, shortness of breath, nausea, and vomiting during chest pain at home.  She described it as more of a tightness in her throat with light-headedness.  She feels her light-headedness is better in emergency department.  Patient denies dizziness, shortness of breath, nausea, vomiting, back, arm, or neck pain, diarrhea, bloody stools, rash, headache, and urinary symptoms.    Problem List:    Patient Active Problem List    Diagnosis Date Noted     Anemia in neoplastic  "disease 05/16/2018     Priority: Medium     Need for prophylactic measure 02/15/2018     Priority: Medium     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) 02/08/2018     Priority: Medium     Anxiety 01/22/2018     Priority: Medium     Morbid obesity (H) 06/29/2017     Priority: Medium     Essential hypertension with goal blood pressure less than 140/90 09/06/2016     Priority: Medium     Senile nuclear cataract 01/22/2016     Priority: Medium     Coronary artery disease involving native coronary artery without angina pectoris 10/29/2015     Priority: Medium     NSTEMI 12/14, stent to Circumflex       Esophageal reflux 12/19/2014     Priority: Medium     Advance Care Planning 10/08/2012     Priority: Medium     Patient does not have an Advance/Health Care Directive (HCD), given \"What is Advance Care Planning?\" flyer. Noris Zuniga October 8, 2012       Hyperlipidemia LDL goal <70 10/31/2010     Priority: Medium     Allergic rhinitis 11/14/2005     Priority: Medium     Problem list name updated by automated process. 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     Coronary artery disease 12/2014     Percutaneous transluminal coronary angioplasty hematoma 12/15/2014       Past Surgical History:    Past Surgical History:   Procedure Laterality Date     APPENDECTOMY  1956     ESOPHAGOSCOPY, GASTROSCOPY, DUODENOSCOPY (EGD), COMBINED N/A 1/30/2018    Procedure: COMBINED ENDOSCOPIC ULTRASOUND, ESOPHAGOSCOPY, GASTROSCOPY, DUODENOSCOPY (EGD), FINE NEEDLE ASPIRATE/BIOPSY;   EUS;  Surgeon: Carlos Fletcher MD;  Location:  GI     EYE SURGERY       INSERT PORT VASCULAR ACCESS N/A 2/12/2018    Procedure: INSERT PORT VASCULAR ACCESS;  Port-a-Cath Placement;  Surgeon: Carlos Johnson MD;  Location: [facility] OR     PHACOEMULSIFICATION WITH STANDARD INTRAOCULAR LENS IMPLANT Left " 1/25/2016    Procedure: PHACOEMULSIFICATION WITH STANDARD INTRAOCULAR LENS IMPLANT;  Surgeon: Julio César Wallace MD;  Location: [facility] OR     PHACOEMULSIFICATION WITH STANDARD INTRAOCULAR LENS IMPLANT Right 2/22/2016    Procedure: PHACOEMULSIFICATION WITH STANDARD INTRAOCULAR LENS IMPLANT;  Surgeon: Julio César Wallace MD;  Location: [facility] OR     SURGICAL HISTORY OF -   1961    right hand surgery      TONSILLECTOMY & ADENOIDECTOMY  1967     VASCULAR SURGERY  02/12/2018    PortaCath       Family History:    Family History   Problem Relation Age of Onset     CANCER Brother      HEART DISEASE Brother      CANCER Mother      Respiratory Father      HEART DISEASE Father      MI       Social History:  Marital Status:   [5]  Social History   Substance Use Topics     Smoking status: Former Smoker     Packs/day: 0.50     Types: Cigarettes     Quit date: 12/13/2014     Smokeless tobacco: Never Used     Alcohol use No        Medications:      Acetaminophen (TYLENOL PO)   albuterol (PROAIR HFA/PROVENTIL HFA/VENTOLIN HFA) 108 (90 BASE) MCG/ACT Inhaler   aspirin EC 81 MG EC tablet   atorvastatin (LIPITOR) 40 MG tablet   IBUPROFEN PO   lidocaine-prilocaine (EMLA) cream   lisinopril (PRINIVIL/ZESTRIL) 5 MG tablet   LORazepam (ATIVAN) 0.5 MG tablet   metoprolol (TOPROL-XL) 50 MG 24 hr tablet   Multiple Vitamins-Minerals (MULTIVITAMIN ADULT) TABS   nitroglycerin (NITROSTAT) 0.4 MG SL tablet   prochlorperazine (COMPAZINE) 10 MG tablet   ranitidine (ZANTAC) 75 MG tablet         Review of Systems   Constitutional: Positive for appetite change and fatigue. Negative for fever.   Respiratory: Positive for chest tightness. Negative for shortness of breath.    Cardiovascular: Positive for chest pain.   Gastrointestinal: Negative for abdominal pain, blood in stool, diarrhea, nausea and vomiting.   Genitourinary: Negative for dysuria, frequency and urgency.   Musculoskeletal: Negative for back pain and neck pain.   Skin: Negative for rash.    Neurological: Negative for dizziness, weakness and headaches.       Physical Exam        Physical Exam   Constitutional: No distress.   HENT:   Mouth/Throat: Oropharynx is clear and moist.   Eyes: Conjunctivae are normal.   Neck: Neck supple.   Cardiovascular: Normal rate.  Exam reveals no gallop and no friction rub.    No murmur heard.  Pulmonary/Chest: Effort normal and breath sounds normal. No respiratory distress. She has no wheezes. She has no rales.   Abdominal: Soft. Bowel sounds are normal. She exhibits no distension. There is no tenderness. There is no rebound.   Musculoskeletal: She exhibits no edema.   Neurological: She is alert. She exhibits normal muscle tone.   Skin: No rash noted. She is not diaphoretic.     Pulmonary/Chest: Clear to ausculation.  Cardiovascular: 3/6 systolic murmur at sternal boarder and apex  Abdomen: Soft, non-tender  Musculoskeletal: No lower extremities edema  Neurological: Normal motor strength.    ED Course     ED Course     Procedures                  EKG Interpretation:      Interpreted by Abiel Godfrey MD  While in the ambulance, an EKG was completed at 9:59 AM.  I read it in the emergency department upon arrival.  Her sinus rhythm was normal at 82 beats per minute, no axis changes, no ST changes, T-wave flatting/inversion seen in V2, normal R progression, no Q-waves. Intervals appear normal.         EKG Interpretation:      Interpreted by Abiel Godfrey  Time reviewed: 10:45 AM  Symptoms at time of EKG: None   Rhythm: normal sinus at 81 beats per minute  Rate: Normal  Axis: Normal  Ectopy: none  Conduction: normal  ST Segments/ T Waves: No ST change. T-wave inversion in V2, biphasic V3  Q Waves: none  Comparison to prior: December 2014 T-wave intervals appears new in V2.  No other changes        Read at 10:45 AM. Her sinus rythum was normal at 81 beats per minute with normal axis and normal ST change no T-wave eversion on V2, normal R progression, no Q wave, normal  conduction. Comparison: From Decemember 2014, T-wave interval appears new in V2 no other changes.    Critical Care time:  none               Results for orders placed or performed during the hospital encounter of 05/17/18   Chest XR,  PA & LAT    Narrative    XR CHEST 2 VW 5/17/2018 12:10 PM    HISTORY: Pain.    COMPARISON: 2/12/2018.      Impression    IMPRESSION: 2 views of the chest show no acute cardiopulmonary disease  and no significant change.  A PowerPort catheter is again seen in  place.    ALINA MATTHEW MD   CBC with platelets differential   Result Value Ref Range    WBC 14.3 (H) 4.0 - 11.0 10e9/L    RBC Count 2.74 (L) 3.8 - 5.2 10e12/L    Hemoglobin 8.0 (L) 11.7 - 15.7 g/dL    Hematocrit 23.5 (L) 35.0 - 47.0 %    MCV 86 78 - 100 fl    MCH 29.2 26.5 - 33.0 pg    MCHC 34.0 31.5 - 36.5 g/dL    RDW 16.7 (H) 10.0 - 15.0 %    Platelet Count 79 (L) 150 - 450 10e9/L    Diff Method Automated Method     % Neutrophils 57.5 %    % Lymphocytes 23.6 %    % Monocytes 15.8 %    % Eosinophils 0.1 %    % Basophils 0.5 %    % Immature Granulocytes 2.5 %    Absolute Neutrophil 8.2 1.6 - 8.3 10e9/L    Absolute Lymphocytes 3.4 0.8 - 5.3 10e9/L    Absolute Monocytes 2.3 (H) 0.0 - 1.3 10e9/L    Absolute Eosinophils 0.0 0.0 - 0.7 10e9/L    Absolute Basophils 0.1 0.0 - 0.2 10e9/L    Abs Immature Granulocytes 0.4 0 - 0.4 10e9/L   Comprehensive metabolic panel   Result Value Ref Range    Sodium 139 133 - 144 mmol/L    Potassium 3.5 3.4 - 5.3 mmol/L    Chloride 106 94 - 109 mmol/L    Carbon Dioxide 26 20 - 32 mmol/L    Anion Gap 7 3 - 14 mmol/L    Glucose 101 (H) 70 - 99 mg/dL    Urea Nitrogen 15 7 - 30 mg/dL    Creatinine 0.48 (L) 0.52 - 1.04 mg/dL    GFR Estimate >90 >60 mL/min/1.7m2    GFR Estimate If Black >90 >60 mL/min/1.7m2    Calcium 8.6 8.5 - 10.1 mg/dL    Bilirubin Total 0.6 0.2 - 1.3 mg/dL    Albumin 3.3 (L) 3.4 - 5.0 g/dL    Protein Total 5.8 (L) 6.8 - 8.8 g/dL    Alkaline Phosphatase 97 40 - 150 U/L    ALT 13 0 - 50 U/L     AST 11 0 - 45 U/L   Troponin I   Result Value Ref Range    Troponin I ES <0.015 0.000 - 0.045 ug/L   D dimer quantitative   Result Value Ref Range    D Dimer 0.3 0.0 - 0.50 ug/ml FEU         Medications - No data to display  10:25 AM Patient Assessed.  Assessments & Plan (with Medical Decision Making)     MDM: Ines Pickard is a 77 year old female who presented with a history of coronary disease with stenting 5 years ago on anterior chest pain at rest that lasted at least 30-45 minutes and was relieved with nitroglycerin at home.  She also used aspirin.  She had no pain in the emergency department.   her EKG both in the ambulance and in the emergency department demonstrated T-wave inversion in the initial precordial leads.  No ST change.  Her vital signs and cardiovascular exam were reassuring.  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  Her d-dimer was negative.  Radial pulses are symmetric and lungs are clear to auscultation.  Her laboratory testing including troponin are normal.  Heart score is 6 suggestive of more close evaluation and I discussed with Dr. Comer who accepted her for observation to perform serial troponins and stress testing tomorrow.    I have reviewed the nursing notes.    I have reviewed the findings, diagnosis, plan and need for follow up with the patient.     HEART Score  Background  Calculates the overall risk of adverse event in patient's presenting with chest pain.  Based on 5 criteria (each assigned 0-2 points) including suspiciousness of history, EKG, age, risk factors and troponin.    Data  77 year old female  has Allergic rhinitis; Hyperlipidemia LDL goal <70; Advance Care Planning; Esophageal reflux; Coronary artery disease involving native coronary artery without angina pectoris; Senile nuclear cataract; Essential hypertension with goal blood pressure less than 140/90; Morbid obesity (H); Anxiety; Small cell carcinoma of left  lung (H); Need for prophylactic measure; Anemia in neoplastic disease; and Chest pain on her problem list.   reports that she quit smoking about 3 years ago. Her smoking use included Cigarettes. She smoked 0.50 packs per day. She has never used smokeless tobacco.  family history includes CANCER in her brother and mother; HEART DISEASE in her brother and father; Respiratory in her father.  Lab Results   Component Value Date    TROPI <0.015 05/17/2018     Criteria   0-2 points for each of 5 items (maximum of 10 points):  Score 1- History moderately suspicious for coronary syndrome  Score 1- EKG with Non-specific repolarization disturbance  Score 2- Age 65 years or older  Score 2- Three or more risk factors for or history of atherosclerotic disease  Score 0- Within normal limits for troponin levels  Interpretation  Risk of adverse outcome  Heart Score: 6  Total Score 4-6- Adverse Outcome Risk 20.3% - Supports admission with standard rule-out management -serial troponins and stress testing          ED to Inpatient Handoff:    Discussed with Dr. Comer  Patient accepted for Observation Stay  Pending studies include none  Code Status: Not Addressed           New Prescriptions    No medications on file       Final diagnoses:   Acute chest pain   Coronary artery disease involving native coronary artery of native heart without angina pectoris     This document serves as a record of the services and decisions personally performed and made by Abiel Godfrey MD. It was created on HIS/HER behalf by Darlene Veras, a trained medical scribe. The creation of this document is based the provider's statements to the medical scribe.  Darlene Veras 11:45 AM 5/17/2018    Provider:   The information in this document, created by the medical scribe for me, accurately reflects the services I personally performed and the decisions made by me. I have reviewed and approved this document for accuracy prior to leaving the patient care  area.  Abiel Godfrey MD 11:45 AM 5/17/2018 5/17/2018   [facility] EMERGENCY DEPARTMENT     Abiel Godfrey MD  05/17/18 1916

## 2018-05-17 NOTE — H&P
Physician Attestation   I, Berhane Comer, was present with the medical student who participated in the service and in the documentation of the note.  I have verified the history and personally performed the physical exam and medical decision making.  I agree with the assessment and plan of care as documented in the note.      I personally reviewed vital signs, medications, labs and imaging.    EXAM:      GENERAL APPEARANCE: healthy, alert and no distress     HENT: ear canals and TM's without perforation, bulging, or retraction. Nose and mouth without ulceration or lesions     NECK: no adenopathy, no asymmetry, masses, or scars and thyroid without enlargement or nodules to palpation     RESP: lungs clear to auscultation - no rales, rhonchi or wheezes     CV: regular rates and rhythm, normal S1 S2, no S3 or S4 and no murmur, click or rub      Abdomen: soft, nontender, no liver or spleen enlargement, no masses, bowel sounds active     MS: extremities normal- no gross deformities noted, no evidence of inflammation in joints, full ROM in all extremities.     SKIN: no suspicious lesions or rashes     LYMPHATICS: No axillary, cervical, inguinal, or supraclavicular nodes   NEURO: Normal strength and tone, sensory exam grossly normal, mentation intact and speech normal     PSYCH: affect normal/bright       EKG completely unchanged from 5 years ago.  Repeat EKG again unchanged.          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     She presented to the ED today after chest pain/pressure that started at 8 am this morning. It lasted for about 20 minutes and began at rest. She took 1 nitro and the pain went away. She took a second nitro as well because she wanted to be safe, but the pain never returned. The pain was located in the center of her chest and radiated to her neck. No nausea, vomiting, or shortness of breath. She did have some lightheadedness. She presented to the ED by EMS and had a 324 mg Aspirin prior to arrival and was on oxygen. In the ED, troponin was negative, EKG negative, D-dimer negative, WBC 14.3, CXR with no acute abnormalities.     She reports that she had a heart attack 6 years ago and had one stent placed. She has not had chest pain since then.     Her history is also complicated by small cell lung cancer. She is on chemotherapy, last had a dose on 5/10. Her hemoglobin has been dropping and was 6.5 on 5/15 in the ED. She received 1 unit of PRBCs and her repeat hemoglobin was 9.2 on 5/16. She reports that her stools are always dark. Also, two days ago fecal occult blood was negative on 5/15. The drop in hemoglobin from her base around 9 has been attributed to her chemotherapy. Has had normal B12/folate. Normocytic. She does take one ibuprofen 200 mg every day for pain with tylenol though she admittedly reports that doctors have told her not to take any. She feels weak and fatigued today. She has a history of GERD and takes Ranitidine daily.     Review of Systems   The 10 point Review of Systems is negative other than noted in the HPI or here.     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     Coronary artery disease 12/2014    Inferior STEMI, stent to Circumflex     Percutaneous transluminal coronary angioplasty hematoma 12/15/2014       Past Surgical History   Past  Surgical History:   Procedure Laterality Date     APPENDECTOMY  1956     ESOPHAGOSCOPY, GASTROSCOPY, DUODENOSCOPY (EGD), COMBINED N/A 1/30/2018    Procedure: COMBINED ENDOSCOPIC ULTRASOUND, ESOPHAGOSCOPY, GASTROSCOPY, DUODENOSCOPY (EGD), FINE NEEDLE ASPIRATE/BIOPSY;   EUS;  Surgeon: Carlos Fletcher MD;  Location:  GI     EYE SURGERY       INSERT PORT VASCULAR ACCESS N/A 2/12/2018    Procedure: INSERT PORT VASCULAR ACCESS;  Port-a-Cath Placement;  Surgeon: Carlos Johnson MD;  Location: [facility] OR     PHACOEMULSIFICATION WITH STANDARD INTRAOCULAR LENS IMPLANT Left 1/25/2016    Procedure: PHACOEMULSIFICATION WITH STANDARD INTRAOCULAR LENS IMPLANT;  Surgeon: Julio César Wallace MD;  Location: [facility] OR     PHACOEMULSIFICATION WITH STANDARD INTRAOCULAR LENS IMPLANT Right 2/22/2016    Procedure: PHACOEMULSIFICATION WITH STANDARD INTRAOCULAR LENS IMPLANT;  Surgeon: Julio César Wallace MD;  Location: [facility] OR     SURGICAL HISTORY OF -   1961    right hand surgery      TONSILLECTOMY & ADENOIDECTOMY  1967     VASCULAR SURGERY  02/12/2018    PortaCath        Social History   Social History   Substance Use Topics     Smoking status: Former Smoker     Packs/day: 0.50     Types: Cigarettes     Quit date: 12/13/2014     Smokeless tobacco: Never Used     Alcohol use No       Family History   Family History   Problem Relation Age of Onset     CANCER Brother      HEART DISEASE Brother      CANCER Mother      Respiratory Father      HEART DISEASE Father      MI       Prior to Admission Medications   Prior to Admission Medications   Prescriptions Last Dose Informant Patient Reported? Taking?   Acetaminophen (TYLENOL PO) 5/17/2018 at am Self Yes Yes   Sig: Take 1,000 mg by mouth every 6 hours as needed   IBUPROFEN PO Past Week at Unknown time  Yes Yes   Sig: Take 200 mg by mouth every 4 hours as needed for moderate pain   LORazepam (ATIVAN) 0.5 MG tablet 5/17/2018 at am  No Yes   Sig: Take 1 tablet (0.5 mg) by mouth 2 times daily as needed  for anxiety   Multiple Vitamins-Minerals (MULTIVITAMIN ADULT) TABS 5/16/2018 at am  Yes Yes   Sig: Take by mouth daily    albuterol (PROAIR HFA/PROVENTIL HFA/VENTOLIN HFA) 108 (90 BASE) MCG/ACT Inhaler 5/17/2018 at am  No Yes   Sig: Inhale 2 puffs into the lungs 4 times daily   aspirin EC 81 MG EC tablet 5/17/2018 at am  No Yes   Sig: Take 1 tablet (81 mg) by mouth daily   atorvastatin (LIPITOR) 40 MG tablet 5/16/2018 at am  No Yes   Sig: Take 1 tablet (40 mg) by mouth daily   lidocaine-prilocaine (EMLA) cream   No No   Sig: Apply topically as needed for moderate pain   lisinopril (PRINIVIL/ZESTRIL) 5 MG tablet 5/16/2018 at am  No Yes   Sig: Take 1 tablet (5 mg) by mouth daily   metoprolol (TOPROL-XL) 50 MG 24 hr tablet 5/16/2018 at am  No Yes   Sig: Take 1 tablet (50 mg) by mouth daily   nitroglycerin (NITROSTAT) 0.4 MG SL tablet 5/17/2018 at am  No Yes   Sig: Place 1 tablet (0.4 mg) under the tongue every 5 minutes as needed for chest pain Maximum of 3 doses in 15 minutes   prochlorperazine (COMPAZINE) 10 MG tablet Past Month at Unknown time  No Yes   Sig: Take 0.5 tablets (5 mg) by mouth every 6 hours as needed (Nausea/Vomiting)   ranitidine (ZANTAC) 75 MG tablet 5/16/2018 at hs  No Yes   Sig: Take 1 tablet (75 mg) by mouth 2 times daily      Facility-Administered Medications: None     Allergies   Allergies   Allergen Reactions     Amoxicillin GI Disturbance     Tetracycline Other (See Comments)     Yeast infection     Nickel Rash       Physical Exam   Vital Signs:   Temp src: Oral BP: 125/72 Pulse: 100 Heart Rate: 79 Resp: 16 SpO2: 95 % O2 Device: None (Room air)    Weight: 166 lbs 0 oz    General Appearance: Alert, sitting up, in no acute distress  Eyes: Normal external eyes, PERRLA  HEENT: Normocephalic, atraumatic, normal external ears and nose. Throat and oral mucosa without lesions, pink, wet oral mucosa.   Respiratory: Lungs clear to auscultation bilaterally. No wheezes rales or rhonchi.   Cardiovascular:  Regular rate and rhythm, normal S1 and S2. No S3, S4 murmurs, rubs or gallops  GI: Soft, non-distended, non tender, no palpable organomegaly  Skin: No notable rashes  Musculoskeletal: 4/5 strength in upper and lower extremities bilaterally  Neurologic: No focal neurological abnormalities. CN II-XII intact, hoarse voice.   Psychiatric: Mood appropriate. Good insight and judgement.     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    # Chest pain, likely of cardiac etiology  - Chest pain seems cardiac with substernal chest pain radiating to the neck that occurred at rest and was relieved by 1 nitro. Stable now with no recurrence of the pain. Took Aspirin 324 mg before presenting to the ED. Tropon negative, d-dimer negative, EKG negative, CXR negative. Discussed with ED and though they would normally discharge a patient with these results to home for outpatient stress test, we decided it was best to admit this patient to observation given her complex medical history as below.   - Plan is for serial troponin's (total of 3) followed by stress test inpatient tomorrow.   - Patient had a second episode of sub sternal chest pain while up on the floor. EKG again normal and the pain again resolved within minutes of nitroglycerin. This may be true unstable angina, but could also be esophageal reflux and spasm. Will start heparin. Consider nitro drip and transfer if troponins elevated or if pain recurs.    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 Last saw Oncology 5/7/18. Has normal B12/folate. Per oncology note, transfuse if hb less than 8.5-9 or symptoms. Hemoglobin 8.0 today. Feels weak and fatigued.   - Will transfuse 1 unit of PRBCs overnight.     # Small cell lung cancer, left lung with mediastinal lymph node and adrenal metastasis, receiving chemotherapy of carbo/-16  With neulasta. First started palliative chemotherapy 2/2018. Has had good response on PET after 3 cycles and treatment is continuing. No radiation or surgery.   - Takes Ativan, compazine and acetaminophen available prn for symptom control.    # Esophageal reflux  - No epigastric pain. Says chest pain this morning felt different from her usual reflux pain. Continue Ranitidine  - Encouraged to stop taking Ibuprofen or any NSAIDS all together.       # Pain Assessment:  Current Pain Score 5/15/2018   Patient currently in pain? -   Pain score (0-10) 0   Pain location -   Pain descriptors -   Ines lisa pain level was assessed and she currently denies pain.      Diet:    Fluids: None  Lines: Peripheral  Urinary Catheter: None needed  DVT Prophylaxis: Padua score of 4. Pharmacologic prophylaxis indicated. However, given low hemoglobin without definite source will go with mechanical prophylaxis.   Code Status: DNR / DNI      [unfilled]   The patient was discussed with Dr. Nahomi Vazquez  Medical Student

## 2018-05-17 NOTE — ED NOTES
Resting comfortably. Pain is improved, no chest pain, no other needs currently. Call light within reach. Pt encouraged to call if anything changes or other needs arise.

## 2018-05-17 NOTE — IP AVS SNAPSHOT
` ` Patient Information     Patient Name Sex     Ines Pickard ([id number]) Female 1940       Room Bed    2310 [id number]      Patient Demographics     Address Phone    [address]  [place] [address] [phone] (Home)  none (Work)  none (Mobile)      Patient Ethnicity & Race     Ethnic Group Patient Race    American White      Emergency Contact(s)     Name Relation Home Work Mobile    Berhane Pickard Other [phone]      Joseph Pickard Other [phone]      PickardFranklin dejesus Other [phone]      Paige Diana Sister [phone]    Liyah Raphael Relative [phone]      Ashlyn Coppola  Daughter   [phone]      Documents on File        Status Date Received Description       Documents for the Patient    Insurance Card  11/10/04     Face Sheet  () 11/10/04     Insurance Card  05     Insurance Card  05     Face Sheet  () 05     Face Sheet  () 07     Insurance Card Received 16 bcbs    Face Sheet Received () 10/22/09     External Medication Information Consent Accepted () 10/22/09     Privacy Notice - [place] Received 11     Patient ID Scan Refused 17     Consent for Services - Hospital/Clinic Received () 10/25/10     External Medication Information Consent Accepted () 11     Consent for Services - Hospital/Clinic Received () 10/10/11     External Medication Information Consent Accepted 10/08/12     Consent for Services - Hospital/Clinic Received () 10/08/12     Consent for EHR Access  13 Copied from existing Consent for services - C/HOD collected on 10/08/2012    [facility] Specified Other       Consent for Services - Hospital/Clinic Received () 13     External Medication Information Consent Accepted 13     Physical Therapy Certification Received 14 Medicare cert -2014 elec signed by MD    Consent for Services - Hospital/Clinic  Received () 14     Insurance Card Received 17 bc    Consent for Services - Hospital/Clinic Received () 16     Consent for Services - Hospital/Clinic       Consent for Services/Privacy Notice - Hospital/Clinic Received () 16     Consent for Services/Privacy Notice - Hospital/Clinic Received () 17     Advance Directives and Living Will Received 17 Health Care Directive 13    Advance Directives and Living Will Not Received  Validation of AD 13    Care Everywhere Prospective Auth Received 17     Insurance Card Received 18 bcbs    Consent to Communicate Received 18     Consent for Services/Privacy Notice - Hospital/Clinic Received 18     Consent for Services - Hospital and Clinic Received 18     HIE Auth Received 18     Patient Photo   Photo of Patient       Documents for the Encounter    CMS IM for Patient Signature Received 18     Observation Notice Received 18     CMS GARCIA for Patient Signature Received 18     ECG   ECG Report    ECG   ECG Report      Admission Information     Attending Provider Admitting Provider Admission Type Admission Date/Time    Berhane Comer MD Dummer, Michael M, MD Emergency 18  1035    Discharge Date Hospital Service Auth/Cert Status Service Area     General Medicine [facility]    Unit Room/Bed Admission Status       [facility] /231 Admission (Confirmed)       Admission     Complaint    Chest pain, Chest pain      Hospital Account     Name Acct ID Class Status Primary Coverage    Ines Pickard [id number] Observation Open MEDICARE - MEDICARE            Guarantor Account (for Hospital Account #[id number])     Name Relation to Pt Service Area Active? Acct Type    Ines Pickard  FCS Yes Personal/Family    Address Phone          [address] [place] [address] [phone](H)              Coverage  Information (for Hospital Account #[id number])     1. MEDICARE/MEDICARE     F/O Payor/Plan Precert #    MEDICARE/MEDICARE     Subscriber Subscriber #    Ines Pickard [id number]    Address Phone    ATT[employer] CLAIMS  [address]  [place] [address] [phone]          2. BCBS/BCBS COMPREHENSIVE CARE SERV/BLUE LINK     F/O Payor/Plan Precert #    BCBS/BCBS COMPREHENSIVE CARE SERV/BLUE LINK     Subscriber Subscriber #    Ines Pickard [id number]    Address Phone    [address] [address]  [place], [place] [id number] [phone]

## 2018-05-17 NOTE — IP AVS SNAPSHOT
"    [facility] SURGICAL: [phone]                                              INTERAGENCY TRANSFER FORM - PHYSICIAN ORDERS   2018                    Hospital Admission Date: 2018  ELIZABETH LANDA   : 1940  Sex: Female        Attending Provider: Berhane Comer MD     Allergies:  Amoxicillin, Tetracycline, Nickel    Infection:  None   Service:  GENERAL MEDI    Ht:  1.626 m (5' 4\")   Wt:  74.3 kg (163 lb 12.8 oz)   Admission Wt:  75.3 kg (166 lb)    BMI:  28.12 kg/m 2   BSA:  1.83 m 2            Patient PCP Information     Provider PCP Type    R Emanuel Perez MD General      ED Clinical Impression     Diagnosis Description Comment Added By Time Added    Acute chest pain [R07.9] Acute chest pain [R07.9]  Abiel Godfrey MD 2018  1:41 PM    Coronary artery disease involving native coronary artery of native heart without angina pectoris [I25.10] Coronary artery disease involving native coronary artery of native heart without angina pectoris [I25.10]  Abiel Godfrey MD 2018  1:41 PM      Hospital Problems as of 2018              Priority Class Noted POA    Chest pain Medium  2018 Yes      Non-Hospital Problems as of 2018              Priority Class Noted    Allergic rhinitis Medium  2005    Hyperlipidemia LDL goal <70 Medium  10/31/2010    Advance Care Planning Medium  10/8/2012    Esophageal reflux Medium  2014    Coronary artery disease involving native coronary artery without angina pectoris Medium  10/29/2015    Senile nuclear cataract Medium  2016    Essential hypertension with goal blood pressure less than 140/90 Medium  2016    Morbid obesity (H) Medium  2017    Anxiety Medium  2018    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) Medium  2018    Need for prophylactic measure Medium  2/15/2018    Anemia in neoplastic disease Medium  2018      Code Status History     Date Active Date Inactive Code Status Order ID Comments " User Context    12/15/2014  2:50 PM 5/17/2018  3:49 PM Full Code [id number]  Rachel Lane PA-C Outpatient    12/12/2014 11:45 AM 12/15/2014  2:50 PM Full Code [id number]  Mavis Solorio MD Inpatient         Medication Review      CONTINUE these medications which have NOT CHANGED        Dose / Directions Comments    albuterol 108 (90 Base) MCG/ACT Inhaler   Commonly known as:  PROAIR HFA/PROVENTIL HFA/VENTOLIN HFA   Used for:  Acute bronchitis with symptoms > 10 days        Dose:  2 puff   Inhale 2 puffs into the lungs 4 times daily   Quantity:  1 Inhaler   Refills:  2        aspirin 81 MG EC tablet   Used for:  CAD (coronary artery disease)        Dose:  81 mg   Take 1 tablet (81 mg) by mouth daily   Quantity:  90 tablet   Refills:  3        atorvastatin 40 MG tablet   Commonly known as:  LIPITOR   Used for:  Coronary artery disease involving native coronary artery of native heart without angina pectoris        Dose:  40 mg   Take 1 tablet (40 mg) by mouth daily   Quantity:  90 tablet   Refills:  3        IBUPROFEN PO        Dose:  200 mg   Take 200 mg by mouth every 4 hours as needed for moderate pain   Refills:  0        lidocaine-prilocaine cream   Commonly known as:  EMLA   Used for:  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)        Apply topically as needed for moderate pain   Quantity:  30 g   Refills:  0        lisinopril 5 MG tablet   Commonly known as:  PRINIVIL/ZESTRIL   Used for:  Essential hypertension with goal blood pressure less than 140/90        Dose:  5 mg   Take 1 tablet (5 mg) by mouth daily   Quantity:  90 tablet   Refills:  2        LORazepam 0.5 MG tablet   Commonly known as:  ATIVAN   Used for:  Anxiety        Dose:  0.5 mg   Take 1 tablet (0.5 mg) by mouth 2 times daily as needed for anxiety   Quantity:  30 tablet   Refills:  5        metoprolol succinate 50 MG 24 hr tablet   Commonly known as:  TOPROL-XL   Used for:  Coronary artery disease involving native coronary artery  of native heart without angina pectoris        Dose:  50 mg   Take 1 tablet (50 mg) by mouth daily   Quantity:  90 tablet   Refills:  3        MULTIVITAMIN ADULT Tabs        Take by mouth daily   Refills:  0        nitroGLYcerin 0.4 MG sublingual tablet   Commonly known as:  NITROSTAT   Used for:  CAD (coronary artery disease)        Dose:  0.4 mg   Place 1 tablet (0.4 mg) under the tongue every 5 minutes as needed for chest pain Maximum of 3 doses in 15 minutes   Quantity:  25 tablet   Refills:  3        prochlorperazine 10 MG tablet   Commonly known as:  COMPAZINE   Used for:  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)        Dose:  5 mg   Take 0.5 tablets (5 mg) by mouth every 6 hours as needed (Nausea/Vomiting)   Quantity:  30 tablet   Refills:  3        ranitidine 75 MG tablet   Commonly known as:  ZANTAC   Used for:  Esophageal reflux        Dose:  75 mg   Take 1 tablet (75 mg) by mouth 2 times daily   Quantity:  30 tablet   Refills:  11        TYLENOL PO        Dose:  1000 mg   Take 1,000 mg by mouth every 6 hours as needed   Refills:  0                  Further instructions from your care team       Transferred to [facility] for semi-urgent angiogram    Your next 10 appointments already scheduled     May 29, 2018  9:00 AM CDT   Level 4 with ROOM 6 [facility] ([facility])    [facility]  [address]  [place] [address]   [phone]            May 29, 2018  9:30 AM CDT   Return Visit with Tracy Miner MD   [facility] ([facility])    [facility]  [address]  [facility] [place] [address]   [phone]            May 30, 2018  1:00 PM CDT   Level 2 with ROOM 4 [facility] ([facility])    [facility]  [address]  [facility] [place] [address]   [phone]            May 31, 2018  1:30 PM CDT   Level 2 with ROOM 9 [facility]  ([facility])    [facility]  [address]  [facility] [address]   [phone]              Statement of Approval     Ordered          05/18/18 1545  I have reviewed and agree with all the recommendations and orders detailed in this document.  EFFECTIVE NOW     Approved and electronically signed by:  Berhane Comer MD

## 2018-05-17 NOTE — IP AVS SNAPSHOT
"    [facility]: [phone]                                              INTERAGENCY TRANSFER FORM - LAB / IMAGING / EKG / EMG RESULTS   2018                    Hospital Admission Date: 2018  ELIZABETH LANDA   : 1940  Sex: Female        Attending Provider: Berhane Comer MD     Allergies:  Amoxicillin, Tetracycline, Nickel    Infection:  None   Service:  GENERAL MEDI    Ht:  1.626 m (5' 4\")   Wt:  74.3 kg (163 lb 12.8 oz)   Admission Wt:  75.3 kg (166 lb)    BMI:  28.12 kg/m 2   BSA:  1.83 m 2            Patient PCP Information     Provider PCP Type    MICHAEL Perez MD General         Lab Results - 3 Days      Troponin I [[id number]]  Resulted: 1853, Result status: Final result    Ordering provider: Berhane Comer MD  18 0001 Resulting lab: [facility]    Specimen Information    Type Source Collected On   Blood  18          Components       Value Reference Range Flag Lab   Troponin I ES <0.015 0.000 - 0.045 ug/L  59   Comment:         The 99th percentile for upper reference range is 0.045 ug/L.  Troponin values   in the range of 0.045 - 0.120 ug/L may be associated with risks of adverse   clinical events.              Anti 10A [[id number]]  Resulted: 1843, Result status: Final result    Ordering provider: Berhane Comer MD  18 0613 Resulting lab: [facility]    Specimen Information    Type Source Collected On   Blood  18          Components       Value Reference Range Flag Lab   Heparin 10A Level 0.17 IU/mL  59   Comment:         Therapeutic Range:                UFH: 0.15-0.35 IU/mL for low                     intensity dosing                     0.30-0.70 IU/mL for high                     intensity dosing for                     DVT and PE  Enoxaparin: If administered              once daily with dose              1.5mg/k.0-2.0 IU/mL              If administered             "  twice daily with dose              1mg/k.60-1.0 IU/mL  This test is not validated for other direct factor X inhibitors (e.g.   rivaroxaban, apixaban, edoxaban, betrixaban, fondaparinux) and should not be   used for monitoring of other medications.              Hemoglobin [[id number]] (Abnormal)  Resulted: 18 0636, Result status: Final result    Ordering provider: Berhane Comer MD  18 0001 Resulting lab: [facility]    Specimen Information    Type Source Collected On   Blood  18 0625          Components       Value Reference Range Flag Lab   Hemoglobin 9.3 11.7 - 15.7 g/dL L 59            Heparin 10a Level [[id number]]  Resulted: 18 0129, Result status: Final result    Ordering provider: Berhane Comer MD  18 7264 Resulting lab: [facility]    Specimen Information    Type Source Collected On   Blood  18 0000          Components       Value Reference Range Flag Lab   Heparin 10A Level 0.15 IU/mL  59   Comment:         Therapeutic Range:                UFH: 0.15-0.35 IU/mL for low                     intensity dosing                     0.30-0.70 IU/mL for high                     intensity dosing for                     DVT and PE  Enoxaparin: If administered              once daily with dose              1.5mg/k.0-2.0 IU/mL              If administered              twice daily with dose              1mg/k.60-1.0 IU/mL  This test is not validated for other direct factor X inhibitors (e.g.   rivaroxaban, apixaban, edoxaban, betrixaban, fondaparinux) and should not be   used for monitoring of other medications.              Troponin I [[id number]]  Resulted: 18 0038, Result status: Final result    Ordering provider: Berhane Comer MD  18 0721 Resulting lab: [facility]    Specimen Information    Type Source Collected On   Blood  18 0000          Components       Value Reference Range Flag  Lab   Troponin I ES <0.015 0.000 - 0.045 ug/L  59   Comment:         The 99th percentile for upper reference range is 0.045 ug/L.  Troponin values   in the range of 0.045 - 0.120 ug/L may be associated with risks of adverse   clinical events.              Lactic acid level STAT for sepsis protocol [[id number]]  Resulted: 05/18/18 0017, Result status: Final result    Ordering provider: Berhane Comer MD  05/18/18 0006 Resulting lab: [facility]    Specimen Information    Type Source Collected On   Blood  05/18/18 0000          Components       Value Reference Range Flag Lab   Lactate for Sepsis Protocol 1.3 0.4 - 1.9 mmol/L  59            Hemoglobin [[id number]] (Abnormal)  Resulted: 05/17/18 2311, Result status: Final result    Ordering provider: Berhane Comer MD  05/17/18 2256 Resulting lab: [facility]    Specimen Information    Type Source Collected On   Blood  05/17/18 2300          Components       Value Reference Range Flag Lab   Hemoglobin 9.6 11.7 - 15.7 g/dL L 59            Troponin I [[id number]]  Resulted: 05/17/18 1923, Result status: Final result    Ordering provider: Abiel Godfrey MD  05/17/18 1528 Resulting lab: [facility]    Specimen Information    Type Source Collected On   Blood  05/17/18 1836          Components       Value Reference Range Flag Lab   Troponin I ES <0.015 0.000 - 0.045 ug/L  59   Comment:         The 99th percentile for upper reference range is 0.045 ug/L.  Troponin values   in the range of 0.045 - 0.120 ug/L may be associated with risks of adverse   clinical events.              D dimer quantitative [[id number]]  Resulted: 05/17/18 1202, Result status: Final result    Ordering provider: Abiel Godfrey MD  05/17/18 1144 Resulting lab: [facility]    Specimen Information    Type Source Collected On   Blood  05/17/18 1105          Components       Value Reference Range Flag Lab   D Dimer 0.3 0.0 - 0.50  ug/ml FEU  59   Comment:         This D-dimer assay is intended for use in conjunction with a clinical pretest   probability assessment model to exclude pulmonary embolism (PE) and deep   venous thrombosis (DVT) in outpatients suspected of PE or DVT. The cut-off   value is 0.5 ug/mL FEU.              Comprehensive metabolic panel [[id number]] (Abnormal)  Resulted: 05/17/18 1137, Result status: Final result    Ordering provider: Abiel Godfrey MD  05/17/18 1042 Resulting lab: [facility]    Specimen Information    Type Source Collected On   Blood  05/17/18 1105          Components       Value Reference Range Flag Lab   Sodium 139 133 - 144 mmol/L  59   Potassium 3.5 3.4 - 5.3 mmol/L  59   Chloride 106 94 - 109 mmol/L  59   Carbon Dioxide 26 20 - 32 mmol/L  59   Anion Gap 7 3 - 14 mmol/L  59   Glucose 101 70 - 99 mg/dL H 59   Urea Nitrogen 15 7 - 30 mg/dL  59   Creatinine 0.48 0.52 - 1.04 mg/dL L 59   GFR Estimate >90 >60 mL/min/1.7m2  59   Comment:  Non  GFR Calc   GFR Estimate If Black >90 >60 mL/min/1.7m2  59   Comment:  African American GFR Calc   Calcium 8.6 8.5 - 10.1 mg/dL  59   Bilirubin Total 0.6 0.2 - 1.3 mg/dL  59   Albumin 3.3 3.4 - 5.0 g/dL L 59   Protein Total 5.8 6.8 - 8.8 g/dL L 59   Alkaline Phosphatase 97 40 - 150 U/L  59   ALT 13 0 - 50 U/L  59   AST 11 0 - 45 U/L  59            Troponin I [[id number]]  Resulted: 05/17/18 1137, Result status: Final result    Ordering provider: Abiel Godfrey MD  05/17/18 1042 Resulting lab: [facility]    Specimen Information    Type Source Collected On   Blood  05/17/18 1105          Components       Value Reference Range Flag Lab   Troponin I ES <0.015 0.000 - 0.045 ug/L  59   Comment:         The 99th percentile for upper reference range is 0.045 ug/L.  Troponin values   in the range of 0.045 - 0.120 ug/L may be associated with risks of adverse   clinical events.              CBC with platelets differential  [[id number]] (Abnormal)  Resulted: 05/17/18 1121, Result status: Final result    Ordering provider: Abiel Godfrey MD  05/17/18 1042 Resulting lab: [facility]    Specimen Information    Type Source Collected On   Blood  05/17/18 1105          Components       Value Reference Range Flag Lab   WBC 14.3 4.0 - 11.0 10e9/L H 59   RBC Count 2.74 3.8 - 5.2 10e12/L L 59   Hemoglobin 8.0 11.7 - 15.7 g/dL L 59   Hematocrit 23.5 35.0 - 47.0 % L 59   MCV 86 78 - 100 fl  59   MCH 29.2 26.5 - 33.0 pg  59   MCHC 34.0 31.5 - 36.5 g/dL  59   RDW 16.7 10.0 - 15.0 % H 59   Platelet Count 79 150 - 450 10e9/L L 59   Diff Method Automated Method   59   % Neutrophils 57.5 %  59   % Lymphocytes 23.6 %  59   % Monocytes 15.8 %  59   % Eosinophils 0.1 %  59   % Basophils 0.5 %  59   % Immature Granulocytes 2.5 %  59   Absolute Neutrophil 8.2 1.6 - 8.3 10e9/L  59   Absolute Lymphocytes 3.4 0.8 - 5.3 10e9/L  59   Absolute Monocytes 2.3 0.0 - 1.3 10e9/L H 59   Absolute Eosinophils 0.0 0.0 - 0.7 10e9/L  59   Absolute Basophils 0.1 0.0 - 0.2 10e9/L  59   Abs Immature Granulocytes 0.4 0 - 0.4 10e9/L  59            CBC with platelets differential [[id number]] (Abnormal)  Resulted: 05/16/18 0936, Result status: Final result    Ordering provider: Tracy Miner MD  05/16/18 09 Resulting lab: [facility]    Specimen Information    Type Source Collected On   Blood  05/16/18 0910          Components       Value Reference Range Flag Lab   WBC 9.3 4.0 - 11.0 10e9/L  59   RBC Count 3.15 3.8 - 5.2 10e12/L L 59   Hemoglobin 9.2 11.7 - 15.7 g/dL L 59   Hematocrit 26.8 35.0 - 47.0 % L 59   MCV 85 78 - 100 fl  59   MCH 29.2 26.5 - 33.0 pg  59   MCHC 34.3 31.5 - 36.5 g/dL  59   RDW 16.8 10.0 - 15.0 % H 59   Platelet Count 106 150 - 450 10e9/L L 59   Diff Method Automated Method   59   % Neutrophils 56.1 %  59   % Lymphocytes 20.9 %  59   % Monocytes 19.1 %  59   % Eosinophils 0.2 %  59   % Basophils 1.2 %  59   % Immature  Granulocytes 2.5 %  59   Absolute Neutrophil 5.2 1.6 - 8.3 10e9/L  59   Absolute Lymphocytes 2.0 0.8 - 5.3 10e9/L  59   Absolute Monocytes 1.8 0.0 - 1.3 10e9/L H 59   Absolute Eosinophils 0.0 0.0 - 0.7 10e9/L  59   Absolute Basophils 0.1 0.0 - 0.2 10e9/L  59   Abs Immature Granulocytes 0.2 0 - 0.4 10e9/L  59            Testing Performed By     Lab - Abbreviation Name Director Address Valid Date Range    59 - Unknown [facility] Unknown [address]  [place] [address] 12/31/14 1006 - Present            Unresulted Labs (24h ago through future)    Start       Ordered    05/19/18 0600  Anti 10A  AM DRAW,   Routine     Question:  Type of Heparin:  Answer:  Unfractionated Heparin    05/18/18 0656         Imaging Results - 3 Days      NM MPI w Lexiscan [[id number]]  Resulted: 05/18/18 1514, Result status: Final result    Ordering provider: Berhane Comer MD  05/17/18 1549 Resulted by: Rubens Neri MD    Performed: 05/18/18 1231 - 05/18/18 1438 Resulting lab: RADIOLOGY RESULTS    Narrative:       GATED MYOCARDIAL PERFUSION SCINTIGRAPHY WITH INTRAVENOUS PHARMACOLOGIC  VASODILATATION LEXISCAN -ONE DAY STUDY     5/18/2018 2:38 PM  ELIZABETH LANDA  77 years  Female  1940.    Indication/Clinical History: Chest pain    Impression  1.  Myocardial perfusion imaging using single isotope technique  demonstrated 2 separate territories of ischemia. There is a moderate  area of anterior/anterolateral ischemia of moderate intensity. There  is a separate small to moderate inferolateral area of ischemia of  moderate intensity. 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 I have a page out to the ordering physician to notify  them of the results of the study.  2. Gated images demonstrated normal wall motion with a calculated  ejection fraction of 54%.    3. There is no prior nuclear study available for comparison.    Procedure  Pharmacologic stress testing was performed with Lexiscan  at a rate of  0.08 mg/ml rapid bolus injection, for 15 seconds, 0.4 mg/5ml  intravenously. Low-level exercise was not performed along with the  vasodilator infusion.  The heart rate was 90 at baseline and leia to  129 beats per minute during the Lexiscan infusion. The rest blood  pressure was 119/74 mmHg and was 124/70 mm Hg during Lexiscan  infusion. The patient experienced mild dyspnea  during the test.    Myocardial perfusion imaging was performed at rest, approximately 45  minutes after the injection intravenously of 10.0 mCi of Tc-99m  Myoview. At peak pharmacologic effect, 10-20 seconds after Lexiscan,   the patient was injected intravenously with 32.6 mCi of  Tc-99m  Myoview. The post-stress tomographic imaging was performed  approximately 60 minutes after stress.    EKG Findings  The resting EKG demonstrated sinus rhythm with PACs. The stress EKG  demonstrated no evidence for ischemia with Lexiscan infusion.    Tomographic Findings  Overall, the study quality is fair . There are 2 separate defects  noted on the present scan. The rest tomographic images have somewhat  patchy uptake with a small area of decreased tracer uptake in the  lateral wall. The stress tomographic images have a moderate area of  decreased tracer uptake in the anterior/anterolateral wall, and a  separate small to moderate area of decreased tracer uptake in the  inferolateral wall. This is consistent with 2 separate territories of  ischemia involving the anterior/anterolateral wall, and inferolateral  wall. Gated images demonstrated normal wall motion. The left  ventricular ejection fraction was calculated to be 54%. TID was 0.92.    DIA DAVIS MD      Chest XR,  PA & LAT [[id number]]  Resulted: 05/17/18 1238, Result status: Final result    Ordering provider: Abiel Godfrey MD  05/17/18 1138 Resulted by: Joseph Kang MD    Performed: 05/17/18 1202 - 05/17/18 1210 Resulting lab: RADIOLOGY RESULTS    Narrative:       XR CHEST 2   2018 12:10 PM    HISTORY: Pain.    COMPARISON: 2018.      Impression:       IMPRESSION: 2 views of the chest show no acute cardiopulmonary disease  and no significant change.  A PowerPort catheter is again seen in  place.    ALINA MATTHEW MD      Testing Performed By     Lab - Abbreviation Name Director Address Valid Date Range    104 - Rad Rslts RADIOLOGY RESULTS Unknown Unknown 05 1553 - Present               ECG/EMG Results      Echocardiogram Complete [[id number]]  Resulted: 18 1107, Result status: Edited Result - FINAL    Ordering provider: Berhane Keating MD  18 1035 Resulted by: Franklyn Davis MD    Performed: 18 1042 - 18 1135 Resulting lab: RADIOLOGY RESULTS    Narrative:       [id number]  [facility]19  [id number]  [id number]^RISHABH^BERHANE^ARNEL           [facility]  Echocardiography Laboratory  [address] [place] [address]        Name: ELIZABETH LANDA  MRN: [id number]  : 1940  Study Date: 2018 11:07 AM  Age: 77 yrs  Gender: Female  Patient Location: [facility]  Reason For Study: Murmur  Ordering Physician: BERHANE KEATING  Referring Physician: JAIMIE JAIMES  Performed By: Annie Mckeon RDCS     BSA: 1.8 m2  Height: 64 in  Weight: 163 lb  HR: 84  BP: 131/65 mmHg  _____________________________________________________________________________  __        Procedure  Complete Portable Echo Adult.  _____________________________________________________________________________  __        Interpretation Summary     The study was technically difficult. The left ventricle is normal in size.  Left ventricular systolic function is normal. The visual ejection fraction is  estimated at 55-60%. Grade I or early diastolic dysfunction. No regional wall  motion abnormalities noted.  The right ventricle is normal size. The right ventricular systolic function is  normal.  Trace mitral and tricuspid regurgitation.  Mild aortic regurgitation.  No  pericardial effusion.  In direct comparison to the previous study dated 07/29/2015, the previously  described area of inferior hypokinesis is no longer observed.  _____________________________________________________________________________  __        Left Ventricle  The left ventricle is normal in size. Left ventricular systolic function is  normal. The visual ejection fraction is estimated at 55-60%. Grade I or early  diastolic dysfunction. No regional wall motion abnormalities noted.     Right Ventricle  The right ventricle is normal size. The right ventricular systolic function is  normal.     Atria  Normal left atrial size. Right atrial size is normal. There is no color  Doppler evidence of an atrial shunt.     Mitral Valve  There is trace mitral regurgitation.        Tricuspid Valve  There is trace tricuspid regurgitation. The right ventricular systolic  pressure is approximated at 24.5 mmHg plus the right atrial pressure.     Aortic Valve  There is mild trileaflet aortic sclerosis. There is mild (1+) aortic  regurgitation. No aortic stenosis is present.     Pulmonic Valve  There is no pulmonic valvular stenosis.     Vessels  The aortic root is normal size. The ascending aorta is Borderline dilated. The  IVC is normal in size and reactivity with respiration, suggesting normal  central venous pressure.     Pericardium  There is no pericardial effusion.        Rhythm  Sinus rhythm was noted.  _____________________________________________________________________________  __  MMode/2D Measurements & Calculations  IVSd: 1.1 cm     LVIDd: 4.1 cm  LVIDs: 3.4 cm  LVPWd: 0.96 cm  FS: 15.7 %  LV mass(C)d: 139.6 grams  LV mass(C)dI: 77.8 grams/m2  Ao root diam: 3.6 cm  LA dimension: 3.2 cm  asc Aorta Diam: 3.7 cm  LA/Ao: 0.89  LA Volume (BP): 40.0 ml  LA Volume Index (BP): 22.3 ml/m2  RWT: 0.47           Doppler Measurements & Calculations  MV E max clatyon: 58.3 cm/sec  MV A max clayton: 88.4 cm/sec  MV E/A: 0.66  MV dec time:  0.33 sec  TR max clayton: 245.5 cm/sec  TR max P.5 mmHg  E/E' av.3  Lateral E/e': 13.0  Medial E/e': 15.6           _____________________________________________________________________________  __           Report approved by: Elvis Sykes 2018 11:53 AM       1    Type Source Collected On     18 1107          View Image (below)              Encounter-Level Documents:     There are no encounter-level documents.      Order-Level Documents:     There are no order-level documents.

## 2018-05-18 ENCOUNTER — APPOINTMENT (OUTPATIENT)
Dept: NUCLEAR MEDICINE | Facility: CLINIC | Age: 78
End: 2018-05-18
Attending: FAMILY MEDICINE
Payer: MEDICARE

## 2018-05-18 ENCOUNTER — APPOINTMENT (OUTPATIENT)
Dept: CARDIOLOGY | Facility: CLINIC | Age: 78
End: 2018-05-18
Attending: FAMILY MEDICINE
Payer: MEDICARE

## 2018-05-18 ENCOUNTER — HOSPITAL ENCOUNTER (INPATIENT)
Facility: CLINIC | Age: 78
LOS: 3 days | Discharge: HOME HEALTH | DRG: 303 | End: 2018-05-21
Attending: INTERNAL MEDICINE | Admitting: INTERNAL MEDICINE
Payer: MEDICARE

## 2018-05-18 VITALS
HEART RATE: 92 BPM | RESPIRATION RATE: 18 BRPM | TEMPERATURE: 99.3 F | DIASTOLIC BLOOD PRESSURE: 68 MMHG | BODY MASS INDEX: 27.96 KG/M2 | WEIGHT: 163.8 LBS | SYSTOLIC BLOOD PRESSURE: 125 MMHG | OXYGEN SATURATION: 92 % | HEIGHT: 64 IN

## 2018-05-18 DIAGNOSIS — I25.10 CORONARY ARTERY DISEASE INVOLVING NATIVE CORONARY ARTERY OF NATIVE HEART WITHOUT ANGINA PECTORIS: Primary | ICD-10-CM

## 2018-05-18 PROBLEM — I20.0 UNSTABLE ANGINA (H): Status: ACTIVE | Noted: 2018-05-18

## 2018-05-18 LAB
ANION GAP SERPL CALCULATED.3IONS-SCNC: 9 MMOL/L (ref 3–14)
BASOPHILS # BLD AUTO: 0.1 10E9/L (ref 0–0.2)
BASOPHILS NFR BLD AUTO: 0.6 %
BUN SERPL-MCNC: 13 MG/DL (ref 7–30)
CALCIUM SERPL-MCNC: 8.7 MG/DL (ref 8.5–10.1)
CHLORIDE SERPL-SCNC: 104 MMOL/L (ref 94–109)
CO2 SERPL-SCNC: 27 MMOL/L (ref 20–32)
CREAT SERPL-MCNC: 0.47 MG/DL (ref 0.52–1.04)
DIFFERENTIAL METHOD BLD: ABNORMAL
EOSINOPHIL # BLD AUTO: 0.1 10E9/L (ref 0–0.7)
EOSINOPHIL NFR BLD AUTO: 0.3 %
ERYTHROCYTE [DISTWIDTH] IN BLOOD BY AUTOMATED COUNT: 17.4 % (ref 10–15)
GFR SERPL CREATININE-BSD FRML MDRD: >90 ML/MIN/1.7M2
GLUCOSE SERPL-MCNC: 114 MG/DL (ref 70–99)
HCT VFR BLD AUTO: 28.5 % (ref 35–47)
HGB BLD-MCNC: 9.3 G/DL (ref 11.7–15.7)
HGB BLD-MCNC: 9.7 G/DL (ref 11.7–15.7)
IMM GRANULOCYTES # BLD: 0.8 10E9/L (ref 0–0.4)
IMM GRANULOCYTES NFR BLD: 4.5 %
LACTATE BLD-SCNC: 1.3 MMOL/L (ref 0.4–1.9)
LACTATE BLD-SCNC: 2.1 MMOL/L (ref 0.4–1.9)
LMWH PPP CHRO-ACNC: 0.15 IU/ML
LMWH PPP CHRO-ACNC: 0.16 IU/ML
LMWH PPP CHRO-ACNC: 0.17 IU/ML
LYMPHOCYTES # BLD AUTO: 4.1 10E9/L (ref 0.8–5.3)
LYMPHOCYTES NFR BLD AUTO: 23.8 %
MCH RBC QN AUTO: 29 PG (ref 26.5–33)
MCHC RBC AUTO-ENTMCNC: 34 G/DL (ref 31.5–36.5)
MCV RBC AUTO: 85 FL (ref 78–100)
MONOCYTES # BLD AUTO: 2.2 10E9/L (ref 0–1.3)
MONOCYTES NFR BLD AUTO: 12.5 %
NEUTROPHILS # BLD AUTO: 10.1 10E9/L (ref 1.6–8.3)
NEUTROPHILS NFR BLD AUTO: 58.3 %
NRBC # BLD AUTO: 0.1 10*3/UL
NRBC BLD AUTO-RTO: 1 /100
PLATELET # BLD AUTO: 64 10E9/L (ref 150–450)
POTASSIUM SERPL-SCNC: 3.5 MMOL/L (ref 3.4–5.3)
PROCALCITONIN SERPL-MCNC: 0.21 NG/ML
RBC # BLD AUTO: 3.35 10E12/L (ref 3.8–5.2)
SODIUM SERPL-SCNC: 140 MMOL/L (ref 133–144)
TROPONIN I SERPL-MCNC: <0.015 UG/L (ref 0–0.04)
TROPONIN I SERPL-MCNC: <0.015 UG/L (ref 0–0.04)
WBC # BLD AUTO: 17.3 10E9/L (ref 4–11)

## 2018-05-18 PROCEDURE — 85520 HEPARIN ASSAY: CPT | Performed by: INTERNAL MEDICINE

## 2018-05-18 PROCEDURE — 93016 CV STRESS TEST SUPVJ ONLY: CPT | Performed by: FAMILY MEDICINE

## 2018-05-18 PROCEDURE — 85520 HEPARIN ASSAY: CPT | Performed by: FAMILY MEDICINE

## 2018-05-18 PROCEDURE — 84484 ASSAY OF TROPONIN QUANT: CPT | Performed by: FAMILY MEDICINE

## 2018-05-18 PROCEDURE — 78452 HT MUSCLE IMAGE SPECT MULT: CPT

## 2018-05-18 PROCEDURE — 83605 ASSAY OF LACTIC ACID: CPT | Performed by: FAMILY MEDICINE

## 2018-05-18 PROCEDURE — A9270 NON-COVERED ITEM OR SERVICE: HCPCS | Mod: GY | Performed by: PHYSICIAN ASSISTANT

## 2018-05-18 PROCEDURE — 99207 ZZC CDG-CODE CATEGORY CHANGED: CPT | Performed by: FAMILY MEDICINE

## 2018-05-18 PROCEDURE — A9502 TC99M TETROFOSMIN: HCPCS | Performed by: FAMILY MEDICINE

## 2018-05-18 PROCEDURE — A9270 NON-COVERED ITEM OR SERVICE: HCPCS | Mod: GY | Performed by: FAMILY MEDICINE

## 2018-05-18 PROCEDURE — 99207 ZZC APP CREDIT; MD BILLING SHARED VISIT: CPT | Performed by: PHYSICIAN ASSISTANT

## 2018-05-18 PROCEDURE — G0378 HOSPITAL OBSERVATION PER HR: HCPCS

## 2018-05-18 PROCEDURE — 85018 HEMOGLOBIN: CPT | Performed by: FAMILY MEDICINE

## 2018-05-18 PROCEDURE — 21000001 ZZH R&B HEART CARE

## 2018-05-18 PROCEDURE — 93018 CV STRESS TEST I&R ONLY: CPT | Performed by: INTERNAL MEDICINE

## 2018-05-18 PROCEDURE — 93017 CV STRESS TEST TRACING ONLY: CPT

## 2018-05-18 PROCEDURE — 99222 1ST HOSP IP/OBS MODERATE 55: CPT | Mod: AI | Performed by: INTERNAL MEDICINE

## 2018-05-18 PROCEDURE — 25000132 ZZH RX MED GY IP 250 OP 250 PS 637: Mod: GY | Performed by: FAMILY MEDICINE

## 2018-05-18 PROCEDURE — 96376 TX/PRO/DX INJ SAME DRUG ADON: CPT

## 2018-05-18 PROCEDURE — 93306 TTE W/DOPPLER COMPLETE: CPT | Mod: 26 | Performed by: INTERNAL MEDICINE

## 2018-05-18 PROCEDURE — 25000128 H RX IP 250 OP 636: Performed by: INTERNAL MEDICINE

## 2018-05-18 PROCEDURE — 25000132 ZZH RX MED GY IP 250 OP 250 PS 637: Mod: GY | Performed by: PHYSICIAN ASSISTANT

## 2018-05-18 PROCEDURE — 96366 THER/PROPH/DIAG IV INF ADDON: CPT

## 2018-05-18 PROCEDURE — 93306 TTE W/DOPPLER COMPLETE: CPT

## 2018-05-18 PROCEDURE — 84145 PROCALCITONIN (PCT): CPT | Performed by: PHYSICIAN ASSISTANT

## 2018-05-18 PROCEDURE — 25000128 H RX IP 250 OP 636: Performed by: FAMILY MEDICINE

## 2018-05-18 PROCEDURE — 80048 BASIC METABOLIC PNL TOTAL CA: CPT | Performed by: INTERNAL MEDICINE

## 2018-05-18 PROCEDURE — 99217 ZZC OBSERVATION CARE DISCHARGE: CPT | Performed by: FAMILY MEDICINE

## 2018-05-18 PROCEDURE — 83605 ASSAY OF LACTIC ACID: CPT | Performed by: INTERNAL MEDICINE

## 2018-05-18 PROCEDURE — 34300033 ZZH RX 343: Performed by: FAMILY MEDICINE

## 2018-05-18 PROCEDURE — 78452 HT MUSCLE IMAGE SPECT MULT: CPT | Mod: 26 | Performed by: INTERNAL MEDICINE

## 2018-05-18 PROCEDURE — 85025 COMPLETE CBC W/AUTO DIFF WBC: CPT | Performed by: INTERNAL MEDICINE

## 2018-05-18 RX ORDER — LORAZEPAM 0.5 MG/1
0.5 TABLET ORAL 2 TIMES DAILY PRN
Status: DISCONTINUED | OUTPATIENT
Start: 2018-05-18 | End: 2018-05-21 | Stop reason: HOSPADM

## 2018-05-18 RX ORDER — AMOXICILLIN 250 MG
1 CAPSULE ORAL 2 TIMES DAILY PRN
Status: DISCONTINUED | OUTPATIENT
Start: 2018-05-18 | End: 2018-05-21 | Stop reason: HOSPADM

## 2018-05-18 RX ORDER — ATORVASTATIN CALCIUM 40 MG/1
40 TABLET, FILM COATED ORAL DAILY
Status: DISCONTINUED | OUTPATIENT
Start: 2018-05-19 | End: 2018-05-21 | Stop reason: HOSPADM

## 2018-05-18 RX ORDER — ALBUTEROL SULFATE 90 UG/1
2 AEROSOL, METERED RESPIRATORY (INHALATION) 4 TIMES DAILY PRN
Status: DISCONTINUED | OUTPATIENT
Start: 2018-05-18 | End: 2018-05-21 | Stop reason: HOSPADM

## 2018-05-18 RX ORDER — REGADENOSON 0.08 MG/ML
0.4 INJECTION, SOLUTION INTRAVENOUS ONCE
Status: COMPLETED | OUTPATIENT
Start: 2018-05-18 | End: 2018-05-18

## 2018-05-18 RX ORDER — METOPROLOL SUCCINATE 50 MG/1
50 TABLET, EXTENDED RELEASE ORAL DAILY
Status: DISCONTINUED | OUTPATIENT
Start: 2018-05-18 | End: 2018-05-19

## 2018-05-18 RX ORDER — PROCHLORPERAZINE MALEATE 5 MG
5 TABLET ORAL EVERY 6 HOURS PRN
Status: DISCONTINUED | OUTPATIENT
Start: 2018-05-18 | End: 2018-05-21 | Stop reason: HOSPADM

## 2018-05-18 RX ORDER — NITROGLYCERIN 40 MG/1
1 PATCH TRANSDERMAL DAILY
Status: DISCONTINUED | OUTPATIENT
Start: 2018-05-19 | End: 2018-05-19

## 2018-05-18 RX ORDER — ALBUTEROL SULFATE 90 UG/1
2 AEROSOL, METERED RESPIRATORY (INHALATION) 4 TIMES DAILY PRN
Status: DISCONTINUED | OUTPATIENT
Start: 2018-05-18 | End: 2018-05-18 | Stop reason: HOSPADM

## 2018-05-18 RX ORDER — ACETAMINOPHEN 325 MG/1
650 TABLET ORAL EVERY 4 HOURS PRN
Status: DISCONTINUED | OUTPATIENT
Start: 2018-05-18 | End: 2018-05-21 | Stop reason: HOSPADM

## 2018-05-18 RX ORDER — BISACODYL 10 MG
10 SUPPOSITORY, RECTAL RECTAL DAILY PRN
Status: DISCONTINUED | OUTPATIENT
Start: 2018-05-18 | End: 2018-05-21 | Stop reason: HOSPADM

## 2018-05-18 RX ORDER — ALUMINA, MAGNESIA, AND SIMETHICONE 2400; 2400; 240 MG/30ML; MG/30ML; MG/30ML
30 SUSPENSION ORAL EVERY 4 HOURS PRN
Status: CANCELLED | OUTPATIENT
Start: 2018-05-18

## 2018-05-18 RX ORDER — LORAZEPAM 0.5 MG/1
.5-1 TABLET ORAL EVERY 4 HOURS PRN
Status: DISCONTINUED | OUTPATIENT
Start: 2018-05-18 | End: 2018-05-18

## 2018-05-18 RX ORDER — LISINOPRIL 5 MG/1
5 TABLET ORAL DAILY
Status: DISCONTINUED | OUTPATIENT
Start: 2018-05-19 | End: 2018-05-21 | Stop reason: HOSPADM

## 2018-05-18 RX ORDER — ACETAMINOPHEN 650 MG/1
650 SUPPOSITORY RECTAL EVERY 4 HOURS PRN
Status: DISCONTINUED | OUTPATIENT
Start: 2018-05-18 | End: 2018-05-21 | Stop reason: HOSPADM

## 2018-05-18 RX ORDER — NITROGLYCERIN 40 MG/1
1 PATCH TRANSDERMAL DAILY
Status: DISCONTINUED | OUTPATIENT
Start: 2018-05-18 | End: 2018-05-18 | Stop reason: HOSPADM

## 2018-05-18 RX ORDER — LIDOCAINE 40 MG/G
CREAM TOPICAL
Status: DISCONTINUED | OUTPATIENT
Start: 2018-05-18 | End: 2018-05-21 | Stop reason: HOSPADM

## 2018-05-18 RX ORDER — AMOXICILLIN 250 MG
2 CAPSULE ORAL 2 TIMES DAILY PRN
Status: DISCONTINUED | OUTPATIENT
Start: 2018-05-18 | End: 2018-05-21 | Stop reason: HOSPADM

## 2018-05-18 RX ORDER — ONDANSETRON 2 MG/ML
4 INJECTION INTRAMUSCULAR; INTRAVENOUS EVERY 6 HOURS PRN
Status: DISCONTINUED | OUTPATIENT
Start: 2018-05-18 | End: 2018-05-21 | Stop reason: HOSPADM

## 2018-05-18 RX ORDER — ALUMINA, MAGNESIA, AND SIMETHICONE 2400; 2400; 240 MG/30ML; MG/30ML; MG/30ML
30 SUSPENSION ORAL EVERY 4 HOURS PRN
Status: DISCONTINUED | OUTPATIENT
Start: 2018-05-18 | End: 2018-05-21 | Stop reason: HOSPADM

## 2018-05-18 RX ORDER — ASPIRIN 81 MG/1
81 TABLET ORAL DAILY
Status: DISCONTINUED | OUTPATIENT
Start: 2018-05-19 | End: 2018-05-21 | Stop reason: HOSPADM

## 2018-05-18 RX ORDER — NALOXONE HYDROCHLORIDE 0.4 MG/ML
.1-.4 INJECTION, SOLUTION INTRAMUSCULAR; INTRAVENOUS; SUBCUTANEOUS
Status: DISCONTINUED | OUTPATIENT
Start: 2018-05-18 | End: 2018-05-21 | Stop reason: HOSPADM

## 2018-05-18 RX ORDER — ONDANSETRON 4 MG/1
4 TABLET, ORALLY DISINTEGRATING ORAL EVERY 6 HOURS PRN
Status: DISCONTINUED | OUTPATIENT
Start: 2018-05-18 | End: 2018-05-21 | Stop reason: HOSPADM

## 2018-05-18 RX ORDER — POLYETHYLENE GLYCOL 3350 17 G/17G
17 POWDER, FOR SOLUTION ORAL DAILY PRN
Status: DISCONTINUED | OUTPATIENT
Start: 2018-05-18 | End: 2018-05-21 | Stop reason: HOSPADM

## 2018-05-18 RX ORDER — PROCHLORPERAZINE 25 MG
12.5 SUPPOSITORY, RECTAL RECTAL EVERY 12 HOURS PRN
Status: DISCONTINUED | OUTPATIENT
Start: 2018-05-18 | End: 2018-05-21 | Stop reason: HOSPADM

## 2018-05-18 RX ADMIN — LISINOPRIL 5 MG: 5 TABLET ORAL at 07:59

## 2018-05-18 RX ADMIN — ATORVASTATIN CALCIUM 40 MG: 20 TABLET, FILM COATED ORAL at 07:59

## 2018-05-18 RX ADMIN — RANITIDINE 75 MG: 75 TABLET, FILM COATED ORAL at 07:59

## 2018-05-18 RX ADMIN — ALBUTEROL SULFATE 2 PUFF: 90 AEROSOL, METERED RESPIRATORY (INHALATION) at 15:47

## 2018-05-18 RX ADMIN — TETROFOSMIN 32.6 MCI.: 1.38 INJECTION, POWDER, LYOPHILIZED, FOR SOLUTION INTRAVENOUS at 13:10

## 2018-05-18 RX ADMIN — HEPARIN SODIUM 750 UNITS/HR: 10000 INJECTION, SOLUTION INTRAVENOUS at 23:04

## 2018-05-18 RX ADMIN — LORAZEPAM 0.5 MG: 0.5 TABLET ORAL at 07:16

## 2018-05-18 RX ADMIN — REGADENOSON 0.4 MG: 0.08 INJECTION, SOLUTION INTRAVENOUS at 13:35

## 2018-05-18 RX ADMIN — ACETAMINOPHEN 650 MG: 325 TABLET ORAL at 22:44

## 2018-05-18 RX ADMIN — LORAZEPAM 0.5 MG: 0.5 TABLET ORAL at 15:47

## 2018-05-18 RX ADMIN — LORAZEPAM 0.5 MG: 0.5 TABLET ORAL at 22:44

## 2018-05-18 RX ADMIN — ASPIRIN 81 MG: 81 TABLET, COATED ORAL at 07:59

## 2018-05-18 RX ADMIN — TETROFOSMIN 10 MCI.: 1.38 INJECTION, POWDER, LYOPHILIZED, FOR SOLUTION INTRAVENOUS at 11:30

## 2018-05-18 RX ADMIN — NITROGLYCERIN 1 PATCH: 0.2 PATCH TRANSDERMAL at 16:40

## 2018-05-18 RX ADMIN — METOPROLOL SUCCINATE 50 MG: 50 TABLET, EXTENDED RELEASE ORAL at 22:44

## 2018-05-18 NOTE — IP AVS SNAPSHOT
[facility]    [address] [place] [address]., [address]    [place] [address]    Phone:  [phone]                                       After Visit Summary   5/18/2018    Ines Pickard    MRN: [id number]           After Visit Summary Signature Page     I have received my discharge instructions, and my questions have been answered. I have discussed any challenges I see with this plan with the nurse or doctor.    ..........................................................................................................................................  Patient/Patient Representative Signature      ..........................................................................................................................................  Patient Representative Print Name and Relationship to Patient    ..................................................               ................................................  Date                                            Time    ..........................................................................................................................................  Reviewed by Signature/Title    ...................................................              ..............................................  Date                                                            Time

## 2018-05-18 NOTE — PROGRESS NOTES
Patient ready for transfer to [facility]. Patient understands reason for transfer and has notified her son and sister of transfer. Report called to [facility] unit, Julee CASTILLO. Heparin drip IV restarted at 750cc/hr, Nitrodur patch 0.2mg/hr placed on left upper arm. Patient denies chest pain at this time.

## 2018-05-18 NOTE — IP AVS SNAPSHOT
MRN:[id number]                      After Visit Summary   5/18/2018    Ines Pickard    MRN: [id number]           Thank you!     Thank you for choosing [facility] for your care. Our goal is always to provide you with excellent care. Hearing back from our patients is one way we can continue to improve our services. Please take a few minutes to complete the written survey that you may receive in the mail after you visit with us. Thank you!        Patient Information     Date Of Birth          1940        About your hospital stay     You were admitted on:  May 18, 2018 You last received care in the:  [facility]    You were discharged on:  May 21, 2018        Reason for your hospital stay       You were admitted due to chest pain and abnormal stress test.  You were observed and medications were adjusted and you seem to be better.                  Who to Call     For medical emergencies, please call 911.  For non-urgent questions about your medical care, please call your primary care provider or clinic, [phone]          Attending Provider     Provider Specialty    Emanuel Coe MD Internal Medicine       Primary Care Provider Office Phone # Fax #    MICHAEL Perez [phone]      After Care Instructions     Activity       Your activity upon discharge: activity as tolerated            Diet       Follow this diet upon discharge: Orders Placed This Encounter      Combination Diet Low Saturated Fat Na <2400mg Diet; No Caffeine for 24 hours (once tests completed, may have caffeine)                  Follow-up Appointments     Follow-up and recommended labs and tests        Follow up with primary care provider, MICHAEL Perez, within 14 days for hospital follow- up.  The following labs/tests are recommended: cbc. This appointment has been scheduled    Follow up with cardiology  for follow up of blood pressure, medication tolerance and any anginal  pain. This appointment has been scheduled                  Your next 10 appointments already scheduled     May 29, 2018  9:00 AM CDT   Level 4 with ROOM 6 [facility] ([facility])    [facility]  [address]  [facility] [place] [address]   [phone]            May 29, 2018  9:30 AM CDT   Return Visit with Tracy Miner MD   [facility] ([facility])    [facility]  [address]  [facility] [place] [address]   [phone]            May 30, 2018  1:00 PM CDT   Level 2 with ROOM 4 [facility] ([facility])    [facility]  [address]  [facility] [place] [address]   [phone]            May 31, 2018  1:30 PM CDT   Level 2 with ROOM 9 [facility] ([facility])    [facility]  [address]  [facility] [place] [address]   [phone]            Jun 01, 2018  1:00 PM CDT   Office Visit with MICHAEL Perez MD   [facility] ([facility])    [address] [facility] [id number] [phone]           Bring a current list of meds and any records pertaining to this visit. For Physicals, please bring immunization records and any forms needing to be filled out. Please arrive 10 minutes early to complete paperwork.            Jun 04, 2018  1:30 PM CDT   Return Visit with ALEXSANDRA Hernandez [facility] ([facility])    [address] [facility] [address]   [phone]              Additional Services     Home Care PT Referral for Hospital Discharge       PT to eval and treat    Your provider has ordered home care - physical therapy. If you have not been contacted within 2 days of your discharge please call [phone]                  Pending Results     Date and Time Order Name Status Description     "2018 1654 EKG 12-lead, tracing only In process             Statement of Approval     Ordered          18 0933  I have reviewed and agree with all the recommendations and orders detailed in this document.  EFFECTIVE NOW     Approved and electronically signed by:  Emanuel Coe MD             Admission Information     Date & Time Provider Department Dept. Phone    2018 Emanuel Coe MD [facility] [phone]      Your Vitals Were     Blood Pressure Pulse Temperature Respirations Height Weight    112/76 91 98.6  F (37  C) (Oral) 16 1.6 m (5' 3\") 72.3 kg (159 lb 8 oz)    Pulse Oximetry BMI (Body Mass Index)                99% 28.25 kg/m2          MyChart Information     [facility] lets you send messages to your doctor, view your test results, renew your prescriptions, schedule appointments and more. To sign up, go to [email or web address] . Click on \"Log in\" on the left side of the screen, which will take you to the Welcome page. Then click on \"Sign up Now\" on the right side of the page.     You will be asked to enter the access code listed below, as well as some personal information. Please follow the directions to create your username and password.     Your access code is: [email or web address]  Expires: 2018  4:13 PM     Your access code will  in 90 days. If you need help or a new code, please call your [place] clinic or [phone].        Care EveryWhere ID     This is your Care EveryWhere ID. This could be used by other organizations to access your [place] medical records  [id number]        Equal Access to Services     [facility] : Hadii doroteo Carmona, waanshu lu, qaybhoda craft. So [facility] [phone].    ATENCIÓN: Si habla español, tiene a nguyễn disposición servicios gratuitos de asistencia lingüística. Llame al [phone].    We comply with applicable federal civil rights laws " and [place] laws. We do not discriminate on the basis of race, color, national origin, age, disability, sex, sexual orientation, or gender identity.               Review of your medicines      START taking        Dose / Directions    isosorbide mononitrate 30 MG 24 hr tablet   Commonly known as:  IMDUR   Used for:  Coronary artery disease involving native coronary artery of native heart without angina pectoris   Notes to Patient:  Take this Mediation Once a Day        Dose:  30 mg   Take 1 tablet (30 mg) by mouth daily   Quantity:  30 tablet   Refills:  3         CONTINUE these medicines which may have CHANGED, or have new prescriptions. If we are uncertain of the size of tablets/capsules you have at home, strength may be listed as something that might have changed.        Dose / Directions    metoprolol succinate 100 MG 24 hr tablet   Commonly known as:  TOPROL-XL   This may have changed:    - medication strength  - how much to take   Used for:  Coronary artery disease involving native coronary artery of native heart without angina pectoris   Notes to Patient:  Take this Medication Once a Day        Dose:  100 mg   Take 1 tablet (100 mg) by mouth daily   Quantity:  30 tablet   Refills:  3         CONTINUE these medicines which have NOT CHANGED        Dose / Directions    albuterol 108 (90 Base) MCG/ACT Inhaler   Commonly known as:  PROAIR HFA/PROVENTIL HFA/VENTOLIN HFA   Used for:  Acute bronchitis with symptoms > 10 days   Notes to Patient:  Use inhaler as Needed 4 times a Day        Dose:  2 puff   Inhale 2 puffs into the lungs 4 times daily   Quantity:  1 Inhaler   Refills:  2       aspirin 81 MG EC tablet   Used for:  CAD (coronary artery disease)   Notes to Patient:  Take this Medication Once a Day         Dose:  81 mg   Take 1 tablet (81 mg) by mouth daily   Quantity:  90 tablet   Refills:  3       atorvastatin 40 MG tablet   Commonly known as:  LIPITOR   Used for:  Coronary artery disease involving native  coronary artery of native heart without angina pectoris   Notes to Patient:  Take this Medication Once a Day        Dose:  40 mg   Take 1 tablet (40 mg) by mouth daily   Quantity:  90 tablet   Refills:  3       lidocaine-prilocaine cream   Commonly known as:  EMLA   Used for:  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)   Notes to Patient:  Use as Needed for moderate pain        Apply topically as needed for moderate pain   Quantity:  30 g   Refills:  0       lisinopril 5 MG tablet   Commonly known as:  PRINIVIL/ZESTRIL   Used for:  Essential hypertension with goal blood pressure less than 140/90   Notes to Patient:  Take this Medication Once a Day        Dose:  5 mg   Take 1 tablet (5 mg) by mouth daily   Quantity:  90 tablet   Refills:  2       LORazepam 0.5 MG tablet   Commonly known as:  ATIVAN   Used for:  Anxiety   Notes to Patient:  Take as Needed for Anxiety Twice a Day        Dose:  0.5 mg   Take 1 tablet (0.5 mg) by mouth 2 times daily as needed for anxiety   Quantity:  30 tablet   Refills:  5       MULTIVITAMIN ADULT Tabs        Dose:  1 tablet   Take 1 tablet by mouth daily   Refills:  0       nitroGLYcerin 0.4 MG sublingual tablet   Commonly known as:  NITROSTAT   Used for:  CAD (coronary artery disease)   Notes to Patient:  Take as Needed  for  Chest Pain        Dose:  0.4 mg   Place 1 tablet (0.4 mg) under the tongue every 5 minutes as needed for chest pain Maximum of 3 doses in 15 minutes   Quantity:  25 tablet   Refills:  3       prochlorperazine 10 MG tablet   Commonly known as:  COMPAZINE   Used for:  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)   Notes to Patient:  Take as Needed for nausea        Dose:  5 mg   Take 0.5 tablets (5 mg) by mouth every 6 hours as needed (Nausea/Vomiting)   Quantity:  30 tablet   Refills:  3       ranitidine 75 MG tablet   Commonly known as:  ZANTAC   Used for:  Esophageal reflux   Notes to Patient:  Take this Medication Twice a Day        Dose:  75 mg   Take 1 tablet (75 mg) by  mouth 2 times daily   Quantity:  30 tablet   Refills:  11       TYLENOL PO   Notes to Patient:  Take this Medication as Needed for discomfort/pain        Dose:  1000 mg   Take 1,000 mg by mouth every 6 hours as needed   Refills:  0         STOP taking     IBUPROFEN PO                Where to get your medicines      These medications were sent to [place] [facility] - [address]  [address], [place] [address]    Hours:  NINA [place] Phone:  [phone]     isosorbide mononitrate 30 MG 24 hr tablet    metoprolol succinate 100 MG 24 hr tablet                Protect others around you: Learn how to safely use, store and throw away your medicines at www.disposemymeds.org.             Medication List: This is a list of all your medications and when to take them. Check marks below indicate your daily home schedule. Keep this list as a reference.      Medications           Morning Afternoon Evening Bedtime As Needed    albuterol 108 (90 Base) MCG/ACT Inhaler   Commonly known as:  PROAIR HFA/PROVENTIL HFA/VENTOLIN HFA   Inhale 2 puffs into the lungs 4 times daily   Notes to Patient:  Use inhaler as Needed 4 times a Day                            Take as needed 4 times a Day       aspirin 81 MG EC tablet   Take 1 tablet (81 mg) by mouth daily   Last time this was given:  81 mg on 5/21/2018 10:44 AM   Next Dose Due:  Wednesday May 22nd in the Morning   Notes to Patient:  Take this Medication Once a Day             Take this Medication in the Morning                       atorvastatin 40 MG tablet   Commonly known as:  LIPITOR   Take 1 tablet (40 mg) by mouth daily   Last time this was given:  40 mg on 5/21/2018 10:44 AM   Next Dose Due:  Tuesday May 22nd in the Morning   Notes to Patient:  Take this Medication Once a Day            Take this Medication Once a Day                       isosorbide mononitrate 30 MG 24 hr tablet   Commonly known as:  IMDUR   Take 1 tablet  (30 mg) by mouth daily   Last time this was given:  30 mg on 5/21/2018 10:43 AM   Next Dose Due:  Tuesday May 22nd in the Morning   Notes to Patient:  Take this Mediation Once a Day            Take this Medication in the Morning                       lidocaine-prilocaine cream   Commonly known as:  EMLA   Apply topically as needed for moderate pain   Notes to Patient:  Use as Needed for moderate pain                               Apply as needed        lisinopril 5 MG tablet   Commonly known as:  PRINIVIL/ZESTRIL   Take 1 tablet (5 mg) by mouth daily   Last time this was given:  5 mg on 5/21/2018 10:44 AM   Next Dose Due:  Tuesday May 22nd in the Morning   Notes to Patient:  Take this Medication Once a Day            Take this Medication in the Morning                       LORazepam 0.5 MG tablet   Commonly known as:  ATIVAN   Take 1 tablet (0.5 mg) by mouth 2 times daily as needed for anxiety   Last time this was given:  0.5 mg on 5/21/2018  6:33 AM   Notes to Patient:  Take as Needed for Anxiety Twice a Day                            Take this Mediation as needed Twice a Day for Anxiety       metoprolol succinate 100 MG 24 hr tablet   Commonly known as:  TOPROL-XL   Take 1 tablet (100 mg) by mouth daily   Last time this was given:  100 mg on 5/21/2018 10:43 AM   Next Dose Due:  Tuesday May 22nd in the Morning   Notes to Patient:  Take this Medication Once a Day         Take this Medication in the Morning                       MULTIVITAMIN ADULT Tabs   Take 1 tablet by mouth daily   Next Dose Due:  Resume at Home as Directed                                nitroGLYcerin 0.4 MG sublingual tablet   Commonly known as:  NITROSTAT   Place 1 tablet (0.4 mg) under the tongue every 5 minutes as needed for chest pain Maximum of 3 doses in 15 minutes   Notes to Patient:  Take as Needed  for  Chest Pain                            Take as Needed for Chest Pain       prochlorperazine 10 MG tablet   Commonly known as:  COMPAZINE    Take 0.5 tablets (5 mg) by mouth every 6 hours as needed (Nausea/Vomiting)   Notes to Patient:  Take as Needed for nausea                         Take as Needed for Nausea       ranitidine 75 MG tablet   Commonly known as:  ZANTAC   Take 1 tablet (75 mg) by mouth 2 times daily   Next Dose Due:  Resume at Home as Directed   Notes to Patient:  Take this Medication Twice a Day         Take 1st Dose in the Morning before Breakfast           Take 2nd Dose in the Evening Before Dinner/ Supper               TYLENOL PO   Take 1,000 mg by mouth every 6 hours as needed   Last time this was given:  650 mg on 5/21/2018  6:33 AM   Notes to Patient:  Take this Medication as Needed for discomfort/pain                            Take as Needed for Pain.

## 2018-05-18 NOTE — PROGRESS NOTES
Dr Comer contacted regarding the pt's ability to perform and exercise stress test. MD okayed Lexiscan test.   Olya Pierre RN

## 2018-05-18 NOTE — PHARMACY-ANTICOAGULATION SERVICE
Patient to start the heparin C-V/Vascular protocol with a goal anti 10a level of 0.15-0.35. Using a HT of 64 inches and a WT of 74.3 kg, a dosing WT of 54.7 kg was calculated. Based on this dosing WT start a drip at 750 units/hr. Check the patient's anti 10a level 6 hours after starting the drip at ~2300.   Per C-V/Vascular protocol.    Ashley Schoen PharmD

## 2018-05-18 NOTE — DISCHARGE SUMMARY
[facility]   Transfer Summary    Ines Pickard MRN# [id number]   Age: 77 year old YOB: 1940     Home clinic:   [facility]  PCP:    MICHAEL Perez    Date of Admission:  5/17/2018  Date of Transfer:  5/18/2018      Transferring Physician: Berhane Coemr      Accepting Physician:   Dr Coe  at [facility]       Identification and Chief Compaint: Ines Pickard is a 77 year old female who presented on 5/17/2018 with complaint of chest pain.    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  Esophageal reflux    Prescriptions Prior to Admission   Medication Sig Dispense Refill Last Dose     Acetaminophen (TYLENOL PO) Take 1,000 mg by mouth every 6 hours as needed   5/17/2018 at am     albuterol (PROAIR HFA/PROVENTIL HFA/VENTOLIN HFA) 108 (90 BASE) MCG/ACT Inhaler Inhale 2 puffs into the lungs 4 times daily 1 Inhaler 2 5/17/2018 at am     aspirin EC 81 MG EC tablet Take 1 tablet (81 mg) by mouth daily 90 tablet 3 5/17/2018 at am     atorvastatin (LIPITOR) 40 MG tablet Take 1 tablet (40 mg) by mouth daily 90 tablet 3 5/16/2018 at am     IBUPROFEN PO Take 200 mg by mouth every 4 hours as needed for moderate pain   Past Week at Unknown time     lisinopril (PRINIVIL/ZESTRIL) 5 MG tablet Take 1 tablet (5 mg) by mouth daily 90 tablet 2 5/16/2018 at am     LORazepam (ATIVAN) 0.5 MG tablet Take 1 tablet (0.5 mg) by mouth 2 times daily as needed for anxiety 30 tablet 5 5/17/2018 at am     metoprolol (TOPROL-XL) 50 MG 24 hr tablet Take 1 tablet (50 mg) by mouth daily 90 tablet 3 5/16/2018 at am     Multiple Vitamins-Minerals (MULTIVITAMIN ADULT) TABS Take by mouth daily    5/16/2018 at am     nitroglycerin (NITROSTAT) 0.4 MG SL tablet Place 1  tablet (0.4 mg) under the tongue every 5 minutes as needed for chest pain Maximum of 3 doses in 15 minutes 25 tablet 3 5/17/2018 at am     prochlorperazine (COMPAZINE) 10 MG tablet Take 0.5 tablets (5 mg) by mouth every 6 hours as needed (Nausea/Vomiting) 30 tablet 3 Past Month at Unknown time     ranitidine (ZANTAC) 75 MG tablet Take 1 tablet (75 mg) by mouth 2 times daily 30 tablet 11 5/16/2018 at hs     lidocaine-prilocaine (EMLA) cream Apply topically as needed for moderate pain 30 g 0 Unknown at Unknown time        Hospital Medications  Transfused 1 unit packed red cells  Nitropatch, 0.2 mg   Heparin drip          Allergies   Allergen Reactions     Amoxicillin GI Disturbance     Tetracycline Other (See Comments)     Yeast infection     Nickel Rash       Pending Studies:    Unresulted Labs Ordered in the Past 30 Days of this Admission     No orders found for last 61 day(s).           Procedures:  Elaine scan stress test, positive for reversible ischemia                                      Echo: see epic.  No acute WMA, no significant AS     Imaging:  See Epic    Consultations: Telephone conversation with cardiology recommending referral for semiurgent angiography    Hospital Course:    She presented with an episode of chest pain lasting about an hour. Resolved within minutes of taking a nitroglycerin. No further chest pain experienced until later that evening when she again had substernal chest pain at rest. Resolved almost immediately with nitroglycerin and no further chest pain during this hospitalization.  Troponins and EKGs normal.  [place] scan stress test indicated significant reversible ischemia    She did have some bronchospasm relieved by albuterol during the hospitalization. She takes this intermittently at home    She was transfused 1 unit packed cells for hemoglobin of 8.3. Oncology guidelines were to transfuse if below 8.5, and with the ongoing cardiac ischemia and was felt reasonable. Hemoglobin on  discharge 9.3    Transfer Condition: The patient is transferred in good condition.    Exam:  Temp:  [97.6  F (36.4  C)-99.3  F (37.4  C)] 99.3  F (37.4  C)  Pulse:  [] 92  Heart Rate:  [82] 82  Resp:  [16-24] 18  BP: (104-131)/(48-68) 125/68  SpO2:  [92 %-95 %] 92 %  General: wake, alert, oriented ×3, no apparent distress  CV: Regular with a 2/6 systolic murmur at the left sternal border, radiating to carotids.  Respiratory: CTA bilaterally  GI: Soft, nontender  Skin: Warm and dry  Musculoskeletal:     The transfer was discussed with  (pt, family) and  are agreeable to transfer.     Total time on this transfer was 45.    Berhane Comer

## 2018-05-18 NOTE — PHARMACY-ANTICOAGULATION SERVICE
Heparin 10a(Xa) =     Lab Results   Component Value Date    AXA 0.15 05/18/2018       Goal level of: 0.15-0.35 IU/mL    Heparin instructions: Continue rate of 750 units/hr    Recheck next heparin 10a(Xa): in the AM.    Heparin dosed per [place] Protocol. Monitor platelets every three days while on anticoagulation therapy.    Mark MedleyD

## 2018-05-18 NOTE — PLAN OF CARE
Problem: Patient Care Overview  Goal: Plan of Care/Patient Progress Review  Outcome: Improving  Patient denies chest pain, dizziness.  Up to bathroom with stand by assist.  Pt down for lexiscan.

## 2018-05-18 NOTE — PHARMACY-ANTICOAGULATION SERVICE
Heparin 10a(Xa) =     Lab Results   Component Value Date    AXA 0.17 05/18/2018       Goal level of: 0.15-0.35 IU/mL    Heparin instructions: Continue rate of 750 units/hr    Recheck next heparin 10a(Xa): in the AM.    Heparin dosed per [place] Protocol. Monitor platelets every three days while on anticoagulation therapy.    Mark MedleyD

## 2018-05-18 NOTE — PLAN OF CARE
Problem: Patient Care Overview  Goal: Plan of Care/Patient Progress Review  Outcome: Improving  Patient A/O x4; uses call light appropriately. Up to the bathroom x3 throughout night. Heparin drip running; maintenance fluids stopped. Hgb-9.6 2 hours after blood administration stopped. Denies pain. Trops wdl.     Temp: 97.6  F (36.4  C) Temp src: Oral BP: 104/48 Pulse: 103 Heart Rate: 82 Resp: 24 SpO2: 95 % O2 Device: None (Room air)    Continue to monitor and implement poc.

## 2018-05-18 NOTE — PROGRESS NOTES
Patient left floor with EMS to transport to [facility] for continued care. Belongings sent with patient.

## 2018-05-19 ENCOUNTER — APPOINTMENT (OUTPATIENT)
Dept: PHYSICAL THERAPY | Facility: CLINIC | Age: 78
DRG: 303 | End: 2018-05-19
Attending: PHYSICIAN ASSISTANT
Payer: MEDICARE

## 2018-05-19 LAB
ALBUMIN UR-MCNC: NEGATIVE MG/DL
ANION GAP SERPL CALCULATED.3IONS-SCNC: 7 MMOL/L (ref 3–14)
APPEARANCE UR: CLEAR
BASOPHILS # BLD AUTO: 0.1 10E9/L (ref 0–0.2)
BASOPHILS NFR BLD AUTO: 0.5 %
BILIRUB UR QL STRIP: NEGATIVE
BUN SERPL-MCNC: 13 MG/DL (ref 7–30)
CALCIUM SERPL-MCNC: 8.5 MG/DL (ref 8.5–10.1)
CHLORIDE SERPL-SCNC: 105 MMOL/L (ref 94–109)
CO2 SERPL-SCNC: 27 MMOL/L (ref 20–32)
COLOR UR AUTO: NORMAL
CREAT SERPL-MCNC: 0.49 MG/DL (ref 0.52–1.04)
DIFFERENTIAL METHOD BLD: ABNORMAL
EOSINOPHIL # BLD AUTO: 0.1 10E9/L (ref 0–0.7)
EOSINOPHIL NFR BLD AUTO: 0.6 %
ERYTHROCYTE [DISTWIDTH] IN BLOOD BY AUTOMATED COUNT: 17.6 % (ref 10–15)
GFR SERPL CREATININE-BSD FRML MDRD: >90 ML/MIN/1.7M2
GLUCOSE SERPL-MCNC: 85 MG/DL (ref 70–99)
GLUCOSE UR STRIP-MCNC: NEGATIVE MG/DL
HCT VFR BLD AUTO: 27.6 % (ref 35–47)
HGB BLD-MCNC: 9.5 G/DL (ref 11.7–15.7)
HGB UR QL STRIP: NEGATIVE
IMM GRANULOCYTES # BLD: 0.7 10E9/L (ref 0–0.4)
IMM GRANULOCYTES NFR BLD: 4.2 %
KETONES UR STRIP-MCNC: NEGATIVE MG/DL
LACTATE BLD-SCNC: 2 MMOL/L (ref 0.7–2)
LEUKOCYTE ESTERASE UR QL STRIP: NEGATIVE
LMWH PPP CHRO-ACNC: 0.15 IU/ML
LYMPHOCYTES # BLD AUTO: 3.9 10E9/L (ref 0.8–5.3)
LYMPHOCYTES NFR BLD AUTO: 23 %
MCH RBC QN AUTO: 29.4 PG (ref 26.5–33)
MCHC RBC AUTO-ENTMCNC: 34.4 G/DL (ref 31.5–36.5)
MCV RBC AUTO: 85 FL (ref 78–100)
MONOCYTES # BLD AUTO: 1.9 10E9/L (ref 0–1.3)
MONOCYTES NFR BLD AUTO: 11.4 %
NEUTROPHILS # BLD AUTO: 10.2 10E9/L (ref 1.6–8.3)
NEUTROPHILS NFR BLD AUTO: 60.3 %
NITRATE UR QL: NEGATIVE
NRBC # BLD AUTO: 0 10*3/UL
NRBC BLD AUTO-RTO: 0 /100
PH UR STRIP: 7 PH (ref 5–7)
PLATELET # BLD AUTO: 58 10E9/L (ref 150–450)
POTASSIUM SERPL-SCNC: 3.5 MMOL/L (ref 3.4–5.3)
RBC # BLD AUTO: 3.23 10E12/L (ref 3.8–5.2)
RBC #/AREA URNS AUTO: 1 /HPF (ref 0–2)
SODIUM SERPL-SCNC: 139 MMOL/L (ref 133–144)
SOURCE: NORMAL
SP GR UR STRIP: 1.01 (ref 1–1.03)
SQUAMOUS #/AREA URNS AUTO: 1 /HPF (ref 0–1)
UROBILINOGEN UR STRIP-MCNC: NORMAL MG/DL (ref 0–2)
WBC # BLD AUTO: 16.9 10E9/L (ref 4–11)
WBC #/AREA URNS AUTO: 2 /HPF (ref 0–5)

## 2018-05-19 PROCEDURE — 80048 BASIC METABOLIC PNL TOTAL CA: CPT | Performed by: PHYSICIAN ASSISTANT

## 2018-05-19 PROCEDURE — 25000132 ZZH RX MED GY IP 250 OP 250 PS 637: Mod: GY | Performed by: INTERNAL MEDICINE

## 2018-05-19 PROCEDURE — 97110 THERAPEUTIC EXERCISES: CPT | Mod: GP | Performed by: PHYSICAL THERAPIST

## 2018-05-19 PROCEDURE — 25000128 H RX IP 250 OP 636: Performed by: INTERNAL MEDICINE

## 2018-05-19 PROCEDURE — 21000001 ZZH R&B HEART CARE

## 2018-05-19 PROCEDURE — 85520 HEPARIN ASSAY: CPT | Performed by: PHYSICIAN ASSISTANT

## 2018-05-19 PROCEDURE — A9270 NON-COVERED ITEM OR SERVICE: HCPCS | Mod: GY | Performed by: INTERNAL MEDICINE

## 2018-05-19 PROCEDURE — 25000125 ZZHC RX 250: Performed by: PHYSICIAN ASSISTANT

## 2018-05-19 PROCEDURE — 83605 ASSAY OF LACTIC ACID: CPT | Performed by: INTERNAL MEDICINE

## 2018-05-19 PROCEDURE — 36415 COLL VENOUS BLD VENIPUNCTURE: CPT | Performed by: PHYSICIAN ASSISTANT

## 2018-05-19 PROCEDURE — 40000193 ZZH STATISTIC PT WARD VISIT: Performed by: PHYSICAL THERAPIST

## 2018-05-19 PROCEDURE — 25000132 ZZH RX MED GY IP 250 OP 250 PS 637: Mod: GY | Performed by: PHYSICIAN ASSISTANT

## 2018-05-19 PROCEDURE — 81001 URINALYSIS AUTO W/SCOPE: CPT | Performed by: PHYSICIAN ASSISTANT

## 2018-05-19 PROCEDURE — A9270 NON-COVERED ITEM OR SERVICE: HCPCS | Mod: GY | Performed by: PHYSICIAN ASSISTANT

## 2018-05-19 PROCEDURE — 97161 PT EVAL LOW COMPLEX 20 MIN: CPT | Mod: GP | Performed by: PHYSICAL THERAPIST

## 2018-05-19 PROCEDURE — 99221 1ST HOSP IP/OBS SF/LOW 40: CPT | Performed by: INTERNAL MEDICINE

## 2018-05-19 PROCEDURE — 85025 COMPLETE CBC W/AUTO DIFF WBC: CPT | Performed by: PHYSICIAN ASSISTANT

## 2018-05-19 PROCEDURE — 99233 SBSQ HOSP IP/OBS HIGH 50: CPT | Performed by: INTERNAL MEDICINE

## 2018-05-19 RX ORDER — ISOSORBIDE MONONITRATE 30 MG/1
30 TABLET, EXTENDED RELEASE ORAL DAILY
Status: DISCONTINUED | OUTPATIENT
Start: 2018-05-19 | End: 2018-05-21 | Stop reason: HOSPADM

## 2018-05-19 RX ORDER — HEPARIN SODIUM,PORCINE 10 UNIT/ML
5-10 VIAL (ML) INTRAVENOUS
Status: DISCONTINUED | OUTPATIENT
Start: 2018-05-19 | End: 2018-05-21 | Stop reason: HOSPADM

## 2018-05-19 RX ORDER — HEPARIN SODIUM,PORCINE 10 UNIT/ML
5-10 VIAL (ML) INTRAVENOUS EVERY 24 HOURS
Status: DISCONTINUED | OUTPATIENT
Start: 2018-05-19 | End: 2018-05-21 | Stop reason: HOSPADM

## 2018-05-19 RX ORDER — HEPARIN SODIUM (PORCINE) LOCK FLUSH IV SOLN 100 UNIT/ML 100 UNIT/ML
5 SOLUTION INTRAVENOUS
Status: DISCONTINUED | OUTPATIENT
Start: 2018-05-19 | End: 2018-05-21 | Stop reason: HOSPADM

## 2018-05-19 RX ADMIN — LORAZEPAM 0.5 MG: 0.5 TABLET ORAL at 07:50

## 2018-05-19 RX ADMIN — ASPIRIN 81 MG: 81 TABLET, COATED ORAL at 08:02

## 2018-05-19 RX ADMIN — LORAZEPAM 0.5 MG: 0.5 TABLET ORAL at 19:47

## 2018-05-19 RX ADMIN — ISOSORBIDE MONONITRATE 30 MG: 30 TABLET, EXTENDED RELEASE ORAL at 10:05

## 2018-05-19 RX ADMIN — ACETAMINOPHEN 650 MG: 325 TABLET ORAL at 07:50

## 2018-05-19 RX ADMIN — HEPARIN, PORCINE (PF) 10 UNIT/ML INTRAVENOUS SYRINGE 5 ML: at 10:05

## 2018-05-19 RX ADMIN — ACETAMINOPHEN 650 MG: 325 TABLET ORAL at 18:20

## 2018-05-19 RX ADMIN — PANTOPRAZOLE SODIUM 40 MG: 40 INJECTION, POWDER, FOR SOLUTION INTRAVENOUS at 08:02

## 2018-05-19 RX ADMIN — ATORVASTATIN CALCIUM 40 MG: 40 TABLET, FILM COATED ORAL at 08:02

## 2018-05-19 RX ADMIN — LISINOPRIL 5 MG: 5 TABLET ORAL at 08:01

## 2018-05-19 RX ADMIN — METOPROLOL SUCCINATE 50 MG: 50 TABLET, EXTENDED RELEASE ORAL at 08:02

## 2018-05-19 NOTE — PROGRESS NOTES
" 05/19/18 1100   Quick Adds   Type of Visit Initial PT Evaluation   Living Environment   Lives With alone   Living Arrangements mobile home   Home Accessibility bed and bath on same level;stairs to enter home   Number of Stairs to Enter Home 6  (B rail)   Number of Stairs Within Home 0   Transportation Available car;family or friend will provide   Living Environment Comment Pt sometimes drives but also has neighbors/friends who assist with transportation and errands (sister [place])   Self-Care   Dominant Hand right   Usual Activity Tolerance moderate   Current Activity Tolerance poor   Regular Exercise no  (pt used to walk until she started chemo- too fatigued)   Equipment Currently Used at Home none   Functional Level Prior   Ambulation 0-->independent   Transferring 0-->independent   Toileting 0-->independent   Bathing 1-->assistive equipment  (shower chair \"just in case\")   Dressing 0-->independent   Eating 0-->independent   Communication 0-->understands/communicates without difficulty   Swallowing 0-->swallows foods/liquids without difficulty   Cognition 0 - no cognition issues reported   Fall history within last six months no   Which of the above functional risks had a recent onset or change? (functional activity tolerance)   Prior Functional Level Comment Pt lives alone in a mobile home with 6 steps and B rails to enter. Pt reports she's been fairly sedentary since beginning her chemotherapy. Pt reports IND without AD at baseline, IND with ADLs/IADLs except sometimes will ask sister (lives [place] same park)/neighbors to assist with transport/errands/groceries; pt does still drive at times.   General Information   Onset of Illness/Injury or Date of Surgery - Date 05/18/18   Referring Physician Emanuel Coe MD   Patient/Family Goals Statement To go home   Pertinent History of Current Problem (include personal factors and/or comorbidities that impact the POC) CR orders received for \"ambulate and " "increase workload to see if she has angina\", evaluation completed, treatment initiated. Pt is a 78yo female admitted for evaluation of chest pain. PMH significant for HTN, HLD, GERD, known CAD s/p previous MI and PCI x1 to the left circumflex (2014), and small cell lung carcinoma currently receiving palliative chemotherapy.    Precautions/Limitations no known precautions/limitations   General Observations Pt sitting up in chair, appears comfortable   Cognitive Status Examination   Orientation orientation to person, place and time   Level of Consciousness alert   Follows Commands and Answers Questions 100% of the time;able to follow multistep instructions   Personal Safety and Judgment intact   Memory intact   Pain Assessment   Patient Currently in Pain No   Posture    Posture Not impaired   Range of Motion (ROM)   ROM Comment BLE WNL with AROM   Strength   Strength Comments BLE groslly 4/5, functionall deconditioned   Bed Mobility   Bed Mobility Comments IND supine<>sit   Transfer Skills   Transfer Comments IND sit<>stand   Gait   Gait Comments SBA for gait 10', WBOS, slow macey, reaching out for light UE support at times   Balance   Balance Comments Good static standing balance, appears slightly unstaedy with mobility   General Therapy Interventions   Planned Therapy Interventions balance training;gait training;strengthening;risk factor education;home program guidelines;progressive activity/exercise   Clinical Impression   Criteria for Skilled Therapeutic Intervention yes, treatment indicated   PT Diagnosis Difficulty with gait   Influenced by the following impairments Decreased functional activity tolerance, SOB, angina symptoms, weakness   Functional limitations due to impairments Decreased IND and tolerance for functional mobility and functional tasks.    Clinical Presentation Evolving/Changing   Clinical Presentation Rationale ongoing angina symptoms with activity   Clinical Decision Making (Complexity) Low " "complexity   Therapy Frequency` daily   Predicted Duration of Therapy Intervention (days/wks) 4 days   Anticipated Discharge Disposition Transitional Care Facility;Home with Home Therapy   Risk & Benefits of therapy have been explained Yes   Patient, Family & other staff in agreement with plan of care Yes   [facility] TM \"6 Clicks\"   2016, Trustees of [facility], under license to [employer].  All rights reserved.   6 Clicks Short Forms Basic Mobility Inpatient Short Form   [facility]  \"6 Clicks\" V.2 Basic Mobility Inpatient Short Form   1. Turning from your back to your side while in a flat bed without using bedrails? 4 - None   2. Moving from lying on your back to sitting on the side of a flat bed without using bedrails? 4 - None   3. Moving to and from a bed to a chair (including a wheelchair)? 4 - None   4. Standing up from a chair using your arms (e.g., wheelchair, or bedside chair)? 4 - None   5. To walk in hospital room? 3 - A Little   6. Climbing 3-5 steps with a railing? 3 - A Little   Basic Mobility Raw Score (Score out of 24.Lower scores equate to lower levels of function) 22   Total Evaluation Time   Total Evaluation Time (Minutes) 10     "

## 2018-05-19 NOTE — PROGRESS NOTES
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  Vitals currently within normal limits.  The patient is currently stable.      Unstable angina  Abnormal stress test:  The patient initially presenting with chest pressure on 05/18 that localized to her substernal region with radiation to her throat, began at rest, relieved with nitroglycerin.  Troponins negative x 4.  Lexiscan showing 2 separate territories of ischemia with moderate area of anterior/anterolateral ischemia of moderate intensity of small to moderate inferolateral area of ischemia of moderate intensity.  Last catheterization was in 12/2014 after patient had an inferior wall MI which showed 3-vessel CAD without left main lesion.  At that time mid left circumflex was stented and she was noted to have 70% ostial RPDA lesion and 90% proximal D1 lesion.   - overnight no chest pain or sob   - serial troponin negative  - Echocardiogram reviewed  - platelet counts down, discontinued heparin  - discussion with cardiology regarding LHC vs medical management given underlying SCLC and palliative chemotherapy.   - Continue PTA aspirin 81 mg p.o. daily, Lipitor 40 mg p.o. daily, lisinopril 5 mg p.o. daily, Toprol-XL 50 mg p.o. With titrating/optomizing medications. p.r.n. nitroglycerin available.    - discontinue ntg patch  - start cardiac rehab     Small cell lung cancer with metastasis:  The patient followed by Dr. Miner of oncology.  History of left lung cancer with mediastinal lymph node and adrenal mets, receiving palliative chemotherapy with carbo/-16 every 3 weeks.  Last dose was 05/10/2018.  Plan for an additional course on 05/29 and none thereafter.  This has been stable.     - will consult [facility] oncology  - would like to have comment on 1 year prognosis, overall functional status, any concerns if patient were to be on DAPT for 1 year.     Normocytic anemia:  Secondary to above.  Oncology recommending transfusion for hemoglobin less than 8.5-9 or if patient is symptomatic.  The patient was noted to have a hemoglobin of 6.5 a few days prior after presenting to the ED and was transfused 1 unit packed red blood cells at that time.  Hemoccult negative.  No active signs of bleeding.  No history of GI bleed or ulceration.  No prior endoscopy or colonoscopy.  The patient does take occasional NSAIDs, but this dose will be 200 mg at a time and not frequently.   - Monitor hemoglobin.     - Conditional orders to transfuse for hemoglobin 8.5 or less.     - Consent signed and patient's chart.      Leukocytosis:  WBC 14.3>17.3.  No active signs of infection.  Chest x-ray unremarkable.    - Check procalcitonin is 0.21  - UA is negative  - no fever  - from chemotherapy (carbo/-16)?     Lactic acidosis: Marginally elevated at 2.1. Triggered by tachycardia on admission (since resolved) and leukocytosis.   -- Repeat lactic acid from 2.1 to 2     Hypertension  Hyperlipidemia:  Continue PTA meds as above.      GERD:    - Protonix ordered.      DVT prophylaxis:    - PCD      CODE STATUS:  DNR/DNI.     Interval History   - no overnight acute events  - no chest pain or sob  - discussed with cardiology    -Data reviewed today: I reviewed all new labs and imaging over the last 24 hours. I personally reviewed no images or EKG's today.    Physical Exam   Heart Rate: 72, Blood pressure 128/71, pulse 79, temperature 97.6  F (36.4  C), temperature source Oral, resp.  "rate 16, height 1.6 m (5' 3\"), weight 72.2 kg (159 lb 1.6 oz), SpO2 92 %, not currently breastfeeding.  Vitals:    05/18/18 2103 05/19/18 0500   Weight: 73.1 kg (161 lb 3.2 oz) 72.2 kg (159 lb 1.6 oz)     Vital Signs with Ranges  Temp:  [97.4  F (36.3  C)-99.3  F (37.4  C)] 97.6  F (36.4  C)  Pulse:  [] 79  Heart Rate:  [72-98] 72  Resp:  [16-18] 16  BP: (125-137)/(68-85) 128/71  SpO2:  [92 %-94 %] 92 %  I/O's Last 24 hours  I/O last 3 completed shifts:  In: 300 [P.O.:300]  Out: -     Constitutional: Awake, alert, cooperative, no apparent distress  Respiratory: Few right basilar crackles.  Cardiovascular: Regular rate and rhythm, normal S1 and S2, and + murmur noted  GI: Normal bowel sounds, soft, non-distended, non-tender  Skin/Integumen: No rashes, no cyanosis, no edema  Other:      Medications   All medications were reviewed.    Continuing ACE inhibitor/ARB/ARNI from home medication list OR ACE inhibitor/ARB order already placed during this visit       - MEDICATION INSTRUCTIONS -       - MEDICATION INSTRUCTIONS -       - MEDICATION INSTRUCTIONS -       - MEDICATION INSTRUCTIONS -         aspirin  81 mg Oral Daily     atorvastatin  40 mg Oral Daily     heparin  5 mL Intracatheter Q28 Days     heparin lock flush  5-10 mL Intracatheter Q24H     isosorbide mononitrate  30 mg Oral Daily     lisinopril  5 mg Oral Daily     [START ON 5/20/2018] metoprolol succinate  75 mg Oral Daily     nitroGLYcerin   Transdermal Q8H     nitroGLYcerin   Transdermal Q24h     pantoprazole (PROTONIX) IV  40 mg Intravenous Daily with breakfast     sodium chloride (PF)  3 mL Intracatheter Q8H        Data     Recent Labs  Lab 05/19/18  0610 05/18/18  2037 05/18/18  0625 05/18/18  0000  05/17/18  1836 05/17/18  1105  05/15/18  0450   WBC 16.9* 17.3*  --   --   --   --  14.3*  < > 6.4   HGB 9.5* 9.7* 9.3*  --   < >  --  8.0*  < > 6.5*   MCV 85 85  --   --   --   --  86  < > 87   PLT 58* 64*  --   --   --   --  79*  < > 126*    140  " --   --   --   --  139  --  140   POTASSIUM 3.5 3.5  --   --   --   --  3.5  --  3.9   CHLORIDE 105 104  --   --   --   --  106  --  106   CO2 27 27  --   --   --   --  26  --  24   BUN 13 13  --   --   --   --  15  --  17   CR 0.49* 0.47*  --   --   --   --  0.48*  --  0.42*   ANIONGAP 7 9  --   --   --   --  7  --  10   AMADO 8.5 8.7  --   --   --   --  8.6  --  9.0   GLC 85 114*  --   --   --   --  101*  --  89   ALBUMIN  --   --   --   --   --   --  3.3*  --  3.4   PROTTOTAL  --   --   --   --   --   --  5.8*  --  5.8*   BILITOTAL  --   --   --   --   --   --  0.6  --  0.9   ALKPHOS  --   --   --   --   --   --  97  --  89   ALT  --   --   --   --   --   --  13  --  10   AST  --   --   --   --   --   --  11  --  5   TROPI  --   --  <0.015 <0.015  --  <0.015 <0.015  < >  --    < > = values in this interval not displayed.    Recent Results (from the past 24 hour(s))   NM MPI w Lexiscan    Narrative    GATED MYOCARDIAL PERFUSION SCINTIGRAPHY WITH INTRAVENOUS PHARMACOLOGIC  VASODILATATION LEXISCAN -ONE DAY STUDY     5/18/2018 2:38 PM  ELIZABETH LANDA  77 years  Female  1940.    Indication/Clinical History: Chest pain    Impression  1.  Myocardial perfusion imaging using single isotope technique  demonstrated 2 separate territories of ischemia. There is a moderate  area of anterior/anterolateral ischemia of moderate intensity. There  is a separate small to moderate inferolateral area of ischemia of  moderate intensity. 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 I have a page out to the ordering physician to notify  them of the results of the study.  2. Gated images demonstrated normal wall motion with a calculated  ejection fraction of 54%.    3. There is no prior nuclear study available for comparison.    Procedure  Pharmacologic stress testing was performed with Lexiscan at a rate of  0.08 mg/ml rapid bolus injection, for 15 seconds, 0.4 mg/5ml  intravenously. Low-level exercise was not  performed along with the  vasodilator infusion.  The heart rate was 90 at baseline and leia to  129 beats per minute during the Lexiscan infusion. The rest blood  pressure was 119/74 mmHg and was 124/70 mm Hg during Lexiscan  infusion. The patient experienced mild dyspnea  during the test.    Myocardial perfusion imaging was performed at rest, approximately 45  minutes after the injection intravenously of 10.0 mCi of Tc-99m  Myoview. At peak pharmacologic effect, 10-20 seconds after Lexiscan,   the patient was injected intravenously with 32.6 mCi of  Tc-99m  Myoview. The post-stress tomographic imaging was performed  approximately 60 minutes after stress.    EKG Findings  The resting EKG demonstrated sinus rhythm with PACs. The stress EKG  demonstrated no evidence for ischemia with Lexiscan infusion.    Tomographic Findings  Overall, the study quality is fair . There are 2 separate defects  noted on the present scan. The rest tomographic images have somewhat  patchy uptake with a small area of decreased tracer uptake in the  lateral wall. The stress tomographic images have a moderate area of  decreased tracer uptake in the anterior/anterolateral wall, and a  separate small to moderate area of decreased tracer uptake in the  inferolateral wall. This is consistent with 2 separate territories of  ischemia involving the anterior/anterolateral wall, and inferolateral  wall. Gated images demonstrated normal wall motion. The left  ventricular ejection fraction was calculated to be 54%. TID was 0.92.    MD Emanuel RODAS MD  Text Page  (7am to 6pm)

## 2018-05-19 NOTE — PLAN OF CARE
Problem: Patient Care Overview  Goal: Plan of Care/Patient Progress Review  Outcome: No Change  A&O, denies CP/SOB. Up Independent BR. VSS on RA. Heparin @ 750u/hr stopped this morning platelets low. Tylenol given for headache. Pt request ativan this morning. Continue to monitor.

## 2018-05-19 NOTE — H&P
Admitted:     05/18/2018      PRIMARY CARE PROVIDER:  JAIMIE Perez MD      CARDIOLOGIST:  Rubens Neri MD      ONCOLOGIST:   ROLO Miner MD      CHIEF COMPLAINT:  Chest pain.      INTAKE SOURCE:  History obtained from patient and chart review.      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      The patient initially presented to the ED on 05/17 after developing chest pressure around 8 a.m. located to the center of her chest with radiation to her throat.  The pain began at rest.  She took a PTA nitro which relieved her pain.  She had some associated lightheadedness, but denied any associated nausea, vomiting, shortness of breath.  She then presented to the ED via EMS.  She was given a full dose aspirin en route.      In the Emergency Department, the patient was seen and assessed by Dr. Abiel Godfrey who obtained basic labs and imaging which revealed an unremarkable CMP, troponin I was negative, D-dimer was negative.  Chest x-ray was unremarkable.  CBC showed a white count of 14.3, hemoglobin of 8, platelets of 79.  Ultimately, the patient was admitted under observation status for further evaluation and stress testing.  The patient underwent a Lexiscan today which showed 2 separate territories of ischemia; moderate area of anterior/anterolateral ischemia of moderate intensity and small to moderate inferolateral area of ischemia of moderate intensity.  The patient was transferred to [facility] for further evaluation and treatment after being placed on heparin drip and a nitropatch.      On my interview,  the patient comes in with the above history.  It appears she had an angiogram in 12/2014 for an inferior wall MI which revealed 3-vessel CAD without left main disease.  Notable disease in her mid left circumflex status post GLORIA and aspiration thrombectomy.  Noted to have 70% ostial RPDA lesion, 90% proximal D1 lesion.  She has been maintained on medical management since that time.  She has had no cardiac issue since that time.      The patient also has a small cell lung cancer of the left lung with mediastinal lymph node and adrenal metastasis.  She is on palliative chemotherapy with carbo/-16.  She receives this every 3 weeks.  It started in 02/2018 and she has her last round on 05/29.  She has been tolerating this well, although she has had noted anemia since starting chemotherapy and a recent ED visit for hemoglobin of 6.5 at which time she was transfused 1 unit of packed red blood cells.  The patient did receive 1 unit of packed red blood cells upon admission on 05/17 for a hemoglobin of 8.  Per oncology notes, goal is to transfuse for hemoglobin of 8.5-9 or with symptoms.      REVIEW OF SYSTEMS:  A 10-point review of systems was performed and is otherwise negative unless specified in the HPI.      PAST MEDICAL HISTORY:   1.  CAD with prior drug-eluting stent to the mid left circumflex in 2014.   2.  Small cell lung cancer of the left lung with mediastinal lymph node and adrenal metastasis followed by Dr. Miner, receiving palliative chemotherapy with last dose on 05/10/2018.   3.  Normocytic anemia secondary to above.   4.  Hypertension.   5.  Hyperlipidemia.   6.  GERD.   7.  Cataracts.      MEDICATIONS:    Prior to Admission Medications   Prescriptions Last Dose Informant Patient Reported? Taking?   Acetaminophen (TYLENOL PO)  Self Yes Yes   Sig: Take 1,000 mg by mouth every 6 hours as needed   IBUPROFEN PO   Yes Yes   Sig: Take 200 mg by mouth every 4 hours as needed for moderate pain   LORazepam (ATIVAN) 0.5  MG tablet 5/18/2018 at x2  No Yes   Sig: Take 1 tablet (0.5 mg) by mouth 2 times daily as needed for anxiety   Multiple Vitamins-Minerals (MULTIVITAMIN ADULT) TABS   Yes Yes   Sig: Take 1 tablet by mouth daily    albuterol (PROAIR HFA/PROVENTIL HFA/VENTOLIN HFA) 108 (90 BASE) MCG/ACT Inhaler 5/18/2018 at Unknown time  No Yes   Sig: Inhale 2 puffs into the lungs 4 times daily   aspirin EC 81 MG EC tablet 5/18/2018 at Unknown time  No Yes   Sig: Take 1 tablet (81 mg) by mouth daily   atorvastatin (LIPITOR) 40 MG tablet 5/18/2018 at Unknown time  No Yes   Sig: Take 1 tablet (40 mg) by mouth daily   lidocaine-prilocaine (EMLA) cream   No Yes   Sig: Apply topically as needed for moderate pain   lisinopril (PRINIVIL/ZESTRIL) 5 MG tablet 5/18/2018 at Unknown time  No Yes   Sig: Take 1 tablet (5 mg) by mouth daily   metoprolol (TOPROL-XL) 50 MG 24 hr tablet   No Yes   Sig: Take 1 tablet (50 mg) by mouth daily   nitroglycerin (NITROSTAT) 0.4 MG SL tablet   No Yes   Sig: Place 1 tablet (0.4 mg) under the tongue every 5 minutes as needed for chest pain Maximum of 3 doses in 15 minutes   prochlorperazine (COMPAZINE) 10 MG tablet   No Yes   Sig: Take 0.5 tablets (5 mg) by mouth every 6 hours as needed (Nausea/Vomiting)   ranitidine (ZANTAC) 75 MG tablet 5/18/2018 at am  No Yes   Sig: Take 1 tablet (75 mg) by mouth 2 times daily      Facility-Administered Medications: None      ALLERGIES:       Allergies   Allergen Reactions     Amoxicillin GI Disturbance     Tetracycline Other (See Comments)     Yeast infection     Nickel Rash      PAST SURGICAL HISTORY:    Past Surgical History:   Procedure Laterality Date     APPENDECTOMY  1956     ESOPHAGOSCOPY, GASTROSCOPY, DUODENOSCOPY (EGD), COMBINED N/A 1/30/2018    Procedure: COMBINED ENDOSCOPIC ULTRASOUND, ESOPHAGOSCOPY, GASTROSCOPY, DUODENOSCOPY (EGD), FINE NEEDLE ASPIRATE/BIOPSY;   EUS;  Surgeon: Carlos Fletcher MD;  Location:  GI     EYE SURGERY       INSERT PORT VASCULAR  ACCESS N/A 2/12/2018    Procedure: INSERT PORT VASCULAR ACCESS;  Port-a-Cath Placement;  Surgeon: Carlos Johnson MD;  Location: [facility] OR     PHACOEMULSIFICATION WITH STANDARD INTRAOCULAR LENS IMPLANT Left 1/25/2016    Procedure: PHACOEMULSIFICATION WITH STANDARD INTRAOCULAR LENS IMPLANT;  Surgeon: Julio César Wallace MD;  Location: [facility] OR     PHACOEMULSIFICATION WITH STANDARD INTRAOCULAR LENS IMPLANT Right 2/22/2016    Procedure: PHACOEMULSIFICATION WITH STANDARD INTRAOCULAR LENS IMPLANT;  Surgeon: Julio César Wallace MD;  Location: [facility] OR     SURGICAL HISTORY OF -   1961    right hand surgery      TONSILLECTOMY & ADENOIDECTOMY  1967     VASCULAR SURGERY  02/12/2018    PortaCat      SOCIAL HISTORY:  The patient lives in a mobile home in [place], [place].  She is a prior tobacco user, smoking half a pack per day for 25 years.  No alcohol use.      FAMILY HISTORY:  Sister with MI at 72.      LABORATORY DATA:  Reviewed per Epic.  CMP unremarkable.  WBC 14.3, hemoglobin 8.0, but after receiving 1 unit of packed red blood cells up to 9.3, platelets 79.  D-dimer negative.      IMAGING:  As noted in HPI.      VITAL SIGNS:  T 99.3, P 98, /73, R 18, SpO2 93% on room air.      CONSTITUTIONAL: Pt laying in bed, dressed in hospital garb. Appears comfortable. Cooperative with interview.   HEENT: Normocephalic, atraumatic. Negative for conjunctival redness or scleral icterus.  Oral mucosa pink and moist; negative for ulcerations, erythema, or exudates.  Dentition in good repair.   CARDIOVASCULAR: RRR, no murmurs, rubs, or extra heart sounds appreciated. Pulses +2/4 and regular in upper and lower extremities, bilaterally.   RESPIRATORY: No increased work of breathing.  CTA, bilat; no wheezes, rales, or rhonchi appreciated.  GASTROINTESTINAL:  Abdomen soft, non-distended. BS auscultated in all four quadrants. Negative for tenderness to palpation.  No masses or organomegaly noted.  MUSCULOSKELETAL: Chest pain not reproducible  upon palpation. No gross deformities noted. Normal muscle tone.   HEMATOLOGIC/LYMPHATIC/IMMUNOLOGIC: Negative for lower extremity edema, bilaterally.  NEUROLOGIC: Alert and oriented to person, place, and time.  strength intact.   SKIN: Warm, dry, intact. No jaundice noted. Negative for suspicious lesions, rashes, bruising, open sores or abrasions.      ASSESSMENT AND PLAN:  Ines Pickard is a 77-year-old female with past medical history of coronary artery disease with prior stent to the left circumflex, small cell lung cancer with metastasis to the mediastinal lymphatic nodes and adrenal gland, normocytic anemia, hypertension, hyperlipidemia and gastroesophageal reflux disease, who presents as a transfer from [facility] for further evaluation and treatment of an abnormal stress test with concern for unstable angina.  Vitals currently within normal limits.  The patient is currently stable.     Unstable angina  Abnormal stress test:  The patient initially presenting with chest pressure on 05/18 that localized to her substernal region with radiation to her throat, began at rest, relieved with nitroglycerin.  Troponins negative x 4.  Lexiscan showing 2 separate territories of ischemia with moderate area of anterior/anterolateral ischemia of moderate intensity of small to moderate inferolateral area of ischemia of moderate intensity.  Last catheterization was in 12/2014 after patient had an inferior wall MI which showed 3-vessel CAD without left main lesion.  At that time mid left circumflex was stented and she was noted to have 70% ostial RPDA lesion and 90% proximal D1 lesion.   - Admit under inpatient status.   - Continue IV heparin drip initiated at [facility].   - Continue nitro patch.   - Echocardiogram ordered.     - Cardiology consulted.   - Cardiac diet ordered.   - CBC and BMP in the a.m.   - Monitor on telemetry.   - Continue PTA aspirin 81 mg p.o. daily, Lipitor 40 mg p.o. daily, lisinopril 5  mg p.o. daily, Toprol-XL 50 mg p.o. daily, p.r.n. nitroglycerin available.      Small cell lung cancer with metastasis:  The patient followed by Dr. Miner of oncology. History of left lung cancer with mediastinal lymph node and adrenal mets, receiving palliative chemotherapy with carbo/-16 every 3 weeks.  Last dose was 05/10/2018.  Plan for an additional course on 05/29 and none thereafter.  This has been stable.     - Defer further management to outpatient specialist.     Normocytic anemia:  Secondary to above.  Oncology recommending transfusion for hemoglobin less than 8.5-9 or if patient is symptomatic.  The patient was noted to have a hemoglobin of 6.5 a few days prior after presenting to the ED and was transfused 1 unit packed red blood cells at that time.  Hemoccult negative.  No active signs of bleeding.  No history of GI bleed or ulceration.  No prior endoscopy or colonoscopy.  The patient does take occasional NSAIDs, but this dose will be 200 mg at a time and not frequently.   - Monitor hemoglobin.     - Conditional orders to transfuse for hemoglobin 8.5 or less.     - Consent signed and patient's chart.     Leukocytosis:  WBC 14.3>17.3.  No active signs of infection.  Chest x-ray unremarkable.    - Check procalcitonin   - UA ordered  - Monitor fever curve.   - CBC in the a.m.      Lactic acidosis: Marginally elevated at 2.1. Triggered by tachycardia on admission (since resolved) and leukocytosis.   -- Repeat lactic acid in the AM     Hypertension  Hyperlipidemia:  Continue PTA meds as above.     GERD:  IV Protonix ordered.     DVT prophylaxis:  The patient on heparin.      CODE STATUS:  DNR/DNI.  This was confirmed with patient at bedside.      The patient was seen and assessed with Dr. Ramírez of the hospitalist service who agrees with the plan as outlined above.         ADONIS RAMÍREZ MD       As dictated by RIO ELIZABETH PA-C            D: 05/18/2018   T: 05/18/2018   MT: [place]      Name:     CORDELL  ELIZABETH   MRN:      [id number]        Account:      [id number]   :      1940        Admitted:     2018                   Document: [id number]

## 2018-05-19 NOTE — PROVIDER NOTIFICATION
MD Notification    Notified Person: MD    Notified Person Name: Carolin    Notification Date/Time: 2139    Notification Interaction: Text page    Purpose of Notification: Critical lab - Lactic Acid 2.1    Orders Received: Repeat Lactic Acid in AM    Comments:

## 2018-05-19 NOTE — PLAN OF CARE
Problem: Patient Care Overview  Goal: Plan of Care/Patient Progress Review  Outcome: No Change  A&O, denies CP/SOB. Up w/SBA to BR, some BARNES. LS: clear. HR 's, improved to 80's after BB. VSS on RA. Heparin @ 750u/hr, next 10a tomorrow. Tylenol given at HS for generalized aches/pains. Pt request ativan at HS for increased anxiety. Slept between cares. Lactic acid elevated 2.1, MD's notified- trending down to 2.0 this AM. Continue to monitor. Plan for: Cards consult, and echo. Still need UA.

## 2018-05-19 NOTE — CONSULTS
Consult Date:  05/19/2018      REASON FOR CONSULTATION:  Chest pains, concerning for unstable angina.      HISTORY OF PRESENT ILLNESS:  Ms. Pickard is a 77-year-old female with a background history of hypertension, hyperlipidemia, esophageal reflux, known coronary artery disease, status post previous myocardial infarction and PCI x1 to the left circumflex (2014) with moderate atherosclerotic disease burden elsewhere, and small cell lung carcinoma, currently receiving palliative chemotherapy.      Ms. Pickard arrived to [facility] following onset of substernal chest discomfort beginning on the 17th at 8:00 a.m.  The chest pain episode lasted approximately 1 hour.  She describes it as moderate intensity, retrosternal chest pain with radiation to her throat.  The chest pain began at rest and responded well to sublingual nitroglycerin.  Associated symptoms did include some lightheadedness but no associated nausea, vomiting, diaphoresis or shortness of breath.        She presented to the Emergency Department and underwent an ischemic evaluation which was unremarkable.  She later underwent a Lexiscan which showed 2 separate territories of ischemia including moderate area of anterior and anterolateral ischemia with moderate to small area of inferolateral ischemia.  She was subsequently transferred to [facility] for further evaluation.      Vital signs are currently within normal limits.  CBC reveals a platelet count of 58, hemoglobin 9.3, white blood cell count 16.9, creatinine is 0.49.  Electrolyte panel otherwise unremarkable.  Total protein is low but liver function and enzymes are within acceptable limits.  Troponin is negative x3.  D-dimer is negative.     Chest x-ray reveals a positive chemotherapy port, some bibasilar atelectasis, no infiltrates, normal cardiac size, calcified tortuous aorta.      Transthoracic echocardiogram reveals normal LV size, LVEF of 55-60%.  No regional wall  motion abnormalities.  Normal RV size and function.  No pericardial effusion.  No significant valvular heart disease.      Lexiscan reveals anterolateral ischemic changes of moderate intensity along with small area of inferolateral ischemic change.  Otherwise unremarkable study.     Latest coronary angiogram data from 2014 reveals a 90% occlusive disease in D1, 70% occlusive disease in the right PDA, moderate disease within the middle RCA and proximal LAD.  At this time, there was a PCI of 100% occlusive left circumflex lesion that was successful.      Social History     Social History     Marital status:      Spouse name: N/A     Number of children: N/A     Years of education: N/A     Occupational History     Not on file.     Social History Main Topics     Smoking status: Former Smoker     Packs/day: 0.50     Types: Cigarettes     Quit date: 12/13/2014     Smokeless tobacco: Never Used     Alcohol use No     Drug use: No     Sexual activity: Not Currently     Other Topics Concern     Parent/Sibling W/ Cabg, Mi Or Angioplasty Before 65f 55m? Yes     Social History Narrative     Family History   Problem Relation Age of Onset     CANCER Brother      HEART DISEASE Brother      CANCER Mother      Respiratory Father      HEART DISEASE Father      MI      ROS negative otherwise negative except what is already note in the HPI     PHYSICAL EXAMINATION:   GENERAL:  Unwell-appearing female, appears older than stated age, friendly, sensorium clear, cooperative, lying supine in bed.     HEENT:  No major abnormalities.     NECK:  Nonelevated JVP.   LUNGS:  Positive rales in bilateral lung bases that did clear with inspiration.   CARDIOVASCULAR:  Normal S1 and S2.  No murmurs, rubs or gallops.   ABDOMEN:  Soft, nontender, nondistended.  No precordial heaves.   EXTREMITIES:  Mild edema.  Acceptable perfusion.      ASSESSMENT AND PLAN:   1.  Chest pain episodes, possibly secondary to myocardial ischemia.   2.  Positive  stress testing.     3.  Known coronary artery disease, status post PCI to the left circumflex, .   4.  Small cell lung cancer, on palliative chemotherapy.   5.  Hypertension.   6.  Hyperlipidemia.   7.  Gastroesophageal reflux disease.   8.  Thrombocytopenia and anemia.        Ms. Landa arrives to [facility] with a positive stress test and chest pains that may be secondary to myocardial ischemia.  However, her overall workup has been reassuring including a relatively benign-appearing ECG and a transthoracic echocardiogram study.  Her stress test does indicate the presence of significant coronary artery disease.  In the absence of a troponin elevation and known small cell lung cancer receiving active chemotherapy, I would like to defer coronary angiography evaluation, plus/minus PCI.  I feel that invasive intervention could potentially cause more problems/difficulties than conceivable benefit.     I think a pure medical management approach will be the most ideal for Ms. Landa.  I do not think further testing or invasive procedures will be needed for her at least for the time being.     Moving forward, I think we should discontinue her IV heparin drip.  She should remain on aspirin and Lipitor.  We will increase her beta blockade to 75 mg of metoprolol succinate daily.  I will continue her lisinopril at 5 mg daily.  We will remove her nitropatch and provide oral isosorbide mononitrate therapy starting at 30 mg daily.      Thank you for this consultation.  We will follow along.  If there are any ongoing questions or concerns, feel free to contact the cardiology consult service.         MAEGAN RODRIGEZ MD             D: 2018   T: 2018   MT: [place]      Name:     ELIZABETH LANDA   MRN:      [id number]        Account:       [id number]   :      1940           Consult Date:  2018      Document: [id number]

## 2018-05-19 NOTE — PLAN OF CARE
"Problem: Patient Care Overview  Goal: Plan of Care/Patient Progress Review  PT/CR: CR orders received for \"ambulate and increase workload to see if she has angina\", evaluation completed, treatment initiated. Pt is a 76yo female admitted for evaluation of chest pain. PMH significant for HTN, HLD, GERD, known CAD s/p previous MI and PCI x1 to the left circumflex (2014), and small cell lung carcinoma currently receiving palliative chemotherapy. Pt lives alone in a mobile home with 6 steps and B rails to enter. Pt reports she's been fairly sedentary since beginning her chemotherapy. Pt reports IND without AD at baseline, IND with ADLs/IADLs except sometimes will ask sister ([place] park)/neighbors to assist with transport/errands/groceries; pt does still drive at times.    Discharge Planner PT   Patient plan for discharge: None stated  Current status: Pt sitting up in chair upon arrival, agreeable to CR. Pt IND with transfers and SBA for ambulation. Pt with poor tolerance for activity today, ambulating 70' then needing urgent seated rest with pt reporting symptoms including chest \"heaviness\" with pain radiating down sternum and laterally around ribs/sides, nausea, and weakness. Symptoms reduced with ~5 min seated rest but did not fully subside, assisted pt back to room. RN and Cardiology updated. See VSFS for details.  Barriers to return to prior living situation: Decreased activity tolerance, symptomatic with activity  Recommendations for discharge: From a mobility perspective pt should be able to return to her mobile home, however, does not appear pt has the functional activity tolerance to care for herself at this time as she is still symptomatic with low levels of activity. Pt may benefit from TCU pending medical POC.   Rationale for recommendations: See above.       Entered by: Estrellita Cameron 05/19/2018 1:06 PM           "

## 2018-05-19 NOTE — PROGRESS NOTES
[facility]    Sepsis Evaluation Progress Note    Date of Service: 05/18/2018    I was called to see Ines Pickard due to abnormal vital signs triggering the Sepsis SIRS screening alert. She is not known to have an infection.     Physical Exam    Vital Signs:  Temp: 97.7  F (36.5  C) Temp src: Oral BP: 131/80 Pulse: 116 Heart Rate: 93 Resp: 16 SpO2: 93 % O2 Device: None (Room air)      Lab:No results found for: LACT    The patient is at baseline mental status.    The rest of their physical exam is significant for negative for acute change--refer to my H&P for complete details on physical exam.    Assessment and Plan    The SIRS and exam findings are likely due to etiology unclear at this time, there is no sign of sepsis at this time.    Disposition: The patient will remain on the current unit. We will continue to monitor this patient closely.    Repeat lactic acid in the AM    Shweta Worthy PA-C

## 2018-05-20 ENCOUNTER — APPOINTMENT (OUTPATIENT)
Dept: GENERAL RADIOLOGY | Facility: CLINIC | Age: 78
DRG: 303 | End: 2018-05-20
Attending: INTERNAL MEDICINE
Payer: MEDICARE

## 2018-05-20 ENCOUNTER — APPOINTMENT (OUTPATIENT)
Dept: PHYSICAL THERAPY | Facility: CLINIC | Age: 78
DRG: 303 | End: 2018-05-20
Attending: INTERNAL MEDICINE
Payer: MEDICARE

## 2018-05-20 LAB
ANION GAP SERPL CALCULATED.3IONS-SCNC: 6 MMOL/L (ref 3–14)
BUN SERPL-MCNC: 12 MG/DL (ref 7–30)
CALCIUM SERPL-MCNC: 8.5 MG/DL (ref 8.5–10.1)
CHLORIDE SERPL-SCNC: 106 MMOL/L (ref 94–109)
CO2 SERPL-SCNC: 28 MMOL/L (ref 20–32)
CREAT SERPL-MCNC: 0.5 MG/DL (ref 0.52–1.04)
ERYTHROCYTE [DISTWIDTH] IN BLOOD BY AUTOMATED COUNT: 17.8 % (ref 10–15)
GFR SERPL CREATININE-BSD FRML MDRD: >90 ML/MIN/1.7M2
GLUCOSE SERPL-MCNC: 94 MG/DL (ref 70–99)
HCT VFR BLD AUTO: 27.6 % (ref 35–47)
HGB BLD-MCNC: 9.3 G/DL (ref 11.7–15.7)
MCH RBC QN AUTO: 28.7 PG (ref 26.5–33)
MCHC RBC AUTO-ENTMCNC: 33.7 G/DL (ref 31.5–36.5)
MCV RBC AUTO: 85 FL (ref 78–100)
PLATELET # BLD AUTO: 55 10E9/L (ref 150–450)
POTASSIUM SERPL-SCNC: 3.5 MMOL/L (ref 3.4–5.3)
RBC # BLD AUTO: 3.24 10E12/L (ref 3.8–5.2)
SODIUM SERPL-SCNC: 140 MMOL/L (ref 133–144)
WBC # BLD AUTO: 15.2 10E9/L (ref 4–11)

## 2018-05-20 PROCEDURE — 99232 SBSQ HOSP IP/OBS MODERATE 35: CPT | Performed by: INTERNAL MEDICINE

## 2018-05-20 PROCEDURE — 99207 ZZC CDG-MDM COMPONENT: MEETS LOW - DOWN CODED: CPT | Performed by: INTERNAL MEDICINE

## 2018-05-20 PROCEDURE — A9270 NON-COVERED ITEM OR SERVICE: HCPCS | Mod: GY | Performed by: PHYSICIAN ASSISTANT

## 2018-05-20 PROCEDURE — 85027 COMPLETE CBC AUTOMATED: CPT | Performed by: INTERNAL MEDICINE

## 2018-05-20 PROCEDURE — A9270 NON-COVERED ITEM OR SERVICE: HCPCS | Mod: GY | Performed by: INTERNAL MEDICINE

## 2018-05-20 PROCEDURE — 97110 THERAPEUTIC EXERCISES: CPT | Mod: GP | Performed by: PHYSICAL THERAPIST

## 2018-05-20 PROCEDURE — 40000193 ZZH STATISTIC PT WARD VISIT: Performed by: PHYSICAL THERAPIST

## 2018-05-20 PROCEDURE — 25000132 ZZH RX MED GY IP 250 OP 250 PS 637: Mod: GY | Performed by: PHYSICIAN ASSISTANT

## 2018-05-20 PROCEDURE — 25000128 H RX IP 250 OP 636: Performed by: INTERNAL MEDICINE

## 2018-05-20 PROCEDURE — 71045 X-RAY EXAM CHEST 1 VIEW: CPT

## 2018-05-20 PROCEDURE — 80048 BASIC METABOLIC PNL TOTAL CA: CPT | Performed by: INTERNAL MEDICINE

## 2018-05-20 PROCEDURE — 25000125 ZZHC RX 250: Performed by: PHYSICIAN ASSISTANT

## 2018-05-20 PROCEDURE — 21000001 ZZH R&B HEART CARE

## 2018-05-20 PROCEDURE — 25000132 ZZH RX MED GY IP 250 OP 250 PS 637: Mod: GY | Performed by: INTERNAL MEDICINE

## 2018-05-20 RX ORDER — METOPROLOL SUCCINATE 100 MG/1
100 TABLET, EXTENDED RELEASE ORAL DAILY
Status: DISCONTINUED | OUTPATIENT
Start: 2018-05-21 | End: 2018-05-21 | Stop reason: HOSPADM

## 2018-05-20 RX ORDER — METOPROLOL SUCCINATE 25 MG/1
25 TABLET, EXTENDED RELEASE ORAL ONCE
Status: COMPLETED | OUTPATIENT
Start: 2018-05-20 | End: 2018-05-20

## 2018-05-20 RX ADMIN — ATORVASTATIN CALCIUM 40 MG: 40 TABLET, FILM COATED ORAL at 08:46

## 2018-05-20 RX ADMIN — ACETAMINOPHEN 650 MG: 325 TABLET ORAL at 20:10

## 2018-05-20 RX ADMIN — LORAZEPAM 0.5 MG: 0.5 TABLET ORAL at 08:47

## 2018-05-20 RX ADMIN — PANTOPRAZOLE SODIUM 40 MG: 40 INJECTION, POWDER, FOR SOLUTION INTRAVENOUS at 08:46

## 2018-05-20 RX ADMIN — ASPIRIN 81 MG: 81 TABLET, COATED ORAL at 08:46

## 2018-05-20 RX ADMIN — METOPROLOL SUCCINATE 75 MG: 50 TABLET, EXTENDED RELEASE ORAL at 08:46

## 2018-05-20 RX ADMIN — ISOSORBIDE MONONITRATE 30 MG: 30 TABLET, EXTENDED RELEASE ORAL at 08:47

## 2018-05-20 RX ADMIN — METOPROLOL SUCCINATE 25 MG: 25 TABLET, EXTENDED RELEASE ORAL at 11:28

## 2018-05-20 RX ADMIN — HEPARIN, PORCINE (PF) 10 UNIT/ML INTRAVENOUS SYRINGE 5 ML: at 08:46

## 2018-05-20 RX ADMIN — LISINOPRIL 5 MG: 5 TABLET ORAL at 08:47

## 2018-05-20 RX ADMIN — ACETAMINOPHEN 650 MG: 325 TABLET ORAL at 11:27

## 2018-05-20 RX ADMIN — LORAZEPAM 0.5 MG: 0.5 TABLET ORAL at 20:10

## 2018-05-20 ASSESSMENT — PAIN DESCRIPTION - DESCRIPTORS: DESCRIPTORS: HEADACHE

## 2018-05-20 NOTE — PLAN OF CARE
"Problem: Patient Care Overview  Goal: Plan of Care/Patient Progress Review  Discharge Planner PT   Patient plan for discharge: None stated    Current status: Resting vitals: /74, HR 93, SpO2 97% on RA. Pt is IND with mobility and with improved activity tolerance today. Pt tolerated 12min intermittent standing activity including hallway ambulation and 10 stairs with stable CV response except hypertensive response to activity. Discussed with RN and Cardiology. Activity vitals: /102, HRmax 112, SpO2 96%. Pt denies adverse symptoms throughout activity except reporting SOB but states \"not more than usual\". Pt reports she feels main limiter is BLE fatigue.    Barriers to return to prior living situation: Decreased activity tolerance    Recommendations for discharge: Home with Home PT    Rationale for recommendations: Pt progressing with activity tolerance. Pt appears safe to disch to home with assist from her neighbors/sister as prior. Pt would benefit from Home PT to progress functional strength, activity tolerance, and balance for improved safety and tolerance for activity. Pt currently does not qualify for OP CR with diagnosis of Unstable Angina, pt also verbalized that she does not think she could tolerate OP therapies stating too fatigued from chemo treatment in the past months.       Entered by: Estrellita Cameron 05/20/2018 11:23 AM           "

## 2018-05-20 NOTE — PROGRESS NOTES
[facility]    Cardiology Progress Note     Assessment & Plan   Ms. Pickard is a 77-year-old female with a background history of hypertension, hyperlipidemia, esophageal reflux, known coronary artery disease, status post previous myocardial infarction and PCI x1 to the left circumflex (2014) with moderate atherosclerotic disease burden elsewhere, and small cell lung carcinoma, currently receiving palliative chemotherapy.       Ms. Pickard arrives to [facility] with a positive stress test and chest pains that may be secondary to myocardial ischemia.  However, her overall workup has been reassuring.  Patient continues have exertional symptoms. It is unclear if her continued exertional symptoms and previous chest pains are secondary to ischemic heart disease.      Plan is to try a more conservative Rx management approach given her reassuring cardiac w/u, uncertainty whether her symptoms represent ischemia, and her risk of cath/PCI +/- DAPT complications that could manifest if we were to pursue and invasive management approach.     Patient appears to be agreement with this approach.  However if it becomes clear that she has limiting angina symptoms despite Rx therapy, we may want to reconsider this strategy and proceed with a coronary angiogram.    1.  Chest pain episodes, possibly secondary to myocardial ischemia.   2.  Positive stress testing.     3.  Known coronary artery disease, status post PCI to the left circumflex, 2014.   4.  Small cell lung cancer, stage IV, on palliative chemotherapy.   5.  Hypertension.   6.  Hyperlipidemia.   7.  Gastroesophageal reflux disease.   8.  Thrombocytopenia and anemia.       - continue ASA 81 mg Q daily  - continue Lipitor 40 mg Q daily  - will increase metoprolol succinate to 100 mg Q daily  - will continue Imdur 30 mg Q daily  - continue lisinopril 5 mg Q daily  - will get chest x-ray to re-evaluate for undisclosed cardiac congestion (+  bilateral rales, + BARNES)    Mauricio Alarcon MD    Interval History   Patient had some exertional symptoms including leg weakness and SOB during ambulation yesterday.  No major overnight events.      Patient reports feeling well this morning.  Makes no specific complaints.    Physical Exam   Temp: 97.7  F (36.5  C) Temp src: Oral BP: 122/74 Pulse: 86 Heart Rate: 86 Resp: 16 SpO2: 97 % O2 Device: None (Room air)    Vitals:    05/18/18 2103 05/19/18 0500 05/20/18 0648   Weight: 73.1 kg (161 lb 3.2 oz) 72.2 kg (159 lb 1.6 oz) 71.9 kg (158 lb 9.6 oz)     Vital Signs with Ranges  Temp:  [97.5  F (36.4  C)-98.5  F (36.9  C)] 97.7  F (36.5  C)  Pulse:  [81-86] 86  Heart Rate:  [65-98] 86  Resp:  [16] 16  BP: ()/(55-74) 122/74  SpO2:  [90 %-97 %] 97 %  I/O last 3 completed shifts:  In: 743 [P.O.:740; I.V.:3]  Out: 400 [Urine:400]  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)     Need for prophylactic measure     Anemia in neoplastic disease     Chest pain     Unstable angina (H)       GENERAL: Chronically unwell-appearing female,  friendly, sensorium clear, cooperative, lying supine in bed.     HEENT:  No major abnormalities.     NECK:  Nonelevated JVP.   LUNGS:  Positive rales in bilateral lung bases that did clear with inspiration.   CARDIOVASCULAR:  Normal S1 and S2. + mid-peaking soft systolic murmur   ABDOMEN:  Soft, nontender, nondistended.  No precordial heaves.   EXTREMITIES:  Mild edema.  Acceptable perfusion.     Medications     Continuing ACE inhibitor/ARB/ARNI from home medication list OR ACE inhibitor/ARB order already placed during this visit       -  MEDICATION INSTRUCTIONS -       - MEDICATION INSTRUCTIONS -       - MEDICATION INSTRUCTIONS -       - MEDICATION INSTRUCTIONS -         aspirin  81 mg Oral Daily     atorvastatin  40 mg Oral Daily     heparin  5 mL Intracatheter Q28 Days     heparin lock flush  5-10 mL Intracatheter Q24H     isosorbide mononitrate  30 mg Oral Daily     lisinopril  5 mg Oral Daily     metoprolol succinate  75 mg Oral Daily     pantoprazole (PROTONIX) IV  40 mg Intravenous Daily with breakfast     sodium chloride (PF)  3 mL Intracatheter Q8H       Data   Results for orders placed or performed during the hospital encounter of 05/18/18 (from the past 24 hour(s))   Basic metabolic panel   Result Value Ref Range    Sodium 140 133 - 144 mmol/L    Potassium 3.5 3.4 - 5.3 mmol/L    Chloride 106 94 - 109 mmol/L    Carbon Dioxide 28 20 - 32 mmol/L    Anion Gap 6 3 - 14 mmol/L    Glucose 94 70 - 99 mg/dL    Urea Nitrogen 12 7 - 30 mg/dL    Creatinine 0.50 (L) 0.52 - 1.04 mg/dL    GFR Estimate >90 >60 mL/min/1.7m2    GFR Estimate If Black >90 >60 mL/min/1.7m2    Calcium 8.5 8.5 - 10.1 mg/dL   CBC with platelets   Result Value Ref Range    WBC 15.2 (H) 4.0 - 11.0 10e9/L    RBC Count 3.24 (L) 3.8 - 5.2 10e12/L    Hemoglobin 9.3 (L) 11.7 - 15.7 g/dL    Hematocrit 27.6 (L) 35.0 - 47.0 %    MCV 85 78 - 100 fl    MCH 28.7 26.5 - 33.0 pg    MCHC 33.7 31.5 - 36.5 g/dL    RDW 17.8 (H) 10.0 - 15.0 %    Platelet Count 55 (L) 150 - 450 10e9/L       Recent Labs  Lab 05/20/18  0640 05/19/18  0610 05/18/18  2037 05/18/18  0625 05/18/18  0000  05/17/18  1836 05/17/18  1105  05/15/18  0450   WBC 15.2* 16.9* 17.3*  --   --   --   --  14.3*  < > 6.4   HGB 9.3* 9.5* 9.7* 9.3*  --   < >  --  8.0*  < > 6.5*   MCV 85 85 85  --   --   --   --  86  < > 87   PLT 55* 58* 64*  --   --   --   --  79*  < > 126*    139 140  --   --   --   --  139  --  140   POTASSIUM 3.5 3.5 3.5  --   --   --   --  3.5  --  3.9   CHLORIDE 106 105 104  --   --   --   --  106  --  106    CO2 28 27 27  --   --   --   --  26  --  24   BUN 12 13 13  --   --   --   --  15  --  17   CR 0.50* 0.49* 0.47*  --   --   --   --  0.48*  --  0.42*   ANIONGAP 6 7 9  --   --   --   --  7  --  10   AMADO 8.5 8.5 8.7  --   --   --   --  8.6  --  9.0   GLC 94 85 114*  --   --   --   --  101*  --  89   ALBUMIN  --   --   --   --   --   --   --  3.3*  --  3.4   PROTTOTAL  --   --   --   --   --   --   --  5.8*  --  5.8*   BILITOTAL  --   --   --   --   --   --   --  0.6  --  0.9   ALKPHOS  --   --   --   --   --   --   --  97  --  89   ALT  --   --   --   --   --   --   --  13  --  10   AST  --   --   --   --   --   --   --  11  --  5   TROPI  --   --   --  <0.015 <0.015  --  <0.015 <0.015  < >  --    < > = values in this interval not displayed.  No results found for this or any previous visit (from the past 24 hour(s)).

## 2018-05-20 NOTE — PLAN OF CARE
Problem: Patient Care Overview  Goal: Plan of Care/Patient Progress Review  Outcome: No Change  Patient A&Ox4. VSS on RA, Tele NSR. Denies chest pain/SOB. Up independently to bathroom, voiding adequately. Patient slept between cares. Will continue to monitor.

## 2018-05-20 NOTE — CONSULTS
New Patient Oncology Consult    Requesting Provider: Dr. Emanuel Coe, Hospitalist service    Reason for consult: small cell lung cancer prognosis    HPI  The patient initially presenting with chest pressure on 05/18 that localized to her substernal region with radiation to her throat, began at rest, relieved with nitroglycerin.  Troponins negative x 4.  Lexiscan showing 2 separate territories of ischemia with moderate area of anterior/anterolateral ischemia of moderate intensity of small to moderate inferolateral area of ischemia of moderate intensity.      She is pain free now. She is still very weak, no SOB, no pain.     She was dx via EUS 01/30/2017 FNA was done on 2 hypoechoic left adrenal mass-   Consistent with metastatic small cell carcinoma.   presented with 5 months duration of hoarseness.  She was evaluated by ENT.  Direct laryngoscopy revealed left vocal cord paralysis.    CT chest with contrast 01/10/2018 identified a 2.3 cm left upper lobe nodule extending to the hilum, suspicious for malignancy, left hilar mediastinal adenopathy with mass-like soft tissue thickening extending along the inferior aspect of the aortic arch likely involving the recurrent laryngeal nerve in this location, left adrenal nodule 1.5 cm undetermined, compression on L1 vertebral body.      She made informed decision to proceed with palliative chemo with carbo/-16 2/2018.   She had drastic PET response in 4/2018 after 3 cycles  and tx is continued. C5D1 5/7 with neulasta on 5/10    PMH  CAD, last catheterization was in 12/2014 after patient had an inferior wall MI which showed 3-vessel CAD without left main lesion.  At that time mid left circumflex was stented and she was noted to have 70% ostial RPDA lesion and 90% proximal D1 lesion.   Reflux, hypertension, cataracts, hyperlipidemia.     MEDICATIONS:  Reviewed in Epic system.       SOCIAL HISTORY:  She lives in her own house.  She has 3 boys. She quit smoking years back.   "Denies alcohol abuse.       FAMILY HISTORY:  Positive for mom who had Hodgkin lymphoma.  Brother had unknown type of cancer.     ROS  ROS negative otherwise negative except what is already note in the HPI       PE  Blood pressure 98/56, pulse 81, temperature 98  F (36.7  C), temperature source Oral, resp. rate 16, height 1.6 m (5' 3\"), weight 72.2 kg (159 lb 1.6 oz), SpO2 94 %, not currently breastfeeding.    GENERAL APPEARANCE:  Elderly lady, looks like her stated age, not in acute distress. Pale, laying bed  HEENT: The patient is normocephalic, atraumatic. Pupils are equally reactive to light.  Sclerae are anicteric.  Moist oral mucosa.  negative posterior pharynx.   NECK:  Supple.  No jugular venous distention.  Thyroid is not palpable.   LYMPH NODES:  Superficial lymphadenopathy is not appreciable in the bilateral cervical, supraclavicular, axillary or inguinal areas.   CARDIOVASCULAR:  S1, S2 regular with no murmurs or gallops.  No carotid or abdominal bruits.   PULMONARY:  Lungs are clear to auscultation and percussion bilaterally.  There is no wheezing or rhonchi.   GASTROINTESTINAL:  Abdomen is soft, nontender.  No hepatosplenomegaly.  No signs of ascites.  No mass appreciable.   MUSCULOSKELETAL/EXTREMITIES:  No edema.  No cyanotic changes.  No signs of joint deformity.  No lymphedema.  No spinal or paraspinal tenderness.  No CVA tenderness.   NEUROLOGIC:  Cranial nerves II-XII are grossly intact.  Sensation intact.  Muscle strength and muscle tone symmetrical, 5/5 throughout.   SKIN:  No petechiae.  No rash.  No signs of cellulitis.     LABORATORY DATA REVIEWED:   Wbc 17, hb 9.7, PL 64, neutrophilia, monocytosis, immature granulocytes 0.8    Current Image data reviewed  CXR 5/17: no acute pathology    Old data reviewed with summary  PET 4/2018 post 3 cycles of carbo/vp16  1. Decrease in size and FDG uptake in the left upper lobe nodule consistent with improved lung cancer.  2. Improvement in the previous " left hilar and mediastinal adenopathy, currently there is small residual hypermetabolic adenopathy in the aorticopulmonary window consistent with residual mayela metastasis.  3. Resolution of previous hypermetabolic left adrenal nodule consistent with resolved metastasis.     PET 2/2018  1. Hypermetabolic left upper lobe nodule is consistent with malignancy, most likely bronchogenic carcinoma.  2. Hypermetabolic left hilar and mediastinal adenopathy is consistent  with metastatic disease.  3. Hypermetabolic left adrenal nodule is suspicious for metastatic disease.  4. There is a new 3.4 cm high-density structure abutting the left adrenal gland, suspicious for interval development of adrenal hemorrhage.       A/p   1.   1/2018 dx extensive stage SCLC on palliative chemo, s/p drastic PET response post 3 cycles of tx.   She is well informed the incurability of her cancer, prognosis is slim. However, due to her very good PET response after 3 cycles of palliative chemo, her OS may be 6-12 + months versus less than 6 months in no responders.     She is to continue her tx after this acute illness resolve. The goal of tx is prolong life, not for cure.      2. CAD, unstable angina - no pain free on conservative measure.   In light of the above, conservative management is preferred over aggressive intervention. Pt is in agreement.    3. Leukocytosis with neutrophilia with immature granulocytes.   No sign of infection, yet need to watch it.   Most is indicative of growth factor effect. She is s/p neulasta on 5/10.    4. Anemia and thrombocytopenia.  Most consistent with chemo effect, will monitor for now.     Hem/onc service will  f/u on needed base during this admission.

## 2018-05-20 NOTE — PROGRESS NOTES
[facility]  Hospitalist Progress Note  Emanuel Coe MD  05/20/2018    Assessment & Plan   ASSESSMENT AND PLAN:  Ines Pickard is a 77-year-old female with past medical history of coronary artery disease with prior stent to the left circumflex, small cell lung cancer with metastasis to the mediastinal lymphatic nodes and adrenal gland, normocytic anemia, hypertension, hyperlipidemia and gastroesophageal reflux disease, who presents as a transfer from [facility] for further evaluation and treatment of an abnormal stress test with concern for unstable angina.  Vitals currently within normal limits.  The patient is currently stable.      Chest pain  Abnormal stress test:  The patient initially presenting with chest pressure on 05/18 that localized to her substernal region with radiation to her throat, began at rest, relieved with nitroglycerin.  Troponins negative x 4.  Lexiscan showing 2 separate territories of ischemia with moderate area of anterior/anterolateral ischemia of moderate intensity of small to moderate inferolateral area of ischemia of moderate intensity.  Last catheterization was in 12/2014 after patient had an inferior wall MI which showed 3-vessel CAD without left main lesion.  At that time mid left circumflex was stented and she was noted to have 70% ostial RPDA lesion and 90% proximal D1 lesion.   - serial troponin negative  - Echocardiogram reviewed  - platelet counts down, 58 to 55 and leveling off, off heparin  - oncology consult reviewed with respect to prognosis and disease state.  - with ambulation, no evidence of recurrence of her initial chest pain that prompted the abnormal stress test.  - plan to optomize medical therapy, antianginal therapy per cardiology.  - Continue PTA aspirin 81 mg p.o. daily, Lipitor 40 mg p.o. daily, lisinopril 5 mg p.o. daily, Toprol-XL 50 mg p.o. With titrating/optomizing medications. p.r.n. nitroglycerin available.    - continue  cardiac rehab     Small cell lung cancer with metastasis:  The patient followed by Dr. Miner of oncology. History of left lung cancer with mediastinal lymph node and adrenal mets, receiving palliative chemotherapy with carbo/-16 every 3 weeks.  Last dose was 05/10/2018.  Plan for an additional course on 05/29 and none thereafter.  This has been stable.     - appreciate oncology consutation  - continue conservative cardiac treatment.     Normocytic anemia:  Secondary to above.  Oncology recommending transfusion for hemoglobin less than 8.5-9 or if patient is symptomatic.  The patient was noted to have a hemoglobin of 6.5 a few days prior after presenting to the ED and was transfused 1 unit packed red blood cells at that time.  Hemoccult negative.  No active signs of bleeding.  No history of GI bleed or ulceration.  No prior endoscopy or colonoscopy.  The patient does take occasional NSAIDs, but this dose will be 200 mg at a time and not frequently.   - stable hemoglobin.     - Conditional orders to transfuse for hemoglobin 8.5 or less.     - Consent signed and patient's chart.      Leukocytosis:  WBC 14.3>17.3.  No active signs of infection.  Chest x-ray unremarkable.    - Check procalcitonin is 0.21  - UA is negative  - no fever  - did get neulasta  - continue monitoring for infection     Lactic acidosis: Marginally elevated at 2.1. Triggered by tachycardia on admission (since resolved) and leukocytosis.   -- Repeat lactic acid from 2.1 to 2     Hypertension  Hyperlipidemia:  Continue PTA meds as above.      GERD:    - Protonix ordered.      DVT prophylaxis:    - PCD      CODE STATUS:  DNR/DNI.     Disposition  - one more day of cardiac rehab and medical optomization  - possible discharge home on 5/21    Interval History   - no overnight acute events  - no chest pain or sob  - discussed with cardiology    -Data reviewed today: I reviewed all new labs and imaging over the last 24 hours. I personally reviewed no images  "or EKG's today.    Physical Exam   Heart Rate: 86, Blood pressure 122/74, pulse 86, temperature 97.7  F (36.5  C), temperature source Oral, resp. rate 16, height 1.6 m (5' 3\"), weight 71.9 kg (158 lb 9.6 oz), SpO2 97 %, not currently breastfeeding.  Vitals:    05/18/18 2103 05/19/18 0500 05/20/18 0648   Weight: 73.1 kg (161 lb 3.2 oz) 72.2 kg (159 lb 1.6 oz) 71.9 kg (158 lb 9.6 oz)     Vital Signs with Ranges  Temp:  [97.5  F (36.4  C)-98.5  F (36.9  C)] 97.7  F (36.5  C)  Pulse:  [81-86] 86  Heart Rate:  [65-98] 86  Resp:  [16] 16  BP: ()/(55-74) 122/74  SpO2:  [90 %-97 %] 97 %  I/O's Last 24 hours  I/O last 3 completed shifts:  In: 743 [P.O.:740; I.V.:3]  Out: 400 [Urine:400]    Constitutional: Awake, alert, cooperative, no apparent distress  Respiratory: Few right basilar crackles.  Cardiovascular: Regular rate and rhythm, normal S1 and S2, and + murmur noted  GI: Normal bowel sounds, soft, non-distended, non-tender  Skin/Integumen: No rashes, no cyanosis, no edema  Other:      Medications   All medications were reviewed.    Continuing ACE inhibitor/ARB/ARNI from home medication list OR ACE inhibitor/ARB order already placed during this visit       - MEDICATION INSTRUCTIONS -       - MEDICATION INSTRUCTIONS -       - MEDICATION INSTRUCTIONS -       - MEDICATION INSTRUCTIONS -         aspirin  81 mg Oral Daily     atorvastatin  40 mg Oral Daily     heparin  5 mL Intracatheter Q28 Days     heparin lock flush  5-10 mL Intracatheter Q24H     isosorbide mononitrate  30 mg Oral Daily     lisinopril  5 mg Oral Daily     metoprolol succinate  75 mg Oral Daily     pantoprazole (PROTONIX) IV  40 mg Intravenous Daily with breakfast     sodium chloride (PF)  3 mL Intracatheter Q8H        Data     Recent Labs  Lab 05/20/18  0640 05/19/18  0610 05/18/18  2037 05/18/18  0625 05/18/18  0000  05/17/18  1836 05/17/18  1105  05/15/18  0450   WBC 15.2* 16.9* 17.3*  --   --   --   --  14.3*  < > 6.4   HGB 9.3* 9.5* 9.7* 9.3*  " --   < >  --  8.0*  < > 6.5*   MCV 85 85 85  --   --   --   --  86  < > 87   PLT 55* 58* 64*  --   --   --   --  79*  < > 126*    139 140  --   --   --   --  139  --  140   POTASSIUM 3.5 3.5 3.5  --   --   --   --  3.5  --  3.9   CHLORIDE 106 105 104  --   --   --   --  106  --  106   CO2 28 27 27  --   --   --   --  26  --  24   BUN 12 13 13  --   --   --   --  15  --  17   CR 0.50* 0.49* 0.47*  --   --   --   --  0.48*  --  0.42*   ANIONGAP 6 7 9  --   --   --   --  7  --  10   AMADO 8.5 8.5 8.7  --   --   --   --  8.6  --  9.0   GLC 94 85 114*  --   --   --   --  101*  --  89   ALBUMIN  --   --   --   --   --   --   --  3.3*  --  3.4   PROTTOTAL  --   --   --   --   --   --   --  5.8*  --  5.8*   BILITOTAL  --   --   --   --   --   --   --  0.6  --  0.9   ALKPHOS  --   --   --   --   --   --   --  97  --  89   ALT  --   --   --   --   --   --   --  13  --  10   AST  --   --   --   --   --   --   --  11  --  5   TROPI  --   --   --  <0.015 <0.015  --  <0.015 <0.015  < >  --    < > = values in this interval not displayed.    No results found for this or any previous visit (from the past 24 hour(s)).    Emanuel Coe MD  Text Page  (7am to 6pm)

## 2018-05-21 ENCOUNTER — APPOINTMENT (OUTPATIENT)
Dept: PHYSICAL THERAPY | Facility: CLINIC | Age: 78
DRG: 303 | End: 2018-05-21
Attending: INTERNAL MEDICINE
Payer: MEDICARE

## 2018-05-21 VITALS
SYSTOLIC BLOOD PRESSURE: 112 MMHG | HEIGHT: 63 IN | OXYGEN SATURATION: 99 % | BODY MASS INDEX: 28.26 KG/M2 | TEMPERATURE: 98.6 F | RESPIRATION RATE: 16 BRPM | DIASTOLIC BLOOD PRESSURE: 76 MMHG | HEART RATE: 91 BPM | WEIGHT: 159.5 LBS

## 2018-05-21 LAB
ABO + RH BLD: NORMAL
ABO + RH BLD: NORMAL
BLD GP AB SCN SERPL QL: NORMAL
BLD PROD TYP BPU: NORMAL
BLOOD BANK CMNT PATIENT-IMP: NORMAL
ERYTHROCYTE [DISTWIDTH] IN BLOOD BY AUTOMATED COUNT: 18 % (ref 10–15)
HCT VFR BLD AUTO: 27.7 % (ref 35–47)
HGB BLD-MCNC: 9.3 G/DL (ref 11.7–15.7)
MCH RBC QN AUTO: 28.8 PG (ref 26.5–33)
MCHC RBC AUTO-ENTMCNC: 33.6 G/DL (ref 31.5–36.5)
MCV RBC AUTO: 86 FL (ref 78–100)
NUM BPU REQUESTED: 2
PLATELET # BLD AUTO: 72 10E9/L (ref 150–450)
RBC # BLD AUTO: 3.23 10E12/L (ref 3.8–5.2)
SPECIMEN EXP DATE BLD: NORMAL
WBC # BLD AUTO: 14.6 10E9/L (ref 4–11)

## 2018-05-21 PROCEDURE — A9270 NON-COVERED ITEM OR SERVICE: HCPCS | Mod: GY | Performed by: PHYSICIAN ASSISTANT

## 2018-05-21 PROCEDURE — 40000193 ZZH STATISTIC PT WARD VISIT

## 2018-05-21 PROCEDURE — 97116 GAIT TRAINING THERAPY: CPT | Mod: GP

## 2018-05-21 PROCEDURE — 25000132 ZZH RX MED GY IP 250 OP 250 PS 637: Mod: GY | Performed by: INTERNAL MEDICINE

## 2018-05-21 PROCEDURE — 25000125 ZZHC RX 250: Performed by: PHYSICIAN ASSISTANT

## 2018-05-21 PROCEDURE — 25000132 ZZH RX MED GY IP 250 OP 250 PS 637: Mod: GY | Performed by: PHYSICIAN ASSISTANT

## 2018-05-21 PROCEDURE — 99239 HOSP IP/OBS DSCHRG MGMT >30: CPT | Performed by: INTERNAL MEDICINE

## 2018-05-21 PROCEDURE — 99232 SBSQ HOSP IP/OBS MODERATE 35: CPT | Performed by: INTERNAL MEDICINE

## 2018-05-21 PROCEDURE — A9270 NON-COVERED ITEM OR SERVICE: HCPCS | Mod: GY | Performed by: INTERNAL MEDICINE

## 2018-05-21 PROCEDURE — 25000128 H RX IP 250 OP 636: Performed by: INTERNAL MEDICINE

## 2018-05-21 PROCEDURE — 85027 COMPLETE CBC AUTOMATED: CPT | Performed by: INTERNAL MEDICINE

## 2018-05-21 RX ORDER — ISOSORBIDE MONONITRATE 30 MG/1
30 TABLET, EXTENDED RELEASE ORAL DAILY
Qty: 30 TABLET | Refills: 3 | Status: SHIPPED | OUTPATIENT
Start: 2018-05-21 | End: 2018-06-04

## 2018-05-21 RX ORDER — METOPROLOL SUCCINATE 100 MG/1
100 TABLET, EXTENDED RELEASE ORAL DAILY
Qty: 30 TABLET | Refills: 3 | Status: SHIPPED | OUTPATIENT
Start: 2018-05-21 | End: 2018-01-01

## 2018-05-21 RX ADMIN — METOPROLOL SUCCINATE 100 MG: 100 TABLET, EXTENDED RELEASE ORAL at 10:43

## 2018-05-21 RX ADMIN — SODIUM CHLORIDE, PRESERVATIVE FREE 5 ML: 5 INJECTION INTRAVENOUS at 13:31

## 2018-05-21 RX ADMIN — ASPIRIN 81 MG: 81 TABLET, COATED ORAL at 10:44

## 2018-05-21 RX ADMIN — ATORVASTATIN CALCIUM 40 MG: 40 TABLET, FILM COATED ORAL at 10:44

## 2018-05-21 RX ADMIN — ISOSORBIDE MONONITRATE 30 MG: 30 TABLET, EXTENDED RELEASE ORAL at 10:43

## 2018-05-21 RX ADMIN — LISINOPRIL 5 MG: 5 TABLET ORAL at 10:44

## 2018-05-21 RX ADMIN — ACETAMINOPHEN 650 MG: 325 TABLET ORAL at 06:33

## 2018-05-21 RX ADMIN — LORAZEPAM 0.5 MG: 0.5 TABLET ORAL at 06:33

## 2018-05-21 RX ADMIN — PANTOPRAZOLE SODIUM 40 MG: 40 INJECTION, POWDER, FOR SOLUTION INTRAVENOUS at 10:42

## 2018-05-21 RX ADMIN — HEPARIN, PORCINE (PF) 10 UNIT/ML INTRAVENOUS SYRINGE 5 ML: at 06:19

## 2018-05-21 ASSESSMENT — PAIN DESCRIPTION - DESCRIPTORS
DESCRIPTORS: CONSTANT;ACHING
DESCRIPTORS: HEADACHE

## 2018-05-21 NOTE — PLAN OF CARE
Problem: Patient Care Overview  Goal: Plan of Care/Patient Progress Review  Discharge Planner PT   Patient plan for discharge: home with home PT  Current status: I with bed mobility and transfers, supervision-I with gait and stairs, amb 150'x2.  Slow pace, denies chest pain, reports LE weakness and feeling a little SOB.  Pt reports she's probably ambulating more than baseline right now.  Barriers to return to prior living situation: None- pt reports sister able to assist, demonstrates ability to navigate home environment with current functional mobility  Recommendations for discharge: home with home PT  Rationale for recommendations: home PT indicated to address weakness and decreased activity tolerance.       Entered by: Marilyn Herrera 05/21/2018 10:08 AM         Physical Therapy Discharge Summary    Reason for therapy discharge:    Discharged to home with home therapy.    Progress towards therapy goal(s). See goals on Care Plan in [facility] electronic health record for goal details.  Goals partially met.  Barriers to achieving goals:   activity tolerance, discharge.    Therapy recommendation(s):    Continued therapy is recommended.  Rationale/Recommendations:  to address LE weakness, decreased activity tolerance.

## 2018-05-21 NOTE — PROGRESS NOTES
Discharge instruction reviewed with the patient and her son. She will be picking up her prescription from her pharmacy and will have PT at home. Instructed to follow up with both a cardiology and PCP. Discharged to home today.

## 2018-05-21 NOTE — PLAN OF CARE
Problem: Patient Care Overview  Goal: Plan of Care/Patient Progress Review  Outcome: No Change  A&O, up independently in room. Denies CP. C/o HA x2 improved with Tylenol. Prn Ativan given for anxiety x2. VSS on RA. Slept between cares. Tele: SR. Plan for probable d/c.

## 2018-05-21 NOTE — DISCHARGE SUMMARY
[facility]  Discharge Summary        Ines Pickard MRN# [id number]   YOB: 1940 Age: 77 year old     Date of Admission:  5/18/2018  Date of Discharge:  5/21/2018  Admitting Physician:  Emanuel Coe MD  Discharge Physician: Emanuel Coe MD  Discharging Service: Hospitalist     Primary Provider:  MICHAEL Perez  Primary Care Physician Phone Number: [phone]         Discharge Diagnoses/Problem Oriented Hospital Course (Providers):    Ines Pickard was admitted on 5/18/2018 by Emanuel Coe MD and I would refer you to their history and physical.  The following problems were addressed during her hospitalization:    ASSESSMENT AND PLAN:  Ines Pickard is a 77-year-old female with past medical history of coronary artery disease with prior stent to the left circumflex, small cell lung cancer with metastasis to the mediastinal lymphatic nodes and adrenal gland, normocytic anemia, hypertension, hyperlipidemia and gastroesophageal reflux disease, who presents as a transfer from [facility] for further evaluation and treatment of an abnormal stress test with concern for unstable angina.  Vitals currently within normal limits.         Chest pain  Abnormal stress test suspected CAD and angina:  The patient initially presenting with chest pressure on 05/18 that localized to her substernal region with radiation to her throat, began at rest, relieved with nitroglycerin.  Troponins negative x 4.  Lexiscan showing 2 separate territories of ischemia with moderate area of anterior/anterolateral ischemia of moderate intensity of small to moderate inferolateral area of ischemia of moderate intensity.  Last catheterization was in 12/2014 after patient had an inferior wall MI which showed 3-vessel CAD without left main lesion.  At that time mid left circumflex was stented and she was noted to have 70% ostial RPDA lesion and 90% proximal D1 lesion.   - serial troponin negative  -  Echocardiogram reviewed  - platelet counts went down and improved after stopping heparin  - oncology consult reviewed with respect to prognosis and disease state.  - with ambulation, no evidence of recurrence of her initial chest pain that prompted the abnormal stress test.  She is deconditioned in part due to her ongoing chemotherapy  - plan to optomize medical therapy, antianginal therapy, increasing her toprol and adding imdur.  - Continue PTA aspirin 81 mg p.o. daily, Lipitor 40 mg p.o. daily, lisinopril 5 mg p.o. daily, Toprol- mg p.o and addition of imdur  - worked with cardiac rehab and induced no chest pain, but has some BARNES, and hypertensive respose.  - plan for home PT      Thrombocytopenia  - had transient drop in platelets with heparin and heparin discontinued.  - platelet count improved to 72K    Small cell lung cancer with metastasis:  The patient followed by Dr. Miner of oncology. History of left lung cancer with mediastinal lymph node and adrenal mets, receiving palliative chemotherapy with carbo/-16 every 3 weeks.  Last dose was 05/10/2018.  Plan for an additional course on 05/29 and none thereafter.  This has been stable.     - appreciate oncology consultation, continue ongoing chemthearpy  - continue conservative cardiac treatment.      Normocytic anemia:  Secondary to above.  Oncology recommending transfusion for hemoglobin less than 8.5-9 or if patient is symptomatic.  The patient was noted to have a hemoglobin of 6.5 a few days prior after presenting to the ED and was transfused 1 unit packed red blood cells at that time.  Hemoccult negative.  No active signs of bleeding.  No history of GI bleed or ulceration.  No prior endoscopy or colonoscopy.  The patient does take occasional NSAIDs, but this dose will be 200 mg at a time and not frequently.   - stable hemoglobin.     - Conditional orders to transfuse for hemoglobin 8.5 or less.     - Consent signed and patient's chart.        Leukocytosis:  WBC 14.3>17.3.  No active signs of infection.  Chest x-ray unremarkable.    - Check procalcitonin is 0.21  - UA is negative  - no fever  - did get neulasta  - continue monitoring for infection      Lactic acidosis: Marginally elevated at 2.1. Triggered by tachycardia on admission (since resolved) and leukocytosis.   -- Repeat lactic acid from 2.1 to 2      Hypertension  Hyperlipidemia:  Continue PTA meds as above.       GERD:    - continue zantac       CODE STATUS:  DNR/DNI.             Code Status:      DNR / DNI         Important Results:      NAD  Lungs CTA  CV RRR + M  Abd soft  Ext no edema         Pending Results:        Unresulted Labs Ordered in the Past 30 Days of this Admission     No orders found from 3/19/2018 to 5/19/2018.               Discharge Instructions and Follow-Up:      Follow-up Appointments     Follow-up and recommended labs and tests        Follow up with primary care provider, MICHAEL Perez, within 14 days for   hospital follow- up.  The following labs/tests are recommended: cbc.  Follow up with cardiology in 1 week for follow up of blood pressure,   medication tolerance and any anginal pain                         Discharge Disposition:      Discharged to home         Discharge Medications:        Current Discharge Medication List      START taking these medications    Details   isosorbide mononitrate (IMDUR) 30 MG 24 hr tablet Take 1 tablet (30 mg) by mouth daily  Qty: 30 tablet, Refills: 3    Associated Diagnoses: Coronary artery disease involving native coronary artery of native heart without angina pectoris         CONTINUE these medications which have CHANGED    Details   metoprolol succinate (TOPROL-XL) 100 MG 24 hr tablet Take 1 tablet (100 mg) by mouth daily  Qty: 30 tablet, Refills: 3    Associated Diagnoses: Coronary artery disease involving native coronary artery of native heart without angina pectoris         CONTINUE these medications which have NOT CHANGED     Details   Acetaminophen (TYLENOL PO) Take 1,000 mg by mouth every 6 hours as needed      albuterol (PROAIR HFA/PROVENTIL HFA/VENTOLIN HFA) 108 (90 BASE) MCG/ACT Inhaler Inhale 2 puffs into the lungs 4 times daily  Qty: 1 Inhaler, Refills: 2    Associated Diagnoses: Acute bronchitis with symptoms > 10 days      aspirin EC 81 MG EC tablet Take 1 tablet (81 mg) by mouth daily  Qty: 90 tablet, Refills: 3    Associated Diagnoses: CAD (coronary artery disease)      atorvastatin (LIPITOR) 40 MG tablet Take 1 tablet (40 mg) by mouth daily  Qty: 90 tablet, Refills: 3    Associated Diagnoses: Coronary artery disease involving native coronary artery of native heart without angina pectoris      lidocaine-prilocaine (EMLA) cream Apply topically as needed for moderate pain  Qty: 30 g, Refills: 0    Associated Diagnoses: 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)      lisinopril (PRINIVIL/ZESTRIL) 5 MG tablet Take 1 tablet (5 mg) by mouth daily  Qty: 90 tablet, Refills: 2    Associated Diagnoses: Essential hypertension with goal blood pressure less than 140/90      LORazepam (ATIVAN) 0.5 MG tablet Take 1 tablet (0.5 mg) by mouth 2 times daily as needed for anxiety  Qty: 30 tablet, Refills: 5    Associated Diagnoses: Anxiety      Multiple Vitamins-Minerals (MULTIVITAMIN ADULT) TABS Take 1 tablet by mouth daily       nitroglycerin (NITROSTAT) 0.4 MG SL tablet Place 1 tablet (0.4 mg) under the tongue every 5 minutes as needed for chest pain Maximum of 3 doses in 15 minutes  Qty: 25 tablet, Refills: 3    Associated Diagnoses: CAD (coronary artery disease)      prochlorperazine (COMPAZINE) 10 MG tablet Take 0.5 tablets (5 mg) by mouth every 6 hours as needed (Nausea/Vomiting)  Qty: 30 tablet, Refills: 3    Associated Diagnoses: 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)      ranitidine (ZANTAC) 75 MG tablet Take 1 tablet (75 mg) by mouth 2 times daily  Qty: 30 tablet, Refills: 11    Associated Diagnoses: Esophageal reflux         STOP taking  these medications       IBUPROFEN PO Comments:   Reason for Stopping:                    Allergies:         Allergies   Allergen Reactions     Amoxicillin GI Disturbance     Tetracycline Other (See Comments)     Yeast infection     Nickel Rash            Consultations This Hospital Stay:      Consultation during this admission received from cardiology          Discharge Orders for Skilled Facility (from Discharge Orders):        After Care Instructions     Activity       Your activity upon discharge: activity as tolerated            Diet       Follow this diet upon discharge: Orders Placed This Encounter      Combination Diet Low Saturated Fat Na <2400mg Diet; No Caffeine for 24 hours (once tests completed, may have caffeine)                           Rehab orders for Skilled Facility (from Discharge Orders):      Referrals     Future Labs/Procedures    Home Care PT Referral for Hospital Discharge     Comments:    PT to eval and treat    Your provider has ordered home care - physical therapy. If you have not   been contacted within 2 days of your discharge please call the department   phone number listed on the top of this document.             Discharge Time:       Greater than 30 minutes.        Image Results From This Hospital Stay (For Non-EPIC Providers):        Results for orders placed or performed during the hospital encounter of 05/18/18   XR Chest Port 1 View    Narrative    CHEST ONE VIEW UPRIGHT 5/20/2018 2:29 PM     HISTORY: Evaluate for pulmonary congestion.    COMPARISON: May 17, 2018      Impression    IMPRESSION: No definite pulmonary vascular congestion. Left chest port  stable. No definite acute infiltrates.    NEGRO RUVALCABA MD           Most Recent Lab Results In EPIC (For Non-EPIC Providers):    Most Recent 3 CBC's:  Recent Labs   Lab Test  05/21/18   0625  05/20/18   0640  05/19/18   0610   WBC  14.6*  15.2*  16.9*   HGB  9.3*  9.3*  9.5*   MCV  86  85  85   PLT  72*  55*  58*      Most Recent  3 BMP's:  Recent Labs   Lab Test  05/20/18   0640  05/19/18   0610  05/18/18   2037   NA  140  139  140   POTASSIUM  3.5  3.5  3.5   CHLORIDE  106  105  104   CO2  28  27  27   BUN  12  13  13   CR  0.50*  0.49*  0.47*   ANIONGAP  6  7  9   AMADO  8.5  8.5  8.7   GLC  94  85  114*     Most Recent 3 Troponin's:  Recent Labs   Lab Test  05/18/18   0625  05/18/18   0000  05/17/18   1836   TROPI  <0.015  <0.015  <0.015     Most Recent 3 INR's:  Recent Labs   Lab Test  12/13/14   0736   INR  1.22*     Most Recent 2 LFT's:  Recent Labs   Lab Test  05/17/18   1105  05/15/18   0450   AST  11  5   ALT  13  10   ALKPHOS  97  89   BILITOTAL  0.6  0.9     Most Recent Cholesterol Panel:  Recent Labs   Lab Test  03/21/17   0918   CHOL  125   LDL  48   HDL  41*   TRIG  182*     Most Recent 6 Bacteria Isolates From Any Culture (See EPIC Reports for Culture Details):  Recent Labs   Lab Test  11/11/13   1007   CULT  50,000 to 100,000 colonies/mL Mixed gram positive leti     Most Recent TSH, T4 and HgbA1c:  Recent Labs   Lab Test  11/07/13   1636   TSH  0.40   A1C  5.8

## 2018-05-21 NOTE — PROGRESS NOTES
Pt has been pain free with V/signs stable and maintaining her O2 sats in the mid 90's on Room air. Pt is now ready for discharge to home with son providing transportation. Reviewed discharge instructions with Pt and her son  regarding home mediations and follow up appointments.   ( 2)  RX's were e-scribed to [facility] and are ready for pick-up. Remains in NSR.

## 2018-05-21 NOTE — PROGRESS NOTES
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  Vitals currently within normal limits.  The patient is currently stable and chest pain free. Trops at [facility] negative    1.  Chest pain episodes, possibly secondary to myocardial ischemia. Discussion over the weekend with decision for conservative management approach-as her risk of cath./PCI +/- DAPT complications could manifest with  invasive management approach- Plan reviewed with patient. No chest pain at rest today.  2. Known CAD s/p PCI to LCX in 2014-followed in [facility] by Dr. Rodriguez  3. Hypertension-well controlled  4. Hyperlipidemia  5. Anemia-related to chemotherapy- HGB 5/15 was down to 6.5 and was transferred in ED with 1 unit of PRBCs. -HGB remains stable.    PLAN  - continue ASA 81 mg Q daily  - continue Lipitor 40 mg Q daily  - continue metoprolol succinate to 100 mg Q daily  - will continue Imdur 30 mg Q daily  - continue lisinopril 5 mg Q daily   Hospital follow-up at [facility] made    ALEXSANDRA Mas, CNP    Interval History   Feeling well today, On-going \"tingling in feet when walking\" discharge plans reviewed, signs and symptoms to call clinic or seek ER treatment reviewed.    Physical Exam   Temp: 98  F (36.7  C) Temp src: Oral BP: 129/67 Pulse: 91 Heart Rate: 81 Resp: 16 SpO2: 99 % O2 Device: None (Room air)    Vitals:    05/19/18 0500 05/20/18 0648 05/21/18 0000   Weight: 72.2 kg (159 lb 1.6 oz) 71.9 " kg (158 lb 9.6 oz) 72.3 kg (159 lb 8 oz)     Vital Signs with Ranges  Temp:  [97.6  F (36.4  C)-98.5  F (36.9  C)] 98  F (36.7  C)  Pulse:  [91] 91  Heart Rate:  [73-94] 81  Resp:  [16-18] 16  BP: (102-168)/() 129/67  SpO2:  [94 %-99 %] 99 %  I/O last 3 completed shifts:  In: 1083 [P.O.:1080; I.V.:3]  Out: 500 [Urine:500]    GENERAL: Chronically ill appearing female up in chair  friendly  HEENT:  No major abnormalities.     LUNGS:   bilateral crackles in the lung bases CARDIOVASCULAR:  Normal S1 and S2. + mid-peaking soft systolic murmur   ABDOMEN:  Soft, nontender, nondistended.  No precordial heaves.   EXTREMITIES:  Mild edema.      Medications     Continuing ACE inhibitor/ARB/ARNI from home medication list OR ACE inhibitor/ARB order already placed during this visit       - MEDICATION INSTRUCTIONS -       - MEDICATION INSTRUCTIONS -       - MEDICATION INSTRUCTIONS -       - MEDICATION INSTRUCTIONS -         aspirin  81 mg Oral Daily     atorvastatin  40 mg Oral Daily     heparin  5 mL Intracatheter Q28 Days     heparin lock flush  5-10 mL Intracatheter Q24H     isosorbide mononitrate  30 mg Oral Daily     lisinopril  5 mg Oral Daily     metoprolol succinate  100 mg Oral Daily     pantoprazole (PROTONIX) IV  40 mg Intravenous Daily with breakfast     sodium chloride (PF)  3 mL Intracatheter Q8H       Data   I personally reviewed  Results for orders placed or performed during the hospital encounter of 05/18/18 (from the past 24 hour(s))   XR Chest Port 1 View    Narrative    CHEST ONE VIEW UPRIGHT 5/20/2018 2:29 PM     HISTORY: Evaluate for pulmonary congestion.    COMPARISON: May 17, 2018      Impression    IMPRESSION: No definite pulmonary vascular congestion. Left chest port  stable. No definite acute infiltrates.    NEGRO RUVALCABA MD   CBC with platelets   Result Value Ref Range    WBC 14.6 (H) 4.0 - 11.0 10e9/L    RBC Count 3.23 (L) 3.8 - 5.2 10e12/L    Hemoglobin 9.3 (L) 11.7 - 15.7 g/dL    Hematocrit  27.7 (L) 35.0 - 47.0 %    MCV 86 78 - 100 fl    MCH 28.8 26.5 - 33.0 pg    MCHC 33.6 31.5 - 36.5 g/dL    RDW 18.0 (H) 10.0 - 15.0 %    Platelet Count 72 (L) 150 - 450 10e9/L

## 2018-05-21 NOTE — PROGRESS NOTES
Care Transition Initial Assessment - RN        Met with: Patient.  DATA   Active Problems:    Unstable angina (H)       Cognitive Status: alert.        Contact information and PCP information verified: yes  Lives With: alone  Living Arrangements: mobile home                 Insurance concerns: No Insurance issues identified  ASSESSMENT  Patient currently receives the following services:  none        Identified issues/concerns regarding health management:  Deconditioned r/t chemo    PLAN  Financial costs for the patient include none.  Patient given options and choices for discharge  yes.  Patient/family is agreeable to the plan?  Yes  Patient anticipates discharging to home . Her sister helps with groceries. Patient is otherwise independent cooking, ADL's        Patient anticipates needs for home equipment: No  Plan/Disposition: Home with PT  Appointments: PCP and Cardiology follow up made      Care  (CTS) will continue to follow as needed.

## 2018-05-22 ENCOUNTER — TELEPHONE (OUTPATIENT)
Dept: CARDIOLOGY | Facility: CLINIC | Age: 78
End: 2018-05-22

## 2018-05-22 NOTE — TELEPHONE ENCOUNTER
Patient was evaluated by cardiology while inpatient for chest pain with medical management. Called patient to discuss any post hospital d/c questions she may have, review medication changes, and confirm f/u appts.Patient denied any questions regarding new medications or changes to some of her current medications that she was taking prior to admission. Patient denied any SOB, chest pain, or light headedness. States Imdur induced HA improved. RN confirmed with patient that she has an apt scheduled on 6/4/18 at 1330 with RANDAL Ana Pope. Patient advised to call clinic with any cardiac related questions or concerns. Patient verbalized understanding and agreed with plan. LULY Valladares RN.

## 2018-05-23 ENCOUNTER — TELEPHONE (OUTPATIENT)
Dept: FAMILY MEDICINE | Facility: CLINIC | Age: 78
End: 2018-05-23

## 2018-05-23 NOTE — TELEPHONE ENCOUNTER
Aileen requesting orders for weekly home care visits for nursing, 4 PRN visits pt wants to wait to start PT until after last chemo treatment in a few weeks, also requesting nail trim from wound care nurse and social work verbal orders mauricio Gallagher [phone] voicemail ok

## 2018-05-23 NOTE — TELEPHONE ENCOUNTER
Sofía calling from home care requesting orders for skilled nurse assessment for home care needs. They already have PT order. Please return call to Sofía with verbal orders to [phone] voicemail is ok.

## 2018-05-29 ENCOUNTER — HOSPITAL ENCOUNTER (OUTPATIENT)
Facility: CLINIC | Age: 78
Discharge: HOME | End: 2018-05-29
Attending: INTERNAL MEDICINE | Admitting: INTERNAL MEDICINE
Payer: MEDICARE

## 2018-05-29 ENCOUNTER — INFUSION THERAPY VISIT (OUTPATIENT)
Dept: INFUSION THERAPY | Facility: CLINIC | Age: 78
End: 2018-05-29
Attending: INTERNAL MEDICINE
Payer: MEDICARE

## 2018-05-29 ENCOUNTER — ONCOLOGY VISIT (OUTPATIENT)
Dept: ONCOLOGY | Facility: CLINIC | Age: 78
End: 2018-05-29
Attending: INTERNAL MEDICINE
Payer: MEDICARE

## 2018-05-29 VITALS — HEART RATE: 74 BPM | DIASTOLIC BLOOD PRESSURE: 41 MMHG | SYSTOLIC BLOOD PRESSURE: 90 MMHG

## 2018-05-29 VITALS
BODY MASS INDEX: 28.31 KG/M2 | TEMPERATURE: 98.6 F | HEART RATE: 86 BPM | SYSTOLIC BLOOD PRESSURE: 115 MMHG | HEIGHT: 63 IN | WEIGHT: 159.8 LBS | DIASTOLIC BLOOD PRESSURE: 65 MMHG | OXYGEN SATURATION: 96 % | RESPIRATION RATE: 20 BRPM

## 2018-05-29 DIAGNOSIS — C34.92 SMALL CELL CARCINOMA OF LEFT LUNG (H): ICD-10-CM

## 2018-05-29 DIAGNOSIS — Z29.9 NEED FOR PROPHYLACTIC MEASURE: ICD-10-CM

## 2018-05-29 DIAGNOSIS — Z29.9 NEED FOR PROPHYLACTIC MEASURE: Primary | ICD-10-CM

## 2018-05-29 DIAGNOSIS — C34.92 SMALL CELL CARCINOMA OF LEFT LUNG (H): Primary | ICD-10-CM

## 2018-05-29 DIAGNOSIS — D64.9 ANEMIA, UNSPECIFIED TYPE: ICD-10-CM

## 2018-05-29 DIAGNOSIS — F41.9 ANXIETY: ICD-10-CM

## 2018-05-29 LAB
ALBUMIN SERPL-MCNC: 3.5 G/DL (ref 3.4–5)
ALP SERPL-CCNC: 65 U/L (ref 40–150)
ALT SERPL W P-5'-P-CCNC: 20 U/L (ref 0–50)
ANION GAP SERPL CALCULATED.3IONS-SCNC: 6 MMOL/L (ref 3–14)
AST SERPL W P-5'-P-CCNC: 12 U/L (ref 0–45)
BASOPHILS # BLD AUTO: 0.1 10E9/L (ref 0–0.2)
BASOPHILS NFR BLD AUTO: 0.6 %
BILIRUB SERPL-MCNC: 0.8 MG/DL (ref 0.2–1.3)
BUN SERPL-MCNC: 15 MG/DL (ref 7–30)
CALCIUM SERPL-MCNC: 8.9 MG/DL (ref 8.5–10.1)
CHLORIDE SERPL-SCNC: 104 MMOL/L (ref 94–109)
CO2 SERPL-SCNC: 26 MMOL/L (ref 20–32)
CREAT SERPL-MCNC: 0.52 MG/DL (ref 0.52–1.04)
DIFFERENTIAL METHOD BLD: ABNORMAL
EOSINOPHIL # BLD AUTO: 0 10E9/L (ref 0–0.7)
EOSINOPHIL NFR BLD AUTO: 0.4 %
ERYTHROCYTE [DISTWIDTH] IN BLOOD BY AUTOMATED COUNT: 17.3 % (ref 10–15)
GFR SERPL CREATININE-BSD FRML MDRD: >90 ML/MIN/1.7M2
GLUCOSE SERPL-MCNC: 145 MG/DL (ref 70–99)
HCT VFR BLD AUTO: 33.1 % (ref 35–47)
HGB BLD-MCNC: 11 G/DL (ref 11.7–15.7)
IMM GRANULOCYTES # BLD: 0.1 10E9/L (ref 0–0.4)
IMM GRANULOCYTES NFR BLD: 0.8 %
LYMPHOCYTES # BLD AUTO: 2 10E9/L (ref 0.8–5.3)
LYMPHOCYTES NFR BLD AUTO: 17.8 %
MCH RBC QN AUTO: 29.3 PG (ref 26.5–33)
MCHC RBC AUTO-ENTMCNC: 33.2 G/DL (ref 31.5–36.5)
MCV RBC AUTO: 88 FL (ref 78–100)
MONOCYTES # BLD AUTO: 1.4 10E9/L (ref 0–1.3)
MONOCYTES NFR BLD AUTO: 12.9 %
NEUTROPHILS # BLD AUTO: 7.5 10E9/L (ref 1.6–8.3)
NEUTROPHILS NFR BLD AUTO: 67.5 %
PLATELET # BLD AUTO: 369 10E9/L (ref 150–450)
POTASSIUM SERPL-SCNC: 4 MMOL/L (ref 3.4–5.3)
PROT SERPL-MCNC: 6.6 G/DL (ref 6.8–8.8)
RBC # BLD AUTO: 3.76 10E12/L (ref 3.8–5.2)
SODIUM SERPL-SCNC: 136 MMOL/L (ref 133–144)
WBC # BLD AUTO: 11 10E9/L (ref 4–11)

## 2018-05-29 PROCEDURE — 96375 TX/PRO/DX INJ NEW DRUG ADDON: CPT

## 2018-05-29 PROCEDURE — G0463 HOSPITAL OUTPT CLINIC VISIT: HCPCS | Mod: 25

## 2018-05-29 PROCEDURE — 25000125 ZZHC RX 250: Performed by: INTERNAL MEDICINE

## 2018-05-29 PROCEDURE — 85025 COMPLETE CBC W/AUTO DIFF WBC: CPT | Performed by: INTERNAL MEDICINE

## 2018-05-29 PROCEDURE — 99214 OFFICE O/P EST MOD 30 MIN: CPT | Performed by: INTERNAL MEDICINE

## 2018-05-29 PROCEDURE — 96413 CHEMO IV INFUSION 1 HR: CPT

## 2018-05-29 PROCEDURE — 96367 TX/PROPH/DG ADDL SEQ IV INF: CPT

## 2018-05-29 PROCEDURE — 96417 CHEMO IV INFUS EACH ADDL SEQ: CPT

## 2018-05-29 PROCEDURE — 25000128 H RX IP 250 OP 636: Performed by: INTERNAL MEDICINE

## 2018-05-29 PROCEDURE — 80053 COMPREHEN METABOLIC PANEL: CPT | Performed by: INTERNAL MEDICINE

## 2018-05-29 RX ORDER — SODIUM CHLORIDE 9 MG/ML
1000 INJECTION, SOLUTION INTRAVENOUS CONTINUOUS PRN
Status: CANCELLED
Start: 2018-05-29

## 2018-05-29 RX ORDER — ALBUTEROL SULFATE 0.83 MG/ML
2.5 SOLUTION RESPIRATORY (INHALATION)
Status: CANCELLED | OUTPATIENT
Start: 2018-05-29

## 2018-05-29 RX ORDER — SODIUM CHLORIDE 9 MG/ML
1000 INJECTION, SOLUTION INTRAVENOUS CONTINUOUS PRN
Status: CANCELLED
Start: 2018-05-31

## 2018-05-29 RX ORDER — MEPERIDINE HYDROCHLORIDE 25 MG/ML
25 INJECTION INTRAMUSCULAR; INTRAVENOUS; SUBCUTANEOUS EVERY 30 MIN PRN
Status: CANCELLED | OUTPATIENT
Start: 2018-05-29

## 2018-05-29 RX ORDER — HEPARIN SODIUM (PORCINE) LOCK FLUSH IV SOLN 100 UNIT/ML 100 UNIT/ML
5 SOLUTION INTRAVENOUS
Status: CANCELLED | OUTPATIENT
Start: 2018-05-31

## 2018-05-29 RX ORDER — METHYLPREDNISOLONE SODIUM SUCCINATE 125 MG/2ML
125 INJECTION, POWDER, LYOPHILIZED, FOR SOLUTION INTRAMUSCULAR; INTRAVENOUS
Status: CANCELLED
Start: 2018-05-29

## 2018-05-29 RX ORDER — METHYLPREDNISOLONE SODIUM SUCCINATE 125 MG/2ML
125 INJECTION, POWDER, LYOPHILIZED, FOR SOLUTION INTRAMUSCULAR; INTRAVENOUS
Status: CANCELLED
Start: 2018-05-30

## 2018-05-29 RX ORDER — ALBUTEROL SULFATE 90 UG/1
1-2 AEROSOL, METERED RESPIRATORY (INHALATION)
Status: CANCELLED
Start: 2018-05-31

## 2018-05-29 RX ORDER — MEPERIDINE HYDROCHLORIDE 25 MG/ML
25 INJECTION INTRAMUSCULAR; INTRAVENOUS; SUBCUTANEOUS EVERY 30 MIN PRN
Status: CANCELLED | OUTPATIENT
Start: 2018-05-31

## 2018-05-29 RX ORDER — HEPARIN SODIUM (PORCINE) LOCK FLUSH IV SOLN 100 UNIT/ML 100 UNIT/ML
5 SOLUTION INTRAVENOUS
Status: CANCELLED | OUTPATIENT
Start: 2018-05-30

## 2018-05-29 RX ORDER — ALBUTEROL SULFATE 0.83 MG/ML
2.5 SOLUTION RESPIRATORY (INHALATION)
Status: CANCELLED | OUTPATIENT
Start: 2018-05-30

## 2018-05-29 RX ORDER — EPINEPHRINE 1 MG/ML
0.3 INJECTION, SOLUTION, CONCENTRATE INTRAVENOUS EVERY 5 MIN PRN
Status: CANCELLED | OUTPATIENT
Start: 2018-05-30

## 2018-05-29 RX ORDER — ALBUTEROL SULFATE 90 UG/1
1-2 AEROSOL, METERED RESPIRATORY (INHALATION)
Status: CANCELLED
Start: 2018-05-29

## 2018-05-29 RX ORDER — EPINEPHRINE 0.3 MG/.3ML
0.3 INJECTION SUBCUTANEOUS EVERY 5 MIN PRN
Status: CANCELLED | OUTPATIENT
Start: 2018-05-31

## 2018-05-29 RX ORDER — METHYLPREDNISOLONE SODIUM SUCCINATE 125 MG/2ML
125 INJECTION, POWDER, LYOPHILIZED, FOR SOLUTION INTRAMUSCULAR; INTRAVENOUS
Status: CANCELLED
Start: 2018-05-31

## 2018-05-29 RX ORDER — DIPHENHYDRAMINE HYDROCHLORIDE 50 MG/ML
50 INJECTION INTRAMUSCULAR; INTRAVENOUS
Status: CANCELLED
Start: 2018-05-31

## 2018-05-29 RX ORDER — MEPERIDINE HYDROCHLORIDE 25 MG/ML
25 INJECTION INTRAMUSCULAR; INTRAVENOUS; SUBCUTANEOUS EVERY 30 MIN PRN
Status: CANCELLED | OUTPATIENT
Start: 2018-05-30

## 2018-05-29 RX ORDER — EPINEPHRINE 0.3 MG/.3ML
0.3 INJECTION SUBCUTANEOUS EVERY 5 MIN PRN
Status: CANCELLED | OUTPATIENT
Start: 2018-05-30

## 2018-05-29 RX ORDER — HEPARIN SODIUM (PORCINE) LOCK FLUSH IV SOLN 100 UNIT/ML 100 UNIT/ML
5 SOLUTION INTRAVENOUS
Status: DISCONTINUED | OUTPATIENT
Start: 2018-05-29 | End: 2018-05-29 | Stop reason: HOSPADM

## 2018-05-29 RX ORDER — DIPHENHYDRAMINE HYDROCHLORIDE 50 MG/ML
50 INJECTION INTRAMUSCULAR; INTRAVENOUS
Status: CANCELLED
Start: 2018-05-30

## 2018-05-29 RX ORDER — LORAZEPAM 2 MG/ML
0.5 INJECTION INTRAMUSCULAR EVERY 4 HOURS PRN
Status: CANCELLED
Start: 2018-05-29

## 2018-05-29 RX ORDER — EPINEPHRINE 1 MG/ML
0.3 INJECTION, SOLUTION, CONCENTRATE INTRAVENOUS EVERY 5 MIN PRN
Status: CANCELLED | OUTPATIENT
Start: 2018-05-31

## 2018-05-29 RX ORDER — ALBUTEROL SULFATE 0.83 MG/ML
2.5 SOLUTION RESPIRATORY (INHALATION)
Status: CANCELLED | OUTPATIENT
Start: 2018-05-31

## 2018-05-29 RX ORDER — DIPHENHYDRAMINE HYDROCHLORIDE 50 MG/ML
50 INJECTION INTRAMUSCULAR; INTRAVENOUS
Status: CANCELLED
Start: 2018-05-29

## 2018-05-29 RX ORDER — EPINEPHRINE 1 MG/ML
0.3 INJECTION, SOLUTION, CONCENTRATE INTRAVENOUS EVERY 5 MIN PRN
Status: CANCELLED | OUTPATIENT
Start: 2018-05-29

## 2018-05-29 RX ORDER — ALBUTEROL SULFATE 90 UG/1
1-2 AEROSOL, METERED RESPIRATORY (INHALATION)
Status: CANCELLED
Start: 2018-05-30

## 2018-05-29 RX ORDER — LORAZEPAM 2 MG/ML
0.5 INJECTION INTRAMUSCULAR EVERY 4 HOURS PRN
Status: CANCELLED
Start: 2018-05-31

## 2018-05-29 RX ORDER — SODIUM CHLORIDE 9 MG/ML
1000 INJECTION, SOLUTION INTRAVENOUS CONTINUOUS PRN
Status: CANCELLED
Start: 2018-05-30

## 2018-05-29 RX ORDER — HEPARIN SODIUM (PORCINE) LOCK FLUSH IV SOLN 100 UNIT/ML 100 UNIT/ML
5 SOLUTION INTRAVENOUS
Status: CANCELLED | OUTPATIENT
Start: 2018-05-29

## 2018-05-29 RX ORDER — EPINEPHRINE 0.3 MG/.3ML
0.3 INJECTION SUBCUTANEOUS EVERY 5 MIN PRN
Status: CANCELLED | OUTPATIENT
Start: 2018-05-29

## 2018-05-29 RX ORDER — LORAZEPAM 2 MG/ML
0.5 INJECTION INTRAMUSCULAR EVERY 4 HOURS PRN
Status: CANCELLED
Start: 2018-05-30

## 2018-05-29 RX ADMIN — FAMOTIDINE 20 MG: 20 INJECTION, SOLUTION INTRAVENOUS at 10:09

## 2018-05-29 RX ADMIN — DEXAMETHASONE SODIUM PHOSPHATE: 10 INJECTION, SOLUTION INTRAMUSCULAR; INTRAVENOUS at 10:23

## 2018-05-29 RX ADMIN — SODIUM CHLORIDE 250 ML: 9 INJECTION, SOLUTION INTRAVENOUS at 10:08

## 2018-05-29 RX ADMIN — HEPARIN 5 ML: 100 SYRINGE at 12:34

## 2018-05-29 RX ADMIN — CARBOPLATIN 495 MG: 10 INJECTION, SOLUTION INTRAVENOUS at 10:41

## 2018-05-29 RX ADMIN — SODIUM CHLORIDE 150 MG: 9 INJECTION, SOLUTION INTRAVENOUS at 11:21

## 2018-05-29 ASSESSMENT — PAIN SCALES - GENERAL: PAINLEVEL: NO PAIN (0)

## 2018-05-29 NOTE — LETTER
5/29/2018         RE: Ines Pickard  [address] [place] [address]        Dear Colleague,    Thank you for referring your patient, Ines Pickard, to the [facility]. Please see a copy of my visit note below.    ONCOLOGY FOLLOW UP VISIT       CHIEF COMPLAINT/REASON FOR VISIT:  Left adrenal mass, FNA 01/30/2018 consistent with sclc     HISTORY OF PRESENT ILLNESS:  A 77-year-old female presented with 5 months duration of hoarseness.  She was evaluated by ENT.  Direct laryngoscopy revealed left vocal cord paralysis.    CT chest with contrast 01/10/2018 identified a 2.3 cm left upper lobe nodule extending to the hilum, suspicious for malignancy, left hilar mediastinal adenopathy with mass-like soft tissue thickening extending along the inferior aspect of the aortic arch likely involving the recurrent laryngeal nerve in this location, left adrenal nodule 1.5 cm undetermined, compression on L1 vertebral body.    EUS 01/30/2017 FNA was done on 2 hypoechoic left adrenal mass-   Consistent with metastatic small cell carcinoma      PET confirmed the primary TEX lesion with adrenal mets. She is dx with extensive stage SCLC.     She made informed decision to proceed with palliative chemo with carbo/-16 2/2018.   She has good PET response after 3 cycles and tx is continued.     PAST MEDICAL HISTORY:  CAD, stent around 2014, Angina attack in 5/2018 was at [facility]. Reflux, hypertension, cataracts, hyperlipidemia.      MEDICATIONS:  Reviewed in Epic system.      SOCIAL HISTORY:  She lives in her own house.  She has 3 boys.  She is here with her neighbor.  She quit smoking years back.  Denies alcohol abuse.      FAMILY HISTORY:  Positive for mom who had Hodgkin lymphoma.  Brother had unknown type of cancer.      REVIEW OF SYSTEMS:   Throat discomfort seems better.     ? BARNES, she is not sure.   Reports no appetite. She is using insure.   She is losing her teeth from her dental wires and eating baby  "food.  Reports more tired, doze off a lot.         PHYSICAL EXAMINATION:   VITAL SIGNS:  Blood pressure 115/65, pulse 86, temperature 98.6  F (37  C), temperature source Tympanic, resp. rate 20, height 1.6 m (5' 2.99\"), weight 72.5 kg (159 lb 12.8 oz), SpO2 96 %, not currently breastfeeding.  ECOG 1    GENERAL APPEARANCE:  Elderly lady, looks like her stated age, not in acute distress.   HEENT: The patient is normocephalic, atraumatic. Pupils are equally reactive to light.  Sclerae are anicteric.  Moist oral mucosa.  negative posterior pharynx.   NECK:  Supple.  No jugular venous distention.  Thyroid is not palpable.   LYMPH NODES:  Superficial lymphadenopathy is not appreciable in the bilateral cervical, supraclavicular, axillary or inguinal areas.   CARDIOVASCULAR:  S1, S2 regular with no murmurs or gallops.  No carotid or abdominal bruits.   PULMONARY:  Lungs are clear to auscultation and percussion bilaterally.  There is no wheezing or rhonchi.   GASTROINTESTINAL:  Abdomen is soft, nontender.  No hepatosplenomegaly.  No signs of ascites.  No mass appreciable.   MUSCULOSKELETAL/EXTREMITIES:  No edema.  No cyanotic changes.  No signs of joint deformity.  No lymphedema.  No spinal or paraspinal tenderness.  No CVA tenderness.   NEUROLOGIC:  Cranial nerves II-XII are grossly intact.  Sensation intact.  Muscle strength and muscle tone symmetrical, 5/5 throughout.   SKIN:  No petechiae.  No rash.  No signs of cellulitis.      LABORATORY DATA REVIEWED:   C4D1 wbc diff and CMP are fine. Hb 9.1,  B12/folate nl.     CEA baseline at 6.6 in 2/2017    From 01/30/2018 FNA on left adrenal mass biopsy -- Consistent with metastatic small cell carcinoma      CURRENT IMAGE DATA REVIEWED:   PET 4/2018 post 3 cycles of carbo/vp16  1. Decrease in size and FDG uptake in the left upper lobe nodule consistent with improved lung cancer.  2. Improvement in the previous left hilar and mediastinal adenopathy, currently there is small " residual hypermetabolic adenopathy in the aorticopulmonary window consistent with residual mayela metastasis.  3. Resolution of previous hypermetabolic left adrenal nodule consistent with resolved metastasis.    PET 2/2018  1. Hypermetabolic left upper lobe nodule is consistent with malignancy, most likely bronchogenic carcinoma.  2. Hypermetabolic left hilar and mediastinal adenopathy is consistent  with metastatic disease.  3. Hypermetabolic left adrenal nodule is suspicious for metastatic disease.  4. There is a new 3.4 cm high-density structure abutting the left adrenal gland, suspicious for interval development of adrenal hemorrhage.    CT chest 01/2018 -  identified a 2.3 cm left upper lobe nodule extending to the hilum, suspicious for malignancy, left hilar mediastinal adenopathy with mass-like soft tissue thickening extending along the inferior aspect of the aortic arch likely involving the recurrent laryngeal nerve in this location, left adrenal nodule 1.5 cm undetermined, compression on L1 vertebral body.         OLD DATA REVIEW IN SUMMARY:    Chest x-ray 11/2017, no abnormalities identified.    In 2014, CBC indicating white count 17, hemoglobin 11 and platelets normal.  Differential indicating neutrophilia when she had a heart attack.        ASSESSMENT AND PLAN:    1.  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     She made informed decision to proceed in 2/2018 with  Carbo,  -16, D1-3 q 3 wks with neulasta support.     She has good PET response after 3 cycles and tx is continued.   She is elderly, has high complication rate and needs to be monitored closely.   She is informed tx is palliative in nature.     He is due another restaging PET.     2. normocytic anemia - nl b12/folate. This is likely from chemo. Will transfuse if hb less than 8.5-9 or symptoms. Dose reduce carbo AUC 4.5.      4. Anxiety. She feels ativan helps. Will refill.               Again, thank you for  allowing me to participate in the care of your patient.        Sincerely,        Tracy Miner MD, MD

## 2018-05-29 NOTE — PATIENT INSTRUCTIONS
Dr. Miner would like you to continue with Cycle 6 chemotherapy today as planned and to set up a PET scan in 2 weeks.     We would like to see you back in after the PET scan for a follow up appointment to review the results.     When you are in need of a refill, please call your pharmacy and they will send us a request.      Copy of appointments, and after visit summary (AVS) given to patient.      If you have any questions please call Bella Arvizu RN, BSN Oncology Hematology  [facility] [phone]. For questions after business hours, or on holidays/weekends, please call our after hours Nurse Triage line [phone]. Thank you.             C6 chemo today. Restaging PET in 2 wk, f/u after.

## 2018-05-29 NOTE — MR AVS SNAPSHOT
After Visit Summary   5/29/2018    Ines Pickard    MRN: [id number]           Patient Information     Date Of Birth          1940        Visit Information        Provider Department      5/29/2018 9:30 AM Tracy Miner MD [facility]        Today's Diagnoses     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)    -  1    Anxiety        Anemia, unspecified type        Need for prophylactic measure          Care Instructions    Dr. Miner would like you to continue with Cycle 6 chemotherapy today as planned and to set up a PET scan in 2 weeks.     We would like to see you back in after the PET scan for a follow up appointment to review the results.     When you are in need of a refill, please call your pharmacy and they will send us a request.      Copy of appointments, and after visit summary (AVS) given to patient.      If you have any questions please call Bella Arvizu RN, BSN Oncology Hematology  [facility] [phone]. For questions after business hours, or on holidays/weekends, please call our after hours Nurse Triage line [phone]. Thank you.             C6 chemo today. Restaging PET in 2 wk, f/u after.           Follow-ups after your visit        Your next 10 appointments already scheduled     May 30, 2018  1:00 PM CDT   Level 2 with ROOM 4 [facility] ([facility])    [facility]  [address]  [facility] [address]   [phone]            May 31, 2018  1:30 PM CDT   Level 2 with ROOM 9 [facility] ([facility])    [facility]  [address]  [facility] [address]   [phone]            Jun 01, 2018  1:00 PM CDT   Office Visit with MICHAEL Perez MD   [facility] ([facility])    [address]  [facility] [id number] [phone]           Bring a current list of meds and any  records pertaining to this visit. For Physicals, please bring immunization records and any forms needing to be filled out. Please arrive 10 minutes early to complete paperwork.            Jun 04, 2018  1:30 PM CDT   Return Visit with ALEXSANDRA Hernandez CNP   [facility] ([facility])    [address] [facility] [address]   [phone]            Jun 13, 2018 10:00 AM CDT   (Arrive by 9:30 AM)   PE NPET ONCOLOGY (EYES TO THIGHS) with WYPETCT1   [facility] ([facility])    [address] [address]   [phone]           Tell your doctor:   If there is any chance you may be pregnant or if you are breastfeeding.   If you have problems lying in small spaces (claustrophobia). If you do, your doctor may give you medicine to help you relax. If you have diabetes:   Have your exam early in the morning. Your blood glucose will go up as the day goes by.   Your glucose level must be 180 or less at the start of the exam. Please take any medicines you need to ensure this blood glucose level. 24 hours before your scan: Don t do any heavy exercise. (No jogging, aerobics or other workouts.) Exercise will make your pictures less accurate. 6 hours before your scan:   Stop all food and liquids (except water).   Do not chew gum or suck on mints.   If you need to take medicine with food, you may take it with a few crackers.  Please call your Imaging Department at your exam site with any questions.            Jun 14, 2018  1:30 PM CDT   Return Visit with Tracy Miner MD   [facility] ([facility])    [facility]  [address]  [facility] [address]   [phone]              Future tests that were ordered for you today     Open Future Orders        Priority Expected Expires Ordered    PET Oncology (Eyes to Thighs) Routine 6/13/2018 5/29/2019 5/29/2018            Who to contact   "   If you have questions or need follow up information about today's clinic visit or your schedule please contact [facility] directly at [phone].  Normal or non-critical lab and imaging results will be communicated to you by MyChart, letter or phone within 4 business days after the clinic has received the results. If you do not hear from us within 7 days, please contact the clinic through Real [employer] Tom[employer] or phone. If you have a critical or abnormal lab result, we will notify you by phone as soon as possible.  Submit refill requests through [employer] or call your pharmacy and they will forward the refill request to us. Please allow 3 business days for your refill to be completed.          Additional Information About Your Visit        [employer] Information     [employer] lets you send messages to your doctor, view your test results, renew your prescriptions, schedule appointments and more. To sign up, go to [email or web address] . Click on \"Log in\" on the left side of the screen, which will take you to the Welcome page. Then click on \"Sign up Now\" on the right side of the page.     You will be asked to enter the access code listed below, as well as some personal information. Please follow the directions to create your username and password.     Your access code is: [email or web address]  Expires: 2018  4:13 PM     Your access code will  in 90 days. If you need help or a new code, please call your [place] clinic or [phone].        Care EveryWhere ID     This is your Care EveryWhere ID. This could be used by other organizations to access your [place] medical records  [id number]        Your Vitals Were     Pulse Temperature Respirations Height Pulse Oximetry Breastfeeding?    86 98.6  F (37  C) (Tympanic) 20 1.6 m (5' 2.99\") 96% No    BMI (Body Mass Index)                   28.31 kg/m2            Blood Pressure from Last 3 Encounters:   18 115/65   18 112/76   18 125/68    Weight from Last " 3 Encounters:   05/29/18 72.5 kg (159 lb 12.8 oz)   05/21/18 72.3 kg (159 lb 8 oz)   05/17/18 74.3 kg (163 lb 12.8 oz)               Primary Care Provider Office Phone # Fax #    R Emanuel Perez [phone] [address] [address]        Equal Access to Services     ALPA LOPEZ : Hadii aad ku hadasho Soomaali, waaxda luqadaha, qaybta kaalmada adeegyada, waxay idiin hayraginin adeeg lorraine laveroniquepam ah. So [facility] [phone].    ATENCIÓN: Si elvira wright, tiene a nguyễn disposición servicios gratuitos de asistencia lingüística. Llame al [phone].    We comply with applicable federal civil rights laws and [place] laws. We do not discriminate on the basis of race, color, national origin, age, disability, sex, sexual orientation, or gender identity.            Thank you!     Thank you for choosing [facility]  for your care. Our goal is always to provide you with excellent care. Hearing back from our patients is one way we can continue to improve our services. Please take a few minutes to complete the written survey that you may receive in the mail after your visit with us. Thank you!             Your Updated Medication List - Protect others around you: Learn how to safely use, store and throw away your medicines at www.disposemymeds.org.          This list is accurate as of 5/29/18 10:35 AM.  Always use your most recent med list.                   Brand Name Dispense Instructions for use Diagnosis    albuterol 108 (90 Base) MCG/ACT Inhaler    PROAIR HFA/PROVENTIL HFA/VENTOLIN HFA    1 Inhaler    Inhale 2 puffs into the lungs 4 times daily    Acute bronchitis with symptoms > 10 days       aspirin 81 MG EC tablet     90 tablet    Take 1 tablet (81 mg) by mouth daily    CAD (coronary artery disease)       atorvastatin 40 MG tablet    LIPITOR    90 tablet    Take 1 tablet (40 mg) by mouth daily    Coronary artery disease involving native coronary artery of native heart without  angina pectoris       isosorbide mononitrate 30 MG 24 hr tablet    IMDUR    30 tablet    Take 1 tablet (30 mg) by mouth daily    Coronary artery disease involving native coronary artery of native heart without angina pectoris       lidocaine-prilocaine cream    EMLA    30 g    Apply topically as needed for moderate pain    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)       lisinopril 5 MG tablet    PRINIVIL/ZESTRIL    90 tablet    Take 1 tablet (5 mg) by mouth daily    Essential hypertension with goal blood pressure less than 140/90       LORazepam 0.5 MG tablet    ATIVAN    30 tablet    Take 1 tablet (0.5 mg) by mouth 2 times daily as needed for anxiety    Anxiety       metoprolol succinate 100 MG 24 hr tablet    TOPROL-XL    30 tablet    Take 1 tablet (100 mg) by mouth daily    Coronary artery disease involving native coronary artery of native heart without angina pectoris       MULTIVITAMIN ADULT Tabs      Take 1 tablet by mouth daily        nitroGLYcerin 0.4 MG sublingual tablet    NITROSTAT    25 tablet    Place 1 tablet (0.4 mg) under the tongue every 5 minutes as needed for chest pain Maximum of 3 doses in 15 minutes    CAD (coronary artery disease)       prochlorperazine 10 MG tablet    COMPAZINE    30 tablet    Take 0.5 tablets (5 mg) by mouth every 6 hours as needed (Nausea/Vomiting)    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

## 2018-05-29 NOTE — PROGRESS NOTES
Infusion Nursing Note:  Ines Pickard presents today for C6D1 Carbo/Etoposide.    Patient seen by provider today: Yes: Dr. Miner   present during visit today: Not Applicable.    Note: N/A.    Intravenous Access:  Labs drawn without difficulty.  Implanted Port.    Treatment Conditions:  Lab Results   Component Value Date    HGB 11.0 05/29/2018     Lab Results   Component Value Date    WBC 11.0 05/29/2018      Lab Results   Component Value Date    ANEU 7.5 05/29/2018     Lab Results   Component Value Date     05/29/2018      Lab Results   Component Value Date     05/29/2018                   Lab Results   Component Value Date    POTASSIUM 4.0 05/29/2018           Lab Results   Component Value Date    MAG 2.1 12/14/2014            Lab Results   Component Value Date    CR 0.52 05/29/2018                   Lab Results   Component Value Date    AMADO 8.9 05/29/2018                Lab Results   Component Value Date    BILITOTAL 0.8 05/29/2018           Lab Results   Component Value Date    ALBUMIN 3.5 05/29/2018                    Lab Results   Component Value Date    ALT 20 05/29/2018           Lab Results   Component Value Date    AST 12 05/29/2018       Results reviewed, labs MET treatment parameters, ok to proceed with treatment.      Post Infusion Assessment:  Patient tolerated infusion without incident.  Blood return noted pre and post infusion.  Site patent and intact, free from redness, edema or discomfort.  No evidence of extravasations.    Discharge Plan:   Patient discharged in stable condition accompanied by: self.  Departure Mode: Ambulatory.  Pt to return tomorrow at 1:00 pm for C6D2 Etoposide.    Dea Black RN

## 2018-05-29 NOTE — NURSING NOTE
"Oncology Rooming Note    May 29, 2018 9:31 AM   Ines Pickard is a 77 year old female who presents for:    Chief Complaint   Patient presents with     Oncology Clinic Visit     Recheck Small cell carcinoma of left lung, Labs & Chemo today     Initial Vitals: /65 (BP Location: Right arm, Patient Position: Sitting, Cuff Size: Adult Regular)  Pulse 86  Temp 98.6  F (37  C) (Tympanic)  Resp 20  Ht 1.6 m (5' 2.99\")  Wt 72.5 kg (159 lb 12.8 oz)  SpO2 96%  Breastfeeding? No  BMI 28.31 kg/m2 Estimated body mass index is 28.31 kg/(m^2) as calculated from the following:    Height as of this encounter: 1.6 m (5' 2.99\").    Weight as of this encounter: 72.5 kg (159 lb 12.8 oz). Body surface area is 1.8 meters squared.  No Pain (0) Comment: Data Unavailable   No LMP recorded. Patient is postmenopausal.  Allergies reviewed: Yes  Medications reviewed: Yes    Medications: Medication refills not needed today.  Pharmacy name entered into [facility]: [facility] [address]    Clinical concerns:Recheck Small cell carcinoma of left lung 7 F/u  from hospital , Labs & Chemo today.     Patient reports she is still feeling tired.   Has questions about the number of treatments she has left.   Appetite is low, has taste changes.     10 minutes for nursing intake (face to face time)     Casi Sandoval CMA              "

## 2018-05-29 NOTE — MR AVS SNAPSHOT
After Visit Summary   5/29/2018    Ines Pickard    MRN: [id number]           Patient Information     Date Of Birth          1940        Visit Information        Provider Department      5/29/2018 9:00 AM ROOM 6 [facility]        Today's Diagnoses     Need for prophylactic measure    -  1    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)           Follow-ups after your visit        Your next 10 appointments already scheduled     May 30, 2018  1:00 PM CDT   Level 2 with ROOM 4 [facility] ([facility])    [facility]  [address]  [facility] [address]   [phone]            May 31, 2018  1:30 PM CDT   Level 2 with ROOM 9 [facility] ([facility])    [facility]  [address]  [facility] [address]   [phone]            Jun 01, 2018  1:00 PM CDT   Office Visit with MICHAEL Perez MD   [facility] ([facility])    [address] [address]   [phone]           Bring a current list of meds and any records pertaining to this visit. For Physicals, please bring immunization records and any forms needing to be filled out. Please arrive 10 minutes early to complete paperwork.            Jun 04, 2018  1:30 PM CDT   Return Visit with ALEXSANDRA Hernandez [facility] ([facility])    [address] [address]   [phone]            Jun 13, 2018 10:00 AM CDT   (Arrive by 9:30 AM)   PE NPET ONCOLOGY (EYES TO THIGHS) with WYPETCT1   [facility] Pet CT ([facility])    [address] [address]   [phone]           Tell your doctor:   If there is any chance you may be pregnant or if you are breastfeeding.   If you have problems lying in small spaces (claustrophobia). If you do, your doctor may give you  medicine to help you relax. If you have diabetes:   Have your exam early in the morning. Your blood glucose will go up as the day goes by.   Your glucose level must be 180 or less at the start of the exam. Please take any medicines you need to ensure this blood glucose level. 24 hours before your scan: Don t do any heavy exercise. (No jogging, aerobics or other workouts.) Exercise will make your pictures less accurate. 6 hours before your scan:   Stop all food and liquids (except water).   Do not chew gum or suck on mints.   If you need to take medicine with food, you may take it with a few crackers.  Please call your Imaging Department at your exam site with any questions.            Jun 14, 2018  1:30 PM CDT   Return Visit with Tracy Miner MD   [facility] ([facility])    [facility]  [address]  [facility] [address]   [phone]              Future tests that were ordered for you today     Open Future Orders        Priority Expected Expires Ordered    PET Oncology (Eyes to Thighs) Routine 6/13/2018 5/29/2019 5/29/2018            Who to contact     If you have questions or need follow up information about today's clinic visit or your schedule please contact [facility] directly at [phone].  Normal or non-critical lab and imaging results will be communicated to you by [employer], letter or phone within 4 business days after the clinic has received the results. If you do not hear from us within 7 days, please contact the clinic through [employer] or phone. If you have a critical or abnormal lab result, we will notify you by phone as soon as possible.  Submit refill requests through [employer] or call your pharmacy and they will forward the refill request to us. Please allow 3 business days for your refill to be completed.          Additional Information About Your Visit        [employer] Information     [employer] lets you send messages to your doctor,  "view your test results, renew your prescriptions, schedule appointments and more. To sign up, go to [email or web address] . Click on \"Log in\" on the left side of the screen, which will take you to the Welcome page. Then click on \"Sign up Now\" on the right side of the page.     You will be asked to enter the access code listed below, as well as some personal information. Please follow the directions to create your username and password.     Your access code is: [email or web address]  Expires: 2018  4:13 PM     Your access code will  in 90 days. If you need help or a new code, please call your [place] clinic or [phone].        Care EveryWhere ID     This is your Care EveryWhere ID. This could be used by other organizations to access your [place] medical records  [id number]        Your Vitals Were     Pulse                   74            Blood Pressure from Last 3 Encounters:   18 115/65   18 90/41   18 112/76    Weight from Last 3 Encounters:   18 72.5 kg (159 lb 12.8 oz)   18 72.3 kg (159 lb 8 oz)   18 74.3 kg (163 lb 12.8 oz)              We Performed the Following     CBC with platelets differential     Comprehensive metabolic panel        Primary Care Provider Office Phone # Fax #    R Emanuel Perez [phone] [address] [facility] [address]        Equal Access to Services     [facility] Center[facility] : Hadii doroteo ku hadasho Soomaali, waaxda luqadaha, qaybta kaalmada caroline, hoda maddox . So [facility] [phone].    ATENCIÓN: Si habla español, tiene a nguyễn disposición servicios gratuitos de asistencia lingüística. Llame al [phone].    We comply with applicable federal civil rights laws and [place] laws. We do not discriminate on the basis of race, color, national origin, age, disability, sex, sexual orientation, or gender identity.            Thank you!     Thank you for choosing [facility]  for your " care. Our goal is always to provide you with excellent care. Hearing back from our patients is one way we can continue to improve our services. Please take a few minutes to complete the written survey that you may receive in the mail after your visit with us. Thank you!             Your Updated Medication List - Protect others around you: Learn how to safely use, store and throw away your medicines at www.disposemymeds.org.          This list is accurate as of 5/29/18  3:45 PM.  Always use your most recent med list.                   Brand Name Dispense Instructions for use Diagnosis    albuterol 108 (90 Base) MCG/ACT Inhaler    PROAIR HFA/PROVENTIL HFA/VENTOLIN HFA    1 Inhaler    Inhale 2 puffs into the lungs 4 times daily    Acute bronchitis with symptoms > 10 days       aspirin 81 MG EC tablet     90 tablet    Take 1 tablet (81 mg) by mouth daily    CAD (coronary artery disease)       atorvastatin 40 MG tablet    LIPITOR    90 tablet    Take 1 tablet (40 mg) by mouth daily    Coronary artery disease involving native coronary artery of native heart without angina pectoris       isosorbide mononitrate 30 MG 24 hr tablet    IMDUR    30 tablet    Take 1 tablet (30 mg) by mouth daily    Coronary artery disease involving native coronary artery of native heart without angina pectoris       lidocaine-prilocaine cream    EMLA    30 g    Apply topically as needed for moderate pain    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)       lisinopril 5 MG tablet    PRINIVIL/ZESTRIL    90 tablet    Take 1 tablet (5 mg) by mouth daily    Essential hypertension with goal blood pressure less than 140/90       LORazepam 0.5 MG tablet    ATIVAN    30 tablet    Take 1 tablet (0.5 mg) by mouth 2 times daily as needed for anxiety    Anxiety       metoprolol succinate 100 MG 24 hr tablet    TOPROL-XL    30 tablet    Take 1 tablet (100 mg) by mouth daily    Coronary artery disease involving native coronary artery of native heart without angina  pectoris       MULTIVITAMIN ADULT Tabs      Take 1 tablet by mouth daily        nitroGLYcerin 0.4 MG sublingual tablet    NITROSTAT    25 tablet    Place 1 tablet (0.4 mg) under the tongue every 5 minutes as needed for chest pain Maximum of 3 doses in 15 minutes    CAD (coronary artery disease)       prochlorperazine 10 MG tablet    COMPAZINE    30 tablet    Take 0.5 tablets (5 mg) by mouth every 6 hours as needed (Nausea/Vomiting)    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

## 2018-05-29 NOTE — PROGRESS NOTES
"ONCOLOGY FOLLOW UP VISIT       CHIEF COMPLAINT/REASON FOR VISIT:  Left adrenal mass, FNA 01/30/2018 consistent with sclc     HISTORY OF PRESENT ILLNESS:  A 77-year-old female presented with 5 months duration of hoarseness.  She was evaluated by ENT.  Direct laryngoscopy revealed left vocal cord paralysis.    CT chest with contrast 01/10/2018 identified a 2.3 cm left upper lobe nodule extending to the hilum, suspicious for malignancy, left hilar mediastinal adenopathy with mass-like soft tissue thickening extending along the inferior aspect of the aortic arch likely involving the recurrent laryngeal nerve in this location, left adrenal nodule 1.5 cm undetermined, compression on L1 vertebral body.    EUS 01/30/2017 FNA was done on 2 hypoechoic left adrenal mass-   Consistent with metastatic small cell carcinoma      PET confirmed the primary TEX lesion with adrenal mets. She is dx with extensive stage SCLC.     She made informed decision to proceed with palliative chemo with carbo/-16 2/2018.   She has good PET response after 3 cycles and tx is continued.     PAST MEDICAL HISTORY:  CAD, stent around 2014, Angina attack in 5/2018 was at [facility]. Reflux, hypertension, cataracts, hyperlipidemia.      MEDICATIONS:  Reviewed in Epic system.      SOCIAL HISTORY:  She lives in her own house.  She has 3 boys.  She is here with her neighbor.  She quit smoking years back.  Denies alcohol abuse.      FAMILY HISTORY:  Positive for mom who had Hodgkin lymphoma.  Brother had unknown type of cancer.      REVIEW OF SYSTEMS:   Throat discomfort seems better.     ? BARNES, she is not sure.   Reports no appetite. She is using insure.   She is losing her teeth from her dental wires and eating baby food.  Reports more tired, doze off a lot.         PHYSICAL EXAMINATION:   VITAL SIGNS:  Blood pressure 115/65, pulse 86, temperature 98.6  F (37  C), temperature source Tympanic, resp. rate 20, height 1.6 m (5' 2.99\"), weight 72.5 kg (159 " lb 12.8 oz), SpO2 96 %, not currently breastfeeding.  ECOG 1    GENERAL APPEARANCE:  Elderly lady, looks like her stated age, not in acute distress.   HEENT: The patient is normocephalic, atraumatic. Pupils are equally reactive to light.  Sclerae are anicteric.  Moist oral mucosa.  negative posterior pharynx.   NECK:  Supple.  No jugular venous distention.  Thyroid is not palpable.   LYMPH NODES:  Superficial lymphadenopathy is not appreciable in the bilateral cervical, supraclavicular, axillary or inguinal areas.   CARDIOVASCULAR:  S1, S2 regular with no murmurs or gallops.  No carotid or abdominal bruits.   PULMONARY:  Lungs are clear to auscultation and percussion bilaterally.  There is no wheezing or rhonchi.   GASTROINTESTINAL:  Abdomen is soft, nontender.  No hepatosplenomegaly.  No signs of ascites.  No mass appreciable.   MUSCULOSKELETAL/EXTREMITIES:  No edema.  No cyanotic changes.  No signs of joint deformity.  No lymphedema.  No spinal or paraspinal tenderness.  No CVA tenderness.   NEUROLOGIC:  Cranial nerves II-XII are grossly intact.  Sensation intact.  Muscle strength and muscle tone symmetrical, 5/5 throughout.   SKIN:  No petechiae.  No rash.  No signs of cellulitis.      LABORATORY DATA REVIEWED:   C4D1 wbc diff and CMP are fine. Hb 9.1,  B12/folate nl.     CEA baseline at 6.6 in 2/2017    From 01/30/2018 FNA on left adrenal mass biopsy -- Consistent with metastatic small cell carcinoma      CURRENT IMAGE DATA REVIEWED:   PET 4/2018 post 3 cycles of carbo/vp16  1. Decrease in size and FDG uptake in the left upper lobe nodule consistent with improved lung cancer.  2. Improvement in the previous left hilar and mediastinal adenopathy, currently there is small residual hypermetabolic adenopathy in the aorticopulmonary window consistent with residual mayela metastasis.  3. Resolution of previous hypermetabolic left adrenal nodule consistent with resolved metastasis.    PET 2/2018  1. Hypermetabolic left  upper lobe nodule is consistent with malignancy, most likely bronchogenic carcinoma.  2. Hypermetabolic left hilar and mediastinal adenopathy is consistent  with metastatic disease.  3. Hypermetabolic left adrenal nodule is suspicious for metastatic disease.  4. There is a new 3.4 cm high-density structure abutting the left adrenal gland, suspicious for interval development of adrenal hemorrhage.    CT chest 01/2018 -  identified a 2.3 cm left upper lobe nodule extending to the hilum, suspicious for malignancy, left hilar mediastinal adenopathy with mass-like soft tissue thickening extending along the inferior aspect of the aortic arch likely involving the recurrent laryngeal nerve in this location, left adrenal nodule 1.5 cm undetermined, compression on L1 vertebral body.         OLD DATA REVIEW IN SUMMARY:    Chest x-ray 11/2017, no abnormalities identified.    In 2014, CBC indicating white count 17, hemoglobin 11 and platelets normal.  Differential indicating neutrophilia when she had a heart attack.        ASSESSMENT AND PLAN:    1.  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     She made informed decision to proceed in 2/2018 with  Carbo,  -16, D1-3 q 3 wks with neulasta support.     She has good PET response after 3 cycles and tx is continued.   She is elderly, has high complication rate and needs to be monitored closely.   She is informed tx is palliative in nature.     He is due another restaging PET.     2. normocytic anemia - nl b12/folate. This is likely from chemo. Will transfuse if hb less than 8.5-9 or symptoms. Dose reduce carbo AUC 4.5.      4. Anxiety. She feels ativan helps. Will refill.

## 2018-05-30 ENCOUNTER — INFUSION THERAPY VISIT (OUTPATIENT)
Dept: INFUSION THERAPY | Facility: CLINIC | Age: 78
End: 2018-05-30
Attending: INTERNAL MEDICINE
Payer: MEDICARE

## 2018-05-30 VITALS — SYSTOLIC BLOOD PRESSURE: 104 MMHG | TEMPERATURE: 97.5 F | DIASTOLIC BLOOD PRESSURE: 50 MMHG | HEART RATE: 69 BPM

## 2018-05-30 DIAGNOSIS — Z29.9 NEED FOR PROPHYLACTIC MEASURE: Primary | ICD-10-CM

## 2018-05-30 DIAGNOSIS — C34.92 SMALL CELL CARCINOMA OF LEFT LUNG (H): ICD-10-CM

## 2018-05-30 PROCEDURE — 25000132 ZZH RX MED GY IP 250 OP 250 PS 637: Mod: GY | Performed by: INTERNAL MEDICINE

## 2018-05-30 PROCEDURE — 96413 CHEMO IV INFUSION 1 HR: CPT

## 2018-05-30 PROCEDURE — 25000128 H RX IP 250 OP 636: Performed by: INTERNAL MEDICINE

## 2018-05-30 PROCEDURE — 96375 TX/PRO/DX INJ NEW DRUG ADDON: CPT

## 2018-05-30 PROCEDURE — 25000125 ZZHC RX 250: Performed by: INTERNAL MEDICINE

## 2018-05-30 PROCEDURE — 96367 TX/PROPH/DG ADDL SEQ IV INF: CPT

## 2018-05-30 PROCEDURE — A9270 NON-COVERED ITEM OR SERVICE: HCPCS | Mod: GY | Performed by: INTERNAL MEDICINE

## 2018-05-30 RX ORDER — HEPARIN SODIUM (PORCINE) LOCK FLUSH IV SOLN 100 UNIT/ML 100 UNIT/ML
5 SOLUTION INTRAVENOUS
Status: DISCONTINUED | OUTPATIENT
Start: 2018-05-30 | End: 2018-05-30 | Stop reason: HOSPADM

## 2018-05-30 RX ORDER — ACETAMINOPHEN 325 MG/1
650 TABLET ORAL ONCE
Status: COMPLETED | OUTPATIENT
Start: 2018-05-30 | End: 2018-05-30

## 2018-05-30 RX ADMIN — FAMOTIDINE 20 MG: 20 INJECTION, SOLUTION INTRAVENOUS at 13:11

## 2018-05-30 RX ADMIN — SODIUM CHLORIDE 150 MG: 9 INJECTION, SOLUTION INTRAVENOUS at 13:45

## 2018-05-30 RX ADMIN — DEXAMETHASONE SODIUM PHOSPHATE 12 MG: 10 INJECTION, SOLUTION INTRAMUSCULAR; INTRAVENOUS at 13:28

## 2018-05-30 RX ADMIN — HEPARIN 5 ML: 100 SYRINGE at 14:58

## 2018-05-30 RX ADMIN — SODIUM CHLORIDE 250 ML: 9 INJECTION, SOLUTION INTRAVENOUS at 13:11

## 2018-05-30 RX ADMIN — ACETAMINOPHEN 650 MG: 325 TABLET, FILM COATED ORAL at 14:00

## 2018-05-30 NOTE — MR AVS SNAPSHOT
After Visit Summary   5/30/2018    Ines Pickard    MRN: [id number]           Patient Information     Date Of Birth          1940        Visit Information        Provider Department      5/30/2018 1:00 PM ROOM 4 [facility]        Today's Diagnoses     Need for prophylactic measure    -  1    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)           Follow-ups after your visit        Your next 10 appointments already scheduled     May 31, 2018  1:30 PM CDT   Level 2 with ROOM 9 [facility] ([facility])    [facility]  [address]  [facility] [address]   [phone]            Jun 01, 2018  1:00 PM CDT   Office Visit with MICHAEL Perez MD   [facility]    [address]  [facility] [address]   [phone]           Bring a current list of meds and any records pertaining to this visit. For Physicals, please bring immunization records and any forms needing to be filled out. Please arrive 10 minutes early to complete paperwork.            Jun 04, 2018  1:30 PM CDT   Return Visit with ALEXSANDRA Hernandez [facility] ([facility])    [address] [address]   [phone]            Jun 13, 2018 10:00 AM CDT   (Arrive by 9:30 AM)   PE NPET ONCOLOGY (EYES TO THIGHS) with WYPETCT1   [facility] ([facility])    [address] [address]   [phone]           Tell your doctor:   If there is any chance you may be pregnant or if you are breastfeeding.   If you have problems lying in small spaces (claustrophobia). If you do, your doctor may give you medicine to help you relax. If you have diabetes:   Have your exam early in the morning. Your blood glucose will go up as the day goes by.   Your glucose level must be 180 or less at the start of the exam. Please  take any medicines you need to ensure this blood glucose level. 24 hours before your scan: Don t do any heavy exercise. (No jogging, aerobics or other workouts.) Exercise will make your pictures less accurate. 6 hours before your scan:   Stop all food and liquids (except water).   Do not chew gum or suck on mints.   If you need to take medicine with food, you may take it with a few crackers.  Please call your Imaging Department at your exam site with any questions.            Jun 14, 2018  1:30 PM CDT   Return Visit with Tracy Miner MD   [facility] ([facility])    [facility]  [address]  [facility] [address] [phone]              Future tests that were ordered for you today     Open Future Orders        Priority Expected Expires Ordered    PET Oncology (Eyes to Thighs) Routine 6/13/2018 5/29/2019 5/29/2018            Who to contact     If you have questions or need follow up information about today's clinic visit or your schedule please contact [facility] directly at [phone].  Normal or non-critical lab and imaging results will be communicated to you by MyChart, letter or phone within 4 business days after the clinic has received the results. If you do not hear from us within 7 days, please contact the clinic through MyChart or phone. If you have a critical or abnormal lab result, we will notify you by phone as soon as possible.  Submit refill requests through [employer]oSnapt or call your pharmacy and they will forward the refill request to us. Please allow 3 business days for your refill to be completed.          Additional Information About Your Visit        Care EveryWhere ID     This is your Care EveryWhere ID. This could be used by other organizations to access your [place] medical records  [id number]        Your Vitals Were     Pulse Temperature                69 97.5  F (36.4  C) (Oral)           Blood Pressure from Last 3  Encounters:   05/30/18 104/50   05/29/18 115/65   05/29/18 90/41    Weight from Last 3 Encounters:   05/29/18 72.5 kg (159 lb 12.8 oz)   05/21/18 72.3 kg (159 lb 8 oz)   05/17/18 74.3 kg (163 lb 12.8 oz)              Today, you had the following     No orders found for display       Primary Care Provider Office Phone # Fax #    R Emanuel Perez [phone] [address] [facility] [address]        Equal Access to Services     [facility]: Hadii aad ku hadasho Soomaali, waaxda luqadaha, qaybta kaalmada caroline, hoda maddox . So [facility] [phone].    ATENCIÓN: Si habla español, tiene a nguyễn disposición servicios gratuitos de asistencia lingüística. [facility] [phone].    We comply with applicable federal civil rights laws and [place] laws. We do not discriminate on the basis of race, color, national origin, age, disability, sex, sexual orientation, or gender identity.            Thank you!     Thank you for choosing [facility]  for your care. Our goal is always to provide you with excellent care. Hearing back from our patients is one way we can continue to improve our services. Please take a few minutes to complete the written survey that you may receive in the mail after your visit with us. Thank you!             Your Updated Medication List - Protect others around you: Learn how to safely use, store and throw away your medicines at www.disposemymeds.org.          This list is accurate as of 5/30/18  4:19 PM.  Always use your most recent med list.                   Brand Name Dispense Instructions for use Diagnosis    albuterol 108 (90 Base) MCG/ACT Inhaler    PROAIR HFA/PROVENTIL HFA/VENTOLIN HFA    1 Inhaler    Inhale 2 puffs into the lungs 4 times daily    Acute bronchitis with symptoms > 10 days       aspirin 81 MG EC tablet     90 tablet    Take 1 tablet (81 mg) by mouth daily    CAD (coronary artery disease)       atorvastatin 40 MG  tablet    LIPITOR    90 tablet    Take 1 tablet (40 mg) by mouth daily    Coronary artery disease involving native coronary artery of native heart without angina pectoris       isosorbide mononitrate 30 MG 24 hr tablet    IMDUR    30 tablet    Take 1 tablet (30 mg) by mouth daily    Coronary artery disease involving native coronary artery of native heart without angina pectoris       lidocaine-prilocaine cream    EMLA    30 g    Apply topically as needed for moderate pain    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)       lisinopril 5 MG tablet    PRINIVIL/ZESTRIL    90 tablet    Take 1 tablet (5 mg) by mouth daily    Essential hypertension with goal blood pressure less than 140/90       LORazepam 0.5 MG tablet    ATIVAN    30 tablet    Take 1 tablet (0.5 mg) by mouth 2 times daily as needed for anxiety    Anxiety       metoprolol succinate 100 MG 24 hr tablet    TOPROL-XL    30 tablet    Take 1 tablet (100 mg) by mouth daily    Coronary artery disease involving native coronary artery of native heart without angina pectoris       MULTIVITAMIN ADULT Tabs      Take 1 tablet by mouth daily        nitroGLYcerin 0.4 MG sublingual tablet    NITROSTAT    25 tablet    Place 1 tablet (0.4 mg) under the tongue every 5 minutes as needed for chest pain Maximum of 3 doses in 15 minutes    CAD (coronary artery disease)       prochlorperazine 10 MG tablet    COMPAZINE    30 tablet    Take 0.5 tablets (5 mg) by mouth every 6 hours as needed (Nausea/Vomiting)    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

## 2018-05-30 NOTE — PROGRESS NOTES
Infusion Nursing Note:  Ines Pickard presents today for C6D2 Etoposide   Patient seen by provider today: No   present during visit today: Not Applicable.    Note: N/A.    Intravenous Access:  Implanted Port.    Treatment Conditions:  Not Applicable.      Post Infusion Assessment:  Patient tolerated infusion without incident.  Blood return noted pre and post infusion.  Site patent and intact, free from redness, edema or discomfort.  No evidence of extravasations.    Discharge Plan:   Patient discharged in stable condition accompanied by: friend.  Departure Mode: Ambulatory.  Pt to return tomorrow at 1:30 pm for C6D3 Etoposide/Neulasta OnPro.     Dea Black RN

## 2018-05-31 ENCOUNTER — INFUSION THERAPY VISIT (OUTPATIENT)
Dept: INFUSION THERAPY | Facility: CLINIC | Age: 78
End: 2018-05-31
Attending: INTERNAL MEDICINE
Payer: MEDICARE

## 2018-05-31 VITALS — TEMPERATURE: 96.9 F | DIASTOLIC BLOOD PRESSURE: 53 MMHG | HEART RATE: 69 BPM | SYSTOLIC BLOOD PRESSURE: 105 MMHG

## 2018-05-31 DIAGNOSIS — Z29.9 NEED FOR PROPHYLACTIC MEASURE: Primary | ICD-10-CM

## 2018-05-31 DIAGNOSIS — C34.92 SMALL CELL CARCINOMA OF LEFT LUNG (H): ICD-10-CM

## 2018-05-31 PROCEDURE — 96367 TX/PROPH/DG ADDL SEQ IV INF: CPT

## 2018-05-31 PROCEDURE — 96375 TX/PRO/DX INJ NEW DRUG ADDON: CPT

## 2018-05-31 PROCEDURE — 25000128 H RX IP 250 OP 636: Performed by: INTERNAL MEDICINE

## 2018-05-31 PROCEDURE — 25000125 ZZHC RX 250: Performed by: INTERNAL MEDICINE

## 2018-05-31 PROCEDURE — 96413 CHEMO IV INFUSION 1 HR: CPT

## 2018-05-31 PROCEDURE — 96377 APPLICATON ON-BODY INJECTOR: CPT

## 2018-05-31 RX ORDER — HEPARIN SODIUM (PORCINE) LOCK FLUSH IV SOLN 100 UNIT/ML 100 UNIT/ML
5 SOLUTION INTRAVENOUS
Status: DISCONTINUED | OUTPATIENT
Start: 2018-05-31 | End: 2018-05-31 | Stop reason: HOSPADM

## 2018-05-31 RX ADMIN — PEGFILGRASTIM 6 MG: KIT SUBCUTANEOUS at 15:08

## 2018-05-31 RX ADMIN — DEXAMETHASONE SODIUM PHOSPHATE 12 MG: 10 INJECTION, SOLUTION INTRAMUSCULAR; INTRAVENOUS at 13:54

## 2018-05-31 RX ADMIN — SODIUM CHLORIDE 250 ML: 9 INJECTION, SOLUTION INTRAVENOUS at 13:34

## 2018-05-31 RX ADMIN — SODIUM CHLORIDE 150 MG: 9 INJECTION, SOLUTION INTRAVENOUS at 14:03

## 2018-05-31 RX ADMIN — FAMOTIDINE 20 MG: 20 INJECTION, SOLUTION INTRAVENOUS at 13:34

## 2018-05-31 RX ADMIN — HEPARIN 5 ML: 100 SYRINGE at 15:08

## 2018-05-31 NOTE — MR AVS SNAPSHOT
After Visit Summary   5/31/2018    Ines Pickard    MRN: [id number]           Patient Information     Date Of Birth          1940        Visit Information        Provider Department      5/31/2018 1:30 PM ROOM 9 [facility]        Today's Diagnoses     Need for prophylactic measure    -  1    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)           Follow-ups after your visit        Your next 10 appointments already scheduled     Jun 01, 2018  1:00 PM CDT   [facility]HAEL Perez MD   [facility]    [address]  [facility] [address]   [phone]           Bring a current list of meds and any records pertaining to this visit. For Physicals, please bring immunization records and any forms needing to be filled out. Please arrive 10 minutes early to complete paperwork.            Jun 04, 2018  1:30 PM CDT   Return Visit with ALEXSANDRA Hernandez CNP   [facility] ([facility])    [address] [address]   [phone]            Jun 13, 2018 10:00 AM CDT   (Arrive by 9:30 AM)   PE NPET ONCOLOGY (EYES TO THIGHS) with WYPETCT1   [facility] ([facility])    [address] [address]   [phone]           Tell your doctor:   If there is any chance you may be pregnant or if you are breastfeeding.   If you have problems lying in small spaces (claustrophobia). If you do, your doctor may give you medicine to help you relax. If you have diabetes:   Have your exam early in the morning. Your blood glucose will go up as the day goes by.   Your glucose level must be 180 or less at the start of the exam. Please take any medicines you need to ensure this blood glucose level. 24 hours before your scan: Don t do any heavy exercise. (No jogging, aerobics or other workouts.) Exercise will make your pictures less accurate. 6  hours before your scan:   Stop all food and liquids (except water).   Do not chew gum or suck on mints.   If you need to take medicine with food, you may take it with a few crackers.  Please call your Imaging Department at your exam site with any questions.            Jun 14, 2018  1:30 PM CDT   Return Visit with Tracy Miner MD   [facility] ([facility])    [facility]  [address]  [facility] [address] [phone]              Who to contact     If you have questions or need follow up information about today's clinic visit or your schedule please contact [facility] directly at [phone].  Normal or non-critical lab and imaging results will be communicated to you by MyChart, letter or phone within 4 business days after the clinic has received the results. If you do not hear from us within 7 days, please contact the clinic through MyChart or phone. If you have a critical or abnormal lab result, we will notify you by phone as soon as possible.  Submit refill requests through [employer] or call your pharmacy and they will forward the refill request to us. Please allow 3 business days for your refill to be completed.          Additional Information About Your Visit        Care EveryWhere ID     This is your Care EveryWhere ID. This could be used by other organizations to access your [place] medical records  [id number]        Your Vitals Were     Pulse Temperature                69 96.9  F (36.1  C) (Oral)           Blood Pressure from Last 3 Encounters:   05/31/18 105/53   05/30/18 104/50   05/29/18 115/65    Weight from Last 3 Encounters:   05/29/18 72.5 kg (159 lb 12.8 oz)   05/21/18 72.3 kg (159 lb 8 oz)   05/17/18 74.3 kg (163 lb 12.8 oz)              Today, you had the following     No orders found for display       Primary Care Provider Office Phone # Fax #    R Emanuel Perez [phone] [address]  [place] [address]        Equal Access to Services     [facility] : Hadii doroteo hartmann mandy Carmona, waaxda luqadaha, qaybta kaalmada johnpallavilondon, waxale kenan eastmarlenmedhat taylor. So [facility] [place] [phone].    ATENCIÓN: Si habla español, tiene a nguyễn disposición servicios gratuitos de asistencia lingüística. Yefri al [phone].    We comply with applicable federal civil rights laws and [place] laws. We do not discriminate on the basis of race, color, national origin, age, disability, sex, sexual orientation, or gender identity.            Thank you!     Thank you for choosing [facility]  for your care. Our goal is always to provide you with excellent care. Hearing back from our patients is one way we can continue to improve our services. Please take a few minutes to complete the written survey that you may receive in the mail after your visit with us. Thank you!             Your Updated Medication List - Protect others around you: Learn how to safely use, store and throw away your medicines at www.disposemymeds.org.          This list is accurate as of 5/31/18  3:34 PM.  Always use your most recent med list.                   Brand Name Dispense Instructions for use Diagnosis    albuterol 108 (90 Base) MCG/ACT Inhaler    PROAIR HFA/PROVENTIL HFA/VENTOLIN HFA    1 Inhaler    Inhale 2 puffs into the lungs 4 times daily    Acute bronchitis with symptoms > 10 days       aspirin 81 MG EC tablet     90 tablet    Take 1 tablet (81 mg) by mouth daily    CAD (coronary artery disease)       atorvastatin 40 MG tablet    LIPITOR    90 tablet    Take 1 tablet (40 mg) by mouth daily    Coronary artery disease involving native coronary artery of native heart without angina pectoris       isosorbide mononitrate 30 MG 24 hr tablet    IMDUR    30 tablet    Take 1 tablet (30 mg) by mouth daily    Coronary artery disease involving native coronary artery of native heart without angina pectoris       lidocaine-prilocaine  cream    EMLA    30 g    Apply topically as needed for moderate pain    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)       lisinopril 5 MG tablet    PRINIVIL/ZESTRIL    90 tablet    Take 1 tablet (5 mg) by mouth daily    Essential hypertension with goal blood pressure less than 140/90       LORazepam 0.5 MG tablet    ATIVAN    30 tablet    Take 1 tablet (0.5 mg) by mouth 2 times daily as needed for anxiety    Anxiety       metoprolol succinate 100 MG 24 hr tablet    TOPROL-XL    30 tablet    Take 1 tablet (100 mg) by mouth daily    Coronary artery disease involving native coronary artery of native heart without angina pectoris       MULTIVITAMIN ADULT Tabs      Take 1 tablet by mouth daily        nitroGLYcerin 0.4 MG sublingual tablet    NITROSTAT    25 tablet    Place 1 tablet (0.4 mg) under the tongue every 5 minutes as needed for chest pain Maximum of 3 doses in 15 minutes    CAD (coronary artery disease)       prochlorperazine 10 MG tablet    COMPAZINE    30 tablet    Take 0.5 tablets (5 mg) by mouth every 6 hours as needed (Nausea/Vomiting)    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

## 2018-05-31 NOTE — PROGRESS NOTES
Infusion Nursing Note:  Ines Pickard presents today for Etoposide.    Patient seen by provider today: No   present during visit today: Not Applicable.    Note: N/A.    Intravenous Access:  Implanted Port.    Treatment Conditions:  Results reviewed, labs MET treatment parameters, ok to proceed with treatment.      Post Infusion Assessment:  Patient tolerated infusion without incident.  Blood return noted pre and post infusion.  Site patent and intact, free from redness, edema or discomfort.  No evidence of extravasations.  Access discontinued per protocol.    Discharge Plan:   Patient discharged in stable condition accompanied by: sister.  On pro Neulasta injector applied to left arm.  Pt given instructions and verbal teaching about the on body injector. Questions answered and pt verbalizes understanding of teaching.  Radha Caal RN

## 2018-06-01 ENCOUNTER — OFFICE VISIT (OUTPATIENT)
Dept: FAMILY MEDICINE | Facility: CLINIC | Age: 78
End: 2018-06-01
Payer: MEDICARE

## 2018-06-01 VITALS
HEIGHT: 63 IN | DIASTOLIC BLOOD PRESSURE: 64 MMHG | RESPIRATION RATE: 16 BRPM | BODY MASS INDEX: 28.17 KG/M2 | HEART RATE: 74 BPM | SYSTOLIC BLOOD PRESSURE: 106 MMHG | WEIGHT: 159 LBS | TEMPERATURE: 97 F | OXYGEN SATURATION: 94 %

## 2018-06-01 DIAGNOSIS — I10 ESSENTIAL HYPERTENSION WITH GOAL BLOOD PRESSURE LESS THAN 140/90: ICD-10-CM

## 2018-06-01 DIAGNOSIS — I25.10 CORONARY ARTERY DISEASE INVOLVING NATIVE CORONARY ARTERY OF NATIVE HEART WITHOUT ANGINA PECTORIS: Primary | ICD-10-CM

## 2018-06-01 DIAGNOSIS — E66.01 MORBID OBESITY (H): ICD-10-CM

## 2018-06-01 DIAGNOSIS — C34.92 SMALL CELL CARCINOMA OF LEFT LUNG (H): ICD-10-CM

## 2018-06-01 DIAGNOSIS — E78.5 HYPERLIPIDEMIA LDL GOAL <70: ICD-10-CM

## 2018-06-01 PROCEDURE — 99495 TRANSJ CARE MGMT MOD F2F 14D: CPT | Performed by: FAMILY MEDICINE

## 2018-06-01 ASSESSMENT — PAIN SCALES - GENERAL: PAINLEVEL: NO PAIN (0)

## 2018-06-01 NOTE — MR AVS SNAPSHOT
After Visit Summary   6/1/2018    Ines Pickard    MRN: [id number]           Patient Information     Date Of Birth          1940        Visit Information        Provider Department      6/1/2018 1:00 PM Chris, MICHAEL Castellanos MD [facility]         Follow-ups after your visit        Your next 10 appointments already scheduled     Jun 04, 2018  1:30 PM CDT   Return Visit with ALEXSANDRA Hernandez CNP   [facility] ([facility])    [address] [address]   [phone]            Jun 13, 2018 10:00 AM CDT   (Arrive by 9:30 AM)   PE NPET ONCOLOGY (EYES TO THIGHS) with [id number]   [facility] ([facility])    [address] [address]   [phone]           Tell your doctor:   If there is any chance you may be pregnant or if you are breastfeeding.   If you have problems lying in small spaces (claustrophobia). If you do, your doctor may give you medicine to help you relax. If you have diabetes:   Have your exam early in the morning. Your blood glucose will go up as the day goes by.   Your glucose level must be 180 or less at the start of the exam. Please take any medicines you need to ensure this blood glucose level. 24 hours before your scan: Don t do any heavy exercise. (No jogging, aerobics or other workouts.) Exercise will make your pictures less accurate. 6 hours before your scan:   Stop all food and liquids (except water).   Do not chew gum or suck on mints.   If you need to take medicine with food, you may take it with a few crackers.  Please call your Imaging Department at your exam site with any questions.            Jun 14, 2018  1:30 PM CDT   Return Visit with Tracy Miner MD   [facility] ([facility])    [facility]  [address]  [facility] [address]   [phone]              Who to contact     If you have  "questions or need follow up information about today's clinic visit or your schedule please contact [facility] directly at [phone].  Normal or non-critical lab and imaging results will be communicated to you by MyChart, letter or phone within 4 business days after the clinic has received the results. If you do not hear from us within 7 days, please contact the clinic through MyChart or phone. If you have a critical or abnormal lab result, we will notify you by phone as soon as possible.  Submit refill requests through [employer] or call your pharmacy and they will forward the refill request to us. Please allow 3 business days for your refill to be completed.          Additional Information About Your Visit        Care EveryWhere ID     This is your Care EveryWhere ID. This could be used by other organizations to access your [place] medical records  [id number]        Your Vitals Were     Pulse Temperature Respirations Height Pulse Oximetry BMI (Body Mass Index)    74 97  F (36.1  C) 16 5' 2.99\" (1.6 m) 94% 28.17 kg/m2       Blood Pressure from Last 3 Encounters:   06/01/18 106/64   05/31/18 105/53   05/30/18 104/50    Weight from Last 3 Encounters:   06/01/18 159 lb (72.1 kg)   05/29/18 159 lb 12.8 oz (72.5 kg)   05/21/18 159 lb 8 oz (72.3 kg)              Today, you had the following     No orders found for display       Primary Care Provider Office Phone # Fax #    R Emanuel Perez [phone] [address] [facility] [address]        Equal Access to Services     [facility]: Hadii aad ku hadasho Soomaali, waaxda luqadaha, qaybta kaalmada hoda velazquez . So [facility] [phone].    ATENCIÓN: Si habla español, tiene a nguyễn disposición servicios gratuitos de asistencia lingüística. Llame al [phone].    We comply with applicable federal civil rights laws and [place] laws. We do not discriminate on the basis of race, color, " national origin, age, disability, sex, sexual orientation, or gender identity.            Thank you!     Thank you for choosing [facility]  for your care. Our goal is always to provide you with excellent care. Hearing back from our patients is one way we can continue to improve our services. Please take a few minutes to complete the written survey that you may receive in the mail after your visit with us. Thank you!             Your Updated Medication List - Protect others around you: Learn how to safely use, store and throw away your medicines at www.disposemymeds.org.          This list is accurate as of 6/1/18  1:22 PM.  Always use your most recent med list.                   Brand Name Dispense Instructions for use Diagnosis    albuterol 108 (90 Base) MCG/ACT Inhaler    PROAIR HFA/PROVENTIL HFA/VENTOLIN HFA    1 Inhaler    Inhale 2 puffs into the lungs 4 times daily    Acute bronchitis with symptoms > 10 days       aspirin 81 MG EC tablet     90 tablet    Take 1 tablet (81 mg) by mouth daily    CAD (coronary artery disease)       atorvastatin 40 MG tablet    LIPITOR    90 tablet    Take 1 tablet (40 mg) by mouth daily    Coronary artery disease involving native coronary artery of native heart without angina pectoris       isosorbide mononitrate 30 MG 24 hr tablet    IMDUR    30 tablet    Take 1 tablet (30 mg) by mouth daily    Coronary artery disease involving native coronary artery of native heart without angina pectoris       lidocaine-prilocaine cream    EMLA    30 g    Apply topically as needed for moderate pain    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)       lisinopril 5 MG tablet    PRINIVIL/ZESTRIL    90 tablet    Take 1 tablet (5 mg) by mouth daily    Essential hypertension with goal blood pressure less than 140/90       LORazepam 0.5 MG tablet    ATIVAN    30 tablet    Take 1 tablet (0.5 mg) by mouth 2 times daily as needed for anxiety    Anxiety       metoprolol succinate 100 MG 24 hr  tablet    TOPROL-XL    30 tablet    Take 1 tablet (100 mg) by mouth daily    Coronary artery disease involving native coronary artery of native heart without angina pectoris       MULTIVITAMIN ADULT Tabs      Take 1 tablet by mouth daily        nitroGLYcerin 0.4 MG sublingual tablet    NITROSTAT    25 tablet    Place 1 tablet (0.4 mg) under the tongue every 5 minutes as needed for chest pain Maximum of 3 doses in 15 minutes    CAD (coronary artery disease)       prochlorperazine 10 MG tablet    COMPAZINE    30 tablet    Take 0.5 tablets (5 mg) by mouth every 6 hours as needed (Nausea/Vomiting)    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

## 2018-06-01 NOTE — PROGRESS NOTES
"  SUBJECTIVE:   Ines Pickard is a 77 year old female who presents to clinic today for the following health issues:          Hospital Follow-up Visit:    Hospital/Nursing Home/IP Rehab Facility: [facility]  Date of Admission: 5/18/2018  Date of Discharge: 5/21/2018  Reason(s) for Admission: CAD            Problems taking medications regularly:  None       Medication changes since discharge: None       Problems adhering to non-medication therapy:  None    Summary of hospitalization:  [facility] discharge summary reviewed  Diagnostic Tests/Treatments reviewed.  Follow up needed: none  Other Healthcare Providers Involved in Patient s Care:         None  Update since discharge: stable, no recurrence of chest pain    Post Discharge Medication Reconciliation: discharge medications reconciled, continue medications without change.  Plan of care communicated with patient     Coding guidelines for this visit:  Type of Medical   Decision Making Face-to-Face Visit       within 7 Days of discharge Face-to-Face Visit        within 14 days of discharge   Moderate Complexity [id number]   High Complexity 23378 [id number]                  Problem list and histories reviewed & adjusted, as indicated.  Additional history: none        Reviewed and updated as needed this visit by Provider               ROS:  Constitutional, HEENT, cardiovascular, pulmonary, gi and gu systems are negative, except as otherwise noted.        OBJECTIVE:                                                    /64 (BP Location: Right arm, Patient Position: Chair, Cuff Size: Adult Regular)  Pulse 74  Temp 97  F (36.1  C)  Resp 16  Ht 5' 2.99\" (1.6 m)  Wt 159 lb (72.1 kg)  SpO2 94%  BMI 28.17 kg/m2    GENERAL: healthy, alert and no distress  EYES: Eyes grossly normal to inspection, extraocular movements - intact, and PERRL  NECK: no tenderness, no adenopathy, no asymmetry, no masses, no stiffness; thyroid- normal to palpation  RESP: " lungs clear to auscultation - no rales, no rhonchi, no wheezes  CV: regular rates and rhythm, normal S1 S2, no S3 or S4 and no murmur, no click or rub -  MS: extremities- no gross deformities noted, no edema         ASSESSMENT/PLAN:                                                    ASSESSMENT:  1. Coronary artery disease involving native coronary artery of native heart without angina pectoris    2. Hyperlipidemia LDL goal <70    3. Essential hypertension with goal blood pressure less than 140/90    4. Morbid obesity (H)    5. 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    MICHAEL Castellanos Post  [facility]

## 2018-06-04 ENCOUNTER — OFFICE VISIT (OUTPATIENT)
Dept: CARDIOLOGY | Facility: CLINIC | Age: 78
End: 2018-06-04
Payer: MEDICARE

## 2018-06-04 VITALS
BODY MASS INDEX: 28.24 KG/M2 | WEIGHT: 159.4 LBS | HEART RATE: 85 BPM | DIASTOLIC BLOOD PRESSURE: 67 MMHG | OXYGEN SATURATION: 95 % | SYSTOLIC BLOOD PRESSURE: 96 MMHG

## 2018-06-04 DIAGNOSIS — I25.10 CORONARY ARTERY DISEASE INVOLVING NATIVE CORONARY ARTERY OF NATIVE HEART WITHOUT ANGINA PECTORIS: ICD-10-CM

## 2018-06-04 PROCEDURE — 99214 OFFICE O/P EST MOD 30 MIN: CPT | Performed by: NURSE PRACTITIONER

## 2018-06-04 RX ORDER — ISOSORBIDE MONONITRATE 30 MG/1
15 TABLET, EXTENDED RELEASE ORAL DAILY
Qty: 15 TABLET | Refills: 11 | Status: SHIPPED | OUTPATIENT
Start: 2018-06-04 | End: 2018-06-11 | Stop reason: SINTOL

## 2018-06-04 NOTE — LETTER
6/4/2018    MICHAEL Perez MD  [address] [facility] [address]    RE: Ines Pickard       Dear Colleague,    I had the pleasure of seeing Ines Pickard in the [facility].    Cardiology Clinic Progress Note  Ines Pickard MRN# [id number]   YOB: 1940 Age: 77 year old     Reason for visit: Hospital discharge follow up           Assessment and Plan:     1. Chest discomfort with abnormal stress test and coronary artery disease    PCI to Lcx in 2014    Presented with substernal chest pressure with radiation to her throat that began at rest and was relieved with nitro    She ruled out for an MI with troponins negative × 4    Conservative management was recommended with ultimately deciding to continue medical therapy.    Titrate Imdur down to 50 mg daily due to headache and hypotension    No recurrence of symptoms since discharge    Follow-up with Dr. Neri in 2-3 months for annual visit    2. Hypertension    Titrate Imdur as above    Consider decreasing metoprolol in the future if needed for additional blood pressure room    3. Hyperlipidemia    Continue atorvastatin 40 mg daily         History of Presenting Illness:    Ines Pickard is a very pleasant 77 year old patient of Dr. Neri who presents today for hospital discharge follow-up.  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    She is transferred from [facility] to [facility] for evaluation on an abnormal stress test and concerns for unstable angina.  She complained of substernal region chest pressure with radiation to her throat that began at rest and was relieved with nitroglycerin.  Troponins were negative ×4.  The stress test showed 2 separate territories of  ischemia with moderate area of the anterior and anteroseptal ischemia  of moderate intensity and small to moderate inferolateral area of ischemia of moderate intensity.  Her last coronary angiogram was in 2014 after she had an inferior MI showed three-vessel coronary artery disease with a 70% ostial RPDA, 90% proximal D1.    She is following with oncology for small cell lung cancer with metastases.  She underwent palliative chemotherapy and at her last dose on 5 3/9/2018.  Patient states she follows up with oncology and a PET scan soon.  Since her discharge she has had no recurrence of her chest discomfort.  She was started on Imdur 30 mg daily and has been having ongoing headaches since.  Her blood pressure is limited, but she is asymptomatic.          This note was completed in part using Dragon voice recognition software. Although reviewed after completion, some word and grammatical errors may occur       Review of Systems:   Review of Systems:  Skin:  Positive for scaling   Eyes:  Negative    ENT:  Negative    Respiratory:  Positive for dyspnea on exertion  Cardiovascular:  Negative    Gastroenterology: Negative    Genitourinary:  Negative    Musculoskeletal:  Negative    Neurologic:  Positive for numbness or tingling of feet;numbness or tingling of hands;headaches  Psychiatric:  Negative    Heme/Lymph/Imm:  Negative    Endocrine:  Negative                Physical Exam:     Vitals: BP 96/67 (BP Location: Right arm, Patient Position: Sitting, Cuff Size: Adult Regular)  Pulse 85  Wt 72.3 kg (159 lb 6.4 oz)  SpO2 95%  BMI 28.24 kg/m2  Constitutional:  cooperative, alert and oriented, well developed, well nourished, in no acute distress        Skin:  warm and dry to the touch        Head:  normocephalic        Eyes:  sclera white        ENT:  no pallor or cyanosis        Chest:  clear to auscultation        Cardiac: regular rhythm, normal S1/S2, no S3 or S4, apical impulse not displaced, no murmurs, gallops or  rubs                  Abdomen:  not assessed this visit        Vascular: not assessed this visit                                      Extremities and Back:  no edema        Neurological:  affect appropriate               Medications:     Current Outpatient Prescriptions   Medication Sig Dispense Refill     Acetaminophen (TYLENOL PO) Take 1,000 mg by mouth every 6 hours as needed       albuterol (PROAIR HFA/PROVENTIL HFA/VENTOLIN HFA) 108 (90 BASE) MCG/ACT Inhaler Inhale 2 puffs into the lungs 4 times daily 1 Inhaler 2     aspirin EC 81 MG EC tablet Take 1 tablet (81 mg) by mouth daily 90 tablet 3     atorvastatin (LIPITOR) 40 MG tablet Take 1 tablet (40 mg) by mouth daily 90 tablet 3     isosorbide mononitrate (IMDUR) 30 MG 24 hr tablet Take 0.5 tablets (15 mg) by mouth daily 15 tablet 11     lidocaine-prilocaine (EMLA) cream Apply topically as needed for moderate pain 30 g 0     lisinopril (PRINIVIL/ZESTRIL) 5 MG tablet Take 1 tablet (5 mg) by mouth daily 90 tablet 2     LORazepam (ATIVAN) 0.5 MG tablet Take 1 tablet (0.5 mg) by mouth 2 times daily as needed for anxiety 30 tablet 5     metoprolol succinate (TOPROL-XL) 100 MG 24 hr tablet Take 1 tablet (100 mg) by mouth daily 30 tablet 3     Multiple Vitamins-Minerals (MULTIVITAMIN ADULT) TABS Take 1 tablet by mouth daily        nitroglycerin (NITROSTAT) 0.4 MG SL tablet Place 1 tablet (0.4 mg) under the tongue every 5 minutes as needed for chest pain Maximum of 3 doses in 15 minutes 25 tablet 3     prochlorperazine (COMPAZINE) 10 MG tablet Take 0.5 tablets (5 mg) by mouth every 6 hours as needed (Nausea/Vomiting) 30 tablet 3     ranitidine (ZANTAC) 75 MG tablet Take 1 tablet (75 mg) by mouth 2 times daily 30 tablet 11     [DISCONTINUED] isosorbide mononitrate (IMDUR) 30 MG 24 hr tablet Take 1 tablet (30 mg) by mouth daily 30 tablet 3           Family History   Problem Relation Age of Onset     CANCER Brother      HEART DISEASE Brother      CANCER Mother       Respiratory Father      HEART DISEASE Father      MI       Social History     Social History     Marital status:      Spouse name: N/A     Number of children: N/A     Years of education: N/A     Occupational History     Not on file.     Social History Main Topics     Smoking status: Former Smoker     Packs/day: 0.50     Types: Cigarettes     Quit date: 12/13/2014     Smokeless tobacco: Never Used     Alcohol use No     Drug use: No     Sexual activity: Not Currently     Other Topics Concern     Parent/Sibling W/ Cabg, Mi Or Angioplasty Before 65f 55m? 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     Coronary artery disease 12/2014    Inferior STEMI, stent to Circumflex     Percutaneous transluminal coronary angioplasty hematoma 12/15/2014              Past Surgical History:     Past Surgical History:   Procedure Laterality Date     APPENDECTOMY  1956     ESOPHAGOSCOPY, GASTROSCOPY, DUODENOSCOPY (EGD), COMBINED N/A 1/30/2018    Procedure: COMBINED ENDOSCOPIC ULTRASOUND, ESOPHAGOSCOPY, GASTROSCOPY, DUODENOSCOPY (EGD), FINE NEEDLE ASPIRATE/BIOPSY;   EUS;  Surgeon: Carlos Fletcher MD;  Location:  GI     EYE SURGERY       INSERT PORT VASCULAR ACCESS N/A 2/12/2018    Procedure: INSERT PORT VASCULAR ACCESS;  Port-a-Cath Placement;  Surgeon: Carlos Johnson MD;  Location: [facility] OR     PHACOEMULSIFICATION WITH STANDARD INTRAOCULAR LENS IMPLANT Left 1/25/2016    Procedure: PHACOEMULSIFICATION WITH STANDARD INTRAOCULAR LENS IMPLANT;  Surgeon: Julio César Wallace MD;  Location: [facility] OR     PHACOEMULSIFICATION WITH STANDARD INTRAOCULAR LENS IMPLANT Right 2/22/2016    Procedure: PHACOEMULSIFICATION WITH STANDARD INTRAOCULAR LENS IMPLANT;  Surgeon: Julio César Wallace MD;  Location: [facility] OR     SURGICAL HISTORY OF -   1961    right hand surgery       TONSILLECTOMY & ADENOIDECTOMY  1967     VASCULAR SURGERY  02/12/2018    PortaCath              Allergies:   Amoxicillin; Tetracycline; and Nickel       Data:   All laboratory data reviewed:    LAST CHOLESTEROL:  Lab Results   Component Value Date    CHOL 125 03/21/2017     Lab Results   Component Value Date    HDL 41 03/21/2017     Lab Results   Component Value Date    LDL 48 03/21/2017     Lab Results   Component Value Date    TRIG 182 03/21/2017     Lab Results   Component Value Date    CHOLHDLRATIO 6.1 12/13/2014       LAST BMP:  Lab Results   Component Value Date     05/29/2018      Lab Results   Component Value Date    POTASSIUM 4.0 05/29/2018     Lab Results   Component Value Date    CHLORIDE 104 05/29/2018     Lab Results   Component Value Date    AMADO 8.9 05/29/2018     Lab Results   Component Value Date    CO2 26 05/29/2018     Lab Results   Component Value Date    BUN 15 05/29/2018     Lab Results   Component Value Date    CR 0.52 05/29/2018     Lab Results   Component Value Date     05/29/2018       LAST CBC:  Lab Results   Component Value Date    WBC 11.0 05/29/2018     Lab Results   Component Value Date    RBC 3.76 05/29/2018     Lab Results   Component Value Date    HGB 11.0 05/29/2018     Lab Results   Component Value Date    HCT 33.1 05/29/2018     Lab Results   Component Value Date    MCV 88 05/29/2018     Lab Results   Component Value Date    MCH 29.3 05/29/2018     Lab Results   Component Value Date    MCHC 33.2 05/29/2018     Lab Results   Component Value Date    RDW 17.3 05/29/2018     Lab Results   Component Value Date     05/29/2018       Thank you for allowing me to participate in the care of your patient.    Sincerely,     ALEXSANDRA LAUREANO [facility]

## 2018-06-04 NOTE — MR AVS SNAPSHOT
After Visit Summary   6/4/2018    Ines Pickard    MRN: [id number]           Patient Information     Date Of Birth          1940        Visit Information        Provider Department      6/4/2018 1:30 PM Ana Pope APRN CNP [facility]        Today's Diagnoses     Coronary artery disease involving native coronary artery of native heart without angina pectoris          Care Instructions    [facility]~[address]~[place], [place]~[address]  Thank you for your  Heart Care visit today. If you have questions regarding your visit, please contact your cardiology RN's, Neelam Han or Lou Boogie, at [phone]. Your provider has recommended the following:  Medication Changes:  Decrease Imdur (long acting nitroglycerin) to 15 mg 1 time a day.  Recommendations:  If blood pressure remains low and headache persist, please and we can stop Imdur  Follow-up:  See Dr. Neri for cardiology follow up in August/September  To schedule a future appointment, we kindly ask that you call cardiology scheduling at [phone] three months prior to requested revisit date.               Reminder:  For your safety, we ask that you bring in your current medication(s) or an updated list of your medications with you to EACH office visit. Include the medication name, dose of pill on bottle and how you are taking it. Include over-the-counter medications or supplements. Your provider will review this at each visit and plan your care based on your current information.               Follow-ups after your visit        Your next 10 appointments already scheduled     Jun 13, 2018 10:00 AM CDT   (Arrive by 9:30 AM)   PE NPET ONCOLOGY (EYES TO THIGHS) with [id number]   [facility] Pet CT ([facility])    [address]  [facility] [id number] [phone]           Tell your doctor:   If there is  any chance you may be pregnant or if you are breastfeeding.   If you have problems lying in small spaces (claustrophobia). If you do, your doctor may give you medicine to help you relax. If you have diabetes:   Have your exam early in the morning. Your blood glucose will go up as the day goes by.   Your glucose level must be 180 or less at the start of the exam. Please take any medicines you need to ensure this blood glucose level. 24 hours before your scan: Don t do any heavy exercise. (No jogging, aerobics or other workouts.) Exercise will make your pictures less accurate. 6 hours before your scan:   Stop all food and liquids (except water).   Do not chew gum or suck on mints.   If you need to take medicine with food, you may take it with a few crackers.  Please call your Imaging Department at your exam site with any questions.            Jun 14, 2018  1:30 PM CDT   Return Visit with Tracy Miner MD   [facility] ([facility])    [facility]  [address]  [facility] [place] [address] [phone]              Who to contact     If you have questions or need follow up information about today's clinic visit or your schedule please contact [facility] directly at [phone].  Normal or non-critical lab and imaging results will be communicated to you by MyChart, letter or phone within 4 business days after the clinic has received the results. If you do not hear from us within 7 days, please contact the clinic through MyChart or phone. If you have a critical or abnormal lab result, we will notify you by phone as soon as possible.  Submit refill requests through [employer] or call your pharmacy and they will forward the refill request to us. Please allow 3 business days for your refill to be completed.          Additional Information About Your Visit        Care EveryWhere ID     This is your Care EveryWhere ID. This could be used  by other organizations to access your [facility] medical records  [id number]        Your Vitals Were     Pulse Pulse Oximetry BMI (Body Mass Index)             85 95% 28.24 kg/m2          Blood Pressure from Last 3 Encounters:   06/04/18 96/67   06/01/18 106/64   05/31/18 105/53    Weight from Last 3 Encounters:   06/04/18 72.3 kg (159 lb 6.4 oz)   06/01/18 72.1 kg (159 lb)   05/29/18 72.5 kg (159 lb 12.8 oz)              Today, you had the following     No orders found for display         Today's Medication Changes          These changes are accurate as of 6/4/18  1:40 PM.  If you have any questions, ask your nurse or doctor.               These medicines have changed or have updated prescriptions.        Dose/Directions    isosorbide mononitrate 30 MG 24 hr tablet   Commonly known as:  IMDUR   This may have changed:  how much to take   Used for:  Coronary artery disease involving native coronary artery of native heart without angina pectoris   Changed by:  Ana Pope, APRN CNP        Dose:  15 mg   Take 0.5 tablets (15 mg) by mouth daily   Quantity:  15 tablet   Refills:  11            Where to get your medicines      These medications were sent to [facility] - [address]  [address] [address]    Hours:  N[facility] Phone:  [phone]     isosorbide mononitrate 30 MG 24 hr tablet                Primary Care Provider Office Phone # Fax #    R Emanuel Perez [phone] [address] [address]        Equal Access to Services     [facility] AH: Hadii aad ku hadasho Soomaali, waaxda luqadaha, qaybta kaalmada adeegyalondon, hoda taylor. So [facility] [phone].    ATENCIÓN: Si habla español, tiene a nguyễn disposición servicios gratuitos de asistencia lingüística. Llame al [phone].    We comply with applicable federal civil rights laws and [place] laws. We do not  discriminate on the basis of race, color, national origin, age, disability, sex, sexual orientation, or gender identity.            Thank you!     Thank you for choosing [facility]  for your care. Our goal is always to provide you with excellent care. Hearing back from our patients is one way we can continue to improve our services. Please take a few minutes to complete the written survey that you may receive in the mail after your visit with us. Thank you!             Your Updated Medication List - Protect others around you: Learn how to safely use, store and throw away your medicines at www.disposemymeds.org.          This list is accurate as of 6/4/18  1:40 PM.  Always use your most recent med list.                   Brand Name Dispense Instructions for use Diagnosis    albuterol 108 (90 Base) MCG/ACT Inhaler    PROAIR HFA/PROVENTIL HFA/VENTOLIN HFA    1 Inhaler    Inhale 2 puffs into the lungs 4 times daily    Acute bronchitis with symptoms > 10 days       aspirin 81 MG EC tablet     90 tablet    Take 1 tablet (81 mg) by mouth daily    CAD (coronary artery disease)       atorvastatin 40 MG tablet    LIPITOR    90 tablet    Take 1 tablet (40 mg) by mouth daily    Coronary artery disease involving native coronary artery of native heart without angina pectoris       isosorbide mononitrate 30 MG 24 hr tablet    IMDUR    15 tablet    Take 0.5 tablets (15 mg) by mouth daily    Coronary artery disease involving native coronary artery of native heart without angina pectoris       lidocaine-prilocaine cream    EMLA    30 g    Apply topically as needed for moderate pain    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)       lisinopril 5 MG tablet    PRINIVIL/ZESTRIL    90 tablet    Take 1 tablet (5 mg) by mouth daily    Essential hypertension with goal blood pressure less than 140/90       LORazepam 0.5 MG tablet    ATIVAN    30 tablet    Take 1 tablet (0.5 mg) by mouth 2 times daily as  needed for anxiety    Anxiety       metoprolol succinate 100 MG 24 hr tablet    TOPROL-XL    30 tablet    Take 1 tablet (100 mg) by mouth daily    Coronary artery disease involving native coronary artery of native heart without angina pectoris       MULTIVITAMIN ADULT Tabs      Take 1 tablet by mouth daily        nitroGLYcerin 0.4 MG sublingual tablet    NITROSTAT    25 tablet    Place 1 tablet (0.4 mg) under the tongue every 5 minutes as needed for chest pain Maximum of 3 doses in 15 minutes    CAD (coronary artery disease)       prochlorperazine 10 MG tablet    COMPAZINE    30 tablet    Take 0.5 tablets (5 mg) by mouth every 6 hours as needed (Nausea/Vomiting)    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

## 2018-06-04 NOTE — PATIENT INSTRUCTIONS
[facility] HEART CARE  [facility]~[address]~[place], [place]~[address]  Thank you for your [facility] Heart Care visit today. If you have questions regarding your visit, please contact your cardiology RN's, Neelam Han or Lou Boogie, at [phone]. Your provider has recommended the following:  Medication Changes:  Decrease Imdur (long acting nitroglycerin) to 15 mg 1 time a day.  Recommendations:  If blood pressure remains low and headache persist, please and we can stop Imdur  Follow-up:  See Dr. Neri for cardiology follow up in August/September  To schedule a future appointment, we kindly ask that you call cardiology scheduling at [phone] three months prior to requested revisit date.               Reminder:  For your safety, we ask that you bring in your current medication(s) or an updated list of your medications with you to EACH office visit. Include the medication name, dose of pill on bottle and how you are taking it. Include over-the-counter medications or supplements. Your provider will review this at each visit and plan your care based on your current information.

## 2018-06-05 ENCOUNTER — DOCUMENTATION ONLY (OUTPATIENT)
Dept: CARE COORDINATION | Facility: CLINIC | Age: 78
End: 2018-06-05

## 2018-06-05 NOTE — PATIENT INSTRUCTIONS
[facility] now requests orders and shares plan of care/discharge summaries for some patients through [employer].  Please REPLY TO THIS MESSAGE OR ROUTE BACK TO THE AUTHOR in order to give authorization for orders when needed.  This is considered a verbal order, you will still receive a faxed copy of orders for signature.  Thank you for your assistance in improving collaboration for our patients.    ORDER    MD SUMMARY/PLAN OF CARE    PT for ambulation, exercises and vital monitoring 1w3.    Chioma Christian MPT

## 2018-06-11 ENCOUNTER — TELEPHONE (OUTPATIENT)
Dept: RADIOLOGY | Facility: CLINIC | Age: 78
End: 2018-06-11

## 2018-06-11 ENCOUNTER — TELEPHONE (OUTPATIENT)
Dept: FAMILY MEDICINE | Facility: CLINIC | Age: 78
End: 2018-06-11

## 2018-06-11 NOTE — TELEPHONE ENCOUNTER
S-(situation): low BP    B-(background): Last seen PCP 06/01/18. Currently taking Lisinopril 5 mg, metoprolol 100 mg, and isosorbide 15 mg    A-(assessment): Spoke with Saige from [facility]. She reports patient's BP readings of: Sitting 95/66, Standing 88/66. She states that patient reports Poor appetite, HR 82, Temp 97.7-oral.     Denies: Chest pain, abdominal pain, shoulder pain, dizziness, pale skin, fever    R-(recommendations): Routing to provider for review.     Courtney KING RN

## 2018-06-11 NOTE — TELEPHONE ENCOUNTER
Pre-procedure call, per protocol, made to patient and her son in regards to scxheduled procedurew on 6/13.

## 2018-06-11 NOTE — TELEPHONE ENCOUNTER
Reason for Call:  Low blood pressure readings    Detailed comments: Saige from [facility] is with the patient and took some blood pressure reading. Sitting 95/66, Standing 88/66.  Patient is on Lisinopril, Isosorbide and Metoprolol was upped to 100 mg. What should they do about her medications. Not symptomatic.    Phone Number Patient can be reached at: Home number on file [phone] is Saige.    Best Time: any    Can we leave a detailed message on this number? YES   This is a confidential VM.  Lana Montanez  [facility]      Call taken on 6/11/2018 at 3:01 PM by Lana Montanez

## 2018-06-12 NOTE — TELEPHONE ENCOUNTER
With her weight loss, she obviously does not need as much bp medication as previously. I would recommend she D/C the isosorbide, and if still too low let me know and would D/C lisinopril next. Notify pt and homecare nurse

## 2018-06-12 NOTE — TELEPHONE ENCOUNTER
I have called the home care RN Saige.  Told of needing to stop the isosorbide and monitor BP.  If still low BP we will dc the lisinopril next.  homecare RN states she will notify the patient. Kalyn CAO RN

## 2018-06-13 ENCOUNTER — HOSPITAL ENCOUNTER (OUTPATIENT)
Dept: PET IMAGING | Facility: CLINIC | Age: 78
Discharge: HOME | End: 2018-06-13
Attending: INTERNAL MEDICINE | Admitting: INTERNAL MEDICINE
Payer: MEDICARE

## 2018-06-13 DIAGNOSIS — C34.92 SMALL CELL CARCINOMA OF LEFT LUNG (H): ICD-10-CM

## 2018-06-13 PROCEDURE — 78815 PET IMAGE W/CT SKULL-THIGH: CPT | Mod: PS

## 2018-06-13 PROCEDURE — 25000128 H RX IP 250 OP 636: Performed by: INTERNAL MEDICINE

## 2018-06-13 PROCEDURE — A9552 F18 FDG: HCPCS | Performed by: INTERNAL MEDICINE

## 2018-06-13 PROCEDURE — 34300033 ZZH RX 343: Performed by: INTERNAL MEDICINE

## 2018-06-13 RX ADMIN — SODIUM CHLORIDE, PRESERVATIVE FREE 500 UNITS: 5 INJECTION INTRAVENOUS at 10:38

## 2018-06-13 RX ADMIN — FLUDEOXYGLUCOSE F-18 13.77 MCI.: 500 INJECTION, SOLUTION INTRAVENOUS at 10:38

## 2018-06-14 ENCOUNTER — ONCOLOGY VISIT (OUTPATIENT)
Dept: ONCOLOGY | Facility: CLINIC | Age: 78
End: 2018-06-14
Attending: INTERNAL MEDICINE
Payer: MEDICARE

## 2018-06-14 VITALS
SYSTOLIC BLOOD PRESSURE: 108 MMHG | RESPIRATION RATE: 18 BRPM | DIASTOLIC BLOOD PRESSURE: 57 MMHG | WEIGHT: 160.2 LBS | HEART RATE: 83 BPM | TEMPERATURE: 96.9 F | BODY MASS INDEX: 28.39 KG/M2 | HEIGHT: 63 IN | OXYGEN SATURATION: 97 %

## 2018-06-14 DIAGNOSIS — C34.92 SMALL CELL CARCINOMA OF LEFT LUNG (H): Primary | ICD-10-CM

## 2018-06-14 DIAGNOSIS — I25.10 CORONARY ARTERY DISEASE INVOLVING NATIVE CORONARY ARTERY OF NATIVE HEART WITHOUT ANGINA PECTORIS: ICD-10-CM

## 2018-06-14 DIAGNOSIS — D64.9 ANEMIA, UNSPECIFIED TYPE: ICD-10-CM

## 2018-06-14 DIAGNOSIS — F41.9 ANXIETY: ICD-10-CM

## 2018-06-14 PROCEDURE — 99215 OFFICE O/P EST HI 40 MIN: CPT | Performed by: INTERNAL MEDICINE

## 2018-06-14 PROCEDURE — G0463 HOSPITAL OUTPT CLINIC VISIT: HCPCS

## 2018-06-14 ASSESSMENT — PAIN SCALES - GENERAL: PAINLEVEL: NO PAIN (0)

## 2018-06-14 NOTE — PROGRESS NOTES
ONCOLOGY FOLLOW UP VISIT       CHIEF COMPLAINT/REASON FOR VISIT:  Left adrenal mass, FNA 01/30/2018 consistent with sclc     HISTORY OF PRESENT ILLNESS:  A 77-year-old female presented with 5 months duration of hoarseness.  She was evaluated by ENT.  Direct laryngoscopy revealed left vocal cord paralysis.    CT chest with contrast 01/10/2018 identified a 2.3 cm left upper lobe nodule extending to the hilum, suspicious for malignancy, left hilar mediastinal adenopathy with mass-like soft tissue thickening extending along the inferior aspect of the aortic arch likely involving the recurrent laryngeal nerve in this location, left adrenal nodule 1.5 cm undetermined, compression on L1 vertebral body.    EUS 01/30/2017 FNA was done on 2 hypoechoic left adrenal mass-   Consistent with metastatic small cell carcinoma      PET confirmed the primary TEX lesion with adrenal mets. She is dx with extensive stage SCLC.     She made informed decision to proceed with palliative chemo with carbo/-16 2/2018.   She had good PET response after 3 cycles and tx is continued.   She has ongoing PET response post 6 cycles of tx. With minimal AP window LN SUV 3.5.    PAST MEDICAL HISTORY:  CAD, stent around 2014, Angina attack in 5/2018 was at [facility]. Reflux, hypertension, cataracts, hyperlipidemia.      MEDICATIONS:  Reviewed in Epic system.      SOCIAL HISTORY:  She lives in her own house.  She has 3 boys.  She is here with her neighbor.  She quit smoking years back.  Denies alcohol abuse.      FAMILY HISTORY:  Positive for mom who had Hodgkin lymphoma.  Brother had unknown type of cancer.      REVIEW OF SYSTEMS:   Throat discomfort seems better.     ? BARNES, she is not sure.   Reports no appetite. She is using insure.   She is losing her teeth from her dental wires and eating baby food.  Reports more tired, doze off a lot.         PHYSICAL EXAMINATION:   VITAL SIGNS:  Blood pressure 108/57, pulse 83, temperature 96.9  F (36.1  C),  "temperature source Tympanic, resp. rate 18, height 1.6 m (5' 2.99\"), weight 72.7 kg (160 lb 3.2 oz), SpO2 97 %, not currently breastfeeding.  ECOG 1    GENERAL APPEARANCE:  Elderly lady, looks like her stated age, not in acute distress.   HEENT: The patient is normocephalic, atraumatic. Pupils are equally reactive to light.  Sclerae are anicteric.  Moist oral mucosa.  negative posterior pharynx.   NECK:  Supple.  No jugular venous distention.  Thyroid is not palpable.   LYMPH NODES:  Superficial lymphadenopathy is not appreciable in the bilateral cervical, supraclavicular, axillary or inguinal areas.   CARDIOVASCULAR:  S1, S2 regular with no murmurs or gallops.  No carotid or abdominal bruits.   PULMONARY:  Lungs are clear to auscultation and percussion bilaterally.  There is no wheezing or rhonchi.   GASTROINTESTINAL:  Abdomen is soft, nontender.  No hepatosplenomegaly.  No signs of ascites.  No mass appreciable.   MUSCULOSKELETAL/EXTREMITIES:  No edema.  No cyanotic changes.  No signs of joint deformity.  No lymphedema.  No spinal or paraspinal tenderness.  No CVA tenderness.   NEUROLOGIC:  Cranial nerves II-XII are grossly intact.  Sensation intact.  Muscle strength and muscle tone symmetrical, 5/5 throughout.   SKIN:  No petechiae.  No rash.  No signs of cellulitis.      LABORATORY DATA REVIEWED:   wbc diff and CMP are fine. Hb 9.1,  B12/folate nl.     CEA baseline at 6.6 in 2/2017    From 01/30/2018 FNA on left adrenal mass biopsy -- Consistent with metastatic small cell carcinoma      CURRENT IMAGE DATA REVIEWED:   PET 6/2018 post 6 cycles of carbo/vp16  1. Medial left upper lobe nodule is 1.6 x 1.2 cm, stable in size, series 3 image 112 (SUV max 2.7, previously 3.9).   2. Previously noted AP window hypermetabolism has decreased and now has SUV max 3.5, previously 4.8.    PET 4/2018 post 3 cycles of carbo/vp16  1. Decrease in size and FDG uptake in the left upper lobe nodule consistent with improved lung " cancer.  2. Improvement in the previous left hilar and mediastinal adenopathy, currently there is small residual hypermetabolic adenopathy in the aorticopulmonary window consistent with residual mayela metastasis.  3. Resolution of previous hypermetabolic left adrenal nodule consistent with resolved metastasis.    PET 2/2018  1. Hypermetabolic left upper lobe nodule is consistent with malignancy, most likely bronchogenic carcinoma.  2. Hypermetabolic left hilar and mediastinal adenopathy is consistent  with metastatic disease.  3. Hypermetabolic left adrenal nodule is suspicious for metastatic disease.  4. There is a new 3.4 cm high-density structure abutting the left adrenal gland, suspicious for interval development of adrenal hemorrhage.    CT chest 01/2018 -  identified a 2.3 cm left upper lobe nodule extending to the hilum, suspicious for malignancy, left hilar mediastinal adenopathy with mass-like soft tissue thickening extending along the inferior aspect of the aortic arch likely involving the recurrent laryngeal nerve in this location, left adrenal nodule 1.5 cm undetermined, compression on L1 vertebral body.         OLD DATA REVIEW IN SUMMARY:    Chest x-ray 11/2017, no abnormalities identified.    In 2014, CBC indicating white count 17, hemoglobin 11 and platelets normal.  Differential indicating neutrophilia when she had a heart attack.        ASSESSMENT AND PLAN:    1.  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     She made informed decision to proceed in 2/2018 with  Carbo,  -16, D1-3 q 3 wks with neulasta support.     She has good PET response after 3 cycles and tx is continued.   She has ongoing PET response post 6 cycles of tx. With minimal AP window LN SUV 3.5.    Recommend Rad-Onc consult to discuss the role of consolidation RT to chest.     Will also ask trial nurse to see if any maintenance therapy is available.       She is informed tx is palliative in nature.    She is informed high recurrence rate with stage IV SCLC after nCR from frontline chemo.     2. normocytic anemia - nl b12/folate. This is likely from chemo. Will transfuse if hb less than 8.5-9 or symptoms. Dose reduce carbo AUC 4.5.    D/c chemo should help. We discussed the role of diet.       3. Anxiety. She feels ativan helps. Will refill.     Total time is 40 minutes, more than 25 minutes spent in counseling regarding her disease status, PET results, possible further tx for her cancer.

## 2018-06-14 NOTE — MR AVS SNAPSHOT
"              After Visit Summary   6/14/2018    Ines Pickard    MRN: [id number]           Patient Information     Date Of Birth          1940        Visit Information        Provider Department      6/14/2018 1:30 PM Tracy Miner MD [facility]        Today's Diagnoses     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)    -  1    Anxiety        Anemia, unspecified type          Care Instructions    RT referral: extensive stage SCLC, role of consolidative chest CT.   Port flush monthly.  Trial RN to check the availability of maintenance therapy.           Follow-ups after your visit        Your next 10 appointments already scheduled     Jul 18, 2018 11:30 AM CDT   Level O with ROOM 10 [facility] ([facility])    n [facility]  [address]  [facility] [place] [address] [phone]              Who to contact     If you have questions or need follow up information about today's clinic visit or your schedule please contact [facility] directly at [phone].  Normal or non-critical lab and imaging results will be communicated to you by MyChart, letter or phone within 4 business days after the clinic has received the results. If you do not hear from us within 7 days, please contact the clinic through MyChart or phone. If you have a critical or abnormal lab result, we will notify you by phone as soon as possible.  Submit refill requests through [employer] or call your pharmacy and they will forward the refill request to us. Please allow 3 business days for your refill to be completed.          Additional Information About Your Visit        Care EveryWhere ID     This is your Care EveryWhere ID. This could be used by other organizations to access your [place] medical records  [id number]        Your Vitals Were     Pulse Temperature Respirations Height Pulse Oximetry Breastfeeding?    83 96.9  F (36.1  C) (Tympanic) 18 1.6 m (5' 2.99\") 97% No    " BMI (Body Mass Index)                   28.39 kg/m2            Blood Pressure from Last 3 Encounters:   06/14/18 108/57   06/04/18 96/67   06/01/18 106/64    Weight from Last 3 Encounters:   06/14/18 72.7 kg (160 lb 3.2 oz)   06/04/18 72.3 kg (159 lb 6.4 oz)   06/01/18 72.1 kg (159 lb)              Today, you had the following     No orders found for display       Primary Care Provider Office Phone # Fax #    R Emanuel Perez [phone] [address] [facility] [address]        Equal Access to Services     FEMI LOPEZ : Hadii doroteo Carmona, waanshu lu, cathie kaalmada caroline, hoda taylor. So [facility] [phone].    ATENCIÓN: Si habla español, tiene a nguyễn disposición servicios gratuitos de asistencia lingüística. [facility] [phone].    We comply with applicable federal civil rights laws and [place] laws. We do not discriminate on the basis of race, color, national origin, age, disability, sex, sexual orientation, or gender identity.            Thank you!     Thank you for choosing [facility]  for your care. Our goal is always to provide you with excellent care. Hearing back from our patients is one way we can continue to improve our services. Please take a few minutes to complete the written survey that you may receive in the mail after your visit with us. Thank you!             Your Updated Medication List - Protect others around you: Learn how to safely use, store and throw away your medicines at www.disposemymeds.org.          This list is accurate as of 6/14/18  2:08 PM.  Always use your most recent med list.                   Brand Name Dispense Instructions for use Diagnosis    albuterol 108 (90 Base) MCG/ACT Inhaler    PROAIR HFA/PROVENTIL HFA/VENTOLIN HFA    1 Inhaler    Inhale 2 puffs into the lungs 4 times daily    Acute bronchitis with symptoms > 10 days       aspirin 81 MG EC tablet     90 tablet    Take 1 tablet (81  mg) by mouth daily    CAD (coronary artery disease)       atorvastatin 40 MG tablet    LIPITOR    90 tablet    Take 1 tablet (40 mg) by mouth daily    Coronary artery disease involving native coronary artery of native heart without angina pectoris       lidocaine-prilocaine cream    EMLA    30 g    Apply topically as needed for moderate pain    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)       lisinopril 5 MG tablet    PRINIVIL/ZESTRIL    90 tablet    Take 1 tablet (5 mg) by mouth daily    Essential hypertension with goal blood pressure less than 140/90       LORazepam 0.5 MG tablet    ATIVAN    30 tablet    Take 1 tablet (0.5 mg) by mouth 2 times daily as needed for anxiety    Anxiety       metoprolol succinate 100 MG 24 hr tablet    TOPROL-XL    30 tablet    Take 1 tablet (100 mg) by mouth daily    Coronary artery disease involving native coronary artery of native heart without angina pectoris       MULTIVITAMIN ADULT Tabs      Take 1 tablet by mouth daily        nitroGLYcerin 0.4 MG sublingual tablet    NITROSTAT    25 tablet    Place 1 tablet (0.4 mg) under the tongue every 5 minutes as needed for chest pain Maximum of 3 doses in 15 minutes    CAD (coronary artery disease)       prochlorperazine 10 MG tablet    COMPAZINE    30 tablet    Take 0.5 tablets (5 mg) by mouth every 6 hours as needed (Nausea/Vomiting)    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

## 2018-06-14 NOTE — TELEPHONE ENCOUNTER
"Requested Prescriptions   Pending Prescriptions Disp Refills     atorvastatin (LIPITOR) 40 MG tablet  Last Written Prescription Date:  3/21/2017  Last Fill Quantity: 90,  # refills: 3   Last office visit: 6/1/2018 with prescribing provider:  Post   Future Office Visit:     90 tablet 3     Sig: Take 1 tablet (40 mg) by mouth daily    Statins Protocol Failed    6/14/2018  1:55 PM       Failed - LDL on file in past 12 months    Recent Labs   Lab Test  03/21/17   0918   LDL  48            Passed - No abnormal creatine kinase in past 12 months    No lab results found.            Passed - Recent (12 mo) or future (30 days) visit within the authorizing provider's specialty    Patient had office visit in the last 12 months or has a visit in the next 30 days with authorizing provider or within the authorizing provider's specialty.  See \"Patient Info\" tab in inbasket, or \"Choose Columns\" in Meds & Orders section of the refill encounter.           Passed - Patient is age 18 or older       Passed - No active pregnancy on record       Passed - No positive pregnancy test in past 12 months          "

## 2018-06-14 NOTE — LETTER
6/14/2018         RE: Ines Pickard  [address] [place] [address]        Dear Colleague,    Thank you for referring your patient, Ines Pickard, to the [facility]. Please see a copy of my visit note below.    ONCOLOGY FOLLOW UP VISIT       CHIEF COMPLAINT/REASON FOR VISIT:  Left adrenal mass, FNA 01/30/2018 consistent with sclc     HISTORY OF PRESENT ILLNESS:  A 77-year-old female presented with 5 months duration of hoarseness.  She was evaluated by ENT.  Direct laryngoscopy revealed left vocal cord paralysis.    CT chest with contrast 01/10/2018 identified a 2.3 cm left upper lobe nodule extending to the hilum, suspicious for malignancy, left hilar mediastinal adenopathy with mass-like soft tissue thickening extending along the inferior aspect of the aortic arch likely involving the recurrent laryngeal nerve in this location, left adrenal nodule 1.5 cm undetermined, compression on L1 vertebral body.    EUS 01/30/2017 FNA was done on 2 hypoechoic left adrenal mass-   Consistent with metastatic small cell carcinoma      PET confirmed the primary TEX lesion with adrenal mets. She is dx with extensive stage SCLC.     She made informed decision to proceed with palliative chemo with carbo/-16 2/2018.   She had good PET response after 3 cycles and tx is continued.   She has ongoing PET response post 6 cycles of tx. With minimal AP window LN SUV 3.5.    PAST MEDICAL HISTORY:  CAD, stent around 2014, Angina attack in 5/2018 was at [facility]. Reflux, hypertension, cataracts, hyperlipidemia.      MEDICATIONS:  Reviewed in Epic system.      SOCIAL HISTORY:  She lives in her own house.  She has 3 boys.  She is here with her neighbor.  She quit smoking years back.  Denies alcohol abuse.      FAMILY HISTORY:  Positive for mom who had Hodgkin lymphoma.  Brother had unknown type of cancer.      REVIEW OF SYSTEMS:   Throat discomfort seems better.     ? BARNES, she is not sure.   Reports no appetite.  "She is using insure.   She is losing her teeth from her dental wires and eating baby food.  Reports more tired, doze off a lot.         PHYSICAL EXAMINATION:   VITAL SIGNS:  Blood pressure 108/57, pulse 83, temperature 96.9  F (36.1  C), temperature source Tympanic, resp. rate 18, height 1.6 m (5' 2.99\"), weight 72.7 kg (160 lb 3.2 oz), SpO2 97 %, not currently breastfeeding.  ECOG 1    GENERAL APPEARANCE:  Elderly lady, looks like her stated age, not in acute distress.   HEENT: The patient is normocephalic, atraumatic. Pupils are equally reactive to light.  Sclerae are anicteric.  Moist oral mucosa.  negative posterior pharynx.   NECK:  Supple.  No jugular venous distention.  Thyroid is not palpable.   LYMPH NODES:  Superficial lymphadenopathy is not appreciable in the bilateral cervical, supraclavicular, axillary or inguinal areas.   CARDIOVASCULAR:  S1, S2 regular with no murmurs or gallops.  No carotid or abdominal bruits.   PULMONARY:  Lungs are clear to auscultation and percussion bilaterally.  There is no wheezing or rhonchi.   GASTROINTESTINAL:  Abdomen is soft, nontender.  No hepatosplenomegaly.  No signs of ascites.  No mass appreciable.   MUSCULOSKELETAL/EXTREMITIES:  No edema.  No cyanotic changes.  No signs of joint deformity.  No lymphedema.  No spinal or paraspinal tenderness.  No CVA tenderness.   NEUROLOGIC:  Cranial nerves II-XII are grossly intact.  Sensation intact.  Muscle strength and muscle tone symmetrical, 5/5 throughout.   SKIN:  No petechiae.  No rash.  No signs of cellulitis.      LABORATORY DATA REVIEWED:   wbc diff and CMP are fine. Hb 9.1,  B12/folate nl.     CEA baseline at 6.6 in 2/2017    From 01/30/2018 FNA on left adrenal mass biopsy -- Consistent with metastatic small cell carcinoma      CURRENT IMAGE DATA REVIEWED:   PET 6/2018 post 6 cycles of carbo/vp16  1. Medial left upper lobe nodule is 1.6 x 1.2 cm, stable in size, series 3 image 112 (SUV max 2.7, previously 3.9).   2. " Previously noted AP window hypermetabolism has decreased and now has SUV max 3.5, previously 4.8.    PET 4/2018 post 3 cycles of carbo/vp16  1. Decrease in size and FDG uptake in the left upper lobe nodule consistent with improved lung cancer.  2. Improvement in the previous left hilar and mediastinal adenopathy, currently there is small residual hypermetabolic adenopathy in the aorticopulmonary window consistent with residual mayela metastasis.  3. Resolution of previous hypermetabolic left adrenal nodule consistent with resolved metastasis.    PET 2/2018  1. Hypermetabolic left upper lobe nodule is consistent with malignancy, most likely bronchogenic carcinoma.  2. Hypermetabolic left hilar and mediastinal adenopathy is consistent  with metastatic disease.  3. Hypermetabolic left adrenal nodule is suspicious for metastatic disease.  4. There is a new 3.4 cm high-density structure abutting the left adrenal gland, suspicious for interval development of adrenal hemorrhage.    CT chest 01/2018 -  identified a 2.3 cm left upper lobe nodule extending to the hilum, suspicious for malignancy, left hilar mediastinal adenopathy with mass-like soft tissue thickening extending along the inferior aspect of the aortic arch likely involving the recurrent laryngeal nerve in this location, left adrenal nodule 1.5 cm undetermined, compression on L1 vertebral body.         OLD DATA REVIEW IN SUMMARY:    Chest x-ray 11/2017, no abnormalities identified.    In 2014, CBC indicating white count 17, hemoglobin 11 and platelets normal.  Differential indicating neutrophilia when she had a heart attack.        ASSESSMENT AND PLAN:    1.  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     She made informed decision to proceed in 2/2018 with  Carbo,  -16, D1-3 q 3 wks with neulasta support.     She has good PET response after 3 cycles and tx is continued.   She has ongoing PET response post 6 cycles of tx. With  minimal AP window LN SUV 3.5.    Recommend Rad-Onc consult to discuss the role of consolidation RT to chest.     Will also ask trial nurse to see if any maintenance therapy is available.       She is informed tx is palliative in nature.   She is informed high recurrence rate with stage IV SCLC after nCR from frontline chemo.     2. normocytic anemia - nl b12/folate. This is likely from chemo. Will transfuse if hb less than 8.5-9 or symptoms. Dose reduce carbo AUC 4.5.    D/c chemo should help. We discussed the role of diet.       3. Anxiety. She feels ativan helps. Will refill.     Total time is 40 minutes, more than 25 minutes spent in counseling regarding her disease status, PET results, possible further tx for her cancer.               Again, thank you for allowing me to participate in the care of your patient.        Sincerely,        Tracy Miner MD, MD

## 2018-06-14 NOTE — PATIENT INSTRUCTIONS
Dr. Miner would like to refer you to Radiation therapy and to continue monthly flushes. We are also going to talk to the trial RN about maintenance therapy.      When you are in need of a refill, please call your pharmacy and they will send us a request.      Copy of appointments, and after visit summary (AVS) given to patient.      If you have any questions please call Bella Arvizu RN, BSN Oncology Hematology  [facility] [phone]. For questions after business hours, or on holidays/weekends, please call our after hours Nurse Triage line [phone]. Thank you.             RT referral: extensive stage SCLC, role of consolidative chest CT.   Port flush monthly.  Trial RN to check the availability of maintenance therapy.

## 2018-06-14 NOTE — NURSING NOTE
"Oncology Rooming Note    June 14, 2018 1:24 PM   Ines Pickard is a 77 year old female who presents for:    Chief Complaint   Patient presents with     Oncology Clinic Visit     Recheck Small cell carcinoma of left lung , review PET scan      Initial Vitals: /57 (BP Location: Right arm, Patient Position: Sitting, Cuff Size: Adult Large)  Pulse 83  Temp 96.9  F (36.1  C) (Tympanic)  Resp 18  Ht 1.6 m (5' 2.99\")  Wt 72.7 kg (160 lb 3.2 oz)  SpO2 97%  Breastfeeding? No  BMI 28.39 kg/m2 Estimated body mass index is 28.39 kg/(m^2) as calculated from the following:    Height as of this encounter: 1.6 m (5' 2.99\").    Weight as of this encounter: 72.7 kg (160 lb 3.2 oz). Body surface area is 1.8 meters squared.  No Pain (0) Comment: Data Unavailable   No LMP recorded. Patient is postmenopausal.  Allergies reviewed: Yes  Medications reviewed: Yes    Medications: Medication refills not needed today.  Pharmacy name entered into [facility]: [facility] - - [place], [place] - [id number] [facility]    Clinical concerns: Recheck Small cell carcinoma of left lung , review PET scan.      8 minutes for nursing intake (face to face time)     Leticia Mcelroy CMA            "

## 2018-06-15 ENCOUNTER — TELEPHONE (OUTPATIENT)
Dept: FAMILY MEDICINE | Facility: CLINIC | Age: 78
End: 2018-06-15

## 2018-06-15 RX ORDER — ATORVASTATIN CALCIUM 40 MG/1
40 TABLET, FILM COATED ORAL DAILY
Qty: 90 TABLET | Refills: 3 | Status: SHIPPED | OUTPATIENT
Start: 2018-06-15 | End: 2019-01-01

## 2018-06-15 NOTE — TELEPHONE ENCOUNTER
Reason for Call:  Other call back    Detailed comments: Saige with  [facility] calling with updates:    1: Patient's blood pressure today was: sitting 118/68, standin/70. Patient stopped Isosorbide 6-12 - need to monitor BP.     2: Patient still has nausea when taking a half tab of prochlorperazine (COMPAZINE) 10 MG tablet. Saige wondering if they can increase to 1 tab? Or if Dr. Perez would like to switch the medication?    Phone Number Patient can be reached at: Other phone number:  Saige: [phone]    Best Time: any    Can we leave a detailed message on this number? YES    Call taken on 6/15/2018 at 10:53 AM by Vianey Louis

## 2018-06-15 NOTE — TELEPHONE ENCOUNTER
Those BP readings acceptable, no other changes in meds at this time.    Go ahead and increase the Compazine to a full tablet, 10 mg as needed

## 2018-06-21 PROCEDURE — G0180 MD CERTIFICATION HHA PATIENT: HCPCS | Performed by: FAMILY MEDICINE

## 2018-06-25 NOTE — LETTER
2018      RE: Ines Pickard  [address] [facility] [address]       RADIATION ONCOLOGY CONSULT NOTE  Date of Visit: 2018    Ines Pickard  MRN: [id number]  : 1940    Ines Pickard is being seen today for initial consultation at the request of Dr. Tracy Miner for consideration of radiation therapy for extensive stage SCLC. All pertinent labs, imaging, and pathology findings have been reviewed.       HISTORY OF PRESENT ILLNESS:  Ms. Pickard is a 77 year old female who noted hoarseness in late . 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      Chest CT scan on 1/10/18 which contributed to 0.3 cm soft tissue nodule in the left upper lobe as well as a 5.5 cm left hilar mass extending into the AP window and the left paratracheal region. Also noted was a 1.5 cm left adrenal nodule.  Fine-needle aspiration of the left adrenal followed on 18 confirming metastatic small cell lung cancer.    Staging was completed with a PET scan and MRI of the brain. The PET scan to 718 confirming the left upper lobe mass is hypermetabolic consistent with malignancy. The patient had hypermetabolic left hilar and mediastinal adenopathy. The adrenal nodule is hypermetabolic as well. There is no metastatic disease seen on MRI of the brain.    She underwent evaluation by Dr. Miner recommended palliative carboplatin and -16 for 4 cycles. Ms. Pickard completed her chemotherapy course on 18.    Restaging PET scan on 18 revealed decrease in size and metabolic activity of her disease. There is no progression of disease. The hypermetabolic left adrenal nodule had resolved.    The patient is here today to  discuss the option of consolidative thoracic irradiation given her response to chemotherapy.  She tells me that she has  lost approximately 40 pounds in the past 8 months. The weight loss started within the hoarseness started. She has noted no headache. She's had some dyspnea. She's noted no hemoptysis. She has a tobacco history of 1 pack per day for 47 years. She quit 6 years ago after suffering an MI.    CHEMOTHERAPY HISTORY: Completed 4 cycles Carbo/-16 in late May 2018.    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     Coronary artery disease 12/2014    Inferior STEMI, stent to Circumflex     Percutaneous transluminal coronary angioplasty hematoma 12/15/2014     Past Surgical History:   Procedure Laterality Date     APPENDECTOMY  1956     ESOPHAGOSCOPY, GASTROSCOPY, DUODENOSCOPY (EGD), COMBINED N/A 1/30/2018    Procedure: COMBINED ENDOSCOPIC ULTRASOUND, ESOPHAGOSCOPY, GASTROSCOPY, DUODENOSCOPY (EGD), FINE NEEDLE ASPIRATE/BIOPSY;   EUS;  Surgeon: Carlos Fletcher MD;  Location:  GI     EYE SURGERY       INSERT PORT VASCULAR ACCESS N/A 2/12/2018    Procedure: INSERT PORT VASCULAR ACCESS;  Port-a-Cath Placement;  Surgeon: Carlos Johnson MD;  Location: [facility] OR     PHACOEMULSIFICATION WITH STANDARD INTRAOCULAR LENS IMPLANT Left 1/25/2016    Procedure: PHACOEMULSIFICATION WITH STANDARD INTRAOCULAR LENS IMPLANT;  Surgeon: Julio César Wallace MD;  Location: [facility] OR     PHACOEMULSIFICATION WITH STANDARD INTRAOCULAR LENS IMPLANT Right 2/22/2016    Procedure: PHACOEMULSIFICATION WITH STANDARD INTRAOCULAR LENS IMPLANT;  Surgeon: Julio César Wallace MD;  Location: [facility] OR     SURGICAL HISTORY OF -   1961    right hand surgery      TONSILLECTOMY & ADENOIDECTOMY  1967     VASCULAR SURGERY  02/12/2018    PortaCath       ALLERGIES:  Allergies as of 06/25/2018 - Tip as Reviewed 06/25/2018   Allergen Reaction Noted     Amoxicillin GI  Disturbance 2014     Tetracycline Other (See Comments) 2005     Nickel Rash 2014       MEDICATIONS:  Current Outpatient Prescriptions   Medication Sig Dispense Refill     Acetaminophen (TYLENOL PO) Take 1,000 mg by mouth every 6 hours as needed       albuterol (PROAIR HFA/PROVENTIL HFA/VENTOLIN HFA) 108 (90 BASE) MCG/ACT Inhaler Inhale 2 puffs into the lungs 4 times daily 1 Inhaler 2     aspirin EC 81 MG EC tablet Take 1 tablet (81 mg) by mouth daily 90 tablet 3     atorvastatin (LIPITOR) 40 MG tablet Take 1 tablet (40 mg) by mouth daily 90 tablet 3     lidocaine-prilocaine (EMLA) cream Apply topically as needed for moderate pain 30 g 0     lisinopril (PRINIVIL/ZESTRIL) 5 MG tablet Take 1 tablet (5 mg) by mouth daily 90 tablet 2     LORazepam (ATIVAN) 0.5 MG tablet Take 1 tablet (0.5 mg) by mouth 2 times daily as needed for anxiety 30 tablet 5     metoprolol succinate (TOPROL-XL) 100 MG 24 hr tablet Take 1 tablet (100 mg) by mouth daily 30 tablet 3     Multiple Vitamins-Minerals (MULTIVITAMIN ADULT) TABS Take 1 tablet by mouth daily        nitroglycerin (NITROSTAT) 0.4 MG SL tablet Place 1 tablet (0.4 mg) under the tongue every 5 minutes as needed for chest pain Maximum of 3 doses in 15 minutes (Patient not taking: Reported on 2018) 25 tablet 3     prochlorperazine (COMPAZINE) 10 MG tablet Take 0.5 tablets (5 mg) by mouth every 6 hours as needed (Nausea/Vomiting) (Patient not taking: Reported on 2018) 30 tablet 3     ranitidine (ZANTAC) 75 MG tablet Take 1 tablet (75 mg) by mouth 2 times daily 30 tablet 11        FAMILY HISTORY:  Family History   Problem Relation Age of Onset     Cancer Brother      HEART DISEASE Brother      Cancer Mother      Respiratory Father      HEART DISEASE Father      MI     The patient's mother  of Hodgkin's disease at age 55. The patient's brother  in his mid 70s  of cancer, type unknown  .  SOCIAL HISTORY:  Social History     Social History     Marital  status:      Spouse name: N/A     Number of children: N/A     Years of education: N/A     Occupational History     Not on file.     Social History Main Topics     Smoking status: Former Smoker     Packs/day: 0.50     Types: Cigarettes     Quit date: 12/13/2014     Smokeless tobacco: Never Used     Alcohol use No     Drug use: No     Sexual activity: Not Currently     Other Topics Concern     Parent/Sibling W/ Cabg, Mi Or Angioplasty Before 65f 55m? Yes     Social History Narrative   The patient lives by herself. She has 3 sons. Her tobacco history is 1 pack per day for 47 years. She quit 6 years ago after suffering an MI.  She is accompanied today by her sister.    REVIEW OF SYSTEMS: A 10 point review of systems was obtained. Pertinent findings are noted in the HPI  or are unremarkable except for the following:  Weight changes been significant as noted above. Her appetite is poor. She notes fatigue. She has impaired hearing. She continues to have hoarseness. That did not improve during chemotherapy. She notes dyspnea particularly with exertion. She notes muscle weakness. She's had some nausea. She notes nocturia.    PHYSICAL EXAM:  VITALS: /71  Pulse 99  Resp 18  Wt 71.6 kg (157 lb 12.8 oz)  SpO2 98%  BMI 27.96 kg/m2  GEN: Appears fatigued.  She is oriented, and in NAD.  She has treatment related alopecia.  She had difficulty getting up on the table and needed assistance. She is diffusely weak.  HEENT: NCAT, PERRL, EOMI, normal conjunctiva, MMM, no thrush, teeth are in poor repair. She has few teeth remaining.  She is hard of hearing.  NECK: Supple, full ROM, no cervical or clavicular lymphadenopathy  CV: RRR, no murmurs/rubs/gallops, warm and well-perfused  RESP: CTAB, no wheezes/rales/rhonchi, breath sounds are distant., breathing comfortably on room air  ABDOMEN: Soft, NT, ND, bowel sounds present  SKIN: Normal color and turgor  NEURO: No focal deficits, CN 3-12 grossly intact, normal and  symmetric motor and sensory exam in bilateral upper and lower extremities, normal gait  PSYCH: Appropriate mood and affect    RADIOLOGIC STUDIES:   Chest CT scan on 1/10/18 revealed a 2.3 cm soft tissue nodule in the left upper lobe. She had a 5.5 cm left hilar mass extending into the AP window and the left paratracheal region. Should 1.5 cm left adrenal nodule. PET scan on 2/7/18 revealed hypermetabolic left upper lobe mass consistent with malignancy.  The left hilar and mediastinal adenopathy with hypermetabolic as is a left adrenal nodule. MRI of the brain on 2/7/18 revealed no evidence of metastatic disease. A PET scan after chemotherapy on 6/13/18 revealed decrease in size and metabolic activity of her disease with resolution of the hypermetabolic left adrenal nodule and no progression of disease.      PATHOLOGY:   FNA of the left adrenal on 1/30/18 confirmed metastatic small cell lung cancer.      IMPRESSION:  In summary, Ms. Pickard is a 77 year old female who presents with stage extensive stage small cell lung cancer. She's had a good response to chemotherapy. She is here today to discuss consolidative thoracic irradiation. We also discussed prophylactic cranial irradiation.    RECOMMENDATION:  In the past patients with extensive stage small cell lung cancer were not treated with consolidative thoracic radiation but it's been found to be beneficial for selected patients in terms of decreasing recurrences within the chest and increasing survival in some patients. The preferred dose is not clear but I plan to treat Ms. Pickard with a palliative dose of 30 Gy in 10 fractions.  I think she will tolerate that much better then 60 Gy and 30 fractions given her overall performance status currently.    We talked also about prophylactic cranial irradiation. A recent Amharic study showed no benefit to adding prophylactic cranial irradiation for extensive stage small cell lung cancer although most patients are  still treated with PCI.  In Ms. Pickard situation, we're essentially treating her as if she has limited stage disease and I think for her there would be a benefit to adding PCI, particularly as it can be done at the same time as treating the lung disease. That will overall make it easier for her. I will admit man seen to her medication regimen to help decrease the likelihood of memory loss but certainly PCI comes with toxicity and we discussed that at length today.    Ms. Pickard wishes to proceed with treatment to the long and to the brain and I'll arrange treatment to begin for her in the near future.  The rationale, logistics, risks, benefits, and potential side effects of the proposed treatment were reviewed in detail.     Renee Rivas M.D.  Radiation Oncology

## 2018-06-25 NOTE — MR AVS SNAPSHOT
After Visit Summary   6/25/2018    Ines Pickard    MRN: [id number]           Patient Information     Date Of Birth          1940        Visit Information        Provider Department      6/25/2018 1:30 PM Renee Rivas MD [facility]        Today's Diagnoses     Malignant neoplasm of upper lobe of left lung (H)    -  1       Follow-ups after your visit        Your next 10 appointments already scheduled     Jun 27, 2018  1:30 PM CDT   SIMULATION with Renee Rivas MD, [facility]   [facility] ([facility])    [address], [address]  [facility] [address]   [phone]            Jul 18, 2018 11:30 AM CDT   Level O with ROOM 10 [facility] ([facility])    [facility]  [address]  [facility] [address] [phone]              Who to contact     Please call your clinic at [phone] to:    Ask questions about your health    Make or cancel appointments    Discuss your medicines    Learn about your test results    Speak to your doctor            Additional Information About Your Visit        Care EveryWhere ID     This is your Care EveryWhere ID. This could be used by other organizations to access your [place] medical records  [id number]        Your Vitals Were     Pulse Respirations Pulse Oximetry BMI (Body Mass Index)          99 18 98% 27.96 kg/m2         Blood Pressure from Last 3 Encounters:   06/25/18 105/71   06/14/18 108/57   06/04/18 96/67    Weight from Last 3 Encounters:   06/25/18 71.6 kg (157 lb 12.8 oz)   06/14/18 72.7 kg (160 lb 3.2 oz)   06/04/18 72.3 kg (159 lb 6.4 oz)              Today, you had the following     No orders found for display       Primary Care Provider Office Phone # Fax #    R Emanuel Perez [phone] [address] [facility] [address]        Equal Access to Services     ALPA LOPEZ AH: Ronnie  doroteo Carmona, wamarkoda minnierodolfoha, qakeyta kagayatri johnzeynep, waxale kenan eastmarlenmedhat taylor. So [facility] [phone].    ATENCIÓN: Si elvira wright, tiene a nguyễn disposición servicios gratuitos de asistencia lingüística. Lornaame al [phone].    We comply with applicable federal civil rights laws and [place] laws. We do not discriminate on the basis of race, color, national origin, age, disability, sex, sexual orientation, or gender identity.            Thank you!     Thank you for choosing [facility]  for your care. Our goal is always to provide you with excellent care. Hearing back from our patients is one way we can continue to improve our services. Please take a few minutes to complete the written survey that you may receive in the mail after your visit with us. Thank you!             Your Updated Medication List - Protect others around you: Learn how to safely use, store and throw away your medicines at www.disposemymeds.org.          This list is accurate as of 6/25/18  3:41 PM.  Always use your most recent med list.                   Brand Name Dispense Instructions for use Diagnosis    albuterol 108 (90 Base) MCG/ACT Inhaler    PROAIR HFA/PROVENTIL HFA/VENTOLIN HFA    1 Inhaler    Inhale 2 puffs into the lungs 4 times daily    Acute bronchitis with symptoms > 10 days       aspirin 81 MG EC tablet     90 tablet    Take 1 tablet (81 mg) by mouth daily    CAD (coronary artery disease)       atorvastatin 40 MG tablet    LIPITOR    90 tablet    Take 1 tablet (40 mg) by mouth daily    Coronary artery disease involving native coronary artery of native heart without angina pectoris       lidocaine-prilocaine cream    EMLA    30 g    Apply topically as needed for moderate pain    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)       lisinopril 5 MG tablet    PRINIVIL/ZESTRIL    90 tablet    Take 1 tablet (5 mg) by mouth daily    Essential hypertension with goal blood pressure less than 140/90        LORazepam 0.5 MG tablet    ATIVAN    30 tablet    Take 1 tablet (0.5 mg) by mouth 2 times daily as needed for anxiety    Anxiety       metoprolol succinate 100 MG 24 hr tablet    TOPROL-XL    30 tablet    Take 1 tablet (100 mg) by mouth daily    Coronary artery disease involving native coronary artery of native heart without angina pectoris       MULTIVITAMIN ADULT Tabs      Take 1 tablet by mouth daily        nitroGLYcerin 0.4 MG sublingual tablet    NITROSTAT    25 tablet    Place 1 tablet (0.4 mg) under the tongue every 5 minutes as needed for chest pain Maximum of 3 doses in 15 minutes    CAD (coronary artery disease)       prochlorperazine 10 MG tablet    COMPAZINE    30 tablet    Take 0.5 tablets (5 mg) by mouth every 6 hours as needed (Nausea/Vomiting)    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

## 2018-06-25 NOTE — PROGRESS NOTES
RADIATION ONCOLOGY CONSULT NOTE  Date of Visit: 2018    Ines Pickard  MRN: [id number]  : 1940    Ines Pickard is being seen today for initial consultation at the request of Dr. Tracy Miner for consideration of radiation therapy for extensive stage SCLC. All pertinent labs, imaging, and pathology findings have been reviewed.       HISTORY OF PRESENT ILLNESS:  Ms. Pickard is a 77 year old female who noted hoarseness in late . 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      Chest CT scan on 1/10/18 which contributed to 0.3 cm soft tissue nodule in the left upper lobe as well as a 5.5 cm left hilar mass extending into the AP window and the left paratracheal region. Also noted was a 1.5 cm left adrenal nodule.  Fine-needle aspiration of the left adrenal followed on 18 confirming metastatic small cell lung cancer.    Staging was completed with a PET scan and MRI of the brain. The PET scan to 718 confirming the left upper lobe mass is hypermetabolic consistent with malignancy. The patient had hypermetabolic left hilar and mediastinal adenopathy. The adrenal nodule is hypermetabolic as well. There is no metastatic disease seen on MRI of the brain.    She underwent evaluation by Dr. Miner recommended palliative carboplatin and -16 for 4 cycles. Ms. Pickard completed her chemotherapy course on 18.    Restaging PET scan on 18 revealed decrease in size and metabolic activity of her disease. There is no progression of disease. The hypermetabolic left adrenal nodule had resolved.    The patient is here today to  discuss the option of consolidative thoracic irradiation given her response to chemotherapy.  She tells me that she has lost approximately 40 pounds in the past 8 months. The weight loss started within  the hoarseness started. She has noted no headache. She's had some dyspnea. She's noted no hemoptysis. She has a tobacco history of 1 pack per day for 47 years. She quit 6 years ago after suffering an MI.    CHEMOTHERAPY HISTORY: Completed 4 cycles Carbo/-16 in late May 2018.    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     Coronary artery disease 12/2014    Inferior STEMI, stent to Circumflex     Percutaneous transluminal coronary angioplasty hematoma 12/15/2014     Past Surgical History:   Procedure Laterality Date     APPENDECTOMY  1956     ESOPHAGOSCOPY, GASTROSCOPY, DUODENOSCOPY (EGD), COMBINED N/A 1/30/2018    Procedure: COMBINED ENDOSCOPIC ULTRASOUND, ESOPHAGOSCOPY, GASTROSCOPY, DUODENOSCOPY (EGD), FINE NEEDLE ASPIRATE/BIOPSY;   EUS;  Surgeon: Carlos Fletcher MD;  Location:  GI     EYE SURGERY       INSERT PORT VASCULAR ACCESS N/A 2/12/2018    Procedure: INSERT PORT VASCULAR ACCESS;  Port-a-Cath Placement;  Surgeon: Carlos Johnson MD;  Location: [facility] OR     PHACOEMULSIFICATION WITH STANDARD INTRAOCULAR LENS IMPLANT Left 1/25/2016    Procedure: PHACOEMULSIFICATION WITH STANDARD INTRAOCULAR LENS IMPLANT;  Surgeon: Julio César Wallace MD;  Location: [facility] OR     PHACOEMULSIFICATION WITH STANDARD INTRAOCULAR LENS IMPLANT Right 2/22/2016    Procedure: PHACOEMULSIFICATION WITH STANDARD INTRAOCULAR LENS IMPLANT;  Surgeon: Julio César Wallace MD;  Location: [facility] OR     SURGICAL HISTORY OF -   1961    right hand surgery      TONSILLECTOMY & ADENOIDECTOMY  1967     VASCULAR SURGERY  02/12/2018    PortaCath       ALLERGIES:  Allergies as of 06/25/2018 - Tip as Reviewed 06/25/2018   Allergen Reaction Noted     Amoxicillin GI Disturbance 11/04/2014     Tetracycline Other (See Comments) 11/14/2005     Nickel Rash  2014       MEDICATIONS:  Current Outpatient Prescriptions   Medication Sig Dispense Refill     Acetaminophen (TYLENOL PO) Take 1,000 mg by mouth every 6 hours as needed       albuterol (PROAIR HFA/PROVENTIL HFA/VENTOLIN HFA) 108 (90 BASE) MCG/ACT Inhaler Inhale 2 puffs into the lungs 4 times daily 1 Inhaler 2     aspirin EC 81 MG EC tablet Take 1 tablet (81 mg) by mouth daily 90 tablet 3     atorvastatin (LIPITOR) 40 MG tablet Take 1 tablet (40 mg) by mouth daily 90 tablet 3     lidocaine-prilocaine (EMLA) cream Apply topically as needed for moderate pain 30 g 0     lisinopril (PRINIVIL/ZESTRIL) 5 MG tablet Take 1 tablet (5 mg) by mouth daily 90 tablet 2     LORazepam (ATIVAN) 0.5 MG tablet Take 1 tablet (0.5 mg) by mouth 2 times daily as needed for anxiety 30 tablet 5     metoprolol succinate (TOPROL-XL) 100 MG 24 hr tablet Take 1 tablet (100 mg) by mouth daily 30 tablet 3     Multiple Vitamins-Minerals (MULTIVITAMIN ADULT) TABS Take 1 tablet by mouth daily        nitroglycerin (NITROSTAT) 0.4 MG SL tablet Place 1 tablet (0.4 mg) under the tongue every 5 minutes as needed for chest pain Maximum of 3 doses in 15 minutes (Patient not taking: Reported on 2018) 25 tablet 3     prochlorperazine (COMPAZINE) 10 MG tablet Take 0.5 tablets (5 mg) by mouth every 6 hours as needed (Nausea/Vomiting) (Patient not taking: Reported on 2018) 30 tablet 3     ranitidine (ZANTAC) 75 MG tablet Take 1 tablet (75 mg) by mouth 2 times daily 30 tablet 11        FAMILY HISTORY:  Family History   Problem Relation Age of Onset     Cancer Brother      HEART DISEASE Brother      Cancer Mother      Respiratory Father      HEART DISEASE Father      MI     The patient's mother  of Hodgkin's disease at age 55. The patient's brother  in his mid 70s  of cancer, type unknown  .  SOCIAL HISTORY:  Social History     Social History     Marital status:      Spouse name: N/A     Number of children: N/A     Years of  education: N/A     Occupational History     Not on file.     Social History Main Topics     Smoking status: Former Smoker     Packs/day: 0.50     Types: Cigarettes     Quit date: 12/13/2014     Smokeless tobacco: Never Used     Alcohol use No     Drug use: No     Sexual activity: Not Currently     Other Topics Concern     Parent/Sibling W/ Cabg, Mi Or Angioplasty Before 65f 55m? Yes     Social History Narrative   The patient lives by herself. She has 3 sons. Her tobacco history is 1 pack per day for 47 years. She quit 6 years ago after suffering an MI.  She is accompanied today by her sister.    REVIEW OF SYSTEMS: A 10 point review of systems was obtained. Pertinent findings are noted in the HPI  or are unremarkable except for the following:  Weight changes been significant as noted above. Her appetite is poor. She notes fatigue. She has impaired hearing. She continues to have hoarseness. That did not improve during chemotherapy. She notes dyspnea particularly with exertion. She notes muscle weakness. She's had some nausea. She notes nocturia.    PHYSICAL EXAM:  VITALS: /71  Pulse 99  Resp 18  Wt 71.6 kg (157 lb 12.8 oz)  SpO2 98%  BMI 27.96 kg/m2  GEN: Appears fatigued.  She is oriented, and in NAD.  She has treatment related alopecia.  She had difficulty getting up on the table and needed assistance. She is diffusely weak.  HEENT: NCAT, PERRL, EOMI, normal conjunctiva, MMM, no thrush, teeth are in poor repair. She has few teeth remaining.  She is hard of hearing.  NECK: Supple, full ROM, no cervical or clavicular lymphadenopathy  CV: RRR, no murmurs/rubs/gallops, warm and well-perfused  RESP: CTAB, no wheezes/rales/rhonchi, breath sounds are distant., breathing comfortably on room air  ABDOMEN: Soft, NT, ND, bowel sounds present  SKIN: Normal color and turgor  NEURO: No focal deficits, CN 3-12 grossly intact, normal and symmetric motor and sensory exam in bilateral upper and lower extremities, normal  gait  PSYCH: Appropriate mood and affect    RADIOLOGIC STUDIES:   Chest CT scan on 1/10/18 revealed a 2.3 cm soft tissue nodule in the left upper lobe. She had a 5.5 cm left hilar mass extending into the AP window and the left paratracheal region. Should 1.5 cm left adrenal nodule. PET scan on 2/7/18 revealed hypermetabolic left upper lobe mass consistent with malignancy.  The left hilar and mediastinal adenopathy with hypermetabolic as is a left adrenal nodule. MRI of the brain on 2/7/18 revealed no evidence of metastatic disease. A PET scan after chemotherapy on 6/13/18 revealed decrease in size and metabolic activity of her disease with resolution of the hypermetabolic left adrenal nodule and no progression of disease.      PATHOLOGY:   FNA of the left adrenal on 1/30/18 confirmed metastatic small cell lung cancer.      IMPRESSION:  In summary, Ms. Pickard is a 77 year old female who presents with stage extensive stage small cell lung cancer. She's had a good response to chemotherapy. She is here today to discuss consolidative thoracic irradiation. We also discussed prophylactic cranial irradiation.    RECOMMENDATION:  In the past patients with extensive stage small cell lung cancer were not treated with consolidative thoracic radiation but it's been found to be beneficial for selected patients in terms of decreasing recurrences within the chest and increasing survival in some patients. The preferred dose is not clear but I plan to treat Ms. Pickard with a palliative dose of 30 Gy in 10 fractions.  I think she will tolerate that much better then 60 Gy and 30 fractions given her overall performance status currently.    We talked also about prophylactic cranial irradiation. A recent Persian study showed no benefit to adding prophylactic cranial irradiation for extensive stage small cell lung cancer although most patients are still treated with PCI.  In Ms. Pickard situation, we're essentially treating her as  if she has limited stage disease and I think for her there would be a benefit to adding PCI, particularly as it can be done at the same time as treating the lung disease. That will overall make it easier for her. I will admit man seen to her medication regimen to help decrease the likelihood of memory loss but certainly PCI comes with toxicity and we discussed that at length today.    Ms. Pickard wishes to proceed with treatment to the long and to the brain and I'll arrange treatment to begin for her in the near future.  The rationale, logistics, risks, benefits, and potential side effects of the proposed treatment were reviewed in detail.     Renee Rivas M.D.  Radiation Oncology

## 2018-06-25 NOTE — NURSING NOTE
"REASON FOR APPOINTMENT   Type of Cancer: SCLC  Location: Left lung  Date of Symptom Onset: voice changes started the work up process with ENT, eventually found to have lung cancer    TREATMENT TO-DATE FOR THIS CANCER  Surgery ? no   Chemotherapy ? Dr. Miner, Carbo/Etoposide 6 cycles, 2/2018-5/2018   Other Treatments for this Cancer ? none    PERSONAL HISTORY OF CANCER   Previous Cancer ? no   Prior Radiation ? no   Prior Chemotherapy ? no   Prior Hormonal Therapy ? no     RECENT IMAGING STUDIES  PET 6/13/18     REFERRALS NEEDED  None at this time    VITALS  /71  Pulse 99  Resp 18  Wt 71.6 kg (157 lb 12.8 oz)  SpO2 98%  BMI 27.96 kg/m2    PACEMAKER/IMPLANTED CARDIAC DEVICE no    PAIN  Denies. Says \"occassional sharp, short lived pain in L lung\" but describes as brief and not very often.    PSYCHOSOCIAL  Marital Status:   Patient lives in [place] with self.  Number of children: 3  Working status: homemaker  Do you feel safe in your home? Yes    REVIEW OF SYSTEMS  Skin: pale  Eyes: glasses  Ears/Nose/Throat: hearing loss, weak voice/voice change, loss of some teeth(dentures cause irritation)  Respiratory: Shortness of breath/BARNES and No hemoptysis  Cardiovascular: negative  Gastrointestinal: nausea  Genitourinary: nocturia  Musculoskeletal: muscular weakness  Neurologic: negative  Psychiatric: negative  Hematologic/Lymphatic/Immunologic: fatigue, appetite change/decrease  Endocrine: negative    WOMEN ONLY  Any chance you may be pregnant: No      Radiation Oncology Patient Teaching    Current Concern: \"how will I feel from radiation, will I lose my voice even more, will I have trouble eating?\"    Person involved with teaching: Patient and sister, Paige  Patient asked Questions: Yes  Patient was cooperative: Yes  Patient was receptive (willing to accept information given): Yes    Education Assessment  Comprehension ability: Medium  Knowledge level: Low  Factors affecting teaching: new information, mild " anxiety    Education Materials Given  Radiation Therapy and You  Radiation to the Chest  Caring for Your Skin During Radiation ...    Educational Topics Discussed  Side effects, Activity, Nutrition, Adjustment to illness and When to call MD/RN    Response To Teaching  More review necessary    GYN Only  Vaginal Dilator-given and educated: N/A    Do you have an advanced directive or living will? yes  Are you DNR/DNI? no

## 2018-07-03 NOTE — TELEPHONE ENCOUNTER
Reason for call:  Patient reporting a symptom    Symptom or request: [facility] RN calling.  Dr. Perez discontinued one of the pt's BP meds on 6/12 and pt's BP has been running low and drops when she stands up.  Sitting 112/62 and standing 98/60.  Call [facility] and advise.      Duration (how long have symptoms been present): ongoing    Have you been treated for this before? Yes    Additional comments:     Phone Number [facility]  can be reached at:  Other phone number:  [phone]    Best Time:  any    Can we leave a detailed message on this number:  YES    Call taken on 7/3/2018 at 3:35 PM by Neyda Meza

## 2018-07-03 NOTE — TELEPHONE ENCOUNTER
[facility] wanting  to be aware that pts B/P continues to be low.  Pt is on Metoprolol 100 mg 1 tab/day and Lisinopril 5 mg 1 tab/day.  Pt starting Chemotherapy next week and will have vitals checked daily.  Any changes?  Call Home[facility] if changes.  KpaMaria Elena

## 2018-07-05 NOTE — PROGRESS NOTES
I met Ms. Pickard prior to her first radiation therapy today.  She has a lot of anxiety towards the upcoming treatments, now knowing what to expect.  She is also concerned about potential memory decline from prophylactic brain radiotherapy.    We reviewed the expected side effects of thoracic radiation therapy.  We will closely monitor her for esophagitis and watch her weight.      We discussed that the role of PCI in extensive stage small cell lung cancer is controversial.  This was discussed with her by Dr. Rivas at the time of consultation.  She had a brain MRI at initial diagnostic work up on 2/718.  However it has not been repeated.  I am in favor of repeating brain MRI prior to PCI if it is to be offered.  Alternatively, we can forgo radiation now, and follow her expectantly.  Given her old age and lack of survival benefit of PCI in extensive stage small cell lung cancer, this may also be a reasonable approach.  I did discuss her case with my colleague, Dr. Bettencourt, who specializes in lung cancer.  He feels quite strongly that PCI would not be beneficial in this lady.    Ms. Pickard feels comfortable proceeding with thoracic radiation only at this time.  She will think about PCI some more and discuss with Dr. Rivas when she comes back in clinic.    Pramod Figueredo MD

## 2018-07-05 NOTE — MR AVS SNAPSHOT
After Visit Summary   7/5/2018    Ines Pickard    MRN: [id number]           Patient Information     Date Of Birth          1940        Visit Information        Provider Department      7/5/2018 1:30 PM Pramod Figueredo MD [facility]        Today's Diagnoses     Malignant neoplasm of upper lobe of left lung (H)    -  1    Small cell lung cancer, left (H)           Follow-ups after your visit        Your next 10 appointments already scheduled     Jul 06, 2018 10:00 AM CDT   Linear Accelerator with Lr [facility] Rad Tech   [facility] ([facility])    [address], [address]  [place] [address]   [phone]            Jul 09, 2018 10:00 AM CDT   Linear Accelerator with Lr [facility] Rad Tech   [facility] ([facility])    [address], [address]  [facility] [address]   [phone]            Jul 10, 2018  1:45 PM CDT   Linear Accelerator with Lr [facility] Rad Tech   [facility] ([facility])    [address], [address]  [facility] [address]   [phone]            Jul 11, 2018 11:30 AM CDT   Linear Accelerator with Lr [facility] Rad Tech   [facility] ([facility])    [address]  [place] [address]   [phone]            Jul 12, 2018  1:45 PM CDT   Linear Accelerator with Lr [facility] Rad Tech   [facility] ([facility])    [address]  [place] [address]   [phone]            Jul 12, 2018  2:15 PM CDT   ON TREATMENT VISIT with Jose Gastelum MD   [facility] ([facility])    [address]  [place] [address]   [phone]            Jul 13, 2018  1:45 PM CDT   Linear Accelerator with Lr [facility] Rad Tech   [facility] ([facility])    [address]  [place] [address]   [phone]            Jul 16, 2018  1:45 PM CDT   Linear  Accelerator with Lr [facility] Rad [facility]   [facility] ([facility])    [address], [address]  [facility] [address]   [phone]            Jul 17, 2018  1:45 PM CDT   Linear Accelerator with Lr [facility] Rad [facility]   [facility] ([facility])    [address], [address]  [facility] [address]   [phone]            Jul 18, 2018 11:30 AM CDT   Level O with ROOM 10 [facility] ([facility])    [facility]  [address]  [facility] [address] [phone]              Who to contact     Please call your clinic at [phone] to:    Ask questions about your health    Make or cancel appointments    Discuss your medicines    Learn about your test results    Speak to your doctor            Additional Information About Your Visit        Care EveryWhere ID     This is your Care EveryWhere ID. This could be used by other organizations to access your [facility] medical records  [id number]        Your Vitals Were     Pulse Respirations Pulse Oximetry             88 18 95%          Blood Pressure from Last 3 Encounters:   07/05/18 130/75   06/25/18 105/71   06/14/18 108/57    Weight from Last 3 Encounters:   06/25/18 71.6 kg (157 lb 12.8 oz)   06/14/18 72.7 kg (160 lb 3.2 oz)   06/04/18 72.3 kg (159 lb 6.4 oz)              Today, you had the following     No orders found for display       Primary Care Provider Office Phone # Fax #    R Emanuel Perez [phone] [address] [facility] [address]        Equal Access to Services     FEMI LOPEZ AH: Hadii doroteo Carmona, wamarkoda tabitha, qaybta kaalhoda lopez. So [facility] [phone].    ATENCIÓN: Si habla español, tiene a nguyễn disposición servicios gratuitos de asistencia lingüística. Yefri al [phone].    We comply with applicable federal civil rights laws and [place] laws. We do  not discriminate on the basis of race, color, national origin, age, disability, sex, sexual orientation, or gender identity.            Thank you!     Thank you for choosing [facility]  for your care. Our goal is always to provide you with excellent care. Hearing back from our patients is one way we can continue to improve our services. Please take a few minutes to complete the written survey that you may receive in the mail after your visit with us. Thank you!             Your Updated Medication List - Protect others around you: Learn how to safely use, store and throw away your medicines at www.disposemymeds.org.          This list is accurate as of 7/5/18  1:53 PM.  Always use your most recent med list.                   Brand Name Dispense Instructions for use Diagnosis    albuterol 108 (90 Base) MCG/ACT Inhaler    PROAIR HFA/PROVENTIL HFA/VENTOLIN HFA    1 Inhaler    Inhale 2 puffs into the lungs 4 times daily    Acute bronchitis with symptoms > 10 days       aspirin 81 MG EC tablet     90 tablet    Take 1 tablet (81 mg) by mouth daily    CAD (coronary artery disease)       atorvastatin 40 MG tablet    LIPITOR    90 tablet    Take 1 tablet (40 mg) by mouth daily    Coronary artery disease involving native coronary artery of native heart without angina pectoris       lidocaine-prilocaine cream    EMLA    30 g    Apply topically as needed for moderate pain    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)       lisinopril 5 MG tablet    PRINIVIL/ZESTRIL    90 tablet    Take 1 tablet (5 mg) by mouth daily    Essential hypertension with goal blood pressure less than 140/90       LORazepam 0.5 MG tablet    ATIVAN    30 tablet    Take 1 tablet (0.5 mg) by mouth 2 times daily as needed for anxiety    Anxiety       metoprolol succinate 100 MG 24 hr tablet    TOPROL-XL    30 tablet    Take 1 tablet (100 mg) by mouth daily    Coronary artery disease involving native coronary artery of native heart without angina  pectoris       MULTIVITAMIN ADULT Tabs      Take 1 tablet by mouth daily        nitroGLYcerin 0.4 MG sublingual tablet    NITROSTAT    25 tablet    Place 1 tablet (0.4 mg) under the tongue every 5 minutes as needed for chest pain Maximum of 3 doses in 15 minutes    CAD (coronary artery disease)       prochlorperazine 10 MG tablet    COMPAZINE    30 tablet    Take 0.5 tablets (5 mg) by mouth every 6 hours as needed (Nausea/Vomiting)    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

## 2018-07-05 NOTE — LETTER
7/5/2018      RE: Ines Pickard  [address] [facility] [place] [address]       I met Ms. Pickard prior to her first radiation therapy today.  She has a lot of anxiety towards the upcoming treatments, now knowing what to expect.  She is also concerned about potential memory decline from prophylactic brain radiotherapy.    We reviewed the expected side effects of thoracic radiation therapy.  We will closely monitor her for esophagitis and watch her weight.      We discussed that the role of PCI in extensive stage small cell lung cancer is controversial.  This was discussed with her by Dr. Rivas at the time of consultation.  She had a brain MRI at initial diagnostic work up on 2/718.  However it has not been repeated.  I am in favor of repeating brain MRI prior to PCI if it is to be offered.  Alternatively, we can forgo radiation now, and follow her expectantly.  Given her old age and lack of survival benefit of PCI in extensive stage small cell lung cancer, this may also be a reasonable approach.  I did discuss her case with my colleague, Dr. Bettencourt, who specializes in lung cancer.  He feels quite strongly that PCI would not be beneficial in this lady.    Ms. Pickard feels comfortable proceeding with thoracic radiation only at this time.  She will think about PCI some more and discuss with Dr. Rivas when she comes back in clinic.    MD Pramod Yeh MD

## 2018-07-07 NOTE — TELEPHONE ENCOUNTER
Have her reduce dose of metoprolol to 50 mg daily ( May cut current tabs in half if she wishes). New Rx sent to [facility].  Notify [facility]

## 2018-07-12 NOTE — MR AVS SNAPSHOT
After Visit Summary   7/12/2018    Ines Pickard    MRN: [id number]           Patient Information     Date Of Birth          1940        Visit Information        Provider Department      7/12/2018 2:15 PM Jose Gastelum MD [facility]         Follow-ups after your visit        Your next 10 appointments already scheduled     Jul 13, 2018  1:45 PM CDT   Linear Accelerator with L[facility] Rad Tech   [facility] ([facility])    [address], [address]  [place] [address]   [phone]            Jul 16, 2018  1:45 PM CDT   Linear Accelerator with L[facility] Rad Tech   [facility] ([facility])    [address], [address]  [place] [address]   [phone]            Jul 17, 2018  1:45 PM CDT   Linear Accelerator with L[facility] Rad Tech   [facility] ([facility])    [address]  [place] [address]   [phone]            Jul 18, 2018  1:45 PM CDT   Linear Accelerator with L[facility] Rad Tech   [facility] ([facility])    [address]  [place] [address]   [phone]            Jul 24, 2018  1:45 PM CDT   Return Visit with Tracy Miner MD   [facility] ([facility])    [facility]  [address]  [facility] [address]   [phone]            Jul 24, 2018  2:30 PM CDT   Level O with ROOM 10 [facility] ([facility])    [facility]  [address]  [place] [address]   [phone]              Who to contact     Please call your clinic at [phone] to:    Ask questions about your health    Make or cancel appointments    Discuss your medicines    Learn about your test results    Speak to your doctor            Additional Information About Your Visit        Care EveryWhere ID     This is your Care EveryWhere ID. This  could be used by other organizations to access your [place] medical records  [id number]        Your Vitals Were     Pulse BMI (Body Mass Index)                73 28.1 kg/m2           Blood Pressure from Last 3 Encounters:   07/12/18 109/61   07/05/18 130/75   06/25/18 105/71    Weight from Last 3 Encounters:   07/12/18 71.9 kg (158 lb 9.6 oz)   06/25/18 71.6 kg (157 lb 12.8 oz)   06/14/18 72.7 kg (160 lb 3.2 oz)              Today, you had the following     No orders found for display       Primary Care Provider Office Phone # Fax #    R Emanuel Perez [phone] [address] [id number]        Equal Access to Services     ALPA LOPEZ : Ronnie mtzo Sodesi, waaxda luqadaha, qaybta kaalmada adedlyalondon, hoda maddox . So [facility] [phone].    ATENCIÓN: Si habla español, tiene a nguyễn disposición servicios gratuitos de asistencia lingüística. Llame al [phone].    We comply with applicable federal civil rights laws and [place] laws. We do not discriminate on the basis of race, color, national origin, age, disability, sex, sexual orientation, or gender identity.            Thank you!     Thank you for choosing [facility]  for your care. Our goal is always to provide you with excellent care. Hearing back from our patients is one way we can continue to improve our services. Please take a few minutes to complete the written survey that you may receive in the mail after your visit with us. Thank you!             Your Updated Medication List - Protect others around you: Learn how to safely use, store and throw away your medicines at www.disposemymeds.org.          This list is accurate as of 7/12/18  2:26 PM.  Always use your most recent med list.                   Brand Name Dispense Instructions for use Diagnosis    albuterol 108 (90 Base) MCG/ACT Inhaler    PROAIR HFA/PROVENTIL HFA/VENTOLIN HFA    1 Inhaler    Inhale 2 puffs  into the lungs 4 times daily    Acute bronchitis with symptoms > 10 days       aspirin 81 MG EC tablet     90 tablet    Take 1 tablet (81 mg) by mouth daily    CAD (coronary artery disease)       atorvastatin 40 MG tablet    LIPITOR    90 tablet    Take 1 tablet (40 mg) by mouth daily    Coronary artery disease involving native coronary artery of native heart without angina pectoris       lidocaine-prilocaine cream    EMLA    30 g    Apply topically as needed for moderate pain    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)       lisinopril 5 MG tablet    PRINIVIL/ZESTRIL    90 tablet    Take 1 tablet (5 mg) by mouth daily    Essential hypertension with goal blood pressure less than 140/90       LORazepam 0.5 MG tablet    ATIVAN    30 tablet    Take 1 tablet (0.5 mg) by mouth 2 times daily as needed for anxiety    Anxiety       * metoprolol succinate 100 MG 24 hr tablet    TOPROL-XL    30 tablet    Take 1 tablet (100 mg) by mouth daily    Coronary artery disease involving native coronary artery of native heart without angina pectoris       * metoprolol succinate 50 MG 24 hr tablet    TOPROL-XL    90 tablet    Take 1 tablet (50 mg) by mouth daily    Coronary artery disease involving native coronary artery of native heart without angina pectoris       MULTIVITAMIN ADULT Tabs      Take 1 tablet by mouth daily        nitroGLYcerin 0.4 MG sublingual tablet    NITROSTAT    25 tablet    Place 1 tablet (0.4 mg) under the tongue every 5 minutes as needed for chest pain Maximum of 3 doses in 15 minutes    CAD (coronary artery disease)       prochlorperazine 10 MG tablet    COMPAZINE    30 tablet    Take 0.5 tablets (5 mg) by mouth every 6 hours as needed (Nausea/Vomiting)    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        * Notice:  This list has 2  medication(s) that are the same as other medications prescribed for you. Read the directions carefully, and ask your doctor or other care provider to review them with you.

## 2018-07-12 NOTE — LETTER
2018      RE: Ines Pickard  [address] [facility] [facility] [address]       [facility] PHYSICIANS  SPECIALIZING IN BREAKTHROUGHS  Radiation Oncology    On Treatment Visit Note      Ines Pickard      Date: 2018   MRN: [id number]   : 1940  Diagnosis: extensive stage SCLC, s/p chemotherapy      Reason for Visit:  On Radiation Treatment Visit     Treatment Summary to Date  Treatment Site: left lung Current Dose: 1800/3000 cGy Fractions: 6/10      Chemotherapy  Chemo concurrent with radx?: No    Subjective:  Tolerating well.     Nursing ROS:   Nutrition Alteration  Diet Type: Patient's Preference  Nutrition Note: eating fine, no esophagitis  Skin  Skin Reaction: 0 - No changes     Cardiovascular  Respiratory effort: 1 - Normal - without distress  Gastrointestinal  Nausea: 0 - None  Genitourinary  Urinary Status: 0 - Normal     Pain Assessment  0-10 Pain Scale: 0    Objective:   /61  Pulse 73  Wt 71.9 kg (158 lb 9.6 oz)  BMI 28.1 kg/m2   No new findings    Labs:  CBC RESULTS:   Recent Labs   Lab Test  18   0915   WBC  11.0   RBC  3.76*   HGB  11.0*   HCT  33.1*   MCV  88   MCH  29.3   MCHC  33.2   RDW  17.3*   PLT  369     ELECTROLYTES:  Recent Labs   Lab Test  18   0915   NA  136   POTASSIUM  4.0   CHLORIDE  104   AMADO  8.9   CO2  26   BUN  15   CR  0.52   GLC  145*       Assessment:    Tolerating radiation therapy well.  All questions and concerns addressed.    Plan:   1. Continue current therapy.        Mosaiq chart and setup information reviewed  MVCT/IGRT images checked    Medication Review  Med list reviewed with patient?: Yes           Jose Gastelum MD

## 2018-07-12 NOTE — PROGRESS NOTES
[facility] PHYSICIANS  SPECIALIZING IN BREAKTHROUGHS  Radiation Oncology    On Treatment Visit Note      Ines Pickard      Date: 2018   MRN: [id number]   : 1940  Diagnosis: extensive stage SCLC, s/p chemotherapy      Reason for Visit:  On Radiation Treatment Visit     Treatment Summary to Date  Treatment Site: left lung Current Dose: 1800/3000 cGy Fractions: 6/10      Chemotherapy  Chemo concurrent with radx?: No    Subjective:  Tolerating well.     Nursing ROS:   Nutrition Alteration  Diet Type: Patient's Preference  Nutrition Note: eating fine, no esophagitis  Skin  Skin Reaction: 0 - No changes     Cardiovascular  Respiratory effort: 1 - Normal - without distress  Gastrointestinal  Nausea: 0 - None  Genitourinary  Urinary Status: 0 - Normal     Pain Assessment  0-10 Pain Scale: 0    Objective:   /61  Pulse 73  Wt 71.9 kg (158 lb 9.6 oz)  BMI 28.1 kg/m2   No new findings    Labs:  CBC RESULTS:   Recent Labs   Lab Test  18   0915   WBC  11.0   RBC  3.76*   HGB  11.0*   HCT  33.1*   MCV  88   MCH  29.3   MCHC  33.2   RDW  17.3*   PLT  369     ELECTROLYTES:  Recent Labs   Lab Test  18   0915   NA  136   POTASSIUM  4.0   CHLORIDE  104   AMADO  8.9   CO2  26   BUN  15   CR  0.52   GLC  145*       Assessment:    Tolerating radiation therapy well.  All questions and concerns addressed.    Plan:   1. Continue current therapy.        Mosaiq chart and setup information reviewed  MVCT/IGRT images checked    Medication Review  Med list reviewed with patient?: Yes           Jose Gastelum MD

## 2018-07-17 NOTE — TELEPHONE ENCOUNTER
Reason for Call:  Home Health Care    Saige with [facility] called regarding (reason for call): Verbal Orders    Orders are needed for this patient.     Skilled Nursing: Once a week    Pt Provider: Post    Phone Number Homecare Nurse can be reached at: [phone]    Can we leave a detailed message on this number? YES    Phone number patient can be reached at: Home number on file [phone] (home)    Best Time: any    Call taken on 7/17/2018 at 12:36 PM by Neyda Meza

## 2018-07-18 NOTE — MR AVS SNAPSHOT
After Visit Summary   7/18/2018    Ines Pickard    MRN: [id number]           Patient Information     Date Of Birth          1940        Visit Information        Provider Department      7/18/2018 2:00 PM Renee Rivas MD [facility]         Follow-ups after your visit        Your next 10 appointments already scheduled     Jul 24, 2018  1:45 PM CDT   Return Visit with Tracy Miner MD   [facility] ([facility])    [facility]  [address]  [facility] [address]   [phone]            Jul 24, 2018  2:30 PM CDT   Level O with ROOM 10 [facility] ([facility])    [facility]  [address]  [facility] [address] [phone]              Who to contact     Please call your clinic at [phone] to:    Ask questions about your health    Make or cancel appointments    Discuss your medicines    Learn about your test results    Speak to your doctor            Additional Information About Your Visit        Care EveryWhere ID     This is your Care EveryWhere ID. This could be used by other organizations to access your [place] medical records  [id number]        Your Vitals Were     Pulse Respirations Pulse Oximetry BMI (Body Mass Index)          76 18 96% 28 kg/m2         Blood Pressure from Last 3 Encounters:   07/18/18 114/70   07/12/18 109/61   07/05/18 130/75    Weight from Last 3 Encounters:   07/18/18 71.7 kg (158 lb)   07/12/18 71.9 kg (158 lb 9.6 oz)   06/25/18 71.6 kg (157 lb 12.8 oz)              Today, you had the following     No orders found for display       Primary Care Provider Office Phone # Fax #    R Emanuel Perez [phone] [address] [address]        Equal Access to Services     ALPA LOPEZ : Ronnie Carmona, renita lu, qalakia velazquez, hoda miranda  lorraine williamaan ah. So [facility] [phone].    ATENCIÓN: Si elvira wright, tiene a nguyễn disposición servicios gratuitos de asistencia lingüística. Yefri wellington [phone].    We comply with applicable federal civil rights laws and [place] laws. We do not discriminate on the basis of race, color, national origin, age, disability, sex, sexual orientation, or gender identity.            Thank you!     Thank you for choosing [facility]  for your care. Our goal is always to provide you with excellent care. Hearing back from our patients is one way we can continue to improve our services. Please take a few minutes to complete the written survey that you may receive in the mail after your visit with us. Thank you!             Your Updated Medication List - Protect others around you: Learn how to safely use, store and throw away your medicines at www.disposemymeds.org.          This list is accurate as of 7/18/18  2:01 PM.  Always use your most recent med list.                   Brand Name Dispense Instructions for use Diagnosis    albuterol 108 (90 Base) MCG/ACT Inhaler    PROAIR HFA/PROVENTIL HFA/VENTOLIN HFA    1 Inhaler    Inhale 2 puffs into the lungs 4 times daily    Acute bronchitis with symptoms > 10 days       aspirin 81 MG EC tablet     90 tablet    Take 1 tablet (81 mg) by mouth daily    CAD (coronary artery disease)       atorvastatin 40 MG tablet    LIPITOR    90 tablet    Take 1 tablet (40 mg) by mouth daily    Coronary artery disease involving native coronary artery of native heart without angina pectoris       lidocaine-prilocaine cream    EMLA    30 g    Apply topically as needed for moderate pain    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)       lisinopril 5 MG tablet    PRINIVIL/ZESTRIL    90 tablet    Take 1 tablet (5 mg) by mouth daily    Essential hypertension with goal blood pressure less than 140/90       LORazepam 0.5 MG tablet    ATIVAN    30 tablet    Take 1 tablet (0.5 mg) by mouth 2 times  daily as needed for anxiety    Anxiety       * metoprolol succinate 100 MG 24 hr tablet    TOPROL-XL    30 tablet    Take 1 tablet (100 mg) by mouth daily    Coronary artery disease involving native coronary artery of native heart without angina pectoris       * metoprolol succinate 50 MG 24 hr tablet    TOPROL-XL    90 tablet    Take 1 tablet (50 mg) by mouth daily    Coronary artery disease involving native coronary artery of native heart without angina pectoris       MULTIVITAMIN ADULT Tabs      Take 1 tablet by mouth daily        nitroGLYcerin 0.4 MG sublingual tablet    NITROSTAT    25 tablet    Place 1 tablet (0.4 mg) under the tongue every 5 minutes as needed for chest pain Maximum of 3 doses in 15 minutes    CAD (coronary artery disease)       prochlorperazine 10 MG tablet    COMPAZINE    30 tablet    Take 0.5 tablets (5 mg) by mouth every 6 hours as needed (Nausea/Vomiting)    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        * Notice:  This list has 2 medication(s) that are the same as other medications prescribed for you. Read the directions carefully, and ask your doctor or other care provider to review them with you.

## 2018-07-18 NOTE — LETTER
2018      RE: Ines Pickard  [address] [facility] [address] [facility] [address]       [facility] PHYSICIANS  SPECIALIZING IN BREAKTHROUGHS  Radiation Oncology    On Treatment Visit Note      Ines Pickard      Date: 2018   MRN: [id number]   : 1940  Diagnosis: SCLC      Reason for Visit:  On Radiation Treatment Visit     Treatment Summary to Date  Treatment Site: L lung Current Dose: 3000/3000 cGy Fractions: 10/10      Chemotherapy  Chemo concurrent with radx?: No    Subjective:   Asked about the likelihood of her disease being controlled.  Right now she feels good and is glad she can do what she wants to do.      Nursing ROS:   Nutrition Alteration  Diet Type: Patient's Preference  Skin  Skin Reaction: 0 - No changes  CNS Alteration  CNS note: A/O x3     Cardiovascular  Respiratory effort: 2 - Mild dyspnea on exertion  Gastrointestinal  Nausea: 0 - None        Pain Assessment  0-10 Pain Scale: 0      Objective:   /70  Pulse 76  Resp 18  Wt 71.7 kg (158 lb)  SpO2 96%  BMI 28 kg/m2  No change.    Toxicities:        Labs:  CBC RESULTS:   Recent Labs   Lab Test  18   0915   WBC  11.0   RBC  3.76*   HGB  11.0*   HCT  33.1*   MCV  88   MCH  29.3   MCHC  33.2   RDW  17.3*   PLT  369     ELECTROLYTES:  Recent Labs   Lab Test  18   0915   NA  136   POTASSIUM  4.0   CHLORIDE  104   AMADO  8.9   CO2  26   BUN  15   CR  0.52   GLC  145*       Assessment:    Tolerating radiation therapy well.  All questions and concerns addressed.        Plan:   Finished tx today.  She has follow up with med onc.  F/u her PRN.      Mosaiq chart and setup information reviewed      Medication Review  Med list reviewed with patient?: Yes    Educational Topic Discussed  Education Instructions: patient will return to Med Onc next week for next steps of treatment/Dr. Miner.  PRN in rad onc        Renee Rivas M.D.    Renee Rivas MD

## 2018-07-18 NOTE — PROGRESS NOTES
[facility] PHYSICIANS  SPECIALIZING IN BREAKTHROUGHS  Radiation Oncology    On Treatment Visit Note      Ines Pickard      Date: 2018   MRN: [id number]   : 1940  Diagnosis: SCLC      Reason for Visit:  On Radiation Treatment Visit     Treatment Summary to Date  Treatment Site: L lung Current Dose: 3000/3000 cGy Fractions: 10/10      Chemotherapy  Chemo concurrent with radx?: No    Subjective:   Asked about the likelihood of her disease being controlled.  Right now she feels good and is glad she can do what she wants to do.      Nursing ROS:   Nutrition Alteration  Diet Type: Patient's Preference  Skin  Skin Reaction: 0 - No changes  CNS Alteration  CNS note: A/O x3     Cardiovascular  Respiratory effort: 2 - Mild dyspnea on exertion  Gastrointestinal  Nausea: 0 - None        Pain Assessment  0-10 Pain Scale: 0      Objective:   /70  Pulse 76  Resp 18  Wt 71.7 kg (158 lb)  SpO2 96%  BMI 28 kg/m2  No change.    Toxicities:        Labs:  CBC RESULTS:   Recent Labs   Lab Test  18   0915   WBC  11.0   RBC  3.76*   HGB  11.0*   HCT  33.1*   MCV  88   MCH  29.3   MCHC  33.2   RDW  17.3*   PLT  369     ELECTROLYTES:  Recent Labs   Lab Test  18   0915   NA  136   POTASSIUM  4.0   CHLORIDE  104   AMADO  8.9   CO2  26   BUN  15   CR  0.52   GLC  145*       Assessment:    Tolerating radiation therapy well.  All questions and concerns addressed.        Plan:   Finished tx today.  She has follow up with med onc.  F/u her PRN.      Mosaiq chart and setup information reviewed      Medication Review  Med list reviewed with patient?: Yes    Educational Topic Discussed  Education Instructions: patient will return to Med Onc next week for next steps of treatment/Dr. Miner.  PRN in rad onc        Renee Rivas M.D.

## 2018-07-24 NOTE — MR AVS SNAPSHOT
After Visit Summary   7/24/2018    Ines Pickard    MRN: [id number]           Patient Information     Date Of Birth          1940        Visit Information        Provider Department      7/24/2018 1:45 PM Tracy Miner MD [facility]        Today's Diagnoses     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)    -  1    Anxiety        Anemia, unspecified type          Care Instructions    Dr. Miner would like you to have labs today with port flush and set up a PET scan in mid to late September.    We would like to see you back in for a follow up appointment with PET scan results.     When you are in need of a refill, please call your pharmacy and they will send us a request.      Copy of appointments, and after visit summary (AVS) given to patient.      If you have any questions please call Bella Arvizu RN, BSN Oncology Hematology  [facility] [phone]. For questions after business hours, or on holidays/weekends, please call our after hours Nurse Triage line [phone]. Thank you.               PET in mid to late Sep with f/u.   Port flush today with labs.           Follow-ups after your visit        Your next 10 appointments already scheduled     Aug 28, 2018  1:30 PM CDT   Level O with ROOM 9 [facility] ([facility])    n Medical Ctr [facility]  [address]  [facility] [address]   [phone]            Sep 19, 2018 10:00 AM CDT   (Arrive by 9:30 AM)   PE NPET ONCOLOGY (EYES TO THIGHS) with [id number]   [facility] Pet CT ([facility])    [address] [facility] [address]   [phone]           Tell your doctor:   If there is any chance you may be pregnant or if you are breastfeeding.   If you have problems lying in small spaces (claustrophobia). If you do, your doctor may give you medicine to help you relax. If you have diabetes:   Have your exam early in the morning.  Your blood glucose will go up as the day goes by.   Your glucose level must be 180 or less at the start of the exam. Please take any medicines you need to ensure this blood glucose level. 24 hours before your scan: Don t do any heavy exercise. (No jogging, aerobics or other workouts.) Exercise will make your pictures less accurate. 6 hours before your scan:   Stop all food and liquids (except water).   Do not chew gum or suck on mints.   If you need to take medicine with food, you may take it with a few crackers.  Please call your Imaging Department at your exam site with any questions.            Sep 20, 2018 11:00 AM CDT   Return Visit with Tracy Miner MD   [facility] ([facility])    [facility]  [address]  [facility] [address]   [phone]            Oct 23, 2018  1:30 PM CDT   Level O with ROOM 4 [facility] ([facility])    [facility]  [address]  [facility] [address]   [phone]              Future tests that were ordered for you today     Open Future Orders        Priority Expected Expires Ordered    PET Oncology (Eyes to Thighs) Routine 9/19/2018 7/24/2019 7/24/2018            Who to contact     If you have questions or need follow up information about today's clinic visit or your schedule please contact [facility] directly at [phone].  Normal or non-critical lab and imaging results will be communicated to you by MyChart, letter or phone within 4 business days after the clinic has received the results. If you do not hear from us within 7 days, please contact the clinic through MyChart or phone. If you have a critical or abnormal lab result, we will notify you by phone as soon as possible.  Submit refill requests through [facility] [employer] or call your pharmacy and they will forward the refill request to us. Please allow 3 business days for your refill to be completed.        "   Additional Information About Your Visit        Care EveryWhere ID     This is your Care EveryWhere ID. This could be used by other organizations to access your [place] medical records  [id number]        Your Vitals Were     Pulse Temperature Respirations Height Pulse Oximetry Breastfeeding?    82 98.6  F (37  C) (Tympanic) 16 1.6 m (5' 2.99\") 95% No    BMI (Body Mass Index)                   28.16 kg/m2            Blood Pressure from Last 3 Encounters:   07/24/18 118/62   07/18/18 114/70   07/12/18 109/61    Weight from Last 3 Encounters:   07/24/18 72.1 kg (158 lb 14.4 oz)   07/18/18 71.7 kg (158 lb)   07/12/18 71.9 kg (158 lb 9.6 oz)              We Performed the Following     CBC with platelets differential     CEA     Comprehensive metabolic panel        Primary Care Provider Office Phone # Fax #    R Emanuel Perez [phone] [address] [address]        Equal Access to Services     [facility] : Hadii aad ku hadasho Soomaali, waaxda luqadaha, qaybta kaalmada adeegyada, waxay silvioin hayotilio maddox . So [facility] [phone].    ATENCIÓN: Si habla español, tiene a nguyễn disposición servicios gratuitos de asistencia lingüística. [facility] [phone].    We comply with applicable federal civil rights laws and [place] laws. We do not discriminate on the basis of race, color, national origin, age, disability, sex, sexual orientation, or gender identity.            Thank you!     Thank you for choosing [facility]  for your care. Our goal is always to provide you with excellent care. Hearing back from our patients is one way we can continue to improve our services. Please take a few minutes to complete the written survey that you may receive in the mail after your visit with us. Thank you!             Your Updated Medication List - Protect others around you: Learn how to safely use, store and throw away your medicines at www.disposemymeds.org.        "   This list is accurate as of 7/24/18  2:47 PM.  Always use your most recent med list.                   Brand Name Dispense Instructions for use Diagnosis    albuterol 108 (90 Base) MCG/ACT Inhaler    PROAIR HFA/PROVENTIL HFA/VENTOLIN HFA    1 Inhaler    Inhale 2 puffs into the lungs 4 times daily    Acute bronchitis with symptoms > 10 days       aspirin 81 MG EC tablet     90 tablet    Take 1 tablet (81 mg) by mouth daily    CAD (coronary artery disease)       atorvastatin 40 MG tablet    LIPITOR    90 tablet    Take 1 tablet (40 mg) by mouth daily    Coronary artery disease involving native coronary artery of native heart without angina pectoris       lidocaine-prilocaine cream    EMLA    30 g    Apply topically as needed for moderate pain    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)       lisinopril 5 MG tablet    PRINIVIL/ZESTRIL    90 tablet    Take 1 tablet (5 mg) by mouth daily    Essential hypertension with goal blood pressure less than 140/90       LORazepam 0.5 MG tablet    ATIVAN    30 tablet    Take 1 tablet (0.5 mg) by mouth 2 times daily as needed for anxiety    Anxiety       metoprolol succinate 50 MG 24 hr tablet    TOPROL-XL    90 tablet    Take 1 tablet (50 mg) by mouth daily    Coronary artery disease involving native coronary artery of native heart without angina pectoris       MULTIVITAMIN ADULT Tabs      Take 1 tablet by mouth daily        nitroGLYcerin 0.4 MG sublingual tablet    NITROSTAT    25 tablet    Place 1 tablet (0.4 mg) under the tongue every 5 minutes as needed for chest pain Maximum of 3 doses in 15 minutes    CAD (coronary artery disease)       prochlorperazine 10 MG tablet    COMPAZINE    30 tablet    Take 0.5 tablets (5 mg) by mouth every 6 hours as needed (Nausea/Vomiting)    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)       ranitidine 75 MG tablet    ZANTAC    30 tablet    Take 1 tablet (75 mg) by mouth 2 times daily    Esophageal reflux       TYLENOL PO      Take 1,000 mg by mouth  every 6 hours as needed

## 2018-07-24 NOTE — PROGRESS NOTES
ONCOLOGY FOLLOW UP VISIT       CHIEF COMPLAINT/REASON FOR VISIT:  Left adrenal mass, FNA 01/30/2018 consistent with sclc     HISTORY OF PRESENT ILLNESS:  A 77-year-old female presented with 5 months duration of hoarseness.  She was evaluated by ENT.  Direct laryngoscopy revealed left vocal cord paralysis.    CT chest with contrast 01/10/2018 identified a 2.3 cm left upper lobe nodule extending to the hilum, suspicious for malignancy, left hilar mediastinal adenopathy with mass-like soft tissue thickening extending along the inferior aspect of the aortic arch likely involving the recurrent laryngeal nerve in this location, left adrenal nodule 1.5 cm undetermined, compression on L1 vertebral body.      EUS 01/30/2017 FNA was done on 2 hypoechoic left adrenal mass-   Consistent with metastatic small cell carcinoma      PET confirmed the primary TEX lesion with adrenal mets. She is dx with extensive stage SCLC.     She made informed decision to proceed with palliative chemo with carbo/-16 2/2018.   She had good PET response after 3 cycles and tx is continued.   She has ongoing PET response post 6 cycles of tx. With minimal AP window LN SUV 3.5.  She had consolidation RT after to her thoracic area till mid July.     PAST MEDICAL HISTORY:  CAD, stent around 2014, Angina attack in 5/2018 was at [facility]. Reflux, hypertension, cataracts, hyperlipidemia.      MEDICATIONS:  Reviewed in Epic system.      SOCIAL HISTORY:  She lives in her own house.  She has 3 boys.  She is here with her neighbor.  She quit smoking years back.  Denies alcohol abuse.      FAMILY HISTORY:  Positive for mom who had Hodgkin lymphoma.  Brother had unknown type of cancer.      REVIEW OF SYSTEMS:   Throat discomfort seems better.     ? BARNES, she is not sure.   Reports no appetite.   She is losing her teeth from her dental wires and eating baby food.  She tolerated chest RT fine.          PHYSICAL EXAMINATION:   VITAL SIGNS:  Blood pressure 118/62,  "pulse 82, temperature 98.6  F (37  C), temperature source Tympanic, resp. rate 16, height 1.6 m (5' 2.99\"), weight 72.1 kg (158 lb 14.4 oz), SpO2 95 %, not currently breastfeeding.  ECOG 1    GENERAL APPEARANCE:  Elderly lady, looks like her stated age, not in acute distress.   HEENT: The patient is normocephalic, atraumatic. Pupils are equally reactive to light.  Sclerae are anicteric.  Moist oral mucosa.  negative posterior pharynx.   NECK:  Supple.  No jugular venous distention.  Thyroid is not palpable.   LYMPH NODES:  Superficial lymphadenopathy is not appreciable in the bilateral cervical, supraclavicular, axillary or inguinal areas.   CARDIOVASCULAR:  S1, S2 regular with no murmurs or gallops.  No carotid or abdominal bruits.   PULMONARY:  Lungs are clear to auscultation and percussion bilaterally.  There is no wheezing or rhonchi.   GASTROINTESTINAL:  Abdomen is soft, nontender.  No hepatosplenomegaly.  No signs of ascites.  No mass appreciable.   MUSCULOSKELETAL/EXTREMITIES:  No edema.  No cyanotic changes.  No signs of joint deformity.  No lymphedema.  No spinal or paraspinal tenderness.  No CVA tenderness.   NEUROLOGIC:  Cranial nerves II-XII are grossly intact.  Sensation intact.  Muscle strength and muscle tone symmetrical, 5/5 throughout.   SKIN:  No petechiae.  No rash.  No signs of cellulitis.      LABORATORY DATA REVIEWED:   wbc diff and CMP are fine. Hb 9.1,  B12/folate nl.     CEA baseline at 6.6 in 2/2017    From 01/30/2018 FNA on left adrenal mass biopsy -- Consistent with metastatic small cell carcinoma      CURRENT IMAGE DATA REVIEWED:   PET 6/2018 post 6 cycles of carbo/vp16  1. Medial left upper lobe nodule is 1.6 x 1.2 cm, stable in size, series 3 image 112 (SUV max 2.7, previously 3.9).   2. Previously noted AP window hypermetabolism has decreased and now has SUV max 3.5, previously 4.8.    PET 4/2018 post 3 cycles of carbo/vp16  1. Decrease in size and FDG uptake in the left upper lobe " nodule consistent with improved lung cancer.  2. Improvement in the previous left hilar and mediastinal adenopathy, currently there is small residual hypermetabolic adenopathy in the aorticopulmonary window consistent with residual mayela metastasis.  3. Resolution of previous hypermetabolic left adrenal nodule consistent with resolved metastasis.    PET 2/2018  1. Hypermetabolic left upper lobe nodule is consistent with malignancy, most likely bronchogenic carcinoma.  2. Hypermetabolic left hilar and mediastinal adenopathy is consistent  with metastatic disease.  3. Hypermetabolic left adrenal nodule is suspicious for metastatic disease.  4. There is a new 3.4 cm high-density structure abutting the left adrenal gland, suspicious for interval development of adrenal hemorrhage.    CT chest 01/2018 -  identified a 2.3 cm left upper lobe nodule extending to the hilum, suspicious for malignancy, left hilar mediastinal adenopathy with mass-like soft tissue thickening extending along the inferior aspect of the aortic arch likely involving the recurrent laryngeal nerve in this location, left adrenal nodule 1.5 cm undetermined, compression on L1 vertebral body.         OLD DATA REVIEW IN SUMMARY:    Chest x-ray 11/2017, no abnormalities identified.    In 2014, CBC indicating white count 17, hemoglobin 11 and platelets normal.  Differential indicating neutrophilia when she had a heart attack.        ASSESSMENT AND PLAN:    1.  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     She made informed decision to proceed in 2/2018 with  Carbo,  -16, D1-3 q 3 wks with neulasta support.   She has good PET response after 3 cycles and tx is continued.   She has ongoing PET response post 6 cycles of tx. With minimal AP window LN SUV 3.5.    She had consolidation RT to chest till mid July 2018.     Recommend repeat PET in Sep to check for response.      There is any maintenance therapy trial is available.        She is informed tx is palliative in nature.   She is informed high recurrence rate with stage IV SCLC after nCR from frontline chemo.     2. normocytic anemia - nl b12/folate. This is likely from chemo. Will transfuse if hb less than 8.5-9 or symptoms. Dose reduce carbo AUC 4.5.    D/c chemo should help. We discussed the role of diet.       3. Anxiety. She feels ativan helps. Will refill.     Total time is 25 minutes, more than 15 minutes spent in counseling regarding her disease status, PET results, possible further tx for her cancer.

## 2018-07-24 NOTE — PATIENT INSTRUCTIONS
Dr. Miner would like you to have labs today with port flush and set up a PET scan in mid to late September.    We would like to see you back in for a follow up appointment with PET scan results.     When you are in need of a refill, please call your pharmacy and they will send us a request.      Copy of appointments, and after visit summary (AVS) given to patient.      If you have any questions please call Bella Arvizu RN, BSN Oncology Hematology  [facility] [phone]. For questions after business hours, or on holidays/weekends, please call our after hours Nurse Triage line [phone]. Thank you.               PET in mid to late Sep with f/u.   Port flush today with labs.

## 2018-07-24 NOTE — NURSING NOTE
"Oncology Rooming Note    July 24, 2018 1:53 PM   Ines Pickard is a 77 year old female who presents for:    Chief Complaint   Patient presents with     Oncology Clinic Visit     Recheck Small cell carcinoma of left lung post  RT      Initial Vitals: /62 (BP Location: Right arm, Patient Position: Sitting, Cuff Size: Adult Regular)  Pulse 82  Temp 98.6  F (37  C) (Tympanic)  Resp 16  Ht 1.6 m (5' 2.99\")  Wt 72.1 kg (158 lb 14.4 oz)  SpO2 95%  Breastfeeding? No  BMI 28.16 kg/m2 Estimated body mass index is 28.16 kg/(m^2) as calculated from the following:    Height as of this encounter: 1.6 m (5' 2.99\").    Weight as of this encounter: 72.1 kg (158 lb 14.4 oz). Body surface area is 1.79 meters squared.  Mild Pain (3) Comment: Mid chest    No LMP recorded. Patient is postmenopausal.  Allergies reviewed: Yes  Medications reviewed: Yes    Medications: Medication refills not needed today.  Pharmacy name entered into [facility]: [facility] - [facility] [id number] [facility]    Clinical concerns:Recheck Small cell carcinoma of left lung post  RT.     Patient reports her appetite has improved.   Mid chest pain this morning.     10  minutes for nursing intake (face to face time)     Casi Sandoval CMA              "

## 2018-07-24 NOTE — LETTER
7/24/2018         RE: Ines Pickard  [address] [facility] [address]        Dear Colleague,    Thank you for referring your patient, Ines Pickard, to the [facility]. Please see a copy of my visit note below.    ONCOLOGY FOLLOW UP VISIT       CHIEF COMPLAINT/REASON FOR VISIT:  Left adrenal mass, FNA 01/30/2018 consistent with sclc     HISTORY OF PRESENT ILLNESS:  A 77-year-old female presented with 5 months duration of hoarseness.  She was evaluated by ENT.  Direct laryngoscopy revealed left vocal cord paralysis.    CT chest with contrast 01/10/2018 identified a 2.3 cm left upper lobe nodule extending to the hilum, suspicious for malignancy, left hilar mediastinal adenopathy with mass-like soft tissue thickening extending along the inferior aspect of the aortic arch likely involving the recurrent laryngeal nerve in this location, left adrenal nodule 1.5 cm undetermined, compression on L1 vertebral body.      EUS 01/30/2017 FNA was done on 2 hypoechoic left adrenal mass-   Consistent with metastatic small cell carcinoma      PET confirmed the primary TEX lesion with adrenal mets. She is dx with extensive stage SCLC.     She made informed decision to proceed with palliative chemo with carbo/-16 2/2018.   She had good PET response after 3 cycles and tx is continued.   She has ongoing PET response post 6 cycles of tx. With minimal AP window LN SUV 3.5.  She had consolidation RT after to her thoracic area till mid July.     PAST MEDICAL HISTORY:  CAD, stent around 2014, Angina attack in 5/2018 was at [facility]. Reflux, hypertension, cataracts, hyperlipidemia.      MEDICATIONS:  Reviewed in Epic system.      SOCIAL HISTORY:  She lives in her own house.  She has 3 boys.  She is here with her neighbor.  She quit smoking years back.  Denies alcohol abuse.      FAMILY HISTORY:  Positive for mom who had Hodgkin lymphoma.  Brother had unknown type of cancer.      REVIEW OF SYSTEMS:   Throat  "discomfort seems better.     ? BARNES, she is not sure.   Reports no appetite.   She is losing her teeth from her dental wires and eating baby food.  She tolerated chest RT fine.          PHYSICAL EXAMINATION:   VITAL SIGNS:  Blood pressure 118/62, pulse 82, temperature 98.6  F (37  C), temperature source Tympanic, resp. rate 16, height 1.6 m (5' 2.99\"), weight 72.1 kg (158 lb 14.4 oz), SpO2 95 %, not currently breastfeeding.  ECOG 1    GENERAL APPEARANCE:  Elderly lady, looks like her stated age, not in acute distress.   HEENT: The patient is normocephalic, atraumatic. Pupils are equally reactive to light.  Sclerae are anicteric.  Moist oral mucosa.  negative posterior pharynx.   NECK:  Supple.  No jugular venous distention.  Thyroid is not palpable.   LYMPH NODES:  Superficial lymphadenopathy is not appreciable in the bilateral cervical, supraclavicular, axillary or inguinal areas.   CARDIOVASCULAR:  S1, S2 regular with no murmurs or gallops.  No carotid or abdominal bruits.   PULMONARY:  Lungs are clear to auscultation and percussion bilaterally.  There is no wheezing or rhonchi.   GASTROINTESTINAL:  Abdomen is soft, nontender.  No hepatosplenomegaly.  No signs of ascites.  No mass appreciable.   MUSCULOSKELETAL/EXTREMITIES:  No edema.  No cyanotic changes.  No signs of joint deformity.  No lymphedema.  No spinal or paraspinal tenderness.  No CVA tenderness.   NEUROLOGIC:  Cranial nerves II-XII are grossly intact.  Sensation intact.  Muscle strength and muscle tone symmetrical, 5/5 throughout.   SKIN:  No petechiae.  No rash.  No signs of cellulitis.      LABORATORY DATA REVIEWED:   wbc diff and CMP are fine. Hb 9.1,  B12/folate nl.     CEA baseline at 6.6 in 2/2017    From 01/30/2018 FNA on left adrenal mass biopsy -- Consistent with metastatic small cell carcinoma      CURRENT IMAGE DATA REVIEWED:   PET 6/2018 post 6 cycles of carbo/vp16  1. Medial left upper lobe nodule is 1.6 x 1.2 cm, stable in size, series 3 " image 112 (SUV max 2.7, previously 3.9).   2. Previously noted AP window hypermetabolism has decreased and now has SUV max 3.5, previously 4.8.    PET 4/2018 post 3 cycles of carbo/vp16  1. Decrease in size and FDG uptake in the left upper lobe nodule consistent with improved lung cancer.  2. Improvement in the previous left hilar and mediastinal adenopathy, currently there is small residual hypermetabolic adenopathy in the aorticopulmonary window consistent with residual mayela metastasis.  3. Resolution of previous hypermetabolic left adrenal nodule consistent with resolved metastasis.    PET 2/2018  1. Hypermetabolic left upper lobe nodule is consistent with malignancy, most likely bronchogenic carcinoma.  2. Hypermetabolic left hilar and mediastinal adenopathy is consistent  with metastatic disease.  3. Hypermetabolic left adrenal nodule is suspicious for metastatic disease.  4. There is a new 3.4 cm high-density structure abutting the left adrenal gland, suspicious for interval development of adrenal hemorrhage.    CT chest 01/2018 -  identified a 2.3 cm left upper lobe nodule extending to the hilum, suspicious for malignancy, left hilar mediastinal adenopathy with mass-like soft tissue thickening extending along the inferior aspect of the aortic arch likely involving the recurrent laryngeal nerve in this location, left adrenal nodule 1.5 cm undetermined, compression on L1 vertebral body.         OLD DATA REVIEW IN SUMMARY:    Chest x-ray 11/2017, no abnormalities identified.    In 2014, CBC indicating white count 17, hemoglobin 11 and platelets normal.  Differential indicating neutrophilia when she had a heart attack.        ASSESSMENT AND PLAN:    1.  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     She made informed decision to proceed in 2/2018 with  Carbo,  -16, D1-3 q 3 wks with neulasta support.   She has good PET response after 3 cycles and tx is continued.   She has ongoing  PET response post 6 cycles of tx. With minimal AP window LN SUV 3.5.    She had consolidation RT to chest till mid July 2018.     Recommend repeat PET in Sep to check for response.      There is any maintenance therapy trial is available.       She is informed tx is palliative in nature.   She is informed high recurrence rate with stage IV SCLC after nCR from frontline chemo.     2. normocytic anemia - nl b12/folate. This is likely from chemo. Will transfuse if hb less than 8.5-9 or symptoms. Dose reduce carbo AUC 4.5.    D/c chemo should help. We discussed the role of diet.       3. Anxiety. She feels ativan helps. Will refill.     Total time is 25 minutes, more than 15 minutes spent in counseling regarding her disease status, PET results, possible further tx for her cancer.               Again, thank you for allowing me to participate in the care of your patient.        Sincerely,        Tracy Miner MD, MD

## 2018-07-24 NOTE — MR AVS SNAPSHOT
After Visit Summary   7/24/2018    Ines Pickard    MRN: [id number]           Patient Information     Date Of Birth          1940        Visit Information        Provider Department      7/24/2018 2:30 PM ROOM 10 [facility]        Today's Diagnoses     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)    -  1       Follow-ups after your visit        Your next 10 appointments already scheduled     Aug 28, 2018  1:30 PM CDT   Level O with ROOM 9 [facility] ([facility])    [facility]  [address]  [facility] [address]   [phone]            Sep 19, 2018 10:00 AM CDT   (Arrive by 9:30 AM)   PE NPET ONCOLOGY (EYES TO THIGHS) with [id number]   [facility] ([facility])    [address] [facility] [address] [address]   [phone]           Tell your doctor:   If there is any chance you may be pregnant or if you are breastfeeding.   If you have problems lying in small spaces (claustrophobia). If you do, your doctor may give you medicine to help you relax. If you have diabetes:   Have your exam early in the morning. Your blood glucose will go up as the day goes by.   Your glucose level must be 180 or less at the start of the exam. Please take any medicines you need to ensure this blood glucose level. 24 hours before your scan: Don t do any heavy exercise. (No jogging, aerobics or other workouts.) Exercise will make your pictures less accurate. 6 hours before your scan:   Stop all food and liquids (except water).   Do not chew gum or suck on mints.   If you need to take medicine with food, you may take it with a few crackers.  Please call your Imaging Department at your exam site with any questions.            Sep 20, 2018 11:00 AM CDT   Return Visit with Tracy Miner MD   [facility] ([facility])    [facility]  [address]  [facility] [address]    [phone]            Oct 23, 2018  1:30 PM CDT   Level O with ROOM 4 [facility] ([facility])    [facility]  [address]  [facility] [address] [phone]              Future tests that were ordered for you today     Open Future Orders        Priority Expected Expires Ordered    PET Oncology (Eyes to Thighs) Routine 9/19/2018 7/24/2019 7/24/2018            Who to contact     If you have questions or need follow up information about today's clinic visit or your schedule please contact [facility] directly at [phone].  Normal or non-critical lab and imaging results will be communicated to you by MyChart, letter or phone within 4 business days after the clinic has received the results. If you do not hear from us within 7 days, please contact the clinic through MyChart or phone. If you have a critical or abnormal lab result, we will notify you by phone as soon as possible.  Submit refill requests through [employer] or call your pharmacy and they will forward the refill request to us. Please allow 3 business days for your refill to be completed.          Additional Information About Your Visit        Care EveryWhere ID     This is your Care EveryWhere ID. This could be used by other organizations to access your [place] medical records  [id number]         Blood Pressure from Last 3 Encounters:   07/24/18 118/62   07/18/18 114/70   07/12/18 109/61    Weight from Last 3 Encounters:   07/24/18 72.1 kg (158 lb 14.4 oz)   07/18/18 71.7 kg (158 lb)   07/12/18 71.9 kg (158 lb 9.6 oz)              Today, you had the following     No orders found for display       Primary Care Provider Office Phone # Fax #    R Emanuel Perez [phone] [address] [facility] [address]        Equal Access to Services     ALPA LOPEZ : Ronnie Carmona, renita lu, hoda coleman  ruben eastmarlenmedhat rosado'aan ah. So [facility] [phone].    ATENCIÓN: Si elvira wright, tiene a nguyễn disposición servicios gratuitos de asistencia lingüística. Yefri al [phone].    We comply with applicable federal civil rights laws and [place] laws. We do not discriminate on the basis of race, color, national origin, age, disability, sex, sexual orientation, or gender identity.            Thank you!     Thank you for choosing [facility]  for your care. Our goal is always to provide you with excellent care. Hearing back from our patients is one way we can continue to improve our services. Please take a few minutes to complete the written survey that you may receive in the mail after your visit with us. Thank you!             Your Updated Medication List - Protect others around you: Learn how to safely use, store and throw away your medicines at www.disposemymeds.org.          This list is accurate as of 7/24/18  2:53 PM.  Always use your most recent med list.                   Brand Name Dispense Instructions for use Diagnosis    albuterol 108 (90 Base) MCG/ACT Inhaler    PROAIR HFA/PROVENTIL HFA/VENTOLIN HFA    1 Inhaler    Inhale 2 puffs into the lungs 4 times daily    Acute bronchitis with symptoms > 10 days       aspirin 81 MG EC tablet     90 tablet    Take 1 tablet (81 mg) by mouth daily    CAD (coronary artery disease)       atorvastatin 40 MG tablet    LIPITOR    90 tablet    Take 1 tablet (40 mg) by mouth daily    Coronary artery disease involving native coronary artery of native heart without angina pectoris       lidocaine-prilocaine cream    EMLA    30 g    Apply topically as needed for moderate pain    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)       lisinopril 5 MG tablet    PRINIVIL/ZESTRIL    90 tablet    Take 1 tablet (5 mg) by mouth daily    Essential hypertension with goal blood pressure less than 140/90       LORazepam 0.5 MG tablet    ATIVAN    30 tablet    Take 1 tablet (0.5 mg) by mouth 2  times daily as needed for anxiety    Anxiety       metoprolol succinate 50 MG 24 hr tablet    TOPROL-XL    90 tablet    Take 1 tablet (50 mg) by mouth daily    Coronary artery disease involving native coronary artery of native heart without angina pectoris       MULTIVITAMIN ADULT Tabs      Take 1 tablet by mouth daily        nitroGLYcerin 0.4 MG sublingual tablet    NITROSTAT    25 tablet    Place 1 tablet (0.4 mg) under the tongue every 5 minutes as needed for chest pain Maximum of 3 doses in 15 minutes    CAD (coronary artery disease)       prochlorperazine 10 MG tablet    COMPAZINE    30 tablet    Take 0.5 tablets (5 mg) by mouth every 6 hours as needed (Nausea/Vomiting)    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

## 2018-07-26 NOTE — TELEPHONE ENCOUNTER
LORazepam (ATIVAN) 0.5 MG tablet     Last Written Prescription Date:  5/10/2018  Last Fill Quantity: 30,   # refills: 5  Last Office Visit: 6/1/2018 with Post  Future Office visit:    Next 5 appointments (look out 90 days)     Sep 20, 2018 11:00 AM CDT   Return Visit with Tracy Miner MD   [facility] ([facility])    [facility]  [address]  [facility] [place] [facility] [address]   [phone]                   Routing refill request to provider for review/approval because:  Drug not on the FM, P or [facility] refill protocol or controlled substance

## 2018-07-26 NOTE — TELEPHONE ENCOUNTER
Will route to Dr. Miner for refill request.    Dr. Miner OV notes 7-24-18:  3. Anxiety. She feels ativan helps. Will refill.     Does not appear RX was reordered.    Thank you!  Ines BENAVIDEZ RN

## 2018-07-30 NOTE — MR AVS SNAPSHOT
After Visit Summary   7/30/2018    Ines Pickard    MRN: [id number]           Patient Information     Date Of Birth          1940        Visit Information        Provider Department      7/30/2018 1:32 PM Fany Rico APRN [facility]        Today's Diagnoses     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)    -  1       Follow-ups after your visit        Your next 10 appointments already scheduled     Aug 28, 2018  1:30 PM CDT   Level O with ROOM 9 [facility] ([facility])    [facility] [place] [place]  [address]  [facility] [address]   [phone]            Sep 19, 2018 10:00 AM CDT   (Arrive by 9:30 AM)   PE NPET ONCOLOGY (EYES TO THIGHS) with [id number] ([facility])    [address] [facility] [address]   [phone]           Tell your doctor:   If there is any chance you may be pregnant or if you are breastfeeding.   If you have problems lying in small spaces (claustrophobia). If you do, your doctor may give you medicine to help you relax. If you have diabetes:   Have your exam early in the morning. Your blood glucose will go up as the day goes by.   Your glucose level must be 180 or less at the start of the exam. Please take any medicines you need to ensure this blood glucose level. 24 hours before your scan: Don t do any heavy exercise. (No jogging, aerobics or other workouts.) Exercise will make your pictures less accurate. 6 hours before your scan:   Stop all food and liquids (except water).   Do not chew gum or suck on mints.   If you need to take medicine with food, you may take it with a few crackers.  Please call your Imaging Department at your exam site with any questions.            Sep 20, 2018 11:00 AM CDT   Return Visit with Tracy Miner MD   [facility] ([facility])    [facility]  [address]  [facility]  [address]   [phone]            Oct 23, 2018  1:30 PM CDT   Level O with ROOM 4 [facility] ([facility])    [facility]  [address]  [facility] [place] [place] [address]   [phone]              Who to contact     Please call your clinic at [phone] to:    Ask questions about your health    Make or cancel appointments    Discuss your medicines    Learn about your test results    Speak to your doctor            Additional Information About Your Visit        Care EveryWhere ID     This is your Care EveryWhere ID. This could be used by other organizations to access your [place] medical records  [id number]         Blood Pressure from Last 3 Encounters:   07/24/18 118/62   07/18/18 114/70   07/12/18 109/61    Weight from Last 3 Encounters:   07/24/18 72.1 kg (158 lb 14.4 oz)   07/18/18 71.7 kg (158 lb)   07/12/18 71.9 kg (158 lb 9.6 oz)              Today, you had the following     No orders found for display       Primary Care Provider Office Phone # Fax #    R Emanuel Perez [phone] [address] [facility] [address]        Equal Access to Services     ALPA LOPEZ AH: Hadii doroteo ku hadasho Soomaali, waaxda luqadaha, qaybta kaalmada adeegyada, hoda anguianoin hayraignin ruben taylor. So [facility] [phone].    ATENCIÓN: Si habla español, tiene a nguyễn disposición servicios gratuitos de asistencia lingüística. Llame al [phone].    We comply with applicable federal civil rights laws and [place] laws. We do not discriminate on the basis of race, color, national origin, age, disability, sex, sexual orientation, or gender identity.            Thank you!     Thank you for choosing [facility]  for your care. Our goal is always to provide you with excellent care. Hearing back from our patients is one way we can continue to improve our services. Please take a few minutes to complete the written survey that you may  receive in the mail after your visit with us. Thank you!             Your Updated Medication List - Protect others around you: Learn how to safely use, store and throw away your medicines at www.disposemymeds.org.          This list is accurate as of 7/30/18  1:36 PM.  Always use your most recent med list.                   Brand Name Dispense Instructions for use Diagnosis    albuterol 108 (90 Base) MCG/ACT Inhaler    PROAIR HFA/PROVENTIL HFA/VENTOLIN HFA    1 Inhaler    Inhale 2 puffs into the lungs 4 times daily    Acute bronchitis with symptoms > 10 days       aspirin 81 MG EC tablet     90 tablet    Take 1 tablet (81 mg) by mouth daily    CAD (coronary artery disease)       atorvastatin 40 MG tablet    LIPITOR    90 tablet    Take 1 tablet (40 mg) by mouth daily    Coronary artery disease involving native coronary artery of native heart without angina pectoris       lidocaine-prilocaine cream    EMLA    30 g    Apply topically as needed for moderate pain    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)       lisinopril 5 MG tablet    PRINIVIL/ZESTRIL    90 tablet    Take 1 tablet (5 mg) by mouth daily    Essential hypertension with goal blood pressure less than 140/90       LORazepam 0.5 MG tablet    ATIVAN    30 tablet    Take 1 tablet (0.5 mg) by mouth 2 times daily as needed for anxiety    Anxiety       metoprolol succinate 50 MG 24 hr tablet    TOPROL-XL    90 tablet    Take 1 tablet (50 mg) by mouth daily    Coronary artery disease involving native coronary artery of native heart without angina pectoris       MULTIVITAMIN ADULT Tabs      Take 1 tablet by mouth daily        nitroGLYcerin 0.4 MG sublingual tablet    NITROSTAT    25 tablet    Place 1 tablet (0.4 mg) under the tongue every 5 minutes as needed for chest pain Maximum of 3 doses in 15 minutes    CAD (coronary artery disease)       prochlorperazine 10 MG tablet    COMPAZINE    30 tablet    Take 0.5 tablets (5 mg) by mouth every 6 hours as needed  (Nausea/Vomiting)    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

## 2018-07-30 NOTE — PROGRESS NOTES
Radiotherapy Treatment Summary          Date of Report: 2018    PATIENT: ELIZABETH LANDA  MEDICAL RECORD NO: [id number]  : 1940    DIAGNOSIS: C34.12 Malignant neoplasm of upper lobe, left bronchus or lung  Ms. Landa is a 77 year old female who noted hoarseness in late . 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       Chest CT scan on 1/10/18 which contributed to 0.3 cm soft tissue nodule in the left upper lobe as well as a 5.5 cm left hilar mass extending into the AP window and the left paratracheal region. Also noted was a 1.5 cm left adrenal nodule.  Fine-needle aspiration of the left adrenal followed on 18 confirming metastatic small cell lung cancer.     Staging was completed with a PET scan and MRI of the brain. The PET scan to 718 confirming the left upper lobe mass is hypermetabolic consistent with malignancy. The patient had hypermetabolic left hilar and mediastinal adenopathy. The adrenal nodule is hypermetabolic as well. There is no metastatic disease seen on MRI of the brain.     She underwent evaluation by Dr. Miner recommended palliative carboplatin and -16 for 4 cycles. Ms. Landa completed her chemotherapy course on 18.     Restaging PET scan on 18 revealed decrease in size and metabolic activity of her disease. There is no progression of disease. The hypermetabolic left adrenal nodule had resolved.    Recommendation for consolidative thoracic irradiation.      INTENT OF RADIOTHERAPY: Palliative  PATHOLOGY:  FNA of the left adrenal on 18 confirmed metastatic small cell lung cancer.                            STAGE: extensive stage SCLC  CONCURRENT SYSTEMIC THERAPY:    none.  Completed 4 cycles Carbo/-16 in late May 2018.              Details of the treatments summarized below  are found in records kept [facility].    Treatment Summary:  Radiation Oncology - Course: 1 Protocol:   Treatment Site Dose Modality From To Elapsed Days Fx.  TEX/med/hilum  3,000 cGy 10X  7/05/2018  7/18/2018  13 10              Dose per Fraction:   300 cGy    Total Dose:      3000 cGy        COMMENTS:                      (Acute Toxicity Profile by CTC v4.0)  Skin  Skin Reaction: 0 - No changes  CNS Alteration  CNS note: A/O x3      Cardiovascular  Respiratory effort: 2 - Mild dyspnea on exertion  Gastrointestinal  Nausea: 0 - None        PAIN MANAGEMENT:                             Pain Assessment  0-10 Pain Scale: 0    FOLLOW UP PLAN:                         She has follow up with med onc.  F/u with her in Radiation Oncology PRN.      Staff Physician: Renee Rivas M.D.  Physicist: Emiliano Shi    CC: Tracy Miner MD  ,

## 2018-08-06 NOTE — TELEPHONE ENCOUNTER
PCP did refill last on 5/2018.  Patient does take this medication twice a day.  Saige Hall RN reports it does help the patient sleep and with her anxiety.  Patient would like a bigger qty given.  Patient has to get refills every 15 days.  Please review and advise.    Thank you  Gina POP RN

## 2018-08-06 NOTE — TELEPHONE ENCOUNTER
Reason for Call: Request for an order or referral:    Order or referral being requested: PT eval    Date needed: as soon as possible    Has the patient been seen by the PCP for this problem?     Additional comments: Saige with  home care calling to report patient was previously discharged from PT however she has had radiation and Dr Perales wants patient to become more active. She has gotten a wheeled walker.    Phone number Patient can be reached at:  Other phone number:  Saige [facility] - [phone]    Best Time:  any    Can we leave a detailed message on this number?      Call taken on 8/6/2018 at 11:16 AM by Alyssia Jett

## 2018-08-08 NOTE — PROGRESS NOTES
[facility] now requests orders and shares plan of care/discharge summaries for some patients through [employer].  Please REPLY TO THIS MESSAGE OR ROUTE BACK TO THE AUTHOR in order to give authorization for orders when needed.  This is considered a verbal order, you will still receive a faxed copy of orders for signature.  Thank you for your assistance in improving collaboration for our patients.    ORDER    MD SUMMARY/PLAN OF CARE    PT for ambulation, exercises and vital monitoring 2w2 starting next week.    Chioma Christian MPT

## 2018-08-28 NOTE — MR AVS SNAPSHOT
After Visit Summary   8/28/2018    Ines Pickard    MRN: [id number]           Patient Information     Date Of Birth          1940        Visit Information        Provider Department      8/28/2018 1:30 PM ROOM 9 [facility]        Today's Diagnoses     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)    -  1       Follow-ups after your visit        Your next 10 appointments already scheduled     Sep 19, 2018 10:00 AM CDT   (Arrive by 9:30 AM)   PE NPET ONCOLOGY (EYES TO THIGHS) with [id number]   [facility] ([facility])    [address] [address]   [phone]           Tell your doctor:   If there is any chance you may be pregnant or if you are breastfeeding.   If you have problems lying in small spaces (claustrophobia). If you do, your doctor may give you medicine to help you relax. If you have diabetes:   Have your exam early in the morning. Your blood glucose will go up as the day goes by.   Your glucose level must be 180 or less at the start of the exam. Please take any oral diabetic medication you need to ensure this blood glucose level is below 180, but no insulin 4 hours prior to the exam.   If you are taking insulin in the morning take with breakfast by 6 am and schedule procedure between 12 and 2:15 pm.    If you are taking insulin at night take nightly dose, fast overnight, schedule procedure before 10 am.    If you take insulin both morning and night take morning dose by 6am and schedule procedure between 12 and 2:15 pm.  24 hours before your scan: Don t do any heavy exercise. (No jogging, aerobics or other workouts.) Exercise will make your pictures less accurate. 6 hours before your scan:   Stop all food and liquids (except water).   Do not chew gum or suck on mints.   If you need to take medicine with food, you may take it with a few crackers.  Please call your Imaging Department at your exam site with any questions.            Sep  20, 2018 11:00 AM CDT   Return Visit with Tracy Miner MD   [facility] ([facility])    [facility]  [address] [place] [address]  [facility] [address] [phone]            Oct 23, 2018  1:30 PM CDT   Level O with ROOM 4 [facility] ([facility])    [facility]  [address] [place]  [facility] [address] [phone]              Who to contact     If you have questions or need follow up information about today's clinic visit or your schedule please contact [facility] directly at [phone].  Normal or non-critical lab and imaging results will be communicated to you by MyChart, letter or phone within 4 business days after the clinic has received the results. If you do not hear from us within 7 days, please contact the clinic through MyChart or phone. If you have a critical or abnormal lab result, we will notify you by phone as soon as possible.  Submit refill requests through [employer] or call your pharmacy and they will forward the refill request to us. Please allow 3 business days for your refill to be completed.          Additional Information About Your Visit        Care EveryWhere ID     This is your Care EveryWhere ID. This could be used by other organizations to access your [place] medical records  [id number]         Blood Pressure from Last 3 Encounters:   07/24/18 118/62   07/18/18 114/70   07/12/18 109/61    Weight from Last 3 Encounters:   07/24/18 72.1 kg (158 lb 14.4 oz)   07/18/18 71.7 kg (158 lb)   07/12/18 71.9 kg (158 lb 9.6 oz)              Today, you had the following     No orders found for display       Primary Care Provider Office Phone # Fax #    MICHAEL Perez [phone] [address] [facility] [address]        Equal Access to Services     ALPA LOPEZ : Ronnie Carmona, wamarkoda luqaarti, qaybta hoda monzon  lionel johndl cruzitoedilia laveroniquepam ah. So [facility] [place] [phone].    ATENCIÓN: Si samiala adriana, tiene a nguyễn disposición servicios gratuitos de asistencia lingüística. Yefri al [phone].    We comply with applicable federal civil rights laws and [place] laws. We do not discriminate on the basis of race, color, national origin, age, disability, sex, sexual orientation, or gender identity.            Thank you!     Thank you for choosing [facility]  for your care. Our goal is always to provide you with excellent care. Hearing back from our patients is one way we can continue to improve our services. Please take a few minutes to complete the written survey that you may receive in the mail after your visit with us. Thank you!             Your Updated Medication List - Protect others around you: Learn how to safely use, store and throw away your medicines at www.disposemymeds.org.          This list is accurate as of 8/28/18  2:03 PM.  Always use your most recent med list.                   Brand Name Dispense Instructions for use Diagnosis    albuterol 108 (90 Base) MCG/ACT inhaler    PROAIR HFA/PROVENTIL HFA/VENTOLIN HFA    1 Inhaler    Inhale 2 puffs into the lungs 4 times daily    Acute bronchitis with symptoms > 10 days       aspirin 81 MG EC tablet     90 tablet    Take 1 tablet (81 mg) by mouth daily    CAD (coronary artery disease)       atorvastatin 40 MG tablet    LIPITOR    90 tablet    Take 1 tablet (40 mg) by mouth daily    Coronary artery disease involving native coronary artery of native heart without angina pectoris       lidocaine-prilocaine cream    EMLA    30 g    Apply topically as needed for moderate pain    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)       lisinopril 5 MG tablet    PRINIVIL/ZESTRIL    90 tablet    Take 1 tablet (5 mg) by mouth daily    Essential hypertension with goal blood pressure less than 140/90       LORazepam 0.5 MG tablet    ATIVAN    30 tablet    Take 1 tablet (0.5 mg) by mouth  2 times daily as needed for anxiety    Anxiety       metoprolol succinate 50 MG 24 hr tablet    TOPROL-XL    90 tablet    Take 1 tablet (50 mg) by mouth daily    Coronary artery disease involving native coronary artery of native heart without angina pectoris       MULTIVITAMIN ADULT Tabs      Take 1 tablet by mouth daily        nitroGLYcerin 0.4 MG sublingual tablet    NITROSTAT    25 tablet    Place 1 tablet (0.4 mg) under the tongue every 5 minutes as needed for chest pain Maximum of 3 doses in 15 minutes    CAD (coronary artery disease)       prochlorperazine 10 MG tablet    COMPAZINE    30 tablet    Take 0.5 tablets (5 mg) by mouth every 6 hours as needed (Nausea/Vomiting)    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

## 2018-08-28 NOTE — PROGRESS NOTES
Infusion Nursing Note:  Ines Pickard presents today for port flush.    Patient seen by provider today: No   present during visit today: Not Applicable.    Note: N/A.    Intravenous Access:  Implanted Port.    Treatment Conditions:  Not Applicable.      Post Infusion Assessment:  Patient tolerated port flush without incident.  Blood return noted  Site patent and intact, free from redness, edema or discomfort.  No evidence of extravasations.  Access discontinued per protocol.    Discharge Plan:   Patient discharged in stable condition accompanied by: self.  Departure Mode: Ambulatory.    Dea Black RN

## 2018-09-18 NOTE — TELEPHONE ENCOUNTER
Pre-procedure call completed, per protocol, for tomorrows scheduled procedure. Patient voiced understanding

## 2018-09-19 NOTE — PROGRESS NOTES
ONCOLOGY FOLLOW UP VISIT       CHIEF COMPLAINT/REASON FOR VISIT:  Left adrenal mass, FNA 01/30/2018 consistent with sclc     HISTORY OF PRESENT ILLNESS:  A 77-year-old female presented with 5 months duration of hoarseness.  She was evaluated by ENT.  Direct laryngoscopy revealed left vocal cord paralysis.    CT chest with contrast 01/10/2018 identified a 2.3 cm left upper lobe nodule extending to the hilum, suspicious for malignancy, left hilar mediastinal adenopathy with mass-like soft tissue thickening extending along the inferior aspect of the aortic arch likely involving the recurrent laryngeal nerve in this location, left adrenal nodule 1.5 cm undetermined, compression on L1 vertebral body.      EUS 01/30/2017 FNA was done on 2 hypoechoic left adrenal mass-   Consistent with metastatic small cell carcinoma      PET confirmed the primary TEX lesion with adrenal mets. She was dx with extensive stage SCLC.     She made informed decision to proceed with palliative chemo with carbo/-16 2/2018.   She had good PET response after 3 cycles and tx is continued.   She had ongoing PET response post 6 cycles of tx. With minimal AP window LN SUV 3.5.  She had consolidation RT after to her thoracic area till mid July 2018.   She has good PET in 9/2018.      PAST MEDICAL HISTORY:  CAD, stent around 2014, Angina attack in 5/2018 was at [facility]. Reflux, hypertension, cataracts, hyperlipidemia.      MEDICATIONS:  Reviewed in Epic system.      SOCIAL HISTORY:  She lives in her own house.  She has 3 boys.  She is here with her neighbor.  She quit smoking years back.  Denies alcohol abuse.      FAMILY HISTORY:  Positive for mom who had Hodgkin lymphoma.  Brother had unknown type of cancer.      REVIEW OF SYSTEMS:   Throat discomfort seems better.     Eating good, gaining weight. Still has a little anxiety.       PHYSICAL EXAMINATION:   VITAL SIGNS: Blood pressure 123/65, pulse 80, temperature 98.6  F (37  C), resp. rate 18,  weight 76.4 kg (168 lb 6.4 oz), SpO2 96 %, not currently breastfeeding.        ECOG 0    GENERAL APPEARANCE:  Elderly lady, looks like her stated age, not in acute distress.   HEENT: The patient is normocephalic, atraumatic. Pupils are equally reactive to light.  Sclerae are anicteric.  Moist oral mucosa.  negative posterior pharynx.   NECK:  Supple.  No jugular venous distention.  Thyroid is not palpable.   LYMPH NODES:  Superficial lymphadenopathy is not appreciable in the bilateral cervical, supraclavicular, axillary or inguinal areas.   CARDIOVASCULAR:  S1, S2 regular with no murmurs or gallops.  No carotid or abdominal bruits.   PULMONARY:  Lungs are clear to auscultation and percussion bilaterally.  There is no wheezing or rhonchi.   GASTROINTESTINAL:  Abdomen is soft, nontender.  No hepatosplenomegaly.  No signs of ascites.  No mass appreciable.   MUSCULOSKELETAL/EXTREMITIES:  No edema.  No cyanotic changes.  No signs of joint deformity.  No lymphedema.  No spinal or paraspinal tenderness.  No CVA tenderness.   NEUROLOGIC:  Cranial nerves II-XII are grossly intact.  Sensation intact.  Muscle strength and muscle tone symmetrical, 5/5 throughout.   SKIN:  No petechiae.  No rash.  No signs of cellulitis.      LABORATORY DATA REVIEWED:   wbc diff and CMP are fine. Hb 11,  B12/folate nl.     CEA baseline at 6.6 in 2/2017    From 01/30/2018 FNA on left adrenal mass biopsy -- Consistent with metastatic small cell carcinoma      CURRENT IMAGE DATA REVIEWED:   PET 9/2018   1. A 1.5 x 1.3 cm nodular opacity near the left lung apex medially  (series 3 image 106) has is not significantly changed in size or  hypermetabolic activity, with an SUV max of 2.7.  2. Mildly prominent hypermetabolic lymph node in the AP  window region (series 3 image 129) is unchanged, measuring 1.9 x 0.9 cm, SUV max 3.4. Not changed.   3. Trace left pleural effusion is new.       PET 6/2018 post 6 cycles of carbo/vp16  1. Medial left upper lobe  nodule is 1.6 x 1.2 cm, stable in size, series 3 image 112 (SUV max 2.7, previously 3.9).   2. Previously noted AP window hypermetabolism has decreased and now has SUV max 3.5, previously 4.8.    PET 4/2018 post 3 cycles of carbo/vp16  1. Decrease in size and FDG uptake in the left upper lobe nodule consistent with improved lung cancer.  2. Improvement in the previous left hilar and mediastinal adenopathy, currently there is small residual hypermetabolic adenopathy in the aorticopulmonary window consistent with residual mayela metastasis.  3. Resolution of previous hypermetabolic left adrenal nodule consistent with resolved metastasis.    PET 2/2018  1. Hypermetabolic left upper lobe nodule is consistent with malignancy, most likely bronchogenic carcinoma.  2. Hypermetabolic left hilar and mediastinal adenopathy is consistent  with metastatic disease.  3. Hypermetabolic left adrenal nodule is suspicious for metastatic disease.  4. There is a new 3.4 cm high-density structure abutting the left adrenal gland, suspicious for interval development of adrenal hemorrhage.    CT chest 01/2018 -  identified a 2.3 cm left upper lobe nodule extending to the hilum, suspicious for malignancy, left hilar mediastinal adenopathy with mass-like soft tissue thickening extending along the inferior aspect of the aortic arch likely involving the recurrent laryngeal nerve in this location, left adrenal nodule 1.5 cm undetermined, compression on L1 vertebral body.         OLD DATA REVIEW IN SUMMARY:    Chest x-ray 11/2017, no abnormalities identified.    In 2014, CBC indicating white count 17, hemoglobin 11 and platelets normal.  Differential indicating neutrophilia when she had a heart attack.        ASSESSMENT AND PLAN:    1.  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     She made informed decision to proceed in 2/2018 with  Carbo,  -16, D1-3 q 3 wks with neulasta support.   She has good PET response after  3 cycles and tx is continued.   She has ongoing PET response post 6 cycles of tx. With minimal AP window LN SUV 3.5.    She had consolidation RT to chest till mid July 2018. She has good PET in 9/2018.     There is any maintenance therapy trial is available.       She is informed tx is palliative in nature.   She is informed high recurrence rate with stage IV SCLC after nCR from frontline chemo.     2. normocytic anemia - nl b12/folate. This is likely from chemo.   D/c chemo should help. We discussed the role of diet.       3. Anxiety. She feels ativan helps. She is advised on the addictive effect of it. She is advised on limiting the use.     Total time is 25 minutes, more than 15 minutes spent in counseling regarding her disease status, PET results, possible further tx for her cancer.

## 2018-09-20 NOTE — NURSING NOTE
"Oncology Rooming Note    September 20, 2018 11:29 AM   Ines Pickard is a 77 year old female who presents for:    Chief Complaint   Patient presents with     Oncology Clinic Visit     2 mo f/up, Small cell carcinoma of left lung, review PET scan     Initial Vitals: /65 (BP Location: Right arm, Patient Position: Right side, Cuff Size: Adult Regular)  Pulse 80  Temp 98.6  F (37  C)  Resp 18  Wt 76.4 kg (168 lb 6.4 oz)  SpO2 96%  BMI 29.84 kg/m2 Estimated body mass index is 29.84 kg/(m^2) as calculated from the following:    Height as of 7/24/18: 1.6 m (5' 2.99\").    Weight as of this encounter: 76.4 kg (168 lb 6.4 oz). Body surface area is 1.84 meters squared.  No Pain (0) Comment: Data Unavailable   No LMP recorded. Patient is postmenopausal.  Allergies reviewed: Yes  Medications reviewed: Yes    Medications: Medication refills not needed today.  Pharmacy name entered into [facility]: [facility] - - [place] - [id number] [facility]    Clinical concerns:  2 mo f/up, Small cell carcinoma of left lung, review PET scan      7 minutes for nursing intake (face to face time)     Meron Mcmullen LPN              "

## 2018-09-20 NOTE — LETTER
9/20/2018         RE: Ines Pickard  [address] [address]        Dear Colleague,    Thank you for referring your patient, Ines Pickard, to the [facility]. Please see a copy of my visit note below.    ONCOLOGY FOLLOW UP VISIT       CHIEF COMPLAINT/REASON FOR VISIT:  Left adrenal mass, FNA 01/30/2018 consistent with sclc     HISTORY OF PRESENT ILLNESS:  A 77-year-old female presented with 5 months duration of hoarseness.  She was evaluated by ENT.  Direct laryngoscopy revealed left vocal cord paralysis.    CT chest with contrast 01/10/2018 identified a 2.3 cm left upper lobe nodule extending to the hilum, suspicious for malignancy, left hilar mediastinal adenopathy with mass-like soft tissue thickening extending along the inferior aspect of the aortic arch likely involving the recurrent laryngeal nerve in this location, left adrenal nodule 1.5 cm undetermined, compression on L1 vertebral body.      EUS 01/30/2017 FNA was done on 2 hypoechoic left adrenal mass-   Consistent with metastatic small cell carcinoma      PET confirmed the primary TEX lesion with adrenal mets. She was dx with extensive stage SCLC.     She made informed decision to proceed with palliative chemo with carbo/-16 2/2018.   She had good PET response after 3 cycles and tx is continued.   She had ongoing PET response post 6 cycles of tx. With minimal AP window LN SUV 3.5.  She had consolidation RT after to her thoracic area till mid July 2018.   She has good PET in 9/2018.      PAST MEDICAL HISTORY:  CAD, stent around 2014, Angina attack in 5/2018 was at [facility]. Reflux, hypertension, cataracts, hyperlipidemia.      MEDICATIONS:  Reviewed in Epic system.      SOCIAL HISTORY:  She lives in her own house.  She has 3 boys.  She is here with her neighbor.  She quit smoking years back.  Denies alcohol abuse.      FAMILY HISTORY:  Positive for mom who had Hodgkin lymphoma.  Brother had unknown type of cancer.       REVIEW OF SYSTEMS:   Throat discomfort seems better.     Eating good, gaining weight. Still has a little anxiety.       PHYSICAL EXAMINATION:   VITAL SIGNS: Blood pressure 123/65, pulse 80, temperature 98.6  F (37  C), resp. rate 18, weight 76.4 kg (168 lb 6.4 oz), SpO2 96 %, not currently breastfeeding.        ECOG 0    GENERAL APPEARANCE:  Elderly lady, looks like her stated age, not in acute distress.   HEENT: The patient is normocephalic, atraumatic. Pupils are equally reactive to light.  Sclerae are anicteric.  Moist oral mucosa.  negative posterior pharynx.   NECK:  Supple.  No jugular venous distention.  Thyroid is not palpable.   LYMPH NODES:  Superficial lymphadenopathy is not appreciable in the bilateral cervical, supraclavicular, axillary or inguinal areas.   CARDIOVASCULAR:  S1, S2 regular with no murmurs or gallops.  No carotid or abdominal bruits.   PULMONARY:  Lungs are clear to auscultation and percussion bilaterally.  There is no wheezing or rhonchi.   GASTROINTESTINAL:  Abdomen is soft, nontender.  No hepatosplenomegaly.  No signs of ascites.  No mass appreciable.   MUSCULOSKELETAL/EXTREMITIES:  No edema.  No cyanotic changes.  No signs of joint deformity.  No lymphedema.  No spinal or paraspinal tenderness.  No CVA tenderness.   NEUROLOGIC:  Cranial nerves II-XII are grossly intact.  Sensation intact.  Muscle strength and muscle tone symmetrical, 5/5 throughout.   SKIN:  No petechiae.  No rash.  No signs of cellulitis.      LABORATORY DATA REVIEWED:   wbc diff and CMP are fine. Hb 11,  B12/folate nl.     CEA baseline at 6.6 in 2/2017    From 01/30/2018 FNA on left adrenal mass biopsy -- Consistent with metastatic small cell carcinoma      CURRENT IMAGE DATA REVIEWED:   PET 9/2018   1. A 1.5 x 1.3 cm nodular opacity near the left lung apex medially  (series 3 image 106) has is not significantly changed in size or  hypermetabolic activity, with an SUV max of 2.7.  2. Mildly prominent  hypermetabolic lymph node in the AP  window region (series 3 image 129) is unchanged, measuring 1.9 x 0.9 cm, SUV max 3.4. Not changed.   3. Trace left pleural effusion is new.       PET 6/2018 post 6 cycles of carbo/vp16  1. Medial left upper lobe nodule is 1.6 x 1.2 cm, stable in size, series 3 image 112 (SUV max 2.7, previously 3.9).   2. Previously noted AP window hypermetabolism has decreased and now has SUV max 3.5, previously 4.8.    PET 4/2018 post 3 cycles of carbo/vp16  1. Decrease in size and FDG uptake in the left upper lobe nodule consistent with improved lung cancer.  2. Improvement in the previous left hilar and mediastinal adenopathy, currently there is small residual hypermetabolic adenopathy in the aorticopulmonary window consistent with residual mayela metastasis.  3. Resolution of previous hypermetabolic left adrenal nodule consistent with resolved metastasis.    PET 2/2018  1. Hypermetabolic left upper lobe nodule is consistent with malignancy, most likely bronchogenic carcinoma.  2. Hypermetabolic left hilar and mediastinal adenopathy is consistent  with metastatic disease.  3. Hypermetabolic left adrenal nodule is suspicious for metastatic disease.  4. There is a new 3.4 cm high-density structure abutting the left adrenal gland, suspicious for interval development of adrenal hemorrhage.    CT chest 01/2018 -  identified a 2.3 cm left upper lobe nodule extending to the hilum, suspicious for malignancy, left hilar mediastinal adenopathy with mass-like soft tissue thickening extending along the inferior aspect of the aortic arch likely involving the recurrent laryngeal nerve in this location, left adrenal nodule 1.5 cm undetermined, compression on L1 vertebral body.         OLD DATA REVIEW IN SUMMARY:    Chest x-ray 11/2017, no abnormalities identified.    In 2014, CBC indicating white count 17, hemoglobin 11 and platelets normal.  Differential indicating neutrophilia when she had a heart attack.         ASSESSMENT AND PLAN:    1.  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     She made informed decision to proceed in 2/2018 with  Carbo,  -16, D1-3 q 3 wks with neulasta support.   She has good PET response after 3 cycles and tx is continued.   She has ongoing PET response post 6 cycles of tx. With minimal AP window LN SUV 3.5.    She had consolidation RT to chest till mid July 2018. She has good PET in 9/2018.     There is any maintenance therapy trial is available.       She is informed tx is palliative in nature.   She is informed high recurrence rate with stage IV SCLC after nCR from frontline chemo.     2. normocytic anemia - nl b12/folate. This is likely from chemo.   D/c chemo should help. We discussed the role of diet.       3. Anxiety. She feels ativan helps. She is advised on the addictive effect of it. She is advised on limiting the use.     Total time is 25 minutes, more than 15 minutes spent in counseling regarding her disease status, PET results, possible further tx for her cancer.               Again, thank you for allowing me to participate in the care of your patient.        Sincerely,        Tracy Miner MD, MD

## 2018-09-20 NOTE — MR AVS SNAPSHOT
After Visit Summary   9/20/2018    Ines Pickard    MRN: [id number]           Patient Information     Date Of Birth          1940        Visit Information        Provider Department      9/20/2018 11:00 AM Tracy Miner MD [facility]        Today's Diagnoses     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)    -  1    Anxiety        Anemia, unspecified type          Care Instructions    Dr. Miner would like you to continue with monthly port flushes.     We would like to see you back in 3 months for a follow up appointment with labs and CT prior.     When you are in need of a refill, please call your pharmacy and they will send us a request.      Copy of appointments, and after visit summary (AVS) given to patient.      If you have any questions please call Bella Arvizu RN, BSN Oncology Hematology  [facility] [phone]. For questions after business hours, or on holidays/weekends, please call our after hours Nurse Triage line [phone]. Thank you.         3 months f/u with CT scan and labs. Need port flush appt.           Follow-ups after your visit        Your next 10 appointments already scheduled     Oct 23, 2018  1:30 PM CDT   Level O with ROOM 4 [facility] ([facility])    n Medical Ctr [facility]  [address]  [facility] [address]   [phone]            Dec 12, 2018 11:30 AM CST   CT CHEST/ABDOMEN/PELVIS W CONTRAST with WYCT1   [facility] CT ([facility])    [address]  [facility] [address]   [phone]           How do I prepare for my exam? (Food and drink instructions) To prepare: Do not eat or drink for 2 hours before your exam. If you need to take medicine, you may take it with small sips of water. (We may ask you to take liquid medicine as well.)  How do I prepare for my exam? (Other instructions) Please arrive 30 minutes early for your CT.  Once in  the department you might be asked to drink water 15-20 minutes prior to your exam.  If indicated you may be asked to drink an oral contrast in advance of your CT.  If this is the case, the imaging team will let you know or be in contact with you prior to your appointment  Patients over 70 or patients with diabetes or kidney problems: If you haven t had a blood test (creatinine test) within the last 30 days, the Cardiologist/Radiologist may require you to get this test prior to your exam.  If you have diabetes:  Continue to take your metformin medication on the day of your exam  What should I wear: Please wear loose clothing, such as a sweat suit or jogging clothes. Avoid snaps, zippers and other metal. We may ask you to undress and put on a hospital gown.  How long does the exam take: Most scans take less than 20 minutes.  What should I bring: Please bring any scans or X-rays taken at other hospitals, if similar tests were done. Also bring a list of your medicines, including vitamins, minerals and over-the-counter drugs. It is safest to leave personal items at home.  Do I need a : No  is needed.  What do I need to tell my doctor? Be sure to tell your doctor: * If you have any allergies. * If there s any chance you are pregnant. * If you are breastfeeding.  What should I do after the exam: No restrictions, You may resume normal activities.  What is this test: A CT (computed tomography) scan is a series of pictures that allows us to look inside your body. The scanner creates images of the body in cross sections, much like slices of bread. This helps us see any problems more clearly. You may receive contrast (X-ray dye) before or during your scan. You will be asked to drink the contrast.  Who should I call with questions: If you have any questions, please call the Imaging Department where you will have your exam. Directions, parking instructions, and other information is available on our website,  [email or web address].            Dec 13, 2018  2:15 PM CST   Return Visit with Tracy Miner MD   [facility] ([facility])    [facility]  [address]  [facility] [address] [phone]              Future tests that were ordered for you today     Open Future Orders        Priority Expected Expires Ordered    Comprehensive metabolic panel Routine 12/1/2018 12/31/2018 9/20/2018    CEA Routine 12/1/2018 12/31/2018 9/20/2018    CBC with platelets differential Routine 12/1/2018 12/31/2018 9/20/2018    CT Chest/Abdomen/Pelvis w Contrast Routine 12/1/2018 12/31/2018 9/20/2018            Who to contact     If you have questions or need follow up information about today's clinic visit or your schedule please contact [facility] directly at [phone].  Normal or non-critical lab and imaging results will be communicated to you by MyChart, letter or phone within 4 business days after the clinic has received the results. If you do not hear from us within 7 days, please contact the clinic through MyChart or phone. If you have a critical or abnormal lab result, we will notify you by phone as soon as possible.  Submit refill requests through [employer] or call your pharmacy and they will forward the refill request to us. Please allow 3 business days for your refill to be completed.          Additional Information About Your Visit        Care EveryWhere ID     This is your Care EveryWhere ID. This could be used by other organizations to access your [facility] [place] medical records  [id number]        Your Vitals Were     Pulse Temperature Respirations Pulse Oximetry BMI (Body Mass Index)       80 98.6  F (37  C) 18 96% 29.84 kg/m2        Blood Pressure from Last 3 Encounters:   09/20/18 123/65   07/24/18 118/62   07/18/18 114/70    Weight from Last 3 Encounters:   09/20/18 76.4 kg (168 lb 6.4 oz)   07/24/18 72.1 kg (158 lb 14.4 oz)   07/18/18 71.7 kg (158 lb)               Information about OPIOIDS     PRESCRIPTION OPIOIDS: WHAT YOU NEED TO KNOW   We gave you an opioid (narcotic) pain medicine. It is important to manage your pain, but opioids are not always the best choice. You should first try all the other options your care team gave you. Take this medicine for as short a time (and as few doses) as possible.    Some activities can increase your pain, such as bandage changes or therapy sessions. It may help to take your pain medicine 30 to 60 minutes before these activities. Reduce your stress by getting enough sleep, working on hobbies you enjoy and practicing relaxation or meditation. Talk to your care team about ways to manage your pain beyond prescription opioids.    These medicines have risks:    DO NOT drive when on new or higher doses of pain medicine. These medicines can affect your alertness and reaction times, and you could be arrested for driving under the influence (DUI). If you need to use opioids long-term, talk to your care team about driving.    DO NOT operate heavy machinery    DO NOT do any other dangerous activities while taking these medicines.    DO NOT drink any alcohol while taking these medicines.     If the opioid prescribed includes acetaminophen, DO NOT take with any other medicines that contain acetaminophen. Read all labels carefully. Look for the word  acetaminophen  or  Tylenol.  Ask your pharmacist if you have questions or are unsure.    You can get addicted to pain medicines, especially if you have a history of addiction (chemical, alcohol or substance dependence). Talk to your care team about ways to reduce this risk.    All opioids tend to cause constipation. Drink plenty of water and eat foods that have a lot of fiber, such as fruits, vegetables, prune juice, apple juice and high-fiber cereal. Take a laxative (Miralax, milk of magnesia, Colace, Senna) if you don t move your bowels at least every other day. Other side effects include upset  stomach, sleepiness, dizziness, throwing up, tolerance (needing more of the medicine to have the same effect), physical dependence and slowed breathing.    Store your pills in a secure place, locked if possible. We will not replace any lost or stolen medicine. If you don t finish your medicine, please throw away (dispose) as directed by your pharmacist. The [place] [employer]ution [employer] has more information about safe disposal: [email or web address]/[email or web address]-medications         Primary Care Provider Office Phone # Fax #    R Emanuel Perez [phone] [address] [facility] [address]        Equal Access to Services     ALPA LOPEZ : Ronnie Carmona, waanshu lu, cathie velazquez, hoda taylor. So [facility] [phone].    ATENCIÓN: Si habla español, tiene a nguyễn disposición servicios gratuitos de asistencia lingüística. Llame al [phone].    We comply with applicable federal civil rights laws and [place] laws. We do not discriminate on the basis of race, color, national origin, age, disability, sex, sexual orientation, or gender identity.            Thank you!     Thank you for choosing [facility]  for your care. Our goal is always to provide you with excellent care. Hearing back from our patients is one way we can continue to improve our services. Please take a few minutes to complete the written survey that you may receive in the mail after your visit with us. Thank you!             Your Updated Medication List - Protect others around you: Learn how to safely use, store and throw away your medicines at www.disposemymeds.org.          This list is accurate as of 9/20/18 11:54 AM.  Always use your most recent med list.                   Brand Name Dispense Instructions for use Diagnosis    albuterol 108 (90 Base) MCG/ACT inhaler    PROAIR HFA/PROVENTIL HFA/VENTOLIN HFA    1 Inhaler     Inhale 2 puffs into the lungs 4 times daily    Acute bronchitis with symptoms > 10 days       aspirin 81 MG EC tablet     90 tablet    Take 1 tablet (81 mg) by mouth daily    CAD (coronary artery disease)       atorvastatin 40 MG tablet    LIPITOR    90 tablet    Take 1 tablet (40 mg) by mouth daily    Coronary artery disease involving native coronary artery of native heart without angina pectoris       HYDROcodone-acetaminophen 5-325 MG per tablet    NORCO          lidocaine-prilocaine cream    EMLA    30 g    Apply topically as needed for moderate pain    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)       lisinopril 5 MG tablet    PRINIVIL/ZESTRIL    90 tablet    Take 1 tablet (5 mg) by mouth daily    Essential hypertension with goal blood pressure less than 140/90       LORazepam 0.5 MG tablet    ATIVAN    30 tablet    Take 1 tablet (0.5 mg) by mouth 2 times daily as needed for anxiety    Anxiety       * metoprolol succinate 100 MG 24 hr tablet    TOPROL-XL     Take 100 mg by mouth daily        * metoprolol succinate 50 MG 24 hr tablet    TOPROL-XL    90 tablet    Take 1 tablet (50 mg) by mouth daily    Coronary artery disease involving native coronary artery of native heart without angina pectoris       MULTIVITAMIN ADULT Tabs      Take 1 tablet by mouth daily        nitroGLYcerin 0.4 MG sublingual tablet    NITROSTAT    25 tablet    Place 1 tablet (0.4 mg) under the tongue every 5 minutes as needed for chest pain Maximum of 3 doses in 15 minutes    CAD (coronary artery disease)       prochlorperazine 10 MG tablet    COMPAZINE    30 tablet    Take 0.5 tablets (5 mg) by mouth every 6 hours as needed (Nausea/Vomiting)    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        * Notice:  This list has 2 medication(s) that are the same as other  medications prescribed for you. Read the directions carefully, and ask your doctor or other care provider to review them with you.

## 2018-09-20 NOTE — PATIENT INSTRUCTIONS
Dr. Miner would like you to continue with monthly port flushes.     We would like to see you back in 3 months for a follow up appointment with labs and CT prior.     When you are in need of a refill, please call your pharmacy and they will send us a request.      Copy of appointments, and after visit summary (AVS) given to patient.      If you have any questions please call Bella Arvizu RN, BSN Oncology Hematology  [facility] [phone]. For questions after business hours, or on holidays/weekends, please call our after hours Nurse Triage line [phone]. Thank you.         3 months f/u with CT scan and labs. Need port flush appt.

## 2018-10-03 NOTE — PROGRESS NOTES

## 2018-10-03 NOTE — NURSING NOTE
Prior to injection verified patient identity using patient's name and date of birth.  Due to injection administration, patient instructed to remain in clinic for 15 minutes  afterwards, and to report any adverse reaction to me immediately.    Mariana Lopez CMA

## 2018-10-03 NOTE — MR AVS SNAPSHOT
After Visit Summary   10/3/2018    Ines Pickard    MRN: [id number]           Patient Information     Date Of Birth          1940        Visit Information        Provider Department      10/3/2018 1:30 PM Yulissa/Alexandra, Fl [facility]        Today's Diagnoses     Need for prophylactic vaccination and inoculation against influenza    -  1       Follow-ups after your visit        Your next 10 appointments already scheduled     Oct 31, 2018  1:30 PM CDT   Level O with ROOM 4 [facility] ([facility])    [facility]  [address]  [facility] [address]   [phone]            Dec 12, 2018 10:30 AM CST   Level O with ROOM 3 [facility] ([facility])    [facility]  [address]  [facility] [address]   [phone]            Dec 12, 2018 11:30 AM CST   (Arrive by 11:00 AM)   CT CHEST/ABDOMEN/PELVIS W CONTRAST with WYCT1   [facility] CT ([facility])    [address] [place] [facility] [address]   [phone]           How do I prepare for my exam? (Food and drink instructions) To prepare: Do not eat or drink for 2 hours before your exam. If you need to take medicine, you may take it with small sips of water. (We may ask you to take liquid medicine as well.)  How do I prepare for my exam? (Other instructions) Please arrive 30 minutes early for your CT.  Once in the department you might be asked to drink water 15-20 minutes prior to your exam.  If indicated you may be asked to drink an oral contrast in advance of your CT.  If this is the case, the imaging team will let you know or be in contact with you prior to your appointment  Patients over 70 or patients with diabetes or kidney problems: If you haven t had a blood test (creatinine test) within the last 30 days, the Cardiologist/Radiologist may require you to get this test prior to  your exam.  If you have diabetes:  Continue to take your metformin medication on the day of your exam  What should I wear: Please wear loose clothing, such as a sweat suit or jogging clothes. Avoid snaps, zippers and other metal. We may ask you to undress and put on a hospital gown.  How long does the exam take: Most scans take less than 20 minutes.  What should I bring: Please bring any scans or X-rays taken at other hospitals, if similar tests were done. Also bring a list of your medicines, including vitamins, minerals and over-the-counter drugs. It is safest to leave personal items at home.  Do I need a : No  is needed.  What do I need to tell my doctor? Be sure to tell your doctor: * If you have any allergies. * If there s any chance you are pregnant. * If you are breastfeeding.  What should I do after the exam: No restrictions, You may resume normal activities.  What is this test: A CT (computed tomography) scan is a series of pictures that allows us to look inside your body. The scanner creates images of the body in cross sections, much like slices of bread. This helps us see any problems more clearly. You may receive contrast (X-ray dye) before or during your scan. You will be asked to drink the contrast.  Who should I call with questions: If you have any questions, please call the Imaging Department where you will have your exam. Directions, parking instructions, and other information is available on our website, [email or web address].            Dec 13, 2018  2:15 PM CST   Return Visit with Tracy Miner MD   [facility] ([facility])    [facility]  [address] [facility] [address]  [facility] [address] [phone]              Who to contact     If you have questions or need follow up information about today's clinic visit or your schedule please contact [facility] directly at [phone].  Normal or non-critical lab and imaging results  will be communicated to you by MyChart, letter or phone within 4 business days after the clinic has received the results. If you do not hear from us within 7 days, please contact the clinic through MyChart or phone. If you have a critical or abnormal lab result, we will notify you by phone as soon as possible.  Submit refill requests through [employer] or call your pharmacy and they will forward the refill request to us. Please allow 3 business days for your refill to be completed.          Additional Information About Your Visit        Care EveryWhere ID     This is your Care EveryWhere ID. This could be used by other organizations to access your [facility] medical records  [id number]         Blood Pressure from Last 3 Encounters:   09/20/18 123/65   07/24/18 118/62   07/18/18 114/70    Weight from Last 3 Encounters:   09/20/18 168 lb 6.4 oz (76.4 kg)   07/24/18 158 lb 14.4 oz (72.1 kg)   07/18/18 158 lb (71.7 kg)              We Performed the Following     ADMIN INFLUENZA (For MEDICARE Patients ONLY) []     FLU VACCINE, INCREASED ANTIGEN, PRESV FREE, AGE 65+ [64068]        Primary Care Provider Office Phone # Fax #    R Emanuel Perez [phone] [address] [address]        Equal Access to Services     ALPA LOPEZ : Hadii aad ku hadasho Soomaali, waaxda luqadaha, qaybta kaalmada adeegyada, waxale anguianoin hayaan ruben taylor. So [facility] [phone].    ATENCIÓN: Si habla español, tiene a nguyễn disposición servicios gratuitos de asistencia lingüística. Yefri al [phone].    We comply with applicable federal civil rights laws and [place] laws. We do not discriminate on the basis of race, color, national origin, age, disability, sex, sexual orientation, or gender identity.            Thank you!     Thank you for choosing [facility]  for your care. Our goal is always to provide you with excellent care. Hearing back from our patients is one way we  can continue to improve our services. Please take a few minutes to complete the written survey that you may receive in the mail after your visit with us. Thank you!             Your Updated Medication List - Protect others around you: Learn how to safely use, store and throw away your medicines at www.disposemymeds.org.          This list is accurate as of 10/3/18  1:34 PM.  Always use your most recent med list.                   Brand Name Dispense Instructions for use Diagnosis    albuterol 108 (90 Base) MCG/ACT inhaler    PROAIR HFA/PROVENTIL HFA/VENTOLIN HFA    1 Inhaler    Inhale 2 puffs into the lungs 4 times daily    Acute bronchitis with symptoms > 10 days       aspirin 81 MG EC tablet     90 tablet    Take 1 tablet (81 mg) by mouth daily    CAD (coronary artery disease)       atorvastatin 40 MG tablet    LIPITOR    90 tablet    Take 1 tablet (40 mg) by mouth daily    Coronary artery disease involving native coronary artery of native heart without angina pectoris       HYDROcodone-acetaminophen 5-325 MG per tablet    NORCO          lidocaine-prilocaine cream    EMLA    30 g    Apply topically as needed for moderate pain    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)       lisinopril 5 MG tablet    PRINIVIL/ZESTRIL    90 tablet    Take 1 tablet (5 mg) by mouth daily    Essential hypertension with goal blood pressure less than 140/90       LORazepam 0.5 MG tablet    ATIVAN    30 tablet    Take 1 tablet (0.5 mg) by mouth 2 times daily as needed for anxiety    Anxiety       * metoprolol succinate 100 MG 24 hr tablet    TOPROL-XL     Take 100 mg by mouth daily        * metoprolol succinate 50 MG 24 hr tablet    TOPROL-XL    90 tablet    Take 1 tablet (50 mg) by mouth daily    Coronary artery disease involving native coronary artery of native heart without angina pectoris       MULTIVITAMIN ADULT Tabs      Take 1 tablet by mouth daily        nitroGLYcerin 0.4 MG sublingual tablet    NITROSTAT    25 tablet    Place 1  tablet (0.4 mg) under the tongue every 5 minutes as needed for chest pain Maximum of 3 doses in 15 minutes    CAD (coronary artery disease)       prochlorperazine 10 MG tablet    COMPAZINE    30 tablet    Take 0.5 tablets (5 mg) by mouth every 6 hours as needed (Nausea/Vomiting)    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        * Notice:  This list has 2 medication(s) that are the same as other medications prescribed for you. Read the directions carefully, and ask your doctor or other care provider to review them with you.

## 2018-10-31 NOTE — MR AVS SNAPSHOT
After Visit Summary   10/31/2018    Ines Pickard    MRN: [id number]           Patient Information     Date Of Birth          1940        Visit Information        Provider Department      10/31/2018 1:30 PM ROOM 4 [facility]        Today's Diagnoses     Port-A-Cath in place    -  1       Follow-ups after your visit        Your next 10 appointments already scheduled     Dec 06, 2018 11:30 AM CST   Return Visit with ALEXSANDRA Rubio CNP   [facility] ([facility])    [address] [address]   [phone]            Dec 12, 2018 10:30 AM CST   Level O with ROOM 3 [facility] ([facility])    [facility]  [address]  [facility] [address]   [phone]            Dec 12, 2018 11:30 AM CST   (Arrive by 11:00 AM)   CT CHEST/ABDOMEN/PELVIS W CONTRAST with WYCT1   [facility] CT ([facility])    [address] [address]   [phone]           How do I prepare for my exam? (Food and drink instructions) To prepare: Do not eat or drink for 2 hours before your exam. If you need to take medicine, you may take it with small sips of water. (We may ask you to take liquid medicine as well.)  How do I prepare for my exam? (Other instructions) Please arrive 30 minutes early for your CT.  Once in the department you might be asked to drink water 15-20 minutes prior to your exam.  If indicated you may be asked to drink an oral contrast in advance of your CT.  If this is the case, the imaging team will let you know or be in contact with you prior to your appointment  Patients over 70 or patients with diabetes or kidney problems: If you haven t had a blood test (creatinine test) within the last 30 days, the Cardiologist/Radiologist may require you to get this test prior to your exam.  If you have diabetes:  Continue to  take your metformin medication on the day of your exam  What should I wear: Please wear loose clothing, such as a sweat suit or jogging clothes. Avoid snaps, zippers and other metal. We may ask you to undress and put on a hospital gown.  How long does the exam take: Most scans take less than 20 minutes.  What should I bring: Please bring any scans or X-rays taken at other hospitals, if similar tests were done. Also bring a list of your medicines, including vitamins, minerals and over-the-counter drugs. It is safest to leave personal items at home.  Do I need a : No  is needed.  What do I need to tell my doctor? Be sure to tell your doctor: * If you have any allergies. * If there s any chance you are pregnant. * If you are breastfeeding.  What should I do after the exam: No restrictions, You may resume normal activities.  What is this test: A CT (computed tomography) scan is a series of pictures that allows us to look inside your body. The scanner creates images of the body in cross sections, much like slices of bread. This helps us see any problems more clearly. You may receive contrast (X-ray dye) before or during your scan. You will be asked to drink the contrast.  Who should I call with questions: If you have any questions, please call the Imaging Department where you will have your exam. Directions, parking instructions, and other information is available on our website, [email or web address].            Dec 13, 2018  2:15 PM CST   Return Visit with Tracy Miner MD   [facility] ([facility])    [facility]  [address]  [facility] [address] [phone]              Who to contact     If you have questions or need follow up information about today's clinic visit or your schedule please contact [facility] directly at [phone].  Normal or non-critical lab and imaging results will be communicated to you by MyChart, letter or phone  within 4 business days after the clinic has received the results. If you do not hear from us within 7 days, please contact the clinic through [employer] or phone. If you have a critical or abnormal lab result, we will notify you by phone as soon as possible.  Submit refill requests through [employer] or call your pharmacy and they will forward the refill request to us. Please allow 3 business days for your refill to be completed.          Additional Information About Your Visit        Care EveryWhere ID     This is your Care EveryWhere ID. This could be used by other organizations to access your [place] medical records  [id number]         Blood Pressure from Last 3 Encounters:   09/20/18 123/65   07/24/18 118/62   07/18/18 114/70    Weight from Last 3 Encounters:   09/20/18 76.4 kg (168 lb 6.4 oz)   07/24/18 72.1 kg (158 lb 14.4 oz)   07/18/18 71.7 kg (158 lb)              Today, you had the following     No orders found for display       Primary Care Provider Office Phone # Fax #    R Emanuel Perez [phone] [address] [facility] [address]        Equal Access to Services     [facility] AH: Hadii doroteo hartmann hadrivero Sodesi, waaxda luqadaha, qaybta kaalmada adedlyada, hoda maddox . So [facility] [phone].    ATENCIÓN: Si habla español, tiene a nguyễn disposición servicios gratuitos de asistencia lingüística. [facility] [phone].    We comply with applicable federal civil rights laws and [place] laws. We do not discriminate on the basis of race, color, national origin, age, disability, sex, sexual orientation, or gender identity.            Thank you!     Thank you for choosing [facility]  for your care. Our goal is always to provide you with excellent care. Hearing back from our patients is one way we can continue to improve our services. Please take a few minutes to complete the written survey that you may receive in the mail after your visit with us.  Thank you!             Your Updated Medication List - Protect others around you: Learn how to safely use, store and throw away your medicines at www.disposemymeds.org.          This list is accurate as of 10/31/18  1:39 PM.  Always use your most recent med list.                   Brand Name Dispense Instructions for use Diagnosis    albuterol 108 (90 Base) MCG/ACT inhaler    PROAIR HFA/PROVENTIL HFA/VENTOLIN HFA    1 Inhaler    Inhale 2 puffs into the lungs 4 times daily    Acute bronchitis with symptoms > 10 days       aspirin 81 MG EC tablet     90 tablet    Take 1 tablet (81 mg) by mouth daily    CAD (coronary artery disease)       atorvastatin 40 MG tablet    LIPITOR    90 tablet    Take 1 tablet (40 mg) by mouth daily    Coronary artery disease involving native coronary artery of native heart without angina pectoris       HYDROcodone-acetaminophen 5-325 MG per tablet    NORCO          lidocaine-prilocaine cream    EMLA    30 g    Apply topically as needed for moderate pain    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)       lisinopril 5 MG tablet    PRINIVIL/ZESTRIL    90 tablet    Take 1 tablet (5 mg) by mouth daily    Essential hypertension with goal blood pressure less than 140/90       LORazepam 0.5 MG tablet    ATIVAN    30 tablet    Take 1 tablet (0.5 mg) by mouth 2 times daily as needed for anxiety    Anxiety       * metoprolol succinate 100 MG 24 hr tablet    TOPROL-XL     Take 100 mg by mouth daily        * metoprolol succinate 50 MG 24 hr tablet    TOPROL-XL    90 tablet    Take 1 tablet (50 mg) by mouth daily    Coronary artery disease involving native coronary artery of native heart without angina pectoris       MULTIVITAMIN ADULT Tabs      Take 1 tablet by mouth daily        nitroGLYcerin 0.4 MG sublingual tablet    NITROSTAT    25 tablet    Place 1 tablet (0.4 mg) under the tongue every 5 minutes as needed for chest pain Maximum of 3 doses in 15 minutes    CAD (coronary artery disease)        prochlorperazine 10 MG tablet    COMPAZINE    30 tablet    Take 0.5 tablets (5 mg) by mouth every 6 hours as needed (Nausea/Vomiting)    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        * Notice:  This list has 2 medication(s) that are the same as other medications prescribed for you. Read the directions carefully, and ask your doctor or other care provider to review them with you.

## 2018-10-31 NOTE — PROGRESS NOTES
Infusion Nursing Note:  Ines Pickard presents today for Port flush.    Patient seen by provider today: No   present during visit today: Not Applicable.    Note: N/A.    Intravenous Access:  Implanted Port.    Treatment Conditions:  Not Applicable.      Post Infusion Assessment:  Site patent and intact, free from redness, edema or discomfort.  No evidence of extravasations.  Access discontinued per protocol.    Discharge Plan:   Patient discharged in stable condition accompanied by: self.  Departure Mode: Ambulatory.  Pt. To return 12/12 for PAC labs, access for CT.    Zackary Barajas RN

## 2018-11-14 NOTE — TELEPHONE ENCOUNTER
Reason for Call:  Other     Detailed comments: pt is calling to get permission to see a chiropractor for back pain, her [place] doctor told her to call PCP to get cleared. Pt would like to see chiro today if she is cleared. Please advise    Phone Number Patient can be reached at: Home number on file [phone] (home)    Best Time: any    Can we leave a detailed message on this number? YES    Call taken on 11/14/2018 at 12:08 PM by Valeria Hughes

## 2018-11-15 NOTE — TELEPHONE ENCOUNTER
I have attempted to contact this patient by phone with the following results: left message to return my call on answering machine.    Alyssia Pierce RN

## 2018-11-16 NOTE — TELEPHONE ENCOUNTER
Patient states she went to the chiropractor once already because her back was hurting so bad she could stand it. States it did help some but that her cancer doctor wanted her to see Dr. Perez for the back pain as well. She will come see Dr Perez on Monday afternoon.    Alyssia Pierce RN

## 2018-11-19 NOTE — PATIENT INSTRUCTIONS
Use tylenol as needed for back pain. If it is quite severe pain you may occasionally use an ibuprofen for a few doses.

## 2018-11-19 NOTE — PROGRESS NOTES
Subjective:  Ines Pickard is a 78 year old female   Chief Complaint   Patient presents with     Back Pain     mid back pain and bilateral rib pain X 1-2 weeks.  pt. is requesting an xray. pt. went to the chiropractor this am.      She has usually obtained good results from chiropractic adjustments to relieve her back pain.  He encouraged her to come in and have x-rays done because he was concerned about doing too many manipulations with her history of lung cancer and the possibility of metastatic disease to the bones.  The pain does not radiate down into her legs but is localized to the upper lumbar lower thoracic region and to the lower ribs on the right-hand side        Encounter Diagnoses   Name Primary?     Acute midline thoracic back pain Yes     Rib tenderness      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)        ROS:other than noted above, general, HEENT, respiratory, cardiac, gastrointestinal systems are negative    Medical, surgical, social, and family histories, medications and allergies reviewed and updated.    Objective:  Exam:    GENERAL APPEARANCE ADULT: Alert, no acute distress  EYES: PERRL, EOM normal, conjunctiva and lids normal  RESP: lungs clear to auscultation   CV: normal rate, regular rhythm, no murmur or gallop  MS: back exam: Moderate tenderness over T11 and T12 and the 11th and 12th ribs on the right side    X-ray of ribs: Difficult to interpret because of overlying lung disease; no obvious fractures or metastatic disease  X-ray thoracic spine: No obvious fracture or metastatic disease; there is a wedge compression of 1 of the mid thoracic vertebrae, but this appears old and is not where she is tender      ASSESSMENT:  1. Acute midline thoracic back pain    2. Rib tenderness    3. 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)        PLAN:  Orders Placed This Encounter     XR Ribs & Chest Right G/E 3 Views     XR Thoracic Spine 3 Views     I told her that if the radiologist sees something that I am  missing I will let her know.  She has been told she should not take ibuprofen because she is on the aspirin.  She does take ranitidine on a daily basis.  Therefore I feel an occasional ibuprofen would be okay since that combination with Tylenol is what gives her relief of her back pain    Patient Instructions   Use tylenol as needed for back pain. If it is quite severe pain you may occasionally use an ibuprofen for a few doses.

## 2018-11-19 NOTE — MR AVS SNAPSHOT
After Visit Summary   11/19/2018    Ines Pickard    MRN: [id number]           Patient Information     Date Of Birth          1940        Visit Information        Provider Department      11/19/2018 3:20 PM Chris, MICHAEL Castellanos MD [facility]        Today's Diagnoses     Acute midline thoracic back pain    -  1    Rib tenderness          Care Instructions    Use tylenol as needed for back pain. If it is quite severe pain you may occasionally use an ibuprofen for a few doses.              Follow-ups after your visit        Your next 10 appointments already scheduled     Dec 06, 2018 11:30 AM CST   Return Visit with ALEXSANDRA Rubio CNP   [facility] ([facility])    [address] [address]   [phone]            Dec 12, 2018 10:30 AM CST   Level O with ROOM 3 [facility] ([facility])    [facility]  [address]  [facility] [address]   [phone]            Dec 12, 2018 11:30 AM CST   (Arrive by 11:00 AM)   CT CHEST/ABDOMEN/PELVIS W CONTRAST with [address] ([facility])    [address] [address]   [phone]           How do I prepare for my exam? (Food and drink instructions) To prepare: Do not eat or drink for 2 hours before your exam. If you need to take medicine, you may take it with small sips of water. (We may ask you to take liquid medicine as well.)  How do I prepare for my exam? (Other instructions) Please arrive 30 minutes early for your CT.  Once in the department you might be asked to drink water 15-20 minutes prior to your exam.  If indicated you may be asked to drink an oral contrast in advance of your CT.  If this is the case, the imaging team will let you know or be in contact with you prior to your appointment  Patients over 70 or patients with diabetes or kidney  problems: If you haven t had a blood test (creatinine test) within the last 30 days, the Cardiologist/Radiologist may require you to get this test prior to your exam.  If you have diabetes:  Continue to take your metformin medication on the day of your exam  What should I wear: Please wear loose clothing, such as a sweat suit or jogging clothes. Avoid snaps, zippers and other metal. We may ask you to undress and put on a hospital gown.  How long does the exam take: Most scans take less than 20 minutes.  What should I bring: Please bring any scans or X-rays taken at other hospitals, if similar tests were done. Also bring a list of your medicines, including vitamins, minerals and over-the-counter drugs. It is safest to leave personal items at home.  Do I need a : No  is needed.  What do I need to tell my doctor? Be sure to tell your doctor: * If you have any allergies. * If there s any chance you are pregnant. * If you are breastfeeding.  What should I do after the exam: No restrictions, You may resume normal activities.  What is this test: A CT (computed tomography) scan is a series of pictures that allows us to look inside your body. The scanner creates images of the body in cross sections, much like slices of bread. This helps us see any problems more clearly. You may receive contrast (X-ray dye) before or during your scan. You will be asked to drink the contrast.  Who should I call with questions: If you have any questions, please call the Imaging Department where you will have your exam. Directions, parking instructions, and other information is available on our website, [email or web address].            Dec 13, 2018  2:15 PM CST   Return Visit with Tracy Miner MD   [facility] ([facility])    [facility]  [address]  [facility] [address] [phone]              Who to contact     If you have questions or need follow up information about  "today's clinic visit or your schedule please contact [facility] directly at [phone].  Normal or non-critical lab and imaging results will be communicated to you by MyChart, letter or phone within 4 business days after the clinic has received the results. If you do not hear from us within 7 days, please contact the clinic through MyChart or phone. If you have a critical or abnormal lab result, we will notify you by phone as soon as possible.  Submit refill requests through [employer] or call your pharmacy and they will forward the refill request to us. Please allow 3 business days for your refill to be completed.          Additional Information About Your Visit        Care EveryWhere ID     This is your Care EveryWhere ID. This could be used by other organizations to access your [place] medical records  [id number]        Your Vitals Were     Pulse Temperature Respirations Height Pulse Oximetry BMI (Body Mass Index)    72 97.4  F (36.3  C) (Tympanic) 16 5' 2\" (1.575 m) 98% 31.46 kg/m2       Blood Pressure from Last 3 Encounters:   11/19/18 112/64   09/20/18 123/65   07/24/18 118/62    Weight from Last 3 Encounters:   11/19/18 172 lb (78 kg)   09/20/18 168 lb 6.4 oz (76.4 kg)   07/24/18 158 lb 14.4 oz (72.1 kg)               Primary Care Provider Office Phone # Fax #    R Emanuel Perez [phone] [address] [facility] [address]        Equal Access to Services     [facility] AH: Hadii aad ku hadasho Soomaali, waaxda luqadaha, qaybta kaalmada adeegyada, hoda taylor. So [facility] [phone].    ATENCIÓN: Si habla español, tiene a nguyễn disposición servicios gratuitos de asistencia lingüística. Llame al [phone].    We comply with applicable federal civil rights laws and [place] laws. We do not discriminate on the basis of race, color, national origin, age, disability, sex, sexual orientation, or gender identity.            Thank you!  "    Thank you for choosing [facility]  for your care. Our goal is always to provide you with excellent care. Hearing back from our patients is one way we can continue to improve our services. Please take a few minutes to complete the written survey that you may receive in the mail after your visit with us. Thank you!             Your Updated Medication List - Protect others around you: Learn how to safely use, store and throw away your medicines at www.disposemymeds.org.          This list is accurate as of 11/19/18  4:23 PM.  Always use your most recent med list.                   Brand Name Dispense Instructions for use Diagnosis    albuterol 108 (90 Base) MCG/ACT inhaler    PROAIR HFA/PROVENTIL HFA/VENTOLIN HFA    1 Inhaler    Inhale 2 puffs into the lungs 4 times daily    Acute bronchitis with symptoms > 10 days       aspirin 81 MG EC tablet     90 tablet    Take 1 tablet (81 mg) by mouth daily    CAD (coronary artery disease)       atorvastatin 40 MG tablet    LIPITOR    90 tablet    Take 1 tablet (40 mg) by mouth daily    Coronary artery disease involving native coronary artery of native heart without angina pectoris       HYDROcodone-acetaminophen 5-325 MG per tablet    NORCO          lidocaine-prilocaine cream    EMLA    30 g    Apply topically as needed for moderate pain    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)       lisinopril 5 MG tablet    PRINIVIL/ZESTRIL    90 tablet    Take 1 tablet (5 mg) by mouth daily    Essential hypertension with goal blood pressure less than 140/90       LORazepam 0.5 MG tablet    ATIVAN    30 tablet    Take 1 tablet (0.5 mg) by mouth 2 times daily as needed for anxiety    Anxiety       * metoprolol succinate 100 MG 24 hr tablet    TOPROL-XL     Take 100 mg by mouth daily        * metoprolol succinate 50 MG 24 hr tablet    TOPROL-XL    90 tablet    Take 1 tablet (50 mg) by mouth daily    Coronary artery disease involving native coronary artery of native heart  without angina pectoris       MULTIVITAMIN ADULT Tabs      Take 1 tablet by mouth daily        nitroGLYcerin 0.4 MG sublingual tablet    NITROSTAT    25 tablet    Place 1 tablet (0.4 mg) under the tongue every 5 minutes as needed for chest pain Maximum of 3 doses in 15 minutes    CAD (coronary artery disease)       prochlorperazine 10 MG tablet    COMPAZINE    30 tablet    Take 0.5 tablets (5 mg) by mouth every 6 hours as needed (Nausea/Vomiting)    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        * Notice:  This list has 2 medication(s) that are the same as other medications prescribed for you. Read the directions carefully, and ask your doctor or other care provider to review them with you.

## 2018-11-20 NOTE — PROGRESS NOTES
Please CALL PATIENT    The radiologist agreed with my readings of the x-rays.  No sign of cancer in your bones

## 2018-12-06 NOTE — PROGRESS NOTES
Cardiology Clinic Progress Note  Ines Pickard MRN# [id number]   YOB: 1940 Age: 78 year old     Reason For Visit:  6-month follow-up    Primary Cardiologist:   Dr. Neri          History of Presenting Illness:    Ines Pickard is a pleasant 78 year old patient with a past cardiac history significant for CAD with stenting to LCx 2014, hypertension, and hyperlipidemia.  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    Pt was last seen by Ana Noyola on 6/4/2018 for hospital follow-up.  She was admitted for unstable angina felt as chest pressure radiating to her throat.  Her stress test was abnormal and medical management was recommended.  She  started isosorbide mononitrate and at follow-up had not had any further chest discomfort.  However, she had complaints of headache since starting the medication and it was decreased to 15 mg daily.  Of note, she follows with oncology and had undergone palliative chemotherapy in March 2018.  On 6/11/2018 she called with reports of low blood pressure and Imdur was discontinued.    Pt presents today for annual follow-up.  She has not had her fasting lab work done in over a year and is agreeable to having this done.  She would like to check to see if she can have it drawn with her oncology lab work coming up.  If not, she will schedule this at [facility].  She has not had any further chest discomfort concerning for angina, since discontinuing Imdur.  She does occasionally have a chest twinge that lasts for only a second and resolves spontaneously.  Her blood pressure is well-controlled today.  She has been tolerating her cardiac medications without any problem.  She has completed 8 months of monthly chemotherapy and has undergone radiation therapy for her lung cancer.  Follow-up PET scan showed no further cancer at that time.  She does  have some follow-up lab work and CT scan coming up.  She did have an echocardiogram in May which showed normal LVEF.  She however had one more round of chemotherapy after that echocardiogram.  When it was nice out, she was able to do some routine walking and is able to go 3-4 blocks at a steady pace, without needing to stop and rest.  During the winter months she does not get any routine exercise.  She continues with chronic stable BARNES which is unchanged.  Patient reports no chest pain, PND, orthopnea, presyncope, syncope, edema, heart racing, or palpitations.      Current Cardiac Medications   Aspirin 81 mg daily  Atorvastatin 40 mg daily  Lisinopril 5 mg daily  Metoprolol XL 50 mg daily  Nitro as needed                   Assessment and Plan:     Plan  1.  Lipid profile and ALT  2.  Follow-up with Dr. Neri in 1 year with echo prior (history of chemo)      1. CAD    stenting to the LCx 2014 with residual 70% ostial RPDA and 90% proximal D1    Positive stress test 6/2018 with medical management recommended     No angina (prior angina chest pain and left arm pain radiating to neck and jaw)     continue statin, aspirin, ACE inhibitor, beta-blocker    Headaches on Imdur 30 mg daily    Consider restarting Imdur 15 mg daily or adding Ranexa, if further chest discomfort      2. Hypertension    controlled    continue lisinopril, metoprolol      3. Hyperlipidemia     last LDL 48 on 3/2017     Continue atorvastatin 40 mg daily         Thank you for allowing me to participate in this delightful patient's care.      This note was completed in part using Dragon voice recognition software. Although reviewed after completion, some word and grammatical errors may occur.    Aliyah Kaufman, APRN, CNP           Data:   All laboratory data reviewed

## 2018-12-06 NOTE — PROGRESS NOTES
HPI and Plan:   See dictation    Orders Placed This Encounter   Procedures     Lipid Profile     ALT     Follow-Up with Cardiologist       No orders of the defined types were placed in this encounter.      There are no discontinued medications.      Encounter Diagnoses   Name Primary?     Hyperlipidemia LDL goal <70      Coronary artery disease involving native coronary artery of native heart without angina pectoris Yes     Benign essential hypertension        CURRENT MEDICATIONS:  Current Outpatient Prescriptions   Medication Sig Dispense Refill     Acetaminophen (TYLENOL PO) Take 1,000 mg by mouth every 6 hours as needed       albuterol (PROAIR HFA/PROVENTIL HFA/VENTOLIN HFA) 108 (90 BASE) MCG/ACT Inhaler Inhale 2 puffs into the lungs 4 times daily 1 Inhaler 2     aspirin EC 81 MG EC tablet Take 1 tablet (81 mg) by mouth daily 90 tablet 3     atorvastatin (LIPITOR) 40 MG tablet Take 1 tablet (40 mg) by mouth daily 90 tablet 3     lisinopril (PRINIVIL/ZESTRIL) 5 MG tablet Take 1 tablet (5 mg) by mouth daily 90 tablet 2     LORazepam (ATIVAN) 0.5 MG tablet Take 1 tablet (0.5 mg) by mouth 2 times daily as needed for anxiety 30 tablet 5     metoprolol succinate (TOPROL-XL) 50 MG 24 hr tablet Take 1 tablet (50 mg) by mouth daily 90 tablet 1     Multiple Vitamins-Minerals (MULTIVITAMIN ADULT) TABS Take 1 tablet by mouth daily        nitroglycerin (NITROSTAT) 0.4 MG SL tablet Place 1 tablet (0.4 mg) under the tongue every 5 minutes as needed for chest pain Maximum of 3 doses in 15 minutes 25 tablet 3     ranitidine (ZANTAC) 75 MG tablet Take 1 tablet (75 mg) by mouth 2 times daily 30 tablet 11       ALLERGIES     Allergies   Allergen Reactions     Amoxicillin GI Disturbance     Tetracycline Other (See Comments)     Yeast infection     Nickel Rash       PAST MEDICAL HISTORY:  Past Medical History:   Diagnosis Date     Anemia due to blood loss, acute 12/30/2014     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      Coronary artery disease 12/2014    Inferior STEMI, stent to Circumflex     Percutaneous transluminal coronary angioplasty hematoma 12/15/2014       PAST SURGICAL HISTORY:  Past Surgical History:   Procedure Laterality Date     APPENDECTOMY  1956     ESOPHAGOSCOPY, GASTROSCOPY, DUODENOSCOPY (EGD), COMBINED N/A 1/30/2018    Procedure: COMBINED ENDOSCOPIC ULTRASOUND, ESOPHAGOSCOPY, GASTROSCOPY, DUODENOSCOPY (EGD), FINE NEEDLE ASPIRATE/BIOPSY;   EUS;  Surgeon: Carlos Fletcher MD;  Location:  GI     EYE SURGERY       INSERT PORT VASCULAR ACCESS N/A 2/12/2018    Procedure: INSERT PORT VASCULAR ACCESS;  Port-a-Cath Placement;  Surgeon: Carlos Johnson MD;  Location: [facility] OR     PHACOEMULSIFICATION WITH STANDARD INTRAOCULAR LENS IMPLANT Left 1/25/2016    Procedure: PHACOEMULSIFICATION WITH STANDARD INTRAOCULAR LENS IMPLANT;  Surgeon: Julio César Wallace MD;  Location: [facility] OR     PHACOEMULSIFICATION WITH STANDARD INTRAOCULAR LENS IMPLANT Right 2/22/2016    Procedure: PHACOEMULSIFICATION WITH STANDARD INTRAOCULAR LENS IMPLANT;  Surgeon: Julio César Wallace MD;  Location: [facility] OR     SURGICAL HISTORY OF -   1961    right hand surgery      TONSILLECTOMY & ADENOIDECTOMY  1967     VASCULAR SURGERY  02/12/2018    PortaCath       FAMILY HISTORY:  Family History   Problem Relation Age of Onset     Cancer Brother      Heart Disease Brother      Cancer Mother      Respiratory Father      Heart Disease Father      MI       SOCIAL HISTORY:  Social History     Social History     Marital status:      Spouse name: N/A     Number of children: N/A     Years of education: N/A     Social History Main Topics     Smoking status: Former Smoker     Packs/day: 0.50     Types: Cigarettes     Quit date: 12/13/2014     Smokeless tobacco: Never Used     Alcohol use No     Drug use: No     Sexual activity: Not Currently     Other Topics Concern     Parent/Sibling W/ Cabg, Mi Or Angioplasty Before 65f 55m? Yes     Social History Narrative        Review of Systems:  Skin:  Negative       Eyes:  Negative      ENT:  Positive for hoarseness    Respiratory:  Positive for dyspnea on exertion     Cardiovascular:    Positive for;lower extremity symptoms;edema;lightheadedness    Gastroenterology: Positive for abdominal pain    Genitourinary:  Negative      Musculoskeletal:  Positive for back pain    Neurologic:  Positive for numbness or tingling of feet;numbness or tingling of hands;headaches    Psychiatric:  Positive for anxiety;depression    Heme/Lymph/Imm:  Negative      Endocrine:  Negative        Physical Exam:  Vitals: /77 (BP Location: Right arm, Patient Position: Sitting, Cuff Size: Adult Regular)  Pulse 83  Wt 77.8 kg (171 lb 9.6 oz)  SpO2 95%  BMI 31.39 kg/m2    Constitutional:  cooperative, alert and oriented, well developed, well nourished, in no acute distress        Skin:  warm and dry to the touch          Head:  normocephalic        Eyes:  sclera white        Lymph:      ENT:  no pallor or cyanosis        Neck:           Respiratory:  clear to auscultation         Cardiac: regular rhythm, normal S1/S2, no S3 or S4, apical impulse not displaced, no murmurs, gallops or rubs                pulses full and equal                                        GI:  not assessed this visit        Extremities and Muscular Skeletal:  no edema              Neurological:  affect appropriate        Psych:  Alert and Oriented x 3        CC  No referring provider defined for this encounter.

## 2018-12-06 NOTE — MR AVS SNAPSHOT
After Visit Summary   12/6/2018    Ines Pickard    MRN: [id number]           Patient Information     Date Of Birth          1940        Visit Information        Provider Department      12/6/2018 11:30 AM Aliyah Paula APRN CNP [facility]        Today's Diagnoses     Hyperlipidemia LDL goal <70    -  1    Coronary artery disease involving native coronary artery of native heart without angina pectoris          Care Instructions    Thank you for your [facility] visit today. Your provider has recommended the following:  Medication Changes:  No changes   Recommendations:  1. Call if any questions   Follow-up:  1. Fasting lab work at [facility] when you can   2. See Dr. Neri for cardiology follow up in 1 year Dec 2019- call in Sept to schedule.  Call [phone] two months prior to request date to schedule any future appointments.  Reminder:  1. Please bring in all current medications, over the counter supplements and vitamin bottles to your next appointment.               [facility]~[address]~[place], [place]~[address]  Questions about your visit today?   Call your Cardiology Clinic RN's-Neelam Han and/or Lou Boogie at [phone].            Follow-ups after your visit        Additional Services     Follow-Up with Cardiologist                 Your next 10 appointments already scheduled     Dec 12, 2018 10:30 AM CST   Level O with ROOM 3 [facility] ([facility])    [facility]  [address]  [facility] [address]   [phone]            Dec 12, 2018 11:30 AM CST   (Arrive by 11:00 AM)   CT CHEST/ABDOMEN/PELVIS W CONTRAST with [facility]   [facility] CT ([facility])    [address]  [facility] [place] [address] [phone]           How do I prepare for my exam? (Food and drink  instructions) To prepare: Do not eat or drink for 2 hours before your exam. If you need to take medicine, you may take it with small sips of water. (We may ask you to take liquid medicine as well.)  How do I prepare for my exam? (Other instructions) Please arrive 30 minutes early for your CT.  Once in the department you might be asked to drink water 15-20 minutes prior to your exam.  If indicated you may be asked to drink an oral contrast in advance of your CT.  If this is the case, the imaging team will let you know or be in contact with you prior to your appointment  Patients over 70 or patients with diabetes or kidney problems: If you haven t had a blood test (creatinine test) within the last 30 days, the Cardiologist/Radiologist may require you to get this test prior to your exam.  If you have diabetes:  Continue to take your metformin medication on the day of your exam  What should I wear: Please wear loose clothing, such as a sweat suit or jogging clothes. Avoid snaps, zippers and other metal. We may ask you to undress and put on a hospital gown.  How long does the exam take: Most scans take less than 20 minutes.  What should I bring: Please bring any scans or X-rays taken at other hospitals, if similar tests were done. Also bring a list of your medicines, including vitamins, minerals and over-the-counter drugs. It is safest to leave personal items at home.  Do I need a : No  is needed.  What do I need to tell my doctor? Be sure to tell your doctor: * If you have any allergies. * If there s any chance you are pregnant. * If you are breastfeeding.  What should I do after the exam: No restrictions, You may resume normal activities.  What is this test: A CT (computed tomography) scan is a series of pictures that allows us to look inside your body. The scanner creates images of the body in cross sections, much like slices of bread. This helps us see any problems more clearly. You may receive contrast  (X-ray dye) before or during your scan. You will be asked to drink the contrast.  Who should I call with questions: If you have any questions, please call the Imaging Department where you will have your exam. Directions, parking instructions, and other information is available on our website, [email or web address].            Dec 13, 2018  2:15 PM CST   Return Visit with Tracy Miner MD   [facility] ([facility])    [facility]  [address]  [facility] [address] [phone]              Future tests that were ordered for you today     Open Future Orders        Priority Expected Expires Ordered    Follow-Up with Cardiologist Routine 12/6/2019 12/5/2020 12/6/2018    Lipid Profile Routine 12/6/2018 12/6/2019 12/6/2018    ALT Routine 12/6/2018 12/6/2019 12/6/2018            Who to contact     If you have questions or need follow up information about today's clinic visit or your schedule please contact [facility] directly at [phone].  Normal or non-critical lab and imaging results will be communicated to you by MyChart, letter or phone within 4 business days after the clinic has received the results. If you do not hear from us within 7 days, please contact the clinic through MyChart or phone. If you have a critical or abnormal lab result, we will notify you by phone as soon as possible.  Submit refill requests through [employer] or call your pharmacy and they will forward the refill request to us. Please allow 3 business days for your refill to be completed.          Additional Information About Your Visit        Care EveryWhere ID     This is your Care EveryWhere ID. This could be used by other organizations to access your [facility] medical records  [id number]        Your Vitals Were     Pulse Pulse Oximetry BMI (Body Mass Index)             83 95% 31.39 kg/m2          Blood Pressure from Last 3 Encounters:   12/06/18  121/77   11/19/18 112/64   09/20/18 123/65    Weight from Last 3 Encounters:   12/06/18 77.8 kg (171 lb 9.6 oz)   11/19/18 78 kg (172 lb)   09/20/18 76.4 kg (168 lb 6.4 oz)               Primary Care Provider Office Phone # Fax #    R Emanuel Perez [phone] [address] [address]        Equal Access to Services     ALPA LOPEZ : Hadii aad ku hadasho Soomaali, waaxda luqadaha, qaybta kaalmada adeegyada, waxay idiin hayaan adeeg cruzitoaramedhat lakelsi . So [facility] [place] [phone].    ATENCIÓN: Si habla español, tiene a nguyễn disposición servicios gratuitos de asistencia lingüística. [facility] [phone].    We comply with applicable federal civil rights laws and [place] laws. We do not discriminate on the basis of race, color, national origin, age, disability, sex, sexual orientation, or gender identity.            Thank you!     Thank you for choosing [facility]  for your care. Our goal is always to provide you with excellent care. Hearing back from our patients is one way we can continue to improve our services. Please take a few minutes to complete the written survey that you may receive in the mail after your visit with us. Thank you!             Your Updated Medication List - Protect others around you: Learn how to safely use, store and throw away your medicines at www.disposemymeds.org.          This list is accurate as of 12/6/18 11:59 AM.  Always use your most recent med list.                   Brand Name Dispense Instructions for use Diagnosis    albuterol 108 (90 Base) MCG/ACT inhaler    PROAIR HFA/PROVENTIL HFA/VENTOLIN HFA    1 Inhaler    Inhale 2 puffs into the lungs 4 times daily    Acute bronchitis with symptoms > 10 days       aspirin 81 MG EC tablet    ASA    90 tablet    Take 1 tablet (81 mg) by mouth daily    CAD (coronary artery disease)       atorvastatin 40 MG tablet    LIPITOR    90 tablet    Take 1 tablet (40 mg) by mouth  daily    Coronary artery disease involving native coronary artery of native heart without angina pectoris       lisinopril 5 MG tablet    PRINIVIL/ZESTRIL    90 tablet    Take 1 tablet (5 mg) by mouth daily    Essential hypertension with goal blood pressure less than 140/90       LORazepam 0.5 MG tablet    ATIVAN    30 tablet    Take 1 tablet (0.5 mg) by mouth 2 times daily as needed for anxiety    Anxiety       metoprolol succinate ER 50 MG 24 hr tablet    TOPROL-XL    90 tablet    Take 1 tablet (50 mg) by mouth daily    Coronary artery disease involving native coronary artery of native heart without angina pectoris       MULTIVITAMIN ADULT Tabs      Take 1 tablet by mouth daily        nitroGLYcerin 0.4 MG sublingual tablet    NITROSTAT    25 tablet    Place 1 tablet (0.4 mg) under the tongue every 5 minutes as needed for chest pain Maximum of 3 doses in 15 minutes    CAD (coronary artery disease)       ranitidine 75 MG tablet    ZANTAC    30 tablet    Take 1 tablet (75 mg) by mouth 2 times daily    Esophageal reflux       TYLENOL PO      Take 1,000 mg by mouth every 6 hours as needed

## 2018-12-06 NOTE — LETTER
12/6/2018    MICHAEL Perez MD  [address] [facility] [address]    RE: Ines Pickard       Dear Colleague,    I had the pleasure of seeing Ines Pickard in the [facility].    Cardiology Clinic Progress Note  Ines Pickard MRN# [id number]   YOB: 1940 Age: 78 year old     Reason For Visit:  6-month follow-up    Primary Cardiologist:   Dr. Neri          History of Presenting Illness:    Ines Pickard is a pleasant 78 year old patient with a past cardiac history significant for CAD with stenting to LCx 2014, hypertension, and hyperlipidemia.  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    Pt was last seen by Ana Noyola on 6/4/2018 for hospital follow-up.  She was admitted for unstable angina felt as chest pressure radiating to her throat.  Her stress test was abnormal and medical management was recommended.  She  started isosorbide mononitrate and at follow-up had not had any further chest discomfort.  However, she had complaints of headache since starting the medication and it was decreased to 15 mg daily.  Of note, she follows with oncology and had undergone palliative chemotherapy in March 2018.  On 6/11/2018 she called with reports of low blood pressure and Imdur was discontinued.    Pt presents today for annual follow-up.  She has not had her fasting lab work done in over a year and is agreeable to having this done.  She would like to check to see if she can have it drawn with her oncology lab work coming up.  If not, she will schedule this at [facility].  She has not had any further chest discomfort concerning for angina, since discontinuing Imdur.  She does occasionally have a chest twinge that lasts for only a second and resolves spontaneously.  Her blood pressure is well-controlled today.  She has been tolerating  her cardiac medications without any problem.  She has completed 8 months of monthly chemotherapy and has undergone radiation therapy for her lung cancer.  Follow-up PET scan showed no further cancer at that time.  She does have some follow-up lab work and CT scan coming up.  She did have an echocardiogram in May which showed normal LVEF.  She however had one more round of chemotherapy after that echocardiogram.  When it was nice out, she was able to do some routine walking and is able to go 3-4 blocks at a steady pace, without needing to stop and rest.  During the winter months she does not get any routine exercise.  She continues with chronic stable BARNES which is unchanged.  Patient reports no chest pain, PND, orthopnea, presyncope, syncope, edema, heart racing, or palpitations.      Current Cardiac Medications   Aspirin 81 mg daily  Atorvastatin 40 mg daily  Lisinopril 5 mg daily  Metoprolol XL 50 mg daily  Nitro as needed                   Assessment and Plan:     Plan  1.  Lipid profile and ALT  2.  Follow-up with Dr. Neri in 1 year with echo prior (history of chemo)      1. CAD    stenting to the LCx 2014 with residual 70% ostial RPDA and 90% proximal D1    Positive stress test 6/2018 with medical management recommended     No angina (prior angina chest pain and left arm pain radiating to neck and jaw)     continue statin, aspirin, ACE inhibitor, beta-blocker    Headaches on Imdur 30 mg daily    Consider restarting Imdur 15 mg daily or adding Ranexa, if further chest discomfort      2. Hypertension    controlled    continue lisinopril, metoprolol      3. Hyperlipidemia     last LDL 48 on 3/2017     Continue atorvastatin 40 mg daily         Thank you for allowing me to participate in this delightful patient's care.      This note was completed in part using Dragon voice recognition software. Although reviewed after completion, some word and grammatical errors may occur.    ALEXSANDRA Balderas,  CNP           Data:   All laboratory data reviewed      HPI and Plan:   See dictation    Orders Placed This Encounter   Procedures     Lipid Profile     ALT     Follow-Up with Cardiologist       No orders of the defined types were placed in this encounter.      There are no discontinued medications.      Encounter Diagnoses   Name Primary?     Hyperlipidemia LDL goal <70      Coronary artery disease involving native coronary artery of native heart without angina pectoris Yes     Benign essential hypertension        CURRENT MEDICATIONS:  Current Outpatient Prescriptions   Medication Sig Dispense Refill     Acetaminophen (TYLENOL PO) Take 1,000 mg by mouth every 6 hours as needed       albuterol (PROAIR HFA/PROVENTIL HFA/VENTOLIN HFA) 108 (90 BASE) MCG/ACT Inhaler Inhale 2 puffs into the lungs 4 times daily 1 Inhaler 2     aspirin EC 81 MG EC tablet Take 1 tablet (81 mg) by mouth daily 90 tablet 3     atorvastatin (LIPITOR) 40 MG tablet Take 1 tablet (40 mg) by mouth daily 90 tablet 3     lisinopril (PRINIVIL/ZESTRIL) 5 MG tablet Take 1 tablet (5 mg) by mouth daily 90 tablet 2     LORazepam (ATIVAN) 0.5 MG tablet Take 1 tablet (0.5 mg) by mouth 2 times daily as needed for anxiety 30 tablet 5     metoprolol succinate (TOPROL-XL) 50 MG 24 hr tablet Take 1 tablet (50 mg) by mouth daily 90 tablet 1     Multiple Vitamins-Minerals (MULTIVITAMIN ADULT) TABS Take 1 tablet by mouth daily        nitroglycerin (NITROSTAT) 0.4 MG SL tablet Place 1 tablet (0.4 mg) under the tongue every 5 minutes as needed for chest pain Maximum of 3 doses in 15 minutes 25 tablet 3     ranitidine (ZANTAC) 75 MG tablet Take 1 tablet (75 mg) by mouth 2 times daily 30 tablet 11       ALLERGIES     Allergies   Allergen Reactions     Amoxicillin GI Disturbance     Tetracycline Other (See Comments)     Yeast infection     Nickel Rash       PAST MEDICAL HISTORY:  Past Medical History:   Diagnosis Date     Anemia due to blood loss, acute 12/30/2014      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     Coronary artery disease 12/2014    Inferior STEMI, stent to Circumflex     Percutaneous transluminal coronary angioplasty hematoma 12/15/2014       PAST SURGICAL HISTORY:  Past Surgical History:   Procedure Laterality Date     APPENDECTOMY  1956     ESOPHAGOSCOPY, GASTROSCOPY, DUODENOSCOPY (EGD), COMBINED N/A 1/30/2018    Procedure: COMBINED ENDOSCOPIC ULTRASOUND, ESOPHAGOSCOPY, GASTROSCOPY, DUODENOSCOPY (EGD), FINE NEEDLE ASPIRATE/BIOPSY;   EUS;  Surgeon: Carlos Fletcher MD;  Location:  GI     EYE SURGERY       INSERT PORT VASCULAR ACCESS N/A 2/12/2018    Procedure: INSERT PORT VASCULAR ACCESS;  Port-a-Cath Placement;  Surgeon: Carlos Johnson MD;  Location: [facility] OR     PHACOEMULSIFICATION WITH STANDARD INTRAOCULAR LENS IMPLANT Left 1/25/2016    Procedure: PHACOEMULSIFICATION WITH STANDARD INTRAOCULAR LENS IMPLANT;  Surgeon: Julio César Wallace MD;  Location: [facility] OR     PHACOEMULSIFICATION WITH STANDARD INTRAOCULAR LENS IMPLANT Right 2/22/2016    Procedure: PHACOEMULSIFICATION WITH STANDARD INTRAOCULAR LENS IMPLANT;  Surgeon: Julio César Wallace MD;  Location: [facility] OR     SURGICAL HISTORY OF -   1961    right hand surgery      TONSILLECTOMY & ADENOIDECTOMY  1967     VASCULAR SURGERY  02/12/2018    PortaCat       FAMILY HISTORY:  Family History   Problem Relation Age of Onset     Cancer Brother      Heart Disease Brother      Cancer Mother      Respiratory Father      Heart Disease Father      MI       SOCIAL HISTORY:  Social History     Social History     Marital status:      Spouse name: N/A     Number of children: N/A     Years of education: N/A     Social History Main Topics     Smoking status: Former Smoker     Packs/day: 0.50     Types: Cigarettes     Quit date: 12/13/2014     Smokeless tobacco: Never Used     Alcohol use No     Drug use: No     Sexual activity: Not Currently     Other Topics Concern     Parent/Sibling W/ Cabg, Mi Or  Angioplasty Before 65f 55m? Yes     Social History Narrative       Review of Systems:  Skin:  Negative       Eyes:  Negative      ENT:  Positive for hoarseness    Respiratory:  Positive for dyspnea on exertion     Cardiovascular:    Positive for;lower extremity symptoms;edema;lightheadedness    Gastroenterology: Positive for abdominal pain    Genitourinary:  Negative      Musculoskeletal:  Positive for back pain    Neurologic:  Positive for numbness or tingling of feet;numbness or tingling of hands;headaches    Psychiatric:  Positive for anxiety;depression    Heme/Lymph/Imm:  Negative      Endocrine:  Negative        Physical Exam:  Vitals: /77 (BP Location: Right arm, Patient Position: Sitting, Cuff Size: Adult Regular)  Pulse 83  Wt 77.8 kg (171 lb 9.6 oz)  SpO2 95%  BMI 31.39 kg/m2    Constitutional:  cooperative, alert and oriented, well developed, well nourished, in no acute distress        Skin:  warm and dry to the touch          Head:  normocephalic        Eyes:  sclera white        Lymph:      ENT:  no pallor or cyanosis        Neck:           Respiratory:  clear to auscultation         Cardiac: regular rhythm, normal S1/S2, no S3 or S4, apical impulse not displaced, no murmurs, gallops or rubs                pulses full and equal                                        GI:  not assessed this visit        Extremities and Muscular Skeletal:  no edema              Neurological:  affect appropriate        Psych:  Alert and Oriented x 3        CC  No referring provider defined for this encounter.                Thank you for allowing me to participate in the care of your patient.      Sincerely,     ALEXSANDRA Balderas [facility]    cc:   No referring provider defined for this encounter.

## 2018-12-06 NOTE — PATIENT INSTRUCTIONS
Thank you for your [facility] visit today. Your provider has recommended the following:  Medication Changes:  No changes   Recommendations:  1. Call if any questions   Follow-up:  1. Fasting lab work at [facility] when you can   2. See Dr. Neri for cardiology follow up in 1 year Dec 2019- call in Sept to schedule.  Call [phone] two months prior to request date to schedule any future appointments.  Reminder:  1. Please bring in all current medications, over the counter supplements and vitamin bottles to your next appointment.               [facility]  [facility]~[address]~[place], [place]~[address]  Questions about your visit today?   Call your Cardiology Clinic RN's-Neelam Han and/or Lou Boogie at [phone].

## 2018-12-06 NOTE — TELEPHONE ENCOUNTER
Please let Pt know that we will just perform repeat echo in 1 year, prior to Dr. Neri's f/u.     Aliyah Kaufman, APRN, CNP    ADDENDUM: Disc with patient. She will still get fasting labs done at her convenience. Lou Boogie RN Cardiology December 6, 2018, 3:46 PM

## 2018-12-06 NOTE — LETTER
12/6/2018    MICHAEL Perez MD  [address] [facility] [address]    RE: Ines Pickard       Dear Colleague,    I had the pleasure of seeing Ines Pickard in the [facility].    Cardiology Clinic Progress Note  Ines Pickard MRN# [id number]   YOB: 1940 Age: 78 year old     Reason For Visit:  6-month follow-up    Primary Cardiologist:   Dr. Neri          History of Presenting Illness:    Ines Pickard is a pleasant 78 year old patient with a past cardiac history significant for CAD with stenting to LCx 2014, hypertension, and hyperlipidemia.  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    Pt was last seen by Ana Noyola on 6/4/2018 for hospital follow-up.  She was admitted for unstable angina felt as chest pressure radiating to her throat.  Her stress test was abnormal and medical management was recommended.  She  started isosorbide mononitrate and at follow-up had not had any further chest discomfort.  However, she had complaints of headache since starting the medication and it was decreased to 15 mg daily.  Of note, she follows with oncology and had undergone palliative chemotherapy in March 2018.  On 6/11/2018 she called with reports of low blood pressure and Imdur was discontinued.    Pt presents today for annual follow-up.  She has not had her fasting lab work done in over a year and is agreeable to having this done.  She would like to check to see if she can have it drawn with her oncology lab work coming up.  If not, she will schedule this at [facility].  She has not had any further chest discomfort concerning for angina, since discontinuing Imdur.  She does occasionally have a chest twinge that lasts for only a second and resolves spontaneously.  Her blood pressure is well-controlled today.  She has been tolerating  her cardiac medications without any problem.  She has completed 8 months of monthly chemotherapy and has undergone radiation therapy for her lung cancer.  Follow-up PET scan showed no further cancer at that time.  She does have some follow-up lab work and CT scan coming up.  She did have an echocardiogram in May which showed normal LVEF.  She however had one more round of chemotherapy after that echocardiogram.  When it was nice out, she was able to do some routine walking and is able to go 3-4 blocks at a steady pace, without needing to stop and rest.  During the winter months she does not get any routine exercise.  She continues with chronic stable BARNES which is unchanged.  Patient reports no chest pain, PND, orthopnea, presyncope, syncope, edema, heart racing, or palpitations.      Current Cardiac Medications   Aspirin 81 mg daily  Atorvastatin 40 mg daily  Lisinopril 5 mg daily  Metoprolol XL 50 mg daily  Nitro as needed                   Assessment and Plan:     Plan  1.  Lipid profile and ALT  2.  Follow-up with Dr. Neri in 1 year with echo prior (history of chemo)      1. CAD    stenting to the LCx 2014 with residual 70% ostial RPDA and 90% proximal D1    Positive stress test 6/2018 with medical management recommended     No angina (prior angina chest pain and left arm pain radiating to neck and jaw)     continue statin, aspirin, ACE inhibitor, beta-blocker    Headaches on Imdur 30 mg daily    Consider restarting Imdur 15 mg daily or adding Ranexa, if further chest discomfort      2. Hypertension    controlled    continue lisinopril, metoprolol      3. Hyperlipidemia     last LDL 48 on 3/2017     Continue atorvastatin 40 mg daily         Thank you for allowing me to participate in this delightful patient's care.      This note was completed in part using Dragon voice recognition software. Although reviewed after completion, some word and grammatical errors may occur.    ALEXSANDRA Balderas,  CNP           Data:   All laboratory data reviewed      HPI and Plan:   See dictation    Orders Placed This Encounter   Procedures     Lipid Profile     ALT     Follow-Up with Cardiologist       No orders of the defined types were placed in this encounter.      There are no discontinued medications.      Encounter Diagnoses   Name Primary?     Hyperlipidemia LDL goal <70      Coronary artery disease involving native coronary artery of native heart without angina pectoris Yes     Benign essential hypertension        CURRENT MEDICATIONS:  Current Outpatient Prescriptions   Medication Sig Dispense Refill     Acetaminophen (TYLENOL PO) Take 1,000 mg by mouth every 6 hours as needed       albuterol (PROAIR HFA/PROVENTIL HFA/VENTOLIN HFA) 108 (90 BASE) MCG/ACT Inhaler Inhale 2 puffs into the lungs 4 times daily 1 Inhaler 2     aspirin EC 81 MG EC tablet Take 1 tablet (81 mg) by mouth daily 90 tablet 3     atorvastatin (LIPITOR) 40 MG tablet Take 1 tablet (40 mg) by mouth daily 90 tablet 3     lisinopril (PRINIVIL/ZESTRIL) 5 MG tablet Take 1 tablet (5 mg) by mouth daily 90 tablet 2     LORazepam (ATIVAN) 0.5 MG tablet Take 1 tablet (0.5 mg) by mouth 2 times daily as needed for anxiety 30 tablet 5     metoprolol succinate (TOPROL-XL) 50 MG 24 hr tablet Take 1 tablet (50 mg) by mouth daily 90 tablet 1     Multiple Vitamins-Minerals (MULTIVITAMIN ADULT) TABS Take 1 tablet by mouth daily        nitroglycerin (NITROSTAT) 0.4 MG SL tablet Place 1 tablet (0.4 mg) under the tongue every 5 minutes as needed for chest pain Maximum of 3 doses in 15 minutes 25 tablet 3     ranitidine (ZANTAC) 75 MG tablet Take 1 tablet (75 mg) by mouth 2 times daily 30 tablet 11       ALLERGIES     Allergies   Allergen Reactions     Amoxicillin GI Disturbance     Tetracycline Other (See Comments)     Yeast infection     Nickel Rash       PAST MEDICAL HISTORY:  Past Medical History:   Diagnosis Date     Anemia due to blood loss, acute 12/30/2014      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     Coronary artery disease 12/2014    Inferior STEMI, stent to Circumflex     Percutaneous transluminal coronary angioplasty hematoma 12/15/2014       PAST SURGICAL HISTORY:  Past Surgical History:   Procedure Laterality Date     APPENDECTOMY  1956     ESOPHAGOSCOPY, GASTROSCOPY, DUODENOSCOPY (EGD), COMBINED N/A 1/30/2018    Procedure: COMBINED ENDOSCOPIC ULTRASOUND, ESOPHAGOSCOPY, GASTROSCOPY, DUODENOSCOPY (EGD), FINE NEEDLE ASPIRATE/BIOPSY;   EUS;  Surgeon: Carlos Fletcher MD;  Location:  GI     EYE SURGERY       INSERT PORT VASCULAR ACCESS N/A 2/12/2018    Procedure: INSERT PORT VASCULAR ACCESS;  Port-a-Cath Placement;  Surgeon: Carlos Johnson MD;  Location: [facility] OR     PHACOEMULSIFICATION WITH STANDARD INTRAOCULAR LENS IMPLANT Left 1/25/2016    Procedure: PHACOEMULSIFICATION WITH STANDARD INTRAOCULAR LENS IMPLANT;  Surgeon: Julio César Wallace MD;  Location: [facility] OR     PHACOEMULSIFICATION WITH STANDARD INTRAOCULAR LENS IMPLANT Right 2/22/2016    Procedure: PHACOEMULSIFICATION WITH STANDARD INTRAOCULAR LENS IMPLANT;  Surgeon: Julio César Wallace MD;  Location: [facility] OR     SURGICAL HISTORY OF -   1961    right hand surgery      TONSILLECTOMY & ADENOIDECTOMY  1967     VASCULAR SURGERY  02/12/2018    PortaCat       FAMILY HISTORY:  Family History   Problem Relation Age of Onset     Cancer Brother      Heart Disease Brother      Cancer Mother      Respiratory Father      Heart Disease Father      MI       SOCIAL HISTORY:  Social History     Social History     Marital status:      Spouse name: N/A     Number of children: N/A     Years of education: N/A     Social History Main Topics     Smoking status: Former Smoker     Packs/day: 0.50     Types: Cigarettes     Quit date: 12/13/2014     Smokeless tobacco: Never Used     Alcohol use No     Drug use: No     Sexual activity: Not Currently     Other Topics Concern     Parent/Sibling W/ Cabg, Mi Or  Angioplasty Before 65f 55m? Yes     Social History Narrative       Review of Systems:  Skin:  Negative       Eyes:  Negative      ENT:  Positive for hoarseness    Respiratory:  Positive for dyspnea on exertion     Cardiovascular:    Positive for;lower extremity symptoms;edema;lightheadedness    Gastroenterology: Positive for abdominal pain    Genitourinary:  Negative      Musculoskeletal:  Positive for back pain    Neurologic:  Positive for numbness or tingling of feet;numbness or tingling of hands;headaches    Psychiatric:  Positive for anxiety;depression    Heme/Lymph/Imm:  Negative      Endocrine:  Negative        Physical Exam:  Vitals: /77 (BP Location: Right arm, Patient Position: Sitting, Cuff Size: Adult Regular)  Pulse 83  Wt 77.8 kg (171 lb 9.6 oz)  SpO2 95%  BMI 31.39 kg/m2    Constitutional:  cooperative, alert and oriented, well developed, well nourished, in no acute distress        Skin:  warm and dry to the touch          Head:  normocephalic        Eyes:  sclera white        Lymph:      ENT:  no pallor or cyanosis        Neck:           Respiratory:  clear to auscultation         Cardiac: regular rhythm, normal S1/S2, no S3 or S4, apical impulse not displaced, no murmurs, gallops or rubs                pulses full and equal                                        GI:  not assessed this visit        Extremities and Muscular Skeletal:  no edema              Neurological:  affect appropriate        Psych:  Alert and Oriented x 3          Thank you for allowing me to participate in the care of your patient.    Sincerely,     ALEXSANDRA Balderas [facility]

## 2018-12-10 NOTE — TELEPHONE ENCOUNTER
Routing refill request to provider for review/approval because:  Associated dx:  Acute bronchitis with symptoms > 10 days    Maria Ines BALES RN

## 2018-12-10 NOTE — TELEPHONE ENCOUNTER
"Requested Prescriptions   Pending Prescriptions Disp Refills     albuterol (PROAIR HFA/PROVENTIL HFA/VENTOLIN HFA) 108 (90 Base) MCG/ACT inhaler  Last Written Prescription Date:  3/30/2018  Last Fill Quantity: 1 inhaler,  # refills: 2   Last office visit: 11/19/2018 with prescribing provider:  Post   Future Office Visit:   Next 5 appointments (look out 90 days)    Dec 13, 2018  2:15 PM CST  Return Visit with Tracy Miner MD  [facility]  [address]  [facility] [place] [address]  [phone]          1 Inhaler 2     Sig: Inhale 2 puffs into the lungs 4 times daily    Asthma Maintenance Inhalers - Anticholinergics Passed - 12/10/2018 10:26 AM       Passed - Patient is age 12 years or older       Passed - Recent (12 mo) or future (30 days) visit within the authorizing provider's specialty    Patient had office visit in the last 12 months or has a visit in the next 30 days with authorizing provider or within the authorizing provider's specialty.  See \"Patient Info\" tab in inbasket, or \"Choose Columns\" in Meds & Orders section of the refill encounter.                "

## 2018-12-12 NOTE — PROGRESS NOTES
Infusion Nursing Note:  Ines Pickard presents today for Portacath access for CT scan, lab draw.    Patient seen by provider today: No   present during visit today: Not Applicable.    Note: N/A.    Intravenous Access:  Implanted Port.    Discharge Plan:   Patient discharged in stable condition accompanied by: friend.  Departure Mode: Ambulatory.    Emmy Murphy RN

## 2018-12-13 NOTE — PROGRESS NOTES
ONCOLOGY FOLLOW UP VISIT       CHIEF COMPLAINT/REASON FOR VISIT:  Left adrenal mass, FNA 01/30/2018 consistent with sclc     HISTORY OF PRESENT ILLNESS:  A 77-year-old female presented with 5 months duration of hoarseness.  She was evaluated by ENT.  Direct laryngoscopy revealed left vocal cord paralysis.    CT chest with contrast 01/10/2018 identified a 2.3 cm left upper lobe nodule extending to the hilum, suspicious for malignancy, left hilar mediastinal adenopathy with mass-like soft tissue thickening extending along the inferior aspect of the aortic arch likely involving the recurrent laryngeal nerve in this location, left adrenal nodule 1.5 cm undetermined, compression on L1 vertebral body.      EUS 01/30/2017 FNA was done on 2 hypoechoic left adrenal mass-   Consistent with metastatic small cell carcinoma      PET confirmed the primary TEX lesion with adrenal mets. She was dx with extensive stage SCLC.     She made informed decision to proceed with palliative chemo with carbo/-16 2/2018.   She had good PET response after 3 cycles and tx is continued.   She had ongoing PET response post 6 cycles of tx. With minimal AP window LN SUV 3.5.  She had consolidation RT after to her thoracic area till mid July 2018.   She has good PET in 9/2018.      PAST MEDICAL HISTORY:  CAD, stent around 2014, Angina attack in 5/2018 was at [facility]. Reflux, hypertension, cataracts, hyperlipidemia.      MEDICATIONS:  Reviewed in Epic system.      SOCIAL HISTORY:  She lives in her own house.  She has 3 boys.  She is here with her neighbor.  She quit smoking years back.  Denies alcohol abuse.      FAMILY HISTORY:  Positive for mom who had Hodgkin lymphoma.  Brother had unknown type of cancer.      REVIEW OF SYSTEMS:   Throat discomfort seems better.     Eating good, gaining weight. Still has a little anxiety.       PHYSICAL EXAMINATION:   VITAL SIGNS:Blood pressure 139/67, pulse 76, temperature 98.4  F (36.9  C), temperature  "source Tympanic, resp. rate 16, height 1.575 m (5' 2\"), weight 79.1 kg (174 lb 6.4 oz), SpO2 92 %, not currently breastfeeding.        ECOG 0    GENERAL APPEARANCE:  Elderly lady, looks like her stated age, not in acute distress.   HEENT: The patient is normocephalic, atraumatic. Pupils are equally reactive to light.  Sclerae are anicteric.  Moist oral mucosa.  negative posterior pharynx.   NECK:  Supple.  No jugular venous distention.  Thyroid is not palpable.   LYMPH NODES:  Superficial lymphadenopathy is not appreciable in the bilateral cervical, supraclavicular, axillary or inguinal areas.   CARDIOVASCULAR:  S1, S2 regular with no murmurs or gallops.  No carotid or abdominal bruits.   PULMONARY:  Lungs are clear to auscultation and percussion bilaterally.  There is no wheezing or rhonchi.   GASTROINTESTINAL:  Abdomen is soft, nontender.  No hepatosplenomegaly.  No signs of ascites.  No mass appreciable.   MUSCULOSKELETAL/EXTREMITIES:  No edema.  No cyanotic changes.  No signs of joint deformity.  No lymphedema.  No spinal or paraspinal tenderness.  No CVA tenderness.   NEUROLOGIC:  Cranial nerves II-XII are grossly intact.  Sensation intact.  Muscle strength and muscle tone symmetrical, 5/5 throughout.   SKIN:  No petechiae.  No rash.  No signs of cellulitis.      LABORATORY DATA REVIEWED:   wbc diff and CMP are fine. Hb 11.6,  B12/folate nl.     CEA at 7 in 12/2018,baseline at 6.6 in 2/2017    From 01/30/2018 FNA on left adrenal mass biopsy -- Consistent with metastatic small cell carcinoma      CURRENT IMAGE DATA REVIEWED:   CT body 12/2018   1.  The left upper lobe lung lesion is smaller.  2. There is a small developing left adrenal mass which is of concern for recurrent metastatic disease.    PET 9/2018   1. A 1.5 x 1.3 cm nodular opacity near the left lung apex medially (series 3 image 106) has is not significantly changed in size or hypermetabolic activity, with an SUV max of 2.7.  2. Mildly prominent " hypermetabolic lymph node in the AP  window region (series 3 image 129) is unchanged, measuring 1.9 x 0.9 cm, SUV max 3.4. Not changed.   3. Trace left pleural effusion is new.       PET 6/2018 post 6 cycles of carbo/vp16  1. Medial left upper lobe nodule is 1.6 x 1.2 cm, stable in size, series 3 image 112 (SUV max 2.7, previously 3.9).   2. Previously noted AP window hypermetabolism has decreased and now has SUV max 3.5, previously 4.8.    PET 4/2018 post 3 cycles of carbo/vp16  1. Decrease in size and FDG uptake in the left upper lobe nodule consistent with improved lung cancer.  2. Improvement in the previous left hilar and mediastinal adenopathy, currently there is small residual hypermetabolic adenopathy in the aorticopulmonary window consistent with residual mayela metastasis.  3. Resolution of previous hypermetabolic left adrenal nodule consistent with resolved metastasis.    PET 2/2018  1. Hypermetabolic left upper lobe nodule is consistent with malignancy, most likely bronchogenic carcinoma.  2. Hypermetabolic left hilar and mediastinal adenopathy is consistent  with metastatic disease.  3. Hypermetabolic left adrenal nodule is suspicious for metastatic disease.  4. There is a new 3.4 cm high-density structure abutting the left adrenal gland, suspicious for interval development of adrenal hemorrhage.    CT chest 01/2018 -  identified a 2.3 cm left upper lobe nodule extending to the hilum, suspicious for malignancy, left hilar mediastinal adenopathy with mass-like soft tissue thickening extending along the inferior aspect of the aortic arch likely involving the recurrent laryngeal nerve in this location, left adrenal nodule 1.5 cm undetermined, compression on L1 vertebral body.         OLD DATA REVIEW IN SUMMARY:    Chest x-ray 11/2017, no abnormalities identified.    In 2014, CBC indicating white count 17, hemoglobin 11 and platelets normal.  Differential indicating neutrophilia when she had a heart attack.         ASSESSMENT AND PLAN:    1.  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     She made informed decision to proceed in 2/2018 with  Carbo,  -16, D1-3 q 3 wks with neulasta support.   She has good PET response after 3 cycles.   She had ongoing PET response post 6 cycles of tx. With minimal AP window LN SUV 3.5.    She had consolidation RT to chest till mid July 2018. She has good PET in 9/2018.       She is informed tx is palliative in nature.   She is informed high recurrence rate with stage IV SCLC after nCR from frontline chemo.     2. normocytic anemia - nl b12/folate. This is likely from chemo.   We discussed the role of diet.       3. Anxiety. She feels ativan helps. She is advised on the addictive effect of it. She is advised on limiting the use.       4. CT 12/2018 found enlarging left adrenal gland - recommend PET restaging.

## 2018-12-13 NOTE — NURSING NOTE
"Oncology Rooming Note    December 13, 2018 2:49 PM   Ines Pickard is a 78 year old female who presents for:    Chief Complaint   Patient presents with     Oncology Clinic Visit     3 month follow up small cell carcinoma of left lung. Review labs and CT scan results.      Initial Vitals: /67 (BP Location: Right arm, Patient Position: Sitting, Cuff Size: Adult Large)   Pulse 76   Temp 98.4  F (36.9  C) (Tympanic)   Resp 16   Ht 1.575 m (5' 2\")   Wt 79.1 kg (174 lb 6.4 oz)   SpO2 92%   Breastfeeding? No   BMI 31.90 kg/m   Estimated body mass index is 31.9 kg/m  as calculated from the following:    Height as of this encounter: 1.575 m (5' 2\").    Weight as of this encounter: 79.1 kg (174 lb 6.4 oz). Body surface area is 1.86 meters squared.  Mild Pain (3) Comment: Data Unavailable   No LMP recorded. Patient is postmenopausal.  Allergies reviewed: Yes  Medications reviewed: Yes    Medications: Medication refills not needed today.  Pharm[facility] into [facility]: [facility] [id number] [facility]    Clinical concerns: 3 month follow up small cell carcinoma of left lung. Review labs and CT scan results.     8 minutes for nursing intake (face to face time)     Leticia Mcelroy CMA            "

## 2018-12-13 NOTE — LETTER
12/13/2018         RE: Ines Pickard  [address] [facility] [address]        Dear Colleague,    Thank you for referring your patient, Ines Pickard, to the [facility]. Please see a copy of my visit note below.    ONCOLOGY FOLLOW UP VISIT       CHIEF COMPLAINT/REASON FOR VISIT:  Left adrenal mass, FNA 01/30/2018 consistent with sclc     HISTORY OF PRESENT ILLNESS:  A 77-year-old female presented with 5 months duration of hoarseness.  She was evaluated by ENT.  Direct laryngoscopy revealed left vocal cord paralysis.    CT chest with contrast 01/10/2018 identified a 2.3 cm left upper lobe nodule extending to the hilum, suspicious for malignancy, left hilar mediastinal adenopathy with mass-like soft tissue thickening extending along the inferior aspect of the aortic arch likely involving the recurrent laryngeal nerve in this location, left adrenal nodule 1.5 cm undetermined, compression on L1 vertebral body.      EUS 01/30/2017 FNA was done on 2 hypoechoic left adrenal mass-   Consistent with metastatic small cell carcinoma      PET confirmed the primary TEX lesion with adrenal mets. She was dx with extensive stage SCLC.     She made informed decision to proceed with palliative chemo with carbo/-16 2/2018.   She had good PET response after 3 cycles and tx is continued.   She had ongoing PET response post 6 cycles of tx. With minimal AP window LN SUV 3.5.  She had consolidation RT after to her thoracic area till mid July 2018.   She has good PET in 9/2018.      PAST MEDICAL HISTORY:  CAD, stent around 2014, Angina attack in 5/2018 was at [facility]. Reflux, hypertension, cataracts, hyperlipidemia.      MEDICATIONS:  Reviewed in Epic system.      SOCIAL HISTORY:  She lives in her own house.  She has 3 boys.  She is here with her neighbor.  She quit smoking years back.  Denies alcohol abuse.      FAMILY HISTORY:  Positive for mom who had Hodgkin lymphoma.  Brother had unknown type of cancer.  "     REVIEW OF SYSTEMS:   Throat discomfort seems better.     Eating good, gaining weight. Still has a little anxiety.       PHYSICAL EXAMINATION:   VITAL SIGNS:Blood pressure 139/67, pulse 76, temperature 98.4  F (36.9  C), temperature source Tympanic, resp. rate 16, height 1.575 m (5' 2\"), weight 79.1 kg (174 lb 6.4 oz), SpO2 92 %, not currently breastfeeding.        ECOG 0    GENERAL APPEARANCE:  Elderly lady, looks like her stated age, not in acute distress.   HEENT: The patient is normocephalic, atraumatic. Pupils are equally reactive to light.  Sclerae are anicteric.  Moist oral mucosa.  negative posterior pharynx.   NECK:  Supple.  No jugular venous distention.  Thyroid is not palpable.   LYMPH NODES:  Superficial lymphadenopathy is not appreciable in the bilateral cervical, supraclavicular, axillary or inguinal areas.   CARDIOVASCULAR:  S1, S2 regular with no murmurs or gallops.  No carotid or abdominal bruits.   PULMONARY:  Lungs are clear to auscultation and percussion bilaterally.  There is no wheezing or rhonchi.   GASTROINTESTINAL:  Abdomen is soft, nontender.  No hepatosplenomegaly.  No signs of ascites.  No mass appreciable.   MUSCULOSKELETAL/EXTREMITIES:  No edema.  No cyanotic changes.  No signs of joint deformity.  No lymphedema.  No spinal or paraspinal tenderness.  No CVA tenderness.   NEUROLOGIC:  Cranial nerves II-XII are grossly intact.  Sensation intact.  Muscle strength and muscle tone symmetrical, 5/5 throughout.   SKIN:  No petechiae.  No rash.  No signs of cellulitis.      LABORATORY DATA REVIEWED:   wbc diff and CMP are fine. Hb 11.6,  B12/folate nl.     CEA at 7 in 12/2018,baseline at 6.6 in 2/2017    From 01/30/2018 FNA on left adrenal mass biopsy -- Consistent with metastatic small cell carcinoma      CURRENT IMAGE DATA REVIEWED:   CT body 12/2018   1.  The left upper lobe lung lesion is smaller.  2. There is a small developing left adrenal mass which is of concern for recurrent " metastatic disease.    PET 9/2018   1. A 1.5 x 1.3 cm nodular opacity near the left lung apex medially (series 3 image 106) has is not significantly changed in size or hypermetabolic activity, with an SUV max of 2.7.  2. Mildly prominent hypermetabolic lymph node in the AP  window region (series 3 image 129) is unchanged, measuring 1.9 x 0.9 cm, SUV max 3.4. Not changed.   3. Trace left pleural effusion is new.       PET 6/2018 post 6 cycles of carbo/vp16  1. Medial left upper lobe nodule is 1.6 x 1.2 cm, stable in size, series 3 image 112 (SUV max 2.7, previously 3.9).   2. Previously noted AP window hypermetabolism has decreased and now has SUV max 3.5, previously 4.8.    PET 4/2018 post 3 cycles of carbo/vp16  1. Decrease in size and FDG uptake in the left upper lobe nodule consistent with improved lung cancer.  2. Improvement in the previous left hilar and mediastinal adenopathy, currently there is small residual hypermetabolic adenopathy in the aorticopulmonary window consistent with residual mayela metastasis.  3. Resolution of previous hypermetabolic left adrenal nodule consistent with resolved metastasis.    PET 2/2018  1. Hypermetabolic left upper lobe nodule is consistent with malignancy, most likely bronchogenic carcinoma.  2. Hypermetabolic left hilar and mediastinal adenopathy is consistent  with metastatic disease.  3. Hypermetabolic left adrenal nodule is suspicious for metastatic disease.  4. There is a new 3.4 cm high-density structure abutting the left adrenal gland, suspicious for interval development of adrenal hemorrhage.    CT chest 01/2018 -  identified a 2.3 cm left upper lobe nodule extending to the hilum, suspicious for malignancy, left hilar mediastinal adenopathy with mass-like soft tissue thickening extending along the inferior aspect of the aortic arch likely involving the recurrent laryngeal nerve in this location, left adrenal nodule 1.5 cm undetermined, compression on L1 vertebral  body.         OLD DATA REVIEW IN SUMMARY:    Chest x-ray 11/2017, no abnormalities identified.    In 2014, CBC indicating white count 17, hemoglobin 11 and platelets normal.  Differential indicating neutrophilia when she had a heart attack.        ASSESSMENT AND PLAN:    1.  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     She made informed decision to proceed in 2/2018 with  Carbo,  -16, D1-3 q 3 wks with neulasta support.   She has good PET response after 3 cycles.   She had ongoing PET response post 6 cycles of tx. With minimal AP window LN SUV 3.5.    She had consolidation RT to chest till mid July 2018. She has good PET in 9/2018.       She is informed tx is palliative in nature.   She is informed high recurrence rate with stage IV SCLC after nCR from frontline chemo.     2. normocytic anemia - nl b12/folate. This is likely from chemo.   We discussed the role of diet.       3. Anxiety. She feels ativan helps. She is advised on the addictive effect of it. She is advised on limiting the use.       4. CT 12/2018 found enlarging left adrenal gland - recommend PET restaging.                   Again, thank you for allowing me to participate in the care of your patient.        Sincerely,        Tracy Miner MD, MD

## 2018-12-13 NOTE — PATIENT INSTRUCTIONS
Dr. Miner would like you to get a PET for restaging    We would like to see you back in after PET for a follow up appointment.     When you are in need of a refill, please call your pharmacy and they will send us a request.      Copy of appointments, and after visit summary (AVS) given to patient.      If you have any questions please call Bella Arvizu RN, BSN Oncology Hematology  [facility] [phone]. For questions after business hours, or on holidays/weekends, please call our after hours Nurse Triage line [phone]. Thank you.     Restaging PET to be done. F/u after.

## 2018-12-26 NOTE — TELEPHONE ENCOUNTER
"Requested Prescriptions   Pending Prescriptions Disp Refills     lisinopril (PRINIVIL/ZESTRIL) 5 MG tablet  Last Written Prescription Date:  03/30/2018  Last Fill Quantity: 90,  # refills: 2   Last office visit: 11/19/2018 with prescribing provider:  post   Future Office Visit:   Next 5 appointments (look out 90 days)    Jan 08, 2019 11:45 AM CST  Return Visit with Tracy Miner MD  [facility] ([facility]  [address]  [facility] [address]  [phone]          90 tablet 2     Sig: Take 1 tablet (5 mg) by mouth daily    ACE Inhibitors (Including Combos) Protocol Passed - 12/26/2018  3:08 PM       Passed - Blood pressure under 140/90 in past 12 months    BP Readings from Last 3 Encounters:   12/13/18 139/67   12/06/18 121/77   11/19/18 112/64                Passed - Recent (12 mo) or future (30 days) visit within the authorizing provider's specialty    Patient had office visit in the last 12 months or has a visit in the next 30 days with authorizing provider or within the authorizing provider's specialty.  See \"Patient Info\" tab in inbasket, or \"Choose Columns\" in Meds & Orders section of the refill encounter.             Passed - Patient is age 18 or older       Passed - No active pregnancy on record       Passed - Normal serum creatinine on file in past 12 months    Recent Labs   Lab Test 12/12/18  1030  01/10/18  1447   CR 0.59   < >  --    CREAT  --   --  0.5*    < > = values in this interval not displayed.            Passed - Normal serum potassium on file in past 12 months    Recent Labs   Lab Test 12/12/18  1030   POTASSIUM 4.0            Passed - No positive pregnancy test in past 12 months        Bam Bowser RT (R)    "

## 2018-12-28 NOTE — TELEPHONE ENCOUNTER
Call-from Neelam Rodriguez [phone] PET financial securing for PET. Diagnosis code C34.92 is no longer a covered diagnosis in 2019. Small Cell Carcinoma of Left Lung. So they need a new diagnosis to get the PET approved. She is scheduled for this on 1/2/2018. I will check to see if Dr. Miner has a different diagnosis to try and approve.

## 2019-01-01 ENCOUNTER — OFFICE VISIT (OUTPATIENT)
Dept: RADIATION THERAPY | Facility: OUTPATIENT CENTER | Age: 79
End: 2019-01-01
Payer: MEDICARE

## 2019-01-01 ENCOUNTER — HOSPITAL ENCOUNTER (OUTPATIENT)
Dept: PET IMAGING | Facility: CLINIC | Age: 79
Discharge: HOME | End: 2019-05-01
Attending: INTERNAL MEDICINE | Admitting: INTERNAL MEDICINE
Payer: MEDICARE

## 2019-01-01 ENCOUNTER — ONCOLOGY VISIT (OUTPATIENT)
Dept: ONCOLOGY | Facility: CLINIC | Age: 79
End: 2019-01-01
Attending: INTERNAL MEDICINE
Payer: MEDICARE

## 2019-01-01 ENCOUNTER — HOSPITAL ENCOUNTER (OUTPATIENT)
Facility: CLINIC | Age: 79
Discharge: HOME | End: 2019-04-22
Attending: INTERNAL MEDICINE | Admitting: INTERNAL MEDICINE
Payer: MEDICARE

## 2019-01-01 ENCOUNTER — TELEPHONE (OUTPATIENT)
Dept: FAMILY MEDICINE | Facility: CLINIC | Age: 79
End: 2019-01-01

## 2019-01-01 ENCOUNTER — TELEPHONE (OUTPATIENT)
Dept: OTHER | Facility: CLINIC | Age: 79
End: 2019-01-01

## 2019-01-01 ENCOUNTER — PATIENT OUTREACH (OUTPATIENT)
Dept: ONCOLOGY | Facility: CLINIC | Age: 79
End: 2019-01-01

## 2019-01-01 ENCOUNTER — TELEPHONE (OUTPATIENT)
Dept: RADIATION THERAPY | Facility: OUTPATIENT CENTER | Age: 79
End: 2019-01-01

## 2019-01-01 ENCOUNTER — APPOINTMENT (OUTPATIENT)
Dept: RADIATION THERAPY | Facility: OUTPATIENT CENTER | Age: 79
End: 2019-01-01
Payer: MEDICARE

## 2019-01-01 ENCOUNTER — HOSPITAL ENCOUNTER (OUTPATIENT)
Dept: CARDIOLOGY | Facility: CLINIC | Age: 79
Discharge: HOME | End: 2019-01-16
Attending: INTERNAL MEDICINE | Admitting: INTERNAL MEDICINE
Payer: MEDICARE

## 2019-01-01 ENCOUNTER — DOCUMENTATION ONLY (OUTPATIENT)
Dept: RADIATION THERAPY | Facility: OUTPATIENT CENTER | Age: 79
End: 2019-01-01

## 2019-01-01 ENCOUNTER — APPOINTMENT (OUTPATIENT)
Dept: PHYSICAL THERAPY | Facility: CLINIC | Age: 79
DRG: 871 | End: 2019-01-01
Payer: MEDICARE

## 2019-01-01 ENCOUNTER — APPOINTMENT (OUTPATIENT)
Dept: GENERAL RADIOLOGY | Facility: CLINIC | Age: 79
DRG: 871 | End: 2019-01-01
Attending: EMERGENCY MEDICINE
Payer: MEDICARE

## 2019-01-01 ENCOUNTER — APPOINTMENT (OUTPATIENT)
Dept: OCCUPATIONAL THERAPY | Facility: CLINIC | Age: 79
DRG: 871 | End: 2019-01-01
Payer: MEDICARE

## 2019-01-01 ENCOUNTER — HOSPITAL ENCOUNTER (INPATIENT)
Facility: CLINIC | Age: 79
LOS: 3 days | Discharge: NURSING, REHAB OR LONG-TERM CARE | DRG: 871 | End: 2019-06-03
Attending: EMERGENCY MEDICINE | Admitting: FAMILY MEDICINE
Payer: MEDICARE

## 2019-01-01 ENCOUNTER — APPOINTMENT (OUTPATIENT)
Dept: CT IMAGING | Facility: CLINIC | Age: 79
DRG: 871 | End: 2019-01-01
Attending: EMERGENCY MEDICINE
Payer: MEDICARE

## 2019-01-01 ENCOUNTER — NURSING HOME VISIT (OUTPATIENT)
Dept: GERIATRICS | Facility: CLINIC | Age: 79
End: 2019-01-01
Payer: MEDICARE

## 2019-01-01 ENCOUNTER — INFUSION THERAPY VISIT (OUTPATIENT)
Dept: INFUSION THERAPY | Facility: CLINIC | Age: 79
End: 2019-01-01
Attending: INTERNAL MEDICINE
Payer: MEDICARE

## 2019-01-01 ENCOUNTER — DOCUMENTATION ONLY (OUTPATIENT)
Dept: FAMILY MEDICINE | Facility: CLINIC | Age: 79
End: 2019-01-01

## 2019-01-01 ENCOUNTER — OFFICE VISIT (OUTPATIENT)
Dept: CARDIOLOGY | Facility: CLINIC | Age: 79
End: 2019-01-01
Payer: MEDICARE

## 2019-01-01 ENCOUNTER — HOSPITAL ENCOUNTER (OUTPATIENT)
Dept: MRI IMAGING | Facility: CLINIC | Age: 79
Discharge: HOME | End: 2019-05-07
Attending: INTERNAL MEDICINE | Admitting: INTERNAL MEDICINE
Payer: MEDICARE

## 2019-01-01 ENCOUNTER — HOSPITAL ENCOUNTER (OUTPATIENT)
Dept: PET IMAGING | Facility: CLINIC | Age: 79
Discharge: HOME | End: 2019-01-02
Attending: INTERNAL MEDICINE | Admitting: INTERNAL MEDICINE
Payer: MEDICARE

## 2019-01-01 ENCOUNTER — PATIENT OUTREACH (OUTPATIENT)
Dept: CARE COORDINATION | Facility: CLINIC | Age: 79
End: 2019-01-01

## 2019-01-01 VITALS
DIASTOLIC BLOOD PRESSURE: 71 MMHG | WEIGHT: 173 LBS | HEART RATE: 85 BPM | SYSTOLIC BLOOD PRESSURE: 127 MMHG | BODY MASS INDEX: 31.64 KG/M2 | OXYGEN SATURATION: 96 %

## 2019-01-01 VITALS
SYSTOLIC BLOOD PRESSURE: 116 MMHG | OXYGEN SATURATION: 93 % | DIASTOLIC BLOOD PRESSURE: 76 MMHG | HEART RATE: 83 BPM | WEIGHT: 172 LBS | RESPIRATION RATE: 18 BRPM | BODY MASS INDEX: 31.46 KG/M2

## 2019-01-01 VITALS
DIASTOLIC BLOOD PRESSURE: 73 MMHG | HEART RATE: 99 BPM | BODY MASS INDEX: 30.76 KG/M2 | SYSTOLIC BLOOD PRESSURE: 122 MMHG | WEIGHT: 168.2 LBS

## 2019-01-01 VITALS
DIASTOLIC BLOOD PRESSURE: 65 MMHG | SYSTOLIC BLOOD PRESSURE: 130 MMHG | TEMPERATURE: 97.6 F | HEART RATE: 111 BPM | OXYGEN SATURATION: 94 % | HEIGHT: 62 IN | WEIGHT: 160.3 LBS | RESPIRATION RATE: 18 BRPM | BODY MASS INDEX: 29.5 KG/M2

## 2019-01-01 VITALS
SYSTOLIC BLOOD PRESSURE: 119 MMHG | DIASTOLIC BLOOD PRESSURE: 80 MMHG | HEART RATE: 95 BPM | RESPIRATION RATE: 16 BRPM | TEMPERATURE: 98.9 F | OXYGEN SATURATION: 94 %

## 2019-01-01 VITALS
SYSTOLIC BLOOD PRESSURE: 109 MMHG | BODY MASS INDEX: 31.17 KG/M2 | DIASTOLIC BLOOD PRESSURE: 73 MMHG | OXYGEN SATURATION: 96 % | HEART RATE: 81 BPM | RESPIRATION RATE: 18 BRPM | WEIGHT: 170.4 LBS

## 2019-01-01 VITALS
BODY MASS INDEX: 31.93 KG/M2 | HEART RATE: 101 BPM | HEIGHT: 62 IN | OXYGEN SATURATION: 92 % | SYSTOLIC BLOOD PRESSURE: 146 MMHG | RESPIRATION RATE: 20 BRPM | TEMPERATURE: 98.5 F | WEIGHT: 173.5 LBS | DIASTOLIC BLOOD PRESSURE: 70 MMHG

## 2019-01-01 VITALS
DIASTOLIC BLOOD PRESSURE: 72 MMHG | BODY MASS INDEX: 24.46 KG/M2 | TEMPERATURE: 98.9 F | SYSTOLIC BLOOD PRESSURE: 118 MMHG | OXYGEN SATURATION: 97 % | WEIGHT: 147 LBS | HEART RATE: 98 BPM | RESPIRATION RATE: 16 BRPM

## 2019-01-01 VITALS
BODY MASS INDEX: 25.16 KG/M2 | DIASTOLIC BLOOD PRESSURE: 73 MMHG | RESPIRATION RATE: 18 BRPM | OXYGEN SATURATION: 93 % | WEIGHT: 151.01 LBS | HEIGHT: 65 IN | HEART RATE: 77 BPM | TEMPERATURE: 97.8 F | SYSTOLIC BLOOD PRESSURE: 140 MMHG

## 2019-01-01 VITALS
SYSTOLIC BLOOD PRESSURE: 121 MMHG | HEART RATE: 111 BPM | OXYGEN SATURATION: 92 % | DIASTOLIC BLOOD PRESSURE: 78 MMHG | RESPIRATION RATE: 18 BRPM

## 2019-01-01 VITALS
DIASTOLIC BLOOD PRESSURE: 73 MMHG | BODY MASS INDEX: 27.83 KG/M2 | SYSTOLIC BLOOD PRESSURE: 120 MMHG | WEIGHT: 152.2 LBS | HEART RATE: 74 BPM

## 2019-01-01 VITALS — SYSTOLIC BLOOD PRESSURE: 112 MMHG | HEART RATE: 128 BPM | DIASTOLIC BLOOD PRESSURE: 77 MMHG | TEMPERATURE: 98.3 F

## 2019-01-01 VITALS
BODY MASS INDEX: 28.45 KG/M2 | SYSTOLIC BLOOD PRESSURE: 118 MMHG | HEART RATE: 101 BPM | RESPIRATION RATE: 20 BRPM | OXYGEN SATURATION: 94 % | DIASTOLIC BLOOD PRESSURE: 71 MMHG | WEIGHT: 155.6 LBS

## 2019-01-01 VITALS
SYSTOLIC BLOOD PRESSURE: 116 MMHG | RESPIRATION RATE: 16 BRPM | DIASTOLIC BLOOD PRESSURE: 64 MMHG | HEART RATE: 77 BPM | BODY MASS INDEX: 31.38 KG/M2 | HEIGHT: 62 IN | TEMPERATURE: 99 F | OXYGEN SATURATION: 92 % | WEIGHT: 170.5 LBS

## 2019-01-01 VITALS
TEMPERATURE: 98.9 F | DIASTOLIC BLOOD PRESSURE: 72 MMHG | BODY MASS INDEX: 24.46 KG/M2 | OXYGEN SATURATION: 95 % | SYSTOLIC BLOOD PRESSURE: 118 MMHG | RESPIRATION RATE: 16 BRPM | WEIGHT: 147 LBS | HEART RATE: 98 BPM

## 2019-01-01 DIAGNOSIS — C34.92 SMALL CELL CARCINOMA OF LEFT LUNG (H): Primary | ICD-10-CM

## 2019-01-01 DIAGNOSIS — C34.32 MALIGNANT NEOPLASM OF LOWER LOBE, LEFT BRONCHUS OR LUNG (H): ICD-10-CM

## 2019-01-01 DIAGNOSIS — Z53.9 DIAGNOSIS NOT YET DEFINED: Primary | ICD-10-CM

## 2019-01-01 DIAGNOSIS — M62.81 MUSCLE WEAKNESS (GENERALIZED): ICD-10-CM

## 2019-01-01 DIAGNOSIS — E43 SEVERE PROTEIN-CALORIE MALNUTRITION (H): ICD-10-CM

## 2019-01-01 DIAGNOSIS — I71.20 THORACIC AORTIC ANEURYSM WITHOUT RUPTURE (H): ICD-10-CM

## 2019-01-01 DIAGNOSIS — E78.5 HYPERLIPIDEMIA LDL GOAL <70: ICD-10-CM

## 2019-01-01 DIAGNOSIS — R53.1 WEAKNESS: ICD-10-CM

## 2019-01-01 DIAGNOSIS — D64.9 ANEMIA, UNSPECIFIED TYPE: ICD-10-CM

## 2019-01-01 DIAGNOSIS — F41.9 ANXIETY: ICD-10-CM

## 2019-01-01 DIAGNOSIS — N39.0 ACUTE UTI: Primary | ICD-10-CM

## 2019-01-01 DIAGNOSIS — M54.9 BACK PAIN, UNSPECIFIED BACK LOCATION, UNSPECIFIED BACK PAIN LATERALITY, UNSPECIFIED CHRONICITY: Primary | ICD-10-CM

## 2019-01-01 DIAGNOSIS — R93.5 ABNORMAL CT OF THE ABDOMEN: ICD-10-CM

## 2019-01-01 DIAGNOSIS — M43.9 COMPRESSION DEFORMITY OF VERTEBRA: ICD-10-CM

## 2019-01-01 DIAGNOSIS — R41.89 COGNITIVE IMPAIRMENT: ICD-10-CM

## 2019-01-01 DIAGNOSIS — E46 PROTEIN-CALORIE MALNUTRITION, UNSPECIFIED SEVERITY (H): ICD-10-CM

## 2019-01-01 DIAGNOSIS — M62.81 GENERALIZED MUSCLE WEAKNESS: ICD-10-CM

## 2019-01-01 DIAGNOSIS — I77.810 ASCENDING AORTA DILATATION (H): ICD-10-CM

## 2019-01-01 DIAGNOSIS — R59.9 ENLARGED GLANDS: ICD-10-CM

## 2019-01-01 DIAGNOSIS — E86.0 DEHYDRATION: ICD-10-CM

## 2019-01-01 DIAGNOSIS — R11.0 NAUSEA: ICD-10-CM

## 2019-01-01 DIAGNOSIS — C34.32 MALIGNANT NEOPLASM OF LOWER LOBE, LEFT BRONCHUS OR LUNG (H): Primary | ICD-10-CM

## 2019-01-01 DIAGNOSIS — R90.89 ABNORMAL BRAIN MRI: ICD-10-CM

## 2019-01-01 DIAGNOSIS — I25.10 CORONARY ARTERY DISEASE INVOLVING NATIVE CORONARY ARTERY OF NATIVE HEART WITHOUT ANGINA PECTORIS: ICD-10-CM

## 2019-01-01 DIAGNOSIS — C34.92 SMALL CELL CARCINOMA OF LEFT LUNG (H): ICD-10-CM

## 2019-01-01 DIAGNOSIS — D69.6 THROMBOCYTOPENIA (H): ICD-10-CM

## 2019-01-01 DIAGNOSIS — R29.898 RIGHT HAND WEAKNESS: ICD-10-CM

## 2019-01-01 DIAGNOSIS — I95.1 ORTHOSTATIC HYPOTENSION: ICD-10-CM

## 2019-01-01 DIAGNOSIS — C79.31 BRAIN METASTASIS: ICD-10-CM

## 2019-01-01 DIAGNOSIS — I10 ESSENTIAL HYPERTENSION WITH GOAL BLOOD PRESSURE LESS THAN 140/90: ICD-10-CM

## 2019-01-01 DIAGNOSIS — D63.0 ANEMIA IN NEOPLASTIC DISEASE: ICD-10-CM

## 2019-01-01 DIAGNOSIS — R11.2 NAUSEA AND VOMITING, INTRACTABILITY OF VOMITING NOT SPECIFIED, UNSPECIFIED VOMITING TYPE: ICD-10-CM

## 2019-01-01 DIAGNOSIS — J20.9 ACUTE BRONCHITIS WITH SYMPTOMS > 10 DAYS: ICD-10-CM

## 2019-01-01 DIAGNOSIS — I71.21 ASCENDING AORTIC ANEURYSM (H): Primary | ICD-10-CM

## 2019-01-01 DIAGNOSIS — C79.31 BRAIN METASTASIS: Primary | ICD-10-CM

## 2019-01-01 DIAGNOSIS — N39.0 ACUTE UTI: ICD-10-CM

## 2019-01-01 DIAGNOSIS — I77.810 ASCENDING AORTA DILATATION (H): Primary | ICD-10-CM

## 2019-01-01 LAB
ALBUMIN SERPL-MCNC: 2.5 G/DL (ref 3.4–5)
ALBUMIN SERPL-MCNC: 2.5 G/DL (ref 3.4–5)
ALBUMIN SERPL-MCNC: 3.2 G/DL (ref 3.4–5)
ALBUMIN SERPL-MCNC: 3.8 G/DL (ref 3.4–5)
ALBUMIN UR-MCNC: 30 MG/DL
ALP SERPL-CCNC: 50 U/L (ref 40–150)
ALP SERPL-CCNC: 50 U/L (ref 40–150)
ALP SERPL-CCNC: 72 U/L (ref 40–150)
ALP SERPL-CCNC: 77 U/L (ref 40–150)
ALT SERPL W P-5'-P-CCNC: 24 U/L (ref 0–50)
ALT SERPL W P-5'-P-CCNC: 28 U/L (ref 0–50)
ANION GAP SERPL CALCULATED.3IONS-SCNC: 6 MMOL/L (ref 3–14)
ANION GAP SERPL CALCULATED.3IONS-SCNC: 7 MMOL/L (ref 3–14)
APPEARANCE UR: ABNORMAL
AST SERPL W P-5'-P-CCNC: 18 U/L (ref 0–45)
AST SERPL W P-5'-P-CCNC: 21 U/L (ref 0–45)
AST SERPL W P-5'-P-CCNC: 21 U/L (ref 0–45)
AST SERPL W P-5'-P-CCNC: 28 U/L (ref 0–45)
BACTERIA #/AREA URNS HPF: ABNORMAL /HPF
BACTERIA SPEC CULT: ABNORMAL
BACTERIA SPEC CULT: ABNORMAL
BACTERIA SPEC CULT: NO GROWTH
BACTERIA SPEC CULT: NO GROWTH
BASE EXCESS BLDV CALC-SCNC: 2.6 MMOL/L
BASOPHILS # BLD AUTO: 0 10E9/L (ref 0–0.2)
BASOPHILS # BLD AUTO: 0 10E9/L (ref 0–0.2)
BASOPHILS # BLD AUTO: 0.1 10E9/L (ref 0–0.2)
BASOPHILS NFR BLD AUTO: 0 %
BASOPHILS NFR BLD AUTO: 0.1 %
BASOPHILS NFR BLD AUTO: 0.9 %
BILIRUB SERPL-MCNC: 1 MG/DL (ref 0.2–1.3)
BILIRUB SERPL-MCNC: 1.1 MG/DL (ref 0.2–1.3)
BILIRUB SERPL-MCNC: 1.7 MG/DL (ref 0.2–1.3)
BILIRUB SERPL-MCNC: 2.9 MG/DL (ref 0.2–1.3)
BILIRUB UR QL STRIP: NEGATIVE
BUN SERPL-MCNC: 10 MG/DL (ref 7–30)
BUN SERPL-MCNC: 18 MG/DL (ref 7–30)
BUN SERPL-MCNC: 20 MG/DL (ref 7–30)
BUN SERPL-MCNC: 46 MG/DL (ref 7–30)
CALCIUM SERPL-MCNC: 8.1 MG/DL (ref 8.5–10.1)
CALCIUM SERPL-MCNC: 8.4 MG/DL (ref 8.5–10.1)
CALCIUM SERPL-MCNC: 9 MG/DL (ref 8.5–10.1)
CALCIUM SERPL-MCNC: 9.1 MG/DL (ref 8.5–10.1)
CEA SERPL-MCNC: 3.7 UG/L (ref 0–2.5)
CHLORIDE SERPL-SCNC: 104 MMOL/L (ref 94–109)
CHLORIDE SERPL-SCNC: 106 MMOL/L (ref 94–109)
CHLORIDE SERPL-SCNC: 107 MMOL/L (ref 94–109)
CHLORIDE SERPL-SCNC: 111 MMOL/L (ref 94–109)
CO2 SERPL-SCNC: 26 MMOL/L (ref 20–32)
CO2 SERPL-SCNC: 26 MMOL/L (ref 20–32)
CO2 SERPL-SCNC: 28 MMOL/L (ref 20–32)
CO2 SERPL-SCNC: 29 MMOL/L (ref 20–32)
COLOR UR AUTO: ABNORMAL
CREAT BLD-MCNC: 0.6 MG/DL (ref 0.52–1.04)
CREAT SERPL-MCNC: 0.4 MG/DL (ref 0.52–1.04)
CREAT SERPL-MCNC: 0.43 MG/DL (ref 0.52–1.04)
CREAT SERPL-MCNC: 0.54 MG/DL (ref 0.52–1.04)
CREAT SERPL-MCNC: 0.54 MG/DL (ref 0.52–1.04)
DIFFERENTIAL METHOD BLD: ABNORMAL
EOSINOPHIL # BLD AUTO: 0 10E9/L (ref 0–0.7)
EOSINOPHIL # BLD AUTO: 0.1 10E9/L (ref 0–0.7)
EOSINOPHIL # BLD AUTO: 0.2 10E9/L (ref 0–0.7)
EOSINOPHIL NFR BLD AUTO: 0.1 %
EOSINOPHIL NFR BLD AUTO: 1.1 %
EOSINOPHIL NFR BLD AUTO: 1.3 %
ERYTHROCYTE [DISTWIDTH] IN BLOOD BY AUTOMATED COUNT: 15.3 % (ref 10–15)
ERYTHROCYTE [DISTWIDTH] IN BLOOD BY AUTOMATED COUNT: 15.3 % (ref 10–15)
ERYTHROCYTE [DISTWIDTH] IN BLOOD BY AUTOMATED COUNT: 15.6 % (ref 10–15)
ERYTHROCYTE [DISTWIDTH] IN BLOOD BY AUTOMATED COUNT: 15.9 % (ref 10–15)
GFR SERPL CREATININE-BSD FRML MDRD: 89 ML/MIN/{1.73_M2}
GFR SERPL CREATININE-BSD FRML MDRD: 89 ML/MIN/{1.73_M2}
GFR SERPL CREATININE-BSD FRML MDRD: >90 ML/MIN/{1.73_M2}
GLUCOSE SERPL-MCNC: 110 MG/DL (ref 70–99)
GLUCOSE SERPL-MCNC: 120 MG/DL (ref 70–99)
GLUCOSE SERPL-MCNC: 87 MG/DL (ref 70–99)
GLUCOSE SERPL-MCNC: 92 MG/DL (ref 70–99)
GLUCOSE UR STRIP-MCNC: NEGATIVE MG/DL
HCO3 BLDV-SCNC: 27 MMOL/L (ref 21–28)
HCT VFR BLD AUTO: 31 % (ref 35–47)
HCT VFR BLD AUTO: 32.4 % (ref 35–47)
HCT VFR BLD AUTO: 38.8 % (ref 35–47)
HCT VFR BLD AUTO: 41.5 % (ref 35–47)
HGB BLD-MCNC: 10.2 G/DL (ref 11.7–15.7)
HGB BLD-MCNC: 10.6 G/DL (ref 11.7–15.7)
HGB BLD-MCNC: 12.4 G/DL (ref 11.7–15.7)
HGB BLD-MCNC: 13.5 G/DL (ref 11.7–15.7)
HGB UR QL STRIP: NEGATIVE
IMM GRANULOCYTES # BLD: 0 10E9/L (ref 0–0.4)
IMM GRANULOCYTES # BLD: 0.1 10E9/L (ref 0–0.4)
IMM GRANULOCYTES # BLD: 0.1 10E9/L (ref 0–0.4)
IMM GRANULOCYTES NFR BLD: 0.5 %
IMM GRANULOCYTES NFR BLD: 0.6 %
IMM GRANULOCYTES NFR BLD: 0.9 %
KETONES UR STRIP-MCNC: NEGATIVE MG/DL
LACTATE BLD-SCNC: 1.4 MMOL/L (ref 0.7–2)
LACTATE BLD-SCNC: 2.6 MMOL/L (ref 0.7–2)
LEUKOCYTE ESTERASE UR QL STRIP: ABNORMAL
LIPASE SERPL-CCNC: 85 U/L (ref 73–393)
LYMPHOCYTES # BLD AUTO: 0.5 10E9/L (ref 0.8–5.3)
LYMPHOCYTES # BLD AUTO: 0.5 10E9/L (ref 0.8–5.3)
LYMPHOCYTES # BLD AUTO: 2 10E9/L (ref 0.8–5.3)
LYMPHOCYTES NFR BLD AUTO: 19.9 %
LYMPHOCYTES NFR BLD AUTO: 3.5 %
LYMPHOCYTES NFR BLD AUTO: 7.3 %
Lab: ABNORMAL
Lab: NORMAL
Lab: NORMAL
MCH RBC QN AUTO: 26.3 PG (ref 26.5–33)
MCH RBC QN AUTO: 26.8 PG (ref 26.5–33)
MCH RBC QN AUTO: 26.8 PG (ref 26.5–33)
MCH RBC QN AUTO: 27.1 PG (ref 26.5–33)
MCHC RBC AUTO-ENTMCNC: 32 G/DL (ref 31.5–36.5)
MCHC RBC AUTO-ENTMCNC: 32.5 G/DL (ref 31.5–36.5)
MCHC RBC AUTO-ENTMCNC: 32.7 G/DL (ref 31.5–36.5)
MCHC RBC AUTO-ENTMCNC: 32.9 G/DL (ref 31.5–36.5)
MCV RBC AUTO: 82 FL (ref 78–100)
MONOCYTES # BLD AUTO: 0.4 10E9/L (ref 0–1.3)
MONOCYTES # BLD AUTO: 0.7 10E9/L (ref 0–1.3)
MONOCYTES # BLD AUTO: 1 10E9/L (ref 0–1.3)
MONOCYTES NFR BLD AUTO: 10.1 %
MONOCYTES NFR BLD AUTO: 5.3 %
MONOCYTES NFR BLD AUTO: 5.7 %
MUCOUS THREADS #/AREA URNS LPF: PRESENT /LPF
NEUTROPHILS # BLD AUTO: 12.4 10E9/L (ref 1.6–8.3)
NEUTROPHILS # BLD AUTO: 6 10E9/L (ref 1.6–8.3)
NEUTROPHILS # BLD AUTO: 6.8 10E9/L (ref 1.6–8.3)
NEUTROPHILS NFR BLD AUTO: 67.3 %
NEUTROPHILS NFR BLD AUTO: 86.3 %
NEUTROPHILS NFR BLD AUTO: 89.1 %
NITRATE UR QL: NEGATIVE
NRBC # BLD AUTO: 0 10*3/UL
NRBC BLD AUTO-RTO: 0 /100
O2/TOTAL GAS SETTING VFR VENT: 2.5 %
PCO2 BLDV: 40 MM HG (ref 40–50)
PH BLDV: 7.44 PH (ref 7.32–7.43)
PH UR STRIP: 5 PH (ref 5–7)
PLATELET # BLD AUTO: 113 10E9/L (ref 150–450)
PLATELET # BLD AUTO: 115 10E9/L (ref 150–450)
PLATELET # BLD AUTO: 115 10E9/L (ref 150–450)
PLATELET # BLD AUTO: 140 10E9/L (ref 150–450)
PLATELET # BLD AUTO: 293 10E9/L (ref 150–450)
PO2 BLDV: 28 MM HG (ref 25–47)
POTASSIUM SERPL-SCNC: 3.5 MMOL/L (ref 3.4–5.3)
POTASSIUM SERPL-SCNC: 3.7 MMOL/L (ref 3.4–5.3)
POTASSIUM SERPL-SCNC: 3.8 MMOL/L (ref 3.4–5.3)
POTASSIUM SERPL-SCNC: 3.9 MMOL/L (ref 3.4–5.3)
PROCALCITONIN SERPL-MCNC: 0.06 NG/ML
PROT SERPL-MCNC: 4.7 G/DL (ref 6.8–8.8)
PROT SERPL-MCNC: 4.7 G/DL (ref 6.8–8.8)
PROT SERPL-MCNC: 6.2 G/DL (ref 6.8–8.8)
PROT SERPL-MCNC: 7 G/DL (ref 6.8–8.8)
RBC # BLD AUTO: 3.77 10E12/L (ref 3.8–5.2)
RBC # BLD AUTO: 3.96 10E12/L (ref 3.8–5.2)
RBC # BLD AUTO: 4.71 10E12/L (ref 3.8–5.2)
RBC # BLD AUTO: 5.04 10E12/L (ref 3.8–5.2)
RBC #/AREA URNS AUTO: 14 /HPF (ref 0–2)
SODIUM SERPL-SCNC: 138 MMOL/L (ref 133–144)
SODIUM SERPL-SCNC: 139 MMOL/L (ref 133–144)
SODIUM SERPL-SCNC: 140 MMOL/L (ref 133–144)
SODIUM SERPL-SCNC: 145 MMOL/L (ref 133–144)
SOURCE: ABNORMAL
SP GR UR STRIP: 1.02 (ref 1–1.03)
SPECIMEN SOURCE: ABNORMAL
SPECIMEN SOURCE: NORMAL
SPECIMEN SOURCE: NORMAL
SQUAMOUS #/AREA URNS AUTO: 1 /HPF (ref 0–1)
UROBILINOGEN UR STRIP-MCNC: 4 MG/DL (ref 0–2)
WBC # BLD AUTO: 10.1 10E9/L (ref 4–11)
WBC # BLD AUTO: 13.9 10E9/L (ref 4–11)
WBC # BLD AUTO: 6.9 10E9/L (ref 4–11)
WBC # BLD AUTO: 8.2 10E9/L (ref 4–11)
WBC #/AREA URNS AUTO: 154 /HPF (ref 0–5)

## 2019-01-01 PROCEDURE — 85049 AUTOMATED PLATELET COUNT: CPT | Performed by: FAMILY MEDICINE

## 2019-01-01 PROCEDURE — 87040 BLOOD CULTURE FOR BACTERIA: CPT | Performed by: EMERGENCY MEDICINE

## 2019-01-01 PROCEDURE — 25000132 ZZH RX MED GY IP 250 OP 250 PS 637: Performed by: PHYSICIAN ASSISTANT

## 2019-01-01 PROCEDURE — 25000128 H RX IP 250 OP 636: Performed by: INTERNAL MEDICINE

## 2019-01-01 PROCEDURE — 97161 PT EVAL LOW COMPLEX 20 MIN: CPT | Mod: GP | Performed by: PHYSICAL THERAPIST

## 2019-01-01 PROCEDURE — 85027 COMPLETE CBC AUTOMATED: CPT | Performed by: PHYSICIAN ASSISTANT

## 2019-01-01 PROCEDURE — 25000128 H RX IP 250 OP 636: Performed by: EMERGENCY MEDICINE

## 2019-01-01 PROCEDURE — 25800030 ZZH RX IP 258 OP 636: Performed by: PHYSICIAN ASSISTANT

## 2019-01-01 PROCEDURE — 83605 ASSAY OF LACTIC ACID: CPT | Performed by: EMERGENCY MEDICINE

## 2019-01-01 PROCEDURE — 34300033 ZZH RX 343: Performed by: INTERNAL MEDICINE

## 2019-01-01 PROCEDURE — G0463 HOSPITAL OUTPT CLINIC VISIT: HCPCS

## 2019-01-01 PROCEDURE — A9270 NON-COVERED ITEM OR SERVICE: HCPCS | Performed by: PHYSICIAN ASSISTANT

## 2019-01-01 PROCEDURE — 96365 THER/PROPH/DIAG IV INF INIT: CPT | Mod: 59 | Performed by: EMERGENCY MEDICINE

## 2019-01-01 PROCEDURE — 99214 OFFICE O/P EST MOD 30 MIN: CPT | Performed by: INTERNAL MEDICINE

## 2019-01-01 PROCEDURE — 25000128 H RX IP 250 OP 636: Performed by: PHYSICIAN ASSISTANT

## 2019-01-01 PROCEDURE — 40000193 ZZH STATISTIC PT WARD VISIT: Performed by: PHYSICAL THERAPIST

## 2019-01-01 PROCEDURE — 99207 ZZC CONSULT E&M CHANGED TO INITIAL LEVEL: CPT | Performed by: NURSE PRACTITIONER

## 2019-01-01 PROCEDURE — 78815 PET IMAGE W/CT SKULL-THIGH: CPT | Mod: PS

## 2019-01-01 PROCEDURE — 96372 THER/PROPH/DIAG INJ SC/IM: CPT

## 2019-01-01 PROCEDURE — 12000000 ZZH R&B MED SURG/OB

## 2019-01-01 PROCEDURE — 99309 SBSQ NF CARE MODERATE MDM 30: CPT | Performed by: NURSE PRACTITIONER

## 2019-01-01 PROCEDURE — 25000132 ZZH RX MED GY IP 250 OP 250 PS 637: Performed by: EMERGENCY MEDICINE

## 2019-01-01 PROCEDURE — 36591 DRAW BLOOD OFF VENOUS DEVICE: CPT

## 2019-01-01 PROCEDURE — 99239 HOSP IP/OBS DSCHRG MGMT >30: CPT | Performed by: INTERNAL MEDICINE

## 2019-01-01 PROCEDURE — 81003 URINALYSIS AUTO W/O SCOPE: CPT | Performed by: EMERGENCY MEDICINE

## 2019-01-01 PROCEDURE — 99207 ZZC CDG-CODE CATEGORY CHANGED: CPT | Performed by: PHYSICIAN ASSISTANT

## 2019-01-01 PROCEDURE — 82565 ASSAY OF CREATININE: CPT

## 2019-01-01 PROCEDURE — 71046 X-RAY EXAM CHEST 2 VIEWS: CPT

## 2019-01-01 PROCEDURE — 97116 GAIT TRAINING THERAPY: CPT | Mod: GP | Performed by: PHYSICAL THERAPIST

## 2019-01-01 PROCEDURE — 99285 EMERGENCY DEPT VISIT HI MDM: CPT | Mod: 25 | Performed by: EMERGENCY MEDICINE

## 2019-01-01 PROCEDURE — G0378 HOSPITAL OBSERVATION PER HR: HCPCS

## 2019-01-01 PROCEDURE — 83605 ASSAY OF LACTIC ACID: CPT | Performed by: PHYSICIAN ASSISTANT

## 2019-01-01 PROCEDURE — 96360 HYDRATION IV INFUSION INIT: CPT

## 2019-01-01 PROCEDURE — A9270 NON-COVERED ITEM OR SERVICE: HCPCS | Performed by: EMERGENCY MEDICINE

## 2019-01-01 PROCEDURE — 40000275 ZZH STATISTIC RCP TIME EA 10 MIN

## 2019-01-01 PROCEDURE — 97110 THERAPEUTIC EXERCISES: CPT | Mod: GO

## 2019-01-01 PROCEDURE — 82378 CARCINOEMBRYONIC ANTIGEN: CPT | Performed by: INTERNAL MEDICINE

## 2019-01-01 PROCEDURE — 80053 COMPREHEN METABOLIC PANEL: CPT | Performed by: FAMILY MEDICINE

## 2019-01-01 PROCEDURE — 71260 CT THORAX DX C+: CPT

## 2019-01-01 PROCEDURE — 99232 SBSQ HOSP IP/OBS MODERATE 35: CPT | Performed by: FAMILY MEDICINE

## 2019-01-01 PROCEDURE — 85025 COMPLETE CBC W/AUTO DIFF WBC: CPT | Performed by: FAMILY MEDICINE

## 2019-01-01 PROCEDURE — 96366 THER/PROPH/DIAG IV INF ADDON: CPT | Performed by: EMERGENCY MEDICINE

## 2019-01-01 PROCEDURE — 97535 SELF CARE MNGMENT TRAINING: CPT | Mod: GO

## 2019-01-01 PROCEDURE — 70553 MRI BRAIN STEM W/O & W/DYE: CPT

## 2019-01-01 PROCEDURE — 70450 CT HEAD/BRAIN W/O DYE: CPT

## 2019-01-01 PROCEDURE — G0180 MD CERTIFICATION HHA PATIENT: HCPCS | Performed by: FAMILY MEDICINE

## 2019-01-01 PROCEDURE — A9552 F18 FDG: HCPCS | Performed by: INTERNAL MEDICINE

## 2019-01-01 PROCEDURE — 87088 URINE BACTERIA CULTURE: CPT | Performed by: EMERGENCY MEDICINE

## 2019-01-01 PROCEDURE — 99215 OFFICE O/P EST HI 40 MIN: CPT | Performed by: INTERNAL MEDICINE

## 2019-01-01 PROCEDURE — 99207 ZZC MOONLIGHTING INDICATOR: CPT | Performed by: FAMILY MEDICINE

## 2019-01-01 PROCEDURE — 25000131 ZZH RX MED GY IP 250 OP 636 PS 637: Performed by: PHYSICIAN ASSISTANT

## 2019-01-01 PROCEDURE — 84145 PROCALCITONIN (PCT): CPT | Performed by: PHYSICIAN ASSISTANT

## 2019-01-01 PROCEDURE — 85025 COMPLETE CBC W/AUTO DIFF WBC: CPT | Performed by: INTERNAL MEDICINE

## 2019-01-01 PROCEDURE — 96523 IRRIG DRUG DELIVERY DEVICE: CPT

## 2019-01-01 PROCEDURE — 93010 ELECTROCARDIOGRAM REPORT: CPT | Mod: Z6 | Performed by: EMERGENCY MEDICINE

## 2019-01-01 PROCEDURE — 97166 OT EVAL MOD COMPLEX 45 MIN: CPT | Mod: GO

## 2019-01-01 PROCEDURE — 80053 COMPREHEN METABOLIC PANEL: CPT | Performed by: PHYSICIAN ASSISTANT

## 2019-01-01 PROCEDURE — 96361 HYDRATE IV INFUSION ADD-ON: CPT | Performed by: EMERGENCY MEDICINE

## 2019-01-01 PROCEDURE — 87086 URINE CULTURE/COLONY COUNT: CPT | Performed by: EMERGENCY MEDICINE

## 2019-01-01 PROCEDURE — 99221 1ST HOSP IP/OBS SF/LOW 40: CPT | Performed by: NURSE PRACTITIONER

## 2019-01-01 PROCEDURE — 71275 CT ANGIOGRAPHY CHEST: CPT | Mod: 26 | Performed by: INTERNAL MEDICINE

## 2019-01-01 PROCEDURE — 87186 SC STD MICRODIL/AGAR DIL: CPT | Performed by: EMERGENCY MEDICINE

## 2019-01-01 PROCEDURE — 82803 BLOOD GASES ANY COMBINATION: CPT | Performed by: EMERGENCY MEDICINE

## 2019-01-01 PROCEDURE — 80053 COMPREHEN METABOLIC PANEL: CPT | Performed by: EMERGENCY MEDICINE

## 2019-01-01 PROCEDURE — 99310 SBSQ NF CARE HIGH MDM 45: CPT | Performed by: NURSE PRACTITIONER

## 2019-01-01 PROCEDURE — A9270 NON-COVERED ITEM OR SERVICE: HCPCS | Performed by: INTERNAL MEDICINE

## 2019-01-01 PROCEDURE — 93005 ELECTROCARDIOGRAM TRACING: CPT | Performed by: EMERGENCY MEDICINE

## 2019-01-01 PROCEDURE — 83690 ASSAY OF LIPASE: CPT | Performed by: EMERGENCY MEDICINE

## 2019-01-01 PROCEDURE — 85025 COMPLETE CBC W/AUTO DIFF WBC: CPT | Performed by: EMERGENCY MEDICINE

## 2019-01-01 PROCEDURE — 71275 CT ANGIOGRAPHY CHEST: CPT

## 2019-01-01 PROCEDURE — A9585 GADOBUTROL INJECTION: HCPCS | Performed by: INTERNAL MEDICINE

## 2019-01-01 PROCEDURE — 99214 OFFICE O/P EST MOD 30 MIN: CPT | Performed by: NURSE PRACTITIONER

## 2019-01-01 PROCEDURE — 99219 ZZC INITIAL OBSERVATION CARE,LEVL II: CPT | Performed by: PHYSICIAN ASSISTANT

## 2019-01-01 PROCEDURE — 99305 1ST NF CARE MODERATE MDM 35: CPT | Mod: AI | Performed by: FAMILY MEDICINE

## 2019-01-01 PROCEDURE — 80053 COMPREHEN METABOLIC PANEL: CPT | Performed by: INTERNAL MEDICINE

## 2019-01-01 PROCEDURE — 97110 THERAPEUTIC EXERCISES: CPT | Mod: GP | Performed by: PHYSICAL THERAPIST

## 2019-01-01 PROCEDURE — 25000125 ZZHC RX 250: Performed by: EMERGENCY MEDICINE

## 2019-01-01 PROCEDURE — 78816 PET IMAGE W/CT FULL BODY: CPT | Mod: PS,KX

## 2019-01-01 PROCEDURE — 25000128 H RX IP 250 OP 636: Performed by: FAMILY MEDICINE

## 2019-01-01 PROCEDURE — 25800030 ZZH RX IP 258 OP 636: Performed by: EMERGENCY MEDICINE

## 2019-01-01 PROCEDURE — 25000132 ZZH RX MED GY IP 250 OP 250 PS 637: Performed by: INTERNAL MEDICINE

## 2019-01-01 PROCEDURE — 36415 COLL VENOUS BLD VENIPUNCTURE: CPT | Performed by: PHYSICIAN ASSISTANT

## 2019-01-01 PROCEDURE — 25500064 ZZH RX 255 OP 636: Performed by: INTERNAL MEDICINE

## 2019-01-01 RX ORDER — DEXAMETHASONE 2 MG/1
2 TABLET ORAL 2 TIMES DAILY WITH MEALS
Qty: 60 TABLET | Refills: 1 | Status: ON HOLD | OUTPATIENT
Start: 2019-01-01 | End: 2019-01-01

## 2019-01-01 RX ORDER — IOPAMIDOL 755 MG/ML
80 INJECTION, SOLUTION INTRAVASCULAR ONCE
Status: COMPLETED | OUTPATIENT
Start: 2019-01-01 | End: 2019-01-01

## 2019-01-01 RX ORDER — HEPARIN SODIUM (PORCINE) LOCK FLUSH IV SOLN 100 UNIT/ML 100 UNIT/ML
5 SOLUTION INTRAVENOUS
Status: DISCONTINUED | OUTPATIENT
Start: 2019-01-01 | End: 2019-01-01 | Stop reason: HOSPADM

## 2019-01-01 RX ORDER — MORPHINE SULFATE 100 MG/5ML
4 SOLUTION ORAL
Refills: 0
Start: 2019-01-01

## 2019-01-01 RX ORDER — CEFTRIAXONE SODIUM 1 G/50ML
1 INJECTION, SOLUTION INTRAVENOUS EVERY 24 HOURS
Status: DISCONTINUED | OUTPATIENT
Start: 2019-01-01 | End: 2019-01-01 | Stop reason: ALTCHOICE

## 2019-01-01 RX ORDER — ALBUTEROL SULFATE 90 UG/1
2 AEROSOL, METERED RESPIRATORY (INHALATION) 4 TIMES DAILY
Qty: 1 INHALER | Refills: 2 | Status: SHIPPED | OUTPATIENT
Start: 2019-01-01 | End: 2019-01-01

## 2019-01-01 RX ORDER — LORAZEPAM 0.5 MG/1
0.5 TABLET ORAL 2 TIMES DAILY PRN
Qty: 30 TABLET | Refills: 0 | Status: SHIPPED | OUTPATIENT
Start: 2019-01-01 | End: 2019-01-01

## 2019-01-01 RX ORDER — PROCHLORPERAZINE 25 MG
12.5 SUPPOSITORY, RECTAL RECTAL EVERY 12 HOURS PRN
Status: DISCONTINUED | OUTPATIENT
Start: 2019-01-01 | End: 2019-01-01 | Stop reason: HOSPADM

## 2019-01-01 RX ORDER — METOPROLOL SUCCINATE 50 MG/1
50 TABLET, EXTENDED RELEASE ORAL DAILY
Status: DISCONTINUED | OUTPATIENT
Start: 2019-01-01 | End: 2019-01-01 | Stop reason: HOSPADM

## 2019-01-01 RX ORDER — ATROPINE SULFATE 10 MG/ML
3 SOLUTION/ DROPS OPHTHALMIC EVERY 4 HOURS PRN
Start: 2019-01-01

## 2019-01-01 RX ORDER — PROCHLORPERAZINE MALEATE 10 MG
5 TABLET ORAL EVERY 6 HOURS PRN
Qty: 30 TABLET | Refills: 3 | Status: SHIPPED | OUTPATIENT
Start: 2019-01-01 | End: 2019-01-01

## 2019-01-01 RX ORDER — METOPROLOL SUCCINATE 25 MG/1
25 TABLET, EXTENDED RELEASE ORAL DAILY
Start: 2019-01-01

## 2019-01-01 RX ORDER — LISINOPRIL 5 MG/1
5 TABLET ORAL DAILY
Status: DISCONTINUED | OUTPATIENT
Start: 2019-01-01 | End: 2019-01-01 | Stop reason: HOSPADM

## 2019-01-01 RX ORDER — ACYCLOVIR 200 MG/1
0-1 CAPSULE ORAL
Status: DISCONTINUED | OUTPATIENT
Start: 2019-01-01 | End: 2019-01-01 | Stop reason: HOSPADM

## 2019-01-01 RX ORDER — DEXAMETHASONE 2 MG/1
4 TABLET ORAL 2 TIMES DAILY WITH MEALS
Qty: 60 TABLET | Refills: 1 | Status: ON HOLD | OUTPATIENT
Start: 2019-01-01 | End: 2019-01-01

## 2019-01-01 RX ORDER — DIPHENHYDRAMINE HCL 25 MG
25 CAPSULE ORAL
Status: DISCONTINUED | OUTPATIENT
Start: 2019-01-01 | End: 2019-01-01 | Stop reason: HOSPADM

## 2019-01-01 RX ORDER — LORAZEPAM 0.5 MG/1
0.5 TABLET ORAL 2 TIMES DAILY PRN
Qty: 30 TABLET | Refills: 1 | Status: SHIPPED | OUTPATIENT
Start: 2019-01-01 | End: 2019-01-01

## 2019-01-01 RX ORDER — LORAZEPAM 0.5 MG/1
0.5 TABLET ORAL 2 TIMES DAILY PRN
Status: DISCONTINUED | OUTPATIENT
Start: 2019-01-01 | End: 2019-01-01 | Stop reason: HOSPADM

## 2019-01-01 RX ORDER — LORAZEPAM 0.5 MG/1
0.5 TABLET ORAL 2 TIMES DAILY
Qty: 60 TABLET | Refills: 5 | Status: SHIPPED | OUTPATIENT
Start: 2019-01-01

## 2019-01-01 RX ORDER — ONDANSETRON 2 MG/ML
4 INJECTION INTRAMUSCULAR; INTRAVENOUS
Status: DISCONTINUED | OUTPATIENT
Start: 2019-01-01 | End: 2019-01-01 | Stop reason: HOSPADM

## 2019-01-01 RX ORDER — GADOBUTROL 604.72 MG/ML
7 INJECTION INTRAVENOUS ONCE
Status: COMPLETED | OUTPATIENT
Start: 2019-01-01 | End: 2019-01-01

## 2019-01-01 RX ORDER — ALBUTEROL SULFATE 0.83 MG/ML
2.5 SOLUTION RESPIRATORY (INHALATION)
Status: DISCONTINUED | OUTPATIENT
Start: 2019-01-01 | End: 2019-01-01 | Stop reason: HOSPADM

## 2019-01-01 RX ORDER — ONDANSETRON 2 MG/ML
4 INJECTION INTRAMUSCULAR; INTRAVENOUS EVERY 6 HOURS PRN
Status: DISCONTINUED | OUTPATIENT
Start: 2019-01-01 | End: 2019-01-01 | Stop reason: HOSPADM

## 2019-01-01 RX ORDER — CIPROFLOXACIN 500 MG/1
500 TABLET, FILM COATED ORAL EVERY 12 HOURS
DISCHARGE
Start: 2019-01-01 | End: 2019-01-01

## 2019-01-01 RX ORDER — DIPHENHYDRAMINE HYDROCHLORIDE 50 MG/ML
25-50 INJECTION INTRAMUSCULAR; INTRAVENOUS
Status: DISCONTINUED | OUTPATIENT
Start: 2019-01-01 | End: 2019-01-01 | Stop reason: HOSPADM

## 2019-01-01 RX ORDER — ATORVASTATIN CALCIUM 20 MG/1
40 TABLET, FILM COATED ORAL DAILY
Status: DISCONTINUED | OUTPATIENT
Start: 2019-01-01 | End: 2019-01-01 | Stop reason: HOSPADM

## 2019-01-01 RX ORDER — DEXAMETHASONE 2 MG/1
2 TABLET ORAL 2 TIMES DAILY WITH MEALS
Status: DISCONTINUED | OUTPATIENT
Start: 2019-01-01 | End: 2019-01-01 | Stop reason: HOSPADM

## 2019-01-01 RX ORDER — ASPIRIN 81 MG/1
81 TABLET ORAL DAILY
Status: DISCONTINUED | OUTPATIENT
Start: 2019-01-01 | End: 2019-01-01 | Stop reason: HOSPADM

## 2019-01-01 RX ORDER — LORAZEPAM 0.5 MG/1
0.5 TABLET ORAL 2 TIMES DAILY PRN
Qty: 30 TABLET | Refills: 1 | Status: ON HOLD | OUTPATIENT
Start: 2019-01-01 | End: 2019-01-01

## 2019-01-01 RX ORDER — CIPROFLOXACIN 2 MG/ML
400 INJECTION, SOLUTION INTRAVENOUS EVERY 12 HOURS
Status: DISCONTINUED | OUTPATIENT
Start: 2019-01-01 | End: 2019-01-01

## 2019-01-01 RX ORDER — DEXAMETHASONE 2 MG/1
2 TABLET ORAL
Refills: 0 | DISCHARGE
Start: 2019-01-01 | End: 2019-01-01

## 2019-01-01 RX ORDER — ONDANSETRON 4 MG/1
4 TABLET, ORALLY DISINTEGRATING ORAL EVERY 8 HOURS PRN
Start: 2019-01-01

## 2019-01-01 RX ORDER — METHYLPREDNISOLONE SODIUM SUCCINATE 125 MG/2ML
125 INJECTION, POWDER, LYOPHILIZED, FOR SOLUTION INTRAMUSCULAR; INTRAVENOUS
Status: DISCONTINUED | OUTPATIENT
Start: 2019-01-01 | End: 2019-01-01 | Stop reason: HOSPADM

## 2019-01-01 RX ORDER — HEPARIN SODIUM (PORCINE) LOCK FLUSH IV SOLN 100 UNIT/ML 100 UNIT/ML
500 SOLUTION INTRAVENOUS
Status: COMPLETED | OUTPATIENT
Start: 2019-01-01 | End: 2019-01-01

## 2019-01-01 RX ORDER — HEPARIN SODIUM (PORCINE) LOCK FLUSH IV SOLN 100 UNIT/ML 100 UNIT/ML
500 SOLUTION INTRAVENOUS EVERY 8 HOURS
Status: DISCONTINUED | OUTPATIENT
Start: 2019-01-01 | End: 2019-01-01 | Stop reason: HOSPADM

## 2019-01-01 RX ORDER — AZITHROMYCIN 250 MG/1
500 TABLET, FILM COATED ORAL ONCE
Status: COMPLETED | OUTPATIENT
Start: 2019-01-01 | End: 2019-01-01

## 2019-01-01 RX ORDER — HEPARIN SODIUM,PORCINE 10 UNIT/ML
5-10 VIAL (ML) INTRAVENOUS
Status: DISCONTINUED | OUTPATIENT
Start: 2019-01-01 | End: 2019-01-01 | Stop reason: HOSPADM

## 2019-01-01 RX ORDER — ACETAMINOPHEN 325 MG/1
650 TABLET ORAL EVERY 4 HOURS PRN
Status: DISCONTINUED | OUTPATIENT
Start: 2019-01-01 | End: 2019-01-01 | Stop reason: HOSPADM

## 2019-01-01 RX ORDER — OXYCODONE HYDROCHLORIDE 5 MG/1
5 CAPSULE ORAL EVERY 6 HOURS PRN
Qty: 30 CAPSULE | Refills: 0 | Status: SHIPPED | OUTPATIENT
Start: 2019-01-01 | End: 2019-01-01

## 2019-01-01 RX ORDER — CIPROFLOXACIN 500 MG/1
500 TABLET, FILM COATED ORAL EVERY 12 HOURS SCHEDULED
Status: DISCONTINUED | OUTPATIENT
Start: 2019-01-01 | End: 2019-01-01 | Stop reason: HOSPADM

## 2019-01-01 RX ORDER — AZITHROMYCIN 250 MG/1
250 TABLET, FILM COATED ORAL DAILY
Status: DISCONTINUED | OUTPATIENT
Start: 2019-01-01 | End: 2019-01-01

## 2019-01-01 RX ORDER — METOPROLOL SUCCINATE 50 MG/1
50 TABLET, EXTENDED RELEASE ORAL DAILY
Qty: 90 TABLET | Refills: 1 | Status: SHIPPED | OUTPATIENT
Start: 2019-01-01 | End: 2019-01-01

## 2019-01-01 RX ORDER — TROLAMINE SALICYLATE 10 G/100G
1 CREAM TOPICAL PRN
COMMUNITY

## 2019-01-01 RX ORDER — SODIUM CHLORIDE, SODIUM LACTATE, POTASSIUM CHLORIDE, CALCIUM CHLORIDE 600; 310; 30; 20 MG/100ML; MG/100ML; MG/100ML; MG/100ML
INJECTION, SOLUTION INTRAVENOUS CONTINUOUS
Status: DISCONTINUED | OUTPATIENT
Start: 2019-01-01 | End: 2019-01-01 | Stop reason: HOSPADM

## 2019-01-01 RX ORDER — ONDANSETRON 4 MG/1
4 TABLET, ORALLY DISINTEGRATING ORAL EVERY 6 HOURS PRN
Status: DISCONTINUED | OUTPATIENT
Start: 2019-01-01 | End: 2019-01-01 | Stop reason: HOSPADM

## 2019-01-01 RX ORDER — HEPARIN SODIUM (PORCINE) LOCK FLUSH IV SOLN 100 UNIT/ML 100 UNIT/ML
500 SOLUTION INTRAVENOUS ONCE
Status: COMPLETED | OUTPATIENT
Start: 2019-01-01 | End: 2019-01-01

## 2019-01-01 RX ORDER — IOPAMIDOL 755 MG/ML
150 INJECTION, SOLUTION INTRAVASCULAR ONCE
Status: COMPLETED | OUTPATIENT
Start: 2019-01-01 | End: 2019-01-01

## 2019-01-01 RX ORDER — PROCHLORPERAZINE MALEATE 5 MG
5 TABLET ORAL EVERY 6 HOURS PRN
Status: DISCONTINUED | OUTPATIENT
Start: 2019-01-01 | End: 2019-01-01 | Stop reason: HOSPADM

## 2019-01-01 RX ADMIN — Medication 500 UNITS: at 10:39

## 2019-01-01 RX ADMIN — LORAZEPAM 0.5 MG: 0.5 TABLET ORAL at 16:56

## 2019-01-01 RX ADMIN — CEFTRIAXONE SODIUM 1 G: 1 INJECTION, SOLUTION INTRAVENOUS at 11:08

## 2019-01-01 RX ADMIN — SODIUM CHLORIDE, POTASSIUM CHLORIDE, SODIUM LACTATE AND CALCIUM CHLORIDE: 600; 310; 30; 20 INJECTION, SOLUTION INTRAVENOUS at 18:34

## 2019-01-01 RX ADMIN — ENOXAPARIN SODIUM 40 MG: 40 INJECTION SUBCUTANEOUS at 19:57

## 2019-01-01 RX ADMIN — ASPIRIN 81 MG: 81 TABLET, COATED ORAL at 16:56

## 2019-01-01 RX ADMIN — ACETAMINOPHEN 650 MG: 325 TABLET, FILM COATED ORAL at 17:04

## 2019-01-01 RX ADMIN — METOPROLOL SUCCINATE 50 MG: 50 TABLET, EXTENDED RELEASE ORAL at 16:56

## 2019-01-01 RX ADMIN — SODIUM CHLORIDE, POTASSIUM CHLORIDE, SODIUM LACTATE AND CALCIUM CHLORIDE: 600; 310; 30; 20 INJECTION, SOLUTION INTRAVENOUS at 03:58

## 2019-01-01 RX ADMIN — ACETAMINOPHEN 650 MG: 325 TABLET, FILM COATED ORAL at 17:58

## 2019-01-01 RX ADMIN — Medication 5 ML: at 12:58

## 2019-01-01 RX ADMIN — ATORVASTATIN CALCIUM 40 MG: 20 TABLET, FILM COATED ORAL at 08:23

## 2019-01-01 RX ADMIN — ASPIRIN 81 MG: 81 TABLET, COATED ORAL at 08:27

## 2019-01-01 RX ADMIN — CIPROFLOXACIN 400 MG: 2 INJECTION, SOLUTION INTRAVENOUS at 22:22

## 2019-01-01 RX ADMIN — LISINOPRIL 5 MG: 5 TABLET ORAL at 08:23

## 2019-01-01 RX ADMIN — AZITHROMYCIN 500 MG: 250 TABLET, FILM COATED ORAL at 13:27

## 2019-01-01 RX ADMIN — ASPIRIN 81 MG: 81 TABLET, COATED ORAL at 08:16

## 2019-01-01 RX ADMIN — CIPROFLOXACIN 400 MG: 2 INJECTION, SOLUTION INTRAVENOUS at 10:51

## 2019-01-01 RX ADMIN — SODIUM CHLORIDE 1000 ML: 9 INJECTION, SOLUTION INTRAVENOUS at 14:35

## 2019-01-01 RX ADMIN — IOPAMIDOL 80 ML: 755 INJECTION, SOLUTION INTRAVENOUS at 12:55

## 2019-01-01 RX ADMIN — ACETAMINOPHEN 650 MG: 325 TABLET, FILM COATED ORAL at 07:57

## 2019-01-01 RX ADMIN — METOPROLOL SUCCINATE 50 MG: 50 TABLET, EXTENDED RELEASE ORAL at 07:57

## 2019-01-01 RX ADMIN — FLUDEOXYGLUCOSE F-18 13.56 MCI.: 500 INJECTION, SOLUTION INTRAVENOUS at 10:30

## 2019-01-01 RX ADMIN — GADOBUTROL 7 ML: 604.72 INJECTION INTRAVENOUS at 12:50

## 2019-01-01 RX ADMIN — DEXAMETHASONE 2 MG: 2 TABLET ORAL at 08:28

## 2019-01-01 RX ADMIN — CIPROFLOXACIN 500 MG: 500 TABLET, FILM COATED ORAL at 10:42

## 2019-01-01 RX ADMIN — RANITIDINE 75 MG: 75 TABLET, FILM COATED ORAL at 08:28

## 2019-01-01 RX ADMIN — METOPROLOL SUCCINATE 50 MG: 50 TABLET, EXTENDED RELEASE ORAL at 08:23

## 2019-01-01 RX ADMIN — ATORVASTATIN CALCIUM 40 MG: 20 TABLET, FILM COATED ORAL at 07:57

## 2019-01-01 RX ADMIN — ACETAMINOPHEN 650 MG: 325 TABLET, FILM COATED ORAL at 17:30

## 2019-01-01 RX ADMIN — RANITIDINE 75 MG: 75 TABLET, FILM COATED ORAL at 07:57

## 2019-01-01 RX ADMIN — ASPIRIN 81 MG: 81 TABLET, COATED ORAL at 07:57

## 2019-01-01 RX ADMIN — SODIUM CHLORIDE, POTASSIUM CHLORIDE, SODIUM LACTATE AND CALCIUM CHLORIDE 1000 ML: 600; 310; 30; 20 INJECTION, SOLUTION INTRAVENOUS at 10:26

## 2019-01-01 RX ADMIN — CEFTRIAXONE SODIUM 1 G: 1 INJECTION, SOLUTION INTRAVENOUS at 12:03

## 2019-01-01 RX ADMIN — Medication 5 ML: at 12:28

## 2019-01-01 RX ADMIN — LISINOPRIL 5 MG: 5 TABLET ORAL at 07:57

## 2019-01-01 RX ADMIN — LISINOPRIL 5 MG: 5 TABLET ORAL at 16:56

## 2019-01-01 RX ADMIN — DEXAMETHASONE 2 MG: 2 TABLET ORAL at 07:58

## 2019-01-01 RX ADMIN — Medication 5 ML: at 10:40

## 2019-01-01 RX ADMIN — DEXAMETHASONE 2 MG: 2 TABLET ORAL at 18:36

## 2019-01-01 RX ADMIN — SODIUM CHLORIDE 100 ML: 9 INJECTION, SOLUTION INTRAVENOUS at 12:55

## 2019-01-01 RX ADMIN — IOPAMIDOL 90 ML: 755 INJECTION, SOLUTION INTRAVENOUS at 10:42

## 2019-01-01 RX ADMIN — LORAZEPAM 0.5 MG: 0.5 TABLET ORAL at 23:48

## 2019-01-01 RX ADMIN — LORAZEPAM 0.5 MG: 0.5 TABLET ORAL at 07:57

## 2019-01-01 RX ADMIN — ATORVASTATIN CALCIUM 40 MG: 20 TABLET, FILM COATED ORAL at 08:16

## 2019-01-01 RX ADMIN — DEXAMETHASONE 2 MG: 2 TABLET ORAL at 08:16

## 2019-01-01 RX ADMIN — ATORVASTATIN CALCIUM 40 MG: 20 TABLET, FILM COATED ORAL at 08:27

## 2019-01-01 RX ADMIN — FLUDEOXYGLUCOSE F-18 13.88 MCI.: 500 INJECTION, SOLUTION INTRAVENOUS at 12:33

## 2019-01-01 RX ADMIN — METOPROLOL SUCCINATE 50 MG: 50 TABLET, EXTENDED RELEASE ORAL at 08:16

## 2019-01-01 RX ADMIN — LISINOPRIL 5 MG: 5 TABLET ORAL at 08:16

## 2019-01-01 RX ADMIN — LISINOPRIL 5 MG: 5 TABLET ORAL at 08:27

## 2019-01-01 RX ADMIN — DEXAMETHASONE 2 MG: 2 TABLET ORAL at 17:06

## 2019-01-01 RX ADMIN — CEFTRIAXONE SODIUM 1 G: 1 INJECTION, SOLUTION INTRAVENOUS at 11:29

## 2019-01-01 RX ADMIN — SODIUM CHLORIDE, PRESERVATIVE FREE 500 UNITS: 5 INJECTION INTRAVENOUS at 12:33

## 2019-01-01 RX ADMIN — SODIUM CHLORIDE, POTASSIUM CHLORIDE, SODIUM LACTATE AND CALCIUM CHLORIDE: 600; 310; 30; 20 INJECTION, SOLUTION INTRAVENOUS at 19:13

## 2019-01-01 RX ADMIN — ACETAMINOPHEN 650 MG: 325 TABLET, FILM COATED ORAL at 08:17

## 2019-01-01 RX ADMIN — ASPIRIN 81 MG: 81 TABLET, COATED ORAL at 08:23

## 2019-01-01 RX ADMIN — DEXAMETHASONE 2 MG: 2 TABLET ORAL at 17:58

## 2019-01-01 RX ADMIN — RANITIDINE 75 MG: 75 TABLET, FILM COATED ORAL at 19:45

## 2019-01-01 RX ADMIN — DEXAMETHASONE 2 MG: 2 TABLET ORAL at 16:34

## 2019-01-01 RX ADMIN — ATORVASTATIN CALCIUM 40 MG: 20 TABLET, FILM COATED ORAL at 16:56

## 2019-01-01 RX ADMIN — ENOXAPARIN SODIUM 40 MG: 40 INJECTION SUBCUTANEOUS at 20:27

## 2019-01-01 RX ADMIN — LORAZEPAM 0.5 MG: 0.5 TABLET ORAL at 16:34

## 2019-01-01 RX ADMIN — RANITIDINE 75 MG: 75 TABLET, FILM COATED ORAL at 20:27

## 2019-01-01 RX ADMIN — SODIUM CHLORIDE, POTASSIUM CHLORIDE, SODIUM LACTATE AND CALCIUM CHLORIDE: 600; 310; 30; 20 INJECTION, SOLUTION INTRAVENOUS at 02:36

## 2019-01-01 RX ADMIN — RANITIDINE 75 MG: 75 TABLET, FILM COATED ORAL at 08:24

## 2019-01-01 RX ADMIN — ENOXAPARIN SODIUM 40 MG: 40 INJECTION SUBCUTANEOUS at 20:21

## 2019-01-01 RX ADMIN — Medication 5 ML: at 15:42

## 2019-01-01 RX ADMIN — ACETAMINOPHEN 650 MG: 325 TABLET, FILM COATED ORAL at 23:48

## 2019-01-01 RX ADMIN — SODIUM CHLORIDE 1000 ML: 9 INJECTION, SOLUTION INTRAVENOUS at 12:02

## 2019-01-01 RX ADMIN — Medication 500 UNITS: at 13:36

## 2019-01-01 RX ADMIN — ENOXAPARIN SODIUM 40 MG: 40 INJECTION SUBCUTANEOUS at 19:45

## 2019-01-01 RX ADMIN — RANITIDINE 75 MG: 75 TABLET, FILM COATED ORAL at 19:55

## 2019-01-01 RX ADMIN — LORAZEPAM 0.5 MG: 0.5 TABLET ORAL at 17:04

## 2019-01-01 RX ADMIN — DEXAMETHASONE 2 MG: 2 TABLET ORAL at 08:24

## 2019-01-01 RX ADMIN — LORAZEPAM 0.5 MG: 0.5 TABLET ORAL at 17:58

## 2019-01-01 RX ADMIN — METOPROLOL SUCCINATE 50 MG: 50 TABLET, EXTENDED RELEASE ORAL at 08:27

## 2019-01-01 RX ADMIN — RANITIDINE 75 MG: 75 TABLET, FILM COATED ORAL at 08:16

## 2019-01-01 RX ADMIN — RANITIDINE 75 MG: 75 TABLET, FILM COATED ORAL at 20:21

## 2019-01-01 ASSESSMENT — ACTIVITIES OF DAILY LIVING (ADL)
ADLS_ACUITY_SCORE: 16
ADLS_ACUITY_SCORE: 20
ADLS_ACUITY_SCORE: 21
ADLS_ACUITY_SCORE: 16
ADLS_ACUITY_SCORE: 20
ADLS_ACUITY_SCORE: 16
ADLS_ACUITY_SCORE: 21
ADLS_ACUITY_SCORE: 16
ADLS_ACUITY_SCORE: 16
ADLS_ACUITY_SCORE: 20
ADLS_ACUITY_SCORE: 16
ADLS_ACUITY_SCORE: 16
ADLS_ACUITY_SCORE: 17
ADLS_ACUITY_SCORE: 17
ADLS_ACUITY_SCORE: 20
ADLS_ACUITY_SCORE: 16
PREVIOUS_RESPONSIBILITIES: MEAL PREP;LAUNDRY;MEDICATION MANAGEMENT;FINANCES
ADLS_ACUITY_SCORE: 20
ADLS_ACUITY_SCORE: 17
ADLS_ACUITY_SCORE: 16
ADLS_ACUITY_SCORE: 20

## 2019-01-01 ASSESSMENT — PAIN SCALES - GENERAL
PAINLEVEL: NO PAIN (0)
PAINLEVEL: NO PAIN (0)
PAINLEVEL: SEVERE PAIN (6)
PAINLEVEL: NO PAIN (0)
PAINLEVEL: MODERATE PAIN (4)
PAINLEVEL: NO PAIN (0)

## 2019-01-01 ASSESSMENT — MIFFLIN-ST. JEOR
SCORE: 1215.24
SCORE: 1201.76
SCORE: 1160.88
SCORE: 1155.53

## 2019-01-06 NOTE — PROGRESS NOTES
ONCOLOGY FOLLOW UP VISIT       CHIEF COMPLAINT/REASON FOR VISIT:  Left adrenal mass, FNA 01/30/2018 consistent with sclc     HISTORY OF PRESENT ILLNESS:  A 77-year-old female presented with 5 months duration of hoarseness.  She was evaluated by ENT.  Direct laryngoscopy revealed left vocal cord paralysis.    CT chest with contrast 01/10/2018 identified a 2.3 cm left upper lobe nodule extending to the hilum, suspicious for malignancy, left hilar mediastinal adenopathy with mass-like soft tissue thickening extending along the inferior aspect of the aortic arch likely involving the recurrent laryngeal nerve in this location, left adrenal nodule 1.5 cm undetermined, compression on L1 vertebral body.      EUS 01/30/2017 FNA was done on 2 hypoechoic left adrenal mass-   Consistent with metastatic small cell carcinoma      PET confirmed the primary TEX lesion with adrenal mets. She was dx with extensive stage SCLC.     She made informed decision to proceed with palliative chemo with carbo/-16 2/2018.   She had good PET response after 3 cycles and tx is continued.   She had ongoing PET response post 6 cycles of tx. With minimal AP window LN SUV 3.5.  She had consolidation RT after to her thoracic area till mid July 2018.   PCI was not offered by Rad-Onc due to lack of OVERAL SURVIVAL benefit in extensive stage.   She has good PET in 9/2018.      She has isolated left adrenal gland disease recurrence (the original site of biopsy proven disease) by CT/PET 1/2019.       PAST MEDICAL HISTORY:  CAD, stent around 2014, Angina attack in 5/2018 was at [facility]. Reflux, hypertension, cataracts, hyperlipidemia.      MEDICATIONS:  Reviewed in Epic system.      SOCIAL HISTORY:  She lives in her own house.  She has 3 boys.  She is here with her neighbor.  She quit smoking years back.  Denies alcohol abuse.      FAMILY HISTORY:  Positive for mom who had Hodgkin lymphoma.  Brother had unknown type of cancer.      REVIEW OF SYSTEMS:  "  Throat discomfort seems better.     Eating good, gaining weight.   Still has a little anxiety.         PHYSICAL EXAMINATION:   VITAL SIGNS:Blood pressure 146/70, pulse 101, temperature 98.5  F (36.9  C), temperature source Tympanic, resp. rate 20, height 1.575 m (5' 2\"), weight 78.7 kg (173 lb 8 oz), SpO2 92 %, not currently breastfeeding.      ECOG 0    GENERAL APPEARANCE:  Elderly lady, looks like her stated age, not in acute distress.   HEENT: The patient is normocephalic, atraumatic. Pupils are equally reactive to light.  Sclerae are anicteric.  Moist oral mucosa.  negative posterior pharynx.   NECK:  Supple.  No jugular venous distention.  Thyroid is not palpable.   LYMPH NODES:  Superficial lymphadenopathy is not appreciable in the bilateral cervical, supraclavicular, axillary or inguinal areas.   CARDIOVASCULAR:  S1, S2 regular with no murmurs or gallops.  No carotid or abdominal bruits.   PULMONARY:  Lungs are clear to auscultation and percussion bilaterally.  There is no wheezing or rhonchi.   GASTROINTESTINAL:  Abdomen is soft, nontender.  No hepatosplenomegaly.  No signs of ascites.  No mass appreciable.   MUSCULOSKELETAL/EXTREMITIES:  No edema.  No cyanotic changes.  No signs of joint deformity.  No lymphedema.  No spinal or paraspinal tenderness.  No CVA tenderness.   NEUROLOGIC:  Cranial nerves II-XII are grossly intact.  Sensation intact.  Muscle strength and muscle tone symmetrical, 5/5 throughout.   SKIN:  No petechiae.  No rash.  No signs of cellulitis.      LABORATORY DATA REVIEWED:   wbc diff and CMP are fine. Hb 11.6,  B12/folate nl.     CEA at 7 in 12/2018,baseline at 6.6 in 2/2017    From 01/30/2018 FNA on left adrenal mass biopsy -- Consistent with metastatic small cell carcinoma        CURRENT IMAGE DATA REVIEWED:   PET 1/2019   1. New 2.5 cm left adrenal nodule is hypermetabolic SUV 2.9,  suspicious for primary or metastatic malignancy.  2. Left upper lobe pulmonary nodule is not " significantly changed and demonstrates only mild hypermetabolic activity, similar to prior.  3. There are new compression deformities in T6 and T10.  4. Ascending thoracic aortic aneurysm measures 5.0 cm.    Old data reviewed with summary  CT body 12/2018   1.  The left upper lobe lung lesion is smaller.  2. There is a small developing left adrenal mass which is of concern for recurrent metastatic disease.    PET 9/2018   1. A 1.5 x 1.3 cm nodular opacity near the left lung apex medially (series 3 image 106) has is not significantly changed in size or hypermetabolic activity, with an SUV max of 2.7.  2. Mildly prominent hypermetabolic lymph node in the AP  window region (series 3 image 129) is unchanged, measuring 1.9 x 0.9 cm, SUV max 3.4. Not changed.   3. Trace left pleural effusion is new.       PET 6/2018 post 6 cycles of carbo/vp16  1. Medial left upper lobe nodule is 1.6 x 1.2 cm, stable in size, series 3 image 112 (SUV max 2.7, previously 3.9).   2. Previously noted AP window hypermetabolism has decreased and now has SUV max 3.5, previously 4.8.    PET 4/2018 post 3 cycles of carbo/vp16  1. Decrease in size and FDG uptake in the left upper lobe nodule consistent with improved lung cancer.  2. Improvement in the previous left hilar and mediastinal adenopathy, currently there is small residual hypermetabolic adenopathy in the aorticopulmonary window consistent with residual mayela metastasis.  3. Resolution of previous hypermetabolic left adrenal nodule consistent with resolved metastasis.    PET 2/2018  1. Hypermetabolic left upper lobe nodule is consistent with malignancy, most likely bronchogenic carcinoma.  2. Hypermetabolic left hilar and mediastinal adenopathy is consistent  with metastatic disease.  3. Hypermetabolic left adrenal nodule is suspicious for metastatic disease.  4. There is a new 3.4 cm high-density structure abutting the left adrenal gland, suspicious for interval development of adrenal  hemorrhage.    CT chest 01/2018 -  identified a 2.3 cm left upper lobe nodule extending to the hilum, suspicious for malignancy, left hilar mediastinal adenopathy with mass-like soft tissue thickening extending along the inferior aspect of the aortic arch likely involving the recurrent laryngeal nerve in this location, left adrenal nodule 1.5 cm undetermined, compression on L1 vertebral body.         ASSESSMENT AND PLAN:    1.  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 S/p full course of tx as above.     She has isolated left adrenal gland disease recurrence (the original site of biopsy proven disease) by CT/PET 1/2019.     We checked with Dr. Flores for possible RT due to isolated recurrence in nature, her age, and prior rough course of chemo.     She is going to see him for discussion.     She is informed high recurrence rate with stage IV SCLC after nCR from frontline chemo.     2. normocytic anemia - nl b12/folate. This is likely from chemo.   We discussed the role of diet.     3. Ascending thoracic aortic aneurysm measures 5.0 cm.  Recommend to see vascular surgeon for discussion.     4. New compression deformities in T6 and T10 on PET - we discussed the vit D intake.

## 2019-01-08 NOTE — NURSING NOTE
"Oncology Rooming Note    January 8, 2019 11:49 AM   Ines Pickard is a 78 year old female who presents for:    Chief Complaint   Patient presents with     Oncology Clinic Visit     F/up Small cell carcinoma of left lung, review PET scan     Initial Vitals: /70 (BP Location: Right arm, Patient Position: Sitting, Cuff Size: Adult Regular)   Pulse 101   Temp 98.5  F (36.9  C) (Tympanic)   Resp 20   Ht 1.575 m (5' 2\")   Wt 78.7 kg (173 lb 8 oz)   SpO2 92%   BMI 31.73 kg/m   Estimated body mass index is 31.73 kg/m  as calculated from the following:    Height as of this encounter: 1.575 m (5' 2\").    Weight as of this encounter: 78.7 kg (173 lb 8 oz). Body surface area is 1.86 meters squared.  Severe Pain (6) Comment: Data Unavailable   No LMP recorded. Patient is postmenopausal.  Allergies reviewed: Yes  Medications reviewed: Yes    Medications: Medication refills not needed today.  [facility] into [facility] - [place] - [phone]    Clinical concerns: F/up Small cell carcinoma of left lung, review PET scan    7 minutes for nursing intake (face to face time)     Meron Mcmullen LPN              "

## 2019-01-08 NOTE — LETTER
1/8/2019         RE: Ines Pickard  [address] [address]        Dear Colleague,    Thank you for referring your patient, Ines Pickard, to the [facility] [place], [facility] by [facility]. Please see a copy of my visit note below.    ONCOLOGY FOLLOW UP VISIT       CHIEF COMPLAINT/REASON FOR VISIT:  Left adrenal mass, FNA 01/30/2018 consistent with sclc     HISTORY OF PRESENT ILLNESS:  A 77-year-old female presented with 5 months duration of hoarseness.  She was evaluated by ENT.  Direct laryngoscopy revealed left vocal cord paralysis.    CT chest with contrast 01/10/2018 identified a 2.3 cm left upper lobe nodule extending to the hilum, suspicious for malignancy, left hilar mediastinal adenopathy with mass-like soft tissue thickening extending along the inferior aspect of the aortic arch likely involving the recurrent laryngeal nerve in this location, left adrenal nodule 1.5 cm undetermined, compression on L1 vertebral body.      EUS 01/30/2017 FNA was done on 2 hypoechoic left adrenal mass-   Consistent with metastatic small cell carcinoma      PET confirmed the primary TEX lesion with adrenal mets. She was dx with extensive stage SCLC.     She made informed decision to proceed with palliative chemo with carbo/-16 2/2018.   She had good PET response after 3 cycles and tx is continued.   She had ongoing PET response post 6 cycles of tx. With minimal AP window LN SUV 3.5.  She had consolidation RT after to her thoracic area till mid July 2018.   PCI was not offered by Rad-Onc due to lack of OVERAL SURVIVAL benefit in extensive stage.   She has good PET in 9/2018.      She has isolated left adrenal gland disease recurrence (the original site of biopsy proven disease) by CT/PET 1/2019.       PAST MEDICAL HISTORY:  CAD, stent around 2014, Angina attack in 5/2018 was at [facility]. Reflux, hypertension, cataracts, hyperlipidemia.      MEDICATIONS:  Reviewed in Epic system.      SOCIAL HISTORY:  She lives in her  "own house.  She has 3 boys.  She is here with her neighbor.  She quit smoking years back.  Denies alcohol abuse.      FAMILY HISTORY:  Positive for mom who had Hodgkin lymphoma.  Brother had unknown type of cancer.      REVIEW OF SYSTEMS:   Throat discomfort seems better.     Eating good, gaining weight.   Still has a little anxiety.         PHYSICAL EXAMINATION:   VITAL SIGNS:Blood pressure 146/70, pulse 101, temperature 98.5  F (36.9  C), temperature source Tympanic, resp. rate 20, height 1.575 m (5' 2\"), weight 78.7 kg (173 lb 8 oz), SpO2 92 %, not currently breastfeeding.      ECOG 0    GENERAL APPEARANCE:  Elderly lady, looks like her stated age, not in acute distress.   HEENT: The patient is normocephalic, atraumatic. Pupils are equally reactive to light.  Sclerae are anicteric.  Moist oral mucosa.  negative posterior pharynx.   NECK:  Supple.  No jugular venous distention.  Thyroid is not palpable.   LYMPH NODES:  Superficial lymphadenopathy is not appreciable in the bilateral cervical, supraclavicular, axillary or inguinal areas.   CARDIOVASCULAR:  S1, S2 regular with no murmurs or gallops.  No carotid or abdominal bruits.   PULMONARY:  Lungs are clear to auscultation and percussion bilaterally.  There is no wheezing or rhonchi.   GASTROINTESTINAL:  Abdomen is soft, nontender.  No hepatosplenomegaly.  No signs of ascites.  No mass appreciable.   MUSCULOSKELETAL/EXTREMITIES:  No edema.  No cyanotic changes.  No signs of joint deformity.  No lymphedema.  No spinal or paraspinal tenderness.  No CVA tenderness.   NEUROLOGIC:  Cranial nerves II-XII are grossly intact.  Sensation intact.  Muscle strength and muscle tone symmetrical, 5/5 throughout.   SKIN:  No petechiae.  No rash.  No signs of cellulitis.      LABORATORY DATA REVIEWED:   wbc diff and CMP are fine. Hb 11.6,  B12/folate nl.     CEA at 7 in 12/2018,baseline at 6.6 in 2/2017    From 01/30/2018 FNA on left adrenal mass biopsy -- Consistent with " metastatic small cell carcinoma        CURRENT IMAGE DATA REVIEWED:   PET 1/2019   1. New 2.5 cm left adrenal nodule is hypermetabolic SUV 2.9,  suspicious for primary or metastatic malignancy.  2. Left upper lobe pulmonary nodule is not significantly changed and demonstrates only mild hypermetabolic activity, similar to prior.  3. There are new compression deformities in T6 and T10.  4. Ascending thoracic aortic aneurysm measures 5.0 cm.    Old data reviewed with summary  CT body 12/2018   1.  The left upper lobe lung lesion is smaller.  2. There is a small developing left adrenal mass which is of concern for recurrent metastatic disease.    PET 9/2018   1. A 1.5 x 1.3 cm nodular opacity near the left lung apex medially (series 3 image 106) has is not significantly changed in size or hypermetabolic activity, with an SUV max of 2.7.  2. Mildly prominent hypermetabolic lymph node in the AP  window region (series 3 image 129) is unchanged, measuring 1.9 x 0.9 cm, SUV max 3.4. Not changed.   3. Trace left pleural effusion is new.       PET 6/2018 post 6 cycles of carbo/vp16  1. Medial left upper lobe nodule is 1.6 x 1.2 cm, stable in size, series 3 image 112 (SUV max 2.7, previously 3.9).   2. Previously noted AP window hypermetabolism has decreased and now has SUV max 3.5, previously 4.8.    PET 4/2018 post 3 cycles of carbo/vp16  1. Decrease in size and FDG uptake in the left upper lobe nodule consistent with improved lung cancer.  2. Improvement in the previous left hilar and mediastinal adenopathy, currently there is small residual hypermetabolic adenopathy in the aorticopulmonary window consistent with residual mayela metastasis.  3. Resolution of previous hypermetabolic left adrenal nodule consistent with resolved metastasis.    PET 2/2018  1. Hypermetabolic left upper lobe nodule is consistent with malignancy, most likely bronchogenic carcinoma.  2. Hypermetabolic left hilar and mediastinal adenopathy is  consistent  with metastatic disease.  3. Hypermetabolic left adrenal nodule is suspicious for metastatic disease.  4. There is a new 3.4 cm high-density structure abutting the left adrenal gland, suspicious for interval development of adrenal hemorrhage.    CT chest 01/2018 -  identified a 2.3 cm left upper lobe nodule extending to the hilum, suspicious for malignancy, left hilar mediastinal adenopathy with mass-like soft tissue thickening extending along the inferior aspect of the aortic arch likely involving the recurrent laryngeal nerve in this location, left adrenal nodule 1.5 cm undetermined, compression on L1 vertebral body.         ASSESSMENT AND PLAN:    1.  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 S/p full course of tx as above.     She has isolated left adrenal gland disease recurrence (the original site of biopsy proven disease) by CT/PET 1/2019.     We checked with Dr. Flores for possible RT due to isolated recurrence in nature, her age, and prior rough course of chemo.     She is going to see him for discussion.     She is informed high recurrence rate with stage IV SCLC after nCR from frontline chemo.     2. normocytic anemia - nl b12/folate. This is likely from chemo.   We discussed the role of diet.     3. Ascending thoracic aortic aneurysm measures 5.0 cm.  Recommend to see vascular surgeon for discussion.     4. New compression deformities in T6 and T10 on PET - we discussed the vit D intake.                 Again, thank you for allowing me to participate in the care of your patient.        Sincerely,        Tracy Miner MD, MD

## 2019-01-08 NOTE — TELEPHONE ENCOUNTER
Pt referred to [facility] by Dr. Miner for ascending thoracic aneurysm, however this referral would need to go to cardiology due to it being ascending.     I called Dr. Miner's office, left vm explaining this. Awaiting call back.     Jessenia Segal, TRUONGN, RN

## 2019-01-08 NOTE — PATIENT INSTRUCTIONS
Dr. Miner would like to refer you to Radiation therapy, they will call you to set up an appointment. We also would like to refer you to a vascular surgeon.     We would like to see you back in after completion of radiation therapy for a follow up appointment.     When you are in need of a refill, please call your pharmacy and they will send us a request.      Copy of appointments, and after visit summary (AVS) given to patient.      If you have any questions please call Bella Arvizu RN, BSN Oncology Hematology  [facility] [phone]. For questions after business hours, or on holidays/weekends, please call our after hours Nurse Triage line [phone]. Thank you.       RT referral.   Ascending thoracic aortic aneurysm - referral to vascular surgeon.     F/u post RT.

## 2019-01-11 NOTE — PATIENT INSTRUCTIONS
Thank you for your [facility] visit today. Your provider has recommended the following:  Medication Changes:  No changes   Recommendations:  1. Chest CT to be done at [facility] on: Wednesday, January 16th at 10:00 am. Report to Imaging, 3rd floor. See map. If you need to contact [facility] for any reason, please call [phone]. Do not have anything to eat or drink for 2 hours prior to your exam. Increase water intake the day before the exam and after. We will call with results and recommendations   Follow-up:  See Dr. Neri for cardiology follow up in Dec 2019 with echo prior- call in Sept to schedule.  Call [phone] two months prior to request date to schedule any future appointments.  Reminder:  1. Please bring in all current medications, over the counter supplements and vitamin bottles to your next appointment.               [facility]  [facility]~[address]~[place], [place]~[address]  Questions about your visit today?   Call your Cardiology Clinic RN's-Neelam Han and/or Lou Boogie at [phone].

## 2019-01-11 NOTE — PROGRESS NOTES
Cardiology Clinic Progress Note  Ines Pickard MRN# [id number]   YOB: 1940 Age: 78 year old     Reason For Visit: follow-up ascending aortic dilatation   Primary Cardiologist:   Dr. Neri          History of Presenting Illness:    Ines Pickard is a pleasant 78 year old patient with a past cardiac history significant for CAD with stenting to LCx 2014, ascending aortic dilatation, hypertension, and hyperlipidemia.  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    Summer of 2018 she was admitted for unstable angina felt as chest pressure radiating to her throat.  Stress test was abnormal and medical management was recommended.  She  started imdur but had to decrease to 15 mg daily due to headache.  This was eventually discontinued due to hypotension. Of note, she follows with oncology and had undergone palliative chemotherapy in March 2018.      Patient was last seen by me on 12/8/2018 for annual follow-up. She denied any further angina.  She had completed eight months of chemotherapy and underwent radiation for her lung cancer.  She mentioned that she had a PET scan coming up with oncology.  Her echocardiogram 5/2018 during chemotherapy showed normal LVEF.  She continued with chronic stable BARNES.    Pt presents today for follow-up of ascending aortic dilatation. On her echocardiogram in May 2018 this was reported as 3.7 cm.  She then had a PET scan in June 2018 noting 4.5 cm and her most recent PET scan January 2019 showing 5.0 cm. I do also see that she had a chest abdomen and pelvis CT in December 2018 reporting her ascending aorta dilatation as 4.5 cm and not significantly changed. Her blood pressures have remained well controlled between 109-121/70-80 mmHg.  She continues on ACE inhibitor and beta blocker. I Discussed her case with Dr. Neri who recommended  proceeding with [facility] CT[facility] at [facility] with cardiology reading,  to ensure accuracy. If this has truly increased, we would consider sending her for CV surgery consult. She tells me that her PET scan showed her lung cancer has not returned but that she does have an area of concern on her kidney and is going to be sent for consultation to possibly undergo radiation for that. Patient reports no chest pain, PND, orthopnea, presyncope, syncope, edema, heart racing, or palpitations.      Current Cardiac Medications   Aspirin 81 mg daily  Atorvastatin 40 mg daily  Lisinopril 5 mg daily  Metoprolol XL 50 mg daily  Nitro as needed                   Assessment and Plan:     Plan  1.  Lipid profile and ALT  2.  Chest CT to reassess ascending aortic dilatation- we will call with these results and recommendations  3. Follow-up with Dr. Neri in 1 year with echo prior (history of chemo)      1. CAD    stenting to the LCx 2014 with residual 70% ostial RPDA and 90% proximal D1    Positive stress test 6/2018 with medical management recommended     No angina (prior angina chest pain and left arm pain radiating to neck and jaw)     continue statin, aspirin, ACE inhibitor, beta-blocker    Headaches on Imdur 30 mg daily    Consider restarting Imdur 15 mg daily or adding Ranexa, if further chest discomfort      2. Hypertension    controlled    continue lisinopril, metoprolol      3. Hyperlipidemia     last LDL 48 on 3/2017     Continue atorvastatin 40 mg daily      4.  Ascending aortic dilatation    Has increased from 3.7 cm on echocardiogram in May 2018 to 5.0 cm on PET scan from January 2019    BP controlled    Continue ACE inhibitor and beta blocker    Consider CV surgery consult         Thank you for allowing me to participate in this delightful patient's care.      This note was completed in part using Dragon voice recognition software. Although reviewed after completion, some word and grammatical errors may occur.    Aliyah ETIENNE  ALEXSANDRA Kaufman, CNP           Data:   All laboratory data reviewed

## 2019-01-11 NOTE — LETTER
1/11/2019    Rohini Suárez MD  [address]  [facility] [address]    RE: Ines Pickard       Dear Colleague,    I had the pleasure of seeing Ines Pickard in the [facility].    Cardiology Clinic Progress Note  Ines Pickard MRN# [id number]   YOB: 1940 Age: 78 year old     Reason For Visit: follow-up ascending aortic dilatation   Primary Cardiologist:   Dr. Neri          History of Presenting Illness:    Ines Pickard is a pleasant 78 year old patient with a past cardiac history significant for CAD with stenting to LCx 2014, ascending aortic dilatation, hypertension, and hyperlipidemia.  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    Summer of 2018 she was admitted for unstable angina felt as chest pressure radiating to her throat.  Stress test was abnormal and medical management was recommended.  She  started imdur but had to decrease to 15 mg daily due to headache.  This was eventually discontinued due to hypotension. Of note, she follows with oncology and had undergone palliative chemotherapy in March 2018.      Patient was last seen by me on 12/8/2018 for annual follow-up. She denied any further angina.  She had completed eight months of chemotherapy and underwent radiation for her lung cancer.  She mentioned that she had a PET scan coming up with oncology.  Her echocardiogram 5/2018 during chemotherapy showed normal LVEF.  She continued with chronic stable BARNES.    Pt presents today for follow-up of ascending aortic dilatation. On her echocardiogram in May 2018 this was reported as 3.7 cm.  She then had a PET scan in June 2018 noting 4.5 cm and her most recent PET scan January 2019 showing 5.0 cm. I do also see that she had a chest abdomen and pelvis CT in December 2018 reporting her ascending aorta dilatation as  4.5 cm and not significantly changed. Her blood pressures have remained well controlled between 109-121/70-80 mmHg.  She continues on ACE inhibitor and beta blocker. I Discussed her case with Dr. Neri who recommended proceeding with chest CT, at [facility] with cardiology reading,  to ensure accuracy. If this has truly increased, we would consider sending her for CV surgery consult. She tells me that her PET scan showed her lung cancer has not returned but that she does have an area of concern on her kidney and is going to be sent for consultation to possibly undergo radiation for that. Patient reports no chest pain, PND, orthopnea, presyncope, syncope, edema, heart racing, or palpitations.      Current Cardiac Medications   Aspirin 81 mg daily  Atorvastatin 40 mg daily  Lisinopril 5 mg daily  Metoprolol XL 50 mg daily  Nitro as needed                   Assessment and Plan:     Plan  1.  Lipid profile and ALT  2.  Chest CT to reassess ascending aortic dilatation- we will call with these results and recommendations  3. Follow-up with Dr. Neri in 1 year with echo prior (history of chemo)      1. CAD    stenting to the LCx 2014 with residual 70% ostial RPDA and 90% proximal D1    Positive stress test 6/2018 with medical management recommended     No angina (prior angina chest pain and left arm pain radiating to neck and jaw)     continue statin, aspirin, ACE inhibitor, beta-blocker    Headaches on Imdur 30 mg daily    Consider restarting Imdur 15 mg daily or adding Ranexa, if further chest discomfort      2. Hypertension    controlled    continue lisinopril, metoprolol      3. Hyperlipidemia     last LDL 48 on 3/2017     Continue atorvastatin 40 mg daily      4.  Ascending aortic dilatation    Has increased from 3.7 cm on echocardiogram in May 2018 to 5.0 cm on PET scan from January 2019    BP controlled    Continue ACE inhibitor and beta blocker    Consider CV surgery consult         Thank you for allowing me to  participate in this delightful patient's care.      This note was completed in part using Dragon voice recognition software. Although reviewed after completion, some word and grammatical errors may occur.    Aliyah Kaufman, ALEXSANDRA, CNP           Data:   All laboratory data reviewed      HPI and Plan:   See dictation    Orders Placed This Encounter   Procedures     CT Chest w/o & w Contrast       No orders of the defined types were placed in this encounter.      There are no discontinued medications.      Encounter Diagnoses   Name Primary?     Ascending aorta dilatation (H) Yes     Essential hypertension with goal blood pressure less than 140/90      Coronary artery disease involving native coronary artery of native heart without angina pectoris      Hyperlipidemia LDL goal <70        CURRENT MEDICATIONS:  Current Outpatient Medications   Medication Sig Dispense Refill     Acetaminophen (TYLENOL PO) Take 1,000 mg by mouth every 6 hours as needed       albuterol (PROAIR HFA/PROVENTIL HFA/VENTOLIN HFA) 108 (90 Base) MCG/ACT inhaler Inhale 2 puffs into the lungs 4 times daily 1 Inhaler 2     aspirin EC 81 MG EC tablet Take 1 tablet (81 mg) by mouth daily 90 tablet 3     atorvastatin (LIPITOR) 40 MG tablet Take 1 tablet (40 mg) by mouth daily 90 tablet 3     lisinopril (PRINIVIL/ZESTRIL) 5 MG tablet Take 1 tablet (5 mg) by mouth daily 90 tablet 2     LORazepam (ATIVAN) 0.5 MG tablet Take 1 tablet (0.5 mg) by mouth 2 times daily as needed for anxiety 30 tablet 5     metoprolol succinate (TOPROL-XL) 50 MG 24 hr tablet Take 1 tablet (50 mg) by mouth daily 90 tablet 1     Multiple Vitamins-Minerals (MULTIVITAMIN ADULT) TABS Take 1 tablet by mouth daily        nitroglycerin (NITROSTAT) 0.4 MG SL tablet Place 1 tablet (0.4 mg) under the tongue every 5 minutes as needed for chest pain Maximum of 3 doses in 15 minutes 25 tablet 3     ranitidine (ZANTAC) 75 MG tablet Take 1 tablet (75 mg) by mouth 2 times daily 30  tablet 11       ALLERGIES     Allergies   Allergen Reactions     Amoxicillin GI Disturbance     Tetracycline Other (See Comments)     Yeast infection     Nickel Rash       PAST MEDICAL HISTORY:  Past Medical History:   Diagnosis Date     Anemia due to blood loss, acute 12/30/2014     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     Coronary artery disease 12/2014    Inferior STEMI, stent to Circumflex     Percutaneous transluminal coronary angioplasty hematoma 12/15/2014       PAST SURGICAL HISTORY:  Past Surgical History:   Procedure Laterality Date     APPENDECTOMY  1956     ESOPHAGOSCOPY, GASTROSCOPY, DUODENOSCOPY (EGD), COMBINED N/A 1/30/2018    Procedure: COMBINED ENDOSCOPIC ULTRASOUND, ESOPHAGOSCOPY, GASTROSCOPY, DUODENOSCOPY (EGD), FINE NEEDLE ASPIRATE/BIOPSY;   EUS;  Surgeon: Carlos Fletcher MD;  Location:  GI     EYE SURGERY       INSERT PORT VASCULAR ACCESS N/A 2/12/2018    Procedure: INSERT PORT VASCULAR ACCESS;  Port-a-Cath Placement;  Surgeon: Carlos Johnson MD;  Location: [facility] OR     PHACOEMULSIFICATION WITH STANDARD INTRAOCULAR LENS IMPLANT Left 1/25/2016    Procedure: PHACOEMULSIFICATION WITH STANDARD INTRAOCULAR LENS IMPLANT;  Surgeon: Julio César Wallace MD;  Location: [facility] OR     PHACOEMULSIFICATION WITH STANDARD INTRAOCULAR LENS IMPLANT Right 2/22/2016    Procedure: PHACOEMULSIFICATION WITH STANDARD INTRAOCULAR LENS IMPLANT;  Surgeon: Julio César Wallace MD;  Location: [facility] OR     SURGICAL HISTORY OF -   1961    right hand surgery      TONSILLECTOMY & ADENOIDECTOMY  1967     VASCULAR SURGERY  02/12/2018    PortaCath       FAMILY HISTORY:  Family History   Problem Relation Age of Onset     Cancer Brother      Heart Disease Brother      Cancer Mother      Respiratory Father      Heart Disease Father         MI       SOCIAL HISTORY:  Social History     Socioeconomic History     Marital status:      Spouse name: None     Number of children: None     Years of education: None      Highest education level: None   Social Needs     Financial resource strain: None     Food insecurity - worry: None     Food insecurity - inability: None     Transportation needs - medical: None     Transportation needs - non-medical: None   Occupational History     None   Tobacco Use     Smoking status: Former Smoker     Packs/day: 0.50     Types: Cigarettes     Last attempt to quit: 2014     Years since quittin.0     Smokeless tobacco: Never Used   Substance and Sexual Activity     Alcohol use: No     Drug use: No     Sexual activity: Not Currently   Other Topics Concern     Parent/sibling w/ CABG, MI or angioplasty before 65F 55M? Yes   Social History Narrative     None       Review of Systems:  Skin:  Negative scaling     Eyes:  Positive for cataracts;visual blurring    ENT:  Positive for hoarseness    Respiratory:  Positive for dyspnea on exertion;cough pneumonia   Cardiovascular:    Positive for;lower extremity symptoms;edema;lightheadedness;fatigue    Gastroenterology: Negative      Genitourinary:  Negative urinary frequency;urgency    Musculoskeletal:  Positive for back pain    Neurologic:  Positive for numbness or tingling of feet;numbness or tingling of hands;headaches    Psychiatric:  Positive for anxiety;depression    Heme/Lymph/Imm:  Negative anemia    Endocrine:  Negative        Physical Exam:  Vitals: /71   Pulse 85   Wt 78.5 kg (173 lb)   SpO2 96%   BMI 31.64 kg/m       Constitutional:  cooperative;in no acute distress frail;chronically ill      Skin:  warm and dry to the touch          Head:  normocephalic        Eyes:  sclera white        Lymph:      ENT:    pallor      Neck:  no stiffness        Respiratory:  clear to auscultation         Cardiac: regular rhythm, normal S1/S2, no S3 or S4, apical impulse not displaced, no murmurs, gallops or rubs                pulses full and equal                                        GI:  not assessed this visit        Extremities and  Muscular Skeletal:  not assessed this visit              Neurological:  affect appropriate        Psych:  Alert and Oriented x 3          Thank you for allowing me to participate in the care of your patient.    Sincerely,     ALEXSANDRA Balderas [facility]

## 2019-01-11 NOTE — LETTER
1/11/2019    MICHAEL Perez MD  [address] [facility] [address]    RE: Ines Pickard       Dear Colleague,    I had the pleasure of seeing Ines Pickard in the [facility].    Cardiology Clinic Progress Note  Ines Pickard MRN# [id number]   YOB: 1940 Age: 78 year old     Reason For Visit: follow-up ascending aortic dilatation   Primary Cardiologist:   Dr. Neri          History of Presenting Illness:    Ines Pickard is a pleasant 78 year old patient with a past cardiac history significant for CAD with stenting to LCx 2014, ascending aortic dilatation, hypertension, and hyperlipidemia.  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    Summer of 2018 she was admitted for unstable angina felt as chest pressure radiating to her throat.  Stress test was abnormal and medical management was recommended.  She  started imdur but had to decrease to 15 mg daily due to headache.  This was eventually discontinued due to hypotension. Of note, she follows with oncology and had undergone palliative chemotherapy in March 2018.      Patient was last seen by me on 12/8/2018 for annual follow-up. She denied any further angina.  She had completed eight months of chemotherapy and underwent radiation for her lung cancer.  She mentioned that she had a PET scan coming up with oncology.  Her echocardiogram 5/2018 during chemotherapy showed normal LVEF.  She continued with chronic stable BARNES.    Pt presents today for follow-up of ascending aortic dilatation. On her echocardiogram in May 2018 this was reported as 3.7 cm.  She then had a PET scan in June 2018 noting 4.5 cm and her most recent PET scan January 2019 showing 5.0 cm. I do also see that she had a chest abdomen and pelvis CT in December 2018 reporting her ascending aorta dilatation as 4.5  cm and not significantly changed. Her blood pressures have remained well controlled between 109-121/70-80 mmHg.  She continues on ACE inhibitor and beta blocker. I Discussed her case with Dr. Neri who recommended proceeding with chest CT, at [facility] with cardiology reading,  to ensure accuracy. If this has truly increased, we would consider sending her for CV surgery consult. She tells me that her PET scan showed her lung cancer has not returned but that she does have an area of concern on her kidney and is going to be sent for consultation to possibly undergo radiation for that. Patient reports no chest pain, PND, orthopnea, presyncope, syncope, edema, heart racing, or palpitations.      Current Cardiac Medications   Aspirin 81 mg daily  Atorvastatin 40 mg daily  Lisinopril 5 mg daily  Metoprolol XL 50 mg daily  Nitro as needed                   Assessment and Plan:     Plan  1.  Lipid profile and ALT  2.  Chest CT to reassess ascending aortic dilatation- we will call with these results and recommendations  3. Follow-up with Dr. Neri in 1 year with echo prior (history of chemo)      1. CAD    stenting to the LCx 2014 with residual 70% ostial RPDA and 90% proximal D1    Positive stress test 6/2018 with medical management recommended     No angina (prior angina chest pain and left arm pain radiating to neck and jaw)     continue statin, aspirin, ACE inhibitor, beta-blocker    Headaches on Imdur 30 mg daily    Consider restarting Imdur 15 mg daily or adding Ranexa, if further chest discomfort      2. Hypertension    controlled    continue lisinopril, metoprolol      3. Hyperlipidemia     last LDL 48 on 3/2017     Continue atorvastatin 40 mg daily      4.  Ascending aortic dilatation    Has increased from 3.7 cm on echocardiogram in May 2018 to 5.0 cm on PET scan from January 2019    BP controlled    Continue ACE inhibitor and beta blocker    Consider CV surgery consult         Thank you for allowing me to  participate in this delightful patient's care.      This note was completed in part using Dragon voice recognition software. Although reviewed after completion, some word and grammatical errors may occur.    Aliyah Kaufman, ALEXSANDRA, CNP           Data:   All laboratory data reviewed      HPI and Plan:   See dictation    Orders Placed This Encounter   Procedures     CT Chest w/o & w Contrast       No orders of the defined types were placed in this encounter.      There are no discontinued medications.      Encounter Diagnoses   Name Primary?     Ascending aorta dilatation (H) Yes     Essential hypertension with goal blood pressure less than 140/90      Coronary artery disease involving native coronary artery of native heart without angina pectoris      Hyperlipidemia LDL goal <70        CURRENT MEDICATIONS:  Current Outpatient Medications   Medication Sig Dispense Refill     Acetaminophen (TYLENOL PO) Take 1,000 mg by mouth every 6 hours as needed       albuterol (PROAIR HFA/PROVENTIL HFA/VENTOLIN HFA) 108 (90 Base) MCG/ACT inhaler Inhale 2 puffs into the lungs 4 times daily 1 Inhaler 2     aspirin EC 81 MG EC tablet Take 1 tablet (81 mg) by mouth daily 90 tablet 3     atorvastatin (LIPITOR) 40 MG tablet Take 1 tablet (40 mg) by mouth daily 90 tablet 3     lisinopril (PRINIVIL/ZESTRIL) 5 MG tablet Take 1 tablet (5 mg) by mouth daily 90 tablet 2     LORazepam (ATIVAN) 0.5 MG tablet Take 1 tablet (0.5 mg) by mouth 2 times daily as needed for anxiety 30 tablet 5     metoprolol succinate (TOPROL-XL) 50 MG 24 hr tablet Take 1 tablet (50 mg) by mouth daily 90 tablet 1     Multiple Vitamins-Minerals (MULTIVITAMIN ADULT) TABS Take 1 tablet by mouth daily        nitroglycerin (NITROSTAT) 0.4 MG SL tablet Place 1 tablet (0.4 mg) under the tongue every 5 minutes as needed for chest pain Maximum of 3 doses in 15 minutes 25 tablet 3     ranitidine (ZANTAC) 75 MG tablet Take 1 tablet (75 mg) by mouth 2 times daily 30  tablet 11       ALLERGIES     Allergies   Allergen Reactions     Amoxicillin GI Disturbance     Tetracycline Other (See Comments)     Yeast infection     Nickel Rash       PAST MEDICAL HISTORY:  Past Medical History:   Diagnosis Date     Anemia due to blood loss, acute 12/30/2014     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     Coronary artery disease 12/2014    Inferior STEMI, stent to Circumflex     Percutaneous transluminal coronary angioplasty hematoma 12/15/2014       PAST SURGICAL HISTORY:  Past Surgical History:   Procedure Laterality Date     APPENDECTOMY  1956     ESOPHAGOSCOPY, GASTROSCOPY, DUODENOSCOPY (EGD), COMBINED N/A 1/30/2018    Procedure: COMBINED ENDOSCOPIC ULTRASOUND, ESOPHAGOSCOPY, GASTROSCOPY, DUODENOSCOPY (EGD), FINE NEEDLE ASPIRATE/BIOPSY;   EUS;  Surgeon: Carlos Fletcher MD;  Location:  GI     EYE SURGERY       INSERT PORT VASCULAR ACCESS N/A 2/12/2018    Procedure: INSERT PORT VASCULAR ACCESS;  Port-a-Cath Placement;  Surgeon: Carlos Johnson MD;  Location: [facility] OR     PHACOEMULSIFICATION WITH STANDARD INTRAOCULAR LENS IMPLANT Left 1/25/2016    Procedure: PHACOEMULSIFICATION WITH STANDARD INTRAOCULAR LENS IMPLANT;  Surgeon: Julio César Wallace MD;  Location: [facility]     PHACOEMULSIFICATION WITH STANDARD INTRAOCULAR LENS IMPLANT Right 2/22/2016    Procedure: PHACOEMULSIFICATION WITH STANDARD INTRAOCULAR LENS IMPLANT;  Surgeon: Julio César Wallace MD;  Location: [facility] OR     SURGICAL HISTORY OF -   1961    right hand surgery      TONSILLECTOMY & ADENOIDECTOMY  1967     VASCULAR SURGERY  02/12/2018    PortaCath       FAMILY HISTORY:  Family History   Problem Relation Age of Onset     Cancer Brother      Heart Disease Brother      Cancer Mother      Respiratory Father      Heart Disease Father         MI       SOCIAL HISTORY:  Social History     Socioeconomic History     Marital status:      Spouse name: None     Number of children: None     Years of education: None      Highest education level: None   Social Needs     Financial resource strain: None     Food insecurity - worry: None     Food insecurity - inability: None     Transportation needs - medical: None     Transportation needs - non-medical: None   Occupational History     None   Tobacco Use     Smoking status: Former Smoker     Packs/day: 0.50     Types: Cigarettes     Last attempt to quit: 2014     Years since quittin.0     Smokeless tobacco: Never Used   Substance and Sexual Activity     Alcohol use: No     Drug use: No     Sexual activity: Not Currently   Other Topics Concern     Parent/sibling w/ CABG, MI or angioplasty before 65F 55M? Yes   Social History Narrative     None       Review of Systems:  Skin:  Negative scaling     Eyes:  Positive for cataracts;visual blurring    ENT:  Positive for hoarseness    Respiratory:  Positive for dyspnea on exertion;cough pneumonia   Cardiovascular:    Positive for;lower extremity symptoms;edema;lightheadedness;fatigue    Gastroenterology: Negative      Genitourinary:  Negative urinary frequency;urgency    Musculoskeletal:  Positive for back pain    Neurologic:  Positive for numbness or tingling of feet;numbness or tingling of hands;headaches    Psychiatric:  Positive for anxiety;depression    Heme/Lymph/Imm:  Negative anemia    Endocrine:  Negative        Physical Exam:  Vitals: /71   Pulse 85   Wt 78.5 kg (173 lb)   SpO2 96%   BMI 31.64 kg/m       Constitutional:  cooperative;in no acute distress frail;chronically ill      Skin:  warm and dry to the touch          Head:  normocephalic        Eyes:  sclera white        Lymph:      ENT:    pallor      Neck:  no stiffness        Respiratory:  clear to auscultation         Cardiac: regular rhythm, normal S1/S2, no S3 or S4, apical impulse not displaced, no murmurs, gallops or rubs                pulses full and equal                                        GI:  not assessed this visit        Extremities and  Muscular Skeletal:  not assessed this visit              Neurological:  affect appropriate        Psych:  Alert and Oriented x 3        CC  No referring provider defined for this encounter.                Thank you for allowing me to participate in the care of your patient.      Sincerely,     ALEXSANDRA Balderas [facility]    cc:   No referring provider defined for this encounter.

## 2019-01-11 NOTE — PROGRESS NOTES
HPI and Plan:   See dictation    Orders Placed This Encounter   Procedures     CT Chest w/o & w Contrast       No orders of the defined types were placed in this encounter.      There are no discontinued medications.      Encounter Diagnoses   Name Primary?     Ascending aorta dilatation (H) Yes     Essential hypertension with goal blood pressure less than 140/90      Coronary artery disease involving native coronary artery of native heart without angina pectoris      Hyperlipidemia LDL goal <70        CURRENT MEDICATIONS:  Current Outpatient Medications   Medication Sig Dispense Refill     Acetaminophen (TYLENOL PO) Take 1,000 mg by mouth every 6 hours as needed       albuterol (PROAIR HFA/PROVENTIL HFA/VENTOLIN HFA) 108 (90 Base) MCG/ACT inhaler Inhale 2 puffs into the lungs 4 times daily 1 Inhaler 2     aspirin EC 81 MG EC tablet Take 1 tablet (81 mg) by mouth daily 90 tablet 3     atorvastatin (LIPITOR) 40 MG tablet Take 1 tablet (40 mg) by mouth daily 90 tablet 3     lisinopril (PRINIVIL/ZESTRIL) 5 MG tablet Take 1 tablet (5 mg) by mouth daily 90 tablet 2     LORazepam (ATIVAN) 0.5 MG tablet Take 1 tablet (0.5 mg) by mouth 2 times daily as needed for anxiety 30 tablet 5     metoprolol succinate (TOPROL-XL) 50 MG 24 hr tablet Take 1 tablet (50 mg) by mouth daily 90 tablet 1     Multiple Vitamins-Minerals (MULTIVITAMIN ADULT) TABS Take 1 tablet by mouth daily        nitroglycerin (NITROSTAT) 0.4 MG SL tablet Place 1 tablet (0.4 mg) under the tongue every 5 minutes as needed for chest pain Maximum of 3 doses in 15 minutes 25 tablet 3     ranitidine (ZANTAC) 75 MG tablet Take 1 tablet (75 mg) by mouth 2 times daily 30 tablet 11       ALLERGIES     Allergies   Allergen Reactions     Amoxicillin GI Disturbance     Tetracycline Other (See Comments)     Yeast infection     Nickel Rash       PAST MEDICAL HISTORY:  Past Medical History:   Diagnosis Date     Anemia due to blood loss, acute 12/30/2014     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     Coronary artery disease 12/2014    Inferior STEMI, stent to Circumflex     Percutaneous transluminal coronary angioplasty hematoma 12/15/2014       PAST SURGICAL HISTORY:  Past Surgical History:   Procedure Laterality Date     APPENDECTOMY  1956     ESOPHAGOSCOPY, GASTROSCOPY, DUODENOSCOPY (EGD), COMBINED N/A 1/30/2018    Procedure: COMBINED ENDOSCOPIC ULTRASOUND, ESOPHAGOSCOPY, GASTROSCOPY, DUODENOSCOPY (EGD), FINE NEEDLE ASPIRATE/BIOPSY;   EUS;  Surgeon: Carlos Fletcher MD;  Location:  GI     EYE SURGERY       INSERT PORT VASCULAR ACCESS N/A 2/12/2018    Procedure: INSERT PORT VASCULAR ACCESS;  Port-a-Cath Placement;  Surgeon: Carlos Johnson MD;  Location: [facility]     PHACOEMULSIFICATION WITH STANDARD INTRAOCULAR LENS IMPLANT Left 1/25/2016    Procedure: PHACOEMULSIFICATION WITH STANDARD INTRAOCULAR LENS IMPLANT;  Surgeon: Julio César Wallace MD;  Location: [facility]     PHACOEMULSIFICATION WITH STANDARD INTRAOCULAR LENS IMPLANT Right 2/22/2016    Procedure: PHACOEMULSIFICATION WITH STANDARD INTRAOCULAR LENS IMPLANT;  Surgeon: Julio César Wallace MD;  Location: [facility]     SURGICAL HISTORY OF -   1961    right hand surgery      TONSILLECTOMY & ADENOIDECTOMY  1967     VASCULAR SURGERY  02/12/2018    PortaCath       FAMILY HISTORY:  Family History   Problem Relation Age of Onset     Cancer Brother      Heart Disease Brother      Cancer Mother      Respiratory Father      Heart Disease Father         MI       SOCIAL HISTORY:  Social History     Socioeconomic History     Marital status:      Spouse name: None     Number of children: None     Years of education: None     Highest education level: None   Social Needs     Financial resource strain: None     Food insecurity - worry: None     Food insecurity - inability: None     Transportation needs - medical: None     Transportation needs - non-medical: None   Occupational History     None   Tobacco Use     Smoking status:  Former Smoker     Packs/day: 0.50     Types: Cigarettes     Last attempt to quit: 2014     Years since quittin.0     Smokeless tobacco: Never Used   Substance and Sexual Activity     Alcohol use: No     Drug use: No     Sexual activity: Not Currently   Other Topics Concern     Parent/sibling w/ CABG, MI or angioplasty before 65F 55M? Yes   Social History Narrative     None       Review of Systems:  Skin:  Negative scaling     Eyes:  Positive for cataracts;visual blurring    ENT:  Positive for hoarseness    Respiratory:  Positive for dyspnea on exertion;cough pneumonia   Cardiovascular:    Positive for;lower extremity symptoms;edema;lightheadedness;fatigue    Gastroenterology: Negative      Genitourinary:  Negative urinary frequency;urgency    Musculoskeletal:  Positive for back pain    Neurologic:  Positive for numbness or tingling of feet;numbness or tingling of hands;headaches    Psychiatric:  Positive for anxiety;depression    Heme/Lymph/Imm:  Negative anemia    Endocrine:  Negative        Physical Exam:  Vitals: /71   Pulse 85   Wt 78.5 kg (173 lb)   SpO2 96%   BMI 31.64 kg/m      Constitutional:  cooperative;in no acute distress frail;chronically ill      Skin:  warm and dry to the touch          Head:  normocephalic        Eyes:  sclera white        Lymph:      ENT:    pallor      Neck:  no stiffness        Respiratory:  clear to auscultation         Cardiac: regular rhythm, normal S1/S2, no S3 or S4, apical impulse not displaced, no murmurs, gallops or rubs                pulses full and equal                                        GI:  not assessed this visit        Extremities and Muscular Skeletal:  not assessed this visit              Neurological:  affect appropriate        Psych:  Alert and Oriented x 3        CC  No referring provider defined for this encounter.

## 2019-01-16 NOTE — RESULT ENCOUNTER NOTE
Results noted: moderate dilatation of mid ascending aorta at 4.6 cm; coronary calcification. Will discuss with Aliyah Paula NP for her recommendations.

## 2019-01-17 NOTE — RESULT ENCOUNTER NOTE
Pt notified of results and recommendations for follow up chest CTA and Dr Neri. Order placed for chest CTA

## 2019-01-17 NOTE — NURSING NOTE
"REASON FOR APPOINTMENT   Type of Cancer: SCLC with new L adrenal met  Date of Symptom Onset: noticed on Ct scan, then PET scan     TREATMENT TO-DATE FOR THIS CANCER  Surgery ? none   Chemotherapy ? Previous chemo in 2018 for SCLC   Other Treatments for this Cancer ? Had radiation to the lung, here 6/2018.    PERSONAL HISTORY OF CANCER   Previous Cancer ? no   Prior Radiation ? no   Prior Chemotherapy ? no   Prior Hormonal Therapy ? no     RECENT IMAGING STUDIES  CT and PET in [facility]    REFERRALS NEEDED  None at this time    VITALS  /76   Pulse 83   Resp 18   Wt 78 kg (172 lb)   SpO2 93%   BMI 31.46 kg/m      PACEMAKER/IMPLANTED CARDIAC DEVICE no    PAIN  Current history of pain associated with this visit:  Pain in mid back for a few months with hx of \"bad back\". Has seen the chiropractor but stopped as she didn't find it helpful and it hurt her ribs.  Intensity: Patient is unable to quantify.  Current: ache/twinge   Location: mid back - on L side but radiates around all mid back.   Treatment: none at present    PSYCHOSOCIAL  Marital Status:   Patient lives in [place] with self.  Number of children: 3  Working status: homemaker  Do you feel safe in your home? Yes    REVIEW OF SYSTEMS  Skin: negative  Eyes: negative  Ears/Nose/Throat: negative  Respiratory: Dyspnea on exertion- occassionaly, has PRN inhaler.  Cardiovascular: newly found AAA- had designated scan to al. Plan to follow up in 6 months  Gastrointestinal: negative  Genitourinary: negative  Musculoskeletal: negative  Neurologic: negative  Psychiatric: negative  Hematologic/Lymphatic/Immunologic: negative  Endocrine: negative    WOMEN ONLY  Any chance you may be pregnant: No      Radiation Oncology Patient Teaching    Current Concern: \"will it hurt to treat this area?\"    Person involved with teaching: Patient  Patient asked Questions: Yes  Patient was cooperative: Yes  Patient was receptive (willing to accept information given): " Yes    Education Assessment  Comprehension ability: Medium  Knowledge level: Medium  Factors affecting teaching: None    Education Materials Given  Radiation Therapy and You    Educational Topics Discussed  Side effects, Activity, Nutrition, Adjustment to illness and When to call MD/RN    Response To Teaching  More review necessary    Do you have an advanced directive or living will? yes  Are you DNR/DNI? yes

## 2019-01-17 NOTE — LETTER
2019      RE: Ines Pickard  [address] [address] [facility] [place] [address]          Department of Radiation Oncology  Radiation Therapy Center  [facility]  [address], [address]  [place] [address] [phone]       Consultation Note    Name: Ines Pickard MRN: [id number]   : 1940   Date of Service: 2019  Referring: Dr. Miner     Reason for consultation: Extensive stage small cell lung cancer, s/p systemic therapy and consolidative RT to thorax. Now with oligometastatic disease involving the left adrenal gland. Evaluate role of radiation therapy.     History of Present Illness   Ms. Pickard is a 78 year old female with a diagnosis of extensive stage small cell lung cancer. She is now s/p systemic (6 cycles of carboplatin/etoposide) therapy and consolidative RT to thorax. She now presents with oligometastatic disease involving the left adrenal gland. She presents to discuss the potential role of radiation therapy.     The patient initially presented with a 5-month history of hoarseness.  She was initially evaluated by ENT who on direct laryngoscopy noted left vocal cord paralysis.  Patient separately underwent a CT scan in 2018 which identified a 2.3 cm left upper lobe nodule and left hilar and mediastinal adenopathy.  She was also noted to have a left adrenal nodule measuring 1.5 cm in size.  On 2018, FNA was done the left adrenal mass.  Final pathology was Consistent with metastatic small cell lung cancer.  Staging workup including a PET scan confirmed the primary left upper lobe lesion, thoracic disease, and left adrenal metastasis.  The patient was staged as extensive stage small cell lung cancer.  She subsequently completed 6 cycles of systemic therapy with carboplatin and etoposide under the care of Dr. Miner.  The patient had overall good response to systemic therapy and subsequently underwent consolidative thoracic radiation therapy was completed  July 2018.  PCI was not recommended due to lack of survival advantage in the [place] data.  Most recent PET scan 012 2019 demonstrated increased metabolic activity of the left adrenal nodule, 2.5 cm in size, with a maximum SUV of 12.6.  No other suspicious active sites of disease were noted.  Patient was subsequent referred to discuss the potential role of radiation therapy.    She is overall doing well.  Energy is adequate.  No acute complaints.  No pacemaker.      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     Coronary artery disease 12/2014    Inferior STEMI, stent to Circumflex     Percutaneous transluminal coronary angioplasty hematoma 12/15/2014       Past Surgical History:   Past Surgical History:   Procedure Laterality Date     APPENDECTOMY  1956     ESOPHAGOSCOPY, GASTROSCOPY, DUODENOSCOPY (EGD), COMBINED N/A 1/30/2018    Procedure: COMBINED ENDOSCOPIC ULTRASOUND, ESOPHAGOSCOPY, GASTROSCOPY, DUODENOSCOPY (EGD), FINE NEEDLE ASPIRATE/BIOPSY;   EUS;  Surgeon: Carlos Fletcher MD;  Location:  GI     EYE SURGERY       INSERT PORT VASCULAR ACCESS N/A 2/12/2018    Procedure: INSERT PORT VASCULAR ACCESS;  Port-a-Cath Placement;  Surgeon: Carlos Johnson MD;  Location: [facility]     PHACOEMULSIFICATION WITH STANDARD INTRAOCULAR LENS IMPLANT Left 1/25/2016    Procedure: PHACOEMULSIFICATION WITH STANDARD INTRAOCULAR LENS IMPLANT;  Surgeon: Julio César Wallace MD;  Location: [facility]     PHACOEMULSIFICATION WITH STANDARD INTRAOCULAR LENS IMPLANT Right 2/22/2016    Procedure: PHACOEMULSIFICATION WITH STANDARD INTRAOCULAR LENS IMPLANT;  Surgeon: Julio César Wallace MD;  Location: [facility]     SURGICAL HISTORY OF -   1961    right hand surgery      TONSILLECTOMY & ADENOIDECTOMY  1967     VASCULAR SURGERY  02/12/2018    PortaCath       Chemotherapy History:  Per HPI    Radiation History:  7/18/18,  completed consolidative RT to chest. 30 Gy in 10 fx          Pregnant: Not Applicable  Implanted Cardiac Devices: No    Medications:  Current Outpatient Medications   Medication     Acetaminophen (TYLENOL PO)     albuterol (PROAIR HFA/PROVENTIL HFA/VENTOLIN HFA) 108 (90 Base) MCG/ACT inhaler     aspirin EC 81 MG EC tablet     atorvastatin (LIPITOR) 40 MG tablet     lisinopril (PRINIVIL/ZESTRIL) 5 MG tablet     LORazepam (ATIVAN) 0.5 MG tablet     metoprolol succinate (TOPROL-XL) 50 MG 24 hr tablet     Multiple Vitamins-Minerals (MULTIVITAMIN ADULT) TABS     nitroglycerin (NITROSTAT) 0.4 MG SL tablet     ranitidine (ZANTAC) 75 MG tablet     No current facility-administered medications for this visit.          Allergies:     Allergies   Allergen Reactions     Amoxicillin GI Disturbance     Tetracycline Other (See Comments)     Yeast infection     Nickel Rash       Family History:  Family History   Problem Relation Age of Onset     Cancer Brother      Heart Disease Brother      Cancer Mother      Respiratory Father      Heart Disease Father         MI       Review of Systems   A 10-point review of systems was performed. Pertinent findings are noted in the HPI.    Physical Exam   ECOG Status: 1    Vitals:  There were no vitals taken for this visit.    Gen: Alert, in NAD  Head: NC/AT  Eyes: PERRL, EOMI, sclera anicteric  Ears: No external auricular lesions  Nose/sinus: No rhinorrhea or epistaxis  Oral cavity/oropharynx: MMM, no visible oral cavity lesions, FOM and BOT are soft to palpation  Neck: Full ROM, supple, no palpable adenopathy  Pulm: No wheezing, stridor or respiratory distress  CV: Extremities are warm and well-perfused, no cyanosis, no pedal edema  Abdominal: Normal bowel sounds, soft, nontender, no masses  Musculoskeletal: Normal bulk and tone  Skin: Normal color and turgor  Neuro: A/Ox3, CN II-XII intact, normal gait    Imaging/Path/Labs   Imagin/2/19  PET  IMPRESSION:  1. New 2.5 cm left adrenal  nodule is hypermetabolic and suspicious for  primary or metastatic malignancy.  2. Left upper lobe pulmonary nodule is not significantly changed and  demonstrates only mild hypermetabolic activity, similar to prior.  3. There are new compression deformities in T6 and T10.  4. Ascending thoracic aortic aneurysm measures 5.0 cm.    Path:  1/8/18  Adrenal gland, left, mass, endoscopic ultrasound guided fine needle   aspiration:   - Positive for malignancy   - Consistent with metastatic small cell carcinoma   - See comment   Specimen Adequacy: Satisfactory for evaluation.     COMMENT:   Immunohistochemical stains are performed with appropriate control   reactions on the cell block material. The   tumor cells are positive for synaptophysin, chromogranin, CK7 and TTF-1,   while negative for p40 and CK20.   Ki-67 shows high proliferation index (>95%). This immunoprofile supports   the above interpretation.       Assessment    Ms. Pickard is a 78 year old female with a diagnosis of extensive stage small cell lung cancer. She is now s/p systemic (6 cycles of carboplatin/etoposide) therapy and consolidative RT to thorax. She now presents with oligometastatic disease involving the left adrenal gland. She presents to discuss the potential role of radiation therapy.     Plan     1. The patient has had an excellent response to systemic therapy and thoracic consolidation with radiation therapy. Her only clearly active site of disease involves her left adrenal gland, which was also her present at time of diagnosis. Given her limited site of disease and overall excellent response to treatment thus far, I feel that local treatment in the form of radiation is a very reasonable option.     2. I plan to treat with SBRT to the left adrenal gland. I plan to treat to 6-8 Gy x 5 fractions, depending upon OAR tolerance (stomach, kidney, and small bowel).     3. Side effects were discussed in detail to the patient at included fatigue, loose  stool, nausea, and injury to organs nearby.     4. Patient wishes to proceed with treatment. CT simulation will be scheduled for next week with intent to start RT soon thereafter.     Juan Flores MD  Department of Radiation Oncology  [facility]

## 2019-01-17 NOTE — PROGRESS NOTES
Department of Radiation Oncology  Radiation Therapy Center  [facility]  [address], [address]  [place] [address]  [phone]       Consultation Note    Name: Ines Pickard MRN: [id number]   : 1940   Date of Service: 2019  Referring: Dr. Miner     Reason for consultation: Extensive stage small cell lung cancer, s/p systemic therapy and consolidative RT to thorax. Now with oligometastatic disease involving the left adrenal gland. Evaluate role of radiation therapy.     History of Present Illness   Ms. Pickard is a 78 year old female with a diagnosis of extensive stage small cell lung cancer. She is now s/p systemic (6 cycles of carboplatin/etoposide) therapy and consolidative RT to thorax. She now presents with oligometastatic disease involving the left adrenal gland. She presents to discuss the potential role of radiation therapy.     The patient initially presented with a 5-month history of hoarseness.  She was initially evaluated by ENT who on direct laryngoscopy noted left vocal cord paralysis.  Patient separately underwent a CT scan in 2018 which identified a 2.3 cm left upper lobe nodule and left hilar and mediastinal adenopathy.  She was also noted to have a left adrenal nodule measuring 1.5 cm in size.  On 2018, FNA was done the left adrenal mass.  Final pathology was Consistent with metastatic small cell lung cancer.  Staging workup including a PET scan confirmed the primary left upper lobe lesion, thoracic disease, and left adrenal metastasis.  The patient was staged as extensive stage small cell lung cancer.  She subsequently completed 6 cycles of systemic therapy with carboplatin and etoposide under the care of Dr. Miner.  The patient had overall good response to systemic therapy and subsequently underwent consolidative thoracic radiation therapy was completed 2018.  PCI was not recommended due to lack of survival advantage in the  [place] data.  Most recent PET scan 012 2019 demonstrated increased metabolic activity of the left adrenal nodule, 2.5 cm in size, with a maximum SUV of 12.6.  No other suspicious active sites of disease were noted.  Patient was subsequent referred to discuss the potential role of radiation therapy.    She is overall doing well.  Energy is adequate.  No acute complaints.  No pacemaker.      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     Coronary artery disease 12/2014    Inferior STEMI, stent to Circumflex     Percutaneous transluminal coronary angioplasty hematoma 12/15/2014       Past Surgical History:   Past Surgical History:   Procedure Laterality Date     APPENDECTOMY  1956     ESOPHAGOSCOPY, GASTROSCOPY, DUODENOSCOPY (EGD), COMBINED N/A 1/30/2018    Procedure: COMBINED ENDOSCOPIC ULTRASOUND, ESOPHAGOSCOPY, GASTROSCOPY, DUODENOSCOPY (EGD), FINE NEEDLE ASPIRATE/BIOPSY;   EUS;  Surgeon: Carlos Fletcher MD;  Location:  GI     EYE SURGERY       INSERT PORT VASCULAR ACCESS N/A 2/12/2018    Procedure: INSERT PORT VASCULAR ACCESS;  Port-a-Cath Placement;  Surgeon: Carlos oJhnson MD;  Location: [facility] OR     PHACOEMULSIFICATION WITH STANDARD INTRAOCULAR LENS IMPLANT Left 1/25/2016    Procedure: PHACOEMULSIFICATION WITH STANDARD INTRAOCULAR LENS IMPLANT;  Surgeon: Julio César Wallace MD;  Location: [facility] OR     PHACOEMULSIFICATION WITH STANDARD INTRAOCULAR LENS IMPLANT Right 2/22/2016    Procedure: PHACOEMULSIFICATION WITH STANDARD INTRAOCULAR LENS IMPLANT;  Surgeon: Julio César Wallace MD;  Location: [facility] OR     SURGICAL HISTORY OF -   1961    right hand surgery      TONSILLECTOMY & ADENOIDECTOMY  1967     VASCULAR SURGERY  02/12/2018    PortaCath       Chemotherapy History:  Per HPI    Radiation History:  7/18/18, completed consolidative RT to chest. 30 Gy in 10 fx          Pregnant: Not  Applicable  Implanted Cardiac Devices: No    Medications:  Current Outpatient Medications   Medication     Acetaminophen (TYLENOL PO)     albuterol (PROAIR HFA/PROVENTIL HFA/VENTOLIN HFA) 108 (90 Base) MCG/ACT inhaler     aspirin EC 81 MG EC tablet     atorvastatin (LIPITOR) 40 MG tablet     lisinopril (PRINIVIL/ZESTRIL) 5 MG tablet     LORazepam (ATIVAN) 0.5 MG tablet     metoprolol succinate (TOPROL-XL) 50 MG 24 hr tablet     Multiple Vitamins-Minerals (MULTIVITAMIN ADULT) TABS     nitroglycerin (NITROSTAT) 0.4 MG SL tablet     ranitidine (ZANTAC) 75 MG tablet     No current facility-administered medications for this visit.          Allergies:     Allergies   Allergen Reactions     Amoxicillin GI Disturbance     Tetracycline Other (See Comments)     Yeast infection     Nickel Rash       Family History:  Family History   Problem Relation Age of Onset     Cancer Brother      Heart Disease Brother      Cancer Mother      Respiratory Father      Heart Disease Father         MI       Review of Systems   A 10-point review of systems was performed. Pertinent findings are noted in the HPI.    Physical Exam   ECOG Status: 1    Vitals:  There were no vitals taken for this visit.    Gen: Alert, in NAD  Head: NC/AT  Eyes: PERRL, EOMI, sclera anicteric  Ears: No external auricular lesions  Nose/sinus: No rhinorrhea or epistaxis  Oral cavity/oropharynx: MMM, no visible oral cavity lesions, FOM and BOT are soft to palpation  Neck: Full ROM, supple, no palpable adenopathy  Pulm: No wheezing, stridor or respiratory distress  CV: Extremities are warm and well-perfused, no cyanosis, no pedal edema  Abdominal: Normal bowel sounds, soft, nontender, no masses  Musculoskeletal: Normal bulk and tone  Skin: Normal color and turgor  Neuro: A/Ox3, CN II-XII intact, normal gait    Imaging/Path/Labs   Imagin/2/19  PET  IMPRESSION:  1. New 2.5 cm left adrenal nodule is hypermetabolic and suspicious for  primary or metastatic  malignancy.  2. Left upper lobe pulmonary nodule is not significantly changed and  demonstrates only mild hypermetabolic activity, similar to prior.  3. There are new compression deformities in T6 and T10.  4. Ascending thoracic aortic aneurysm measures 5.0 cm.    Path:  1/8/18  Adrenal gland, left, mass, endoscopic ultrasound guided fine needle   aspiration:   - Positive for malignancy   - Consistent with metastatic small cell carcinoma   - See comment   Specimen Adequacy: Satisfactory for evaluation.     COMMENT:   Immunohistochemical stains are performed with appropriate control   reactions on the cell block material. The   tumor cells are positive for synaptophysin, chromogranin, CK7 and TTF-1,   while negative for p40 and CK20.   Ki-67 shows high proliferation index (>95%). This immunoprofile supports   the above interpretation.       Assessment    Ms. Pickard is a 78 year old female with a diagnosis of extensive stage small cell lung cancer. She is now s/p systemic (6 cycles of carboplatin/etoposide) therapy and consolidative RT to thorax. She now presents with oligometastatic disease involving the left adrenal gland. She presents to discuss the potential role of radiation therapy.     Plan     1. The patient has had an excellent response to systemic therapy and thoracic consolidation with radiation therapy. Her only clearly active site of disease involves her left adrenal gland, which was also her present at time of diagnosis. Given her limited site of disease and overall excellent response to treatment thus far, I feel that local treatment in the form of radiation is a very reasonable option.     2. I plan to treat with SBRT to the left adrenal gland. I plan to treat to 6-8 Gy x 5 fractions, depending upon OAR tolerance (stomach, kidney, and small bowel).     3. Side effects were discussed in detail to the patient at included fatigue, loose stool, nausea, and injury to organs nearby.     4. Patient wishes  to proceed with treatment. CT simulation will be scheduled for next week with intent to start RT soon thereafter.     Juan Flores MD  Department of Radiation Oncology  [facility]

## 2019-01-24 NOTE — TELEPHONE ENCOUNTER
"Call patient.    Refilled x 1, needs a visit to establish care with a new provider as this is a controlled substance. Appears she is using it twice a day, not \"as needed\" and filling every 14 days or so.      Recommend visit to discuss.     Rohini Suárez M.D.    "

## 2019-01-24 NOTE — TELEPHONE ENCOUNTER
Routing refill request to provider for review/approval because:  Drug not on the [facility] refill protocol   Former Dr. Perez pt    Maria Ines BALES RN

## 2019-01-24 NOTE — TELEPHONE ENCOUNTER
Requested Prescriptions   Pending Prescriptions Disp Refills     LORazepam (ATIVAN) 0.5 MG tablet 30 tablet 5     Sig: Take 1 tablet (0.5 mg) by mouth 2 times daily as needed for anxiety    Last Written Prescription Date:  11/8/2018  Last Fill Quantity: 30,   # refills: 5  Last Office Visit: 11/19/2018 with Post  Future Office visit:       Routing refill request to provider for review/approval because:  Drug not on the [facility] or [facility] refill protocol or controlled substance

## 2019-01-24 NOTE — TELEPHONE ENCOUNTER
Pt called left message and message below re: appt needed, faxed script to [facility].  Kalyn Bender  [address]

## 2019-01-24 NOTE — TELEPHONE ENCOUNTER
CSS, please let her know Dr Suárez's message below when you have the hard copy.   Thanks,   Melissa Iyer RNC

## 2019-02-18 NOTE — PROGRESS NOTES
Call received from pt requesting a refill of Ativan on behalf of Self.  Last refill: 01.24.19  # 30 with 0 refills at via hand carry.  Last office visit:  01.08.19  Next office visit:  03.04.19    This is an appropriate refill, and has been placed at the  for .  Bella Arvizu RN, BSN, OCN

## 2019-02-18 NOTE — PROGRESS NOTES
Infusion Nursing Note:  Ines Pickard presents today for port flush  Patient seen by provider today: No   present during visit today: Not Applicable.    Note: N/A.    Intravenous Access:  Implanted Port.    Treatment Conditions:  Not Applicable.      Post Infusion Assessment:  Patient tolerated port flush without incident.  Blood return noted  Site patent and intact, free from redness, edema or discomfort.  No evidence of extravasations.  Access discontinued per protocol.    Discharge Plan:   Patient discharged in stable condition accompanied by: self.  Departure Mode: Ambulatory.  Pt to return on 3/18/19 at 1:00 pm for next port flush.    Dea Black RN

## 2019-02-25 NOTE — TELEPHONE ENCOUNTER
Routing refill request to provider for review/approval because:  Patient has medication for Metoprolol and this was addressed in Cardiology on 12/6/18:  2. Hypertension    controlled    continue lisinopril, metoprolol    Patient has albuterol with dx of bronchitis.  Patient has small cell carcinoma of left lung and no ACT done.    Ok to continue medications?    Thank you    Gina POP RN

## 2019-02-25 NOTE — TELEPHONE ENCOUNTER
Reason for Call:  Medication or medication refill:    Do you use a [facility]?  Name of the pharmacy and phone number for the current request:  [facility] [place] [phone]    Name of the medication requested: Pt calling for refills on Albuterol inhaler and Metoprolol.  Please call patient and advise.    Metoprolol  Last Written Prescription Date:  7/7/18  Last Fill Quantity: 90,  # refills: 1     Albuterol Inhaler  Last Written Prescription Date:  12/10/18  Last Fill Quantity: 1,  # refills: 2    Last office visit: 11/19/2018 with prescribing provider:     Future Office Visit:   Next 5 appointments (look out 90 days)    Mar 04, 2019  3:30 PM CST  Return Visit with Tracy Miner MD  [facility] ([facility]) [facility] [address] [id number] [phone]         Other request:     Can we leave a detailed message on this number? YES    Phone number patient can be reached at: Home number on file [phone] (home)    Best Time: any    Call taken on 2/25/2019 at 9:02 AM by Neyda Meza

## 2019-02-25 NOTE — TELEPHONE ENCOUNTER
Refilled.  Last office visit 11/2018 in FM.  Last visit in cardiology 1/11/2019.    Rohini Suáerz M.D.

## 2019-02-27 NOTE — LETTER
2019      RE: Ines Pickard  [address] [id number]       [facility]  SPECIALIZING IN BREAKTHROUGHS  Radiation Oncology    On Treatment Visit Note      Ines Pickard      Date: 2019   MRN: [id number]   : 1940  Diagnosis: SCLC with L adrenal met      Reason for Visit:  On Radiation Treatment Visit     Treatment Summary to Date  Treatment Site: L adrenal met/abdomen Current Dose: 2400/3000 cGy Fractions: 4/5      Chemotherapy  Chemo concurrent with radx?: No    Subjective:   Mild fatigue. No nausea. No loose stool. Overall doing well. Tolerating PO.    Nursing ROS:   Nutrition Alteration  Diet Type: Patient's Preference  Skin  Skin Reaction: 0 - No changes        Cardiovascular  Respiratory effort: 1 - Normal - without distress  Gastrointestinal  Nausea: 0 - None        Pain Assessment  0-10 Pain Scale: 0      Objective:   /73   Pulse 81   Resp 18   Wt 77.3 kg (170 lb 6.4 oz)   SpO2 96%   BMI 31.17 kg/m     No apparent distress.     Labs:  CBC RESULTS:   Recent Labs   Lab Test 18  1030   WBC 8.7   RBC 4.26   HGB 11.6*   HCT 35.5   MCV 83   MCH 27.2   MCHC 32.7   RDW 14.9        ELECTROLYTES:  Recent Labs   Lab Test 18  1030      POTASSIUM 4.0   CHLORIDE 109   AMADO 8.5   CO2 26   BUN 21   CR 0.59   *       Assessment:  Ms. Pickard is a 78 year old female with a diagnosis of extensive stage small cell lung cancer. She is now s/p systemic (6 cycles of carboplatin/etoposide) therapy and consolidative RT to thorax. She presented with oligometastatic disease involving the left adrenal gland, and is undergoing palliative radiation therapy.       Tolerating radiation therapy well.  All questions and concerns addressed.    Plan:   1. Continue current therapy.    2. EOT on Friday. Will plan to see patient in 1 month.       Mosaiq chart and setup information reviewed  Ports checked         Educational Topic  Discussed  Education Instructions: Pt reports tolerating treatment well with no nausea. Does notice fatigue is most notiecable SE.      Juan Flores MD

## 2019-02-27 NOTE — PROGRESS NOTES
[facility]  SPECIALIZING IN BREAKTHROUGHS  Radiation Oncology    On Treatment Visit Note      Ines Pickard      Date: 2019   MRN: [id number]   : 1940  Diagnosis: SCLC with L adrenal met      Reason for Visit:  On Radiation Treatment Visit     Treatment Summary to Date  Treatment Site: L adrenal met/abdomen Current Dose: 2400/3000 cGy Fractions: 4/5      Chemotherapy  Chemo concurrent with radx?: No    Subjective:   Mild fatigue. No nausea. No loose stool. Overall doing well. Tolerating PO.    Nursing ROS:   Nutrition Alteration  Diet Type: Patient's Preference  Skin  Skin Reaction: 0 - No changes        Cardiovascular  Respiratory effort: 1 - Normal - without distress  Gastrointestinal  Nausea: 0 - None        Pain Assessment  0-10 Pain Scale: 0      Objective:   /73   Pulse 81   Resp 18   Wt 77.3 kg (170 lb 6.4 oz)   SpO2 96%   BMI 31.17 kg/m    No apparent distress.     Labs:  CBC RESULTS:   Recent Labs   Lab Test 18  1030   WBC 8.7   RBC 4.26   HGB 11.6*   HCT 35.5   MCV 83   MCH 27.2   MCHC 32.7   RDW 14.9        ELECTROLYTES:  Recent Labs   Lab Test 18  1030      POTASSIUM 4.0   CHLORIDE 109   AMADO 8.5   CO2 26   BUN 21   CR 0.59   *       Assessment:  Ms. Pickard is a 78 year old female with a diagnosis of extensive stage small cell lung cancer. She is now s/p systemic (6 cycles of carboplatin/etoposide) therapy and consolidative RT to thorax. She presented with oligometastatic disease involving the left adrenal gland, and is undergoing palliative radiation therapy.       Tolerating radiation therapy well.  All questions and concerns addressed.    Plan:   1. Continue current therapy.    2. EOT on Friday. Will plan to see patient in 1 month.       Mosaiq chart and setup information reviewed  Ports checked         Educational Topic Discussed  Education Instructions: Pt reports tolerating treatment well with no nausea. Does  notice fatigue is most notiecable SE.      Juan Flores MD

## 2019-03-03 NOTE — PROGRESS NOTES
ONCOLOGY FOLLOW UP VISIT       CHIEF COMPLAINT/REASON FOR VISIT:  Left adrenal mass, FNA 01/30/2018 consistent with sclc     HISTORY OF PRESENT ILLNESS:  A 77-year-old female presented with 5 months duration of hoarseness.  She was evaluated by ENT.  Direct laryngoscopy revealed left vocal cord paralysis.    CT chest with contrast 01/10/2018 identified a 2.3 cm left upper lobe nodule extending to the hilum, suspicious for malignancy, left hilar mediastinal adenopathy with mass-like soft tissue thickening extending along the inferior aspect of the aortic arch likely involving the recurrent laryngeal nerve in this location, left adrenal nodule 1.5 cm undetermined, compression on L1 vertebral body.      EUS 01/30/2017 FNA was done on 2 hypoechoic left adrenal mass-   Consistent with metastatic small cell carcinoma      PET confirmed the primary TEX lesion with adrenal mets. She was dx with extensive stage SCLC.     She made informed decision to proceed with palliative chemo with carbo/-16 2/2018.   She had good PET response after 3 cycles and tx is continued.   She had ongoing PET response post 6 cycles of tx. With minimal AP window LN SUV 3.5.  She had consolidation RT after to her thoracic area till mid July 2018.   PCI was not offered by Rad-Onc due to lack of OVERAL SURVIVAL benefit in extensive stage.   She has good PET in 9/2018.      She has isolated left adrenal gland disease recurrence (the original site of biopsy proven disease) by CT/PET 1/2019. Local RT is offered till early 3/2019 due to isolated recurrence in nature, her age, and prior rough course of chemo.         PAST MEDICAL HISTORY:  CAD, stent around 2014, Angina attack in 5/2018 was at [facility]. Reflux, hypertension, cataracts, hyperlipidemia.      MEDICATIONS:  Reviewed in Epic system.      SOCIAL HISTORY:  She lives in her own house.  She has 3 boys.  She is here with her neighbor.  She quit smoking years back.  Denies alcohol abuse.     "  FAMILY HISTORY:  Positive for mom who had Hodgkin lymphoma.  Brother had unknown type of cancer.      REVIEW OF SYSTEMS:   Throat discomfort seems better.     Eating good, gaining weight.   Still has a little anxiety.   She handled the RT fine.         PHYSICAL EXAMINATION:   VITAL SIGNS: Blood pressure 116/64, pulse 77, temperature 99  F (37.2  C), temperature source Tympanic, resp. rate 16, height 1.575 m (5' 2.01\"), weight 77.3 kg (170 lb 8 oz), SpO2 92 %, not currently breastfeeding.      ECOG 0    GENERAL APPEARANCE:  Elderly lady, looks like her stated age, not in acute distress.   HEENT: The patient is normocephalic, atraumatic. Pupils are equally reactive to light.  Sclerae are anicteric.  Moist oral mucosa.  negative posterior pharynx.   NECK:  Supple.  No jugular venous distention.  Thyroid is not palpable.   LYMPH NODES:  Superficial lymphadenopathy is not appreciable in the bilateral cervical, supraclavicular, axillary or inguinal areas.   CARDIOVASCULAR:  S1, S2 regular with no murmurs or gallops.  No carotid or abdominal bruits.   PULMONARY:  Lungs are clear to auscultation and percussion bilaterally.  There is no wheezing or rhonchi.   GASTROINTESTINAL:  Abdomen is soft, nontender.  No hepatosplenomegaly.  No signs of ascites.  No mass appreciable.   MUSCULOSKELETAL/EXTREMITIES:  No edema.  No cyanotic changes.  No signs of joint deformity.  No lymphedema.  No spinal or paraspinal tenderness.  No CVA tenderness.   NEUROLOGIC:  Cranial nerves II-XII are grossly intact.  Sensation intact.  Muscle strength and muscle tone symmetrical, 5/5 throughout.   SKIN:  No petechiae.  No rash.  No signs of cellulitis.        LABORATORY DATA REVIEWED:   wbc diff and CMP are fine. Hb 11.6,  B12/folate nl.     CEA at 7 in 12/2018,baseline at 6.6 in 2/2017    From 01/30/2018 FNA on left adrenal mass biopsy -- Consistent with metastatic small cell carcinoma        CURRENT IMAGE DATA REVIEWED:   PET 1/2019   1. New 2.5 " cm left adrenal nodule is hypermetabolic SUV 2.9,  suspicious for primary or metastatic malignancy.  2. Left upper lobe pulmonary nodule is not significantly changed and demonstrates only mild hypermetabolic activity, similar to prior.  3. There are new compression deformities in T6 and T10.  4. Ascending thoracic aortic aneurysm measures 5.0 cm.      Old data reviewed with summary  CT body 12/2018   1.  The left upper lobe lung lesion is smaller.  2. There is a small developing left adrenal mass which is of concern for recurrent metastatic disease.    PET 9/2018   1. A 1.5 x 1.3 cm nodular opacity near the left lung apex medially (series 3 image 106) has is not significantly changed in size or hypermetabolic activity, with an SUV max of 2.7.  2. Mildly prominent hypermetabolic lymph node in the AP  window region (series 3 image 129) is unchanged, measuring 1.9 x 0.9 cm, SUV max 3.4. Not changed.   3. Trace left pleural effusion is new.       PET 6/2018 post 6 cycles of carbo/vp16  1. Medial left upper lobe nodule is 1.6 x 1.2 cm, stable in size, series 3 image 112 (SUV max 2.7, previously 3.9).   2. Previously noted AP window hypermetabolism has decreased and now has SUV max 3.5, previously 4.8.    PET 4/2018 post 3 cycles of carbo/vp16  1. Decrease in size and FDG uptake in the left upper lobe nodule consistent with improved lung cancer.  2. Improvement in the previous left hilar and mediastinal adenopathy, currently there is small residual hypermetabolic adenopathy in the aorticopulmonary window consistent with residual mayela metastasis.  3. Resolution of previous hypermetabolic left adrenal nodule consistent with resolved metastasis.    PET 2/2018  1. Hypermetabolic left upper lobe nodule is consistent with malignancy, most likely bronchogenic carcinoma.  2. Hypermetabolic left hilar and mediastinal adenopathy is consistent  with metastatic disease.  3. Hypermetabolic left adrenal nodule is suspicious for  metastatic disease.  4. There is a new 3.4 cm high-density structure abutting the left adrenal gland, suspicious for interval development of adrenal hemorrhage.    CT chest 01/2018 -  identified a 2.3 cm left upper lobe nodule extending to the hilum, suspicious for malignancy, left hilar mediastinal adenopathy with mass-like soft tissue thickening extending along the inferior aspect of the aortic arch likely involving the recurrent laryngeal nerve in this location, left adrenal nodule 1.5 cm undetermined, compression on L1 vertebral body.         ASSESSMENT AND PLAN:    1.  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 S/p full course of tx as above.     She has isolated left adrenal gland disease recurrence (the original site of biopsy proven disease) by CT/PET 1/2019. Local RT is offered 2/2019 due to isolated recurrence in nature, her age, and prior rough course of chemo.     We discussed the option of observation, immunotherapy, oral topotecan.  At the end of discussion, she made informed informed decision to be observed now.     Will f/u in 2 months with repeat PET and labs.     She is informed high recurrence rate with stage IV SCLC after nCR from frontline chemo.     2. normocytic anemia - nl b12/folate. This is likely from chemo.   We discussed the role of diet.     3. Ascending thoracic aortic aneurysm measures 5.0 cm.  She saw CT on 1/11/2019. She needs to f/u and have good BP.     4. New compression deformities in T6 and T10 on PET - we discussed the vit D intake.     5. Anxiety - she still needs ativan prn.

## 2019-03-04 NOTE — LETTER
3/4/2019         RE: Ines Pickard  [address] [facility] [address]        Dear Colleague,    Thank you for referring your patient, Ines Pickard, to the [facility]. Please see a copy of my visit note below.    ONCOLOGY FOLLOW UP VISIT       CHIEF COMPLAINT/REASON FOR VISIT:  Left adrenal mass, FNA 01/30/2018 consistent with sclc     HISTORY OF PRESENT ILLNESS:  A 77-year-old female presented with 5 months duration of hoarseness.  She was evaluated by ENT.  Direct laryngoscopy revealed left vocal cord paralysis.    CT chest with contrast 01/10/2018 identified a 2.3 cm left upper lobe nodule extending to the hilum, suspicious for malignancy, left hilar mediastinal adenopathy with mass-like soft tissue thickening extending along the inferior aspect of the aortic arch likely involving the recurrent laryngeal nerve in this location, left adrenal nodule 1.5 cm undetermined, compression on L1 vertebral body.      EUS 01/30/2017 FNA was done on 2 hypoechoic left adrenal mass-   Consistent with metastatic small cell carcinoma      PET confirmed the primary TEX lesion with adrenal mets. She was dx with extensive stage SCLC.     She made informed decision to proceed with palliative chemo with carbo/-16 2/2018.   She had good PET response after 3 cycles and tx is continued.   She had ongoing PET response post 6 cycles of tx. With minimal AP window LN SUV 3.5.  She had consolidation RT after to her thoracic area till mid July 2018.   PCI was not offered by Rad-Onc due to lack of OVERAL SURVIVAL benefit in extensive stage.   She has good PET in 9/2018.      She has isolated left adrenal gland disease recurrence (the original site of biopsy proven disease) by CT/PET 1/2019. Local RT is offered till early 3/2019 due to isolated recurrence in nature, her age, and prior rough course of chemo.         PAST MEDICAL HISTORY:  CAD, stent around 2014, Angina attack in 5/2018 was [facility]. Reflux,  "hypertension, cataracts, hyperlipidemia.      MEDICATIONS:  Reviewed in Epic system.      SOCIAL HISTORY:  She lives in her own house.  She has 3 boys.  She is here with her neighbor.  She quit smoking years back.  Denies alcohol abuse.      FAMILY HISTORY:  Positive for mom who had Hodgkin lymphoma.  Brother had unknown type of cancer.      REVIEW OF SYSTEMS:   Throat discomfort seems better.     Eating good, gaining weight.   Still has a little anxiety.   She handled the RT fine.         PHYSICAL EXAMINATION:   VITAL SIGNS: Blood pressure 116/64, pulse 77, temperature 99  F (37.2  C), temperature source Tympanic, resp. rate 16, height 1.575 m (5' 2.01\"), weight 77.3 kg (170 lb 8 oz), SpO2 92 %, not currently breastfeeding.      ECOG 0    GENERAL APPEARANCE:  Elderly lady, looks like her stated age, not in acute distress.   HEENT: The patient is normocephalic, atraumatic. Pupils are equally reactive to light.  Sclerae are anicteric.  Moist oral mucosa.  negative posterior pharynx.   NECK:  Supple.  No jugular venous distention.  Thyroid is not palpable.   LYMPH NODES:  Superficial lymphadenopathy is not appreciable in the bilateral cervical, supraclavicular, axillary or inguinal areas.   CARDIOVASCULAR:  S1, S2 regular with no murmurs or gallops.  No carotid or abdominal bruits.   PULMONARY:  Lungs are clear to auscultation and percussion bilaterally.  There is no wheezing or rhonchi.   GASTROINTESTINAL:  Abdomen is soft, nontender.  No hepatosplenomegaly.  No signs of ascites.  No mass appreciable.   MUSCULOSKELETAL/EXTREMITIES:  No edema.  No cyanotic changes.  No signs of joint deformity.  No lymphedema.  No spinal or paraspinal tenderness.  No CVA tenderness.   NEUROLOGIC:  Cranial nerves II-XII are grossly intact.  Sensation intact.  Muscle strength and muscle tone symmetrical, 5/5 throughout.   SKIN:  No petechiae.  No rash.  No signs of cellulitis.        LABORATORY DATA REVIEWED:   wbc diff and CMP are " fine. Hb 11.6,  B12/folate nl.     CEA at 7 in 12/2018,baseline at 6.6 in 2/2017    From 01/30/2018 FNA on left adrenal mass biopsy -- Consistent with metastatic small cell carcinoma        CURRENT IMAGE DATA REVIEWED:   PET 1/2019   1. New 2.5 cm left adrenal nodule is hypermetabolic SUV 2.9,  suspicious for primary or metastatic malignancy.  2. Left upper lobe pulmonary nodule is not significantly changed and demonstrates only mild hypermetabolic activity, similar to prior.  3. There are new compression deformities in T6 and T10.  4. Ascending thoracic aortic aneurysm measures 5.0 cm.      Old data reviewed with summary  CT body 12/2018   1.  The left upper lobe lung lesion is smaller.  2. There is a small developing left adrenal mass which is of concern for recurrent metastatic disease.    PET 9/2018   1. A 1.5 x 1.3 cm nodular opacity near the left lung apex medially (series 3 image 106) has is not significantly changed in size or hypermetabolic activity, with an SUV max of 2.7.  2. Mildly prominent hypermetabolic lymph node in the AP  window region (series 3 image 129) is unchanged, measuring 1.9 x 0.9 cm, SUV max 3.4. Not changed.   3. Trace left pleural effusion is new.       PET 6/2018 post 6 cycles of carbo/vp16  1. Medial left upper lobe nodule is 1.6 x 1.2 cm, stable in size, series 3 image 112 (SUV max 2.7, previously 3.9).   2. Previously noted AP window hypermetabolism has decreased and now has SUV max 3.5, previously 4.8.    PET 4/2018 post 3 cycles of carbo/vp16  1. Decrease in size and FDG uptake in the left upper lobe nodule consistent with improved lung cancer.  2. Improvement in the previous left hilar and mediastinal adenopathy, currently there is small residual hypermetabolic adenopathy in the aorticopulmonary window consistent with residual mayela metastasis.  3. Resolution of previous hypermetabolic left adrenal nodule consistent with resolved metastasis.    PET 2/2018  1. Hypermetabolic left  upper lobe nodule is consistent with malignancy, most likely bronchogenic carcinoma.  2. Hypermetabolic left hilar and mediastinal adenopathy is consistent  with metastatic disease.  3. Hypermetabolic left adrenal nodule is suspicious for metastatic disease.  4. There is a new 3.4 cm high-density structure abutting the left adrenal gland, suspicious for interval development of adrenal hemorrhage.    CT chest 01/2018 -  identified a 2.3 cm left upper lobe nodule extending to the hilum, suspicious for malignancy, left hilar mediastinal adenopathy with mass-like soft tissue thickening extending along the inferior aspect of the aortic arch likely involving the recurrent laryngeal nerve in this location, left adrenal nodule 1.5 cm undetermined, compression on L1 vertebral body.         ASSESSMENT AND PLAN:    1.  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 S/p full course of tx as above.     She has isolated left adrenal gland disease recurrence (the original site of biopsy proven disease) by CT/PET 1/2019. Local RT is offered 2/2019 due to isolated recurrence in nature, her age, and prior rough course of chemo.     We discussed the option of observation, immunotherapy, oral topotecan.  At the end of discussion, she made informed informed decision to be observed now.     Will f/u in 2 months with repeat PET and labs.     She is informed high recurrence rate with stage IV SCLC after nCR from frontline chemo.     2. normocytic anemia - nl b12/folate. This is likely from chemo.   We discussed the role of diet.     3. Ascending thoracic aortic aneurysm measures 5.0 cm.  She saw CT on 1/11/2019. She needs to f/u and have good BP.     4. New compression deformities in T6 and T10 on PET - we discussed the vit D intake.     5. Anxiety - she still needs ativan prn.                 Again, thank you for allowing me to participate in the care of your patient.        Sincerely,        Tracy Miner MD,  MD

## 2019-03-04 NOTE — NURSING NOTE
"Oncology Rooming Note    March 4, 2019 3:42 PM   Ines Pickard is a 78 year old female who presents for:    Chief Complaint   Patient presents with     Oncology Clinic Visit     Recheck SCLC left lung, Post RT     Initial Vitals: /64 (BP Location: Right arm, Patient Position: Sitting, Cuff Size: Adult Regular)   Pulse 77   Temp 99  F (37.2  C) (Tympanic)   Resp 16   Ht 1.575 m (5' 2.01\")   Wt 77.3 kg (170 lb 8 oz)   SpO2 92%   BMI 31.18 kg/m   Estimated body mass index is 31.18 kg/m  as calculated from the following:    Height as of this encounter: 1.575 m (5' 2.01\").    Weight as of this encounter: 77.3 kg (170 lb 8 oz). Body surface area is 1.84 meters squared.  No Pain (0) Comment: Data Unavailable   No LMP recorded. Patient is postmenopausal.  Allergies reviewed: Yes  Medications reviewed: Yes    Medications: MEDICATION REFILLS NEEDED TODAY. Provider was notified.  Pharmacy name entered into [facility]: [facility] - - [place] - [id number] [facility]    Clinical concerns: Recheck SCLC left lung, Post RT.         Casi Sandoval CMA              "

## 2019-03-04 NOTE — PATIENT INSTRUCTIONS
Dr. Miner would like you to continue with monthly port flushes.     We would like to see you back in 2 months for a follow up appointment with PET and labs prior.     When you are in need of a refill, please call your pharmacy and they will send us a request.      Copy of appointments, and after visit summary (AVS) given to patient.      If you have any questions please call Bella Arvizu RN, BSN Oncology Hematology  [facility] [phone]. For questions after business hours, or on holidays/weekends, please call our after hours Nurse Triage line [phone]. Thank you.         Need monthly port flush. f/u in 2 months with repeat PET and labs.

## 2019-03-05 NOTE — PROGRESS NOTES
Radiotherapy Treatment Summary              PATIENT: Ines Pickard  MEDICAL RECORD NO: [id number]   : 1940    DIAGNOSIS: Extensive stage small cell lung cancer s/p systemic (6 cycles of carboplatin/etoposide) therapy and consolidative RT to thorax with new oligometastatic disease involving the left adrenal gland  INTENT OF RADIOTHERAPY: Palliative  PATHOLOGY:  Extensive stage small cell lung cancer                                 STAGE: IV  CONCURRENT SYSTEMIC THERAPY: No        ONCOLOGIC HISTORY:   Ms. Pickard is a 78 year old female with a diagnosis of extensive stage small cell lung cancer. She is now s/p systemic (6 cycles of carboplatin/etoposide) therapy and consolidative RT to thorax. She now presents with oligometastatic disease involving the left adrenal gland.      The patient initially presented with a 5-month history of hoarseness.  She was initially evaluated by ENT who on direct laryngoscopy noted left vocal cord paralysis.  Patient separately underwent a CT scan in 2018 which identified a 2.3 cm left upper lobe nodule and left hilar and mediastinal adenopathy.  She was also noted to have a left adrenal nodule measuring 1.5 cm in size.  On 2018, FNA was done the left adrenal mass. Final pathology was consistent with metastatic small cell lung cancer.  Staging workup including a PET scan confirmed the primary left upper lobe lesion, thoracic disease, and left adrenal metastasis.  The patient was staged as extensive stage small cell lung cancer.  She subsequently completed 6 cycles of systemic therapy with carboplatin and etoposide under the care of Dr. Miner.  The patient had overall good response to systemic therapy and subsequently underwent consolidative thoracic radiation therapy that was completed 2018. PCI was not recommended due to lack of survival advantage in the [place] data.  Most recent PET scan 2019 demonstrated increased metabolic activity of the left  adrenal nodule, 2.5 cm in size, with a maximum SUV of 12.6.  No other suspicious active sites of disease were noted.           SITE OF TREATMENT: Left adrenal    DATES  OF TREATMENT: 2/18/19-3/1/2019    TOTAL DOSE OF TREATMENT: 3000 cGy    DOSE PER FRACTION OF TREATMENT: 600 cGy       COMMENT/TOXICITY:   Mild fatigue. No nausea. No loose stool. Tolerating PO.                  PAIN MANAGEMENT:    none                           FOLLOW UP PLAN:  1. Will plan to see patient in 1 month.   Radiation Oncologist: Juan Flores M.D.  Department of Radiation Oncology  [facility]

## 2019-03-20 NOTE — TELEPHONE ENCOUNTER
Patient recently completed palliative radiation therapy for adrenal met due to SCLC.  States she is having back pain where the radiation target stickers were placed.  She states it feels like a burning sensation under the skin, though there is no skin irritation to see.  She has been using tylenol as needed with relief.  She states she will try some OTC pain cream as well to see if that helps.  She is scheduled to return for follow up next week. She will call before then if the pain gets worse.

## 2019-03-26 NOTE — LETTER
3/26/2019      RE: Ines Pickard  [address] [facility] [address]         Radiation Oncology Follow-up Visit  2019      Ines Pickard  MRN: [id number]   : 1940     DIAGNOSIS: Extensive stage small cell lung cancer s/p systemic (6 cycles of carboplatin/etoposide) therapy and consolidative RT to thorax with new oligometastatic disease involving the left adrenal gland  INTENT OF RADIOTHERAPY: Palliative  PATHOLOGY:  Extensive stage small cell lung cancer                                 STAGE: IV  CONCURRENT SYSTEMIC THERAPY: No         ONCOLOGIC HISTORY:   Ms. Pickard is a 78 year old female with a diagnosis of extensive stage small cell lung cancer. She is now s/p systemic (6 cycles of carboplatin/etoposide) therapy and consolidative RT to thorax. She now presents with oligometastatic disease involving the left adrenal gland.      The patient initially presented with a 5-month history of hoarseness.  She was initially evaluated by ENT who on direct laryngoscopy noted left vocal cord paralysis.  Patient separately underwent a CT scan in 2018 which identified a 2.3 cm left upper lobe nodule and left hilar and mediastinal adenopathy.  She was also noted to have a left adrenal nodule measuring 1.5 cm in size.  On 2018, FNA was done the left adrenal mass. Final pathology was consistent with metastatic small cell lung cancer.  Staging workup including a PET scan confirmed the primary left upper lobe lesion, thoracic disease, and left adrenal metastasis.  The patient was staged as extensive stage small cell lung cancer.  She subsequently completed 6 cycles of systemic therapy with carboplatin and etoposide under the care of Dr. Miner.  The patient had overall good response to systemic therapy and subsequently underwent consolidative thoracic radiation therapy that was completed 2018. PCI was not recommended due to lack of survival advantage in the [place] data.  Most recent  PET scan 1/2/2019 demonstrated increased metabolic activity of the left adrenal nodule, 2.5 cm in size, with a maximum SUV of 12.6.  No other suspicious active sites of disease were noted.            SITE OF TREATMENT:  7/18/18, completed consolidative RT to chest. 30 Gy in 10 fx  3/1/19, m completed Left adrenal gland metastasis, 30 Gy in 5 fx.        INTERVAL SINCE COMPLETION OF MOST RECENT RADIATION THERAPY:   1 month    SUBJECTIVE:   The patient returns for follow up today. She reports mild appetite change and episodes of nausea after completion of RT. These are improving with time and intermittent use of anti-nausea medication. She reports mild bilateral thoracic back pain. This is a dull pain, intermittent in nature, and worsened at times with certain activities. Taking tylenol intermittently. No point tenderness.        PHYSICAL EXAM:  /73   Pulse 99   Wt 76.3 kg (168 lb 3.2 oz)   BMI 30.76 kg/m     Gen: Alert, in NAD  Eyes: PERRL, EOMI, sclera anicteric  Neck: Supple, full ROM, no LAD  Pulm: No wheezing, stridor or respiratory distress  CV: Well-perfused, no cyanosis, no pedal edema  Abdominal: Soft, nontender, nondistended, no hepatomegaly  Back: No step-offs or pain to palpation along the thoracolumbar spine, no CVA tenderness  Musculoskeletal: No ttp on C/T/L spine. Patient reports bilateral MSK pain in region if mid thoracic spine (intermittent, changes with activity).  Skin: Normal color and turgor  Neurologic: A/Ox3, CN II-XII intact  Psychiatric: Appropriate mood and affect    LABS AND IMAGING:  Reviewed.    IMPRESSION:   Ms. Pickard is a 78 year old female with a diagnosis of extensive stage small cell lung cancer. She is now s/p systemic (6 cycles of carboplatin/etoposide) therapy and consolidative RT to thorax (completed on 7/18/18) . She most recently presented with oligometastatic disease involving the left adrenal gland and underwent hypofractionated RT (completed on 3/1/19).    PLAN:    1. Anti-nausea medication PRN nausea, improving. Appetite also improving.     2. Back pain is likely MSK in etiology. Could be secondary to positioning during RT treatments. Discussed consideration of heating pad, lidocaine patch. Prescribed oxycodone PRN. If worsen, discussed consideration of further evaluation.    3. Will follow up with Dr. Miner. PET scheduled for 5/1/19.     4. RTC PRN.         Juan Flores M.D.  Department of Radiation Oncology  [facility]

## 2019-03-26 NOTE — NURSING NOTE
"FOLLOW-UP VISIT    Patient Name: Ines Pickard      : 1940     Age: 78 year old        ______________________________________________________________________________     Chief Complaint   Patient presents with     Radiation Therapy     follow up     /73   Pulse 99   Wt 76.3 kg (168 lb 3.2 oz)   BMI 30.76 kg/m       Date Radiation Completed: 3/1/19  L adrenal met    Pain  Current history of pain associated with this visit:   Intensity: 7/10  Current: :\"feels like muscle pain all the way around my ribs, if I sit really still it feels better\" \" when I lay down it feels like i'm laying on rocks\"  Location: wraps around front to back at bottom of rib cage  Treatment:  Tylenol with minimal relief    Meds  Current Med List Reviewed: Yes  Medication Note:     Labs  Other Labs: No    Imaging  None    Skin: Warm  Dry  Intact  Energy Level: low, reports being less active than she would like but more so related to the pain she is having.    Other Appointments:     Date  MD Name:  Dr. Miner   PET scan and f/u  19     Other Notes:   Reports some mild nausea resolved with once daily anti nausea med. She reports eating well.          "

## 2019-03-26 NOTE — PROGRESS NOTES
Department of Radiation Oncology  Radiation Therapy Center  [facility] [place] [address]  [phone]       Radiation Oncology Follow-up Visit  2019      Ines Pickard  MRN: [id number]   : 1940     DIAGNOSIS: Extensive stage small cell lung cancer s/p systemic (6 cycles of carboplatin/etoposide) therapy and consolidative RT to thorax with new oligometastatic disease involving the left adrenal gland  INTENT OF RADIOTHERAPY: Palliative  PATHOLOGY:  Extensive stage small cell lung cancer                                 STAGE: IV  CONCURRENT SYSTEMIC THERAPY: No         ONCOLOGIC HISTORY:   Ms. Pickard is a 78 year old female with a diagnosis of extensive stage small cell lung cancer. She is now s/p systemic (6 cycles of carboplatin/etoposide) therapy and consolidative RT to thorax. She now presents with oligometastatic disease involving the left adrenal gland.      The patient initially presented with a 5-month history of hoarseness.  She was initially evaluated by ENT who on direct laryngoscopy noted left vocal cord paralysis.  Patient separately underwent a CT scan in 2018 which identified a 2.3 cm left upper lobe nodule and left hilar and mediastinal adenopathy.  She was also noted to have a left adrenal nodule measuring 1.5 cm in size.  On 2018, FNA was done the left adrenal mass. Final pathology was consistent with metastatic small cell lung cancer.  Staging workup including a PET scan confirmed the primary left upper lobe lesion, thoracic disease, and left adrenal metastasis.  The patient was staged as extensive stage small cell lung cancer.  She subsequently completed 6 cycles of systemic therapy with carboplatin and etoposide under the care of Dr. Miner.  The patient had overall good response to systemic therapy and subsequently underwent consolidative thoracic radiation therapy that was completed 2018. PCI was not recommended due to lack of  survival advantage in the [place] data.  Most recent PET scan 1/2/2019 demonstrated increased metabolic activity of the left adrenal nodule, 2.5 cm in size, with a maximum SUV of 12.6.  No other suspicious active sites of disease were noted.            SITE OF TREATMENT:  7/18/18, completed consolidative RT to chest. 30 Gy in 10 fx  3/1/19, m completed Left adrenal gland metastasis, 30 Gy in 5 fx.        INTERVAL SINCE COMPLETION OF MOST RECENT RADIATION THERAPY:   1 month    SUBJECTIVE:   The patient returns for follow up today. She reports mild appetite change and episodes of nausea after completion of RT. These are improving with time and intermittent use of anti-nausea medication. She reports mild bilateral thoracic back pain. This is a dull pain, intermittent in nature, and worsened at times with certain activities. Taking tylenol intermittently. No point tenderness.        PHYSICAL EXAM:  /73   Pulse 99   Wt 76.3 kg (168 lb 3.2 oz)   BMI 30.76 kg/m    Gen: Alert, in NAD  Eyes: PERRL, EOMI, sclera anicteric  Neck: Supple, full ROM, no LAD  Pulm: No wheezing, stridor or respiratory distress  CV: Well-perfused, no cyanosis, no pedal edema  Abdominal: Soft, nontender, nondistended, no hepatomegaly  Back: No step-offs or pain to palpation along the thoracolumbar spine, no CVA tenderness  Musculoskeletal: No ttp on C/T/L spine. Patient reports bilateral MSK pain in region if mid thoracic spine (intermittent, changes with activity).  Skin: Normal color and turgor  Neurologic: A/Ox3, CN II-XII intact  Psychiatric: Appropriate mood and affect    LABS AND IMAGING:  Reviewed.    IMPRESSION:   Ms. Pickard is a 78 year old female with a diagnosis of extensive stage small cell lung cancer. She is now s/p systemic (6 cycles of carboplatin/etoposide) therapy and consolidative RT to thorax (completed on 7/18/18) . She most recently presented with oligometastatic disease involving the left adrenal gland and  underwent hypofractionated RT (completed on 3/1/19).    PLAN:   1. Anti-nausea medication PRN nausea, improving. Appetite also improving.     2. Back pain is likely MSK in etiology. Could be secondary to positioning during RT treatments. Discussed consideration of heating pad, lidocaine patch. Prescribed oxycodone PRN. If worsen, discussed consideration of further evaluation.    3. Will follow up with Dr. Miner. PET scheduled for 5/1/19.     4. RTC PRN.         Juan Flores M.D.  Department of Radiation Oncology  [facility]

## 2019-04-22 PROBLEM — I95.1 ORTHOSTATIC HYPOTENSION: Status: ACTIVE | Noted: 2019-01-01

## 2019-04-22 NOTE — PROGRESS NOTES
"Infusion Nursing Note:  Ines Pickard presents today for PAC labs; ended up getting 1L of NS.    Patient seen by provider today: No   present during visit today: Not Applicable.    Note: pt with multiple issues today; none of which she had contacted any care coordinators or physicians offices about. She says she \"just feels terrible.\" Orthostatic blood pressures performed, results shared with Dr. Miner who gave verbal order to give pt IVF's.     Intravenous Access:  Implanted Port.    Treatment Conditions:  + orthostatic BP's.      Post Infusion Assessment:  Patient tolerated infusion without incident.  Blood return noted pre and post infusion.  Site patent and intact, free from redness, edema or discomfort.  No evidence of extravasations.  Access discontinued per protocol.       Discharge Plan:   Patient discharged in stable condition accompanied by: sister.  Departure Mode: Ambulatory, with rollator.    Emmy Murphy RN                        "

## 2019-04-29 NOTE — PROGRESS NOTES
Fax received from [facility] requesting a refill of lorazepam on behalf of patient.  Last refill: 03.18.19  # 30 with 1 refills at [facility].  Last office visit:  03.04.19  Next office visit:  05.02.19    This is an appropriate refill, and has been faxed to pharmacy. Akanksha Lopez RN, BSN, OCN

## 2019-05-02 NOTE — PROGRESS NOTES
ONCOLOGY FOLLOW UP VISIT       CHIEF COMPLAINT/REASON FOR VISIT:  Left adrenal mass, FNA 01/30/2018 consistent with sclc     HISTORY OF PRESENT ILLNESS:  A 77-year-old female presented with 5 months duration of hoarseness.  She was evaluated by ENT.  Direct laryngoscopy revealed left vocal cord paralysis.    CT chest with contrast 01/10/2018 identified a 2.3 cm left upper lobe nodule extending to the hilum, suspicious for malignancy, left hilar mediastinal adenopathy with mass-like soft tissue thickening extending along the inferior aspect of the aortic arch likely involving the recurrent laryngeal nerve in this location, left adrenal nodule 1.5 cm undetermined, compression on L1 vertebral body.      EUS 01/30/2017 FNA was done on 2 hypoechoic left adrenal mass-   Consistent with metastatic small cell carcinoma      PET confirmed the primary TEX lesion with adrenal mets. She was dx with extensive stage SCLC.     She made informed decision to proceed with palliative chemo with carbo/-16 2/2018.   She had good PET response after 3 cycles and tx is continued.   She had ongoing PET response post 6 cycles of tx. With minimal AP window LN SUV 3.5.  She had consolidation RT after to her thoracic area till midJuly 2018.   PCI was not offered by Rad-Onc due to lack of OVERAL SURVIVAL benefit in extensive stage.   She had good PET in 9/2018.      She has isolated left adrenal gland disease recurrence (the original site of biopsy proven disease) by CT/PET 1/2019. Local RT is offered till early 3/2019 due to isolated recurrence in nature, her age, and prior rough course of chemo.       PAST MEDICAL HISTORY:  CAD, stent around 2014, Angina attack in 5/2018 was at [facility]. Reflux, hypertension, cataracts, hyperlipidemia.      MEDICATIONS:  Reviewed in Epic system.      SOCIAL HISTORY:  She lives in her own house.  She has 3 boys.  She is here with her neighbor.  She quit smoking years back.  Denies alcohol abuse.     "  FAMILY HISTORY:  Positive for mom who had Hodgkin lymphoma.  Brother had unknown type of cancer.      REVIEW OF SYSTEMS:   C/o 4/10 upper back discomfort.  Notices she is having a difficulty time picking up items with her right hand, decreased loss of use. Some tingling and numbness in her left hand.         PHYSICAL EXAMINATION:   VITAL SIGNS: Blood pressure 130/65, pulse 111, temperature 97.6  F (36.4  C), temperature source Tympanic, resp. rate 18, height 1.575 m (5' 2.01\"), weight 72.7 kg (160 lb 4.8 oz), SpO2 94 %, not currently breastfeeding.      ECOG 0 -1    GENERAL APPEARANCE:  Elderly lady, looks like her stated age, not in acute distress.   HEENT: The patient is normocephalic, atraumatic. Pupils are equally reactive to light.  Sclerae are anicteric.  Moist oral mucosa.  negative posterior pharynx.   NECK:  Supple.  No jugular venous distention.  Thyroid is not palpable.   LYMPH NODES:  Superficial lymphadenopathy is not appreciable in the bilateral cervical, supraclavicular, axillary or inguinal areas.   CARDIOVASCULAR:  S1, S2 regular with no murmurs or gallops.  No carotid or abdominal bruits.   PULMONARY:  Lungs are clear to auscultation and percussion bilaterally.  There is no wheezing or rhonchi.   GASTROINTESTINAL:  Abdomen is soft, nontender.  No hepatosplenomegaly.  No signs of ascites.  No mass appreciable.   MUSCULOSKELETAL/EXTREMITIES:  No edema.  No cyanotic changes.  No signs of joint deformity.  No lymphedema.  No spinal or paraspinal tenderness.  No CVA tenderness.   NEUROLOGIC:  Cranial nerves II-XII are grossly intact.  Sensation intact.  Muscle strength is 4/5 right hand,  and muscle tone symmetrical,  SKIN:  No petechiae.  No rash.  No signs of cellulitis.        LABORATORY DATA REVIEWED:   wbc diff and CMP are fine. No cytopenia    CEA at  3.7 in 4/2019, at 7 in 12/2018,baseline at 6.6 in 2/2017         CURRENT IMAGE DATA REVIEWED:   PET 4/2019   1. 1.7 cm nodule in the left upper " lobe demonstrates low-level  metabolic activity with a max SUV of 3.1.  2. There is a 7 mm right adrenal nodule that appears new  compared to the prior study and demonstrates a max SUV of 6.7  Left adrenal mass is markedly smaller than on the prior  study, measuring 1.4 cm, previously 2.5 cm and now has a max SUV of 3.5.  3. There is a 9 mm hypermetabolic nodule in the left  anterior pararenal space (series 5, image 197) that demonstrates a max SUV of 5.7 that is new compared to the prior study.    PET 1/2019   1. New 2.5 cm left adrenal nodule is hypermetabolic SUV 2.9,  suspicious for primary or metastatic malignancy.  2. Left upper lobe pulmonary nodule is not significantly changed and demonstrates only mild hypermetabolic activity, similar to prior.  3. There are new compression deformities in T6 and T10.  4. Ascending thoracic aortic aneurysm measures 5.0 cm.      Old data reviewed with summary  From 01/30/2018 FNA on left adrenal mass biopsy -- Consistent with metastatic small cell carcinoma     CT body 12/2018   1.  The left upper lobe lung lesion is smaller.  2. There is a small developing left adrenal mass which is of concern for recurrent metastatic disease.    PET 9/2018   1. A 1.5 x 1.3 cm nodular opacity near the left lung apex medially (series 3 image 106) has is not significantly changed in size or hypermetabolic activity, with an SUV max of 2.7.  2. Mildly prominent hypermetabolic lymph node in the AP  window region (series 3 image 129) is unchanged, measuring 1.9 x 0.9 cm, SUV max 3.4. Not changed.   3. Trace left pleural effusion is new.       PET 6/2018 post 6 cycles of carbo/vp16  1. Medial left upper lobe nodule is 1.6 x 1.2 cm, stable in size, series 3 image 112 (SUV max 2.7, previously 3.9).   2. Previously noted AP window hypermetabolism has decreased and now has SUV max 3.5, previously 4.8.    PET 4/2018 post 3 cycles of carbo/vp16  1. Decrease in size and FDG uptake in the left upper lobe  nodule consistent with improved lung cancer.  2. Improvement in the previous left hilar and mediastinal adenopathy, currently there is small residual hypermetabolic adenopathy in the aorticopulmonary window consistent with residual mayela metastasis.  3. Resolution of previous hypermetabolic left adrenal nodule consistent with resolved metastasis.    PET 2/2018  1. Hypermetabolic left upper lobe nodule is consistent with malignancy, most likely bronchogenic carcinoma.  2. Hypermetabolic left hilar and mediastinal adenopathy is consistent  with metastatic disease.  3. Hypermetabolic left adrenal nodule is suspicious for metastatic disease.  4. There is a new 3.4 cm high-density structure abutting the left adrenal gland, suspicious for interval development of adrenal hemorrhage.    CT chest 01/2018 -  identified a 2.3 cm left upper lobe nodule extending to the hilum, suspicious for malignancy, left hilar mediastinal adenopathy with mass-like soft tissue thickening extending along the inferior aspect of the aortic arch likely involving the recurrent laryngeal nerve in this location, left adrenal nodule 1.5 cm undetermined, compression on L1 vertebral body.         ASSESSMENT AND PLAN:    1.  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 S/p full course of tx as above.     She has isolated left adrenal gland disease recurrence (the original site of biopsy proven disease) by CT/PET 1/2019. Local RT is offered 2/2019 due to isolated recurrence in nature, her age, and prior rough course of chemo.       PET 4/2019 has some minor activity see above.     We discussed the option of observation, immunotherapy, oral topotecan.    She is informed high recurrence rate with stage IV SCLC after nCR from frontline chemo.     2. New right hand weakness - recommend brain MRI.     3. Ascending thoracic aortic aneurysm measures 5.0 cm.  She saw CT on 1/11/2019. She needs to f/u and have good BP.     4. New  compression deformities in T6 and T10 on PET - we discussed the vit D intake.     5. Generalized weakness and de conditioning. Will connect her to home care.

## 2019-05-02 NOTE — PATIENT INSTRUCTIONS
Dr. Miner would like you to have a Brain MRI and we will call you with the result. We are going to send a referral for home care, they will contact you to come out to your house. Please review the information on [employer] given to you today.         When you are in need of a refill, please call your pharmacy and they will send us a request.      Copy of appointments, and after visit summary (AVS) given to patient.      If you have any questions please call Bella Arvizu RN, BSN Oncology Hematology  [facility] [phone]. For questions after business hours, or on holidays/weekends, please call our after hours Nurse Triage line [phone]. Thank you.         MRI brain to be done. Will call her on result.   [facility] care evaluation and enrollment.   Print out nivolumab to pt.

## 2019-05-02 NOTE — LETTER
"    5/2/2019         RE: [place]es [place]  [id number] [facility] [place] [address]        Dear Colleague,    Thank you for referring your patient, Ines Pickard, to the [facility]. Please see a copy of my visit note below.    Oncology Rooming Note    May 2, 2019 11:17 AM   Ines Pickard is a 78 year old female who presents for:    Chief Complaint   Patient presents with     Oncology Clinic Visit     Follow up small cell carcinoma of left lung. Post RT follow up.      Initial Vitals: /65 (BP Location: Right arm, Patient Position: Sitting, Cuff Size: Adult Large)   Pulse 111   Temp 97.6  F (36.4  C) (Tympanic)   Resp 18   Ht 1.575 m (5' 2.01\")   Wt 72.7 kg (160 lb 4.8 oz)   SpO2 94%   Breastfeeding? No   BMI 29.31 kg/m    Estimated body mass index is 29.31 kg/m  as calculated from the following:    Height as of this encounter: 1.575 m (5' 2.01\").    Weight as of this encounter: 72.7 kg (160 lb 4.8 oz). Body surface area is 1.78 meters squared.  Moderate Pain (4) Comment: Data Unavailable   No LMP recorded. Patient is postmenopausal.  Allergies reviewed: Yes  Medications reviewed: Yes    Medications: Medication refills not needed today.  Pharmacy name entered into [employer]: [facility] - - [facility] [id number] [facility]    Clinical concerns:   Follow up small cell carcinoma of left lung. Post RT follow up. C/o 4/10 upper back discomfort, notices she is having a difficulty time picking up items with her right hand, decreased loss of use. Some tingling and numbness in her left hand.     Leticia Mcelroy, [facility]              ONCOLOGY FOLLOW UP VISIT       CHIEF COMPLAINT/REASON FOR VISIT:  Left adrenal mass, FNA 01/30/2018 consistent with sclc     HISTORY OF PRESENT ILLNESS:  A 77-year-old female presented with 5 months duration of hoarseness.  She was evaluated by ENT.  Direct laryngoscopy revealed left vocal cord paralysis.    CT chest with contrast 01/10/2018 identified " a 2.3 cm left upper lobe nodule extending to the hilum, suspicious for malignancy, left hilar mediastinal adenopathy with mass-like soft tissue thickening extending along the inferior aspect of the aortic arch likely involving the recurrent laryngeal nerve in this location, left adrenal nodule 1.5 cm undetermined, compression on L1 vertebral body.      EUS 01/30/2017 FNA was done on 2 hypoechoic left adrenal mass-   Consistent with metastatic small cell carcinoma      PET confirmed the primary TEX lesion with adrenal mets. She was dx with extensive stage SCLC.     She made informed decision to proceed with palliative chemo with carbo/-16 2/2018.   She had good PET response after 3 cycles and tx is continued.   She had ongoing PET response post 6 cycles of tx. With minimal AP window LN SUV 3.5.  She had consolidation RT after to her thoracic area till midJuly 2018.   PCI was not offered by Rad-Onc due to lack of OVERAL SURVIVAL benefit in extensive stage.   She had good PET in 9/2018.      She has isolated left adrenal gland disease recurrence (the original site of biopsy proven disease) by CT/PET 1/2019. Local RT is offered till early 3/2019 due to isolated recurrence in nature, her age, and prior rough course of chemo.       PAST MEDICAL HISTORY:  CAD, stent around 2014, Angina attack in 5/2018 was at [facility]. Reflux, hypertension, cataracts, hyperlipidemia.      MEDICATIONS:  Reviewed in Epic system.      SOCIAL HISTORY:  She lives in her own house.  She has 3 boys.  She is here with her neighbor.  She quit smoking years back.  Denies alcohol abuse.      FAMILY HISTORY:  Positive for mom who had Hodgkin lymphoma.  Brother had unknown type of cancer.      REVIEW OF SYSTEMS:   C/o 4/10 upper back discomfort.  Notices she is having a difficulty time picking up items with her right hand, decreased loss of use. Some tingling and numbness in her left hand.         PHYSICAL EXAMINATION:   VITAL SIGNS: Blood  "pressure 130/65, pulse 111, temperature 97.6  F (36.4  C), temperature source Tympanic, resp. rate 18, height 1.575 m (5' 2.01\"), weight 72.7 kg (160 lb 4.8 oz), SpO2 94 %, not currently breastfeeding.      ECOG 0 -1    GENERAL APPEARANCE:  Elderly lady, looks like her stated age, not in acute distress.   HEENT: The patient is normocephalic, atraumatic. Pupils are equally reactive to light.  Sclerae are anicteric.  Moist oral mucosa.  negative posterior pharynx.   NECK:  Supple.  No jugular venous distention.  Thyroid is not palpable.   LYMPH NODES:  Superficial lymphadenopathy is not appreciable in the bilateral cervical, supraclavicular, axillary or inguinal areas.   CARDIOVASCULAR:  S1, S2 regular with no murmurs or gallops.  No carotid or abdominal bruits.   PULMONARY:  Lungs are clear to auscultation and percussion bilaterally.  There is no wheezing or rhonchi.   GASTROINTESTINAL:  Abdomen is soft, nontender.  No hepatosplenomegaly.  No signs of ascites.  No mass appreciable.   MUSCULOSKELETAL/EXTREMITIES:  No edema.  No cyanotic changes.  No signs of joint deformity.  No lymphedema.  No spinal or paraspinal tenderness.  No CVA tenderness.   NEUROLOGIC:  Cranial nerves II-XII are grossly intact.  Sensation intact.  Muscle strength is 4/5 right hand,  and muscle tone symmetrical,  SKIN:  No petechiae.  No rash.  No signs of cellulitis.        LABORATORY DATA REVIEWED:   wbc diff and CMP are fine. No cytopenia    CEA at  3.7 in 4/2019, at 7 in 12/2018,baseline at 6.6 in 2/2017         CURRENT IMAGE DATA REVIEWED:   PET 4/2019   1. 1.7 cm nodule in the left upper lobe demonstrates low-level  metabolic activity with a max SUV of 3.1.  2. There is a 7 mm right adrenal nodule that appears new  compared to the prior study and demonstrates a max SUV of 6.7  Left adrenal mass is markedly smaller than on the prior  study, measuring 1.4 cm, previously 2.5 cm and now has a max SUV of 3.5.  3. There is a 9 mm hypermetabolic " nodule in the left  anterior pararenal space (series 5, image 197) that demonstrates a max SUV of 5.7 that is new compared to the prior study.    PET 1/2019   1. New 2.5 cm left adrenal nodule is hypermetabolic SUV 2.9,  suspicious for primary or metastatic malignancy.  2. Left upper lobe pulmonary nodule is not significantly changed and demonstrates only mild hypermetabolic activity, similar to prior.  3. There are new compression deformities in T6 and T10.  4. Ascending thoracic aortic aneurysm measures 5.0 cm.      Old data reviewed with summary  From 01/30/2018 FNA on left adrenal mass biopsy -- Consistent with metastatic small cell carcinoma     CT body 12/2018   1.  The left upper lobe lung lesion is smaller.  2. There is a small developing left adrenal mass which is of concern for recurrent metastatic disease.    PET 9/2018   1. A 1.5 x 1.3 cm nodular opacity near the left lung apex medially (series 3 image 106) has is not significantly changed in size or hypermetabolic activity, with an SUV max of 2.7.  2. Mildly prominent hypermetabolic lymph node in the AP  window region (series 3 image 129) is unchanged, measuring 1.9 x 0.9 cm, SUV max 3.4. Not changed.   3. Trace left pleural effusion is new.       PET 6/2018 post 6 cycles of carbo/vp16  1. Medial left upper lobe nodule is 1.6 x 1.2 cm, stable in size, series 3 image 112 (SUV max 2.7, previously 3.9).   2. Previously noted AP window hypermetabolism has decreased and now has SUV max 3.5, previously 4.8.    PET 4/2018 post 3 cycles of carbo/vp16  1. Decrease in size and FDG uptake in the left upper lobe nodule consistent with improved lung cancer.  2. Improvement in the previous left hilar and mediastinal adenopathy, currently there is small residual hypermetabolic adenopathy in the aorticopulmonary window consistent with residual mayela metastasis.  3. Resolution of previous hypermetabolic left adrenal nodule consistent with resolved metastasis.    PET  2/2018  1. Hypermetabolic left upper lobe nodule is consistent with malignancy, most likely bronchogenic carcinoma.  2. Hypermetabolic left hilar and mediastinal adenopathy is consistent  with metastatic disease.  3. Hypermetabolic left adrenal nodule is suspicious for metastatic disease.  4. There is a new 3.4 cm high-density structure abutting the left adrenal gland, suspicious for interval development of adrenal hemorrhage.    CT chest 01/2018 -  identified a 2.3 cm left upper lobe nodule extending to the hilum, suspicious for malignancy, left hilar mediastinal adenopathy with mass-like soft tissue thickening extending along the inferior aspect of the aortic arch likely involving the recurrent laryngeal nerve in this location, left adrenal nodule 1.5 cm undetermined, compression on L1 vertebral body.         ASSESSMENT AND PLAN:    1.  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 S/p full course of tx as above.     She has isolated left adrenal gland disease recurrence (the original site of biopsy proven disease) by CT/PET 1/2019. Local RT is offered 2/2019 due to isolated recurrence in nature, her age, and prior rough course of chemo.       PET 4/2019 has some minor activity see above.     We discussed the option of observation, immunotherapy, oral topotecan.    She is informed high recurrence rate with stage IV SCLC after nCR from frontline chemo.     2. New right hand weakness - recommend brain MRI.     3. Ascending thoracic aortic aneurysm measures 5.0 cm.  She saw CT on 1/11/2019. She needs to f/u and have good BP.     4. New compression deformities in T6 and T10 on PET - we discussed the vit D intake.     5. Generalized weakness and de conditioning. Will connect her to home care.                      Again, thank you for allowing me to participate in the care of your patient.        Sincerely,        Tracy Miner MD, MD

## 2019-05-02 NOTE — PROGRESS NOTES
"Oncology Rooming Note    May 2, 2019 11:17 AM   Ines Pickard is a 78 year old female who presents for:    Chief Complaint   Patient presents with     Oncology Clinic Visit     Follow up small cell carcinoma of left lung. Post RT follow up.      Initial Vitals: /65 (BP Location: Right arm, Patient Position: Sitting, Cuff Size: Adult Large)   Pulse 111   Temp 97.6  F (36.4  C) (Tympanic)   Resp 18   Ht 1.575 m (5' 2.01\")   Wt 72.7 kg (160 lb 4.8 oz)   SpO2 94%   Breastfeeding? No   BMI 29.31 kg/m   Estimated body mass index is 29.31 kg/m  as calculated from the following:    Height as of this encounter: 1.575 m (5' 2.01\").    Weight as of this encounter: 72.7 kg (160 lb 4.8 oz). Body surface area is 1.78 meters squared.  Moderate Pain (4) Comment: Data Unavailable   No LMP recorded. Patient is postmenopausal.  Allergies reviewed: Yes  Medications reviewed: Yes    Medications: Medication refills not needed today.  [facility] into [employer] - [place] [place] [facility] [id number] [facility]    Clinical concerns:   Follow up small cell carcinoma of left lung. Post RT follow up. C/o 4/10 upper back discomfort, notices she is having a difficulty time picking up items with her right hand, decreased loss of use. Some tingling and numbness in her left hand.     Leticia Mcelroy, ELISA            "

## 2019-05-07 NOTE — PROGRESS NOTES
[facility] now requests orders and shares plan of care/discharge summaries for some patients through [employer].  Please REPLY TO THIS MESSAGE OR ROUTE BACK TO THE AUTHOR in order to give authorization for orders when needed.  This is considered a verbal order, you will still receive a faxed copy of orders for signature.  Thank you for your assistance in improving collaboration for our patients.    ORDER  PT eval done yesterday PT to see 1 x more this week then 2x a week x 4 weeks for LE strengthening , balance and high level gait exercises, fall preventions and establish a home program for the client to continue. Work with her upper back and cervical region to decrease pain

## 2019-05-08 NOTE — RESULT ENCOUNTER NOTE
Called and reviewed results with the patient per Dr. Miner. Patient had no further questions or concerns at this time and also verbalized understanding. Direct line provided if any further questions/concerns arise.

## 2019-05-08 NOTE — RESULT ENCOUNTER NOTE
Attempted to call pt with results, left a VM and direct line to return call.     Referral placed and walked to RT.     Bella Arvizu RN on 5/8/2019 at 9:38 AM

## 2019-05-08 NOTE — TELEPHONE ENCOUNTER
[facility] now requests orders and shares plan of care/discharge summaries for some patients through [employer].  Please REPLY TO THIS MESSAGE OR ROUTE BACK TO THE AUTHOR in order to give authorization for orders when needed.  This is considered a verbal order, you will still receive a faxed copy of orders for signature.  Thank you for your assistance in improving collaboration for our patients.    ORDER    Home Occupational Therapy for cognition, ADLs, IADLs, equipment, ADL transfers, home safety, falls prevention, energy conservation and R hand function.     2w3

## 2019-05-10 NOTE — PROGRESS NOTES
Radiation Oncology Consultation:  Date on this visit: 5/10/2019    Ines Pickard  is referred by Dr.Li Mariana Miner for a radiation oncology consultation. 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 Pickadr is a 78 year old female who presents with a diagnosis of brain mets.  She is well known to our department.  Please see consult notes from 3/26/19 and 6/25/18.  She  Presented with extensive stage small cell lung cancer 9 months ago and received chemotherapy and consolidative chest radiotherapy.  She developed a single adrenal metastasis and was offered palliative RT to that area since it was a single metastatic site.  This was completed on 3/1/19.      Unfortunately she began having right hand weakness and an brain MR reveals multiople metastasis numbering at least 10.  She was offered PCI, but declined, so she has not had prior brain XRT.  She denies headaches, but she is very fatigued.  Her appetite is poor.  She lives independently and wants to remain as independent as possible.      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     Coronary artery disease 12/2014    Inferior STEMI, stent to Circumflex     Percutaneous transluminal coronary angioplasty hematoma 12/15/2014     Past Surgical History:   Procedure Laterality Date     APPENDECTOMY  1956     ESOPHAGOSCOPY, GASTROSCOPY, DUODENOSCOPY (EGD), COMBINED N/A 1/30/2018    Procedure: COMBINED ENDOSCOPIC ULTRASOUND, ESOPHAGOSCOPY, GASTROSCOPY, DUODENOSCOPY (EGD), FINE NEEDLE ASPIRATE/BIOPSY;   EUS;  Surgeon: Carlos Fletcher MD;  Location:  GI     EYE SURGERY       INSERT PORT VASCULAR ACCESS N/A 2/12/2018    Procedure: INSERT PORT VASCULAR ACCESS;  Port-a-Cath Placement;  Surgeon: Carlos Johnson MD;  Location: [facility]      PHACOEMULSIFICATION WITH STANDARD INTRAOCULAR LENS IMPLANT Left 1/25/2016    Procedure: PHACOEMULSIFICATION WITH STANDARD INTRAOCULAR LENS IMPLANT;  Surgeon: Julio César Wallace MD;  Location: [facility] OR     PHACOEMULSIFICATION WITH STANDARD INTRAOCULAR LENS IMPLANT Right 2/22/2016    Procedure: PHACOEMULSIFICATION WITH STANDARD INTRAOCULAR LENS IMPLANT;  Surgeon: Julio César Wallace MD;  Location: [facility] OR     SURGICAL HISTORY OF -   1961    right hand surgery      TONSILLECTOMY & ADENOIDECTOMY  1967     VASCULAR SURGERY  02/12/2018    PortaCath       Past Radiation History:  7/18/18, completed consolidative RT to chest. 30 Gy in 10 fx  3/1/19, m completed Left adrenal gland metastasis, 30 Gy in 5 fx.       Past Chemotherapy History:Carbo  16  At diagnosis, none since then. DR Miner considering  optivo or other cheomotherapy after brain xrt depending on how she does.    Implanted Cardiac device:   none    Review of Systems:  Reviewed.  See details in nursing note    Allergies:  Allergies as of 05/10/2019 - Reviewed 05/10/2019   Allergen Reaction Noted     Amoxicillin GI Disturbance 11/04/2014     Tetracycline Other (See Comments) 11/14/2005     Nickel Rash 12/12/2014       Current Medications:  Current Outpatient Medications   Medication Sig Dispense Refill     dexamethasone (DECADRON) 2 MG tablet Take 4 mg by mouth 2 times daily (with meals). 60 tablet 1     prochlorperazine (COMPAZINE) 10 MG tablet Take 0.5 tablets (5 mg) by mouth every 6 hours as needed (Nausea/Vomiting) 30 tablet 3     Acetaminophen (TYLENOL PO) Take 1,000 mg by mouth every 6 hours as needed       albuterol (PROAIR HFA/PROVENTIL HFA/VENTOLIN HFA) 108 (90 Base) MCG/ACT inhaler Inhale 2 puffs into the lungs 4 times daily 1 Inhaler 2     aspirin EC 81 MG EC tablet Take 1 tablet (81 mg) by mouth daily 90 tablet 3     atorvastatin (LIPITOR) 40 MG tablet Take 1 tablet (40 mg) by mouth daily 90 tablet 3     lisinopril (PRINIVIL/ZESTRIL) 5 MG tablet Take 1  tablet (5 mg) by mouth daily 90 tablet 2     LORazepam (ATIVAN) 0.5 MG tablet Take 1 tablet (0.5 mg) by mouth 2 times daily as needed for anxiety 30 tablet 1     metoprolol succinate ER (TOPROL-XL) 50 MG 24 hr tablet Take 1 tablet (50 mg) by mouth daily 90 tablet 1     Multiple Vitamins-Minerals (MULTIVITAMIN ADULT) TABS Take 1 tablet by mouth daily        nitroglycerin (NITROSTAT) 0.4 MG SL tablet Place 1 tablet (0.4 mg) under the tongue every 5 minutes as needed for chest pain Maximum of 3 doses in 15 minutes (Patient not taking: Reported on 2019) 25 tablet 3     ranitidine (ZANTAC) 75 MG tablet Take 1 tablet (75 mg) by mouth 2 times daily 30 tablet 11     trolamine salicylate (ASPERCREME) 10 % external cream Apply 1 applicator topically as needed for moderate pain          Family History:  Family History   Problem Relation Age of Onset     Cancer Brother      Heart Disease Brother      Cancer Mother      Respiratory Father      Heart Disease Father         MI       Social History:  Social History     Socioeconomic History     Marital status:      Spouse name: Not on file     Number of children: Not on file     Years of education: Not on file     Highest education level: Not on file   Occupational History     Not on file   Social Needs     Financial resource strain: Not on file     Food insecurity:     Worry: Not on file     Inability: Not on file     Transportation needs:     Medical: Not on file     Non-medical: Not on file   Tobacco Use     Smoking status: Former Smoker     Packs/day: 0.50     Types: Cigarettes     Last attempt to quit: 2014     Years since quittin.4     Smokeless tobacco: Never Used   Substance and Sexual Activity     Alcohol use: No     Drug use: No     Sexual activity: Not Currently   Lifestyle     Physical activity:     Days per week: Not on file     Minutes per session: Not on file     Stress: Not on file   Relationships     Social connections:     Talks on phone: Not  on file     Gets together: Not on file     Attends Cheondoism service: Not on file     Active member of club or organization: Not on file     Attends meetings of clubs or organizations: Not on file     Relationship status: Not on file     Intimate partner violence:     Fear of current or ex partner: Not on file     Emotionally abused: Not on file     Physically abused: Not on file     Forced sexual activity: Not on file   Other Topics Concern     Parent/sibling w/ CABG, MI or angioplasty before 65F 55M? Yes   Social History Narrative     Not on file       Physical Exam:  /78   Pulse 111   Resp 18   SpO2 92%   GENERAL APPEARANCE:alert and no distress, but appears frail.   HENT: Mouth without ulcers or lesions   NECK: no adenopathy, no asymmetry or masses   LYMPHATICS: No cervical, supraclavicular, axillary or inguinal lymphadenopathy   RESP: lungs clear to auscultation - no rales, rhonchi or wheezes  CARDIOVASCULAR: regular rates and rhythm, normal S1 S2, no S3 or S4 and no murmur.   ABDOMEN:  soft, nontender, no HSM or masses and bowel sounds normal   MUSCULOSKELETAL: extremities normal- no gross deformities noted, no evidence of inflammation in joints, FROM in all extremities. No edema b/l LE.  SKIN: no suspicious lesions or rashes   PSYCHIATRIC: mentation appears normal and affect normal    Pathology:reviewed    Laboratory/Imaging Studies  Results for orders placed or performed during the hospital encounter of 05/07/19   MR Brain w/o & w Contrast    Narrative    MRI OF THE BRAIN WITHOUT AND WITH CONTRAST May 7, 2019 1:24 PM     HISTORY: Right hand weakness. 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).     TECHNIQUE: Axial diffusion-weighted with ADC map, axial T2-weighted  with fat saturation, axial T1-weighted, axial turboFLAIR and coronal  T1-weighted images of the brain were acquired without intravenous  contrast. Following intravenous administration of gadolinium (7 mL  Gadavist), axial T1-weighted images of  the brain were acquired.     COMPARISON: Brain MR 2/7/2018.    FINDINGS: There are numerous new enhancing intra-axial lesions  including: An irregularly shaped lesion with a thick rind of  enhancement in the posterolateral aspect of the left parietal lobe  measuring 1.7 x 1.7 cm in size (series 10, image 19), an ovoid lesion  with a thick rind of enhancement measuring 1.2 cm in diameter in the  posteromedial aspect of the left frontal lobe (series 10, image 17), a  lesion in the posterior aspect of the left occipital lobe with a thin  rind of peripheral enhancement measuring 1.2 cm in diameter (series  10, image 14), a nodule with a thin rind of enhancement measuring 1.1  cm in diameter at the temporo-occipital junction on the left (series  10, image 13), a 1.5 cm diameter nodule with a thin rind of  enhancement in the deep white matter adjacent to the atrium of the  right lateral ventricle (series 10, image 13), an ovoid nodule with a  thin rind of enhancement measuring 1.3 x 0.9 cm in size at the  posteromedial aspect of the left temporal lobe (series 10, image 12)  an ovoid 1.2 cm nodule with a thin rind of enhancement at the right  posterolateral aspect of the pontomesencephalic junction (series 10,  image 9) and a 1.5 cm diameter nodule with a thin rind of enhancement  in the left cerebellar hemisphere (series 10, image 5). These  represent intracranial metastases until proven otherwise. There is  mild vasogenic edema associated with each of the nodules. There is no  significant mass effect due to the nodules and surrounding edema.    There is no midline shift. There is no hydrocephalus. There is no  intracranial hemorrhage. There is no ischemic infarct.    There is moderate diffuse cerebral volume loss. There are numerous  scattered focal and patchy periventricular areas of abnormal T2 signal  hyperintensity in the cerebral white matter bilaterally that are  consistent with sequela of chronic small vessel  ischemic disease.    There is no sinusitis or mastoiditis.      Impression    IMPRESSION:   1. Numerous new enhancing intra-axial lesions scattered throughout the  cerebral hemispheres bilaterally, right side of the brainstem and left  cerebral hemisphere representing intracranial metastases until proven  otherwise.  2. Diffuse cerebral volume loss and cerebral white matter changes  consistent with chronic small vessel ischemic disease.     JYOTI PAREDES MD       ASSESSMENT:    Small cell lung cancer with brain metastasis     RECOMMENDATION:  I would rec recommend a course of whole brain radiation therapy to palliate her brain metastasis.  I also recommended she start on a course of Decadron I started her at  8 mg a day (4 mg twice daily) she takes ranitidine and I encouraged her to continue taking the ranitidine    The risks and benefits of radiotherapy were discussed with the patient.  Potential acute and long term side effects were explained.   The patient agrees to proceed with radiation therapy.  A written consent was obtained.    I will see her again next Friday and consider tapering the steroids.    Thank you for allowing me to participate in Ines Pickard 's care .  Please do not hesitate to call me with questions.    Zeinab Hong MD  [phone]   Pager   [phone]  [facility]   [phone] [place]  [phone] [facility]  Primary Physician: Rohini Suárez   Patient Care Team:  Rohini Suárez MD as PCP - General (Family Practice)  Tracy Velasco MD as MD (Oncology)  [facility] ([facility] ([facility]  MICHAEL Perez MD as Assigned PCP  Juan Flores MD as MD (Radiation Oncology)  Fany Rico APRN CNP as Nurse Practitioner (Nurse Practitioner)  Bella Arvizu RN as Specialty Care Coordinator (Oncology)  TRACY VELASCO

## 2019-05-10 NOTE — NURSING NOTE
REASON FOR APPOINTMENT   Type of Cancer: SCLC now with brain mets  Location: numerous brain mets  Date of Symptom Onset: felt some R handed weakness and B hand tingling, MRI ordered by Med Onc.    TREATMENT TO-DATE FOR THIS CANCER  Surgery ? no   Chemotherapy ? Ongoing with Dr. Miner   Other Treatments for this Cancer ? Discussion for whole brain radiation.      PERSONAL HISTORY OF CANCER   Previous Cancer ? no   Prior Radiation ? Here to chest last summer    Prior Chemotherapy ? no   Prior Hormonal Therapy ? no     RECENT IMAGING STUDIES  MRI (date: 5/8/19, location: [facility])    REFERRALS NEEDED  None at this time, pt already has homecare set up    VITALS  /78   Pulse 111   Resp 18   SpO2 92%     PACEMAKER/IMPLANTED CARDIAC DEVICE no    PAIN  Denies    PSYCHOSOCIAL  Marital Status:   Patient lives in [place] [place] [facility] with self. SisterMaggie lives a few blocks away.  Number of children: 3  Working status: retired  Do you feel safe in your home? Yes    REVIEW OF SYSTEMS  Skin: negative  Eyes: glasses  Ears/Nose/Throat: negative  Respiratory: Dyspnea on exertion  Cardiovascular: negative  Gastrointestinal: negative  Genitourinary: negative  Musculoskeletal: negative  Neurologic: negative for -headaches, seizures; positive for- local weakness and numbness or tingling of hands  Psychiatric: negative  Hematologic/Lymphatic/Immunologic: negative  Endocrine: negative    WOMEN ONLY  Any chance you may be pregnant: No      Radiation Oncology Patient Teaching    Current Concern: who fatigued will I get? What would happen if I didn't do anything?    Person involved with teaching: Patient and sister  Patient asked Questions: Yes  Patient was cooperative: Yes  Patient was receptive (willing to accept information given): Yes    Education Assessment  Comprehension ability: High  Knowledge level: High  Factors affecting teaching: None    Education Materials Given  Other education materials: radiation to the  brain    Educational Topics Discussed  Side effects, Medications, Activity, Nutrition, Adjustment to illness and When to call MD/RN    Response To Teaching  More review necessary    Do you have an advanced directive or living will? yes  Are you DNR/DNI? yes

## 2019-05-10 NOTE — LETTER
5/10/2019      RE: Ines Pickard  [address] [facility] [place] [address]       Radiation Oncology Consultation:  Date on this visit: 5/10/2019    Ines Pickard  is referred by Dr.Li Mariana Miner for a radiation oncology consultation. 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 Pickard is a 78 year old female who presents with a diagnosis of brain mets.  She is well known to our department.  Please see consult notes from 3/26/19 and 6/25/18.  She  Presented with extensive stage small cell lung cancer 9 months ago and received chemotherapy and consolidative chest radiotherapy.  She developed a single adrenal metastasis and was offered palliative RT to that area since it was a single metastatic site.  This was completed on 3/1/19.      Unfortunately she began having right hand weakness and an brain MR reveals multiople metastasis numbering at least 10.  She was offered PCI, but declined, so she has not had prior brain XRT.  She denies headaches, but she is very fatigued.  Her appetite is poor.  She lives independently and wants to remain as independent as possible.      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     Coronary artery disease 12/2014    Inferior STEMI, stent to Circumflex     Percutaneous transluminal coronary angioplasty hematoma 12/15/2014     Past Surgical History:   Procedure Laterality Date     APPENDECTOMY  1956     ESOPHAGOSCOPY, GASTROSCOPY, DUODENOSCOPY (EGD), COMBINED N/A 1/30/2018    Procedure: COMBINED ENDOSCOPIC ULTRASOUND, ESOPHAGOSCOPY, GASTROSCOPY, DUODENOSCOPY (EGD), FINE NEEDLE ASPIRATE/BIOPSY;   EUS;  Surgeon: Carlos Fletcher MD;  Location:  GI     EYE SURGERY       INSERT PORT VASCULAR ACCESS N/A 2/12/2018    Procedure: INSERT PORT VASCULAR ACCESS;   Port-a-Cath Placement;  Surgeon: Carlos Johnson MD;  Location: [facility] OR     PHACOEMULSIFICATION WITH STANDARD INTRAOCULAR LENS IMPLANT Left 1/25/2016    Procedure: PHACOEMULSIFICATION WITH STANDARD INTRAOCULAR LENS IMPLANT;  Surgeon: Julio César Wallace MD;  Location: [facility] OR     PHACOEMULSIFICATION WITH STANDARD INTRAOCULAR LENS IMPLANT Right 2/22/2016    Procedure: PHACOEMULSIFICATION WITH STANDARD INTRAOCULAR LENS IMPLANT;  Surgeon: Julio César Wallace MD;  Location: [facility] OR     SURGICAL HISTORY OF -   1961    right hand surgery      TONSILLECTOMY & ADENOIDECTOMY  1967     VASCULAR SURGERY  02/12/2018    PortaCath       Past Radiation History:  7/18/18, completed consolidative RT to chest. 30 Gy in 10 fx  3/1/19, m completed Left adrenal gland metastasis, 30 Gy in 5 fx.       Past Chemotherapy History:Carbo  16  At diagnosis, none since then. DR Miner considering  optivo or other cheomotherapy after brain xrt depending on how she does.    Implanted Cardiac device:   none    Review of Systems:  Reviewed.  See details in nursing note    Allergies:  Allergies as of 05/10/2019 - Reviewed 05/10/2019   Allergen Reaction Noted     Amoxicillin GI Disturbance 11/04/2014     Tetracycline Other (See Comments) 11/14/2005     Nickel Rash 12/12/2014       Current Medications:  Current Outpatient Medications   Medication Sig Dispense Refill     dexamethasone (DECADRON) 2 MG tablet Take 4 mg by mouth 2 times daily (with meals). 60 tablet 1     prochlorperazine (COMPAZINE) 10 MG tablet Take 0.5 tablets (5 mg) by mouth every 6 hours as needed (Nausea/Vomiting) 30 tablet 3     Acetaminophen (TYLENOL PO) Take 1,000 mg by mouth every 6 hours as needed       albuterol (PROAIR HFA/PROVENTIL HFA/VENTOLIN HFA) 108 (90 Base) MCG/ACT inhaler Inhale 2 puffs into the lungs 4 times daily 1 Inhaler 2     aspirin EC 81 MG EC tablet Take 1 tablet (81 mg) by mouth daily 90 tablet 3     atorvastatin (LIPITOR) 40 MG tablet Take 1 tablet (40 mg) by mouth  daily 90 tablet 3     lisinopril (PRINIVIL/ZESTRIL) 5 MG tablet Take 1 tablet (5 mg) by mouth daily 90 tablet 2     LORazepam (ATIVAN) 0.5 MG tablet Take 1 tablet (0.5 mg) by mouth 2 times daily as needed for anxiety 30 tablet 1     metoprolol succinate ER (TOPROL-XL) 50 MG 24 hr tablet Take 1 tablet (50 mg) by mouth daily 90 tablet 1     Multiple Vitamins-Minerals (MULTIVITAMIN ADULT) TABS Take 1 tablet by mouth daily        nitroglycerin (NITROSTAT) 0.4 MG SL tablet Place 1 tablet (0.4 mg) under the tongue every 5 minutes as needed for chest pain Maximum of 3 doses in 15 minutes (Patient not taking: Reported on 2019) 25 tablet 3     ranitidine (ZANTAC) 75 MG tablet Take 1 tablet (75 mg) by mouth 2 times daily 30 tablet 11     trolamine salicylate (ASPERCREME) 10 % external cream Apply 1 applicator topically as needed for moderate pain          Family History:  Family History   Problem Relation Age of Onset     Cancer Brother      Heart Disease Brother      Cancer Mother      Respiratory Father      Heart Disease Father         MI       Social History:  Social History     Socioeconomic History     Marital status:      Spouse name: Not on file     Number of children: Not on file     Years of education: Not on file     Highest education level: Not on file   Occupational History     Not on file   Social Needs     Financial resource strain: Not on file     Food insecurity:     Worry: Not on file     Inability: Not on file     Transportation needs:     Medical: Not on file     Non-medical: Not on file   Tobacco Use     Smoking status: Former Smoker     Packs/day: 0.50     Types: Cigarettes     Last attempt to quit: 2014     Years since quittin.4     Smokeless tobacco: Never Used   Substance and Sexual Activity     Alcohol use: No     Drug use: No     Sexual activity: Not Currently   Lifestyle     Physical activity:     Days per week: Not on file     Minutes per session: Not on file     Stress: Not  on file   Relationships     Social connections:     Talks on phone: Not on file     Gets together: Not on file     Attends Hindu service: Not on file     Active member of club or organization: Not on file     Attends meetings of clubs or organizations: Not on file     Relationship status: Not on file     Intimate partner violence:     Fear of current or ex partner: Not on file     Emotionally abused: Not on file     Physically abused: Not on file     Forced sexual activity: Not on file   Other Topics Concern     Parent/sibling w/ CABG, MI or angioplasty before 65F 55M? Yes   Social History Narrative     Not on file       Physical Exam:  /78   Pulse 111   Resp 18   SpO2 92%   GENERAL APPEARANCE:alert and no distress, but appears frail.   HENT: Mouth without ulcers or lesions   NECK: no adenopathy, no asymmetry or masses   LYMPHATICS: No cervical, supraclavicular, axillary or inguinal lymphadenopathy   RESP: lungs clear to auscultation - no rales, rhonchi or wheezes  CARDIOVASCULAR: regular rates and rhythm, normal S1 S2, no S3 or S4 and no murmur.   ABDOMEN:  soft, nontender, no HSM or masses and bowel sounds normal   MUSCULOSKELETAL: extremities normal- no gross deformities noted, no evidence of inflammation in joints, FROM in all extremities. No edema b/l LE.  SKIN: no suspicious lesions or rashes   PSYCHIATRIC: mentation appears normal and affect normal    Pathology:reviewed    Laboratory/Imaging Studies  Results for orders placed or performed during the hospital encounter of 05/07/19   MR Brain w/o & w Contrast    Narrative    MRI OF THE BRAIN WITHOUT AND WITH CONTRAST May 7, 2019 1:24 PM     HISTORY: Right hand weakness. 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).     TECHNIQUE: Axial diffusion-weighted with ADC map, axial T2-weighted  with fat saturation, axial T1-weighted, axial turboFLAIR and coronal  T1-weighted images of the brain were acquired without intravenous  contrast. Following intravenous  administration of gadolinium (7 mL  Gadavist), axial T1-weighted images of the brain were acquired.     COMPARISON: Brain MR 2/7/2018.    FINDINGS: There are numerous new enhancing intra-axial lesions  including: An irregularly shaped lesion with a thick rind of  enhancement in the posterolateral aspect of the left parietal lobe  measuring 1.7 x 1.7 cm in size (series 10, image 19), an ovoid lesion  with a thick rind of enhancement measuring 1.2 cm in diameter in the  posteromedial aspect of the left frontal lobe (series 10, image 17), a  lesion in the posterior aspect of the left occipital lobe with a thin  rind of peripheral enhancement measuring 1.2 cm in diameter (series  10, image 14), a nodule with a thin rind of enhancement measuring 1.1  cm in diameter at the temporo-occipital junction on the left (series  10, image 13), a 1.5 cm diameter nodule with a thin rind of  enhancement in the deep white matter adjacent to the atrium of the  right lateral ventricle (series 10, image 13), an ovoid nodule with a  thin rind of enhancement measuring 1.3 x 0.9 cm in size at the  posteromedial aspect of the left temporal lobe (series 10, image 12)  an ovoid 1.2 cm nodule with a thin rind of enhancement at the right  posterolateral aspect of the pontomesencephalic junction (series 10,  image 9) and a 1.5 cm diameter nodule with a thin rind of enhancement  in the left cerebellar hemisphere (series 10, image 5). These  represent intracranial metastases until proven otherwise. There is  mild vasogenic edema associated with each of the nodules. There is no  significant mass effect due to the nodules and surrounding edema.    There is no midline shift. There is no hydrocephalus. There is no  intracranial hemorrhage. There is no ischemic infarct.    There is moderate diffuse cerebral volume loss. There are numerous  scattered focal and patchy periventricular areas of abnormal T2 signal  hyperintensity in the cerebral white matter  bilaterally that are  consistent with sequela of chronic small vessel ischemic disease.    There is no sinusitis or mastoiditis.      Impression    IMPRESSION:   1. Numerous new enhancing intra-axial lesions scattered throughout the  cerebral hemispheres bilaterally, right side of the brainstem and left  cerebral hemisphere representing intracranial metastases until proven  otherwise.  2. Diffuse cerebral volume loss and cerebral white matter changes  consistent with chronic small vessel ischemic disease.     JYOTI PAREDES MD       ASSESSMENT:    Small cell lung cancer with brain metastasis     RECOMMENDATION:  I would rec recommend a course of whole brain radiation therapy to palliate her brain metastasis.  I also recommended she start on a course of Decadron I started her at  8 mg a day (4 mg twice daily) she takes ranitidine and I encouraged her to continue taking the ranitidine    The risks and benefits of radiotherapy were discussed with the patient.  Potential acute and long term side effects were explained.   The patient agrees to proceed with radiation therapy.  A written consent was obtained.    I will see her again next Friday and consider tapering the steroids.    Thank you for allowing me to participate in Ines Pickard 's care .  Please do not hesitate to call me with questions.    Zeinab Hong MD  [phone]   Pager   [phone]  [facility]   [phone] [place]  [phone] [facility]  Primary Physician: Rohini Suárez   Patient Care Team:  Rohini Suárez MD as PCP - General (Family Practice)  Tracy Miner MD as MD (Oncology)  [facility] ([facility] ([facility] ([place]  MICHAEL Perez MD as Assigned PCP  Juan Flores MD as MD (Radiation Oncology)  Fany Rico APRN CNP as Nurse Practitioner (Nurse Practitioner)  Bella Arvizu, JONATHAN as Specialty Care Coordinator (Oncology)

## 2019-05-17 NOTE — PROGRESS NOTES
[facility] PHYSICIANS  SPECIALIZING IN BREAKTHROUGHS  Radiation Oncology    On Treatment Visit Note      Ines Pickard      Date: 2019   MRN: [id number]   : 1940  Diagnosis: SCLC with brain metastesis    Treatment Summary to Date  Treatment Site: whole brain Current Dose: 1500/3000 cGy Fractions: 5/10      Chemotherapy  Chemo concurrent with radx?: No    Subjective: Ms. Pickard is overall doing well with radiation treatment. She has experienced increased fatigue and finds herself falling asleep in front of the TV in the early evening. She denies memory loss, change in appetite, headaches or nausea/vomiting. She has no new focal neurologic deficits.     Nursing ROS:   Nutrition Alteration  Diet Type: Patient's Preference  Skin  Skin Reaction: 0 - No changes     Cardiovascular  Respiratory effort: 2 - Mild dyspnea on exertion  Gastrointestinal  Nausea: 0 - None     Psychosocial  Mood - Anxiety: 0 - Normal(but says she does feel a little anxious in the clinic)  Pain Assessment  0-10 Pain Scale: 0  Pain Note: dexamethasone 4 mg 2x/day    Objective:   /71   Pulse 101   Resp 20   Wt 70.6 kg (155 lb 9.6 oz)   SpO2 94%   BMI 28.45 kg/m    Gen: Appears well, NAD  Skin: No erythema    Assessment:    Tolerating radiation therapy well.  All questions and concerns addressed.    Plan:   1. Continue current therapy    Mosaiq chart and setup information reviewed. Imaging reviewed.     Aliyah Corrales MD  Pager: [phone]

## 2019-05-22 NOTE — PROGRESS NOTES
Called to check in with pt to see if they have made a decision on the Opdivo or if they had any questions. Pt is currently undergoing WBRT 05.14-05.24.     for a return call with direct line.     Bella Arvizu RN on 5/22/2019 at 2:06 PM

## 2019-05-24 NOTE — LETTER
2019      RE: Ines Pickard  [address] [facility] [place] [address]       [facility] PHYSICIANS  SPECIALIZING IN BREAKTHROUGHS  Radiation Oncology    On Treatment Visit Note      Ines Pickard      Date: 2019   MRN: [id number]   : 1940  Diagnosis: SCLC with brain metastesis    Treatment Summary to Date  Treatment Site: whole brain Current Dose: 3000/3000 cGy Fractions: 10/10      Chemotherapy  Chemo concurrent with radx?: No    Subjective: Ms. Pickard is overall doing well with radiation treatment. She has experienced increased fatigue.     She denies memory loss, change in appetite, headaches or nausea/vomiting. She has no new focal neurologic deficits.     Nursing ROS:   Nutrition Alteration  Diet Type: Patient's Preference  Skin  Skin Reaction: 0 - No changes     Cardiovascular  Respiratory effort: 2 - Mild dyspnea on exertion  Gastrointestinal  Nausea: 0 - None     Psychosocial  Mood - Anxiety: 0 - Normal(but says she does feel a little anxious in the clinic)  Pain Assessment  0-10 Pain Scale: 0  Pain Note: dexamethasone 4 mg 2x/day    Objective:   There were no vitals taken for this visit.  Gen: Appears well, NAD  Skin: No erythema    Assessment:    Tolerating radiation therapy well.  All questions and concerns addressed.    Plan:   1. Done with XRT today  2. Currently on   Decadron at   8 mg   /day.   WIll arrange taper   3. Will arrange follow up with Fany Lino NP and have her port flushed in 2+ weeks.     Mosaiq chart and setup information reviewed. Imaging reviewed.       Zeinab Hernandezvictorina  [phone] pager      CC  Patient Care Team:  Rohini Suárez MD as PCP - General (Family Practice)  Tracy Miner MD as MD (Oncology)  Care, [facility] ([facility] ([facility] ([place]  MICHAEL Perez MD as Assigned PCP  Juan Flores MD as MD (Radiation Oncology)  Fany Rico APRN CNP as Nurse Practitioner (Nurse Practitioner)  Bella Arvizu RN as  Specialty Care Coordinator (Oncology)  BEVERLY VELASCO MD

## 2019-05-24 NOTE — PROGRESS NOTES
[facility] PHYSICIANS  SPECIALIZING IN BREAKTHROUGHS  Radiation Oncology    On Treatment Visit Note      Ines Pickard      Date: 2019   MRN: [id number]   : 1940  Diagnosis: SCLC with brain metastesis    Treatment Summary to Date  Treatment Site: whole brain Current Dose: 3000/3000 cGy Fractions: 10/10      Chemotherapy  Chemo concurrent with radx?: No    Subjective: Ms. Pickard is overall doing well with radiation treatment. She has experienced increased fatigue.     She denies memory loss, change in appetite, headaches or nausea/vomiting. She has no new focal neurologic deficits.     Nursing ROS:   Nutrition Alteration  Diet Type: Patient's Preference  Skin  Skin Reaction: 0 - No changes     Cardiovascular  Respiratory effort: 2 - Mild dyspnea on exertion  Gastrointestinal  Nausea: 0 - None     Psychosocial  Mood - Anxiety: 0 - Normal(but says she does feel a little anxious in the clinic)  Pain Assessment  0-10 Pain Scale: 0  Pain Note: dexamethasone 4 mg 2x/day    Objective:   There were no vitals taken for this visit.  Gen: Appears well, NAD  Skin: No erythema    Assessment:    Tolerating radiation therapy well.  All questions and concerns addressed.    Plan:   1. Done with XRT today  2. Currently on   Decadron at   8 mg   /day.   WIll arrange taper   3. Will arrange follow up with Fany Lino NP and have her port flushed in 2+ weeks.     Mosaiq chart and setup information reviewed. Imaging reviewed.       Zeinab BROUSSARD Hal  [phone] pager      CC  Patient Care Team:  Rohini Suárez MD as PCP - General (Family Practice)  Tracy Velasco MD as MD (Oncology)  [facility] ([facility] ([facility] ([place]  MICHAEL Perez MD as Assigned PCP  Juan Flores MD as MD (Radiation Oncology)  Fany Rico APRN CNP as Nurse Practitioner (Nurse Practitioner)  Bella Arvizu, RN as Specialty Care Coordinator (Oncology)  TRACY VELASCO

## 2019-05-28 NOTE — PROGRESS NOTES
"Pt son Trell called reports pt is not able to do anything on her own anymore, having \"brain problems\", staring blankly, weakness and not being able to get off the toilet or out of the chair. He tried to take her to the ER yesterday but pt refused. He thinks pt needs some sort of Assisted living placement or somewhere where she can get more help. Her sister is near by but pt lives alone. Pt did just finish whole brain RT on 05.24 and is advised she will be tired for the next couple weeks. According to the visit note w/RT on Friday pt was not having memory issues at that time.     Trell also report pt has home care on tuesdays on thursday.     Called [facility] home care and they will not be out to see the pt until tomorrow 05.29. Upon chart review Home Care  also reached out to pt today. LM for a return call with her.   "

## 2019-05-28 NOTE — PROGRESS NOTES
SW revisit for community resources, psychosocial support.     CYDNEY Bruce, [email or web address]  [phone]

## 2019-05-30 PROBLEM — R53.1 WEAKNESS: Status: ACTIVE | Noted: 2019-01-01

## 2019-05-30 PROBLEM — D69.6 THROMBOCYTOPENIA (H): Status: ACTIVE | Noted: 2019-01-01

## 2019-05-30 NOTE — PROGRESS NOTES
"[facility] [place]MISS[facility] NOTE    Patient admitted to room 2213 at approximately 1350 via cart from emergency room. Patient was accompanied by transport tech.     Verbal SBAR report received from Sophie prior to patient arrival.     Patient trasferred to bed via air alvin. Patient alert and oriented X 2. The patient is not having any pain.  . Admission vital signs: Blood pressure 120/55, pulse 100, temperature 97.7  F (36.5  C), temperature source Oral, resp. rate 18, height 1.651 m (5' 5\"), weight 68.5 kg (151 lb 0.2 oz), SpO2 94 %, not currently breastfeeding. Patient was oriented to plan of care, call light, bed controls, tv, telephone, bathroom and visiting hours.     Risk Assessment    The following safety risks were identified during admission: fall. Yellow risk band applied: YES.     Skin Initial Assessment    This writer admitted this patient and completed a full skin assessment and James score in the Adult PCS flowsheet. Appropriate interventions initiated as needed.     Secondary skin check completed by Kathe Ocampo    "

## 2019-05-30 NOTE — PROGRESS NOTES
"Patient denies pain or discomfort; winces a bit when elevating head of bed to swallow meds. Can communicate needs. Does not have an appetite; states she usually just has boost at home. States can usually tell when she has to have a bowel movement. Consult for macho wells placed by Junior.    Vital signs:  Temp: 97.7  F (36.5  C) Temp src: Oral BP: 120/55 Pulse: 100 Heart Rate: 126 Resp: 18 SpO2: 94 % O2 Device: None (Room air) Oxygen Delivery: 3 LPM Height: 165.1 cm (5' 5\") Weight: 68.5 kg (151 lb 0.2 oz)  Estimated body mass index is 25.13 kg/m  as calculated from the following:    Height as of this encounter: 1.651 m (5' 5\").    Weight as of this encounter: 68.5 kg (151 lb 0.2 oz).          "

## 2019-05-30 NOTE — ED NOTES
Pt resting plan on admit and pt family in agreement as she lives independently and family is concerned regarding her ability to do this

## 2019-05-30 NOTE — ED PROVIDER NOTES
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  History obtained from the patient, EMS, review of the chart, and the patient's family.  The patient has been feeling increasingly weak over the last several days and not as norm.  She has not been acting herself and not taking care of herself.  No recent fevers.  No vomiting.  She has had multiple loose stools, nonbloody.  She denies chest pain, headache, shortness of breath, cough, abdominal pain.  No rash.      Allergies:  Allergies   Allergen Reactions     Amoxicillin GI Disturbance     Tetracycline Other (See Comments)     Yeast infection     Nickel Rash       Problem List:    Patient Active Problem List    Diagnosis Date Noted     Orthostatic hypotension 04/22/2019     Priority: Medium     Unstable angina (H) 05/18/2018     Priority: Medium     Chest pain 05/17/2018     Priority: Medium     Anemia in neoplastic disease 05/16/2018     Priority: Medium     Need for prophylactic measure 02/15/2018     Priority: Medium     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) 02/08/2018     Priority: Medium     Anxiety 01/22/2018     Priority: Medium     Morbid obesity (H) 06/29/2017     Priority: Medium     Essential hypertension with goal blood pressure less than 140/90 09/06/2016     Priority: Medium     Senile nuclear cataract 01/22/2016     Priority: Medium     Coronary artery disease involving native coronary artery without angina pectoris 10/29/2015     Priority: Medium     NSTEMI 12/14, stent to Circumflex       Esophageal reflux 12/19/2014     Priority: Medium     Advance Care Planning 10/08/2012     Priority: Medium     Patient does not have an Advance/Health Care Directive (HCD), given \"What is Advance " "Care Planning?\" flyer. Noris Zuniga October 8, 2012       Hyperlipidemia LDL goal <70 10/31/2010     Priority: Medium     Allergic rhinitis 11/14/2005     Priority: Medium     Problem list name updated by automated process. 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     Coronary artery disease 12/2014     Percutaneous transluminal coronary angioplasty hematoma 12/15/2014       Past Surgical History:    Past Surgical History:   Procedure Laterality Date     APPENDECTOMY  1956     ESOPHAGOSCOPY, GASTROSCOPY, DUODENOSCOPY (EGD), COMBINED N/A 1/30/2018    Procedure: COMBINED ENDOSCOPIC ULTRASOUND, ESOPHAGOSCOPY, GASTROSCOPY, DUODENOSCOPY (EGD), FINE NEEDLE ASPIRATE/BIOPSY;   EUS;  Surgeon: Carlos Fletcher MD;  Location:  GI     EYE SURGERY       INSERT PORT VASCULAR ACCESS N/A 2/12/2018    Procedure: INSERT PORT VASCULAR ACCESS;  Port-a-Cath Placement;  Surgeon: aCrlos Johnson MD;  Location: [facility]     PHACOEMULSIFICATION WITH STANDARD INTRAOCULAR LENS IMPLANT Left 1/25/2016    Procedure: PHACOEMULSIFICATION WITH STANDARD INTRAOCULAR LENS IMPLANT;  Surgeon: Julio César Wallace MD;  Location: [facility] OR     PHACOEMULSIFICATION WITH STANDARD INTRAOCULAR LENS IMPLANT Right 2/22/2016    Procedure: PHACOEMULSIFICATION WITH STANDARD INTRAOCULAR LENS IMPLANT;  Surgeon: Julio César Wallace MD;  Location: [facility] OR     SURGICAL HISTORY OF -   1961    right hand surgery      TONSILLECTOMY & ADENOIDECTOMY  1967     VASCULAR SURGERY  02/12/2018    PortaCat       Family History:    Family History   Problem Relation Age of Onset     Cancer Brother      Heart Disease Brother      Cancer Mother      Respiratory Father      Heart Disease Father         MI       Social History:  Marital Status:   [5]  Social History     Tobacco Use     Smoking status: Former Smoker     " Packs/day: 0.50     Types: Cigarettes     Last attempt to quit: 2014     Years since quittin.4     Smokeless tobacco: Never Used   Substance Use Topics     Alcohol use: No     Drug use: No        Medications:      Acetaminophen (TYLENOL PO)   albuterol (PROAIR HFA/PROVENTIL HFA/VENTOLIN HFA) 108 (90 Base) MCG/ACT inhaler   aspirin EC 81 MG EC tablet   atorvastatin (LIPITOR) 40 MG tablet   dexamethasone (DECADRON) 2 MG tablet   lisinopril (PRINIVIL/ZESTRIL) 5 MG tablet   LORazepam (ATIVAN) 0.5 MG tablet   metoprolol succinate ER (TOPROL-XL) 50 MG 24 hr tablet   Multiple Vitamins-Minerals (MULTIVITAMIN ADULT) TABS   prochlorperazine (COMPAZINE) 10 MG tablet   ranitidine (ZANTAC) 75 MG tablet   dexamethasone (DECADRON) 2 MG tablet   nitroglycerin (NITROSTAT) 0.4 MG SL tablet   trolamine salicylate (ASPERCREME) 10 % external cream         Review of Systems  Pertinent positives and negatives listed in the HPI, all other systems reviewed and are negative.    Physical Exam   BP: 99/80  Pulse: 109  Heart Rate: 117  Temp: 99.5  F (37.5  C)  Resp: 18  Height: 152.4 cm (5')  SpO2: 93 %      Physical Exam   Constitutional: She is oriented to person, place, and time. She appears well-developed and well-nourished. She appears distressed.   HENT:   Head: Normocephalic and atraumatic.   Right Ear: External ear normal.   Left Ear: External ear normal.   Nose: Nose normal.   Eyes: Conjunctivae are normal. No scleral icterus.   Neck: Normal range of motion.   Cardiovascular: Normal rate and regular rhythm.   Pulmonary/Chest: Effort normal. No stridor. No respiratory distress.   Abdominal: Soft. She exhibits no distension and no mass. There is no tenderness. There is no guarding.   Neurological: She is alert and oriented to person, place, and time.   Skin: Skin is warm and dry. She is not diaphoretic.   Stage I pressure ulcer over the coccyx.  No signs of cellulitis on a full skin exam.   Psychiatric: She has a normal mood  and affect. Her behavior is normal.   Nursing note and vitals reviewed.      ED Course        Procedures               EKG Interpretation:      Interpreted by Bam Marcos  Time reviewed: 1022  Symptoms at time of EKG: Weakness   Rhythm: sinus tachycardia  Rate: 121  Axis: Right Axis Deviation  Ectopy: none  Conduction: right bundle branch block (complete)  ST Segments/ T Waves: No acute ischemic changes  Q Waves: III and aVf  Comparison to prior: New RBBB with tachycardia    Clinical Impression: Sinus tachycardia with new right bundle branch block      Critical Care time:  none  The patient has signs of Severe Sepsis as evidenced by:    1. 2 SIRS criteria, AND  2. Suspected infection, AND   3. Organ dysfunction: Lactic Acid > 1.9    Time severe sepsis diagnosis confirmed = 1119 as this was the time when Lactate resulted, and the level was > 1.9      3 Hour Severe Sepsis Bundle Completion:  1. Initial Lactic Acid Result:   Recent Labs   Lab Test 05/30/19  1023 05/19/18  0610   LACT 2.6* 2.0     2. Blood Cultures before Antibiotics: Yes  3. Broad Spectrum Antibiotics Administered: Yes     Anti-infectives (From admission through now)    Start     Dose/Rate Route Frequency Ordered Stop    05/30/19 1225  azithromycin (ZITHROMAX) tablet 500 mg      500 mg Oral ONCE 05/30/19 1225 05/30/19 1327    05/30/19 1124  cefTRIAXone in d5w (ROCEPHIN) intermittent infusion 1 g      1 g  over 30 Minutes Intravenous EVERY 24 HOURS 05/30/19 1123          4. 2000 ml of IV fluids.  Ideal body weight: 45.5 kg (100 lb 4.9 oz)  Adjusted ideal body weight: 54.9 kg (121 lb 1 oz)            Results for orders placed or performed during the hospital encounter of 05/30/19 (from the past 24 hour(s))   Lactic acid whole blood   Result Value Ref Range    Lactic Acid 2.6 (H) 0.7 - 2.0 mmol/L   CBC with platelets differential   Result Value Ref Range    WBC 13.9 (H) 4.0 - 11.0 10e9/L    RBC Count 5.04 3.8 - 5.2 10e12/L    Hemoglobin 13.5 11.7  - 15.7 g/dL    Hematocrit 41.5 35.0 - 47.0 %    MCV 82 78 - 100 fl    MCH 26.8 26.5 - 33.0 pg    MCHC 32.5 31.5 - 36.5 g/dL    RDW 15.3 (H) 10.0 - 15.0 %    Platelet Count 140 (L) 150 - 450 10e9/L    Diff Method Automated Method     % Neutrophils 89.1 %    % Lymphocytes 3.5 %    % Monocytes 5.3 %    % Eosinophils 1.1 %    % Basophils 0.1 %    % Immature Granulocytes 0.9 %    Nucleated RBCs 0 0 /100    Absolute Neutrophil 12.4 (H) 1.6 - 8.3 10e9/L    Absolute Lymphocytes 0.5 (L) 0.8 - 5.3 10e9/L    Absolute Monocytes 0.7 0.0 - 1.3 10e9/L    Absolute Eosinophils 0.2 0.0 - 0.7 10e9/L    Absolute Basophils 0.0 0.0 - 0.2 10e9/L    Abs Immature Granulocytes 0.1 0 - 0.4 10e9/L    Absolute Nucleated RBC 0.0    Comprehensive metabolic panel   Result Value Ref Range    Sodium 139 133 - 144 mmol/L    Potassium 3.8 3.4 - 5.3 mmol/L    Chloride 104 94 - 109 mmol/L    Carbon Dioxide 29 20 - 32 mmol/L    Anion Gap 6 3 - 14 mmol/L    Glucose 120 (H) 70 - 99 mg/dL    Urea Nitrogen 46 (H) 7 - 30 mg/dL    Creatinine 0.54 0.52 - 1.04 mg/dL    GFR Estimate 89 >60 mL/min/[1.73_m2]    GFR Estimate If Black >90 >60 mL/min/[1.73_m2]    Calcium 9.0 8.5 - 10.1 mg/dL    Bilirubin Total 2.9 (H) 0.2 - 1.3 mg/dL    Albumin 3.2 (L) 3.4 - 5.0 g/dL    Protein Total 6.2 (L) 6.8 - 8.8 g/dL    Alkaline Phosphatase 72 40 - 150 U/L    ALT 28 0 - 50 U/L    AST 28 0 - 45 U/L   Lipase   Result Value Ref Range    Lipase 85 73 - 393 U/L   Blood gas venous   Result Value Ref Range    Ph Venous 7.44 (H) 7.32 - 7.43 pH    PCO2 Venous 40 40 - 50 mm Hg    PO2 Venous 28 25 - 47 mm Hg    Bicarbonate Venous 27 21 - 28 mmol/L    Base Excess Venous 2.6 mmol/L    FIO2 2.5    CT Head w/o Contrast    Narrative    CT SCAN OF THE HEAD WITHOUT CONTRAST   5/30/2019 10:59 AM     HISTORY: Altered mental status level. Metastatic brain disease.    TECHNIQUE:  Axial images of the head and coronal reformations without  IV contrast material.  Radiation dose for this scan was reduced  using  automated exposure control, adjustment of the mA and/or kV according  to patient size, or iterative reconstruction technique.    COMPARISON: 10/27/2014 CT exam. MR dated 5/7/2019.    FINDINGS: Again seen are several intra-axial lesions corresponding to  areas of abnormal enhancement on recent MRI study. These include  lesions in the right renee, left cerebellum, right parietal  peritrigonal region, left parietal cortex, left parietal subcortical  white matter and left temporoparietal white matter. The majority of  these lesions show some high density on CT which could be due to some  developing calcification. Hemorrhage is felt to be much less likely.  There is no significant mass effect. Mild cerebral atrophy is present.  There is no evidence for acute infarct. Vascular calcifications noted.  No lytic or destructive lesions are seen in the calvarium.      Impression    IMPRESSION: Multiple intra-axial metastatic deposits which the  majority are showing some development of some high density on CT which  is probably due to some developing calcification. The number of  intra-axial metastatic deposits have not significantly changed since  recent MR. There is no evidence for any definite acute hemorrhage,  significant mass effect, or acute infarct.    SAMANTHA OLEARY MD   Chest XR,  PA & LAT    Narrative    XR CHEST 2 VW 5/30/2019 11:20 AM    HISTORY: Weakness. Hypertension.    COMPARISON: 11/19/2018.      Impression    IMPRESSION: 2 views of the chest show no acute cardiopulmonary  disease. Low lung volumes are seen on the lateral view. A PowerPort  catheter hasn't significantly changed.     ALINA MATTHEW MD   UA reflex to Microscopic   Result Value Ref Range    Color Urine Chanel     Appearance Urine Slightly Cloudy     Glucose Urine Negative NEG^Negative mg/dL    Bilirubin Urine Negative NEG^Negative    Ketones Urine Negative NEG^Negative mg/dL    Specific Gravity Urine 1.024 1.003 - 1.035    Blood Urine Negative  NEG^Negative    pH Urine 5.0 5.0 - 7.0 pH    Protein Albumin Urine 30 (A) NEG^Negative mg/dL    Urobilinogen mg/dL 4.0 (H) 0.0 - 2.0 mg/dL    Nitrite Urine Negative NEG^Negative    Leukocyte Esterase Urine Large (A) NEG^Negative    Source Catheterized Urine     RBC Urine 14 (H) 0 - 2 /HPF    WBC Urine 154 (H) 0 - 5 /HPF    Bacteria Urine Few (A) NEG^Negative /HPF    Squamous Epithelial /HPF Urine 1 0 - 1 /HPF    Mucous Urine Present (A) NEG^Negative /LPF   CT Chest Pulmonary Embolism w Contrast    Narrative    CT CHEST PULMONARY EMBOLISM WITH CONTRAST   5/30/2019 1:03 PM    HISTORY: Hypoxia. Weakness. Tachycardia.    TECHNIQUE: Pulmonary embolism protocol was performed. 80 mL Isovue 370  were injected IV. After contrast injection, volumetric helical  acquisition was performed from the thoracic inlet through the upper  abdomen. Radiation dose for this scan was reduced using automated  exposure control, adjustment of the mA and/or kV according to patient  size, or iterative reconstruction technique.    COMPARISON: Chest CT performed 1/16/2019.    FINDINGS:  No evidence for pulmonary embolism. No evidence for  thoracic aortic dissection. Aneurysmal dilatation of the ascending  thoracic aorta measures up to 4.8 cm in diameter and is not  significantly changed. Atherosclerotic calcification of the thoracic  aorta and coronary arteries. No pleural or pericardial effusions. No  enlarged lymph nodes are identified in the chest. Left Port-A-Cath,  with tip not visualized, but likely in the low SVC. Emphysematous  changes are noted in both upper lungs. Mild scarring and/or  atelectasis at both lung bases. A 1 x 0.6 cm nodular opacity near the  left lung apex medially (series 6 image 22) is unchanged. Scattered  calcified pleural plaques bilaterally are unchanged and suggest prior  asbestos exposure. Small hiatal hernia. Cholelithiasis. A 2 cm left  adrenal nodule has decreased in size since the previous exam. A 2.8 cm  cyst  in the upper pole of the right kidney is unchanged. Limited views  of the upper abdomen are otherwise unremarkable. Moderate anterior  compression of the T6 vertebral body is unchanged. Mild  age-indeterminate anterior compression of the T5 vertebral body is new  since the previous exam.      Impression    IMPRESSION:   1. No evidence for pulmonary embolism or thoracic aortic dissection.    2. Aneurysmal dilatation of the ascending thoracic aorta measures 4.8  cm and is not significantly changed.  3. An indeterminate 1 x 0.6 cm nodule near the left lung apex is not  significantly changed.  4. A 2 cm indeterminate left adrenal nodule has decreased in size.   5. Mild age-indeterminate anterior compression of the T5 vertebral  body is new compared to 1/16/2019.  6. Cholelithiasis.  7. Emphysema.       Medications   cefTRIAXone in d5w (ROCEPHIN) intermittent infusion 1 g (1 g Intravenous New Bag 5/30/19 1203)   azithromycin (ZITHROMAX) tablet 250 mg (has no administration in time range)   lactated ringers BOLUS 1,000 mL (0 mLs Intravenous Stopped 5/30/19 1153)   0.9% sodium chloride BOLUS (1,000 mLs Intravenous New Bag 5/30/19 1202)   azithromycin (ZITHROMAX) tablet 500 mg (500 mg Oral Given 5/30/19 1327)   iopamidol (ISOVUE-370) solution 80 mL (80 mLs Intravenous Given 5/30/19 1255)   sodium chloride 0.9 % bag 500mL for CT scan flush use (100 mLs Intravenous Given 5/30/19 1255)       Assessments & Plan (with Medical Decision Making)   78-year-old female who presents for weakness.  Temperature is 99.5  F, heart rate 109, blood pressure 99/80, SPO2 is 93%. She is given IV fluids.  No signs of cellulitis on exam.  She was hypoxic upon arrival 87% on room air and was placed on oxygen via nasal cannula with improvement.  She lactic acid is elevated at 2.6.  Blood cultures are drawn.  White blood cell count is slightly elevated.  She is started empirically on ceftriaxone and azithromycin.  Chest x-ray obtained, images  reviewed independently as well as radiology read reviewed, radiology read as clear lungs, however I am concerned for possible retrocardiac infiltrate that could represent pneumonia, however it is a little poor and story effort and this may be atelectasis.  With her tachycardia and hypoxia, pulmonary embolism is a consideration and her EKG shows sinus tachycardia with a new right bundle branch block.  Therefore CT of the chest obtained, images reviewed independently as well as radiology read reviewed, no signs of pulmonary embolism.  He will be admitted to the medicine service for further treatment.  I discussed the case with the on-call hospitalist, Dr. Bradley who agrees to admit the patient to her service.    I have reviewed the nursing notes.    I have reviewed the findings, diagnosis, plan and need for follow up with the patient.          Medication List      There are no discharge medications for this visit.         Final diagnoses:   Generalized muscle weakness   Dehydration       5/30/2019   [facility] EMERGENCY DEPARTMENT     Bam Marcos MD  05/30/19 8500

## 2019-05-30 NOTE — LETTER
Transition Communication Hand-off for Care Transitions to Next Level of Care Provider    Name: Ines Pickard  : 1940  MRN #: [id number]  Primary Care Provider: Rohini Suárez  Primary Care MD Name: (Jose Armando)  Primary Clinic: [address] [facility] [address]  Primary Care Clinic Name: ([facility])  Reason for Hospitalization:  Dehydration [E86.0]  Generalized muscle weakness [M62.81]  Admit Date/Time: 2019  9:49 AM  Discharge Date: 6/3/19  Payor Source: Payor: MEDICARE / Plan: MEDICARE / Product Type: Medicare /     Readmission Assessment Measure (CHARLIE) Risk Score/category: Average    Plan of Care Goals/Milestone Events:     Reason for Communication Hand-off Referral: Fragility    Discharge Plan: [place] [place] [facility] tele:[phone] then transition into Long Term there.       Concern for non-adherence with plan of care:   NO  Discharge Needs Assessment: Pt needs PT/OT to regain more strength and endurance.      Follow-up plan:    Future Appointments   Date Time Provider Department Center   2019  2:00 PM Fany Rico, APRN CNP [facility]       Any outstanding tests or procedures:        Referrals     Future Labs/Procedures    Occupational Therapy Adult Consult     Comments:    Evaluate and treat as clinically indicated.    Reason:  Deconditioning due to illness    Physical Therapy Adult Consult     Comments:    Evaluate and treat as clinically indicated.    Reason:  Deconditioning due to illness            Key Recommendations:      MIR Alvarez     AVS/Discharge Summary is the source of truth; this is a helpful guide for improved communication of patient story

## 2019-05-30 NOTE — ED NOTES
Patient has  Rebecca to Observation  order. Patient has been given Observation brochure  What does Observation mean to me .  Patient has been given the opportunity to ask questions about observation status and their plan of care.  Brittany Lee RN

## 2019-05-30 NOTE — PROGRESS NOTES
Talked with Saige, home care RN again on 05.29. She was present with the OT alejandro and they were expecting the SW to visit later in the afternoon. They had noted some neurological changes and are following pt to see if admission or more assistance is needed. She has been in touch with pt son as well.       Noted this AM, pt was in the ER.    Bella Arvizu RN on 5/30/2019 at 2:04 PM

## 2019-05-30 NOTE — H&P
[facility]    History and Physical - Hospitalist Service       Date of Admission:  5/30/2019    Assessment & Plan   Ines Pickard is a 78 year old female admitted on 5/30/2019. She presents with weakness.    Weakness, likely due to dehydration and UTI  Likely due to poor oral intake.  Increasing weakness over the last 2 to 3 weeks, most noticeable over the last week. Still ambulatory at home with walker, lives alone. Minimal p.o. intake aside from 1 to 2 cans of Ensure per day.  Completed radiation therapy 5/24/2019.  Denies fever, chills, cough, dysuria, urinary frequency. BP initially soft in ED (99/80).  Afebrile.  WBC 13.9.  Lactate 2.6 -> 1.4. UA as below. Procalcitonin 0.06. CTA PE negative for PE, pneumonia.  Mildly tachycardic .  Received 2L fluid in ED.   Initially thought to exhibit a retrocardiac infiltrate on CXR and started on ceftriaxone/azithromycin, however CT did not show this infiltrate.  No respiratory symptoms.  She is amenable to TCU if it will help her regain her strength so she can return home.  - Stop azithromycin  - LR @ 125 mL/hr overnight  - Blood Cx pending  - AM CMP  - PT/OT evaluation    UTI  UA shows 154 WBCs, few bacteria. No clear urinary symptoms.  - Ceftriaxone 1g daily    Thrombocytopenia  Platelets 140. Unclear etiology, possibly due to chemo/radiation.  - Follow    Elevated bilirubin  Bili 2.9. Other LFTs normal. Urobilinogen present. Denies nausea, vomiting, abdominal pain. Abdominal exam benign. CT Chest showed cholelithiasis.  - Follow, consider imaging if RUQ abdominal pain develops    Elevated BUN  BUN 46, previously 15-21. Likely due to dehydration.  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 Mets to left adrenal gland and brain. Completed chemotherapy. Completed treatment with radiation and  carbo/-16. Additional radiation to left adrenal gland started 2/2019. Last radiation dose 5/24/19. MR brain 5/7/19 showed numerous new intracranial mets. Treatment is palliative. Follows with Dr. Miner.  - Continue dexamethasone taper (ordered by rad/onc), currently on 2 mg BID.    Hyperlipidemia  - Continue atorvastatin.    Esophageal reflux  - Continue ranitidine.    Coronary artery disease involving native coronary artery without angina pectoris  Lexiscan stress 5/17/18 showed anterolateral and inferolateral ischemia.  Echo same date showed early diastolic dysfunction with LVEF 55-60%.  - Continue atorvastatin, aspirin, lisinopril, metoprolol.    Essential hypertension   - Continue lisinopril, metoprolol.         Diet: Regular adult diet  DVT Prophylaxis: Enoxaparin  Spear Catheter: not present  Code Status: DNR/DNI    Disposition Plan   Expected discharge: 1-2 days, recommended to transitional care unit once rehydrated and evaluation complete.  Entered: Carlitos Guerrero PA-C 05/30/2019, 4:37 PM     The patient's care was discussed with the Attending Physician, Dr. Rohini Suárez and Patient.    Carlitos Guerrero PA-C  [facility]    ______________________________________________________________________    Chief Complaint   Weakness    History is obtained from the patient    History of Present Illness   Ines Pickard is a 78 year old female who with history of SCLC with brain mets who presents with increasing weakness over the last several weeks.  Family is concerned that she is not taking care of herself at home.  She acknowledges extremely poor p.o. intake, generally only 1 to 2 cans of Ensure per day and the occasional half sandwich.  She has noticed increased difficulty with ambulation although she asserts she is still able to get around her house with her walker, albeit with great difficulty rising from a seated position.  She denies focal weakness.  Denies fevers, chills,  nausea, vomiting, diarrhea, or constipation.  Denies abdominal pain.    Review of Systems    The 10 point Review of Systems is negative other than noted in the HPI or here.     Past Medical History    I have reviewed this patient's medical history and updated it with pertinent information if needed.   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  Past Surgical History:   Procedure Laterality Date     APPENDECTOMY  1956     ESOPHAGOSCOPY, GASTROSCOPY, DUODENOSCOPY (EGD), COMBINED N/A 1/30/2018    Procedure: COMBINED ENDOSCOPIC ULTRASOUND, ESOPHAGOSCOPY, GASTROSCOPY, DUODENOSCOPY (EGD), FINE NEEDLE ASPIRATE/BIOPSY;   EUS;  Surgeon: Carlos Fletcher MD;  Location:  GI     EYE SURGERY       INSERT PORT VASCULAR ACCESS N/A 2/12/2018    Procedure: INSERT PORT VASCULAR ACCESS;  Port-a-Cath Placement;  Surgeon: Carlos Johnson MD;  Location: [facility] OR     PHACOEMULSIFICATION WITH STANDARD INTRAOCULAR LENS IMPLANT Left 1/25/2016    Procedure: PHACOEMULSIFICATION WITH STANDARD INTRAOCULAR LENS IMPLANT;  Surgeon: Julio César Wallace MD;  Location: [facility] OR     PHACOEMULSIFICATION WITH STANDARD INTRAOCULAR LENS IMPLANT Right 2/22/2016    Procedure: PHACOEMULSIFICATION WITH STANDARD INTRAOCULAR LENS IMPLANT;  Surgeon: Julio César Wallace MD;  Location: [facility] OR     SURGICAL HISTORY OF -   1961    right hand surgery      TONSILLECTOMY & ADENOIDECTOMY  1967     VASCULAR SURGERY  02/12/2018    PortaCat       Social History   I have reviewed this patient's social history and updated it with pertinent information if needed.  Social History     Tobacco Use      Smoking status: Former Smoker     Packs/day: 0.50     Types: Cigarettes     Last attempt to quit: 2014     Years since quittin.4     Smokeless tobacco: Never Used   Substance Use Topics     Alcohol use: No     Drug use: No       Family History   I have reviewed this patient's family history and updated it with pertinent information if needed.   Family History   Problem Relation Age of Onset     Cancer Brother      Heart Disease Brother      Cancer Mother      Respiratory Father      Heart Disease Father         MI       Prior to Admission Medications   Prior to Admission Medications   Prescriptions Last Dose Informant Patient Reported? Taking?   Acetaminophen (TYLENOL PO) 2019 at am Self Yes Yes   Sig: Take 1,000 mg by mouth every 6 hours as needed   LORazepam (ATIVAN) 0.5 MG tablet 2019 at am  No Yes   Sig: Take 1 tablet (0.5 mg) by mouth 2 times daily as needed for anxiety   Multiple Vitamins-Minerals (MULTIVITAMIN ADULT) TABS 2019 at Unknown time  Yes Yes   Sig: Take 1 tablet by mouth daily    albuterol (PROAIR HFA/PROVENTIL HFA/VENTOLIN HFA) 108 (90 Base) MCG/ACT inhaler Past Week at Unknown time  No Yes   Sig: Inhale 2 puffs into the lungs 4 times daily   aspirin EC 81 MG EC tablet 2019 at Unknown time  No Yes   Sig: Take 1 tablet (81 mg) by mouth daily   atorvastatin (LIPITOR) 40 MG tablet 2019 at Unknown time  No Yes   Sig: Take 1 tablet (40 mg) by mouth daily   dexamethasone (DECADRON) 2 MG tablet   No No   Sig: Take 4 mg by mouth 2 times daily (with meals).   dexamethasone (DECADRON) 2 MG tablet 2019 at Unknown time  No Yes   Sig: Take 1 tablet (2 mg) by mouth 2 times daily (with meals)   lisinopril (PRINIVIL/ZESTRIL) 5 MG tablet 2019 at Unknown time  No Yes   Sig: Take 1 tablet (5 mg) by mouth daily   metoprolol succinate ER (TOPROL-XL) 50 MG 24 hr tablet 2019 at Unknown time  No Yes   Sig: Take 1 tablet (50 mg) by mouth daily   nitroglycerin (NITROSTAT)  0.4 MG SL tablet More than a month at Unknown time  No No   Sig: Place 1 tablet (0.4 mg) under the tongue every 5 minutes as needed for chest pain Maximum of 3 doses in 15 minutes   Patient not taking: Reported on 5/2/2019   prochlorperazine (COMPAZINE) 10 MG tablet Past Week at Unknown time  No Yes   Sig: Take 0.5 tablets (5 mg) by mouth every 6 hours as needed (Nausea/Vomiting)   ranitidine (ZANTAC) 75 MG tablet Past Week at Unknown time  No Yes   Sig: Take 1 tablet (75 mg) by mouth 2 times daily   trolamine salicylate (ASPERCREME) 10 % external cream   Yes No   Sig: Apply 1 applicator topically as needed for moderate pain      Facility-Administered Medications: None     Allergies   Allergies   Allergen Reactions     Amoxicillin GI Disturbance     Tetracycline Other (See Comments)     Yeast infection     Nickel Rash       Physical Exam   Vital Signs: Temp: 97.7  F (36.5  C) Temp src: Oral BP: 120/55 Pulse: 100 Heart Rate: 126 Resp: 18 SpO2: 94 % O2 Device: None (Room air) Oxygen Delivery: 3 LPM  Weight: 151 lbs .24 oz    General: Appears calm, comfortable, nontoxic. Answers questions quickly and appropriately with clear speech. No apparent distress.  Skin: Pink, warm, dry.  Eyes: PERRL. Sclera are white.  HENT:  Normocephalic, atraumatic. Oropharynx is tacky to dry, without lesions or exudate.  Lymph/Hematologic: No occipital, anterior/posterior cervical, supraclavicular, or axillary lymphadenopathy.  CV: RRR, clear S1/S2 without murmur, rub, or gallop. Radial pulses equal bilaterally. No lower extremity edema.  Respiratory: CTA and equal bilaterally. Normal respiratory effort.  GI: Soft, nontender. Normal bowel sounds.  Musculoskeletal: Moving all extremities well. Normal muscle bulk and tone.  Neuro: Memory and cognition appear normal. CN II - XII grossly intact.  Right arm noticeably weaker than the left.  Finger-to-nose intact.  Negative pronator drift.   Psych: Normal mood and affect.         Data   Data  reviewed today: I reviewed all medications, new labs and imaging results over the last 24 hours. I personally reviewed the EKG tracing showing SR without ischemic changes.    Recent Labs   Lab 05/30/19  1023   WBC 13.9*   HGB 13.5   MCV 82   *      POTASSIUM 3.8   CHLORIDE 104   CO2 29   BUN 46*   CR 0.54   ANIONGAP 6   AMADO 9.0   *   ALBUMIN 3.2*   PROTTOTAL 6.2*   BILITOTAL 2.9*   ALKPHOS 72   ALT 28   AST 28   LIPASE 85     Recent Results (from the past 24 hour(s))   CT Head w/o Contrast    Narrative    CT SCAN OF THE HEAD WITHOUT CONTRAST   5/30/2019 10:59 AM     HISTORY: Altered mental status level. Metastatic brain disease.    TECHNIQUE:  Axial images of the head and coronal reformations without  IV contrast material.  Radiation dose for this scan was reduced using  automated exposure control, adjustment of the mA and/or kV according  to patient size, or iterative reconstruction technique.    COMPARISON: 10/27/2014 CT exam. MR dated 5/7/2019.    FINDINGS: Again seen are several intra-axial lesions corresponding to  areas of abnormal enhancement on recent MRI study. These include  lesions in the right renee, left cerebellum, right parietal  peritrigonal region, left parietal cortex, left parietal subcortical  white matter and left temporoparietal white matter. The majority of  these lesions show some high density on CT which could be due to some  developing calcification. Hemorrhage is felt to be much less likely.  There is no significant mass effect. Mild cerebral atrophy is present.  There is no evidence for acute infarct. Vascular calcifications noted.  No lytic or destructive lesions are seen in the calvarium.      Impression    IMPRESSION: Multiple intra-axial metastatic deposits which the  majority are showing some development of some high density on CT which  is probably due to some developing calcification. The number of  intra-axial metastatic deposits have not significantly changed  since  recent MR. There is no evidence for any definite acute hemorrhage,  significant mass effect, or acute infarct.    SAMANTHA OLEARY MD   Chest XR,  PA & LAT    Narrative    XR CHEST 2 VW 5/30/2019 11:20 AM    HISTORY: Weakness. Hypertension.    COMPARISON: 11/19/2018.      Impression    IMPRESSION: 2 views of the chest show no acute cardiopulmonary  disease. Low lung volumes are seen on the lateral view. A PowerPort  catheter hasn't significantly changed.     ALINA MATTHEW MD   CT Chest Pulmonary Embolism w Contrast    Narrative    CT CHEST PULMONARY EMBOLISM WITH CONTRAST   5/30/2019 1:03 PM    HISTORY: Hypoxia. Weakness. Tachycardia.    TECHNIQUE: Pulmonary embolism protocol was performed. 80 mL Isovue 370  were injected IV. After contrast injection, volumetric helical  acquisition was performed from the thoracic inlet through the upper  abdomen. Radiation dose for this scan was reduced using automated  exposure control, adjustment of the mA and/or kV according to patient  size, or iterative reconstruction technique.    COMPARISON: Chest CT performed 1/16/2019.    FINDINGS:  No evidence for pulmonary embolism. No evidence for  thoracic aortic dissection. Aneurysmal dilatation of the ascending  thoracic aorta measures up to 4.8 cm in diameter and is not  significantly changed. Atherosclerotic calcification of the thoracic  aorta and coronary arteries. No pleural or pericardial effusions. No  enlarged lymph nodes are identified in the chest. Left Port-A-Cath,  with tip not visualized, but likely in the low SVC. Emphysematous  changes are noted in both upper lungs. Mild scarring and/or  atelectasis at both lung bases. A 1 x 0.6 cm nodular opacity near the  left lung apex medially (series 6 image 22) is unchanged. Scattered  calcified pleural plaques bilaterally are unchanged and suggest prior  asbestos exposure. Small hiatal hernia. Cholelithiasis. A 2 cm left  adrenal nodule has decreased in size since the previous  exam. A 2.8 cm  cyst in the upper pole of the right kidney is unchanged. Limited views  of the upper abdomen are otherwise unremarkable. Moderate anterior  compression of the T6 vertebral body is unchanged. Mild  age-indeterminate anterior compression of the T5 vertebral body is new  since the previous exam.      Impression    IMPRESSION:   1. No evidence for pulmonary embolism or thoracic aortic dissection.    2. Aneurysmal dilatation of the ascending thoracic aorta measures 4.8  cm and is not significantly changed.  3. An indeterminate 1 x 0.6 cm nodule near the left lung apex is not  significantly changed.  4. A 2 cm indeterminate left adrenal nodule has decreased in size.   5. Mild age-indeterminate anterior compression of the T5 vertebral  body is new compared to 1/16/2019.  6. Cholelithiasis.  7. Emphysema.    REYES MEDINA MD

## 2019-05-30 NOTE — ED NOTES
Attempt to contact family and notify them of pt going to the floor. They knew she was being admitted: unable to locate them

## 2019-05-31 PROBLEM — N39.0 ACUTE UTI: Status: ACTIVE | Noted: 2019-01-01

## 2019-05-31 NOTE — PROGRESS NOTES
CARE TRANSITION SOCIAL WORK INITIAL ASSESSMENT:      Met with: Patient.    DATA  Principal Problem:    Weakness  Active Problems:    Hyperlipidemia LDL goal <70    Esophageal reflux    Coronary artery disease involving native coronary artery without angina pectoris    Essential hypertension with goal blood pressure less than 140/90    Anxiety    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)    Thrombocytopenia (H)    Acute UTI       [facility]: ([facility])  Primary Care MD Name: (Jose Armando)  Contact information and PCP information verified: Yes      ASSESSMENT  Cognitive Status: awake, alert and oriented.       Resources List: Transitional Care, Skilled Nursing Facility, Home Care              Description of Support System: Supportive, Involved   Who is your support system?: Children, Sibling(s)   Support Assessment: Adequate family and caregiver support, Adequate social supports   Insurance Concerns: No Insurance issues identified           This writer met with pt introduced self and role. Discussed discharge planning and medicare guidelines in regards to home care and SNF benefits.     Palliative care consult held. Goal is for pt to discharge to a TCU and then transition to LTC with hospice. Patient was provided with Medicare certified nursing home list. Pts choices are as follows [facility] (Main: [phone] Admissions: [phone] Fax: [phone]).  Pt was provided with Medicare certified hospice care list. Pt chooses to use [facility] (phone: [phone] Fax: [phone]).     Again, pt would like to try TCU first, her only choice is [facility].      Pt will need to complete MA application, can be done at TCU.      PLAN    TCU transition to LTC with hospice        Rose LAMAS, [facility], [facility] [phone]       addendum: Pt has been accepted at [facility] (Main: [phone] Admissions: [phone] Fax: [phone])   for as early as MONDAY. Will need PAS. Rose Bah  CYDNEY, [facility], [facility] [phone]

## 2019-05-31 NOTE — CONSULTS
[facility]    Palliative Care Consultation--Inpatient  Admission Date: 5/30/2019   Visit Date: May 31, 2019  PCP: Rohini Suárez   Requested by: Junior Anderson      HISTORY of PRESENT ILLNESS:  Ines Pickard is a 78 year old year old female diagnosed with SCLC in January 2018, metastatic at the time of diagnosis.  She completed Carbo/-16 followed by consolidation RT thenn local RT to the metastatic L adrenal gland in March of this year.  More recently, muultiple metastatic brain lesions were found, and she completed 10 fx/3000 cGy on 5/24/19.  Family states that since starting radiation earlier this month, patient has declined very rapidly.  She was admitted with weakness, dehydration.  She was referred to Palliative Care for exploration of goals of care..      ASSESSMENT & PLAN:    End stage metastatic SCLC first diagnosed in Jan 2018  Recent completion of radiation to the brain for extensive brain metastasis  Weakness  Subjective short term memory problems  > prognosis is likely weeks; months IF she eats and drinks    Anorexia and gradual weight loss since August 2017 at which time she weighed 197 lb (now 151)  > Considered decadron; however, she was just tapered off Decadron, and family reports that her appetite wasn't good even when taking higher doses of Decadron.  I don't think she would be a good candidate for Megace as it is prothrombotic.  I haven't seen Remeron effective in end state disease as Ines has.     Advance Care Planning:  > Understanding of condition:  She knows she has cancer, and DOES NOT WANT TO KNOW HOW MUCH TIME SHE HAS LEFT  > Decisional capacity:  Questionable; has limited insight and has subjective ST memory loss  > Code status:  DNR / DNI  > Health Care Directive: Yes, If Yes, is there a copy in the EMR?   No  > POLST:  No    Goals of Care:  > patient hopes to return to her own home but admits she won't be able to without more help, and her sister can't do any more  due to physical exhaustion.  No in-town sons.   > She doesn't want to know her prognosis.  She DOES want to die in her sleep.    > refusing activity today; sister will try to encourage her to work with Pt so she qualifies for [facility]  > met with sister Paige and son Joseph.  If she doesn't qualify for [facility], they say she can NOT return to her trailer.  They support her going to [facility] with hospice enrollment  > if she goes to [facility], they still want her to remain at [facility] thereafter and be enrolled in hospice  > Joseph and Trell are her health care agents and are her legal decision-makers if/when she lacks decisional capacity  > attempting to get POA documents signed naming Joseph Gibson and Trell as her POA as well  > Family united:  She can NEVER return home, especially in light of her limited prognosis.      Thank you for the opportunity to be of service to this patient and family.      Jb Santos CNP ([facility])  Palliative Care  Private cell:  [phone]     Face to face:   1155 - 1230, 35 min, > 50% spent discussing  goals of care and reviewing symptoms.   Non face to face:   1350 - 1550, 120 min, > 50% spent coordinating care with  attending, nursing and Care Transitions and facilitating completion of POA documents.       = = = = = = = = = = = = = = = =      PROBLEM LIST  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)     Need for prophylactic measure     Anemia in neoplastic disease     Chest pain     Unstable angina (H)     Orthostatic hypotension     Weakness     Thrombocytopenia (H)     Acute UTI       PAST MEDICAL HISTORY  Past Medical  History:   Diagnosis Date     Anemia due to blood loss, acute 12/30/2014     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     Coronary artery disease 12/2014    Inferior STEMI, stent to Circumflex     Percutaneous transluminal coronary angioplasty hematoma 12/15/2014       PAST SURGICAL HISTORY  Past Surgical History:   Procedure Laterality Date     APPENDECTOMY  1956     ESOPHAGOSCOPY, GASTROSCOPY, DUODENOSCOPY (EGD), COMBINED N/A 1/30/2018    Procedure: COMBINED ENDOSCOPIC ULTRASOUND, ESOPHAGOSCOPY, GASTROSCOPY, DUODENOSCOPY (EGD), FINE NEEDLE ASPIRATE/BIOPSY;   EUS;  Surgeon: Carlos Fletcher MD;  Location:  GI     EYE SURGERY       INSERT PORT VASCULAR ACCESS N/A 2/12/2018    Procedure: INSERT PORT VASCULAR ACCESS;  Port-a-Cath Placement;  Surgeon: Carlos Johnson MD;  Location: [facility] OR     PHACOEMULSIFICATION WITH STANDARD INTRAOCULAR LENS IMPLANT Left 1/25/2016    Procedure: PHACOEMULSIFICATION WITH STANDARD INTRAOCULAR LENS IMPLANT;  Surgeon: Julio César Wallace MD;  Location: [facility] OR     PHACOEMULSIFICATION WITH STANDARD INTRAOCULAR LENS IMPLANT Right 2/22/2016    Procedure: PHACOEMULSIFICATION WITH STANDARD INTRAOCULAR LENS IMPLANT;  Surgeon: Julio César Wallace MD;  Location: [facility] OR     SURGICAL HISTORY OF -   1961    right hand surgery      TONSILLECTOMY & ADENOIDECTOMY  1967     VASCULAR SURGERY  02/12/2018    PortaCat       FAMILY MEDICAL HISTORY  Family History   Problem Relation Age of Onset     Cancer Brother      Heart Disease Brother      Cancer Mother      Respiratory Father      Heart Disease Father         MI       SOCIAL HISTORY  History   Smoking Status     Former Smoker     Packs/day: 0.50     Types: Cigarettes     Quit date: 12/13/2014   Smokeless Tobacco     Never Used     Social History    Substance and Sexual Activity      Alcohol use: No    History   Drug Use No     Social History     Social History Narrative    May 31, 2019:   with 3 sons, none of whom live around  here.  Has a sister, Paige, who lives just a few blocks away.  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  Is currently receiving some home care services from [facility], and housekeeping services? From [facility]?  Remote h/o smoking,        SPIRITUAL HISTORY  As above    ALLERGIES  Allergies   Allergen Reactions     Amoxicillin GI Disturbance     Tetracycline Other (See Comments)     Yeast infection     Nickel Rash       MEDICATIONS  Medications Prior to Admission    No current facility-administered medications on file prior to encounter.   Current Outpatient Medications on File Prior to Encounter:  Acetaminophen (TYLENOL PO) Take 1,000 mg by mouth every 6 hours as needed   albuterol (PROAIR HFA/PROVENTIL HFA/VENTOLIN HFA) 108 (90 Base) MCG/ACT inhaler Inhale 2 puffs into the lungs 4 times daily   aspirin EC 81 MG EC tablet Take 1 tablet (81 mg) by mouth daily   atorvastatin (LIPITOR) 40 MG tablet Take 1 tablet (40 mg) by mouth daily   dexamethasone (DECADRON) 2 MG tablet Take 1 tablet (2 mg) by mouth 2 times daily (with meals)   lisinopril (PRINIVIL/ZESTRIL) 5 MG tablet Take 1 tablet (5 mg) by mouth daily   LORazepam (ATIVAN) 0.5 MG tablet Take 1 tablet (0.5 mg) by mouth 2 times daily as needed for anxiety   metoprolol succinate ER (TOPROL-XL) 50 MG 24 hr tablet Take 1 tablet (50 mg) by mouth daily   Multiple Vitamins-Minerals (MULTIVITAMIN ADULT) TABS Take 1 tablet by mouth daily    prochlorperazine (COMPAZINE) 10 MG tablet Take 0.5 tablets (5 mg) by mouth every 6 hours as needed (Nausea/Vomiting)   ranitidine (ZANTAC) 75 MG tablet Take 1 tablet (75 mg) by mouth 2 times daily   dexamethasone (DECADRON) 2 MG tablet Take 4 mg by mouth 2 times daily (with meals).   nitroglycerin (NITROSTAT) 0.4 MG SL tablet Place 1 tablet (0.4 mg) under the tongue every 5 minutes as needed for chest pain Maximum of 3 doses in 15 minutes (Patient not  taking: Reported on 5/2/2019)   trolamine salicylate (ASPERCREME) 10 % external cream Apply 1 applicator topically as needed for moderate pain       Current Medications    aspirin  81 mg Oral Daily     atorvastatin  40 mg Oral Daily     cefTRIAXone  1 g Intravenous Q24H     dexamethasone  2 mg Oral BID w/meals     enoxaparin  40 mg Subcutaneous Q24H     lisinopril  5 mg Oral Daily     metoprolol succinate ER  50 mg Oral Daily     ranitidine  75 mg Oral BID       PRN Medications  acetaminophen, albuterol, LORazepam, ondansetron **OR** ondansetron, prochlorperazine **OR** prochlorperazine **OR** prochlorperazine      REVIEW OF SYSTEMS  No family members know if/how she has been taking meds as prescribed.  She has no med boxes at home. She admits to ST memory loss/problems since her radiation started.  She denies headaches, vision changes, sore throat, pain, dyspnea, hunger.  Has no appetite.  States someone was helping her with a spongebath.  States that even recently she could get herself dressed and undressed.  Says she was brought in due to dehydration.  Family reports multiple falls from which she can't stand again, had to call EMS to lift her off the toilet as she was too weak to stand, fecal/urinary incontinence at home in her trailer in [place].  They report she has been living on Ensure (2/day) and V8.  Even when she was taking Decadron they didn't notice any improvement in her appetite.  Staff reports she is obstinate and refuses to even try to ambulate to the bathroom.      Performance Assessment:    Palliative Performance Scale, v.2     20%  Extensive disease. Bedbound & requires total care, minimal intake or just sips. Normal LOC or drowsy or confused.        PHYSICAL EXAMINATION  Patient Vitals for the past 24 hrs:   BP Temp Temp src Pulse Resp SpO2   05/31/19 1105 131/65 98  F (36.7  C) Oral 80 18 96 %   05/31/19 0735 129/64 98.1  F (36.7  C) Oral 69 16 98 %   05/31/19 0509 128/55 96.7  F (35.9  C) Oral  70 18 98 %   05/30/19 2341 108/70 97.4  F (36.3  C) -- 83 18 94 %   05/30/19 1929 112/57 97.5  F (36.4  C) Oral 71 20 95 %      Wt Readings from Last 5 Encounters:   05/30/19 68.5 kg (151 lb 0.2 oz)   05/24/19 69 kg (152 lb 3.2 oz)   05/17/19 70.6 kg (155 lb 9.6 oz)   05/02/19 72.7 kg (160 lb 4.8 oz)   03/26/19 76.3 kg (168 lb 3.2 oz)     Constitutional:  Awake, in no apparent distress   Eyes:  Anicteric, PERRL  HEENT:  Largely edentuous; OP dry, no thrush  Lymph/Hematologic:  No epitrochlear, axillary, anterior or posterior cervical, or supraclavicular lymphadenopathy is appreciated  Cardiovascular:  RRR  Respiratory:  Breathing unlabored, lungs clear  GI: Soft, non-tender, normal bowel sounds, no hepatosplenomegaly  Genitourinary:  deferred  Musculoskeletal:  Overall diminished muscle mass  Skin:  Warm, dry  Neurological:  nonfocal  Psych:  Flat affect      DATA  ROUTINE ICU LABS (Last four results)  CMP  Recent Labs   Lab 05/31/19  0603 05/30/19  1023    139   POTASSIUM 3.7 3.8   CHLORIDE 107 104   CO2 26 29   ANIONGAP 7 6   GLC 87 120*   BUN 20 46*   CR 0.40* 0.54   GFRESTIMATED >90 89   GFRESTBLACK >90 >90   AMADO 8.1* 9.0   PROTTOTAL 4.7* 6.2*   ALBUMIN 2.5* 3.2*   BILITOTAL 1.7* 2.9*   ALKPHOS 50 72   AST 21 28   ALT 24 28     CBC  Recent Labs   Lab 05/31/19  0603 05/30/19  1023   WBC 8.2 13.9*   RBC 3.96 5.04   HGB 10.6* 13.5   HCT 32.4* 41.5   MCV 82 82   MCH 26.8 26.8   MCHC 32.7 32.5   RDW 15.3* 15.3*   * 140*     INRNo lab results found in last 7 days.  Arterial Blood Gas  Recent Labs   Lab 05/30/19  1023   O2PER 2.5         Recent Results (from the past 48 hour(s))   CT Head w/o Contrast    Narrative    CT SCAN OF THE HEAD WITHOUT CONTRAST   5/30/2019 10:59 AM     HISTORY: Altered mental status level. Metastatic brain disease.    TECHNIQUE:  Axial images of the head and coronal reformations without  IV contrast material.  Radiation dose for this scan was reduced using  automated exposure  control, adjustment of the mA and/or kV according  to patient size, or iterative reconstruction technique.    COMPARISON: 10/27/2014 CT exam. MR dated 5/7/2019.    FINDINGS: Again seen are several intra-axial lesions corresponding to  areas of abnormal enhancement on recent MRI study. These include  lesions in the right renee, left cerebellum, right parietal  peritrigonal region, left parietal cortex, left parietal subcortical  white matter and left temporoparietal white matter. The majority of  these lesions show some high density on CT which could be due to some  developing calcification. Hemorrhage is felt to be much less likely.  There is no significant mass effect. Mild cerebral atrophy is present.  There is no evidence for acute infarct. Vascular calcifications noted.  No lytic or destructive lesions are seen in the calvarium.      Impression    IMPRESSION: Multiple intra-axial metastatic deposits which the  majority are showing some development of some high density on CT which  is probably due to some developing calcification. The number of  intra-axial metastatic deposits have not significantly changed since  recent MR. There is no evidence for any definite acute hemorrhage,  significant mass effect, or acute infarct.    SAMANTHA OLEARY MD   Chest XR,  PA & LAT    Narrative    XR CHEST 2 VW 5/30/2019 11:20 AM    HISTORY: Weakness. Hypertension.    COMPARISON: 11/19/2018.      Impression    IMPRESSION: 2 views of the chest show no acute cardiopulmonary  disease. Low lung volumes are seen on the lateral view. A PowerPort  catheter hasn't significantly changed.     ALINA MATTHEW MD   CT Chest Pulmonary Embolism w Contrast    Narrative    CT CHEST PULMONARY EMBOLISM WITH CONTRAST   5/30/2019 1:03 PM    HISTORY: Hypoxia. Weakness. Tachycardia.    TECHNIQUE: Pulmonary embolism protocol was performed. 80 mL Isovue 370  were injected IV. After contrast injection, volumetric helical  acquisition was performed from the  thoracic inlet through the upper  abdomen. Radiation dose for this scan was reduced using automated  exposure control, adjustment of the mA and/or kV according to patient  size, or iterative reconstruction technique.    COMPARISON: Chest CT performed 1/16/2019.    FINDINGS:  No evidence for pulmonary embolism. No evidence for  thoracic aortic dissection. Aneurysmal dilatation of the ascending  thoracic aorta measures up to 4.8 cm in diameter and is not  significantly changed. Atherosclerotic calcification of the thoracic  aorta and coronary arteries. No pleural or pericardial effusions. No  enlarged lymph nodes are identified in the chest. Left Port-A-Cath,  with tip not visualized, but likely in the low SVC. Emphysematous  changes are noted in both upper lungs. Mild scarring and/or  atelectasis at both lung bases. A 1 x 0.6 cm nodular opacity near the  left lung apex medially (series 6 image 22) is unchanged. Scattered  calcified pleural plaques bilaterally are unchanged and suggest prior  asbestos exposure. Small hiatal hernia. Cholelithiasis. A 2 cm left  adrenal nodule has decreased in size since the previous exam. A 2.8 cm  cyst in the upper pole of the right kidney is unchanged. Limited views  of the upper abdomen are otherwise unremarkable. Moderate anterior  compression of the T6 vertebral body is unchanged. Mild  age-indeterminate anterior compression of the T5 vertebral body is new  since the previous exam.      Impression    IMPRESSION:   1. No evidence for pulmonary embolism or thoracic aortic dissection.    2. Aneurysmal dilatation of the ascending thoracic aorta measures 4.8  cm and is not significantly changed.  3. An indeterminate 1 x 0.6 cm nodule near the left lung apex is not  significantly changed.  4. A 2 cm indeterminate left adrenal nodule has decreased in size.   5. Mild age-indeterminate anterior compression of the T5 vertebral  body is new compared to 1/16/2019.  6. Cholelithiasis.  7.  Emphysema.    REYES MEDINA MD

## 2019-05-31 NOTE — PROGRESS NOTES
"Spoke with son, Trell, informed RN that Paige and Joseph would like to be here when the Palliative Care nurse is rounding on the patient.     Left a message for Paige to [facility]. The number listed in chart for Joseph \"cannot be completed as dialed\"    Esthela Suárez RN BSN    "

## 2019-05-31 NOTE — PROGRESS NOTES
Writer spoke with pt's sister, Paige on the phone. Sister reports that Ines is declining. She has fallen once getting out of bed this last week; sister called EMS as she was unable to help her. Also pt could not get up off of the toilet this past week and EMS was called to assist with that. Family states that she has become incontinent of bowel and bladder and has become forgetful. Feels she should not be discharged home alone. Difficult for sister to care for her any longer. Agrees with plan to meet with Harper Lopez and is willing to be here for any meetings.

## 2019-05-31 NOTE — PROGRESS NOTES
[facility]    Medicine Progress Note - Hospitalist Service       Date of Admission:  5/30/2019  Assessment & Plan        Weakness, likely due to dehydration and UTI  Likely due to poor oral intake.  Increasing weakness over the last 2 to 3 weeks, most noticeable over the last week. Still ambulatory at home with walker, lives alone. Minimal p.o. intake aside from 1 to 2 cans of Ensure per day.  Completed radiation therapy 5/24/2019.  Denies fever, chills, cough, dysuria, urinary frequency. BP initially soft in ED (99/80).  Afebrile.  WBC 13.9.  Lactate 2.6 -> 1.4. UA as below. Procalcitonin 0.06. CTA PE negative for PE, pneumonia.  Mildly tachycardic .  Received 2L fluid in ED.   Initially thought to exhibit a retrocardiac infiltrate on CXR and started on ceftriaxone/azithromycin, however CT did not show this infiltrate.  No respiratory symptoms.  She is amenable to TCU if it will help her regain her strength so she can return home.  - Stop azithromycin  - LR @ 125 mL/hr overnight  - Blood Cx pending  - AM CMP  - PT/OT evaluation    5/31/2019 BUN improving with fluids.      Severe dehydration and protein calorie malnutrition  Poor oral intake has led to dehydration, hypoalbuminemia, hypoproteinemia, elevated BUN, hypotension, etc.  Responding well to IVF however I suspect that her albumin and protein may continue to decline with ongoing fluid resuscitation.       UTI with sepsis  UA shows 154 WBCs, few bacteria. No clear urinary symptoms.  - Ceftriaxone 1g daily    5/31/2019 elevated WBC, hypotension, Lactate of 2.6  Patient is on Ceftriaxone pending the urine culture/sensitivities.      Thrombocytopenia  Platelets 140. Unclear etiology, possibly due to chemo/radiation.  - Follow  5/31/2019 plts 114k today     Elevated bilirubin  Bili 2.9. Other LFTs normal. Urobilinogen present. Denies nausea, vomiting, abdominal pain. Abdominal exam benign. CT Chest showed cholelithiasis.  -  Follow, consider imaging if RUQ abdominal pain develops  5/31/2019 this is improved with fluids alone, will continue to monitor     Elevated BUN  BUN 46, previously 15-21. Likely due to dehydration.  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 Mets to left adrenal gland and brain. Completed chemotherapy. Completed treatment with radiation and carbo/-16. Additional radiation to left adrenal gland started 2/2019. Last radiation dose 5/24/19. MR brain 5/7/19 showed numerous new intracranial mets. Treatment is palliative. Follows with Dr. Miner.  - Continue dexamethasone taper (ordered by rad/onc), currently on 2 mg BID.    5/31/2019 RONAD Alvarenga to see today for goals of care/family care conference     Hyperlipidemia  - Continue atorvastatin.     Esophageal reflux  - Continue ranitidine.     Coronary artery disease involving native coronary artery without angina pectoris  Lexiscan stress 5/17/18 showed anterolateral and inferolateral ischemia.  Echo same date showed early diastolic dysfunction with LVEF 55-60%.  - Continue atorvastatin, aspirin, lisinopril, metoprolol.     Essential hypertension   - Continue lisinopril, metoprolol.          Diet: Regular Diet Adult    DVT Prophylaxis: Enoxaparin (Lovenox) SQ  Spear Catheter: not present  Code Status: DNR/DNI      Disposition Plan   Expected discharge: 2 - 3 days, recommended to transitional care unit once safe disposition plan/ TCU bed available and dehydration resolved.  Entered: Rohini Suárez MD 05/31/2019, 10:25 AM       The patient's care was discussed with the Patient and palliative care Consultant.    Rohini Suárez MD  Hospitalist Service  [facility]    ______________________________________________________________________    Interval History    knows she's not getting enough to eat.   Sister feels she needs more care at home than she is able to provide.  Ines denies pain.  She knows she is weak when she is up, ambulates with a walker at home.     Data reviewed today: I reviewed all medications, new labs and imaging results over the last 24 hours. I personally reviewed no images or EKG's today.    Physical Exam   Vital Signs: Temp: 98.1  F (36.7  C) Temp src: Oral BP: 129/64 Pulse: 69 Heart Rate: 126 Resp: 16 SpO2: 98 % O2 Device: None (Room air)    Weight: 151 lbs .24 oz  Constitutional: awake, alert, cooperative, no apparent distress, and appears stated age  Respiratory: No increased work of breathing, good air exchange, clear to auscultation bilaterally, no crackles or wheezing  Cardiovascular: regular rate and rhythm II/VI Systolic murmur lusb  GI: soft/nt/nd, normal bowel sounds  Skin: no bruising or bleeding  Neuropsychiatric: General: normal, calm and normal eye contact  Level of consciousness: alert / normal  Affect: flat  Orientation: oriented to self and place, oriented to month but not year, oriented to president   Data   Recent Labs   Lab 05/31/19  0603 05/30/19  1023   WBC 8.2 13.9*   HGB 10.6* 13.5   MCV 82 82   * 140*    139   POTASSIUM 3.7 3.8   CHLORIDE 107 104   CO2 26 29   BUN 20 46*   CR 0.40* 0.54   ANIONGAP 7 6   AMADO 8.1* 9.0   GLC 87 120*   ALBUMIN 2.5* 3.2*   PROTTOTAL 4.7* 6.2*   BILITOTAL 1.7* 2.9*   ALKPHOS 50 72   ALT 24 28   AST 21 28   LIPASE  --  85     Recent Results (from the past 24 hour(s))   CT Head w/o Contrast    Narrative    CT SCAN OF THE HEAD WITHOUT CONTRAST   5/30/2019 10:59 AM     HISTORY: Altered mental status level. Metastatic brain disease.    TECHNIQUE:  Axial images of the head and coronal reformations without  IV contrast material.  Radiation dose for this scan was reduced using  automated exposure control, adjustment of the mA and/or kV according  to patient size, or iterative reconstruction technique.    COMPARISON: 10/27/2014 CT  exam. MR dated 5/7/2019.    FINDINGS: Again seen are several intra-axial lesions corresponding to  areas of abnormal enhancement on recent MRI study. These include  lesions in the right renee, left cerebellum, right parietal  peritrigonal region, left parietal cortex, left parietal subcortical  white matter and left temporoparietal white matter. The majority of  these lesions show some high density on CT which could be due to some  developing calcification. Hemorrhage is felt to be much less likely.  There is no significant mass effect. Mild cerebral atrophy is present.  There is no evidence for acute infarct. Vascular calcifications noted.  No lytic or destructive lesions are seen in the calvarium.      Impression    IMPRESSION: Multiple intra-axial metastatic deposits which the  majority are showing some development of some high density on CT which  is probably due to some developing calcification. The number of  intra-axial metastatic deposits have not significantly changed since  recent MR. There is no evidence for any definite acute hemorrhage,  significant mass effect, or acute infarct.    SAMANTHA OLEARY MD   Chest XR,  PA & LAT    Narrative    XR CHEST 2 VW 5/30/2019 11:20 AM    HISTORY: Weakness. Hypertension.    COMPARISON: 11/19/2018.      Impression    IMPRESSION: 2 views of the chest show no acute cardiopulmonary  disease. Low lung volumes are seen on the lateral view. A PowerPort  catheter hasn't significantly changed.     ALINA MATTHEW MD   CT Chest Pulmonary Embolism w Contrast    Narrative    CT CHEST PULMONARY EMBOLISM WITH CONTRAST   5/30/2019 1:03 PM    HISTORY: Hypoxia. Weakness. Tachycardia.    TECHNIQUE: Pulmonary embolism protocol was performed. 80 mL Isovue 370  were injected IV. After contrast injection, volumetric helical  acquisition was performed from the thoracic inlet through the upper  abdomen. Radiation dose for this scan was reduced using automated  exposure control, adjustment of the mA  and/or kV according to patient  size, or iterative reconstruction technique.    COMPARISON: Chest CT performed 1/16/2019.    FINDINGS:  No evidence for pulmonary embolism. No evidence for  thoracic aortic dissection. Aneurysmal dilatation of the ascending  thoracic aorta measures up to 4.8 cm in diameter and is not  significantly changed. Atherosclerotic calcification of the thoracic  aorta and coronary arteries. No pleural or pericardial effusions. No  enlarged lymph nodes are identified in the chest. Left Port-A-Cath,  with tip not visualized, but likely in the low SVC. Emphysematous  changes are noted in both upper lungs. Mild scarring and/or  atelectasis at both lung bases. A 1 x 0.6 cm nodular opacity near the  left lung apex medially (series 6 image 22) is unchanged. Scattered  calcified pleural plaques bilaterally are unchanged and suggest prior  asbestos exposure. Small hiatal hernia. Cholelithiasis. A 2 cm left  adrenal nodule has decreased in size since the previous exam. A 2.8 cm  cyst in the upper pole of the right kidney is unchanged. Limited views  of the upper abdomen are otherwise unremarkable. Moderate anterior  compression of the T6 vertebral body is unchanged. Mild  age-indeterminate anterior compression of the T5 vertebral body is new  since the previous exam.      Impression    IMPRESSION:   1. No evidence for pulmonary embolism or thoracic aortic dissection.    2. Aneurysmal dilatation of the ascending thoracic aorta measures 4.8  cm and is not significantly changed.  3. An indeterminate 1 x 0.6 cm nodule near the left lung apex is not  significantly changed.  4. A 2 cm indeterminate left adrenal nodule has decreased in size.   5. Mild age-indeterminate anterior compression of the T5 vertebral  body is new compared to 1/16/2019.  6. Cholelithiasis.  7. Emphysema.    ERYES MEDINA MD

## 2019-05-31 NOTE — PLAN OF CARE
OT: Upon attempt pt declining any OOB activity. Updated RN and NSTIsabel Muhammad is meeting with patient's family at time of attempt to discuss goals of care. Will hold therapy until tomorrow as appropriate.

## 2019-05-31 NOTE — PLAN OF CARE
"Pt has been up with assist of 1 to commode and recliner. Was up for breakfast but refused for lunch. Appetite is poor, only had a boost today. IVF running at 125/hr. Pt needs encouragement to get up and move. Palliative Care consult was placed, Jb MÉNDEZ is currently speaking with sister, Paige and son, Joseph to figure out what the plan is for discharge. /65   Pulse 80   Temp 98  F (36.7  C) (Oral)   Resp 18   Ht 1.651 m (5' 5\")   Wt 68.5 kg (151 lb 0.2 oz)   SpO2 96%   BMI 25.13 kg/m    Esthela Suárez RN BSN    "

## 2019-05-31 NOTE — PROGRESS NOTES
"CLINICAL NUTRITION SERVICES  -  ASSESSMENT NOTE     REASON FOR ASSESSMENT  Ines Pickard is a 78 year old female seen by Registered Dietitian for Admission Nutrition Risk Screen for reduced oral intake over the last month      NUTRITION HISTORY  - Information obtained from the chart and patient. She reports that she has not been eating very well at home, wast just drinking 1-2 Ensure a day. She can not chew very well due to missing teeth, so she just wants to drink the oral nutrition supplement. She did agree to also have pudding and Magic cup sent on her meal trays. Offered other options but she did not want any at this time. She does not want any other foods on her meal trays. Per chart, she is planning to enroll in hospice.      CURRENT NUTRITION ORDERS  Diet Order:     Regular     Current Intake/Tolerance:  She refused lunch today.      NUTRITION FOCUSED PHYSICAL ASSESSMENT (NFPA)  Completed:  No patient had visitors and per chart she will be enrolling in hospice         Observed: missing teeth    ANTHROPOMETRICS  Height: 5' 5\"  Weight: 151 lbs .24 oz  Body mass index is 25.13 kg/m .  Weight Status:  Overweight BMI 25-29.9  IBW: 125#s  % IBW: 121%  Weight History:   Wt Readings from Last 12 Encounters:   05/30/19 68.5 kg (151 lb 0.2 oz)   05/24/19 69 kg (152 lb 3.2 oz)   05/17/19 70.6 kg (155 lb 9.6 oz)   05/02/19 72.7 kg (160 lb 4.8 oz)   03/26/19 76.3 kg (168 lb 3.2 oz)   03/04/19 77.3 kg (170 lb 8 oz)   02/27/19 77.3 kg (170 lb 6.4 oz)   01/17/19 78 kg (172 lb)   01/11/19 78.5 kg (173 lb)   01/08/19 78.7 kg (173 lb 8 oz)   12/13/18 79.1 kg (174 lb 6.4 oz)   12/06/18 77.8 kg (171 lb 9.6 oz)         LABS  Labs reviewed    MEDICATIONS  Medications reviewed      ASSESSED NUTRITION NEEDS PER APPROVED PRACTICE GUIDELINES:    Dosing Weight 69 kg  Estimated Energy Needs: 1677-8890 kcals (25-30 Kcal/Kg)  Justification: maintenance  Estimated Protein Needs:  grams protein (1.2-1.5 g pro/Kg)  Justification: " Repletion  Estimated Fluid Needs:1 mL/Kcal  Justification: maintenance    MALNUTRITION:  % Weight Loss:  > 10% in 6 months (severe malnutrition) - 13% in 6 months  % Intake:  </= 75% for >/= 1 month (severe malnutrition)  Subcutaneous Fat Loss:  Unable to review at this time  Muscle Loss:  Unable to review at this time  Fluid Retention:  No lower extremity edema per the provider note    Malnutrition Diagnosis: Severe malnutrition  In Context of:  Chronic illness or disease    NUTRITION DIAGNOSIS:  Inadequate oral intake related to poor appetite, cancer treatments, poor dentition as evidenced by 23# weight loss over the last 6 months      NUTRITION INTERVENTIONS  Recommendations / Nutrition Prescription  Will send chocolate Boost plus on meal trays plus pudding and Magic cup per the patient request  .      Implementation  Nutrition education: No education needs assessed at this time  Medical Food Supplement  .      Nutrition Goals  The patient plans to enroll in hospice so no goals identified.  .      MONITORING AND EVALUATION:  No follow up planned at this time.    Mariel Orellana RD,LD  Clinical Dietitian

## 2019-06-01 NOTE — PROGRESS NOTES
06/01/19 1300   Quick Adds   Type of Visit Initial PT Evaluation   Living Environment   Lives With alone   Living Arrangements house   Home Accessibility stairs to enter home   Number of Stairs, Main Entrance 6   Stair Railings, Main Entrance railing on right side (ascending)   Functional Level Prior   Ambulation 1-->assistive equipment   Transferring 1-->assistive equipment   Toileting 1-->assistive equipment   Bathing 1-->assistive equipment   Communication 0-->understands/communicates without difficulty   Swallowing 0-->swallows foods/liquids without difficulty   Cognition 0 - no cognition issues reported   Fall history within last six months no   Prior Functional Level Comment PLOF-  Pt reports indep. ambulation  using a 4ww   General Information   Onset of Illness/Injury or Date of Surgery - Date 05/31/19   Referring Physician Jose Armando   Patient/Family Goals Statement Pt reports goal of DC to  TCU.  Per chart, planning on transiitioning to hospice services/ LTC. Pt reports she is unable to live alone   Pertinent History of Current Problem (include personal factors and/or comorbidities that impact the POC) 78 year old female who with history of SCLC with brain mets who presents with increasing weakness over the last several weeks.; admitted to the hospiital w/ UTI, weakness   Precautions/Limitations fall precautions   General Observations Pt alert, pleasant- denies pain   Pain Assessment   Patient Currently in Pain No   Range of Motion (ROM)   ROM Comment WFL    MMT: Hip, Rehab Eval   Hip Flexion - Left Side (4-/5) good minus, left   Hip Flexion - Right Side (4-/5) good minus, right   MMT: Knee, Rehab Eval   Knee Extension - Left Side (4/5) good, left   Knee Extension - Right Side (4/5) good, right   MMT: Ankle, Rehab Eval   Ankle Dorsiflexion - Left Side (4/5) good, left   Ankle Dorsiflexion - Right Side (4/5) good, left   Bed Mobility   Bed Mobility Comments SBA supine<> sitting   Transfer Skills   Transfer  "Comments SBA sit<> stand w/ RW   Gait   Gait Comments Pt amb 10 feet x1 with RW. SBA. limited distance due to fatigue. slow rate   Balance   Balance Comments fair/ good dynamic standing balance   Sensory Examination   Sensory Perception no deficits were identified   General Therapy Interventions   Planned Therapy Interventions balance training;gait training;strengthening;transfer training   Clinical Impression   Criteria for Skilled Therapeutic Intervention yes, treatment indicated   PT Diagnosis generalized weakness   Influenced by the following impairments decreased strength/ activity tolerance   Functional limitations due to impairments decreased indep. w/ mobility, decreased amb. status   Clinical Presentation Stable/Uncomplicated   Clinical Decision Making (Complexity) Low complexity   Therapy Frequency` daily   Predicted Duration of Therapy Intervention (days/wks) 3 days   Anticipated Discharge Disposition Transitional Care Facility;Long Term Care Facility;Other (see comments)  ( TCU with transition to approp. environment)   Risk & Benefits of therapy have been explained Yes   Patient, Family & other staff in agreement with plan of care Yes   [facility] AM-PAC  \"6 Clicks\" V.2 Basic Mobility Inpatient Short Form   1. Turning from your back to your side while in a flat bed without using bedrails? 4 - None   2. Moving from lying on your back to sitting on the side of a flat bed without using bedrails? 3 - A Little   3. Moving to and from a bed to a chair (including a wheelchair)? 4 - None   4. Standing up from a chair using your arms (e.g., wheelchair, or bedside chair)? 3 - A Little   5. To walk in hospital room? 3 - A Little   6. Climbing 3-5 steps with a railing? 3 - A Little   Basic Mobility Raw Score (Score out of 24.Lower scores equate to lower levels of function) 20     "

## 2019-06-01 NOTE — PLAN OF CARE
"Pt has been up assist of 1 with walker. Is more A&O today with increased strength. Drank her boost with meals and chocolate pudding. IVF decreased as she had had increase in frequency. [facility]  /67   Pulse 83   Temp 98.6  F (37  C) (Oral)   Resp 16   Ht 1.651 m (5' 5\")   Wt 68.5 kg (151 lb 0.2 oz)   SpO2 96%   BMI 25.13 kg/m    Esthela Suárez RN BSN    "

## 2019-06-01 NOTE — PLAN OF CARE
"Patient alert, oriented, assist 1 with walker to commode. IV fluids infusing. Labs drawn via port this am. Denies pain, nausea, SOB. LS clear. LR running at 125 ml/hr. /78 (BP Location: Left arm)   Pulse 65   Temp 97.6  F (36.4  C) (Oral)   Resp 18   Ht 1.651 m (5' 5\")   Wt 68.5 kg (151 lb 0.2 oz)   SpO2 98%   BMI 25.13 kg/m      "

## 2019-06-01 NOTE — PROGRESS NOTES
[facility]    Medicine Progress Note - Hospitalist Service       Date of Admission:  5/30/2019  Assessment & Plan        Weakness, likely due to dehydration and UTI  Likely due to poor oral intake.  Increasing weakness over the last 2 to 3 weeks, most noticeable over the last week. Still ambulatory at home with walker, lives alone. Minimal p.o. intake aside from 1 to 2 cans of Ensure per day.  Completed radiation therapy 5/24/2019.  Denies fever, chills, cough, dysuria, urinary frequency. BP initially soft in ED (99/80).  Afebrile.  WBC 13.9.  Lactate 2.6 -> 1.4. UA as below. Procalcitonin 0.06. CTA PE negative for PE, pneumonia.  Mildly tachycardic .  Received 2L fluid in ED.   Initially thought to exhibit a retrocardiac infiltrate on CXR and started on ceftriaxone/azithromycin, however CT did not show this infiltrate.  No respiratory symptoms.  She is amenable to TCU if it will help her regain her strength so she can return home.  - Stop azithromycin  - LR @ 125 mL/hr overnight  - Blood Cx pending  - AM CMP  - PT/OT evaluation    5/31/2019 BUN improving with fluids.    6/1/2019 dehydration resolved, will cut her fluids back.  Sounds like she's eating/drinking near baseline    Severe dehydration and protein calorie malnutrition  Poor oral intake has led to dehydration, hypoalbuminemia, hypoproteinemia, elevated BUN, hypotension, etc.  Responding well to IVF however I suspect that her albumin and protein may continue to decline with ongoing fluid resuscitation.    6/1/2019 remaining stable, will turn fluid rate down     UTI with sepsis  UA shows 154 WBCs, few bacteria. No clear urinary symptoms.  - Ceftriaxone 1g daily    5/31/2019 elevated WBC, hypotension, Lactate of 2.6  Patient is on Ceftriaxone pending the urine culture/sensitivities.   6/1/2019 culture showing 10-50,000 non-lactose fermenting GNR  Sensitivities to follow     Thrombocytopenia  Platelets 140. Unclear etiology,  possibly due to chemo/radiation.  - Follow  5/31/2019 plts 114k today   6/1/2019 stable    Elevated bilirubin  Bili 2.9. Other LFTs normal. Urobilinogen present. Denies nausea, vomiting, abdominal pain. Abdominal exam benign. CT Chest showed cholelithiasis.  - Follow, consider imaging if RUQ abdominal pain develops  5/31/2019 this is improved with fluids alone, will continue to monitor  6/1/2019 improved/normal     Elevated BUN  BUN 46, previously 15-21. Likely due to dehydration.  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 Mets to left adrenal gland and brain. Completed chemotherapy. Completed treatment with radiation and carbo/-16. Additional radiation to left adrenal gland started 2/2019. Last radiation dose 5/24/19. MR brain 5/7/19 showed numerous new intracranial mets. Treatment is palliative. Follows with Dr. Miner.  - Continue dexamethasone taper (ordered by rad/onc), currently on 2 mg BID.    5/31/2019 RONDA Alvarenga to see today for goals of care/family care conference  6/1/2019 plan is to TCU and then likely transition to LTC with hospice     Hyperlipidemia  - Continue atorvastatin.     Esophageal reflux  - Continue ranitidine.     Coronary artery disease involving native coronary artery without angina pectoris  Lexiscan stress 5/17/18 showed anterolateral and inferolateral ischemia.  Echo same date showed early diastolic dysfunction with LVEF 55-60%.  - Continue atorvastatin, aspirin, lisinopril, metoprolol.     Essential hypertension   - Continue lisinopril, metoprolol.          Diet: Regular Diet Adult    DVT Prophylaxis: Enoxaparin (Lovenox) SQ  Spear Catheter: not present  Code Status: DNR/DNI      Disposition Plan   Expected discharge: 2 - 3 days, recommended to transitional care unit once safe disposition plan/ TCU bed available and  "dehydration resolved.  Likely discharge on Monday to TCU.   Entered: Rohini Suárez MD 06/01/2019, 11:53 AM       The patient's care was discussed with the Patient and palliative care Consultant.    Rohini Suárez MD  Hospitalist Service  [facility]    ______________________________________________________________________    Interval History    she's feeling better.  Urinating a lot, would like fluids turned down.  Understands plan for TCU on discharge with LTC/hospice likely to follow.    Denies chest pain/pressure/soa  Appetite is \"fair\".    Data reviewed today: I reviewed all medications, new labs and imaging results over the last 24 hours. I personally reviewed no images or EKG's today.    Physical Exam   Vital Signs: Temp: 98.6  F (37  C) Temp src: Oral BP: 115/67 Pulse: 83   Resp: 16 SpO2: 96 % O2 Device: None (Room air)    Weight: 151 lbs .24 oz  Constitutional: awake, alert, cooperative, no apparent distress, and appears stated age  Respiratory: No increased work of breathing, good air exchange, clear to auscultation bilaterally, no crackles or wheezing  Cardiovascular: regular rate and rhythm II/VI Systolic murmur lusb  GI: soft/nt/nd, normal bowel sounds  Skin: no bruising or bleeding  Neuropsychiatric: General: normal, calm and normal eye contact  Level of consciousness: alert / normal  Affect: flat  Orientation: oriented to self and place   Data   Recent Labs   Lab 06/01/19  0630 05/31/19  0603 05/30/19  1023   WBC 6.9 8.2 13.9*   HGB 10.2* 10.6* 13.5   MCV 82 82 82   * 115* 140*   * 140 139   POTASSIUM 3.5 3.7 3.8   CHLORIDE 111* 107 104   CO2 28 26 29   BUN 10 20 46*   CR 0.43* 0.40* 0.54   ANIONGAP 6 7 6   AMADO 8.4* 8.1* 9.0   GLC 92 87 120*   ALBUMIN 2.5* 2.5* 3.2*   PROTTOTAL 4.7* 4.7* 6.2*   BILITOTAL 1.1 1.7* 2.9*   ALKPHOS 50 50 72   ALT 24 24 28   AST 18 21 28   LIPASE  --   --  85     No results found for this or any previous visit (from the past 24 " hour(s)).

## 2019-06-01 NOTE — PLAN OF CARE
Discharge Planner OT   Patient plan for discharge: TCU  Current status: Pt un oriented to year, knowing place, and day as well as person. She is able to follow simple commands. Pt fatigues with min UE ex. Min A to dress LB and s/U UB.intermitted c/o blurry vision during tx. Limited stand malik  Barriers to return to prior living situation: fatigue and weakness, fall risk  Recommendations for discharge: TCU  Rationale for recommendations: Pt requires assist with all BADLs and tranfers due to overall weakness and decreased act malik with recent radiation tx       Entered by: Sofya Grewal 06/01/2019 11:28 AM

## 2019-06-01 NOTE — PROGRESS NOTES
06/01/19 1100   Quick Adds   Type of Visit Initial Occupational Therapy Evaluation   Living Environment   Lives With alone   Living Arrangements house   Home Accessibility stairs to enter home   Functional Level   Ambulation 1-->assistive equipment   Transferring 1-->assistive equipment   Toileting 1-->assistive equipment   Bathing 1-->assistive equipment   Dressing 0-->independent   Eating 0-->independent   Communication 0-->understands/communicates without difficulty   Fall history within last six months no   Prior Functional Level Comment Pt lived in mobile home previous to hospitalization unable to retun to care for self.   General Information   Onset of Illness/Injury or Date of Surgery - Date 06/30/19   Patient/Family Goals Statement To become stronger   Additional Occupational Profile Info/Pertinent History of Current Problem Pt is a 78 yr old female admitted on 5/30/19 with hx of small cell lung CA with mets to brain finishing radiation on 5/24/19 with progressive weakness over past week.   General Info Comments Pt was ind at home feeling weaker from CA radiation and tx   Cognitive Status Examination   Orientation orientation to person, place and time   Level of Consciousness alert   Follows Commands (Cognition) WNL   Memory intact   Cognitive Comment no major cognitive deficits noted durig evaluation   Range of Motion (ROM)   ROM Quick Adds No deficits were identified   Strength   Manual Muscle Testing Quick Adds No deficits were identified   Hand Strength   Hand Strength Comments slightly weakned pt states since CA tx.   Coordination   Upper Extremity Coordination No deficits were identified   Transfer Skill: Bed to Chair/Chair to Bed   Level of [place]: Bed to Chair stand-by assist   Physical Assist/Nonphysical Assist: Bed to Chair 1 person assist   Transfer Skill: Sit to Stand   Level of [place]: Sit/Stand stand-by assist   Transfer Skill: Toilet Transfer   Level of [place]: Toilet  "minimum assist (75% patients effort)   Physical Assist/Nonphysical Assist: Toilet 1 person assist   Weight-Bearing Restrictions: Toilet weight-bearing as tolerated   Assistive Device rolling walker   Upper Body Dressing   Level of [place]: Dress Upper Body stand-by assist   Lower Body Dressing   Level of [place]: Dress Lower Body minimum assist (75% patients effort)   Toileting   Level of [place]: Toilet minimum assist (75% patients effort)   Grooming   Level of [place]: Grooming stand-by assist   Physical Assist/Nonphysical Assist: Grooming   (limited stand malik of 2-3 min with fatigue)   Instrumental Activities of Daily Living (IADL)   Previous Responsibilities meal prep;laundry;medication management;finances   Activities of Daily Living Analysis   ADL Comments Pt fatigues quickly with generalized weakness   General Therapy Interventions   Planned Therapy Interventions ADL retraining;cognition;strengthening;transfer training;progressive activity/exercise   Clinical Impression   Criteria for Skilled Therapeutic Interventions Met yes, treatment indicated   OT Diagnosis weakness   Assessment of Occupational Performance 3-5 Performance Deficits   Identified Performance Deficits LB dressing, toilet transfers, stand malik for g/h, functional mobility   Clinical Decision Making (Complexity) Moderate complexity   Therapy Frequency daily   Predicted Duration of Therapy Intervention (days/wks) 3   Anticipated Discharge Disposition Transitional Care Facility   Risks and Benefits of Treatment have been explained. Yes   Patient, Family & other staff in agreement with plan of care Yes   Clinical Impression Comments Pt appears to be a good OT skilled care canidate to increase ind with BADLs and transfers for safety   [facility] AM-PAC TM \"6 Clicks\"   2016, Trustees of [facility], under license to [employer].  All rights reserved.   6 Clicks Short Forms Daily Activity Inpatient Short Form   [place] " "[employer] AM-PAC  \"6 Clicks\" Daily Activity Inpatient Short Form   1. Putting on and taking off regular lower body clothing? 3 - A Little   2. Bathing (including washing, rinsing, drying)? 3 - A Little   3. Toileting, which includes using toilet, bedpan or urinal? 3 - A Little   4. Putting on and taking off regular upper body clothing? 3 - A Little   5. Taking care of personal grooming such as brushing teeth? 3 - A Little   6. Eating meals? 4 - None   Daily Activity Raw Score (Score out of 24.Lower scores equate to lower levels of function) 19   Total Evaluation Time   Total Evaluation Time (Minutes) 19     "

## 2019-06-02 NOTE — PLAN OF CARE
"Pt has been improving with her strength everyday. Up with assist of 1 with walker. Still needs encouragement to move and get up for meals. Is drinking Boost with meals and her \"sodium free\" V8 juice. Antibiotics were changed to IV Cipro based on urine culture results. Port is accessed and at TKO. Is alert and oriented x 3. Plan is for discharge tomorrow to [facility] the [facility]. /72   Pulse 88   Temp 98.2  F (36.8  C) (Oral)   Resp 16   Ht 1.651 m (5' 5\")   Wt 68.5 kg (151 lb 0.2 oz)   SpO2 95%   BMI 25.13 kg/m    Esthela Suárez RN BSN    "

## 2019-06-02 NOTE — PROGRESS NOTES
Reason for Follow up: Transportation needs at discharge    Anticipated discharge needs: Spoke with son, Joseph.  He requested CTS to set up transportation to TCU on 6/3.       Next steps: CTS to arrange transport upon discharge.    Discharge Planner   Discharge Plans in progress: completed: [place] (Main: [phone] Admissions: [phone] Fax: [phone])  Barriers to discharge plan: medical stability  Follow up plan: CTS to monitor       Entered by: Paradise Romero 06/02/2019 1:06 PM       Paradise Romero RN  [facility] [phone]  [facility] [phone]

## 2019-06-02 NOTE — PLAN OF CARE
"Pt alert, oriented, assist 1 with walker. Up to commode to void. Denies pain, nausea, SOB. LS clear. IV running TKO. Port is patent. Labs drawn this am. Pt rested comfortably overnight. Encouraged fluid intake. BP (!) 136/98 (BP Location: Right arm)   Pulse 75   Temp 97.6  F (36.4  C) (Oral)   Resp 16   Ht 1.651 m (5' 5\")   Wt 68.5 kg (151 lb 0.2 oz)   SpO2 97%   BMI 25.13 kg/m      "

## 2019-06-02 NOTE — PROGRESS NOTES
[facility]    Medicine Progress Note - Hospitalist Service       Date of Admission:  5/30/2019  Assessment & Plan        Weakness, likely due to dehydration and UTI  Likely due to poor oral intake.  Increasing weakness over the last 2 to 3 weeks, most noticeable over the last week. Still ambulatory at home with walker, lives alone. Minimal p.o. intake aside from 1 to 2 cans of Ensure per day.  Completed radiation therapy 5/24/2019.  Denies fever, chills, cough, dysuria, urinary frequency. BP initially soft in ED (99/80).  Afebrile.  WBC 13.9.  Lactate 2.6 -> 1.4. UA as below. Procalcitonin 0.06. CTA PE negative for PE, pneumonia.  Mildly tachycardic .  Received 2L fluid in ED.   Initially thought to exhibit a retrocardiac infiltrate on CXR and started on ceftriaxone/azithromycin, however CT did not show this infiltrate.  No respiratory symptoms.  She is amenable to TCU if it will help her regain her strength so she can return home.  - Stop azithromycin  - LR @ 125 mL/hr overnight  - Blood Cx pending  - AM CMP  - PT/OT evaluation    5/31/2019 BUN improving with fluids.    6/1/2019 dehydration resolved, will cut her fluids back.  Sounds like she's eating/drinking near baseline      Severe dehydration and protein calorie malnutrition  Poor oral intake has led to dehydration, hypoalbuminemia, hypoproteinemia, elevated BUN, hypotension, etc.  Responding well to IVF however I suspect that her albumin and protein may continue to decline with ongoing fluid resuscitation.    6/1/2019 remaining stable, will turn fluid rate down  6/2/2019 eating/drinking fairly well. IV at TKO     UTI with sepsis  UA shows 154 WBCs, few bacteria. No clear urinary symptoms.  - Ceftriaxone 1g daily    5/31/2019 elevated WBC, hypotension, Lactate of 2.6  Patient is on Ceftriaxone pending the urine culture/sensitivities.   6/1/2019 culture showing 10-50,000 non-lactose fermenting GNR  Sensitivities to  follow  6/2/2019 UCx shows 10-50,000 Pseudomonas aeruginosa, discontinue the ceftriaxone and start IV cipro 400 mg twice daily - can change to oral on discharge.  Likely 5-7 days of treatment total.     Thrombocytopenia  Platelets 140. Unclear etiology, possibly due to chemo/radiation.  - Follow  5/31/2019 plts 114k today   6/1/2019 stable  6/2/2019 stable at 113k    Elevated bilirubin  Bili 2.9. Other LFTs normal. Urobilinogen present. Denies nausea, vomiting, abdominal pain. Abdominal exam benign. CT Chest showed cholelithiasis.  - Follow, consider imaging if RUQ abdominal pain develops  5/31/2019 this is improved with fluids alone, will continue to monitor  6/1/2019 improved/normal     Elevated BUN  BUN 46, previously 15-21. Likely due to dehydration.  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 Mets to left adrenal gland and brain. Completed chemotherapy. Completed treatment with radiation and carbo/-16. Additional radiation to left adrenal gland started 2/2019. Last radiation dose 5/24/19. MR brain 5/7/19 showed numerous new intracranial mets. Treatment is palliative. Follows with Dr. Miner.  - Continue dexamethasone taper (ordered by rad/onc), currently on 2 mg BID.    5/31/2019 RONDA Alvarenga to see today for goals of care/family care conference  6/1/2019 plan is to TCU and then likely transition to LTC with hospice     Hyperlipidemia  - Continue atorvastatin.     Esophageal reflux  - Continue ranitidine.     Coronary artery disease involving native coronary artery without angina pectoris  Lexiscan stress 5/17/18 showed anterolateral and inferolateral ischemia.  Echo same date showed early diastolic dysfunction with LVEF 55-60%.  - Continue atorvastatin, aspirin, lisinopril, metoprolol.     Essential hypertension   - Continue lisinopril,  metoprolol.          Diet: Regular Diet Adult    DVT Prophylaxis: Enoxaparin (Lovenox) SQ  Spear Catheter: not present  Code Status: DNR/DNI      Disposition Plan   Expected discharge: tomorrow, recommended to transitional care unit once safe disposition plan/ TCU bed available and dehydration resolved.  [facility]  Entered: Rohini Suárez MD 06/02/2019, 11:19 AM       The patient's care was discussed with the Patient and nurse.    Rohini Suárez MD  Hospitalist Service  [facility]    ______________________________________________________________________    Interval History    overall feeling well, better than admission.   Urinating a lot, IVF have been turned down.  Understands us making changes to her antibiotic regimen today.     Data reviewed today: I reviewed all medications, new labs and imaging results over the last 24 hours. I personally reviewed no images or EKG's today.    Physical Exam   Vital Signs: Temp: 98.2  F (36.8  C) Temp src: Oral BP: 113/72 Pulse: 88   Resp: 16 SpO2: 95 % O2 Device: None (Room air)    Weight: 151 lbs .24 oz  Constitutional: awake, alert, cooperative, no apparent distress, and appears stated age  Respiratory: No increased work of breathing, good air exchange, clear to auscultation bilaterally, no crackles or wheezing  Cardiovascular: regular rate and rhythm II/VI Systolic murmur lusb  GI: soft/nt/nd, normal bowel sounds  Skin: no bruising or bleeding  Neuropsychiatric: General: normal, calm and normal eye contact  Level of consciousness: alert / normal  Affect: flat  Orientation: oriented to self and place   Data   Recent Labs   Lab 06/02/19  0645 06/01/19  0630 05/31/19  0603 05/30/19  1023   WBC  --  6.9 8.2 13.9*   HGB  --  10.2* 10.6* 13.5   MCV  --  82 82 82   * 115* 115* 140*   NA  --  145* 140 139   POTASSIUM  --  3.5 3.7 3.8   CHLORIDE  --  111* 107 104   CO2  --  28 26 29   BUN  --  10 20 46*   CR  --   0.43* 0.40* 0.54   ANIONGAP  --  6 7 6   AMADO  --  8.4* 8.1* 9.0   GLC  --  92 87 120*   ALBUMIN  --  2.5* 2.5* 3.2*   PROTTOTAL  --  4.7* 4.7* 6.2*   BILITOTAL  --  1.1 1.7* 2.9*   ALKPHOS  --  50 50 72   ALT  --  24 24 28   AST  --  18 21 28   LIPASE  --   --   --  85     No results found for this or any previous visit (from the past 24 hour(s)).

## 2019-06-02 NOTE — PLAN OF CARE
Discharge Planner OT   Patient plan for discharge: TCU  Current status: Pt seen for ADL skills training and increased strengthening and malik. Pt progressing well toward goals  Barriers to return to prior living situation: unable to care for self in ind home  Recommendations for discharge: TCU   Rationale for recommendations: Increased overall strength and act malik for safety with all transfers, functional mob and BADLs       Entered by: Sofya Grewal 06/02/2019 10:20 AM

## 2019-06-02 NOTE — PLAN OF CARE
Discharge Planner PT   Patient plan for discharge: TCU  Current status: Pt reports feeling stronger.  SBA supine> sitting. Amb 30 feet x1 with RW, SBA. returned to supine w/ min. assistance for LE tracking. Particiapted in strengthening ex in seated/ supine position  Barriers to return to prior living situation: none anticipated   Recommendations for discharge: TCU; plans to transition to LTC   Rationale for recommendations: continued therapy for ongoing strengthening to maximize indep        Entered by: Aida Aviles 06/02/2019 3:09 PM

## 2019-06-03 NOTE — PLAN OF CARE
Physical Therapy Discharge Summary    Reason for therapy discharge:    Discharged to transitional care facility.     Progress towards therapy goal(s). See goals on Care Plan in [facility] electronic health record for goal details.  Goals partially met.  Barriers to achieving goals:   discharge from facility.     Therapy recommendation(s):    Continued therapy is recommended.  Rationale/Recommendations:  TCU to increase independence/safety with all mobility and ADL.

## 2019-06-03 NOTE — DISCHARGE SUMMARY
[facility]  Discharge Summary  Hospital Medicine       Date of Admission:  5/30/2019  Date of Discharge:  6/3/2019  Discharging Provider: Bhargav Scales MD      Identification and Chief Compaint: Ines Pickard is a 78 year old female who presented on 5/30/2019 with complaint of progressive weakness.    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  No follow up labs or test are   needed.             Unresulted Labs Ordered in the Past 30 Days of this Admission     Date and Time Order Name Status Description    5/30/2019 1124 Blood culture Preliminary     5/30/2019 1124 Blood culture Preliminary       These results will be followed up by Dr. Scales    Hospital Course         UTI with sepsis    UA shows 154 WBCs, few bacteria. BP initially soft in ED (99/80), tachycardia, WBC 13.9, initial lactate 2.6, appeared distressed on initial eval in the ED. Initially received 2L fluid in ED and lactic acid normalized. Started on ceftriaxone x 3 days and changed to ciprofloxacin 6/2 due to Pseudomonas aeruginosa positive urine culture. Will complete 7 days of ciprofloxacin on discharge.    Weakness, likely due to dehydration and UTI    Dehydration likely due to poor oral intake.  Increasing weakness over the last 2 to 3 weeks, most noticeable over the last week. Still ambulatory at home with walker, lives alone. Minimal p.o. intake aside from 1 to 2 cans of Ensure per day.  Completed radiation therapy 5/24/2019.      Symptoms worse on admission likely due to UTI with sepsis (see below).      Initially thought to  exhibit a retrocardiac infiltrate on CXR and started on ceftriaxone/azithromycin, however CT did not show this infiltrate.  No respiratory symptoms. Azithromycin stopped. Ceftriaxone continued until changed to ciprofloxacin (see below).     She is amenable to TCU to help her regain her strength so she can return home. Oral intake has improved and appears able to maintain hydration.    Severe dehydration  Severe protein calorie malnutrition due to chronic illness, lung cancer ( 6/4/2019)    Poor oral intake has led to dehydration, hypoalbuminemia, hypoproteinemia, elevated BUN, hypotension, etc. Blood pressure responded well to IVF. After resuscitation, albumin 2.5 and total protein 4.7. Patient has had 13% weight loss last 6 months. Muscle mass appears decreased on exam. Continue regular diet. Patient felt not to be a good candidate for Megace, ongoing steroids for appetite or Remeron per Palliative care.     Nutrition consult obtained and added chocolate Boost plus on meal trays plus pudding and Magic cup per the patient request.    Thrombocytopenia, mild    Platelets 140 on admission and 114 after hydration though stable. Unclear etiology, possibly due to chemo/radiation or result of cancer. Not at a point that need to make any medication changes.      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 Mets to left adrenal gland and brain. Completed chemotherapy. Completed treatment with radiation and carbo/-16. Additional radiation to left adrenal gland started 2/2019. Last radiation dose 5/24/19. MR brain 5/7/19 showed numerous new intracranial mets. Treatment is palliative. Follows with Dr. Miner. Seen this admission by Palliative Care.     Patient is  on decadron taper. Starting 2 mg daily for one week today, Ying 3, and is scheduled to stop decadron after this week.     TCU for strengthening though may ultimately need LTC and hospice.      Coronary artery disease involving native coronary artery without angina pectoris    Lexiscan stress 5/17/18 showed anterolateral and inferolateral ischemia.  Echo same date showed early diastolic dysfunction with LVEF 55-60%. Asymptomatic.    - Continue atorvastatin, aspirin, lisinopril, metoprolol.     Essential hypertension   - Continue lisinopril, metoprolol.    Esophageal reflux  - Continue ranitidine.    Hyperlipidemia  - Continue atorvastatin.    Consultations This Hospital Stay   PALLIATIVE CARE ADULT IP CONSULT  PHYSICAL THERAPY ADULT IP CONSULT  OCCUPATIONAL THERAPY ADULT IP CONSULT  CARE TRANSITION RN/SW IP CONSULT  PHYSICAL THERAPY ADULT IP CONSULT  OCCUPATIONAL THERAPY ADULT IP CONSULT    Code Status   DNR/DNI    The discharge plan was discussed with the patient, and she expressed understanding.     Time Spent on this Encounter   Total time on this discharge was 50 minutes.       Bhargav Scales MD  [facility]  ______________________________________________________________________    Physical Exam   Vital Signs: Temp: 97.6  F (36.4  C) Temp src: Oral BP: 155/73 Pulse: 70   Resp: 18 SpO2: 96 % O2 Device: None (Room air)    Weight: 151 lbs .24 oz  Constitutional: alert and oriented, appears weak but nontoxic and NAD  CV: Regular, no murmur, no edema, no JVD  Respiratory: CTA bilaterally  GI: Soft, non-tender   Skin: Warm and dry  Musculoskeletal: muscle bulk appears decreased for age on exam       Primary Care Physician   Rohini Suárez  [address] [facility] [address]     Discharge Disposition   Discharged to TCU  Condition at discharge: Stable    Significant Results and Procedures   Results for orders placed or performed during the hospital encounter of 05/30/19   CT Head  w/o Contrast    Narrative    CT SCAN OF THE HEAD WITHOUT CONTRAST   5/30/2019 10:59 AM     HISTORY: Altered mental status level. Metastatic brain disease.    TECHNIQUE:  Axial images of the head and coronal reformations without  IV contrast material.  Radiation dose for this scan was reduced using  automated exposure control, adjustment of the mA and/or kV according  to patient size, or iterative reconstruction technique.    COMPARISON: 10/27/2014 CT exam. MR dated 5/7/2019.    FINDINGS: Again seen are several intra-axial lesions corresponding to  areas of abnormal enhancement on recent MRI study. These include  lesions in the right renee, left cerebellum, right parietal  peritrigonal region, left parietal cortex, left parietal subcortical  white matter and left temporoparietal white matter. The majority of  these lesions show some high density on CT which could be due to some  developing calcification. Hemorrhage is felt to be much less likely.  There is no significant mass effect. Mild cerebral atrophy is present.  There is no evidence for acute infarct. Vascular calcifications noted.  No lytic or destructive lesions are seen in the calvarium.      Impression    IMPRESSION: Multiple intra-axial metastatic deposits which the  majority are showing some development of some high density on CT which  is probably due to some developing calcification. The number of  intra-axial metastatic deposits have not significantly changed since  recent MR. There is no evidence for any definite acute hemorrhage,  significant mass effect, or acute infarct.    SAMANTHA OLEARY MD   Chest XR,  PA & LAT    Narrative    XR CHEST 2 VW 5/30/2019 11:20 AM    HISTORY: Weakness. Hypertension.    COMPARISON: 11/19/2018.      Impression    IMPRESSION: 2 views of the chest show no acute cardiopulmonary  disease. Low lung volumes are seen on the lateral view. A PowerPort  catheter hasn't significantly changed.     ALINA MATTHEW MD   CT Chest Pulmonary  Embolism w Contrast    Narrative    CT CHEST PULMONARY EMBOLISM WITH CONTRAST   5/30/2019 1:03 PM    HISTORY: Hypoxia. Weakness. Tachycardia.    TECHNIQUE: Pulmonary embolism protocol was performed. 80 mL Isovue 370  were injected IV. After contrast injection, volumetric helical  acquisition was performed from the thoracic inlet through the upper  abdomen. Radiation dose for this scan was reduced using automated  exposure control, adjustment of the mA and/or kV according to patient  size, or iterative reconstruction technique.    COMPARISON: Chest CT performed 1/16/2019.    FINDINGS:  No evidence for pulmonary embolism. No evidence for  thoracic aortic dissection. Aneurysmal dilatation of the ascending  thoracic aorta measures up to 4.8 cm in diameter and is not  significantly changed. Atherosclerotic calcification of the thoracic  aorta and coronary arteries. No pleural or pericardial effusions. No  enlarged lymph nodes are identified in the chest. Left Port-A-Cath,  with tip not visualized, but likely in the low SVC. Emphysematous  changes are noted in both upper lungs. Mild scarring and/or  atelectasis at both lung bases. A 1 x 0.6 cm nodular opacity near the  left lung apex medially (series 6 image 22) is unchanged. Scattered  calcified pleural plaques bilaterally are unchanged and suggest prior  asbestos exposure. Small hiatal hernia. Cholelithiasis. A 2 cm left  adrenal nodule has decreased in size since the previous exam. A 2.8 cm  cyst in the upper pole of the right kidney is unchanged. Limited views  of the upper abdomen are otherwise unremarkable. Moderate anterior  compression of the T6 vertebral body is unchanged. Mild  age-indeterminate anterior compression of the T5 vertebral body is new  since the previous exam.      Impression    IMPRESSION:   1. No evidence for pulmonary embolism or thoracic aortic dissection.    2. Aneurysmal dilatation of the ascending thoracic aorta measures 4.8  cm and is not  significantly changed.  3. An indeterminate 1 x 0.6 cm nodule near the left lung apex is not  significantly changed.  4. A 2 cm indeterminate left adrenal nodule has decreased in size.   5. Mild age-indeterminate anterior compression of the T5 vertebral  body is new compared to 1/16/2019.  6. Cholelithiasis.  7. Emphysema.    REYES MEDINA MD     Procedures    None    Discharge Orders      General info for SNF    Length of Stay Estimate: Short Term Care: Estimated # of Days <30  Condition at Discharge: Improving  Level of care:skilled   Rehabilitation Potential: Good  Admission H&P remains valid and up-to-date: Yes  Recent Chemotherapy: N/A  Use Nursing Home Standing Orders: Yes     Mantoux instructions    Give two-step Mantoux (PPD) Per Facility Policy Yes     Follow Up and recommended labs and tests    Follow up with Nursing home physician.  No follow up labs or test are needed.     Activity - Up with nursing assistance     Reason for your hospital stay    Generalized weakness due to dehydration following radiation therapy and also due to UTI with sepsis.     Physical Therapy Adult Consult    Evaluate and treat as clinically indicated.    Reason:  Deconditioning due to illness     Occupational Therapy Adult Consult    Evaluate and treat as clinically indicated.    Reason:  Deconditioning due to illness     Advance Diet as Tolerated    Follow this diet upon discharge:      Regular Diet Adult     Discharge Medications   Current Discharge Medication List      START taking these medications    Details   ciprofloxacin (CIPRO) 500 MG tablet Take 1 tablet (500 mg) by mouth every 12 hours Through June 8, 2019    Associated Diagnoses: Acute UTI         CONTINUE these medications which have CHANGED    Details   dexamethasone (DECADRON) 2 MG tablet Take 1 tablet (2 mg) by mouth daily (with breakfast) for 6 days Through June 9, then stop  Refills: 0    Associated Diagnoses: Brain metastasis (H)      LORazepam (ATIVAN) 0.5  MG tablet Take 1 tablet (0.5 mg) by mouth 2 times daily as needed for anxiety  Qty: 30 tablet, Refills: 1    Associated Diagnoses: Anxiety         CONTINUE these medications which have NOT CHANGED    Details   Acetaminophen (TYLENOL PO) Take 1,000 mg by mouth every 6 hours as needed      albuterol (PROAIR HFA/PROVENTIL HFA/VENTOLIN HFA) 108 (90 Base) MCG/ACT inhaler Inhale 2 puffs into the lungs 4 times daily  Qty: 1 Inhaler, Refills: 2    Associated Diagnoses: Acute bronchitis with symptoms > 10 days      aspirin EC 81 MG EC tablet Take 1 tablet (81 mg) by mouth daily  Qty: 90 tablet, Refills: 3    Associated Diagnoses: CAD (coronary artery disease)      atorvastatin (LIPITOR) 40 MG tablet Take 1 tablet (40 mg) by mouth daily  Qty: 90 tablet, Refills: 3    Associated Diagnoses: Coronary artery disease involving native coronary artery of native heart without angina pectoris      lisinopril (PRINIVIL/ZESTRIL) 5 MG tablet Take 1 tablet (5 mg) by mouth daily  Qty: 90 tablet, Refills: 2    Associated Diagnoses: Essential hypertension with goal blood pressure less than 140/90      metoprolol succinate ER (TOPROL-XL) 50 MG 24 hr tablet Take 1 tablet (50 mg) by mouth daily  Qty: 90 tablet, Refills: 1    Associated Diagnoses: Coronary artery disease involving native coronary artery of native heart without angina pectoris      Multiple Vitamins-Minerals (MULTIVITAMIN ADULT) TABS Take 1 tablet by mouth daily       prochlorperazine (COMPAZINE) 10 MG tablet Take 0.5 tablets (5 mg) by mouth every 6 hours as needed (Nausea/Vomiting)  Qty: 30 tablet, Refills: 3    Associated Diagnoses: 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)      ranitidine (ZANTAC) 75 MG tablet Take 1 tablet (75 mg) by mouth 2 times daily  Qty: 30 tablet, Refills: 11    Associated Diagnoses: Esophageal reflux      nitroglycerin (NITROSTAT) 0.4 MG SL tablet Place 1 tablet (0.4 mg) under the tongue every 5 minutes as needed for chest pain Maximum of 3 doses in 15  minutes  Qty: 25 tablet, Refills: 3    Associated Diagnoses: CAD (coronary artery disease)      trolamine salicylate (ASPERCREME) 10 % external cream Apply 1 applicator topically as needed for moderate pain           Allergies   Allergies   Allergen Reactions     Amoxicillin GI Disturbance     Tetracycline Other (See Comments)     Yeast infection     Nickel Rash

## 2019-06-03 NOTE — PROGRESS NOTES
"Pt a/ox2, disoriented to situation. Denies pain, states \"I am just so tired\", observed sleeping, up to bathroom SBA w/walker, gb wears brief, dribbles but uses bathroom. Plan for discharge to Critical access hospital today  "

## 2019-06-03 NOTE — PROGRESS NOTES
PAS-RR    Per DHS regulation, CTS team completed and submitted PAS-RR to [place] [employer]ging Direct Connect via the Senior [employer] Line. CTS team advised SNF and they are aware a PAS-RR has been submitted.     CTS team reviewed with pt or health care agent that they may be contacted for a follow up appointment within 10 days of hospital discharge if SNF stay is <30 days. Contact information for [employer] was also provided.     Pt or health care agent verbalized understanding.     PAS-RR # [id number]    Jolly Martinez RN Care Coordinator  [facility] [phone]  [facility] [phone]

## 2019-06-03 NOTE — PROGRESS NOTES
Name: Ines Pickard    MRN#: [id number]    Reason for Hospitalization: Dehydration [E86.0]  Generalized muscle weakness [M62.81]    Discharge Date: 6/3/2019    Patient / Family response to discharge plan: Pt is agreeable and wanting to go to  [facility] (Main: [phone] Admissions: [phone] Fax: [phone]) for TCU today via HE transportation. Pt aware of possible transportation costs. Writer called pt's son, Berhane and relayed discharge plan per pt's request via telephone. Confirmed arrangements with Jodee at [facility].           Follow-Up Appt:   Future Appointments   Date Time Provider Department Center   6/21/2019  2:00 PM Fany Rico APRN CNP [facility] Owned         Discharge Disposition:  Pt to discharge to transitional care unit/long term bed at [facility] via HE transportation at 1715.    MIR Alvarez Tele: [phone]

## 2019-06-03 NOTE — PLAN OF CARE
[facility] [facility] DISCHARGE NOTE    Patient discharged to long term care facility at 5:20 PM via wheel chair. Accompanied by other: [facility] transport staff. Discharge instructions reviewed with patient and long term care facility staff , opportunity offered to ask questions. Prescriptions sent to patient preferred pharmacy. All belongings sent with patient.    Aliyah Duenas

## 2019-06-03 NOTE — PLAN OF CARE
Occupational Therapy Discharge Summary    Reason for therapy discharge:    Discharged to transitional care facility.    Progress towards therapy goal(s). See goals on Care Plan in [facility] electronic health record for goal details.  Goals partially met.  Barriers to achieving goals:   discharge from facility.    Therapy recommendation(s):    Continued therapy is recommended.  Rationale/Recommendations:  TCU to increase independence/safety with ADLs.

## 2019-06-03 NOTE — PLAN OF CARE
Patient requested tylenol and ativan this am. Taking only Boost for breakfast and states will take chocolate pudding maybe later. Takes her meds on a spoon one at a time as has some numbness in her fingertips and unable to feel her meds to do this herself. Plan is to discharge today. Will deaccess port prior to discharge. Dr. Scales in with patient at this time.

## 2019-06-03 NOTE — PROGRESS NOTES
[facility]  Patient is currently open to home care services with [facility].  The patient is currently receiving RN, Pt and HHA       services.  [facility]  and team have been notified of patient admission.  [facility] liaison will continue to follow patient during stay.  If appropriate provide orders to resume home care at time of discharge.    Lana Suárez RN Liaison   [facility]

## 2019-06-03 NOTE — PROGRESS NOTES
Called report to admitting nurse at [facility]. Patient given copy of her discharge orders.

## 2019-06-03 NOTE — PLAN OF CARE
Vitals stable. Patient c/o generalized pain, administered PRN tylenol.  PRN ativan given per patient request.  Decreased appetite, drinking boost for meals.  Up with stand by assist using walker and transfer belt.  IV abx given per orders.  Plan is for discharge to TCU tomorrow.

## 2019-06-04 NOTE — LETTER
[place] CARE COORDINATION  [facility]  [address]  [facility] [address]  Phone: [phone]    June 4, 2019    Ines Pickard  [address] [facility] [place] [address]      Dear Ines,    I am a clinic care coordinator who works with Rohini Suárez MD at [facility]. I wanted to introduce myself and provide you with my contact information so that you can call me with questions or concerns about your health care. Below is a description of clinic care coordination and how I can further assist you.     The clinic care coordinator is a registered nurse and/or  who understand the health care system. The goal of clinic care coordination is to help you manage your health and improve access to the [facility] system in the most efficient manner. The registered nurse can assist you in meeting your health care goals by providing education, coordinating services, and strengthening the communication among your providers. The  can assist you with financial, behavioral, psychosocial, chemical dependency, counseling, and/or psychiatric resources.    Please feel free to contact me at [phone], [phone], with any questions or concerns. We at [facility] are focused on providing you with the highest-quality healthcare experience possible and that all starts with you.     Sincerely,     Grover Johnson RN  Clinic Care Coordinator    Enclosed: I have enclosed a copy of a 24 Hour Access Plan. This has helpful phone numbers for you to call when needed. Please keep this in an easy to access place to use as needed.

## 2019-06-04 NOTE — PROGRESS NOTES
Department of Radiation Oncology  Radiation Therapy Center  [facility] [place] [address]  [phone]       Radiotherapy Treatment Summary          2019    PATIENT: Ines Pickard  MEDICAL RECORD NO: [id number]   : 1940    DIAGNOSIS: Brain mets  INTENT OF RADIOTHERAPY: palliative   PATHOLOGY:   Metastatic small cell lung cancer                                STAGE: IV  CONCURRENT SYSTEMIC THERAPY:  No       ONCOLOGIC HISTORY:   Ms. Pickard is a 78 year old female who presents with a diagnosis of brain mets.    She has a history of extensive stage small cell lung cancer s/p systemic (6 cycles of carboplatin/etoposide) therapy and consolidative RT to thorax. Then developed Oligometastatic disease involving the left adrenal gland seen on PET 2019. S/p RT to left adrenal 3000 cGy from 19-3/1/2019.     The patient initially presented with a 5-month history of hoarseness.  She was initially evaluated by ENT who on direct laryngoscopy noted left vocal cord paralysis.  Patient separately underwent a CT scan in 2018 which identified a 2.3 cm left upper lobe nodule and left hilar and mediastinal adenopathy.  She was also noted to have a left adrenal nodule measuring 1.5 cm in size.  On 2018, FNA was done the left adrenal mass. Final pathology was consistent with metastatic small cell lung cancer. Staging workup including a PET scan confirmed the primary left upper lobe lesion, thoracic disease, and left adrenal metastasis.  The patient was staged as extensive stage small cell lung cancer.  She subsequently completed 6 cycles of systemic therapy with carboplatin and etoposide under the care of Dr. Miner.  The patient had overall good response to systemic therapy and subsequently underwent consolidative thoracic radiation therapy to chest. 30 Gy in 10 fx that was completed 2018.      PET scan 2019 demonstrated increased metabolic activity of the left  adrenal nodule, 2.5 cm in size, with a maximum SUV of 12.6. She was offered palliative RT to that area since it was a single metastatic site. 3/1/19, completed RT to Left adrenal gland metastasis, 30 Gy in 5 fx.     Unfortunately she began having right hand weakness and a brain MR 5/7/2019 revealed multiple metastasis numbering at least 10.  She was offered PCI in the past, but declined, so she has not had prior brain XRT.     Dr Miner from medical oncology considering  optivo or other cheomotherapy after brain radiation therapy.         SITE OF TREATMENT: whole brain    DATES  OF TREATMENT: 5/13/19-5/24/19    TOTAL DOSE OF TREATMENT: 3000 cGy    DOSE PER FRACTION OF TREATMENT: 300 cGy x 10 fractions       COMMENT/TOXICITY:  Fatigue. She denies memory loss, change in appetite, headaches or nausea/vomiting. She has no new focal neurologic deficits                   PAIN MANAGEMENT:    0-10 Pain Scale: 0  Pain Note: dexamethasone 4 mg 2x/day                          FOLLOW UP PLAN:  1.   Decadron as per taper.   2.   Follow up with Dr Miner in Medical Oncology.  3.   RTC in 2 weeks. .      Radiation Oncologist: Juan Flores M.D.  Department of Radiation Oncology  [facility]

## 2019-06-04 NOTE — PROGRESS NOTES
Clinic Care Coordination Contact  Care Coordination Transition Communication    Referral Source: IP Handoff    Clinical Data: Patient was hospitalized at [facility]  from 5-30 to 6-3 with diagnosis of Dehydration.     Transition to Facility:            [facility] tele:[phone] then transition into Long Term there                 Plan: RN/SW Care Coordinator will await notification from facility staff informing RN/SW Care Coordinator of patient's discharge plans/needs. RN/SW Care Coordinator will review chart and outreach to facility staff every 4 weeks and as needed.     Grover Johnson RN  Primary Care Clinic RN Care Coordinator  [facility]   [phone] or [phone]

## 2019-06-04 NOTE — PROGRESS NOTES
"[facility]  PRIMARY CARE PROVIDER AND CLINIC:  Rohini Suárez MD, [address] [facility] [address]  Chief Complaint   Patient presents with     Hospital F/U     [place] Medical Record Number:  [id number]  Place of Service where encounter took place:  [facility] ([facility] [[id number]]    Ines Pickard  is a 78 year old  (1940), admitted to the above facility from  [facility]. Hospital stay 5/30/19 through 6/3/19..  Admitted to this facility for  rehab, medical management and nursing care.    HPI:    HPI information obtained from: facility chart records, facility staff, patient report and [facility] chart review.   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 Patient Completed chemotherapy and radiation. Following treatment patient experienced fatigue, memory loss, and weakness.     Patient admitted to the ER with progressive weakness. She was treated with IV fluids and cipro for UTI. Dehydration was felt to be related to poor oral intake.      Updates on Status Since Skilled nursing Admission: Patient admitted to SNF for long term care. Per staff plan is to receive therapy for strengthening, then start hospice if not advancing.    Patient is able to report her medical history. She is alert and oriented. She states she has been receiving ativan BID x 3 years and is only receiving this every day. She has not been able to sleep. She notes \"a little\" memory loss. She states her back pain has resolved. She notes an MI 4 years ago. Denies any recent CP, cough, fever or chills. She denies any difficulty swallowing. Admits her appetite is poor. No bowel or bladder concerns. Denies nausea.    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  PAST SURGICAL HISTORY:   has a past surgical history that includes surgical history of -  (1961); tonsillectomy & adenoidectomy (1967); appendectomy (1956); Eye surgery; Phacoemulsification with standard intraocular lens implant (Left, 1/25/2016); Phacoemulsification with standard intraocular lens implant (Right, 2/22/2016); Esophagoscopy, gastroscopy, duodenoscopy (EGD), combined (N/A, 1/30/2018); vascular surgery (02/12/2018); and Insert port vascular access (N/A, 2/12/2018).  FAMILY HISTORY: family history includes Cancer in her brother and mother; Heart Disease in her brother and father; Respiratory in her father.  SOCIAL HISTORY:   reports that she quit smoking about 4 years ago. Her smoking use included cigarettes. She smoked 0.50 packs per day. She has never used smokeless tobacco. She reports that she does not drink alcohol or use drugs.    Post Discharge Medication Reconciliation Status: discharge medications reconciled, continue medications without change    Current Outpatient Medications   Medication Sig Dispense Refill     Acetaminophen (TYLENOL PO) Take 1,000 mg by mouth every 6 hours as needed       albuterol (PROAIR HFA/PROVENTIL HFA/VENTOLIN HFA) 108 (90 Base) MCG/ACT inhaler Inhale 2 puffs into the lungs 4 times daily 1 Inhaler 2     aspirin EC 81 MG EC tablet Take 1 tablet (81 mg) by mouth daily 90 tablet 3     atorvastatin (LIPITOR) 40 MG tablet Take 1 tablet (40 mg) by mouth daily 90 tablet 3     ciprofloxacin (CIPRO) 500 MG tablet Take 1 tablet (500 mg) by mouth every 12 hours Through June 8, 2019       lisinopril (PRINIVIL/ZESTRIL) 5 MG tablet Take 1 tablet (5 mg) by mouth daily 90 tablet 2     metoprolol succinate ER (TOPROL-XL) 50 MG 24 hr  tablet Take 1 tablet (50 mg) by mouth daily 90 tablet 1     Multiple Vitamins-Minerals (MULTIVITAMIN ADULT) TABS Take 1 tablet by mouth daily        nitroglycerin (NITROSTAT) 0.4 MG SL tablet Place 1 tablet (0.4 mg) under the tongue every 5 minutes as needed for chest pain Maximum of 3 doses in 15 minutes 25 tablet 3     ranitidine (ZANTAC) 75 MG tablet Take 1 tablet (75 mg) by mouth 2 times daily 30 tablet 11     trolamine salicylate (ASPERCREME) 10 % external cream Apply 1 applicator topically as needed for moderate pain         ROS:  10 point ROS of systems including Constitutional, Eyes, Respiratory, Cardiovascular, Gastroenterology, Genitourinary, Integumentary, Musculoskeletal, Psychiatric were all negative except for pertinent positives noted in my HPI.    Vitals:  /80   Pulse 95   Temp 98.9  F (37.2  C)   Resp 16   SpO2 94%   Exam:  GENERAL APPEARANCE:  Alert, in no distress, lying in bed  RESP:  respiratory effort and palpation of chest normal, auscultation of lungs clear , no respiratory distress  CV:  Palpation and auscultation of heart done , rate and rhythm reg,  no peripheral edema  ABDOMEN:  normal bowel sounds, soft, nontender, no hepatosplenomegaly or other masses  SKIN:  Inspection and Palpation of skin and subcutaneous tissue pale/dry. No rashes or lesions.   NEURO: OLYA's  PSYCH:  insight and judgement, memory intact , affect and mood normal      Lab/Diagnostic data:  CBC RESULTS:   Recent Labs   Lab Test 06/02/19  0645 06/01/19  0630 05/31/19  0603   WBC  --  6.9 8.2   RBC  --  3.77* 3.96   HGB  --  10.2* 10.6*   HCT  --  31.0* 32.4*   MCV  --  82 82   MCH  --  27.1 26.8   MCHC  --  32.9 32.7   RDW  --  15.6* 15.3*   * 115* 115*       Last Basic Metabolic Panel:  Recent Labs   Lab Test 06/01/19  0630 05/31/19  0603   * 140   POTASSIUM 3.5 3.7   CHLORIDE 111* 107   AMADO 8.4* 8.1*   CO2 28 26   BUN 10 20   CR 0.43* 0.40*   GLC 92 87       Liver Function Studies -   Recent Labs    Lab Test 06/01/19  0630 05/31/19  0603   PROTTOTAL 4.7* 4.7*   ALBUMIN 2.5* 2.5*   BILITOTAL 1.1 1.7*   ALKPHOS 50 50   AST 18 21   ALT 24 24       TSH   Date Value Ref Range Status   11/07/2013 0.40 0.4 - 5.0 mU/L Final   ]    Lab Results   Component Value Date    A1C 5.8 11/07/2013         ASSESSMENT/PLAN:  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)  - diagnosed Jan '18.    - vocal cord paralysis from hilar mass  - completed chemo and radiation with progression of CA  - goals are now comfort based, plan to start hospice when therapy completed    Weakness  - multifactorial, working with therapy.   - no falls reported since admission to SNF     Severe protein-calorie malnutrition (H)  - poor appetite  - dietician to follow    Thrombocytopenia (H)  - PLt 113 6/2/19. No signs of bleeding, bruises easily  - consider labs if signs of bleeding      Coronary artery disease involving native coronary artery of native heart without angina pectoris  Essential hypertension with goal blood pressure less than 140/90  - hx of MI 2015 (per patient)  - lexiscan 5/19 showed ischemia, Echo normal EF with diastolic dsfxn  - continue statin, asa, lisinopril and metoprolol  - at risk for hypotension due to poor intake  - consider stopping statin as goal is comfort focused    Acute UTI  - due to complete 7 day course of cipro  - nsg to update with concerns  - staff advised to encourage patient to increase fluid intake    Cognitive impairment  - mild. OT to eval.     Anxiety  - has been on ativan BID since CA dx.   - will resume previous dose       transcribed by : Pricilla Khan    1. Discontinue compazine  2. Zofran ODT 4 mg po Q 8 hr prn nausea  3. Ativan 0.5 mg po BID  4. Discontinue prn ativan    Total time spent with patient visit at the skilled nursing facility was 35 min including patient visit, review of past records and d/w staff. Greater than 50% of total time spent with counseling and coordinating care due to  complex conditions  Electronically signed by:  ALEXSANDRA Alfred CNP

## 2019-06-04 NOTE — LETTER
"    6/4/2019        RE: Ines Pickard  [address] [facility] [address]        [facility]  PRIMARY CARE PROVIDER AND CLINIC:  Rohini Suárez MD, [address] [facility] [address]  Chief Complaint   Patient presents with     Hospital F/U     [place] Medical Record Number:  [id number]  Place of Service where encounter took place:  [facility] [[id number]]    Ines Pickard  is a 78 year old  (1940), admitted to the above facility from  [facility]. Hospital stay 5/30/19 through 6/3/19..  Admitted to this facility for  rehab, medical management and nursing care.    HPI:    HPI information obtained from: facility chart records, facility staff, patient report and [facility] chart review.   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 Patient Completed chemotherapy and radiation. Following treatment patient experienced fatigue, memory loss, and weakness.     Patient admitted to the ER with progressive weakness. She was treated with IV fluids and cipro for UTI. Dehydration was felt to be related to poor oral intake.      Updates on Status Since Skilled nursing Admission: Patient admitted to SNF for long term care. Per staff plan is to receive therapy for strengthening, then start hospice if not advancing.    Patient is able to report her medical history. She is alert and oriented. She states she has been receiving ativan BID x 3 years and is only receiving this every day. She has not been able to sleep. She notes \"a little\" memory loss. She states her back pain has resolved. She notes an MI 4 years ago. Denies any recent CP, cough, fever or chills. She denies any difficulty swallowing. Admits her appetite is poor. No bowel or bladder concerns. Denies nausea.    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  PAST SURGICAL HISTORY:   has a past surgical history that includes surgical history of -  (1961); tonsillectomy & adenoidectomy (1967); appendectomy (1956); Eye surgery; Phacoemulsification with standard intraocular lens implant (Left, 1/25/2016); Phacoemulsification with standard intraocular lens implant (Right, 2/22/2016); Esophagoscopy, gastroscopy, duodenoscopy (EGD), combined (N/A, 1/30/2018); vascular surgery (02/12/2018); and Insert port vascular access (N/A, 2/12/2018).  FAMILY HISTORY: family history includes Cancer in her brother and mother; Heart Disease in her brother and father; Respiratory in her father.  SOCIAL HISTORY:   reports that she quit smoking about 4 years ago. Her smoking use included cigarettes. She smoked 0.50 packs per day. She has never used smokeless tobacco. She reports that she does not drink alcohol or use drugs.    Post Discharge Medication Reconciliation Status: discharge medications reconciled, continue medications without change    Current Outpatient Medications   Medication Sig Dispense Refill     Acetaminophen (TYLENOL PO) Take 1,000 mg by mouth every 6 hours as needed       albuterol (PROAIR HFA/PROVENTIL HFA/VENTOLIN HFA) 108 (90 Base) MCG/ACT inhaler Inhale 2 puffs into the lungs 4 times daily 1 Inhaler 2     aspirin EC 81 MG EC tablet Take 1 tablet (81 mg) by mouth daily 90 tablet 3     atorvastatin (LIPITOR) 40 MG tablet Take 1 tablet (40 mg) by mouth daily 90 tablet 3     ciprofloxacin (CIPRO) 500 MG tablet Take 1 tablet (500 mg) by mouth every 12 hours Through June 8, 2019       lisinopril (PRINIVIL/ZESTRIL) 5 MG tablet Take 1 tablet  (5 mg) by mouth daily 90 tablet 2     metoprolol succinate ER (TOPROL-XL) 50 MG 24 hr tablet Take 1 tablet (50 mg) by mouth daily 90 tablet 1     Multiple Vitamins-Minerals (MULTIVITAMIN ADULT) TABS Take 1 tablet by mouth daily        nitroglycerin (NITROSTAT) 0.4 MG SL tablet Place 1 tablet (0.4 mg) under the tongue every 5 minutes as needed for chest pain Maximum of 3 doses in 15 minutes 25 tablet 3     ranitidine (ZANTAC) 75 MG tablet Take 1 tablet (75 mg) by mouth 2 times daily 30 tablet 11     trolamine salicylate (ASPERCREME) 10 % external cream Apply 1 applicator topically as needed for moderate pain         ROS:  10 point ROS of systems including Constitutional, Eyes, Respiratory, Cardiovascular, Gastroenterology, Genitourinary, Integumentary, Musculoskeletal, Psychiatric were all negative except for pertinent positives noted in my HPI.    Vitals:  /80   Pulse 95   Temp 98.9  F (37.2  C)   Resp 16   SpO2 94%   Exam:  GENERAL APPEARANCE:  Alert, in no distress, lying in bed  RESP:  respiratory effort and palpation of chest normal, auscultation of lungs clear , no respiratory distress  CV:  Palpation and auscultation of heart done , rate and rhythm reg,  no peripheral edema  ABDOMEN:  normal bowel sounds, soft, nontender, no hepatosplenomegaly or other masses  SKIN:  Inspection and Palpation of skin and subcutaneous tissue pale/dry. No rashes or lesions.   NEURO: DOBSON's  PSYCH:  insight and judgement, memory intact , affect and mood normal      Lab/Diagnostic data:  CBC RESULTS:   Recent Labs   Lab Test 06/02/19  0645 06/01/19 0630 05/31/19  0603   WBC  --  6.9 8.2   RBC  --  3.77* 3.96   HGB  --  10.2* 10.6*   HCT  --  31.0* 32.4*   MCV  --  82 82   MCH  --  27.1 26.8   MCHC  --  32.9 32.7   RDW  --  15.6* 15.3*   * 115* 115*       Last Basic Metabolic Panel:  Recent Labs   Lab Test 06/01/19 0630 05/31/19  0603   * 140   POTASSIUM 3.5 3.7   CHLORIDE 111* 107   AMADO 8.4* 8.1*   CO2 28 26    BUN 10 20   CR 0.43* 0.40*   GLC 92 87       Liver Function Studies -   Recent Labs   Lab Test 06/01/19  0630 05/31/19  0603   PROTTOTAL 4.7* 4.7*   ALBUMIN 2.5* 2.5*   BILITOTAL 1.1 1.7*   ALKPHOS 50 50   AST 18 21   ALT 24 24       TSH   Date Value Ref Range Status   11/07/2013 0.40 0.4 - 5.0 mU/L Final   ]    Lab Results   Component Value Date    A1C 5.8 11/07/2013         ASSESSMENT/PLAN:  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)  - diagnosed Jan '18.    - vocal cord paralysis from hilar mass  - completed chemo and radiation with progression of CA  - goals are now comfort based, plan to start hospice when therapy completed    Weakness  - multifactorial, working with therapy.   - no falls reported since admission to SNF     Severe protein-calorie malnutrition (H)  - poor appetite  - dietician to follow    Thrombocytopenia (H)  - PLt 113 6/2/19. No signs of bleeding, bruises easily  - consider labs if signs of bleeding      Coronary artery disease involving native coronary artery of native heart without angina pectoris  Essential hypertension with goal blood pressure less than 140/90  - hx of MI 2015 (per patient)  - lexiscan 5/19 showed ischemia, Echo normal EF with diastolic dsfxn  - continue statin, asa, lisinopril and metoprolol  - at risk for hypotension due to poor intake  - consider stopping statin as goal is comfort focused    Acute UTI  - due to complete 7 day course of cipro  - nsg to update with concerns  - staff advised to encourage patient to increase fluid intake    Cognitive impairment  - mild. OT to eval.     Anxiety  - has been on ativan BID since CA dx.   - will resume previous dose       transcribed by : Pricilla Khan    1. Discontinue compazine  2. Zofran ODT 4 mg po Q 8 hr prn nausea  3. Ativan 0.5 mg po BID  4. Discontinue prn ativan    Total time spent with patient visit at the skilled nursing facility was 35 min including patient visit, review of past records and d/w staff.  Greater than 50% of total time spent with counseling and coordinating care due to complex conditions  Electronically signed by:  ALEXSANDRA Alfred CNP                         Sincerely,        ALEXSANDRA Alfred CNP

## 2019-06-04 NOTE — LETTER
Health Care Home - Access Care Plan    About Me:    Patient Name:  Ines Landa    YOB: 1940  Age:                      78 year old   [place] MRN:     [id number] Telephone Information:   Home Phone [phone]   Mobile none       Address:  [address] [place] [address] Email address:  No e-mail address on record      Emergency Contact(s)   Name Relationship Lgl Grd Work Phone Home Phone Mobile Phone   1. AUSTYN LANDA* Other No  [phone]    2. LAY LANDA Son No  [phone]    3. CARMELINA LANDA Other No  [phone]    4. JOSE ROBERTO ALVARES Sister No  [phone]   5. TOBIN BLACKWELL Relative No  [phone]              Health Maintenance:    Health Maintenance Due   Topic Date Due     DEXA  1940     ZOSTER IMMUNIZATION (1 of 2) 09/29/1989     MEDICARE ANNUAL WELLNESS VISIT  09/06/2017     DTAP/TDAP/TD IMMUNIZATION (2 - Td) 10/24/2018     PHQ-2  01/01/2019       My Access Plan  Medical Emergency 911   Questions or concerns during clinic hours Primary Clinic Line, I will call the clinic directly: [facility] - [phone]   24 Hour Appointment Line [phone] or  [phone] [phone]) (toll free)   24 Hour Nurse Line [phone] (toll free)   Questions or concerns outside clinic hours 24 Hour Appointment Line, I will call the after-hours on-call line:   [facility] [phone] or [phone]) (toll-free)   Preferred Urgent Care [facility] [phone]   Preferred Hospital [place], [place]  [phone]   Preferred Pharmacy [facility] - - JARET Pelaez - [id number] [facility]     Behavioral Health Crisis Line The National Suicide Prevention Lifeline at 1-935.444.6019 or 911                     My Care Team Members  Patient Care Team       Relationship Specialty Notifications Start End    Rohini Suárez MD PCP - General Family Practice  1/16/19      Phone: [phone] Pager: [phone] Fax: [phone] [address] [facility] [address]    Tracy Miner MD MD Oncology Admissions 5/17/18     Phone: [phone] Pager: [phone] Fax: [phone]        [facility] [address] [facility] [address]    [facility] ([facility] ([place])  5/23/18     Phone: [phone]         Juan Flores MD MD Radiation Oncology  1/24/19     Phone: [phone] Fax: [phone] [address] [facility] [address]    Fany Rico APRN CNP Nurse Practitioner Nurse Practitioner  1/24/19     Phone: [phone] Fax: [phone]         [address] [facility] [address]    Bella Arvizu, RN Specialty Care Coordinator Oncology Admissions 2/22/19     Phone: [phone]         MICHAEL Perez MD Assigned PCP   4/26/19     Branden Johnson, RN Clinic Care Coordinator Primary Care - [facility]  6/4/19     Phone: [phone] Fax: [phone] [address] [place] [address]           My Medical and Care Information  Problem List   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)     Need for prophylactic measure     Anemia in neoplastic disease     Chest pain     Unstable angina (H)     Orthostatic hypotension     Weakness     Thrombocytopenia (H)     Acute UTI      Current Medications and Allergies:  See printed Medication Report

## 2019-06-10 NOTE — LETTER
6/10/2019        RE: Ines Pickard  [address] [facility] [address]        [place] [facility]  PRIMARY CARE PROVIDER AND CLINIC:  Rohini Suárez MD, [address] [facility] [address]  No chief complaint on file.    [place] Medical Record Number:  [id number]  Place of Service where encounter took place:  Megan Patrick at the [facility]    Ines Pickard  is a 78 year old  (1940), admitted to the above facility from  [facility]. Hospital stay 5/30/19 through 6/3/19..  Admitted to this facility for  rehab, medical management and nursing care.    HPI:    HPI information obtained from: facility staff, patient report and [facility] chart review.   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 Dehydration treated with IVF hydration, nutrition consulted, diet adjusted. .     Updates on Status Since Skilled nursing Admission:   - compazine stopped, zofran started. Ativan prn stopped, and started on ativan 0.5 mg bid scheduled.   -Patient was seen and examined today.  Reported completed ciprofloxacin, no more dysuria.  - Denies pain, cough, wheezing.  - Reports history of lung cancer with mets to the brain and adrenal gland, and completed radiotherapy on May 29.  Denies having a blurry vision  -Nurse manager reports Ativan is helping.  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  PAST SURGICAL HISTORY:   has a past surgical history that includes surgical history of -  (1961); tonsillectomy & adenoidectomy (1967); appendectomy (1956); Eye surgery; Phacoemulsification with standard intraocular lens implant (Left, 1/25/2016); Phacoemulsification with standard intraocular lens implant (Right, 2/22/2016); Esophagoscopy, gastroscopy, duodenoscopy (EGD), combined (N/A, 1/30/2018); vascular surgery (02/12/2018); and Insert port vascular access (N/A, 2/12/2018).  FAMILY HISTORY: family history includes Cancer in her brother and mother; Heart Disease in her brother and father; Respiratory in her father.  SOCIAL HISTORY:   reports that she quit smoking about 4 years ago. Her smoking use included cigarettes. She smoked 0.50 packs per day. She has never used smokeless tobacco. She reports that she does not drink alcohol or use drugs.    Post Discharge Medication Reconciliation Status: discharge medications reconciled and changed, per note/orders (see AVS)  Current Outpatient Medications   Medication Sig Dispense Refill     Acetaminophen (TYLENOL PO) Take 1,000 mg by mouth every 6 hours as needed       albuterol (PROAIR HFA/PROVENTIL HFA/VENTOLIN HFA) 108 (90 Base) MCG/ACT inhaler Inhale 2 puffs into the lungs 4 times daily 1 Inhaler 2     aspirin EC 81 MG EC tablet Take 1 tablet (81 mg) by mouth daily 90 tablet 3     atorvastatin (LIPITOR) 40 MG tablet Take 1 tablet (40 mg) by mouth daily 90 tablet 3     ciprofloxacin (CIPRO) 500 MG tablet Take 1 tablet (500 mg) by mouth every 12 hours Through June 8, 2019       dexamethasone (DECADRON) 2 MG tablet Take 1 tablet (2 mg) by mouth daily (with breakfast) for 6 days Through June 9, then stop  0     lisinopril (PRINIVIL/ZESTRIL) 5 MG tablet Take 1 tablet (5 mg) by mouth daily 90 tablet 2     metoprolol succinate ER (TOPROL-XL) 50 MG 24 hr  tablet Take 1 tablet (50 mg) by mouth daily 90 tablet 1     Multiple Vitamins-Minerals (MULTIVITAMIN ADULT) TABS Take 1 tablet by mouth daily        nitroglycerin (NITROSTAT) 0.4 MG SL tablet Place 1 tablet (0.4 mg) under the tongue every 5 minutes as needed for chest pain Maximum of 3 doses in 15 minutes 25 tablet 3     ranitidine (ZANTAC) 75 MG tablet Take 1 tablet (75 mg) by mouth 2 times daily 30 tablet 11     trolamine salicylate (ASPERCREME) 10 % external cream Apply 1 applicator topically as needed for moderate pain       ROS:  Very Limited due to the Resident's dementia, otherwise negative except as in the HPI    Vitals:  BP Readings from Last 3 Encounters:   06/10/19 118/72   06/04/19 119/80   06/03/19 140/73     Pulse Readings from Last 4 Encounters:   06/10/19 98   06/04/19 95   06/03/19 77   05/24/19 74     Body mass index is 24.46 kg/m .    Exam:  GENERAL APPEARANCE:  in no distress, cooperative, sitting in a wheelchair very quietly.  ENT:  Mouth and posterior oropharynx normal, moist mucous membranes, oral mucosa moist, no lesion noted.   EYES:  EOMI, Pupil rounded and equal.  RESP: Coarse sounds over left lung base otherwise good air entry elsewhere.  CV:  S1S2 audible, regular HR, no murmur appreciated.  No peripheral edema appreciated.  ABDOMEN:  soft, NT/ND, BS audible. no mass appreciated on palpation.   M/S:   no joint deformity noted on observation.   SKIN:  No rash.   NEURO:   No NFD appreciated on observation. Hand  5/5 b/l  PSYCH: Alert awake and oriented to person only, some confusion, speech clear, poor judgment and insight.    Lab/Diagnostic data: Reviewed in the chart and EHR.      ASSESSMENT/PLAN:  UTI with sepsis:  - Pseudomonas aeruginosa  , cipro completed. Doing fine.     General Weakness:  - likely 2/2 recent acute ailment, recent RTX, and multiple comorbidities.   - started rehab program, making a progress, continue until desired goal is achieved.       Protein calorie  malnutrition due to chronic illness, lung cancer   - Body mass index is 24.46 kg/m .  Albumin 2.5 and Low protein. continue current diet plan.           Thrombocytopenia, mild: monitor.     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   - life expectancy is < 6 month, hospice appropriate      Coronary artery disease involving native coronary artery without angina pectoris  Essential hypertension   - compensated.     Cognitive Impairment clinically:  - Continue to anticipate needs. Chronic condition, ongoing decline expected.   -  Continue to provide redirection and reassurance as needed. Maintain safe living situation with goals focused on comfort.    Orders:  - See above, otherwise, continue the rest of the current POC.       Electronically signed by:  Tracy Mcgill MD                         Sincerely,        Tracy Mcgill MD

## 2019-06-10 NOTE — PROGRESS NOTES
[place] [facility]  PRIMARY CARE PROVIDER AND CLINIC:  Rohini Suárez MD, [address] [facility] [address]  No chief complaint on file.    [place] Medical Record Number:  [id number]  Place of Service where encounter took place:  Celina Megan at the [facility]    Ines Pickard  is a 78 year old  (1940), admitted to the above facility from  [facility]. Hospital stay 5/30/19 through 6/3/19..  Admitted to this facility for  rehab, medical management and nursing care.    HPI:    HPI information obtained from: facility staff, patient report and [facility] chart review.   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 Dehydration treated with IVF hydration, nutrition consulted, diet adjusted. .     Updates on Status Since Skilled nursing Admission:   - compazine stopped, zofran started. Ativan prn stopped, and started on ativan 0.5 mg bid scheduled.   -Patient was seen and examined today.  Reported completed ciprofloxacin, no more dysuria.  - Denies pain, cough, wheezing.  - Reports history of lung cancer with mets to the brain and adrenal gland, and completed radiotherapy on May 29.  Denies having a blurry vision  -Nurse manager reports Ativan is helping.  -----------------------------------------------------------------------------------------------------------  CODE STATUS/ADVANCE DIRECTIVES DISCUSSION:   DNR / DNI  Patient's living condition: lives in a nursing home  ALLERGIES: Amoxicillin; Tetracycline; and Nickel  PAST MEDICAL HISTORY:  has a past medical history of Anemia due to blood loss, acute (12/30/2014), Chondrodermatitis nodularis chronica helicis (10/10/2011), Coronary artery disease (12/2014), and Percutaneous transluminal coronary  angioplasty hematoma (12/15/2014).  PAST SURGICAL HISTORY:   has a past surgical history that includes surgical history of -  (1961); tonsillectomy & adenoidectomy (1967); appendectomy (1956); Eye surgery; Phacoemulsification with standard intraocular lens implant (Left, 1/25/2016); Phacoemulsification with standard intraocular lens implant (Right, 2/22/2016); Esophagoscopy, gastroscopy, duodenoscopy (EGD), combined (N/A, 1/30/2018); vascular surgery (02/12/2018); and Insert port vascular access (N/A, 2/12/2018).  FAMILY HISTORY: family history includes Cancer in her brother and mother; Heart Disease in her brother and father; Respiratory in her father.  SOCIAL HISTORY:   reports that she quit smoking about 4 years ago. Her smoking use included cigarettes. She smoked 0.50 packs per day. She has never used smokeless tobacco. She reports that she does not drink alcohol or use drugs.    Post Discharge Medication Reconciliation Status: discharge medications reconciled and changed, per note/orders (see AVS)  Current Outpatient Medications   Medication Sig Dispense Refill     Acetaminophen (TYLENOL PO) Take 1,000 mg by mouth every 6 hours as needed       albuterol (PROAIR HFA/PROVENTIL HFA/VENTOLIN HFA) 108 (90 Base) MCG/ACT inhaler Inhale 2 puffs into the lungs 4 times daily 1 Inhaler 2     aspirin EC 81 MG EC tablet Take 1 tablet (81 mg) by mouth daily 90 tablet 3     atorvastatin (LIPITOR) 40 MG tablet Take 1 tablet (40 mg) by mouth daily 90 tablet 3     ciprofloxacin (CIPRO) 500 MG tablet Take 1 tablet (500 mg) by mouth every 12 hours Through June 8, 2019       dexamethasone (DECADRON) 2 MG tablet Take 1 tablet (2 mg) by mouth daily (with breakfast) for 6 days Through June 9, then stop  0     lisinopril (PRINIVIL/ZESTRIL) 5 MG tablet Take 1 tablet (5 mg) by mouth daily 90 tablet 2     metoprolol succinate ER (TOPROL-XL) 50 MG 24 hr tablet Take 1 tablet (50 mg) by mouth daily 90 tablet 1     Multiple Vitamins-Minerals  (MULTIVITAMIN ADULT) TABS Take 1 tablet by mouth daily        nitroglycerin (NITROSTAT) 0.4 MG SL tablet Place 1 tablet (0.4 mg) under the tongue every 5 minutes as needed for chest pain Maximum of 3 doses in 15 minutes 25 tablet 3     ranitidine (ZANTAC) 75 MG tablet Take 1 tablet (75 mg) by mouth 2 times daily 30 tablet 11     trolamine salicylate (ASPERCREME) 10 % external cream Apply 1 applicator topically as needed for moderate pain       ROS:  Very Limited due to the Resident's dementia, otherwise negative except as in the HPI    Vitals:  BP Readings from Last 3 Encounters:   06/10/19 118/72   06/04/19 119/80   06/03/19 140/73     Pulse Readings from Last 4 Encounters:   06/10/19 98   06/04/19 95   06/03/19 77   05/24/19 74     Body mass index is 24.46 kg/m .    Exam:  GENERAL APPEARANCE:  in no distress, cooperative, sitting in a wheelchair very quietly.  ENT:  Mouth and posterior oropharynx normal, moist mucous membranes, oral mucosa moist, no lesion noted.   EYES:  EOMI, Pupil rounded and equal.  RESP: Coarse sounds over left lung base otherwise good air entry elsewhere.  CV:  S1S2 audible, regular HR, no murmur appreciated.  No peripheral edema appreciated.  ABDOMEN:  soft, NT/ND, BS audible. no mass appreciated on palpation.   M/S:   no joint deformity noted on observation.   SKIN:  No rash.   NEURO:   No NFD appreciated on observation. Hand  5/5 b/l  PSYCH: Alert awake and oriented to person only, some confusion, speech clear, poor judgment and insight.    Lab/Diagnostic data: Reviewed in the chart and EHR.      ASSESSMENT/PLAN:  UTI with sepsis:  - Pseudomonas aeruginosa , cipro completed. Doing fine.     General Weakness:  - likely 2/2 recent acute ailment, recent RTX, and multiple comorbidities.   - started rehab program, making a progress, continue until desired goal is achieved.       Protein calorie malnutrition due to chronic illness, lung cancer   - Body mass index is 24.46 kg/m .  Albumin  2.5 and Low protein. continue current diet plan.           Thrombocytopenia, mild: monitor.     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   - life expectancy is < 6 month, hospice appropriate      Coronary artery disease involving native coronary artery without angina pectoris  Essential hypertension   - compensated.     Cognitive Impairment clinically:  - Continue to anticipate needs. Chronic condition, ongoing decline expected.   -  Continue to provide redirection and reassurance as needed. Maintain safe living situation with goals focused on comfort.    Orders:  - See above, otherwise, continue the rest of the current POC.       Electronically signed by:  Tracy Mcgill MD

## 2019-06-20 NOTE — LETTER
6/20/2019        RE: Iens Pickard  [address] [facility] [address]        [place] [facility]  [place] Medical Record Number:  [id number]  Place of Service where encounter took place[facility] [[id number]]  Chief Complaint   Patient presents with     Nursing Home Acute       HPI:    Ines Pickard  is a 78 year old (1940), who is being seen today for an episodic care visit.  HPI information obtained from: facility chart records, facility staff, patient report and [facility] chart review.     Nsg called yesterday to update NP with patient concerns. Patient had 4 episodes of vomiting and says were 82 on RA. Oxygen via nc was ordered. D/W patient's son determined he is interested in a comfort based approach. He is not able to sign forms until later today. Comfort meds ordered by MD.     Met with patient in her room. She is confused and alert. She denies pain and appears comfortable. No grimacing with abd exam. Breathing appears non labored.     Past Medical and Surgical History reviewed in Epic today.    MEDICATIONS:  Current Outpatient Medications   Medication Sig Dispense Refill     atropine 1 % ophthalmic solution Place 3 drops under the tongue every 4 hours as needed for secretions       LORazepam (ATIVAN) 0.5 MG tablet Take 1 tablet (0.5 mg) by mouth 2 times daily 60 tablet 5     metoprolol succinate ER (TOPROL-XL) 25 MG 24 hr tablet Take 1 tablet (25 mg) by mouth daily       morphine sulfate, high concentrate, (ROXANOL-CONCENTRATED) 20 MG/ML concentrated solution Take 4 mLs (80 mg) by mouth every 2 hours as needed for shortness of breath / dyspnea or breakthrough pain  0     ondansetron (ZOFRAN-ODT) 4 MG ODT tab Take 1 tablet (4 mg) by mouth every 8 hours as needed for nausea       ranitidine (ZANTAC) 75 MG tablet Take 1 tablet (75 mg) by mouth 2 times daily 30 tablet 11     trolamine salicylate (ASPERCREME) 10 % external cream Apply 1 applicator  topically as needed for moderate pain         REVIEW OF SYSTEMS:  Limited secondary to cognitive impairment but today pt reports no pain    Objective:  /72   Pulse 98   Temp 98.9  F (37.2  C)   Resp 16   Wt 66.7 kg (147 lb)   SpO2 97%   BMI 24.46 kg/m     Exam:  GENERAL APPEARANCE:  Alert, appears chronically ill, in no distress  RESP:  respiratory effort and palpation of chest normal, lungs clear to auscultation   CV:  Palpation and auscultation of heart done , HRR  ABDOMEN:  no guarding or rebound, bowel sounds normal  Skin: jaundiced appearing, dry, fragile  PSYCH:  insight and judgement impaired, memory impaired     Labs:   Labs done in SNF are in [facility] EPIC. Please refer to them using [email or web address] Everywhere.    ASSESSMENT/PLAN:     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)  Cognitive impairment  Anxiety  Weakness  Nausea and vomiting, intractability of vomiting not specified, unspecified vomiting type     No further vomiting today. Has zofran available. Ok for liquids slowly and monitor for GI distress.   Morphine added for comfort or dyspnea.   Patient will be signing on with [facility] today with a goal of comfort.       Total time spent with patient visit at the skilled nursing facility was 25 min including patient visit and discussion re end of life, review of non essential meds and chart review. Greater than 50% of total time spent with counseling and coordinating care due to complex end of life conditions  Electronically signed by:  ALEXSANDRA Alfred CNP               Sincerely,        ALEXSANDRA Alfred CNP

## 2019-06-20 NOTE — PROGRESS NOTES
[place] [facility]  [place] Medical Record Number:  [id number]  Place of Service where encounter took place:  [facility] [[id number]]  Chief Complaint   Patient presents with     Nursing Home Acute       HPI:    Ines Pickadr  is a 78 year old (1940), who is being seen today for an episodic care visit.  HPI information obtained from: facility chart records, facility staff, patient report and [facility] chart review.     Nsg called yesterday to update NP with patient concerns. Patient had 4 episodes of vomiting and says were 82 on RA. Oxygen via nc was ordered. D/W patient's son determined he is interested in a comfort based approach. He is not able to sign forms until later today. Comfort meds ordered by MD.     Met with patient in her room. She is confused and alert. She denies pain and appears comfortable. No grimacing with abd exam. Breathing appears non labored.     Past Medical and Surgical History reviewed in Epic today.    MEDICATIONS:  Current Outpatient Medications   Medication Sig Dispense Refill     atropine 1 % ophthalmic solution Place 3 drops under the tongue every 4 hours as needed for secretions       LORazepam (ATIVAN) 0.5 MG tablet Take 1 tablet (0.5 mg) by mouth 2 times daily 60 tablet 5     metoprolol succinate ER (TOPROL-XL) 25 MG 24 hr tablet Take 1 tablet (25 mg) by mouth daily       morphine sulfate, high concentrate, (ROXANOL-CONCENTRATED) 20 MG/ML concentrated solution Take 4 mLs (80 mg) by mouth every 2 hours as needed for shortness of breath / dyspnea or breakthrough pain  0     ondansetron (ZOFRAN-ODT) 4 MG ODT tab Take 1 tablet (4 mg) by mouth every 8 hours as needed for nausea       ranitidine (ZANTAC) 75 MG tablet Take 1 tablet (75 mg) by mouth 2 times daily 30 tablet 11     trolamine salicylate (ASPERCREME) 10 % external cream Apply 1 applicator topically as needed for moderate pain         REVIEW OF SYSTEMS:  Limited secondary to cognitive  impairment but today pt reports no pain    Objective:  /72   Pulse 98   Temp 98.9  F (37.2  C)   Resp 16   Wt 66.7 kg (147 lb)   SpO2 97%   BMI 24.46 kg/m    Exam:  GENERAL APPEARANCE:  Alert, appears chronically ill, in no distress  RESP:  respiratory effort and palpation of chest normal, lungs clear to auscultation   CV:  Palpation and auscultation of heart done , HRR  ABDOMEN:  no guarding or rebound, bowel sounds normal  Skin: jaundiced appearing, dry, fragile  PSYCH:  insight and judgement impaired, memory impaired     Labs:   Labs done in SNF are in [place] EPIC. Please refer to them using EPIC/Care Everywhere.    ASSESSMENT/PLAN:     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)  Cognitive impairment  Anxiety  Weakness  Nausea and vomiting, intractability of vomiting not specified, unspecified vomiting type     No further vomiting today. Has zofran available. Ok for liquids slowly and monitor for GI distress.   Morphine added for comfort or dyspnea.   Patient will be signing on with [facility] today with a goal of comfort.       Total time spent with patient visit at the skilled nursing facility was 25 min including patient visit and discussion re end of life, review of non essential meds and chart review. Greater than 50% of total time spent with counseling and coordinating care due to complex end of life conditions  Electronically signed by:  ALEXSANDRA Alfred CNP

## 2019-06-21 NOTE — TELEPHONE ENCOUNTER
During chart review on 6/20 NP noticed patient may be choosing Hospice care. Call made twice today to touch base with Megan to check on patient.  Wanted to let them know ok to cancel this follow up and wanted to give input on pt symptoms (nausea) if they wanted to discuss.  Unable to talk with care team. Patient did not arrive for 2 pm follow up which confirms she has most likely been enrolled in Hospice. Will not reschedule appointment unless their team contacts this clinic.

## 2019-08-13 DIAGNOSIS — I25.10 CORONARY ARTERY DISEASE INVOLVING NATIVE CORONARY ARTERY OF NATIVE HEART WITHOUT ANGINA PECTORIS: ICD-10-CM

## 2019-08-13 RX ORDER — METOPROLOL SUCCINATE 50 MG/1
50 TABLET, EXTENDED RELEASE ORAL DAILY
Qty: 90 TABLET | Refills: 1 | OUTPATIENT
Start: 2019-08-13

## 2019-09-16 NOTE — PROGRESS NOTES
Cardiology Clinic Progress Note  Ines Pickard MRN# [id number]   YOB: 1940 Age: 77 year old     Reason for visit: Hospital discharge follow up           Assessment and Plan:     1. Chest discomfort with abnormal stress test and coronary artery disease    PCI to [facility] in 2014    Presented with substernal chest pressure with radiation to her throat that began at rest and was relieved with nitro    She ruled out for an MI with troponins negative ×4    Conservative management was recommended with ultimately deciding to continue medical therapy.    Titrate Imdur down to 50 mg daily due to headache and hypotension    No recurrence of symptoms since discharge    Follow-up with Dr. Neri in 2-3 months for annual visit    2. Hypertension    Titrate Imdur as above    Consider decreasing metoprolol in the future if needed for additional blood pressure room    3. Hyperlipidemia    Continue atorvastatin 40 mg daily         History of Presenting Illness:    Ines Pickard is a very pleasant 77 year old patient of Dr. eNri who presents today for hospital discharge follow-up.  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    She is transferred from [facility] to [facility] for evaluation on an abnormal stress test and concerns for unstable angina.  She complained of substernal region chest pressure with radiation to her throat that began at rest and was relieved with nitroglycerin.  Troponins were negative ×4.  The stress test showed 2 separate territories of ischemia with moderate area of the anterior and anteroseptal ischemia  of moderate intensity and small to moderate inferolateral area of ischemia of moderate intensity.  Her last coronary angiogram was in 2014 after she had  an inferior MI showed three-vessel coronary artery disease with a 70% ostial RPDA, 90% proximal D1.    She is following with oncology for small cell lung cancer with metastases.  She underwent palliative chemotherapy and at her last dose on 5 3/9/2018.  Patient states she follows up with oncology and a PET scan soon.  Since her discharge she has had no recurrence of her chest discomfort.  She was started on Imdur 30 mg daily and has been having ongoing headaches since.  Her blood pressure is limited, but she is asymptomatic.          This note was completed in part using Dragon voice recognition software. Although reviewed after completion, some word and grammatical errors may occur       Review of Systems:   Review of Systems:  Skin:  Positive for scaling   Eyes:  Negative    ENT:  Negative    Respiratory:  Positive for dyspnea on exertion  Cardiovascular:  Negative    Gastroenterology: Negative    Genitourinary:  Negative    Musculoskeletal:  Negative    Neurologic:  Positive for numbness or tingling of feet;numbness or tingling of hands;headaches  Psychiatric:  Negative    Heme/Lymph/Imm:  Negative    Endocrine:  Negative                Physical Exam:     Vitals: BP 96/67 (BP Location: Right arm, Patient Position: Sitting, Cuff Size: Adult Regular)  Pulse 85  Wt 72.3 kg (159 lb 6.4 oz)  SpO2 95%  BMI 28.24 kg/m2  Constitutional:  cooperative, alert and oriented, well developed, well nourished, in no acute distress        Skin:  warm and dry to the touch        Head:  normocephalic        Eyes:  sclera white        ENT:  no pallor or cyanosis        Chest:  clear to auscultation        Cardiac: regular rhythm, normal S1/S2, no S3 or S4, apical impulse not displaced, no murmurs, gallops or rubs                  Abdomen:  not assessed this visit        Vascular: not assessed this visit                                      Extremities and Back:  no edema        Neurological:  affect appropriate                Medications:     Current Outpatient Prescriptions   Medication Sig Dispense Refill     Acetaminophen (TYLENOL PO) Take 1,000 mg by mouth every 6 hours as needed       albuterol (PROAIR HFA/PROVENTIL HFA/VENTOLIN HFA) 108 (90 BASE) MCG/ACT Inhaler Inhale 2 puffs into the lungs 4 times daily 1 Inhaler 2     aspirin EC 81 MG EC tablet Take 1 tablet (81 mg) by mouth daily 90 tablet 3     atorvastatin (LIPITOR) 40 MG tablet Take 1 tablet (40 mg) by mouth daily 90 tablet 3     isosorbide mononitrate (IMDUR) 30 MG 24 hr tablet Take 0.5 tablets (15 mg) by mouth daily 15 tablet 11     lidocaine-prilocaine (EMLA) cream Apply topically as needed for moderate pain 30 g 0     lisinopril (PRINIVIL/ZESTRIL) 5 MG tablet Take 1 tablet (5 mg) by mouth daily 90 tablet 2     LORazepam (ATIVAN) 0.5 MG tablet Take 1 tablet (0.5 mg) by mouth 2 times daily as needed for anxiety 30 tablet 5     metoprolol succinate (TOPROL-XL) 100 MG 24 hr tablet Take 1 tablet (100 mg) by mouth daily 30 tablet 3     Multiple Vitamins-Minerals (MULTIVITAMIN ADULT) TABS Take 1 tablet by mouth daily        nitroglycerin (NITROSTAT) 0.4 MG SL tablet Place 1 tablet (0.4 mg) under the tongue every 5 minutes as needed for chest pain Maximum of 3 doses in 15 minutes 25 tablet 3     prochlorperazine (COMPAZINE) 10 MG tablet Take 0.5 tablets (5 mg) by mouth every 6 hours as needed (Nausea/Vomiting) 30 tablet 3     ranitidine (ZANTAC) 75 MG tablet Take 1 tablet (75 mg) by mouth 2 times daily 30 tablet 11     [DISCONTINUED] isosorbide mononitrate (IMDUR) 30 MG 24 hr tablet Take 1 tablet (30 mg) by mouth daily 30 tablet 3           Family History   Problem Relation Age of Onset     CANCER Brother      HEART DISEASE Brother      CANCER Mother      Respiratory Father      HEART DISEASE Father      MI       Social History     Social History     Marital status:      Spouse name: N/A     Number of children: N/A     Years of education: N/A     Occupational History      Not on file.     Social History Main Topics     Smoking status: Former Smoker     Packs/day: 0.50     Types: Cigarettes     Quit date: 12/13/2014     Smokeless tobacco: Never Used     Alcohol use No     Drug use: No     Sexual activity: Not Currently     Other Topics Concern     Parent/Sibling W/ Cabg, Mi Or Angioplasty Before 65f 55m? 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     Coronary artery disease 12/2014    Inferior STEMI, stent to Circumflex     Percutaneous transluminal coronary angioplasty hematoma 12/15/2014              Past Surgical History:     Past Surgical History:   Procedure Laterality Date     APPENDECTOMY  1956     ESOPHAGOSCOPY, GASTROSCOPY, DUODENOSCOPY (EGD), COMBINED N/A 1/30/2018    Procedure: COMBINED ENDOSCOPIC ULTRASOUND, ESOPHAGOSCOPY, GASTROSCOPY, DUODENOSCOPY (EGD), FINE NEEDLE ASPIRATE/BIOPSY;   EUS;  Surgeon: Carlos Fletcher MD;  Location:  GI     EYE SURGERY       INSERT PORT VASCULAR ACCESS N/A 2/12/2018    Procedure: INSERT PORT VASCULAR ACCESS;  Port-a-Cath Placement;  Surgeon: Carlos Johnson MD;  Location: [facility] OR     PHACOEMULSIFICATION WITH STANDARD INTRAOCULAR LENS IMPLANT Left 1/25/2016    Procedure: PHACOEMULSIFICATION WITH STANDARD INTRAOCULAR LENS IMPLANT;  Surgeon: Julio César Wallace MD;  Location: [facility] OR     PHACOEMULSIFICATION WITH STANDARD INTRAOCULAR LENS IMPLANT Right 2/22/2016    Procedure: PHACOEMULSIFICATION WITH STANDARD INTRAOCULAR LENS IMPLANT;  Surgeon: Julio César Wallace MD;  Location: [facility] OR     SURGICAL HISTORY OF -   1961    right hand surgery      TONSILLECTOMY & ADENOIDECTOMY  1967     VASCULAR SURGERY  02/12/2018    PortaCath              Allergies:   Amoxicillin; Tetracycline; and Nickel       Data:   All laboratory data reviewed:    LAST CHOLESTEROL:  Lab Results    Component Value Date    CHOL 125 03/21/2017     Lab Results   Component Value Date    HDL 41 03/21/2017     Lab Results   Component Value Date    LDL 48 03/21/2017     Lab Results   Component Value Date    TRIG 182 03/21/2017     Lab Results   Component Value Date    CHOLHDLRATIO 6.1 12/13/2014       LAST BMP:  Lab Results   Component Value Date     05/29/2018      Lab Results   Component Value Date    POTASSIUM 4.0 05/29/2018     Lab Results   Component Value Date    CHLORIDE 104 05/29/2018     Lab Results   Component Value Date    AMADO 8.9 05/29/2018     Lab Results   Component Value Date    CO2 26 05/29/2018     Lab Results   Component Value Date    BUN 15 05/29/2018     Lab Results   Component Value Date    CR 0.52 05/29/2018     Lab Results   Component Value Date     05/29/2018       LAST CBC:  Lab Results   Component Value Date    WBC 11.0 05/29/2018     Lab Results   Component Value Date    RBC 3.76 05/29/2018     Lab Results   Component Value Date    HGB 11.0 05/29/2018     Lab Results   Component Value Date    HCT 33.1 05/29/2018     Lab Results   Component Value Date    MCV 88 05/29/2018     Lab Results   Component Value Date    MCH 29.3 05/29/2018     Lab Results   Component Value Date    MCHC 33.2 05/29/2018     Lab Results   Component Value Date    RDW 17.3 05/29/2018     Lab Results   Component Value Date     05/29/2018         ALEXSANDRA LAUREANO, CNP   none

## 2021-02-25 NOTE — ED PROVIDER NOTES
"  History     Chief Complaint   Patient presents with     Dog Bite     dog bite to lt hand, puncture wound and bruising to top of hand     HPI  Ines Pickard is a 76 year old female who presents with a dog bite to her left nondominant hand.  Patient stated she was playing with her dog and \"he got mad\".  He bit her on the dorsum of the left hand.  She also has a small V-shaped skin tear at the PIP of the finger.  She has bruising on the dorsum of the hand.  She denies paresthesias.  No other injury with the incident.  Her immunizations up-to-date.  His immunizations are up-to-date.  Other impression acute prior to arrival.  Patient presents close the finger tear continued to lose blood.  She is on aspirin.    I have reviewed the Medications, Allergies, Past Medical and Surgical History, and Social History in the Epic system.         Review of Systems all other systems reviewed and are negative.  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      Current Outpatient Prescriptions   Medication     sulfamethoxazole-trimethoprim (BACTRIM DS) 800-160 MG per tablet     clindamycin (CLEOCIN) 300 MG capsule     lisinopril (PRINIVIL/ZESTRIL) 5 MG tablet     atorvastatin (LIPITOR) 40 MG tablet     metoprolol (TOPROL-XL) 50 MG 24 hr tablet     Multiple Vitamins-Minerals " (MULTIVITAMIN ADULT) TABS     cyanocolbalamin (VITAMIN  B-12) 100 MCG tablet     ranitidine (ZANTAC) 75 MG tablet     aspirin EC 81 MG EC tablet     nitroglycerin (NITROSTAT) 0.4 MG SL tablet     Acetaminophen (TYLENOL PO)        Allergies   Allergen Reactions     Amoxicillin GI Disturbance     Tetracycline Other (See Comments)     Yeast infection     Nickel Rash     Social History     Social History     Marital status:      Spouse name: N/A     Number of children: N/A     Years of education: N/A     Occupational History     Not on file.     Social History Main Topics     Smoking status: Former Smoker     Packs/day: 0.50     Types: Cigarettes     Quit date: 12/13/2014     Smokeless tobacco: Never Used     Alcohol use No     Drug use: No     Sexual activity: Not Currently     Other Topics Concern     Parent/Sibling W/ Cabg, Mi Or Angioplasty Before 65f 55m? Yes     Social History Narrative     Family History   Problem Relation Age of Onset     CANCER Brother      HEART DISEASE Brother      CANCER Mother      Respiratory Father      HEART DISEASE Father      MI           Physical Exam   BP: 168/88  Pulse: 76  Temp: 97.5  F (36.4  C)  SpO2: 97 %  Physical Exam Gen. alert cooperative female in moderate distress.  Examination of the left hand shows a large contusion over the dorsum.  2 small puncture wounds on the dorsum.  No active bleeding.  On the middle finger she has a 4 mm V shaped laceration over the DIP.  The joint does not open.  Slight oozing of blood from this area.  CMS is intact distally.      ED Course     ED Course     Procedures             Critical Care time:  none               Labs Ordered and Resulted from Time of ED Arrival Up to the Time of Departure from the ED - No data to display  She had a wound irrigated with normal saline.  The patient Was Steri-Stripped to allow for drainage if infection occurs.  Assessments & Plan (with Medical Decision Making)   Patient is a 76-year-old female  presents with dog bite to her left nondominant hand.  Just a skin tear on her finger as above.  She is given information on dog bite.  Her tetanus and the dogs physicians are up-to-date.  She has problems with amoxicillin so did not want to take Augmentin.  She'll be given Bactrim and clindamycin for the bite.  Reasons to return for reassessment are discussed.  I have reviewed the nursing notes.    I have reviewed the findings, diagnosis, plan and need for follow up with the patient.       New Prescriptions    CLINDAMYCIN (CLEOCIN) 300 MG CAPSULE    Take 1 capsule (300 mg) by mouth 3 times daily for 10 days    SULFAMETHOXAZOLE-TRIMETHOPRIM (BACTRIM DS) 800-160 MG PER TABLET    Take 1 tablet by mouth 2 times daily for 7 days       Final diagnoses:   Dog bite, hand, left, initial encounter       6/23/2017   [facility] EMERGENCY DEPARTMENT     Patrick Rossi MD  06/23/17 4612     Yes

## 2024-06-28 NOTE — MR AVS SNAPSHOT
After Visit Summary   4/17/2018    Ines Pickard    MRN: [id number]           Patient Information     Date Of Birth          1940        Visit Information        Provider Department      4/17/2018 1:00 PM ROOM 1 [facility]        Today's Diagnoses     Need for prophylactic measure    -  1    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)           Follow-ups after your visit        Your next 10 appointments already scheduled     Apr 18, 2018  1:00 PM CDT   Level 2 with ROOM 10 [facility] ([facility])    [facility]  [address]  [facility] [address]   [phone]            May 07, 2018  8:00 AM CDT   Level 4 with ROOM 5 [facility] ([facility])    [facility]  [address]  [facility] [address]   [phone]            May 07, 2018  9:00 AM CDT   Return Visit with Tracy Miner MD   [facility] ([facility])    [facility]  [address]  [facility] [address]   [phone]            May 08, 2018  1:00 PM CDT   Level 2 with ROOM 2 [facility] ([facility])    [facility]  [address]  [facility] [address]   [phone]            May 09, 2018  1:30 PM CDT   Level 2 with ROOM 1 [facility] ([facility])    [facility]  [address]  [facility] [address]   [phone]              Who to contact     If you have questions or need follow up information about today's clinic visit or your schedule please contact [facility] [place] [facility] directly at [phone].  Normal or non-critical lab and imaging results will be communicated to you by MyChart, letter or phone within 4 business days after the clinic has received the results. If you do not hear from us within 7 days, please  [place]    Thank you for choosing [facility]  We know you have options when it comes to your healthcare; we appreciate that you chose us. Our goal is to provide exceptional  service and world class care to every patient.  You will be receiving a survey via email or text message asking for your feedback.  Please take a few minutes to share your thoughts about your recent visit. Your comments help us understand what we do well and ways we can improve.  Thank you in advance for your valuable feedback.      Dr. JAYE Hogan MD   Advance Care Planning     Advance Care Planning opens a door to talk about and write down your wishes before a sudden accident or illness.  Make your goals, values, and preferences known.     This puts you in the ’s seat and helps others know what matters most to you so they won’t have to guess.      Where can you learn more?    Go to [email or web address]   to learn how to:    Name someone you trust to make healthcare decisions for you, only if you can’t. (Healthcare Power of )    Document your wishes for care if you were seriously ill and not expected to recover or are approaching end of life. (Advance Directive or Living Will)    The same page can be found using the QR code below.                Starting a Weight Loss Plan: Care Instructions  Overview     It can be a challenge to lose weight. But your doctor can help you make a weight-loss plan that meets your needs.  You don't have to make a lot of big changes at once. A better idea might be to focus on small changes and stick with them. When those changes become habit, you can add a few more changes.  Some people find it helpful to take an exercise or nutrition class. If you have questions, ask your doctor about seeing a registered dietitian or an exercise specialist. You might also think about joining a weight-loss support group.  If you're not ready to make changes  "contact the clinic through [employer] or phone. If you have a critical or abnormal lab result, we will notify you by phone as soon as possible.  Submit refill requests through [employer] or call your pharmacy and they will forward the refill request to us. Please allow 3 business days for your refill to be completed.          Additional Information About Your Visit        [facility] Information     [employer] lets you send messages to your doctor, view your test results, renew your prescriptions, schedule appointments and more. To sign up, go to [email or web address] . Click on \"Log in\" on the left side of the screen, which will take you to the Welcome page. Then click on \"Sign up Now\" on the right side of the page.     You will be asked to enter the access code listed below, as well as some personal information. Please follow the directions to create your username and password.     Your access code is: [email or web address]  Expires: 2018  2:30 PM     Your access code will  in 90 days. If you need help or a new code, please call your [facility] clinic or [phone].        Care EveryWhere ID     This is your Care EveryWhere ID. This could be used by other organizations to access your [facility] medical records  [id number]        Your Vitals Were     Pulse                   93            Blood Pressure from Last 3 Encounters:   18 115/59   18 141/71   18 122/48    Weight from Last 3 Encounters:   18 74.4 kg (164 lb)   18 75.3 kg (166 lb 1.6 oz)   18 74.4 kg (164 lb)              Today, you had the following     No orders found for display       Primary Care Provider Office Phone # Fax #    R Emanuel Perez [phone] [address] [facility] [address]        Equal Access to Services     FEMI LOPEZ : Ronnie galvan Sodesi, waaxda luqadaha, qaybta kaalmada caroline, hoda taylor. So [facility] [phone].    ATENCIÓN: Si habla " español, tiene a nguyễn disposición servicios gratuitos de asistencia lingüística. Yefri wellington [phone].    We comply with applicable federal civil rights laws and [place] laws. We do not discriminate on the basis of race, color, national origin, age, disability, sex, sexual orientation, or gender identity.            Thank you!     Thank you for choosing [facility]  for your care. Our goal is always to provide you with excellent care. Hearing back from our patients is one way we can continue to improve our services. Please take a few minutes to complete the written survey that you may receive in the mail after your visit with us. Thank you!             Your Updated Medication List - Protect others around you: Learn how to safely use, store and throw away your medicines at www.disposemymeds.org.          This list is accurate as of 4/17/18  4:13 PM.  Always use your most recent med list.                   Brand Name Dispense Instructions for use Diagnosis    albuterol 108 (90 Base) MCG/ACT Inhaler    PROAIR HFA/PROVENTIL HFA/VENTOLIN HFA    1 Inhaler    Inhale 2 puffs into the lungs 4 times daily    Acute bronchitis with symptoms > 10 days       aspirin 81 MG EC tablet     90 tablet    Take 1 tablet (81 mg) by mouth daily    CAD (coronary artery disease)       atorvastatin 40 MG tablet    LIPITOR    90 tablet    Take 1 tablet (40 mg) by mouth daily    Coronary artery disease involving native coronary artery of native heart without angina pectoris       HYDROcodone-acetaminophen 5-325 MG per tablet    NORCO    20 tablet    Take 1-2 tablets by mouth every 4 hours as needed for moderate to severe pain    Post-op pain       IBUPROFEN PO      Take 200 mg by mouth every 4 hours as needed for moderate pain        lidocaine-prilocaine cream    EMLA    30 g    Apply topically as needed for moderate pain    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)       lisinopril 5 MG tablet    PRINIVIL/ZESTRIL    90 tablet    Take 1  tablet (5 mg) by mouth daily    Essential hypertension with goal blood pressure less than 140/90       LORazepam 0.5 MG tablet    ATIVAN    30 tablet    Take 1 tablet (0.5 mg) by mouth 2 times daily as needed for anxiety    Anxiety       metoprolol succinate 50 MG 24 hr tablet    TOPROL-XL    90 tablet    Take 1 tablet (50 mg) by mouth daily    Coronary artery disease involving native coronary artery of native heart without angina pectoris       MULTIVITAMIN ADULT Tabs      Take by mouth daily        nitroGLYcerin 0.4 MG sublingual tablet    NITROSTAT    25 tablet    Place 1 tablet (0.4 mg) under the tongue every 5 minutes as needed for chest pain Maximum of 3 doses in 15 minutes    CAD (coronary artery disease)       prochlorperazine 10 MG tablet    COMPAZINE    30 tablet    Take 0.5 tablets (5 mg) by mouth every 6 hours as needed (Nausea/Vomiting)    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

## 2025-06-24 NOTE — TELEPHONE ENCOUNTER
Faxed prescription to patient's pharmacy.    
Requested Prescriptions   Pending Prescriptions Disp Refills     LORazepam (ATIVAN) 0.5 MG tablet  Last Written Prescription Date:  04/25/2018  Last Fill Quantity: 30,  # refills: 0   Last office visit: 11/16/2017 with prescribing provider:  cristina   Future Office Visit:   Next 5 appointments (look out 90 days)     May 29, 2018  9:30 AM CDT   Return Visit with Tracy Miner MD   [facility] ([facility])    [facility]  [address]  [place] [address]   [phone]                  30 tablet 0     Sig: Take 1 tablet (0.5 mg) by mouth 2 times daily as needed for anxiety    There is no refill protocol information for this order        Bam CORONEL (R)    
Routing refill request to provider for review/approval because:  Drug not on the [facility] refill protocol     Sumaya BENAVIDEZ Rn          
X Size Of Defect In Cm (Optional): 0
Detail Level: Detailed
Other Plan: Graft vs Flap vs Second Intent

## (undated) DEVICE — ADHESIVE SWIFTSET 0.8ML OCTYL SS6

## (undated) DEVICE — NDL 22GA 1.5"

## (undated) DEVICE — GOWN XLG DISP 9545

## (undated) DEVICE — DECANTER BAG 2002S

## (undated) DEVICE — SPONGE RAY-TEC 4X8" 7318

## (undated) DEVICE — DRAPE SHEET REV FOLD 3/4 9349

## (undated) DEVICE — LIGHT HANDLE X2

## (undated) DEVICE — SU VICRYL 3-0 SH 27" UND J416H

## (undated) DEVICE — BLADE KNIFE SURG 15 371115

## (undated) DEVICE — DECANTER VIAL 2006S

## (undated) DEVICE — DRAPE C-ARM 60X42" 1013

## (undated) DEVICE — NDL COUNTER 20CT 31142493

## (undated) DEVICE — SU PROLENE 2-0 SHDA 36" 8523H

## (undated) DEVICE — SU VICRYL 3-0 SH 27" J316H

## (undated) DEVICE — BLADE KNIFE SURG 11 371111

## (undated) DEVICE — SU VICRYL 4-0 FS-2 27" J422-H

## (undated) DEVICE — SYR 10ML FINGER CONTROL W/O NDL 309695

## (undated) DEVICE — GLOVE PROTEXIS W/NEU-THERA 7.5  2D73TE75

## (undated) DEVICE — SYR 10ML LL W/O NDL

## (undated) DEVICE — ESU PENCIL W/COATED BLADE E2450H

## (undated) DEVICE — PREP CHLORAPREP 26ML TINTED ORANGE  260815

## (undated) DEVICE — SOL NACL 0.9% IRRIG 1000ML BOTTLE 07138-09

## (undated) DEVICE — PACK LAP TRANSVERSE STD

## (undated) DEVICE — SOL NACL 0.9% 100ML BAG 2B1302

## (undated) DEVICE — BASIN SET MINOR DISP

## (undated) DEVICE — LABEL MEDICATION SYSTEM  3304

## (undated) DEVICE — SOL WATER IRRIG 1000ML BOTTLE 07139-09

## (undated) RX ORDER — CLINDAMYCIN PHOSPHATE 900 MG/50ML
INJECTION, SOLUTION INTRAVENOUS
Status: DISPENSED
Start: 2018-02-12

## (undated) RX ORDER — LIDOCAINE HYDROCHLORIDE 10 MG/ML
INJECTION, SOLUTION INFILTRATION; PERINEURAL
Status: DISPENSED
Start: 2018-02-12

## (undated) RX ORDER — BUPIVACAINE HYDROCHLORIDE AND EPINEPHRINE 5; 5 MG/ML; UG/ML
INJECTION, SOLUTION EPIDURAL; INTRACAUDAL; PERINEURAL
Status: DISPENSED
Start: 2018-02-12

## (undated) RX ORDER — DEXAMETHASONE SODIUM PHOSPHATE 4 MG/ML
INJECTION, SOLUTION INTRA-ARTICULAR; INTRALESIONAL; INTRAMUSCULAR; INTRAVENOUS; SOFT TISSUE
Status: DISPENSED
Start: 2018-02-12

## (undated) RX ORDER — FENTANYL CITRATE 50 UG/ML
INJECTION, SOLUTION INTRAMUSCULAR; INTRAVENOUS
Status: DISPENSED
Start: 2018-02-12

## (undated) RX ORDER — LIDOCAINE HYDROCHLORIDE 20 MG/ML
INJECTION, SOLUTION EPIDURAL; INFILTRATION; INTRACAUDAL; PERINEURAL
Status: DISPENSED
Start: 2018-01-30

## (undated) RX ORDER — PROPOFOL 10 MG/ML
INJECTION, EMULSION INTRAVENOUS
Status: DISPENSED
Start: 2018-02-12

## (undated) RX ORDER — PROPOFOL 10 MG/ML
INJECTION, EMULSION INTRAVENOUS
Status: DISPENSED
Start: 2018-01-30

## (undated) RX ORDER — ONDANSETRON 2 MG/ML
INJECTION INTRAMUSCULAR; INTRAVENOUS
Status: DISPENSED
Start: 2018-02-12

## (undated) RX ORDER — HEPARIN SODIUM (PORCINE) LOCK FLUSH IV SOLN 100 UNIT/ML 100 UNIT/ML
SOLUTION INTRAVENOUS
Status: DISPENSED
Start: 2018-01-01

## (undated) RX ORDER — HEPARIN SODIUM (PORCINE) LOCK FLUSH IV SOLN 100 UNIT/ML 100 UNIT/ML
SOLUTION INTRAVENOUS
Status: DISPENSED
Start: 2019-01-01

## (undated) RX ORDER — GLYCOPYRROLATE 0.2 MG/ML
INJECTION, SOLUTION INTRAMUSCULAR; INTRAVENOUS
Status: DISPENSED
Start: 2018-01-30